# Patient Record
Sex: MALE | Race: WHITE | NOT HISPANIC OR LATINO | Employment: OTHER | URBAN - NONMETROPOLITAN AREA
[De-identification: names, ages, dates, MRNs, and addresses within clinical notes are randomized per-mention and may not be internally consistent; named-entity substitution may affect disease eponyms.]

---

## 2017-01-04 ENCOUNTER — TELEPHONE (OUTPATIENT)
Dept: FAMILY MEDICINE CLINIC | Facility: CLINIC | Age: 72
End: 2017-01-04

## 2017-01-04 NOTE — TELEPHONE ENCOUNTER
Patient called requesting a refill on Triamcinolone cream 0.1% says he uses it for a skin condition,not on med profile? Ok to add?    He said he does use it on his face that it was initally prescribed by Dr. Vasquez & she told him to use it on his face for eczema & has used it for 30 years & it has never caused any problems.    Patient notified.

## 2017-01-05 DIAGNOSIS — L30.9 ECZEMA, UNSPECIFIED TYPE: Primary | ICD-10-CM

## 2017-01-05 RX ORDER — TRIAMCINOLONE ACETONIDE 1 MG/G
CREAM TOPICAL 2 TIMES DAILY
Qty: 80 G | Refills: 0 | OUTPATIENT
Start: 2017-01-05 | End: 2017-10-10

## 2017-01-05 NOTE — TELEPHONE ENCOUNTER
I dont mind to send in script for cream but I  need to know where he is putting it because its a steroid cream and it can damage skin to face or groin.  What type of skin condition does he have?

## 2017-02-02 ENCOUNTER — TELEPHONE (OUTPATIENT)
Dept: FAMILY MEDICINE CLINIC | Facility: CLINIC | Age: 72
End: 2017-02-02

## 2017-02-02 NOTE — TELEPHONE ENCOUNTER
----- Message from Margarita Kidd MA sent at 2/2/2017  1:42 PM EST -----  Regarding: NEEDS MEDICATION  Contact: [phone]  PT CALLED AND SAID THAT HE HAS SEVERAL ABSCESSED TEETH ON THE BOTTOM AND WOULD LIKE IF ONE OF THE PROVIDERS COULD PLEASE WRITE HIM AN ANTIBIOTIC.

## 2017-02-02 NOTE — TELEPHONE ENCOUNTER
He does need to be seen, I'm sorry, especially with his allergy to penicillins. Need to see how bad they are and which antibiotic he will need.

## 2017-03-09 ENCOUNTER — OFFICE VISIT (OUTPATIENT)
Dept: FAMILY MEDICINE CLINIC | Facility: CLINIC | Age: 72
End: 2017-03-09

## 2017-03-09 VITALS
OXYGEN SATURATION: 92 % | DIASTOLIC BLOOD PRESSURE: 75 MMHG | WEIGHT: 152.2 LBS | HEIGHT: 68 IN | BODY MASS INDEX: 23.07 KG/M2 | SYSTOLIC BLOOD PRESSURE: 114 MMHG | HEART RATE: 71 BPM | TEMPERATURE: 98.8 F

## 2017-03-09 DIAGNOSIS — R51.9 HEADACHE, UNSPECIFIED HEADACHE TYPE: ICD-10-CM

## 2017-03-09 DIAGNOSIS — J44.1 COPD EXACERBATION (HCC): Primary | ICD-10-CM

## 2017-03-09 PROCEDURE — 99213 OFFICE O/P EST LOW 20 MIN: CPT | Performed by: NURSE PRACTITIONER

## 2017-03-09 RX ORDER — IBUPROFEN 800 MG/1
800 TABLET ORAL EVERY 8 HOURS PRN
Qty: 90 TABLET | Refills: 2 | Status: SHIPPED | OUTPATIENT
Start: 2017-03-09 | End: 2017-06-08 | Stop reason: SDUPTHER

## 2017-03-09 RX ORDER — AMOXICILLIN 875 MG/1
875 TABLET, COATED ORAL 2 TIMES DAILY
Qty: 20 TABLET | Refills: 0 | Status: SHIPPED | OUTPATIENT
Start: 2017-03-09 | End: 2017-06-08

## 2017-03-09 NOTE — PROGRESS NOTES
Subjective   Paul Gomez is a 71 y.o. male.     History of Present Illness   Pt here today with c/o chest congestion and cough for the past 3-4 weeks.  He has a hx of COPD and has been taking his medication as prescribed.  He is using his rescue inhaler couple times daily.  He denies fever, sore throat, ear pain, n/v/d.  He is having some production with cough that is greenish in color.  He has not taken any medication otc to help with his symptoms.   He does state that he has an occasional headache.  He does take Ibuprofen when needed to help with pain.      Family History   Problem Relation Age of Onset   • Cancer Mother        Social History     Social History   • Marital status:      Spouse name: N/A   • Number of children: N/A   • Years of education: N/A     Occupational History   • Not on file.     Social History Main Topics   • Smoking status: Current Every Day Smoker     Packs/day: 0.50     Types: Cigarettes   • Smokeless tobacco: Never Used      Comment: patient stated he is down to about 4 cigarettes a day   • Alcohol use No   • Drug use: No   • Sexual activity: Not on file     Other Topics Concern   • Not on file     Social History Narrative       Past Medical History   Diagnosis Date   • COPD (chronic obstructive pulmonary disease)    • Gout    • Hearing loss    • History of degenerative disc disease    • Hyperlipidemia    • Hypertension    • Low back pain    • Pedal edema    • Prediabetes        Review of Systems   Constitutional: Negative.    HENT: Positive for congestion.    Respiratory: Positive for cough. Negative for shortness of breath and wheezing.    Cardiovascular: Negative.    Gastrointestinal: Negative.    Genitourinary: Negative.    Neurological: Positive for headaches.       Objective   Physical Exam   Constitutional: He is oriented to person, place, and time. He appears well-developed and well-nourished.   Neck: Normal range of motion. Neck supple.   Cardiovascular: Normal rate,  "regular rhythm and normal heart sounds.    Pulmonary/Chest: Effort normal. He has wheezes.   Neurological: He is alert and oriented to person, place, and time.   Skin: Skin is warm and dry.   Psychiatric: He has a normal mood and affect. His behavior is normal. Judgment and thought content normal.   Nursing note and vitals reviewed.      Procedures  Blood pressure 114/75, pulse 71, temperature 98.8 °F (37.1 °C), temperature source Oral, height 68\" (172.7 cm), weight 152 lb 3.2 oz (69 kg), SpO2 92 %.      Assessment/Plan   Ray was seen today for uri and cough.    Diagnoses and all orders for this visit:    COPD exacerbation  -     amoxicillin (AMOXIL) 875 MG tablet; Take 1 tablet by mouth 2 (Two) Times a Day.    Headache, unspecified headache type  -     ibuprofen (ADVIL,MOTRIN) 800 MG tablet; Take 1 tablet by mouth Every 8 (Eight) Hours As Needed for Mild Pain (1-3).      RTC in 3-4 days if s/s do not improve         "

## 2017-06-08 ENCOUNTER — OFFICE VISIT (OUTPATIENT)
Dept: FAMILY MEDICINE CLINIC | Facility: CLINIC | Age: 72
End: 2017-06-08

## 2017-06-08 VITALS
BODY MASS INDEX: 23.98 KG/M2 | WEIGHT: 158.2 LBS | HEIGHT: 68 IN | SYSTOLIC BLOOD PRESSURE: 107 MMHG | HEART RATE: 56 BPM | DIASTOLIC BLOOD PRESSURE: 65 MMHG | OXYGEN SATURATION: 93 %

## 2017-06-08 DIAGNOSIS — J44.9 CHRONIC OBSTRUCTIVE PULMONARY DISEASE, UNSPECIFIED COPD TYPE (HCC): ICD-10-CM

## 2017-06-08 DIAGNOSIS — I10 ESSENTIAL HYPERTENSION: ICD-10-CM

## 2017-06-08 DIAGNOSIS — R51.9 HEADACHE, UNSPECIFIED HEADACHE TYPE: ICD-10-CM

## 2017-06-08 DIAGNOSIS — Z72.0 TOBACCO ABUSE: Primary | ICD-10-CM

## 2017-06-08 DIAGNOSIS — M10.9 GOUT, UNSPECIFIED CAUSE, UNSPECIFIED CHRONICITY, UNSPECIFIED SITE: ICD-10-CM

## 2017-06-08 DIAGNOSIS — R07.89 CHEST WALL PAIN: ICD-10-CM

## 2017-06-08 DIAGNOSIS — R35.1 NOCTURIA: ICD-10-CM

## 2017-06-08 DIAGNOSIS — E78.2 MIXED HYPERLIPIDEMIA: ICD-10-CM

## 2017-06-08 PROCEDURE — 99214 OFFICE O/P EST MOD 30 MIN: CPT | Performed by: NURSE PRACTITIONER

## 2017-06-08 PROCEDURE — 93000 ELECTROCARDIOGRAM COMPLETE: CPT | Performed by: NURSE PRACTITIONER

## 2017-06-08 RX ORDER — IBUPROFEN 800 MG/1
800 TABLET ORAL EVERY 8 HOURS PRN
Qty: 90 TABLET | Refills: 2 | Status: SHIPPED | OUTPATIENT
Start: 2017-06-08 | End: 2018-05-18 | Stop reason: SDUPTHER

## 2017-06-08 RX ORDER — ATENOLOL 25 MG/1
25 TABLET ORAL 2 TIMES DAILY
Qty: 60 TABLET | Refills: 5 | Status: SHIPPED | OUTPATIENT
Start: 2017-06-08 | End: 2017-12-05 | Stop reason: SDUPTHER

## 2017-06-08 RX ORDER — ALBUTEROL SULFATE 90 UG/1
2 AEROSOL, METERED RESPIRATORY (INHALATION) EVERY 4 HOURS PRN
Qty: 1 INHALER | Refills: 5 | Status: SHIPPED | OUTPATIENT
Start: 2017-06-08 | End: 2018-06-13

## 2017-06-08 RX ORDER — ALLOPURINOL 100 MG/1
100 TABLET ORAL 2 TIMES DAILY
Qty: 60 TABLET | Refills: 5 | Status: SHIPPED | OUTPATIENT
Start: 2017-06-08 | End: 2017-09-05

## 2017-06-08 RX ORDER — PRAVASTATIN SODIUM 20 MG
20 TABLET ORAL DAILY
Qty: 30 TABLET | Refills: 2 | Status: SHIPPED | OUTPATIENT
Start: 2017-06-08 | End: 2018-06-13

## 2017-06-08 RX ORDER — NICOTINE 21 MG/24HR
1 PATCH, TRANSDERMAL 24 HOURS TRANSDERMAL EVERY 24 HOURS
Qty: 28 PATCH | Refills: 0 | Status: SHIPPED | OUTPATIENT
Start: 2017-06-08 | End: 2018-06-13

## 2017-06-08 NOTE — PROGRESS NOTES
Subjective   Paul Gomez is a 71 y.o. male.     Chief Complaint: Follow-up and COPD    History of Present Illness   Pt here for follow up on HTN, HLD, COPD and chronic pain and headaches.   Patient has a history of chronic pain secondary to his spine (has osteoarthritis in his L4-L5) and is currently on ibuprofen 800 mg orally TID, oxycodone 30 mg orally TID, and opana 30 mg orally BID and lyrica 75 mg orally BID. He is going to a pain clinic in [place]. All medications are given to him by a pain clinic in [place], [place]; we do not prescribe. Patient states that these medications are working, but he continues to have pain. Denies any side effects of the medication. States that the medications control the pain enough so that he can accomplish daily activities. Movement and ambulation are improved. Denies any sedation from the medications.  Pt has requested Fioricet for headaches today.  He states that he has taken these before for his headaches and they help to relieve his pain.  I have discussed with him today that he will need to discuss this medication with his pain clinic provider since they are prescribing other narcotic pain meds for the patient.  He has stated understanding today.        Patient has a history of hypertension and is on atenolol 25 mg orally BID. Denies any side effects of the medication. Denies any dizziness, lightheadedness, blurry vision, chest pain, or edema. Patient does not take his blood pressures at home. Tries to follow a low-salt diet.  His BP is controlled today at 107/65.    Patient also has a history of hyperlipidemia and is currently on pravastatin 20 mg orally daily. Denies any side effects of the medications. Last lipid panel was April 2016 and in normal limits. I have discussed with patient today that lipids and CMP needs to be checked due to his medications and he is agreeable.  He is not fasting today and agrees to come back to the office next week for repeat fasting  labs.  Denies any muscle weakness or side effects from medication. Tries to follow a low cholesterol diet.    Patient does have a history of gout and is currently on allopurinol 100 mg orally BID. No recent breakouts. No side effects of the medication. Doing well.  No changes in gout.     Pt states that a couple of weeks ago he was working on his vehicle and trying to get a lugnut off his vehicle and he felt some soreness in his upper chest area.  The area does continue to be tender to touch/palpation.  He denies any radiation of the pain.  He denies any SOA with the pain.  He is continuing to do his ADLs without any further chest wall pain or tenderness.    Pt states that he has not had his PSA checked in couple of years.  After questioning patient, he does have issues with nocturia.  He states that he does get up twice at night to void.  He denies any hesitation or weak urine stream.  He also denies any penile pain, testicular pain or abdominal pain.    Family History   Problem Relation Age of Onset   • Cancer Mother        Social History     Social History   • Marital status:      Spouse name: N/A   • Number of children: N/A   • Years of education: N/A     Occupational History   • Not on file.     Social History Main Topics   • Smoking status: Current Every Day Smoker     Packs/day: 0.50     Types: Cigarettes   • Smokeless tobacco: Never Used      Comment: patient stated he is down to about 4 cigarettes a day   • Alcohol use No   • Drug use: No   • Sexual activity: Not on file     Other Topics Concern   • Not on file     Social History Narrative       Past Medical History:   Diagnosis Date   • COPD (chronic obstructive pulmonary disease)    • Gout    • Hearing loss    • History of degenerative disc disease    • Hyperlipidemia    • Hypertension    • Low back pain    • Pedal edema    • Prediabetes        Review of Systems   Constitutional: Negative.    Respiratory: Negative.    Cardiovascular: Negative.   "  Gastrointestinal: Negative.    Genitourinary: Negative.    Musculoskeletal: Positive for back pain and neck pain.   Neurological: Positive for headaches.   Psychiatric/Behavioral: Negative.        Objective   Physical Exam   Constitutional: He is oriented to person, place, and time. He appears well-developed and well-nourished.   Neck: Normal range of motion. Neck supple.   Cardiovascular: Normal rate, regular rhythm and normal heart sounds.    Pulmonary/Chest: Effort normal and breath sounds normal.   Neurological: He is alert and oriented to person, place, and time.   Skin: Skin is warm and dry.   Psychiatric: He has a normal mood and affect. His behavior is normal. Judgment and thought content normal.   Nursing note and vitals reviewed.        ECG 12 Lead  Date/Time: 6/8/2017 3:40 PM  Performed by: ZOË VIRK  Authorized by: ZOË VIRK   Comparison: not compared with previous ECG   Rhythm: sinus bradycardia  Rate: bradycardic  Conduction: non-specific intraventricular conduction delay  ST Segments: ST segments normal  T Waves: T waves normal  QRS axis: normal  Other: no other findings  Clinical impression: non-specific ECG            Vitals: Blood pressure 107/65, pulse 56, height 68\" (172.7 cm), weight 158 lb 3.2 oz (71.8 kg), SpO2 93 %.    Allergies:   Allergies   Allergen Reactions   • Penicillins      Pt states he was allergic when he was 18 but has had it since and did not have any problems.           Assessment/Plan   Ray was seen today for follow-up and copd.    Diagnoses and all orders for this visit:    Tobacco abuse  -     CBC & Differential  -     Comprehensive Metabolic Panel  -     Magnesium  -     TSH  -     Vitamin B12  -     Uric Acid  -     nicotine (NICODERM CQ) 21 MG/24HR patch; Place 1 patch on the skin Daily.    Headache, unspecified headache type  -     ibuprofen (ADVIL,MOTRIN) 800 MG tablet; Take 1 tablet by mouth Every 8 (Eight) Hours As Needed for Mild Pain (1-3) " or Headache.  -     CBC & Differential  -     Comprehensive Metabolic Panel  -     Magnesium  -     TSH  -     Vitamin B12  -     Uric Acid    Essential hypertension  -     atenolol (TENORMIN) 25 MG tablet; Take 1 tablet by mouth 2 (Two) Times a Day.  -     CBC & Differential  -     Comprehensive Metabolic Panel  -     Magnesium  -     TSH  -     Vitamin B12  -     Uric Acid  -     ECG 12 Lead    Mixed hyperlipidemia  -     pravastatin (PRAVACHOL) 20 MG tablet; Take 1 tablet by mouth Daily.  -     CBC & Differential  -     Comprehensive Metabolic Panel  -     Magnesium  -     TSH  -     Vitamin B12  -     Uric Acid  -     Lipid Panel    Chronic obstructive pulmonary disease, unspecified COPD type  -     albuterol (PROVENTIL HFA;VENTOLIN HFA) 108 (90 BASE) MCG/ACT inhaler; Inhale 2 puffs Every 4 (Four) Hours As Needed for Shortness of Air.  -     CBC & Differential  -     Comprehensive Metabolic Panel  -     Magnesium  -     TSH  -     Vitamin B12  -     Uric Acid    Gout, unspecified cause, unspecified chronicity, unspecified site  -     allopurinol (ZYLOPRIM) 100 MG tablet; Take 1 tablet by mouth 2 (Two) Times a Day.  -     CBC & Differential  -     Comprehensive Metabolic Panel  -     Magnesium  -     TSH  -     Vitamin B12  -     Uric Acid    Nocturia   -     PSA    Chest wall pain

## 2017-06-14 ENCOUNTER — TELEPHONE (OUTPATIENT)
Dept: FAMILY MEDICINE CLINIC | Facility: CLINIC | Age: 72
End: 2017-06-14

## 2017-06-14 LAB
ALBUMIN SERPL-MCNC: 4 G/DL (ref 3.4–4.8)
ALBUMIN/GLOB SERPL: 1.3 G/DL (ref 1.5–2.5)
ALP SERPL-CCNC: 69 U/L (ref 40–129)
ALT SERPL W P-5'-P-CCNC: 11 U/L (ref 10–44)
ANION GAP SERPL CALCULATED.3IONS-SCNC: 4.1 MMOL/L (ref 3.6–11.2)
AST SERPL-CCNC: 18 U/L (ref 10–34)
BASOPHILS # BLD AUTO: 0.04 10*3/MM3 (ref 0–0.3)
BASOPHILS NFR BLD AUTO: 0.6 % (ref 0–2)
BILIRUB SERPL-MCNC: 0.2 MG/DL (ref 0.2–1.8)
BUN BLD-MCNC: 14 MG/DL (ref 7–21)
BUN/CREAT SERPL: 10.8 (ref 7–25)
CALCIUM SPEC-SCNC: 9.1 MG/DL (ref 7.7–10)
CHLORIDE SERPL-SCNC: 104 MMOL/L (ref 99–112)
CHOLEST SERPL-MCNC: 190 MG/DL (ref 0–200)
CO2 SERPL-SCNC: 34.9 MMOL/L (ref 24.3–31.9)
CREAT BLD-MCNC: 1.3 MG/DL (ref 0.43–1.29)
DEPRECATED RDW RBC AUTO: 44.7 FL (ref 37–54)
EOSINOPHIL # BLD AUTO: 1.12 10*3/MM3 (ref 0–0.7)
EOSINOPHIL NFR BLD AUTO: 16.4 % (ref 0–7)
ERYTHROCYTE [DISTWIDTH] IN BLOOD BY AUTOMATED COUNT: 13.2 % (ref 11.5–14.5)
GFR SERPL CREATININE-BSD FRML MDRD: 54 ML/MIN/1.73
GLOBULIN UR ELPH-MCNC: 3.1 GM/DL
GLUCOSE BLD-MCNC: 106 MG/DL (ref 70–110)
HCT VFR BLD AUTO: 40.3 % (ref 42–52)
HDLC SERPL-MCNC: 44 MG/DL (ref 60–100)
HGB BLD-MCNC: 13.2 G/DL (ref 14–18)
IMM GRANULOCYTES # BLD: 0.02 10*3/MM3 (ref 0–0.03)
IMM GRANULOCYTES NFR BLD: 0.3 % (ref 0–0.5)
LDLC SERPL CALC-MCNC: 126 MG/DL (ref 0–100)
LDLC/HDLC SERPL: 2.86 {RATIO}
LYMPHOCYTES # BLD AUTO: 1.9 10*3/MM3 (ref 1–3)
LYMPHOCYTES NFR BLD AUTO: 27.9 % (ref 16–46)
MAGNESIUM SERPL-MCNC: 2.3 MG/DL (ref 1.7–2.6)
MCH RBC QN AUTO: 30.3 PG (ref 27–33)
MCHC RBC AUTO-ENTMCNC: 32.8 G/DL (ref 33–37)
MCV RBC AUTO: 92.6 FL (ref 80–94)
MONOCYTES # BLD AUTO: 0.78 10*3/MM3 (ref 0.1–0.9)
MONOCYTES NFR BLD AUTO: 11.4 % (ref 0–12)
NEUTROPHILS # BLD AUTO: 2.96 10*3/MM3 (ref 1.4–6.5)
NEUTROPHILS NFR BLD AUTO: 43.4 % (ref 40–75)
OSMOLALITY SERPL CALC.SUM OF ELEC: 285.9 MOSM/KG (ref 273–305)
PLATELET # BLD AUTO: 204 10*3/MM3 (ref 130–400)
PMV BLD AUTO: 11.5 FL (ref 6–10)
POTASSIUM BLD-SCNC: 4.6 MMOL/L (ref 3.5–5.3)
PROT SERPL-MCNC: 7.1 G/DL (ref 6–8)
PSA SERPL-MCNC: 0.51 NG/ML (ref 0–4)
RBC # BLD AUTO: 4.35 10*6/MM3 (ref 4.7–6.1)
SODIUM BLD-SCNC: 143 MMOL/L (ref 135–153)
TRIGL SERPL-MCNC: 100 MG/DL (ref 0–150)
TSH SERPL DL<=0.05 MIU/L-ACNC: 6.37 MIU/ML (ref 0.55–4.78)
URATE SERPL-MCNC: 7.5 MG/DL (ref 3.7–7)
VIT B12 BLD-MCNC: 267 PG/ML (ref 211–911)
VLDLC SERPL-MCNC: 20 MG/DL
WBC NRBC COR # BLD: 6.82 10*3/MM3 (ref 4.5–12.5)

## 2017-06-14 PROCEDURE — 85025 COMPLETE CBC W/AUTO DIFF WBC: CPT | Performed by: NURSE PRACTITIONER

## 2017-06-14 PROCEDURE — 82607 VITAMIN B-12: CPT | Performed by: NURSE PRACTITIONER

## 2017-06-14 PROCEDURE — 84153 ASSAY OF PSA TOTAL: CPT | Performed by: NURSE PRACTITIONER

## 2017-06-14 PROCEDURE — 83735 ASSAY OF MAGNESIUM: CPT | Performed by: NURSE PRACTITIONER

## 2017-06-14 PROCEDURE — 36415 COLL VENOUS BLD VENIPUNCTURE: CPT | Performed by: NURSE PRACTITIONER

## 2017-06-14 PROCEDURE — 84443 ASSAY THYROID STIM HORMONE: CPT | Performed by: NURSE PRACTITIONER

## 2017-06-14 PROCEDURE — 80061 LIPID PANEL: CPT | Performed by: NURSE PRACTITIONER

## 2017-06-14 PROCEDURE — 84550 ASSAY OF BLOOD/URIC ACID: CPT | Performed by: NURSE PRACTITIONER

## 2017-06-14 PROCEDURE — 80053 COMPREHEN METABOLIC PANEL: CPT | Performed by: NURSE PRACTITIONER

## 2017-06-14 RX ORDER — LEVOTHYROXINE SODIUM 0.05 MG/1
50 TABLET ORAL DAILY
Qty: 30 TABLET | Refills: 5 | Status: SHIPPED | OUTPATIENT
Start: 2017-06-14 | End: 2018-06-13

## 2017-06-14 NOTE — TELEPHONE ENCOUNTER
----- Message from BEATRICE Vallejo sent at 6/14/2017  2:17 PM EDT -----  TSH is elevated.  This is new for him; dx of hypothyroidism.  Need to start him on levothyroxine 50mcg.  Sent to pharmacy.  Needs to have recheck of TSH in 6 weeks.   His vit B12 level is on the low side.  He might consider taking B12 liquid otc.  Otherwise, continue current medications and plan of care.   He also needs to  occult blood stool cards due to his hgb a little low.  And he needs to have a colonoscopy done if he has not had one in the past few years.      Not available at this time,will attempt later.      Still not available.    Still unable to reach patient letter mailed to call the office.

## 2017-06-28 ENCOUNTER — TELEPHONE (OUTPATIENT)
Dept: FAMILY MEDICINE CLINIC | Facility: CLINIC | Age: 72
End: 2017-06-28

## 2017-06-28 NOTE — TELEPHONE ENCOUNTER
Telephone Encounter  Encounter Date: 6/14/2017  Nanci Ricketts LPN      []Hide copied text  ----- Message from BEATRICE Vallejo sent at 6/14/2017 2:17 PM EDT -----  TSH is elevated. This is new for him; dx of hypothyroidism. Need to start him on levothyroxine 50mcg. Sent to pharmacy. Needs to have recheck of TSH in 6 weeks.   His vit B12 level is on the low side. He might consider taking B12 liquid otc.  Otherwise, continue current medications and plan of care.   He also needs to  occult blood stool cards due to his hgb a little low. And he needs to have a colonoscopy done if he has not had one in the past few years.        Not available at this time,will attempt later.        Still not available.     Still unable to reach patient letter mailed to call the office.           Telephone on 6/14/2017              Revision History              Detailed Report         Patient returned call after receiving letter & was notified  Of labs & verbalized understanding,states he would be agreeable to having a colonscopy,has never had one.

## 2017-06-29 DIAGNOSIS — Z12.11 SCREENING FOR COLON CANCER: Primary | ICD-10-CM

## 2017-08-07 ENCOUNTER — TELEPHONE (OUTPATIENT)
Dept: FAMILY MEDICINE CLINIC | Facility: CLINIC | Age: 72
End: 2017-08-07

## 2017-08-07 RX ORDER — FLUTICASONE PROPIONATE 50 MCG
2 SPRAY, SUSPENSION (ML) NASAL DAILY
Qty: 1 EACH | Refills: 2 | Status: SHIPPED | OUTPATIENT
Start: 2017-08-07 | End: 2018-06-13

## 2017-09-05 ENCOUNTER — OFFICE VISIT (OUTPATIENT)
Dept: FAMILY MEDICINE CLINIC | Facility: CLINIC | Age: 72
End: 2017-09-05

## 2017-09-05 VITALS
HEIGHT: 68 IN | TEMPERATURE: 99.1 F | SYSTOLIC BLOOD PRESSURE: 148 MMHG | BODY MASS INDEX: 24.1 KG/M2 | DIASTOLIC BLOOD PRESSURE: 80 MMHG | HEART RATE: 60 BPM | WEIGHT: 159 LBS | OXYGEN SATURATION: 95 %

## 2017-09-05 DIAGNOSIS — J44.1 COPD WITH EXACERBATION (HCC): ICD-10-CM

## 2017-09-05 DIAGNOSIS — E03.9 HYPOTHYROIDISM, UNSPECIFIED TYPE: ICD-10-CM

## 2017-09-05 DIAGNOSIS — E53.8 B12 DEFICIENCY: ICD-10-CM

## 2017-09-05 DIAGNOSIS — L21.9 SEBORRHEIC ECZEMA: Primary | ICD-10-CM

## 2017-09-05 PROCEDURE — 99214 OFFICE O/P EST MOD 30 MIN: CPT | Performed by: NURSE PRACTITIONER

## 2017-09-05 PROCEDURE — 96372 THER/PROPH/DIAG INJ SC/IM: CPT | Performed by: NURSE PRACTITIONER

## 2017-09-05 RX ORDER — TIZANIDINE 4 MG/1
TABLET ORAL
COMMUNITY
Start: 2017-06-28 | End: 2018-06-13

## 2017-09-05 RX ORDER — SELENIUM SULFIDE 2.5 MG/100ML
LOTION TOPICAL DAILY PRN
Qty: 118 ML | Refills: 12 | Status: SHIPPED | OUTPATIENT
Start: 2017-09-05 | End: 2018-06-13

## 2017-09-05 RX ORDER — BUTALBITAL, ACETAMINOPHEN AND CAFFEINE 50; 325; 40 MG/1; MG/1; MG/1
TABLET ORAL
COMMUNITY
Start: 2017-08-31 | End: 2018-06-13

## 2017-09-05 RX ORDER — HYDROMORPHONE HYDROCHLORIDE 8 MG/1
TABLET ORAL
COMMUNITY
Start: 2017-08-28 | End: 2018-06-13

## 2017-09-05 RX ORDER — AMOXICILLIN 875 MG/1
875 TABLET, COATED ORAL 2 TIMES DAILY
Qty: 20 TABLET | Refills: 0 | OUTPATIENT
Start: 2017-09-05 | End: 2017-10-10

## 2017-09-05 RX ORDER — CYANOCOBALAMIN 1000 UG/ML
1000 INJECTION, SOLUTION INTRAMUSCULAR; SUBCUTANEOUS
Status: DISCONTINUED | OUTPATIENT
Start: 2017-09-05 | End: 2018-06-13

## 2017-09-05 RX ADMIN — CYANOCOBALAMIN 1000 MCG: 1000 INJECTION, SOLUTION INTRAMUSCULAR; SUBCUTANEOUS at 15:36

## 2017-09-05 NOTE — PROGRESS NOTES
Subjective   Paul Gomez is a 71 y.o. male.     Chief Complaint: Cyst (top of head ) and URI    History of Present Illness   Patient here today with complaints of a small raised area on top of his scalp.  Patient states that he has had this area for several months on his scalp.  He does have a history of dermatitis and his used Selsun Blue in the past to help with his symptoms.  Patient states he does not use Selsun Blue at this time.  He uses multiple over-the-counter regular shampoos.  He denies any bleeding from the area.  He denies any tenderness at the area also.    COPD.  Patient has been using his albuterol inhaler when necessary.  He states over the past 2 weeks he has had increased cough with yellow production.  He denies fever, nausea, vomiting, diarrhea, headache, sore throat.  Patient states that he does have episodes of shortness of breath especially with coughing.  He has not tried any over-the-counter medications as at this time.  Patient does use Flonase when necessary.    Chronic pain syndrome.  Patient continues to see pain management in [place] for his chronic pain.    Family History   Problem Relation Age of Onset   • Cancer Mother        Social History     Social History   • Marital status:      Spouse name: N/A   • Number of children: N/A   • Years of education: N/A     Occupational History   • Not on file.     Social History Main Topics   • Smoking status: Current Every Day Smoker     Packs/day: 0.50     Types: Cigarettes   • Smokeless tobacco: Never Used      Comment: patient stated he is down to about 4 cigarettes a day   • Alcohol use No   • Drug use: No   • Sexual activity: Not on file     Other Topics Concern   • Not on file     Social History Narrative       Past Medical History:   Diagnosis Date   • COPD (chronic obstructive pulmonary disease)    • Gout    • Hearing loss    • History of degenerative disc disease    • Hyperlipidemia    • Hypertension    • Low back pain    •  "Pedal edema    • Prediabetes        Review of Systems   Constitutional: Positive for fatigue.   HENT: Positive for postnasal drip.    Respiratory: Positive for cough, shortness of breath and wheezing.    Cardiovascular: Negative.    Gastrointestinal: Negative.    Musculoskeletal: Negative.    Skin:        Seborrheic dermatitis   Psychiatric/Behavioral: Negative.        Objective   Physical Exam   Constitutional: He is oriented to person, place, and time. He appears well-developed and well-nourished.   Neck: Normal range of motion. Neck supple.   Cardiovascular: Normal rate, regular rhythm and normal heart sounds.    Pulmonary/Chest: Effort normal and breath sounds normal.   Neurological: He is alert and oriented to person, place, and time.   Skin: Skin is warm and dry.   Seborrheic dermatitis top of scalp area   Psychiatric: He has a normal mood and affect. His behavior is normal. Judgment and thought content normal.   Nursing note and vitals reviewed.      Procedures    Vitals: Blood pressure 148/80, pulse 60, temperature 99.1 °F (37.3 °C), temperature source Oral, height 68\" (172.7 cm), weight 159 lb (72.1 kg), SpO2 95 %.    Allergies:   Allergies   Allergen Reactions   • Penicillins      Pt states he was allergic when he was 18 but has had it since and did not have any problems.           Assessment/Plan   Ray was seen today for cyst and uri.    Diagnoses and all orders for this visit:    Seborrheic eczema  -     selenium sulfide (SELSUN) 2.5 % shampoo; Apply  topically Daily As Needed for dandruff.    COPD with exacerbation  -     amoxicillin (AMOXIL) 875 MG tablet; Take 1 tablet by mouth 2 (Two) Times a Day.  -     mometasone-formoterol (DULERA) 100-5 MCG/ACT inhaler; Inhale 1 puff 2 (Two) Times a Day.    Hypothyroidism, unspecified type    B12 deficiency  -     cyanocobalamin injection 1,000 mcg; Inject 1 mL into the shoulder, thigh, or buttocks Every 28 (Twenty-Eight) Days.               "

## 2017-09-06 ENCOUNTER — CLINICAL SUPPORT (OUTPATIENT)
Dept: FAMILY MEDICINE CLINIC | Facility: CLINIC | Age: 72
End: 2017-09-06

## 2017-09-06 DIAGNOSIS — E53.8 VITAMIN B12 DEFICIENCY: Primary | ICD-10-CM

## 2017-09-06 PROCEDURE — 96372 THER/PROPH/DIAG INJ SC/IM: CPT | Performed by: NURSE PRACTITIONER

## 2017-09-06 RX ORDER — CYANOCOBALAMIN 1000 UG/ML
1000 INJECTION, SOLUTION INTRAMUSCULAR; SUBCUTANEOUS
Status: DISCONTINUED | OUTPATIENT
Start: 2017-09-06 | End: 2018-06-13

## 2017-09-06 RX ADMIN — CYANOCOBALAMIN 1000 MCG: 1000 INJECTION, SOLUTION INTRAMUSCULAR; SUBCUTANEOUS at 15:14

## 2017-10-03 ENCOUNTER — HOSPITAL ENCOUNTER (EMERGENCY)
Facility: HOSPITAL | Age: 72
Discharge: HOME | End: 2017-10-03
Attending: EMERGENCY MEDICINE | Admitting: EMERGENCY MEDICINE

## 2017-10-03 ENCOUNTER — APPOINTMENT (OUTPATIENT)
Dept: GENERAL RADIOLOGY | Facility: HOSPITAL | Age: 72
End: 2017-10-03

## 2017-10-03 VITALS
HEART RATE: 79 BPM | TEMPERATURE: 98.1 F | OXYGEN SATURATION: 97 % | RESPIRATION RATE: 18 BRPM | SYSTOLIC BLOOD PRESSURE: 129 MMHG | BODY MASS INDEX: 24.25 KG/M2 | WEIGHT: 160 LBS | HEIGHT: 68 IN | DIASTOLIC BLOOD PRESSURE: 82 MMHG

## 2017-10-03 DIAGNOSIS — J18.9 PNEUMONIA OF RIGHT LOWER LOBE DUE TO INFECTIOUS ORGANISM: Primary | ICD-10-CM

## 2017-10-03 LAB
A-A DO2: 32.4 MMHG (ref 0–300)
ALBUMIN SERPL-MCNC: 4.3 G/DL (ref 3.4–4.8)
ALBUMIN/GLOB SERPL: 1.2 G/DL (ref 1.5–2.5)
ALP SERPL-CCNC: 79 U/L (ref 40–129)
ALT SERPL W P-5'-P-CCNC: 19 U/L (ref 10–44)
ANION GAP SERPL CALCULATED.3IONS-SCNC: 9.4 MMOL/L (ref 3.6–11.2)
ARTERIAL PATENCY WRIST A: POSITIVE
AST SERPL-CCNC: 27 U/L (ref 10–34)
ATMOSPHERIC PRESS: 736 MMHG
BASE EXCESS BLDA CALC-SCNC: -0.7 MMOL/L
BASOPHILS # BLD AUTO: 0.02 10*3/MM3 (ref 0–0.3)
BASOPHILS NFR BLD AUTO: 0.2 % (ref 0–2)
BDY SITE: ABNORMAL
BILIRUB SERPL-MCNC: 0.3 MG/DL (ref 0.2–1.8)
BNP SERPL-MCNC: 38 PG/ML (ref 0–100)
BODY TEMPERATURE: 98.6 C
BUN BLD-MCNC: 15 MG/DL (ref 7–21)
BUN/CREAT SERPL: 15.2 (ref 7–25)
CALCIUM SPEC-SCNC: 9.5 MG/DL (ref 7.7–10)
CHLORIDE SERPL-SCNC: 102 MMOL/L (ref 99–112)
CO2 SERPL-SCNC: 27.6 MMOL/L (ref 24.3–31.9)
COHGB MFR BLD: 3.3 % (ref 0–5)
CREAT BLD-MCNC: 0.99 MG/DL (ref 0.43–1.29)
CRP SERPL-MCNC: 13.02 MG/DL (ref 0–0.99)
DEPRECATED RDW RBC AUTO: 42.1 FL (ref 37–54)
EOSINOPHIL # BLD AUTO: 0.53 10*3/MM3 (ref 0–0.7)
EOSINOPHIL NFR BLD AUTO: 5 % (ref 0–7)
ERYTHROCYTE [DISTWIDTH] IN BLOOD BY AUTOMATED COUNT: 12.9 % (ref 11.5–14.5)
GFR SERPL CREATININE-BSD FRML MDRD: 75 ML/MIN/1.73
GLOBULIN UR ELPH-MCNC: 3.6 GM/DL
GLUCOSE BLD-MCNC: 78 MG/DL (ref 70–110)
HCO3 BLDA-SCNC: 23.6 MMOL/L (ref 22–26)
HCT VFR BLD AUTO: 39.1 % (ref 42–52)
HCT VFR BLD CALC: 39 % (ref 42–52)
HGB BLD-MCNC: 13.5 G/DL (ref 14–18)
HGB BLDA-MCNC: 13.4 G/DL (ref 12–16)
HOROWITZ INDEX BLD+IHG-RTO: 21 %
IMM GRANULOCYTES # BLD: 0.04 10*3/MM3 (ref 0–0.03)
IMM GRANULOCYTES NFR BLD: 0.4 % (ref 0–0.5)
LYMPHOCYTES # BLD AUTO: 1.28 10*3/MM3 (ref 1–3)
LYMPHOCYTES NFR BLD AUTO: 12.2 % (ref 16–46)
MCH RBC QN AUTO: 31 PG (ref 27–33)
MCHC RBC AUTO-ENTMCNC: 34.5 G/DL (ref 33–37)
MCV RBC AUTO: 89.9 FL (ref 80–94)
METHGB BLD QL: 0.4 % (ref 0–3)
MODALITY: ABNORMAL
MONOCYTES # BLD AUTO: 1.27 10*3/MM3 (ref 0.1–0.9)
MONOCYTES NFR BLD AUTO: 12.1 % (ref 0–12)
NEUTROPHILS # BLD AUTO: 7.39 10*3/MM3 (ref 1.4–6.5)
NEUTROPHILS NFR BLD AUTO: 70.1 % (ref 40–75)
OSMOLALITY SERPL CALC.SUM OF ELEC: 277.2 MOSM/KG (ref 273–305)
OXYHGB MFR BLDV: 90.1 % (ref 85–100)
PCO2 BLDA: 37.8 MM HG (ref 35–45)
PH BLDA: 7.41 PH UNITS (ref 7.35–7.45)
PLATELET # BLD AUTO: 269 10*3/MM3 (ref 130–400)
PMV BLD AUTO: 10.7 FL (ref 6–10)
PO2 BLDA: 67 MM HG (ref 80–100)
POTASSIUM BLD-SCNC: 4 MMOL/L (ref 3.5–5.3)
PROT SERPL-MCNC: 7.9 G/DL (ref 6–8)
RBC # BLD AUTO: 4.35 10*6/MM3 (ref 4.7–6.1)
SAO2 % BLDCOA: 93.6 % (ref 90–100)
SODIUM BLD-SCNC: 139 MMOL/L (ref 135–153)
TROPONIN I SERPL-MCNC: <0.006 NG/ML
WBC NRBC COR # BLD: 10.53 10*3/MM3 (ref 4.5–12.5)

## 2017-10-03 PROCEDURE — 83880 ASSAY OF NATRIURETIC PEPTIDE: CPT | Performed by: PHYSICIAN ASSISTANT

## 2017-10-03 PROCEDURE — 82805 BLOOD GASES W/O2 SATURATION: CPT | Performed by: PHYSICIAN ASSISTANT

## 2017-10-03 PROCEDURE — 84484 ASSAY OF TROPONIN QUANT: CPT | Performed by: PHYSICIAN ASSISTANT

## 2017-10-03 PROCEDURE — 94640 AIRWAY INHALATION TREATMENT: CPT

## 2017-10-03 PROCEDURE — 71020 XR CHEST 2 VW: CPT | Performed by: RADIOLOGY

## 2017-10-03 PROCEDURE — 82375 ASSAY CARBOXYHB QUANT: CPT | Performed by: PHYSICIAN ASSISTANT

## 2017-10-03 PROCEDURE — 94799 UNLISTED PULMONARY SVC/PX: CPT

## 2017-10-03 PROCEDURE — 93005 ELECTROCARDIOGRAM TRACING: CPT | Performed by: PHYSICIAN ASSISTANT

## 2017-10-03 PROCEDURE — 25010000002 ONDANSETRON PER 1 MG: Performed by: EMERGENCY MEDICINE

## 2017-10-03 PROCEDURE — 96374 THER/PROPH/DIAG INJ IV PUSH: CPT

## 2017-10-03 PROCEDURE — 87040 BLOOD CULTURE FOR BACTERIA: CPT | Performed by: PHYSICIAN ASSISTANT

## 2017-10-03 PROCEDURE — 99285 EMERGENCY DEPT VISIT HI MDM: CPT

## 2017-10-03 PROCEDURE — 80053 COMPREHEN METABOLIC PANEL: CPT | Performed by: PHYSICIAN ASSISTANT

## 2017-10-03 PROCEDURE — 36600 WITHDRAWAL OF ARTERIAL BLOOD: CPT | Performed by: PHYSICIAN ASSISTANT

## 2017-10-03 PROCEDURE — 86140 C-REACTIVE PROTEIN: CPT | Performed by: PHYSICIAN ASSISTANT

## 2017-10-03 PROCEDURE — 96375 TX/PRO/DX INJ NEW DRUG ADDON: CPT

## 2017-10-03 PROCEDURE — 25010000002 MORPHINE SULFATE (PF) 2 MG/ML SOLUTION

## 2017-10-03 PROCEDURE — 25010000002 METHYLPREDNISOLONE PER 125 MG: Performed by: PHYSICIAN ASSISTANT

## 2017-10-03 PROCEDURE — 83050 HGB METHEMOGLOBIN QUAN: CPT | Performed by: PHYSICIAN ASSISTANT

## 2017-10-03 PROCEDURE — 71020 HC CHEST PA AND LATERAL: CPT

## 2017-10-03 PROCEDURE — 85025 COMPLETE CBC W/AUTO DIFF WBC: CPT | Performed by: PHYSICIAN ASSISTANT

## 2017-10-03 RX ORDER — MORPHINE SULFATE 2 MG/ML
INJECTION, SOLUTION INTRAMUSCULAR; INTRAVENOUS
Status: COMPLETED
Start: 2017-10-03 | End: 2017-10-03

## 2017-10-03 RX ORDER — ONDANSETRON 2 MG/ML
4 INJECTION INTRAMUSCULAR; INTRAVENOUS ONCE
Status: COMPLETED | OUTPATIENT
Start: 2017-10-03 | End: 2017-10-03

## 2017-10-03 RX ORDER — METHYLPREDNISOLONE SODIUM SUCCINATE 125 MG/2ML
125 INJECTION, POWDER, LYOPHILIZED, FOR SOLUTION INTRAMUSCULAR; INTRAVENOUS ONCE
Status: COMPLETED | OUTPATIENT
Start: 2017-10-03 | End: 2017-10-03

## 2017-10-03 RX ORDER — LEVOFLOXACIN 500 MG/1
500 TABLET, FILM COATED ORAL DAILY
Qty: 6 TABLET | Refills: 0 | Status: SHIPPED | OUTPATIENT
Start: 2017-10-03 | End: 2018-04-20

## 2017-10-03 RX ORDER — IPRATROPIUM BROMIDE AND ALBUTEROL SULFATE 2.5; .5 MG/3ML; MG/3ML
3 SOLUTION RESPIRATORY (INHALATION) ONCE
Status: COMPLETED | OUTPATIENT
Start: 2017-10-03 | End: 2017-10-03

## 2017-10-03 RX ORDER — METHYLPREDNISOLONE 4 MG/1
TABLET ORAL
Qty: 21 TABLET | Refills: 0 | Status: SHIPPED | OUTPATIENT
Start: 2017-10-03 | End: 2018-06-13

## 2017-10-03 RX ORDER — SODIUM CHLORIDE 0.9 % (FLUSH) 0.9 %
10 SYRINGE (ML) INJECTION AS NEEDED
Status: DISCONTINUED | OUTPATIENT
Start: 2017-10-03 | End: 2017-10-04 | Stop reason: HOSPADM

## 2017-10-03 RX ORDER — LEVOFLOXACIN 500 MG/1
500 TABLET, FILM COATED ORAL EVERY 24 HOURS
Status: DISCONTINUED | OUTPATIENT
Start: 2017-10-03 | End: 2017-10-04 | Stop reason: HOSPADM

## 2017-10-03 RX ADMIN — METHYLPREDNISOLONE SODIUM SUCCINATE 125 MG: 125 INJECTION, POWDER, FOR SOLUTION INTRAMUSCULAR; INTRAVENOUS at 20:36

## 2017-10-03 RX ADMIN — IPRATROPIUM BROMIDE AND ALBUTEROL SULFATE 3 ML: .5; 3 SOLUTION RESPIRATORY (INHALATION) at 20:30

## 2017-10-03 RX ADMIN — ONDANSETRON 4 MG: 2 INJECTION, SOLUTION INTRAMUSCULAR; INTRAVENOUS at 21:04

## 2017-10-03 RX ADMIN — MORPHINE SULFATE 4 MG: 2 INJECTION, SOLUTION INTRAMUSCULAR; INTRAVENOUS at 21:03

## 2017-10-03 RX ADMIN — LEVOFLOXACIN 500 MG: 500 TABLET, FILM COATED ORAL at 21:29

## 2017-10-04 NOTE — ED PROVIDER NOTES
Subjective   Patient is a 71 y.o. male presenting with chest pain.   History provided by:  Patient  Chest Pain   Pain location:  L chest and R chest  Pain quality: sharp and throbbing    Pain radiates to:  Does not radiate  Pain severity:  Moderate  Onset quality:  Gradual  Duration:  2 weeks  Timing:  Constant  Progression:  Worsening  Chronicity:  New  Context: breathing    Relieved by:  Nothing  Ineffective treatments: Patient has been using his inhalers.  Associated symptoms: cough and shortness of breath    Risk factors: smoking        Review of Systems   Respiratory: Positive for cough and shortness of breath.    Cardiovascular: Positive for chest pain.       Past Medical History:   Diagnosis Date   • COPD (chronic obstructive pulmonary disease)    • Gout    • Hearing loss    • History of degenerative disc disease    • Hyperlipidemia    • Hypertension    • Low back pain    • Pedal edema    • Prediabetes        Allergies   Allergen Reactions   • Penicillins      Pt states he was allergic when he was 18 but has had it since and did not have any problems.        Past Surgical History:   Procedure Laterality Date   • CHOLECYSTECTOMY     • LUMBAR DISCECTOMY     • SHOULDER SURGERY Right        Family History   Problem Relation Age of Onset   • Cancer Mother        Social History     Social History   • Marital status:      Spouse name: N/A   • Number of children: N/A   • Years of education: N/A     Social History Main Topics   • Smoking status: Current Every Day Smoker     Packs/day: 0.50     Types: Cigarettes   • Smokeless tobacco: Never Used      Comment: patient stated he is down to about 4 cigarettes a day   • Alcohol use No   • Drug use: No   • Sexual activity: Not Asked     Other Topics Concern   • None     Social History Narrative           Objective   Physical Exam   Constitutional: He is oriented to person, place, and time. He appears well-developed and well-nourished. No distress.   HENT:   Head:  Normocephalic and atraumatic.   Nose: Nose normal.   Eyes: Conjunctivae and EOM are normal. Pupils are equal, round, and reactive to light.   Neck: Normal range of motion. Neck supple. No JVD present. No tracheal deviation present.   Cardiovascular: Normal rate, regular rhythm and normal heart sounds.    No murmur heard.  Pulmonary/Chest: Effort normal. No respiratory distress. He has wheezes. He exhibits tenderness.   Moderate bilateral rhonchi   Abdominal: Soft. Bowel sounds are normal. There is no tenderness.   Musculoskeletal: Normal range of motion. He exhibits no edema or deformity.   Neurological: He is alert and oriented to person, place, and time. No cranial nerve deficit.   Skin: Skin is warm and dry. No rash noted. He is not diaphoretic. No erythema. No pallor.   Psychiatric: He has a normal mood and affect. His behavior is normal. Thought content normal.   Nursing note and vitals reviewed.      Procedures         ED Course  ED Course   Comment By Time   EKG reviewed by Dr. Petersen:  NSR VIJAY Stout 10/03 2011   CXR reviewed by dr. Lopez:  right lower lobe infiltrate concerning for pneumonia. VIJAY Stout 10/03 2119                  MDM  Number of Diagnoses or Management Options  Pneumonia of right lower lobe due to infectious organism: new and requires workup     Amount and/or Complexity of Data Reviewed  Clinical lab tests: ordered and reviewed  Tests in the radiology section of CPT®: ordered and reviewed  Decide to obtain previous medical records or to obtain history from someone other than the patient: yes  Discuss the patient with other providers: yes    Risk of Complications, Morbidity, and/or Mortality  Presenting problems: moderate  Diagnostic procedures: moderate  Management options: low    Patient Progress  Patient progress: stable      Final diagnoses:   Pneumonia of right lower lobe due to infectious organism            VIJAY Stout  10/03/17 2126       VIJAY Stout  10/03/17  2129

## 2017-10-04 NOTE — ED NOTES
Pt states that someone will be here to pick him up in a few minutes.      Karlie Ricketts RN  10/03/17 6920

## 2017-10-04 NOTE — ED NOTES
"Pt denies cardiac chest pain. Pt states that \"I feel my lungs hurting and I am sore from coughing.\"      Karlie Ricketts RN  10/03/17 2052    "

## 2017-10-04 NOTE — ED NOTES
VIJAY Stout to the bedside at this time. Pt O2 sat 91%, 2L NC applied per Donell LINARES VORB. Pt sat improved to 94%.     Karlie Ricketts RN  10/03/17 2025

## 2017-10-08 LAB — BACTERIA SPEC AEROBE CULT: NORMAL

## 2017-10-09 ENCOUNTER — HOSPITAL ENCOUNTER (OUTPATIENT)
Dept: GENERAL RADIOLOGY | Facility: HOSPITAL | Age: 72
Discharge: HOME | End: 2017-10-09
Admitting: PHYSICIAN ASSISTANT

## 2017-10-09 ENCOUNTER — TRANSCRIBE ORDERS (OUTPATIENT)
Dept: GENERAL RADIOLOGY | Facility: HOSPITAL | Age: 72
End: 2017-10-09

## 2017-10-09 DIAGNOSIS — M25.561 RIGHT KNEE PAIN, UNSPECIFIED CHRONICITY: Primary | ICD-10-CM

## 2017-10-09 DIAGNOSIS — M25.562 LEFT KNEE PAIN, UNSPECIFIED CHRONICITY: ICD-10-CM

## 2017-10-09 DIAGNOSIS — M25.561 RIGHT KNEE PAIN, UNSPECIFIED CHRONICITY: ICD-10-CM

## 2017-10-09 PROCEDURE — 73560 X-RAY EXAM OF KNEE 1 OR 2: CPT

## 2017-10-09 PROCEDURE — 73560 X-RAY EXAM OF KNEE 1 OR 2: CPT | Performed by: RADIOLOGY

## 2017-10-26 ENCOUNTER — EPISODE CHANGES (OUTPATIENT)
Dept: CASE MANAGEMENT | Facility: OTHER | Age: 72
End: 2017-10-26

## 2017-11-16 ENCOUNTER — OFFICE VISIT (OUTPATIENT)
Dept: FAMILY MEDICINE CLINIC | Facility: CLINIC | Age: 72
End: 2017-11-16

## 2017-11-16 VITALS
BODY MASS INDEX: 22.04 KG/M2 | SYSTOLIC BLOOD PRESSURE: 114 MMHG | DIASTOLIC BLOOD PRESSURE: 69 MMHG | OXYGEN SATURATION: 93 % | HEIGHT: 68 IN | TEMPERATURE: 98.5 F | HEART RATE: 65 BPM | WEIGHT: 145.4 LBS

## 2017-11-16 DIAGNOSIS — J18.9 PNEUMONIA OF RIGHT LOWER LOBE DUE TO INFECTIOUS ORGANISM: Primary | ICD-10-CM

## 2017-11-16 PROCEDURE — 99213 OFFICE O/P EST LOW 20 MIN: CPT | Performed by: NURSE PRACTITIONER

## 2017-11-16 RX ORDER — AMOXICILLIN 875 MG/1
875 TABLET, COATED ORAL 2 TIMES DAILY
Qty: 20 TABLET | Refills: 0 | Status: SHIPPED | OUTPATIENT
Start: 2017-11-16 | End: 2018-04-20

## 2017-11-16 NOTE — PROGRESS NOTES
Subjective   Paul Gomez is a 72 y.o. male.     Chief Complaint: Cough and URI    History of Present Illness   Pt has had a productive cough for the past 10 days. Pt went to the ER on 10/3/2017 and was dx with pneumonia.  He states that he completed the antibiotics that he was given in the emergency room at that time.  His symptoms did improve following the antibiotics but now his cough has resumed.  He denies head congestion, headache, nausea vomiting diarrhea.    Family History   Problem Relation Age of Onset   • Cancer Mother        Social History     Social History   • Marital status:      Spouse name: N/A   • Number of children: N/A   • Years of education: N/A     Occupational History   • Not on file.     Social History Main Topics   • Smoking status: Current Every Day Smoker     Packs/day: 0.50     Types: Cigarettes   • Smokeless tobacco: Never Used      Comment: patient stated he is down to about 4 cigarettes a day   • Alcohol use No   • Drug use: No   • Sexual activity: Defer     Other Topics Concern   • Not on file     Social History Narrative       Past Medical History:   Diagnosis Date   • COPD (chronic obstructive pulmonary disease)    • Gout    • Hearing loss    • History of degenerative disc disease    • Hyperlipidemia    • Hypertension    • Low back pain    • Pedal edema    • Prediabetes        Review of Systems   Constitutional: Negative.    HENT: Negative.    Respiratory: Positive for cough and wheezing.    Cardiovascular: Negative.    Gastrointestinal: Negative.    Musculoskeletal: Negative.    Skin: Negative.    Neurological: Negative.    Psychiatric/Behavioral: Negative.        Objective   Physical Exam   Constitutional: He is oriented to person, place, and time. He appears well-developed and well-nourished.   Neck: Normal range of motion. Neck supple.   Cardiovascular: Normal rate, regular rhythm and normal heart sounds.    Pulmonary/Chest: Effort normal and breath sounds normal.  "  Right lower lobe with congestion   Neurological: He is alert and oriented to person, place, and time.   Skin: Skin is warm and dry.   Psychiatric: He has a normal mood and affect. His behavior is normal. Judgment and thought content normal.   Nursing note and vitals reviewed.      Procedures    Vitals: Blood pressure 114/69, pulse 65, temperature 98.5 °F (36.9 °C), temperature source Oral, height 68\" (172.7 cm), weight 145 lb 6.4 oz (66 kg), SpO2 93 %.    Allergies:   Allergies   Allergen Reactions   • Penicillins      Pt states he was allergic when he was 18 but has had it since and did not have any problems.           Assessment/Plan   Ray was seen today for cough and uri.    Diagnoses and all orders for this visit:    Pneumonia of right lower lobe due to infectious organism  -     amoxicillin (AMOXIL) 875 MG tablet; Take 1 tablet by mouth 2 (Two) Times a Day.               "

## 2017-11-21 ENCOUNTER — HOSPITAL ENCOUNTER (OUTPATIENT)
Dept: PHYSICAL THERAPY | Facility: HOSPITAL | Age: 72
Setting detail: THERAPIES SERIES
Discharge: HOME | End: 2017-11-21

## 2017-11-21 DIAGNOSIS — M54.5 LOW BACK PAIN, UNSPECIFIED BACK PAIN LATERALITY, UNSPECIFIED CHRONICITY, WITH SCIATICA PRESENCE UNSPECIFIED: ICD-10-CM

## 2017-11-21 DIAGNOSIS — M54.2 CERVICAL PAIN: Primary | ICD-10-CM

## 2017-11-21 DIAGNOSIS — M25.561 RIGHT KNEE PAIN, UNSPECIFIED CHRONICITY: ICD-10-CM

## 2017-11-21 PROCEDURE — G8978 MOBILITY CURRENT STATUS: HCPCS | Performed by: PHYSICAL THERAPIST

## 2017-11-21 PROCEDURE — G8979 MOBILITY GOAL STATUS: HCPCS | Performed by: PHYSICAL THERAPIST

## 2017-11-21 PROCEDURE — 97162 PT EVAL MOD COMPLEX 30 MIN: CPT | Performed by: PHYSICAL THERAPIST
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
## 2017-11-27 ENCOUNTER — HOSPITAL ENCOUNTER (OUTPATIENT)
Dept: PHYSICAL THERAPY | Facility: HOSPITAL | Age: 72
Setting detail: THERAPIES SERIES
Discharge: HOME | End: 2017-11-27

## 2017-11-27 DIAGNOSIS — M25.561 RIGHT KNEE PAIN, UNSPECIFIED CHRONICITY: ICD-10-CM

## 2017-11-27 DIAGNOSIS — M54.2 CERVICAL PAIN: Primary | ICD-10-CM

## 2017-11-27 DIAGNOSIS — M54.5 LOW BACK PAIN, UNSPECIFIED BACK PAIN LATERALITY, UNSPECIFIED CHRONICITY, WITH SCIATICA PRESENCE UNSPECIFIED: ICD-10-CM

## 2017-11-27 PROCEDURE — 97110 THERAPEUTIC EXERCISES: CPT

## 2017-11-27 PROCEDURE — G0283 ELEC STIM OTHER THAN WOUND: HCPCS

## 2017-11-27 NOTE — THERAPY TREATMENT NOTE
Outpatient Physical Therapy Ortho Treatment Note  [facility]     Patient Name: Paul Gomez  : 1945  MRN: [id number]  Today's Date: 2017      Visit Date: 2017    Visit Dx:    ICD-10-CM ICD-9-CM   1. Cervical pain M54.2 723.1   2. Right knee pain, unspecified chronicity M25.561 719.46   3. Low back pain, unspecified back pain laterality, unspecified chronicity, with sciatica presence unspecified M54.5 724.2       Patient Active Problem List   Diagnosis   • Low back pain   • Hypertension   • Hyperlipidemia   • History of degenerative disc disease   • COPD (chronic obstructive pulmonary disease)   • Gout   • B12 deficiency   • Seborrheic eczema   • Hypothyroidism        Past Medical History:   Diagnosis Date   • COPD (chronic obstructive pulmonary disease)    • Gout    • Hearing loss    • History of degenerative disc disease    • Hyperlipidemia    • Hypertension    • Low back pain    • Pedal edema    • Prediabetes         Past Surgical History:   Procedure Laterality Date   • CHOLECYSTECTOMY     • LUMBAR DISCECTOMY     • SHOULDER SURGERY Right              PT Ortho       17 1200    Subjective Comments    Subjective Comments Patient reports that he is having more pain in his neck today. Patient states of pain and soreness in the R) knee today.   -AC    Subjective Pain    Able to rate subjective pain? yes  -AC    Pre-Treatment Pain Level 4   4/10 neck, 0/10 back/R) knee  -AC    Post-Treatment Pain Level 0  -AC      User Key  (r) = Recorded By, (t) = Taken By, (c) = Cosigned By    Initials Name Provider Type    ESTHER Baum, PTA Physical Therapy Assistant                            PT Assessment/Plan       17 1303       PT Assessment    Assessment Comments Patient tolerated treatment session well with rest breaks taken as needed by the patient. New ther ex added per the patient's tolerance, patient demonstrated and understood new ther ex with no increase in pain noted. No  adverse reactions with modalities or treatment. Educated patient to perform ther ex per his tolerance, patient verbalized understanding. Verbal cues needed on proper technique of ther ex. Decrease in pain noted following treatment session.   -AC     PT Plan    PT Plan Comments Continue per PT's POC, progress per the patient's tolerance.  -AC       User Key  (r) = Recorded By, (t) = Taken By, (c) = Cosigned By    Initials Name Provider Type    ESTHER Baum PTA Physical Therapy Assistant                Modalities       11/27/17 1200          Moist Heat    MH Applied Yes   No redness noted following moist heat  -AC      Location Cervical, Back, and R) knee  -AC      Rx Minutes 15 mins  -AC      MH Prior to Rx Yes  -AC      ELECTRICAL STIMULATION    Attended/Unattended Unattended   No irritation noted following estim  -AC      Stimulation Type IFC  -AC      Max mAmp --   per the patient's tolerance  -AC      Location/Electrode Placement/Other Cervical   -AC      Rx Minutes 15 mins  -AC        User Key  (r) = Recorded By, (t) = Taken By, (c) = Cosigned By    Initials Name Provider Type    ESTHER Baum PTA Physical Therapy Assistant                Exercises       11/27/17 1200          Subjective Comments    Subjective Comments Patient reports that he is having more pain in his neck today. Patient states of pain and soreness in the R) knee today.   -AC      Subjective Pain    Able to rate subjective pain? yes  -AC      Pre-Treatment Pain Level 4   4/10 neck, 0/10 back/R) knee  -AC      Post-Treatment Pain Level 0  -AC      Exercise 1    Exercise Name 1 UT stretch 20 sec hold x3, levator scap stretch 20 sec hold x3, supine chin tucks x10, CROM (flex, ext, rot) x10 each, LTR x10, SAQ x10  -AC      Cueing 1 Verbal;Tactile;Demo  -AC      Time (Minutes) 1 25 minutes  -AC        User Key  (r) = Recorded By, (t) = Taken By, (c) = Cosigned By    Initials Name Provider Type    ESTHER Baum  FRAN Physical Therapy Assistant                                   Therapy Education       11/27/17 1303          Therapy Education    Given HEP;Symptoms/condition management;Pain management;Posture/body mechanics  -AC      Program New  -AC      How Provided Verbal;Demonstration  -AC      Provided to Patient  -AC      Level of Understanding Verbalized;Demonstrated  -AC        User Key  (r) = Recorded By, (t) = Taken By, (c) = Cosigned By    Initials Name Provider Type    AC Karlie Baum PTA Physical Therapy Assistant                Time Calculation:   Start Time: 1000  Stop Time: 1100  Time Calculation (min): 60 min    Therapy Charges for Today     Code Description Service Date Service Provider Modifiers Qty    97082283740 HC PT THER PROC EA 15 MIN 11/27/2017 Karlie Baum PTA GP 2    84293891961 HC PT ELECTRICAL STIM UNATTENDED 11/27/2017 Karlie Baum PTA  1    42447075849 HC PT THER SUPP EA 15 MIN 11/27/2017 Karlie Baum PTA GP 1                    Karlie Baum PTA  11/27/2017

## 2017-11-29 ENCOUNTER — HOSPITAL ENCOUNTER (OUTPATIENT)
Dept: PHYSICAL THERAPY | Facility: HOSPITAL | Age: 72
Setting detail: THERAPIES SERIES
Discharge: HOME | End: 2017-11-29

## 2017-11-29 DIAGNOSIS — M54.5 LOW BACK PAIN, UNSPECIFIED BACK PAIN LATERALITY, UNSPECIFIED CHRONICITY, WITH SCIATICA PRESENCE UNSPECIFIED: ICD-10-CM

## 2017-11-29 DIAGNOSIS — M25.561 RIGHT KNEE PAIN, UNSPECIFIED CHRONICITY: ICD-10-CM

## 2017-11-29 DIAGNOSIS — M54.2 CERVICAL PAIN: Primary | ICD-10-CM

## 2017-11-29 PROCEDURE — G0283 ELEC STIM OTHER THAN WOUND: HCPCS

## 2017-11-29 PROCEDURE — 97110 THERAPEUTIC EXERCISES: CPT

## 2017-11-29 NOTE — THERAPY TREATMENT NOTE
Outpatient Physical Therapy Ortho Treatment Note  HEIDE Albarran     Patient Name: Paul Gomez  : 1945  MRN: [id number]  Today's Date: 2017      Visit Date: 2017    Visit Dx:    ICD-10-CM ICD-9-CM   1. Cervical pain M54.2 723.1   2. Right knee pain, unspecified chronicity M25.561 719.46   3. Low back pain, unspecified back pain laterality, unspecified chronicity, with sciatica presence unspecified M54.5 724.2       Patient Active Problem List   Diagnosis   • Low back pain   • Hypertension   • Hyperlipidemia   • History of degenerative disc disease   • COPD (chronic obstructive pulmonary disease)   • Gout   • B12 deficiency   • Seborrheic eczema   • Hypothyroidism        Past Medical History:   Diagnosis Date   • COPD (chronic obstructive pulmonary disease)    • Gout    • Hearing loss    • History of degenerative disc disease    • Hyperlipidemia    • Hypertension    • Low back pain    • Pedal edema    • Prediabetes         Past Surgical History:   Procedure Laterality Date   • CHOLECYSTECTOMY     • LUMBAR DISCECTOMY     • SHOULDER SURGERY Right              PT Ortho       17 1400    Subjective Comments    Subjective Comments Patient states that he is having pain in his neck. Patient reports he isn't having some pain in his R) knee but not like he was. Patient states that he is able to sleep more due to less pain.   -AC    Subjective Pain    Able to rate subjective pain? yes  -AC    Pre-Treatment Pain Level 3  -AC    Post-Treatment Pain Level 3  -AC      17 1200    Subjective Comments    Subjective Comments Patient reports that he is having more pain in his neck today. Patient states of pain and soreness in the R) knee today.   -AC    Subjective Pain    Able to rate subjective pain? yes  -AC    Pre-Treatment Pain Level 4   4/10 neck, 0/10 back/R) knee  -AC    Post-Treatment Pain Level 0  -AC      User Key  (r) = Recorded By, (t) = Taken By, (c) = Cosigned By    Initials Name Provider  Type    ESTHER Baum PTA Physical Therapy Assistant                            PT Assessment/Plan       11/29/17 6511       PT Assessment    Assessment Comments New ther ex added per the patient's tolerance, patient demonstrated and understood new ther ex with no increase in pain noted. Patient tolerated treatment session well with rest breaks taken as needed by the patient. Dizzyness occured during cervical flex and ext, patient reports that he thought it was his blood pressure, BP: 114/80 mmHg o2: 96%, heart rate: 65 beats per minute. Edcuated patient on adverse reactions with blood pressure and to seek medical attention immediately if adverse reactions occur, patient verbalized understanding. Verbal cues needed on proper technique of exercises.   -AC     PT Plan    PT Plan Comments Continue per PT's POC, progress per the patient's tolerance.  -AC       User Key  (r) = Recorded By, (t) = Taken By, (c) = Cosigned By    Initials Name Provider Type    ESTHER Baum PTA Physical Therapy Assistant                Modalities       11/29/17 1400          Moist Heat    MH Applied Yes   No redness noted following moist heat  -AC      Location Cervical, Back, and R) knee  -AC      Rx Minutes 15 mins  -AC      MH Prior to Rx Yes  -AC      ELECTRICAL STIMULATION    Attended/Unattended Unattended   No irritation noted following estim  -AC      Stimulation Type IFC  -AC      Max mAmp --   per the patient's tolerance  -AC      Location/Electrode Placement/Other Cervical   -AC      Rx Minutes 15 mins  -AC        User Key  (r) = Recorded By, (t) = Taken By, (c) = Cosigned By    Initials Name Provider Type    ESTHER Baum PTA Physical Therapy Assistant                Exercises       11/29/17 1400          Subjective Comments    Subjective Comments Patient states that he is having pain in his neck. Patient reports he isn't having some pain in his R) knee but not like he was. Patient states that  he is able to sleep more due to less pain.   -AC      Subjective Pain    Able to rate subjective pain? yes  -AC      Pre-Treatment Pain Level 3  -AC      Post-Treatment Pain Level 3  -AC      Exercise 1    Exercise Name 1 UT stretch 20 sec hold x3, CROM (flex, ext, rot) x10 each, levator scap stretch 20 sec hold x3, LAQ x5 on R), seated march x10, ball squeeze x10, LTR x10, SAQ x10  -AC      Cueing 1 Verbal;Tactile;Demo  -AC      Time (Minutes) 1 30 minutes  -AC        User Key  (r) = Recorded By, (t) = Taken By, (c) = Cosigned By    Initials Name Provider Type    ESTHER Baum PTA Physical Therapy Assistant                                   Therapy Education       11/29/17 3544          Therapy Education    Given HEP;Symptoms/condition management;Pain management;Posture/body mechanics  -AC      Program New  -AC      How Provided Verbal;Demonstration  -AC      Provided to Patient  -AC      Level of Understanding Verbalized;Demonstrated  -AC        User Key  (r) = Recorded By, (t) = Taken By, (c) = Cosigned By    Initials Name Provider Type    ESTHER Baum PTA Physical Therapy Assistant                Time Calculation:   Start Time: 1040  Stop Time: 1130  Time Calculation (min): 50 min    Therapy Charges for Today     Code Description Service Date Service Provider Modifiers Qty    68733953071 HC PT THER PROC EA 15 MIN 11/29/2017 Karlie Baum PTA GP 2    93269393975 HC PT ELECTRICAL STIM UNATTENDED 11/29/2017 Karlie Baum PTA  1    35382099194 HC PT THER SUPP EA 15 MIN 11/29/2017 Karlie Baum PTA GP 1                    Karlie Baum PTA  11/29/2017

## 2017-12-04 ENCOUNTER — HOSPITAL ENCOUNTER (OUTPATIENT)
Dept: PHYSICAL THERAPY | Facility: HOSPITAL | Age: 72
Setting detail: THERAPIES SERIES
Discharge: HOME | End: 2017-12-04

## 2017-12-04 DIAGNOSIS — M54.5 LOW BACK PAIN, UNSPECIFIED BACK PAIN LATERALITY, UNSPECIFIED CHRONICITY, WITH SCIATICA PRESENCE UNSPECIFIED: ICD-10-CM

## 2017-12-04 DIAGNOSIS — M54.2 CERVICAL PAIN: Primary | ICD-10-CM

## 2017-12-04 DIAGNOSIS — M25.561 RIGHT KNEE PAIN, UNSPECIFIED CHRONICITY: ICD-10-CM

## 2017-12-04 PROCEDURE — 97110 THERAPEUTIC EXERCISES: CPT

## 2017-12-04 PROCEDURE — G0283 ELEC STIM OTHER THAN WOUND: HCPCS

## 2017-12-04 NOTE — THERAPY TREATMENT NOTE
Outpatient Physical Therapy Ortho Treatment Note  HEIDE Albarran     Patient Name: Paul Gomez  : 1945  MRN: [id number]  Today's Date: 2017      Visit Date: 2017    Visit Dx:    ICD-10-CM ICD-9-CM   1. Cervical pain M54.2 723.1   2. Right knee pain, unspecified chronicity M25.561 719.46   3. Low back pain, unspecified back pain laterality, unspecified chronicity, with sciatica presence unspecified M54.5 724.2       Patient Active Problem List   Diagnosis   • Low back pain   • Hypertension   • Hyperlipidemia   • History of degenerative disc disease   • COPD (chronic obstructive pulmonary disease)   • Gout   • B12 deficiency   • Seborrheic eczema   • Hypothyroidism        Past Medical History:   Diagnosis Date   • COPD (chronic obstructive pulmonary disease)    • Gout    • Hearing loss    • History of degenerative disc disease    • Hyperlipidemia    • Hypertension    • Low back pain    • Pedal edema    • Prediabetes         Past Surgical History:   Procedure Laterality Date   • CHOLECYSTECTOMY     • LUMBAR DISCECTOMY     • SHOULDER SURGERY Right              PT Ortho       17 1000    Subjective Comments    Subjective Comments Patient reports that he is having pain in his neck and back today. Patient states that he slept wrong on his neck and back so that might be why his pain is worse.  -AC    Subjective Pain    Able to rate subjective pain? yes  -AC      User Key  (r) = Recorded By, (t) = Taken By, (c) = Cosigned By    Initials Name Provider Type    ESTHER Baum, PTA Physical Therapy Assistant                            PT Assessment/Plan       17 1102       PT Assessment    Assessment Comments Patient tolerated treatment session well with rest breaks taken as needed by the patient. Educated patient to perform ther ex per his tolerance, patient verbalized understanding. No adverse reactions with modalities or treatment. Pain remained the same from pre to post treatment.  Ultrasound not performed due to patient wanting to abbreviate session due to prior engagment.   -AC     PT Plan    PT Plan Comments Continue per PT's POC, progress per the patient's tolerance.  -AC       User Key  (r) = Recorded By, (t) = Taken By, (c) = Cosigned By    Initials Name Provider Type    ESTHER Baum PTA Physical Therapy Assistant                Modalities       12/04/17 1000          Subjective Pain    Pre-Treatment Pain Level 6   6/10 neck, 5/10 back  -AC      Post-Treatment Pain Level 6   6/10 neck, 5/10 back  -AC      Moist Heat    MH Applied Yes   No redness noted following moist heat  -AC      Location Cervical, Back, and R) knee  -AC      Rx Minutes 15 mins  -AC      MH Prior to Rx Yes  -AC      ELECTRICAL STIMULATION    Attended/Unattended Unattended   No irritation noted following estim  -AC      Stimulation Type IFC  -AC      Max mAmp --   per the patient's tolerance  -AC      Location/Electrode Placement/Other Cervical   -AC      Rx Minutes 15 mins  -AC        User Key  (r) = Recorded By, (t) = Taken By, (c) = Cosigned By    Initials Name Provider Type    ESTHER Baum PTA Physical Therapy Assistant                Exercises       12/04/17 1000          Subjective Comments    Subjective Comments Patient reports that he is having pain in his neck and back today. Patient states that he slept wrong on his neck and back so that might be why his pain is worse.  -AC      Subjective Pain    Able to rate subjective pain? yes  -AC      Pre-Treatment Pain Level 6   6/10 neck, 5/10 back  -AC      Post-Treatment Pain Level 6   6/10 neck, 5/10 back  -AC      Exercise 1    Exercise Name 1 UT stretch 20 sec hold x3, CROM (flex, ext, rot) x10 each, chin tucks (supine) x10, scap squeeze x10, levator scap stretch 20 sec hold x1, LTR x15  -AC      Cueing 1 Verbal;Tactile;Demo  -AC      Time (Minutes) 1 30 minutes  -AC        User Key  (r) = Recorded By, (t) = Taken By, (c) = Cosigned By     Initials Name Provider Type    AC Karlie Baum PTA Physical Therapy Assistant                                   Therapy Education       12/04/17 1102          Therapy Education    Given HEP;Symptoms/condition management;Pain management;Posture/body mechanics  -AC      Program Reinforced  -AC      How Provided Verbal;Demonstration  -AC      Provided to Patient  -AC      Level of Understanding Verbalized;Demonstrated  -AC        User Key  (r) = Recorded By, (t) = Taken By, (c) = Cosigned By    Initials Name Provider Type    ESTHER Baum PTA Physical Therapy Assistant                Time Calculation:   Start Time: 0953  Stop Time: 1048  Time Calculation (min): 55 min    Therapy Charges for Today     Code Description Service Date Service Provider Modifiers Qty    26082804554 HC PT THER PROC EA 15 MIN 12/4/2017 Karlie Baum PTA GP 2    74843915490 HC PT ELECTRICAL STIM UNATTENDED 12/4/2017 Karlie Baum PTA  1    66683573334 HC PT THER SUPP EA 15 MIN 12/4/2017 Karlie Baum PTA GP 1                    Karlie Baum PTA  12/4/2017

## 2017-12-05 DIAGNOSIS — I10 ESSENTIAL HYPERTENSION: ICD-10-CM

## 2017-12-05 RX ORDER — ATENOLOL 25 MG/1
TABLET ORAL
Qty: 60 TABLET | Refills: 1 | Status: SHIPPED | OUTPATIENT
Start: 2017-12-05 | End: 2018-05-16 | Stop reason: SDUPTHER

## 2017-12-06 ENCOUNTER — HOSPITAL ENCOUNTER (OUTPATIENT)
Dept: PHYSICAL THERAPY | Facility: HOSPITAL | Age: 72
Setting detail: THERAPIES SERIES
Discharge: HOME | End: 2017-12-06

## 2017-12-06 DIAGNOSIS — M25.561 RIGHT KNEE PAIN, UNSPECIFIED CHRONICITY: ICD-10-CM

## 2017-12-06 DIAGNOSIS — M54.2 CERVICAL PAIN: Primary | ICD-10-CM

## 2017-12-06 DIAGNOSIS — M54.5 LOW BACK PAIN, UNSPECIFIED BACK PAIN LATERALITY, UNSPECIFIED CHRONICITY, WITH SCIATICA PRESENCE UNSPECIFIED: ICD-10-CM

## 2017-12-06 PROCEDURE — 97110 THERAPEUTIC EXERCISES: CPT

## 2017-12-06 PROCEDURE — G0283 ELEC STIM OTHER THAN WOUND: HCPCS

## 2017-12-06 NOTE — THERAPY TREATMENT NOTE
Outpatient Physical Therapy Ortho Treatment Note   [facility]     Patient Name: Paul Gomez  : 1945  MRN: [id number]  Today's Date: 2017      Visit Date: 2017    Visit Dx:    ICD-10-CM ICD-9-CM   1. Cervical pain M54.2 723.1   2. Right knee pain, unspecified chronicity M25.561 719.46   3. Low back pain, unspecified back pain laterality, unspecified chronicity, with sciatica presence unspecified M54.5 724.2       Patient Active Problem List   Diagnosis   • Low back pain   • Hypertension   • Hyperlipidemia   • History of degenerative disc disease   • COPD (chronic obstructive pulmonary disease)   • Gout   • B12 deficiency   • Seborrheic eczema   • Hypothyroidism        Past Medical History:   Diagnosis Date   • COPD (chronic obstructive pulmonary disease)    • Gout    • Hearing loss    • History of degenerative disc disease    • Hyperlipidemia    • Hypertension    • Low back pain    • Pedal edema    • Prediabetes         Past Surgical History:   Procedure Laterality Date   • CHOLECYSTECTOMY     • LUMBAR DISCECTOMY     • SHOULDER SURGERY Right              PT Ortho       17 1200    Subjective Pain    Able to rate subjective pain? yes  -AC    Pre-Treatment Pain Level 2   2/10 neck, back, R) knee  -AC    Post-Treatment Pain Level 0   0/10 neck, 2/10 R knee, back  -AC      17 1000    Subjective Comments    Subjective Comments Patient reports that he is having pain in his neck and back today. Patient states that he slept wrong on his neck and back so that might be why his pain is worse.  -AC    Subjective Pain    Able to rate subjective pain? yes  -AC      User Key  (r) = Recorded By, (t) = Taken By, (c) = Cosigned By    Initials Name Provider Type    ESTHER Baum, PTA Physical Therapy Assistant                            PT Assessment/Plan       17 1419       PT Assessment    Assessment Comments Educated patient to perform ther ex per his tolerance, patient verbalized  understanding. Reps increased per the patient's tolerance with no increase in pain noted. Patient tolerated treatment session well with rest breaks taken as needed by the patient. No adverse reactions with modalities or treatment. Decrease in pain noted following treatment in cervical region.   -AC     PT Plan    PT Plan Comments Continue per PT's POC, progress per the patient's tolerance.  -AC       User Key  (r) = Recorded By, (t) = Taken By, (c) = Cosigned By    Initials Name Provider Type    ESTHER Baum PTA Physical Therapy Assistant                Modalities       12/06/17 1200          Moist Heat    MH Applied Yes   No redness noted following moist heat  -AC      Location Cervical, Back, and R) knee  -AC      Rx Minutes 15 mins  -AC      MH Prior to Rx Yes  -AC      Ultrasound [id number]    Location R) UT  -AC      Rx Minutes 8 minutes  -AC      Duty Cycle 50  -AC      Frequency --   3.3MHz  -AC      Intensity - Wts/cm 1.2  -AC      ELECTRICAL STIMULATION    Attended/Unattended Unattended   No irritation noted following estim  -AC      Stimulation Type IFC  -AC      Max mAmp --   per the patient's tolerance  -AC      Location/Electrode Placement/Other Cervical   -AC      Rx Minutes 15 mins  -AC        User Key  (r) = Recorded By, (t) = Taken By, (c) = Cosigned By    Initials Name Provider Type    ESTHER Baum PTA Physical Therapy Assistant                Exercises       12/06/17 1200          Subjective Comments    Subjective Comments Patient states that his pain is better today. Patient reports that he is moving his neck better and with less pain. Patient states that therapy is helping him.   -AC      Subjective Pain    Able to rate subjective pain? yes  -AC      Pre-Treatment Pain Level 2   2/10 neck, back, R) knee  -AC      Post-Treatment Pain Level 0   0/10 neck, 2/10 R knee, back  -AC      Exercise 1    Exercise Name 1 UT stretch 20 sec hold x3, levator scap stretch 20 sec hold x3,  CROM (flex, ext, rot) x15 each, scap squeeze 10x2, ball squeeze 10x2, LAQ x15, seated march x15  -AC      Cueing 1 Verbal;Tactile;Demo  -AC      Time (Minutes) 1 28 minutes  -AC        User Key  (r) = Recorded By, (t) = Taken By, (c) = Cosigned By    Initials Name Provider Type    ESTHER Baum PTA Physical Therapy Assistant                                   Therapy Education       12/06/17 1259          Therapy Education    Given HEP;Symptoms/condition management;Pain management;Posture/body mechanics  -AC      Program Reinforced  -AC      How Provided Verbal;Demonstration  -AC      Provided to Patient  -AC      Level of Understanding Verbalized;Demonstrated  -AC        User Key  (r) = Recorded By, (t) = Taken By, (c) = Cosigned By    Initials Name Provider Type    ESTHER Baum PTA Physical Therapy Assistant                Time Calculation:   Start Time: 1008  Stop Time: 1100  Time Calculation (min): 52 min    Therapy Charges for Today     Code Description Service Date Service Provider Modifiers Qty    94167144984  PT THER PROC EA 15 MIN 12/6/2017 Karlie Baum PTA GP 2    [id number]  PT ELECTRICAL STIM UNATTENDED 12/6/2017 Karlie Baum PTA  1                    Karlie aBum PTA  12/6/2017

## 2017-12-11 ENCOUNTER — HOSPITAL ENCOUNTER (OUTPATIENT)
Dept: PHYSICAL THERAPY | Facility: HOSPITAL | Age: 72
Setting detail: THERAPIES SERIES
Discharge: HOME | End: 2017-12-11

## 2017-12-11 DIAGNOSIS — M54.5 LOW BACK PAIN, UNSPECIFIED BACK PAIN LATERALITY, UNSPECIFIED CHRONICITY, WITH SCIATICA PRESENCE UNSPECIFIED: ICD-10-CM

## 2017-12-11 DIAGNOSIS — M54.2 CERVICAL PAIN: Primary | ICD-10-CM

## 2017-12-11 DIAGNOSIS — M25.561 RIGHT KNEE PAIN, UNSPECIFIED CHRONICITY: ICD-10-CM

## 2017-12-11 PROCEDURE — 97110 THERAPEUTIC EXERCISES: CPT | Performed by: PHYSICAL THERAPIST

## 2017-12-11 PROCEDURE — G0283 ELEC STIM OTHER THAN WOUND: HCPCS | Performed by: PHYSICAL THERAPIST

## 2017-12-11 PROCEDURE — 97140 MANUAL THERAPY 1/> REGIONS: CPT | Performed by: PHYSICAL THERAPIST
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
## 2017-12-18 ENCOUNTER — APPOINTMENT (OUTPATIENT)
Dept: PHYSICAL THERAPY | Facility: HOSPITAL | Age: 72
End: 2017-12-18

## 2017-12-20 ENCOUNTER — HOSPITAL ENCOUNTER (OUTPATIENT)
Dept: PHYSICAL THERAPY | Facility: HOSPITAL | Age: 72
Setting detail: THERAPIES SERIES
Discharge: HOME | End: 2017-12-20

## 2017-12-20 DIAGNOSIS — M25.561 RIGHT KNEE PAIN, UNSPECIFIED CHRONICITY: ICD-10-CM

## 2017-12-20 DIAGNOSIS — M54.2 CERVICAL PAIN: Primary | ICD-10-CM

## 2017-12-20 DIAGNOSIS — M54.5 LOW BACK PAIN, UNSPECIFIED BACK PAIN LATERALITY, UNSPECIFIED CHRONICITY, WITH SCIATICA PRESENCE UNSPECIFIED: ICD-10-CM

## 2017-12-20 PROCEDURE — G8978 MOBILITY CURRENT STATUS: HCPCS

## 2017-12-20 PROCEDURE — 97010 HOT OR COLD PACKS THERAPY: CPT

## 2017-12-20 PROCEDURE — G8979 MOBILITY GOAL STATUS: HCPCS

## 2017-12-20 PROCEDURE — 97110 THERAPEUTIC EXERCISES: CPT

## 2017-12-20 PROCEDURE — G0283 ELEC STIM OTHER THAN WOUND: HCPCS

## 2017-12-20 PROCEDURE — 97035 APP MDLTY 1+ULTRASOUND EA 15: CPT

## 2017-12-20 NOTE — THERAPY EVALUATION
Outpatient Physical Therapy Ortho Re-Evaluation   [place]     Patient Name: Paul Gomez  : 1945  MRN: [id number]  Today's Date: 2017      Visit Date: 2017    Patient Active Problem List   Diagnosis   • Low back pain   • Hypertension   • Hyperlipidemia   • History of degenerative disc disease   • COPD (chronic obstructive pulmonary disease)   • Gout   • B12 deficiency   • Seborrheic eczema   • Hypothyroidism        Past Medical History:   Diagnosis Date   • COPD (chronic obstructive pulmonary disease)    • Gout    • Hearing loss    • History of degenerative disc disease    • Hyperlipidemia    • Hypertension    • Low back pain    • Pedal edema    • Prediabetes         Past Surgical History:   Procedure Laterality Date   • CHOLECYSTECTOMY     • LUMBAR DISCECTOMY     • SHOULDER SURGERY Right        Visit Dx:     ICD-10-CM ICD-9-CM   1. Cervical pain M54.2 723.1   2. Right knee pain, unspecified chronicity M25.561 719.46   3. Low back pain, unspecified back pain laterality, unspecified chronicity, with sciatica presence unspecified M54.5 724.2                 PT Ortho       17 1200    Sensory Screen for Light Touch- Upper Quarter Clearing    C4 (posterior shoulder) Bilateral:;Intact  -RT    C5 (lateral upper arm) Bilateral:;Intact  -RT    C6 (tip of thumb) Bilateral:;Intact  -RT    C7 (tip of 3rd finger) Bilateral:;Intact  -RT    C8 (tip of 5th finger) Bilateral:;Intact  -RT    T1 (medial lower arm) Bilateral:;Intact  -RT    Myotomal Screen- Upper Quarter Clearing    Shoulder flexion (C5) Bilateral:;4 (Good)  -RT    Elbow flexion/wrist extension (C6) Bilateral:;4 (Good)  -RT    Elbow extension/wrist flexion (C7) Bilateral:;4 (Good)  -RT    Cervical/Shoulder ROM Screen    Cervical flexion Impaired   50 degrees  -RT    Cervical extension Impaired   44degrees  -RT    Cervical lateral flexion Impaired   right 25; left 24  -RT    Sensory Screen for Light Touch- Lower Quarter Clearing    L1  (inguinal area) Bilateral:;Intact  -RT    L2 (anterior mid thigh) Bilateral:;Intact  -RT    L3 (distal anterior thigh) Bilateral:;Intact  -RT    L4 (medial lower leg/foot) Bilateral:;Intact  -RT    L5 (lateral lower leg/great toe) Bilateral:;Intact  -RT    S1 (bottom of foot) Bilateral:;Intact  -RT    Myotomal Screen- Lower Quarter Clearing    Hip flexion (L2) Bilateral:;4 (Good)  -RT    Knee extension (L3) Bilateral:;4 (Good)  -RT    Knee flexion (S2) Bilateral:;4 (Good)  -RT    Lumbar ROM Screen- Lower Quarter Clearing    Lumbar Flexion Impaired   25%  -RT    Lumbar Extension Impaired   50%  -RT    Lumbar Rotation Impaired   50%  -RT      User Key  (r) = Recorded By, (t) = Taken By, (c) = Cosigned By    Initials Name Provider Type    RT Mendez Sylvester, PT Physical Therapist                      Therapy Education  Given: HEP, Symptoms/condition management  Program: Reinforced  How Provided: Verbal  Provided to: Patient  Level of Understanding: Verbalized           PT OP Goals       12/20/17 1200       PT Short Term Goals    STG Date to Achieve 01/03/18  -RT     STG 1 Pt will report a decrease in c/o pain at worse to 3/10 with daily funtional activities with standing/sitting.  -RT     STG 1 Progress Progressing  -RT     STG 2 Pt will demonstrate improved cervical AROM in all planes by 10 degrees, and improved lumbar AROM flexion and extension by 25% to promote improved functional mobility.   -RT     STG 2 Progress Partially Met  -RT     STG 3 Pt will demonstrate decrease palpable tenderness of cervical/upper trap mm and lumbar paraspinals to mild with no palpable wincing.   -RT     STG 3 Progress Progressing  -RT     STG 4 Pt will be educated and instructed with HEP and report daily performance and demonstrate improved posture with performance in clinic requiring less than 1 verbal cue to self correct.   -RT     STG 4 Progress Met  -RT     Long Term Goals    LTG Date to Achieve 01/17/18  -RT     LTG 1 Pt will  report a decrease in c/o pain at worse to 1/10 with daily funtional activities with standing/sitting.  -RT     LTG 1 Progress Not Met  -RT     LTG 2 Pt will demonstrate decrease palpable tenderness of cervical/upper trap mm and lumbar paraspinals to mild/trace.   -RT     LTG 2 Progress Progressing  -RT     LTG 3 Pt will demonstrate improved cervical AROM in all planes by 15 degrees, and improved lumbar AROM flexion and extension by 50% to promote improved functional mobility.   -RT     LTG 3 Progress Progressing  -RT     LTG 4 Pt will demonstrate improved strength of R) knee to 5/5 to promote improved standing tolerance.  -RT     LTG 4 Progress Progressing  -RT     Time Calculation    PT Goal Re-Cert Due Date 01/17/18  -RT       User Key  (r) = Recorded By, (t) = Taken By, (c) = Cosigned By    Initials Name Provider Type    RT Mendez Sylvester, PT Physical Therapist                PT Assessment/Plan       12/20/17 1221       PT Assessment    Functional Limitations Performance in self-care ADL;Performance in leisure activities;Performance in work activities  -RT     Impairments Pain;Posture;Range of motion;Joint mobility  -RT     Assessment Comments Pt is a 71 y/o male referred to therapy for treatment of neck, back and right knee pain.  Pt has demonstrated good effort with reports of decreased pain.  Pt will benefit from cont tx for max gains and improved ADLs.  -RT     Please refer to paper survey for additional self-reported information Yes  -RT     Rehab Potential Good  -RT     Patient/caregiver participated in establishment of treatment plan and goals Yes  -RT     Patient would benefit from skilled therapy intervention Yes  -RT     PT Plan    PT Frequency 2x/week  -RT     Predicted Duration of Therapy Intervention (days/wks) 4 weeks  -RT     Planned CPT's? PT RE-EVAL: 53583;PT THER PROC EA 15 MIN: 34184;PT MANUAL THERAPY EA 15 MIN: 11535;PT NEUROMUSC RE-EDUCATION EA 15 MIN: 96778;PT GAIT TRAINING EA 15 MIN:  79786;PT ELECTRICAL STIM UNATTEND: ;PT ULTRASOUND EA 15 MIN: 03127;PT HOT/COLD PACK WC NONMCARE: 27044  -RT     Physical Therapy Interventions (Optional Details) bed mobility training;home exercise program;joint mobilization;postural re-education;patient/family education;neuromuscular re-education;modalities;manual therapy techniques;lumbar stabilization;ROM (Range of Motion);strengthening;stretching  -RT     PT Plan Comments Will follow for optimal gains.  -RT       User Key  (r) = Recorded By, (t) = Taken By, (c) = Cosigned By    Initials Name Provider Type    RT Mendez Sylvester, PT Physical Therapist                Modalities       12/20/17 1100          Subjective Comments    Subjective Comments Pt reports the therapy has helped and he would like to cont tx.  -RT      Subjective Pain    Able to rate subjective pain? yes  -RT      Pre-Treatment Pain Level 3  -RT      Post-Treatment Pain Level 2  -RT      Moist Heat    MH Applied Yes  -RT      Location Cervical, Back, and R) knee  -RT      Rx Minutes 10 mins  -RT      MH Prior to Rx Yes  -RT      Ultrasound 72721    Location R) UT  -RT      Rx Minutes 8 min  -RT      Duty Cycle 100  -RT      Frequency 1.0 MHz  -RT      Intensity - Wts/cm 1.2  -RT      ELECTRICAL STIMULATION    Attended/Unattended Unattended  -RT      Stimulation Type IFC  -RT      Location/Electrode Placement/Other Cervical   -RT      Rx Minutes 15 mins  -RT        User Key  (r) = Recorded By, (t) = Taken By, (c) = Cosigned By    Initials Name Provider Type    RT Mendez Sylvester, PT Physical Therapist              Exercises       12/20/17 1100          Subjective Comments    Subjective Comments Pt reports the therapy has helped and he would like to cont tx.  -RT      Subjective Pain    Able to rate subjective pain? yes  -RT      Pre-Treatment Pain Level 3  -RT      Post-Treatment Pain Level 2  -RT      Exercise 1    Exercise Name 1 UT stretch 20 sec hold x3, levator scap stretch 20 sec hold  x3, CROM (flex, ext, rot) x15 each, scap squeeze 15x2, ball squeeze 15x2, LAQ x15x2, seated march x15, supine chin tuck 15 x 2  -RT      Cueing 1 Verbal;Tactile;Demo  -RT      Time (Minutes) 1 30 minutes  -RT        User Key  (r) = Recorded By, (t) = Taken By, (c) = Cosigned By    Initials Name Provider Type    RT Mendez Sylvester, PT Physical Therapist                                  Time Calculation:   Start Time: 1000  Stop Time: 1105  Time Calculation (min): 65 min     Therapy Charges for Today     Code Description Service Date Service Provider Modifiers Qty    95504575998 HC PT THER PROC EA 15 MIN 12/20/2017 Mendez Sylvester, PT GP 2    06863916071 HC PT HOT/COLD PACK WC NONMCARE 12/20/2017 Mendez Sylvester, PT GP 1    38648333808 HC PT ELECTRICAL STIM UNATTENDED 12/20/2017 Mendez Sylvester, PT  1    05607314205 HC PT ULTRASOUND EA 15 MIN 12/20/2017 Mendez Sylvester, PT GP 1                    Mendez Sylvester, PT  12/20/2017

## 2017-12-28 ENCOUNTER — OFFICE VISIT (OUTPATIENT)
Dept: FAMILY MEDICINE CLINIC | Facility: CLINIC | Age: 72
End: 2017-12-28

## 2017-12-28 VITALS
DIASTOLIC BLOOD PRESSURE: 81 MMHG | HEIGHT: 68 IN | HEART RATE: 108 BPM | OXYGEN SATURATION: 99 % | SYSTOLIC BLOOD PRESSURE: 163 MMHG | WEIGHT: 152.2 LBS | BODY MASS INDEX: 23.07 KG/M2

## 2017-12-28 DIAGNOSIS — L02.91 ABSCESS: Primary | ICD-10-CM

## 2017-12-28 DIAGNOSIS — E53.8 B12 DEFICIENCY: ICD-10-CM

## 2017-12-28 PROCEDURE — 96372 THER/PROPH/DIAG INJ SC/IM: CPT | Performed by: NURSE PRACTITIONER

## 2017-12-28 PROCEDURE — 99213 OFFICE O/P EST LOW 20 MIN: CPT | Performed by: NURSE PRACTITIONER

## 2017-12-28 RX ORDER — DOXYCYCLINE HYCLATE 100 MG
100 TABLET ORAL 2 TIMES DAILY
Qty: 14 TABLET | Refills: 0 | Status: SHIPPED | OUTPATIENT
Start: 2017-12-28 | End: 2018-01-23 | Stop reason: SDUPTHER

## 2017-12-28 RX ORDER — CYANOCOBALAMIN 1000 UG/ML
1000 INJECTION, SOLUTION INTRAMUSCULAR; SUBCUTANEOUS
Status: DISCONTINUED | OUTPATIENT
Start: 2017-12-28 | End: 2018-06-13

## 2017-12-28 RX ORDER — DOXYCYCLINE HYCLATE 100 MG/1
100 TABLET, DELAYED RELEASE ORAL DAILY
Qty: 14 TABLET | Refills: 0 | Status: SHIPPED | OUTPATIENT
Start: 2017-12-28 | End: 2017-12-28

## 2017-12-28 RX ADMIN — CYANOCOBALAMIN 1000 MCG: 1000 INJECTION, SOLUTION INTRAMUSCULAR; SUBCUTANEOUS at 12:43

## 2017-12-28 NOTE — PROGRESS NOTES
Subjective   Paul Gomez is a 72 y.o. male.     Chief Complaint: Mass (right buttock )    History of Present Illness   Pt states that he has a hard tender boil to his right buttock that has been there for 3-4 days.  He has not noticed any drainage from the area.  He denies any fever, n/v/d.   Pt has also had a continued cough and congestion.  Cough is non productive.      Family History   Problem Relation Age of Onset   • Cancer Mother        Social History     Social History   • Marital status:      Spouse name: N/A   • Number of children: N/A   • Years of education: N/A     Occupational History   • Not on file.     Social History Main Topics   • Smoking status: Current Every Day Smoker     Packs/day: 0.50     Types: Cigarettes   • Smokeless tobacco: Never Used      Comment: patient stated he is down to about 4 cigarettes a day   • Alcohol use No   • Drug use: No   • Sexual activity: Defer     Other Topics Concern   • Not on file     Social History Narrative       Past Medical History:   Diagnosis Date   • COPD (chronic obstructive pulmonary disease)    • Gout    • Hearing loss    • History of degenerative disc disease    • Hyperlipidemia    • Hypertension    • Low back pain    • Pedal edema    • Prediabetes        Review of Systems   Constitutional: Negative.    HENT: Negative.    Respiratory: Positive for cough.    Cardiovascular: Negative.    Gastrointestinal: Negative.    Musculoskeletal: Negative.    Skin:        boil   Neurological: Negative.    Psychiatric/Behavioral: Negative.        Objective   Physical Exam   Constitutional: He is oriented to person, place, and time. He appears well-developed and well-nourished.   Neck: Normal range of motion. Neck supple.   Cardiovascular: Normal rate, regular rhythm and normal heart sounds.    Pulmonary/Chest: Effort normal and breath sounds normal.   Congested cough noted    Neurological: He is alert and oriented to person, place, and time.   Skin: Skin is  "warm and dry.   Silver dollar size indurated abscess noted to right buttock area; no drainage noted; area erythematous   Psychiatric: He has a normal mood and affect. His behavior is normal. Judgment and thought content normal.   Nursing note and vitals reviewed.      Procedures    Vitals: Blood pressure 163/81, pulse 108, height 172.7 cm (68\"), weight 69 kg (152 lb 3.2 oz), SpO2 99 %.    Allergies:   Allergies   Allergen Reactions   • Penicillins      Pt states he was allergic when he was 18 but has had it since and did not have any problems.           Assessment/Plan   Ray was seen today for mass.    Diagnoses and all orders for this visit:    Abscess  -     Discontinue: doxycycline (DORYX) 100 MG enteric coated tablet; Take 1 tablet by mouth Daily.  -     doxycycline (VIBRAMYICN) 100 MG tablet; Take 1 tablet by mouth 2 (Two) Times a Day.    B12 deficiency  -     cyanocobalamin injection 1,000 mcg; Inject 1 mL into the shoulder, thigh, or buttocks Every 28 (Twenty-Eight) Days.               "

## 2018-01-02 ENCOUNTER — HOSPITAL ENCOUNTER (OUTPATIENT)
Dept: PHYSICAL THERAPY | Facility: HOSPITAL | Age: 73
Setting detail: THERAPIES SERIES
Discharge: HOME | End: 2018-01-02

## 2018-01-02 DIAGNOSIS — M25.561 RIGHT KNEE PAIN, UNSPECIFIED CHRONICITY: ICD-10-CM

## 2018-01-02 DIAGNOSIS — M54.5 LOW BACK PAIN, UNSPECIFIED BACK PAIN LATERALITY, UNSPECIFIED CHRONICITY, WITH SCIATICA PRESENCE UNSPECIFIED: ICD-10-CM

## 2018-01-02 DIAGNOSIS — M54.2 CERVICAL PAIN: Primary | ICD-10-CM

## 2018-01-02 PROCEDURE — 97035 APP MDLTY 1+ULTRASOUND EA 15: CPT | Performed by: PHYSICAL THERAPIST

## 2018-01-02 PROCEDURE — 97110 THERAPEUTIC EXERCISES: CPT | Performed by: PHYSICAL THERAPIST

## 2018-01-02 PROCEDURE — G0283 ELEC STIM OTHER THAN WOUND: HCPCS | Performed by: PHYSICAL THERAPIST

## 2018-01-02 NOTE — THERAPY TREATMENT NOTE
Outpatient Physical Therapy Ortho Treatment Note  [facility]     Patient Name: Paul Gomez  : 1945  MRN: [id number]  Today's Date: 2018      Visit Date: 2018    Visit Dx:    ICD-10-CM ICD-9-CM   1. Cervical pain M54.2 723.1   2. Right knee pain, unspecified chronicity M25.561 719.46   3. Low back pain, unspecified back pain laterality, unspecified chronicity, with sciatica presence unspecified M54.5 724.2       Patient Active Problem List   Diagnosis   • Low back pain   • Hypertension   • Hyperlipidemia   • History of degenerative disc disease   • COPD (chronic obstructive pulmonary disease)   • Gout   • B12 deficiency   • Seborrheic eczema   • Hypothyroidism        Past Medical History:   Diagnosis Date   • COPD (chronic obstructive pulmonary disease)    • Gout    • Hearing loss    • History of degenerative disc disease    • Hyperlipidemia    • Hypertension    • Low back pain    • Pedal edema    • Prediabetes         Past Surgical History:   Procedure Laterality Date   • CHOLECYSTECTOMY     • LUMBAR DISCECTOMY     • SHOULDER SURGERY Right                              PT Assessment/Plan       18 1521       PT Assessment    Assessment Comments Pt tolerated treatment well today, with good form with minimal cueing, and decrease c/o pain at conclusion of treatment.  Pt continues to requrie skilled PT services to focus ability to maintain HEP daily and to promote improved posture and positioning to facilitate decrease c/o pain and improved functional mobility with daily activities.    -CC     PT Plan    PT Plan Comments continue with POC, promote maxium functional level  -CC       User Key  (r) = Recorded By, (t) = Taken By, (c) = Cosigned By    Initials Name Provider Type    CC Juana Nelson, PT Physical Therapist                Modalities       18 1500          Moist Heat    Location Cervical, Back, and R) knee  -CC      Rx Minutes 10 mins  -CC      MH Prior to Rx Yes  -CC       Ultrasound 57897    Location R) UT  -CC      Rx Minutes 10 min  -CC      Duty Cycle 100  -CC      Frequency 1.0 MHz  -CC      Intensity - Wts/cm 1.5  -CC      ELECTRICAL STIMULATION    Attended/Unattended Unattended  -CC      Stimulation Type IFC  -CC      Max mAmp --   as to pt's tolerance as per protocol   -CC      Location/Electrode Placement/Other Cervical   -CC      Rx Minutes 15 mins   including set-up and positioing  -CC        User Key  (r) = Recorded By, (t) = Taken By, (c) = Cosigned By    Initials Name Provider Type    SONIA Nelson PT Physical Therapist                Exercises       01/02/18 1500          Subjective Comments    Subjective Comments Pt reports not new concerns today.  Pt reports at conclusion of treatment he feels real good.   -CC      Subjective Pain    Able to rate subjective pain? yes  -CC      Pre-Treatment Pain Level 3  -CC      Post-Treatment Pain Level 2  -CC      Exercise 1    Exercise Name 1 UT stretch 20 sec hold x3, levator scap stretch 20 sec hold x3, CROM (flex, ext, rot) x15 each, scap squeeze 15x2, ball squeeze 15x2, LAQ x15x2, seated march x15, supine chin tuck 15 x 2 (supine) and 15 x 2 (seated)   -CC      Cueing 1 Verbal;Tactile;Demo  -CC      Time (Minutes) 1 30 minutes  -CC        User Key  (r) = Recorded By, (t) = Taken By, (c) = Cosigned By    Initials Name Provider Type    CC Juana Nelson PT Physical Therapist                             Therapy Education  Given: HEP  Program: Reinforced  How Provided: Verbal  Provided to: Patient  Level of Understanding: Verbalized              Time Calculation:   Start Time: 1110  Stop Time: 1210  Time Calculation (min): 60 min    Therapy Charges for Today     Code Description Service Date Service Provider Modifiers Qty    08899938377 HC PT THER PROC EA 15 MIN 1/2/2018 Juana Nelson, PT GP 2    49142671714 HC PT ELECTRICAL STIM UNATTENDED 1/2/2018 Juana Nelson PT  1    [id number]  PT ULTRASOUND EA 15 MIN  1/2/2018 Juana Nelson, PT GP 1                    Juana Nelson, PT  1/2/2018

## 2018-01-04 ENCOUNTER — HOSPITAL ENCOUNTER (OUTPATIENT)
Dept: PHYSICAL THERAPY | Facility: HOSPITAL | Age: 73
Setting detail: THERAPIES SERIES
Discharge: HOME | End: 2018-01-04

## 2018-01-04 DIAGNOSIS — M54.2 CERVICAL PAIN: Primary | ICD-10-CM

## 2018-01-04 DIAGNOSIS — M54.5 LOW BACK PAIN, UNSPECIFIED BACK PAIN LATERALITY, UNSPECIFIED CHRONICITY, WITH SCIATICA PRESENCE UNSPECIFIED: ICD-10-CM

## 2018-01-04 DIAGNOSIS — M25.561 RIGHT KNEE PAIN, UNSPECIFIED CHRONICITY: ICD-10-CM

## 2018-01-04 PROCEDURE — G0283 ELEC STIM OTHER THAN WOUND: HCPCS

## 2018-01-04 PROCEDURE — 97110 THERAPEUTIC EXERCISES: CPT

## 2018-01-04 NOTE — THERAPY TREATMENT NOTE
Outpatient Physical Therapy Ortho Treatment Note  [facility]     Patient Name: Paul Gomez  : 1945  MRN: [id number]  Today's Date: 2018      Visit Date: 2018    Visit Dx:    ICD-10-CM ICD-9-CM   1. Cervical pain M54.2 723.1   2. Right knee pain, unspecified chronicity M25.561 719.46   3. Low back pain, unspecified back pain laterality, unspecified chronicity, with sciatica presence unspecified M54.5 724.2       Patient Active Problem List   Diagnosis   • Low back pain   • Hypertension   • Hyperlipidemia   • History of degenerative disc disease   • COPD (chronic obstructive pulmonary disease)   • Gout   • B12 deficiency   • Seborrheic eczema   • Hypothyroidism        Past Medical History:   Diagnosis Date   • COPD (chronic obstructive pulmonary disease)    • Gout    • Hearing loss    • History of degenerative disc disease    • Hyperlipidemia    • Hypertension    • Low back pain    • Pedal edema    • Prediabetes         Past Surgical History:   Procedure Laterality Date   • CHOLECYSTECTOMY     • LUMBAR DISCECTOMY     • SHOULDER SURGERY Right                              PT Assessment/Plan       18 1205       PT Assessment    Assessment Comments Tx today consisted of mh and estim to cervical region, back and right knee followed by ther ex progressed per flow sheet with weights.  Pt responded well to US at end of session and reported decreased pain following tx today.  -RT     PT Plan    PT Plan Comments Will follow progressing as pt tolerates.  -RT       User Key  (r) = Recorded By, (t) = Taken By, (c) = Cosigned By    Initials Name Provider Type    RT Mendez S Higinio, PT Physical Therapist                Modalities       18 1100          Subjective Comments    Subjective Comments Pt reports having increased cervical and left knee pain.  -RT      Subjective Pain    Able to rate subjective pain? yes  -RT      Pre-Treatment Pain Level 3  -RT      Post-Treatment Pain Level 2  -RT       Moist Heat    Location Cervical, Back, and R) knee  -RT      Rx Minutes 15 mins  -RT      MH Prior to Rx Yes  -RT      Ultrasound [id number]    Location R) UT  -RT      Rx Minutes 8min  -RT      Duty Cycle 100  -RT      Frequency 1.0 MHz  -RT      Intensity - Wts/cm 1.5  -RT      ELECTRICAL STIMULATION    Attended/Unattended Unattended  -RT      Stimulation Type IFC  -RT      Location/Electrode Placement/Other Cervical   -RT      Rx Minutes 15 mins  -RT        User Key  (r) = Recorded By, (t) = Taken By, (c) = Cosigned By    Initials Name Provider Type    RT Mendez Sylvester PT Physical Therapist                Exercises       01/04/18 1100          Subjective Comments    Subjective Comments Pt reports having increased cervical and left knee pain.  -RT      Subjective Pain    Able to rate subjective pain? yes  -RT      Pre-Treatment Pain Level 3  -RT      Post-Treatment Pain Level 2  -RT      Exercise 1    Exercise Name 1 ball squeeze x30, laq x20 2#, marches x20 2#, UT and lev scap stretch [id number], knee flexion and hip abd rtb x20  -RT      Cueing 1 Verbal;Tactile;Demo  -RT      Time (Minutes) 1 25min  -RT        User Key  (r) = Recorded By, (t) = Taken By, (c) = Cosigned By    Initials Name Provider Type    RT Mendez Sylvester PT Physical Therapist                             Therapy Education  Given: Symptoms/condition management, Pain management  Program: Reinforced  How Provided: Verbal, Demonstration  Provided to: Patient  Level of Understanding: Teach back education performed, Verbalized              Time Calculation:   Start Time: 1115  Stop Time: 1205  Time Calculation (min): 50 min    Therapy Charges for Today     Code Description Service Date Service Provider Modifiers Qty    26231466193 HC PT THER PROC EA 15 MIN 1/4/2018 Mendez Sylvester, PT GP 2    03886594000 HC PT ELECTRICAL STIM UNATTENDED 1/4/2018 Mendez Sylvester PT  1                    Mendez Sylvester PT  1/4/2018

## 2018-01-08 ENCOUNTER — TRANSCRIBE ORDERS (OUTPATIENT)
Dept: ADMINISTRATIVE | Facility: HOSPITAL | Age: 73
End: 2018-01-08

## 2018-01-08 DIAGNOSIS — M54.2 CERVICAL PAIN: Primary | ICD-10-CM

## 2018-01-11 ENCOUNTER — HOSPITAL ENCOUNTER (OUTPATIENT)
Dept: PHYSICAL THERAPY | Facility: HOSPITAL | Age: 73
Setting detail: THERAPIES SERIES
Discharge: HOME | End: 2018-01-11

## 2018-01-11 DIAGNOSIS — M54.5 LOW BACK PAIN, UNSPECIFIED BACK PAIN LATERALITY, UNSPECIFIED CHRONICITY, WITH SCIATICA PRESENCE UNSPECIFIED: ICD-10-CM

## 2018-01-11 DIAGNOSIS — M54.2 CERVICAL PAIN: Primary | ICD-10-CM

## 2018-01-11 DIAGNOSIS — M25.561 RIGHT KNEE PAIN, UNSPECIFIED CHRONICITY: ICD-10-CM

## 2018-01-11 PROCEDURE — G0283 ELEC STIM OTHER THAN WOUND: HCPCS

## 2018-01-11 PROCEDURE — 97110 THERAPEUTIC EXERCISES: CPT

## 2018-01-11 PROCEDURE — 97035 APP MDLTY 1+ULTRASOUND EA 15: CPT

## 2018-01-11 NOTE — THERAPY TREATMENT NOTE
Outpatient Physical Therapy Ortho Treatment Note  [facility]barran     Patient Name: Paul Gomez  : 1945  MRN: [id number]  Today's Date: 2018      Visit Date: 2018    Visit Dx:    ICD-10-CM ICD-9-CM   1. Cervical pain M54.2 723.1   2. Right knee pain, unspecified chronicity M25.561 719.46   3. Low back pain, unspecified back pain laterality, unspecified chronicity, with sciatica presence unspecified M54.5 724.2       Patient Active Problem List   Diagnosis   • Low back pain   • Hypertension   • Hyperlipidemia   • History of degenerative disc disease   • COPD (chronic obstructive pulmonary disease)   • Gout   • B12 deficiency   • Seborrheic eczema   • Hypothyroidism        Past Medical History:   Diagnosis Date   • COPD (chronic obstructive pulmonary disease)    • Gout    • Hearing loss    • History of degenerative disc disease    • Hyperlipidemia    • Hypertension    • Low back pain    • Pedal edema    • Prediabetes         Past Surgical History:   Procedure Laterality Date   • CHOLECYSTECTOMY     • LUMBAR DISCECTOMY     • SHOULDER SURGERY Right              PT Ortho       18 1200    Subjective Comments    Subjective Comments Patient states that he is having more pain in his neck and back. Patient reports of numbness in both his arms and into his pinky and ring finger.   -AC    Subjective Pain    Able to rate subjective pain? yes  -AC    Pre-Treatment Pain Level 4  -AC    Post-Treatment Pain Level 2  -AC      User Key  (r) = Recorded By, (t) = Taken By, (c) = Cosigned By    Initials Name Provider Type    ESTHER Baum, PTA Physical Therapy Assistant                            PT Assessment/Plan       18 1251       PT Assessment    Assessment Comments New ther ex added per the patient's tolerance, patient demonstrated and understood new ther ex with no increase in pain noted. Educated patient to perform ther ex per his tolerance, patient verbalized understanding. Patient  tolerated treatment session well with rest breaks taken as needed by the patient. No adverse reactions with modalities or treatment. Decrease in pain noted following treatment session.   -AC     PT Plan    PT Plan Comments Continue per PT's POC, progress per the patient's tolerance.  -AC       User Key  (r) = Recorded By, (t) = Taken By, (c) = Cosigned By    Initials Name Provider Type    ESTHER Baum PTA Physical Therapy Assistant                Modalities       01/11/18 1200          Moist Heat    MH Applied Yes   No redness noted following moist heat  -AC      Location Cervical, Back, and R) knee  -AC      Rx Minutes 15 mins  -AC      MH Prior to Rx Yes  -AC      Ultrasound 97205    Location B) [place]  -AC      Rx Minutes 8min  -AC      Duty Cycle 100  -AC      Frequency --   3.3Mhz  -AC      Intensity - Wts/cm 1.2  -AC      ELECTRICAL STIMULATION    Attended/Unattended Unattended   No irirtation noted following estim  -AC      Stimulation Type IFC  -AC      Max mAmp --   per the patient's tolerance  -AC      Location/Electrode Placement/Other Cervical   -AC      Rx Minutes 15 mins  -AC        User Key  (r) = Recorded By, (t) = Taken By, (c) = Cosigned By    Initials Name Provider Type     Karlie Baum PTA Physical Therapy Assistant                Exercises       01/11/18 1200          Subjective Comments    Subjective Comments Patient states that he is having more pain in his neck and back. Patient reports of numbness in both his arms and into his pinky and ring finger.   -AC      Subjective Pain    Able to rate subjective pain? yes  -AC      Pre-Treatment Pain Level 4  -AC      Post-Treatment Pain Level 2  -AC      Exercise 1    Exercise Name 1 SKTC 20 sec hold x3, supine chin tucks 15x2, ball squeeze 15x2, scap squeeze 15x2, hip abd with GTB 10x2, mid row with RTB x15, low row with RTB x15, seated march (2#) 10x2, LAQ (2#) 10x2, levator scap stretch 20 sec hold x3, UT stretch 20 sec hold  x3,  -AC      Cueing 1 Verbal;Tactile;Demo  -AC      Time (Minutes) 1 35 minutes  -AC        User Key  (r) = Recorded By, (t) = Taken By, (c) = Cosigned By    Initials Name Provider Type    AC Karlie Baum PTA Physical Therapy Assistant                             Therapy Education  Given: HEP, Pain management, Symptoms/condition management  Program: New  How Provided: Verbal, Demonstration  Provided to: Patient  Level of Understanding: Demonstrated, Verbalized              Time Calculation:   Start Time: 1100  Stop Time: 1200  Time Calculation (min): 60 min    Therapy Charges for Today     Code Description Service Date Service Provider Modifiers Qty    48711937696 HC PT THER PROC EA 15 MIN 1/11/2018 Karlie Baum PTA GP 2    84261491452 HC PT ULTRASOUND EA 15 MIN 1/11/2018 Karlie Baum PTA GP 1    71936405408 HC PT ELECTRICAL STIM UNATTENDED 1/11/2018 Karlie Baum PTA  1    56883998323 HC PT THER SUPP EA 15 MIN 1/11/2018 Karlie Baum PTA GP 1                    Karlie Baum PTA  1/11/2018

## 2018-01-12 ENCOUNTER — HOSPITAL ENCOUNTER (OUTPATIENT)
Dept: MRI IMAGING | Facility: HOSPITAL | Age: 73
Discharge: HOME | End: 2018-01-12
Admitting: NURSE PRACTITIONER

## 2018-01-12 DIAGNOSIS — M54.2 CERVICAL PAIN: ICD-10-CM

## 2018-01-12 PROCEDURE — 72141 MRI NECK SPINE W/O DYE: CPT

## 2018-01-12 PROCEDURE — 72141 MRI NECK SPINE W/O DYE: CPT | Performed by: RADIOLOGY

## 2018-01-22 ENCOUNTER — TELEPHONE (OUTPATIENT)
Dept: FAMILY MEDICINE CLINIC | Facility: CLINIC | Age: 73
End: 2018-01-22

## 2018-01-22 NOTE — TELEPHONE ENCOUNTER
"Patient called reports that those \"Staph boils\" have come back on his buttocks & wants to know if you will call him in the same antibiotic you did the last time.  "

## 2018-01-23 ENCOUNTER — HOSPITAL ENCOUNTER (OUTPATIENT)
Dept: PHYSICAL THERAPY | Facility: HOSPITAL | Age: 73
Setting detail: THERAPIES SERIES
Discharge: HOME | End: 2018-01-23

## 2018-01-23 DIAGNOSIS — L02.91 ABSCESS: ICD-10-CM

## 2018-01-23 DIAGNOSIS — M54.2 CERVICAL PAIN: ICD-10-CM

## 2018-01-23 DIAGNOSIS — M54.5 LOW BACK PAIN, UNSPECIFIED BACK PAIN LATERALITY, UNSPECIFIED CHRONICITY, WITH SCIATICA PRESENCE UNSPECIFIED: ICD-10-CM

## 2018-01-23 DIAGNOSIS — M25.561 RIGHT KNEE PAIN, UNSPECIFIED CHRONICITY: Primary | ICD-10-CM

## 2018-01-23 PROCEDURE — G0283 ELEC STIM OTHER THAN WOUND: HCPCS | Performed by: PHYSICAL THERAPIST

## 2018-01-23 PROCEDURE — 97110 THERAPEUTIC EXERCISES: CPT | Performed by: PHYSICAL THERAPIST

## 2018-01-23 PROCEDURE — 97010 HOT OR COLD PACKS THERAPY: CPT | Performed by: PHYSICAL THERAPIST

## 2018-01-23 RX ORDER — DOXYCYCLINE HYCLATE 100 MG
100 TABLET ORAL 2 TIMES DAILY
Qty: 14 TABLET | Refills: 0 | Status: SHIPPED | OUTPATIENT
Start: 2018-01-23 | End: 2018-04-20

## 2018-01-23 NOTE — TELEPHONE ENCOUNTER
Sent to pharmacy.  If they return again, he will have to come in and have a culture.      Attempted to contact patient,not available at this time.    Attempted to contact patient again ,no answer.    Patient called (corrected phone number) was notified of med & instructions & verbalized understanding.

## 2018-01-24 NOTE — THERAPY RE-EVALUATION
Outpatient Physical Therapy Ortho Re-Evaluation   [facility]     Patient Name: Paul Gomez  : 1945  MRN: [id number]  Today's Date: 2018      Visit Date: 2018    Patient Active Problem List   Diagnosis   • Low back pain   • Hypertension   • Hyperlipidemia   • History of degenerative disc disease   • COPD (chronic obstructive pulmonary disease)   • Gout   • B12 deficiency   • Seborrheic eczema   • Hypothyroidism        Past Medical History:   Diagnosis Date   • COPD (chronic obstructive pulmonary disease)    • Gout    • Hearing loss    • History of degenerative disc disease    • Hyperlipidemia    • Hypertension    • Low back pain    • Pedal edema    • Prediabetes         Past Surgical History:   Procedure Laterality Date   • CHOLECYSTECTOMY     • LUMBAR DISCECTOMY     • SHOULDER SURGERY Left        Visit Dx:     ICD-10-CM ICD-9-CM   1. Right knee pain, unspecified chronicity M25.561 719.46   2. Low back pain, unspecified back pain laterality, unspecified chronicity, with sciatica presence unspecified M54.5 724.2   3. Cervical pain M54.2 723.1                 PT Ortho       18 1430    Subjective Comments    Subjective Comments Pt reports moderate low back, right knee, and neck pain.  -AD    Subjective Pain    Able to rate subjective pain? yes  -AD    Pre-Treatment Pain Level 4  -AD    Post-Treatment Pain Level 3  -AD    Cervical/Shoulder ROM Screen    Cervical flexion Impaired   50 degrees  -AD    Cervical extension Impaired   50 degrees  -AD    Cervical lateral flexion Impaired   30 degrees bilaterally  -AD    Lumbar ROM Screen- Lower Quarter Clearing    Lumbar Flexion Impaired   75%  -AD    Lumbar Extension Impaired   75%  -AD    Lumbar Lateral Flexion Impaired   75% bilaterally  -AD    Lumbar Rotation Impaired   75%  -AD      User Key  (r) = Recorded By, (t) = Taken By, (c) = Cosigned By    Initials Name Provider Type    AD Ashley Claudene Dalton, PT Physical Therapist                       Therapy Education  Given: HEP, Pain management, Symptoms/condition management  Program: Reinforced  How Provided: Verbal, Demonstration  Provided to: Patient  Level of Understanding: Demonstrated, Verbalized           PT OP Goals       01/23/18 1430       PT Short Term Goals    STG Date to Achieve 02/07/18  -AD     STG 1 Pt will report a decreaase in c/o pain at worse to 3/10 with daily functional activities with standing/sitting.  -AD     STG 1 Progress Progressing  -AD     STG 2 Pt will demonstrate improved cervical AROM in all planes by 10 degrees, and improved lumbar AROM flexion and extension by 25% to promote improved functional mobility.  -AD     STG 2 Progress Progressing  -AD     STG 3 Pt will demonstrate decrease palpable tenderness of cervical/upper trapezius muscle and lumbar paraspinals to mild with no palpable wincing.  -AD     STG 3 Progress Met  -AD     STG 4 Pt will be educated and instructed with HEP and report daily performance and demonstrate improved posture with performance in clinic requiring less than 1 verbal cue to self correct.  -AD     STG 4 Progress Met  -AD     Long Term Goals    LTG Date to Achieve 02/23/18  -AD     LTG 1 Pt will report a decrease in c/o pain at worse to 1/10 with daily functional activities with standing/sitting.  -AD     LTG 1 Progress Progressing  -AD     LTG 2 Pt will demonstrate decrease palpable tenderness of cervical/upper trapezius muscle and lumbar paraspinals to mild/trace.  -AD     LTG 2 Progress Progressing  -AD     LTG 3 Pt will demonstrate improved cervical AROM in all planes by 15 degrees, and improved lumbar AROM flexion and extension by 50% to promote improved functional mobility.  -AD     LTG 3 Progress Progressing  -AD     LTG 4 Pt will demonstrate improved strength of right knee to 5/5 to promote improved standing tolerance.  -AD     LTG 4 Progress Progressing  -AD     Time Calculation    PT Goal Re-Cert Due Date 02/23/18  -AD        User Key  (r) = Recorded By, (t) = Taken By, (c) = Cosigned By    Initials Name Provider Type    AD Ashley Claudene Dalton, PT Physical Therapist                PT Assessment/Plan       01/24/18 1200       PT Assessment    Functional Limitations Performance in self-care ADL;Performance in leisure activities;Performance in work activities  -AD     Impairments Pain;Posture;Range of motion;Joint mobility  -AD     Assessment Comments The patient continues to improve with cervical, lumbar, and right knee range of motion, strength, and pain. He would benefit from continued skilled physical therapy to further improve functional limitations and improvements. He tolerated today's physical therapy session well, with no reports of increased pain. No skin irritation was observed following moist heat and IFC. The patient will continue to be progressed as tolerated.  -AD     Please refer to paper survey for additional self-reported information Yes  -AD     Patient/caregiver participated in establishment of treatment plan and goals Yes  -AD     Patient would benefit from skilled therapy intervention Yes  -AD     PT Plan    PT Frequency 2x/week  -AD     Predicted Duration of Therapy Intervention (days/wks) 4 weeks  -AD     Planned CPT's? PT EVAL MOD COMPLELITY: 23965;PT RE-EVAL: 94200;PT NEUROMUSC RE-EDUCATION EA 15 MIN: 18330;PT MANUAL THERAPY EA 15 MIN: 10456;PT THER ACT EA 15 MIN: 69473;PT THER PROC EA 15 MIN: 80994;PT SELF CARE/HOME MGMT/TRAIN EA 15: 30947;PT ULTRASOUND EA 15 MIN: 77379;PT ELECTRICAL STIM UNATTEND: ;PT HOT/COLD PACK WC NONMCARE: 20842;PT THER SUPP EA 15 MIN  -AD     PT Plan Comments Progress as tolerated per POC.  -AD       User Key  (r) = Recorded By, (t) = Taken By, (c) = Cosigned By    Initials Name Provider Type    AD Ashley Claudene Dalton, PT Physical Therapist                Modalities       01/23/18 1430          Moist Heat    MH Applied Yes  -AD      Location Cervical, lumbar, right knee. No  skin irritation observed  -AD      Rx Minutes 15 mins  -AD      MH Prior to Rx Yes   Combined with IFC to the cervical region.  -AD      ELECTRICAL STIMULATION    Attended/Unattended Unattended   No skin irritation observed  -AD      Stimulation Type IFC  -AD      Max mAmp --   Per pt tolerance  -AD      Location/Electrode Placement/Other Cervical  -AD      Rx Minutes 15 mins  -AD        User Key  (r) = Recorded By, (t) = Taken By, (c) = Cosigned By    Initials Name Provider Type    AD Ashley Claudene Dalton, PT Physical Therapist              Exercises       01/23/18 1430          Subjective Comments    Subjective Comments Pt reports moderate low back, right knee, and neck pain.  -AD      Subjective Pain    Able to rate subjective pain? yes  -AD      Pre-Treatment Pain Level 4  -AD      Post-Treatment Pain Level 3  -AD      Exercise 1    Exercise Name 1 Levator scap stretch, upper trap stretch, scapular squeeze, seated march, ball squeeze  -AD      Cueing 1 Verbal;Tactile  -AD      Time (Minutes) 1 20 minutes  -AD        User Key  (r) = Recorded By, (t) = Taken By, (c) = Cosigned By    Initials Name Provider Type    AD Ashley Claudene Dalton, PT Physical Therapist                                  Time Calculation:   Start Time: 1415  Stop Time: 1500  Time Calculation (min): 45 min     Therapy Charges for Today     Code Description Service Date Service Provider Modifiers Qty    58575747495 HC PT ELECTRICAL STIM UNATTENDED 1/23/2018 Ashley Claudene Dalton, PT  1    35092411830 HC PT HOT/COLD PACK WC NONMCARE 1/23/2018 Ashley Claudene Dalton, PT GP 1    87178139410 HC PT THER PROC EA 15 MIN 1/23/2018 Ashley Claudene Dalton, PT GP 1                    Ashley Claudene Dalton, PT  1/24/2018

## 2018-01-25 ENCOUNTER — HOSPITAL ENCOUNTER (OUTPATIENT)
Dept: PHYSICAL THERAPY | Facility: HOSPITAL | Age: 73
Setting detail: THERAPIES SERIES
Discharge: HOME | End: 2018-01-25

## 2018-01-25 DIAGNOSIS — M54.5 LOW BACK PAIN, UNSPECIFIED BACK PAIN LATERALITY, UNSPECIFIED CHRONICITY, WITH SCIATICA PRESENCE UNSPECIFIED: ICD-10-CM

## 2018-01-25 DIAGNOSIS — M25.561 RIGHT KNEE PAIN, UNSPECIFIED CHRONICITY: Primary | ICD-10-CM

## 2018-01-25 DIAGNOSIS — M54.2 CERVICAL PAIN: ICD-10-CM

## 2018-01-25 PROCEDURE — 97010 HOT OR COLD PACKS THERAPY: CPT | Performed by: PHYSICAL THERAPIST

## 2018-01-25 PROCEDURE — 97035 APP MDLTY 1+ULTRASOUND EA 15: CPT | Performed by: PHYSICAL THERAPIST

## 2018-01-25 PROCEDURE — 97110 THERAPEUTIC EXERCISES: CPT | Performed by: PHYSICAL THERAPIST

## 2018-01-25 PROCEDURE — G0283 ELEC STIM OTHER THAN WOUND: HCPCS | Performed by: PHYSICAL THERAPIST

## 2018-01-25 NOTE — PROGRESS NOTES
Outpatient Physical Therapy Ortho Treatment Note  [facility]ran     Patient Name: Paul Gomez  : 1945  MRN: [id number]  Today's Date: 2018      Visit Date: 2018    Visit Dx:    ICD-10-CM ICD-9-CM   1. Right knee pain, unspecified chronicity M25.561 719.46   2. Low back pain, unspecified back pain laterality, unspecified chronicity, with sciatica presence unspecified M54.5 724.2   3. Cervical pain M54.2 723.1       Patient Active Problem List   Diagnosis   • Low back pain   • Hypertension   • Hyperlipidemia   • History of degenerative disc disease   • COPD (chronic obstructive pulmonary disease)   • Gout   • B12 deficiency   • Seborrheic eczema   • Hypothyroidism        Past Medical History:   Diagnosis Date   • COPD (chronic obstructive pulmonary disease)    • Gout    • Hearing loss    • History of degenerative disc disease    • Hyperlipidemia    • Hypertension    • Low back pain    • Pedal edema    • Prediabetes         Past Surgical History:   Procedure Laterality Date   • CHOLECYSTECTOMY     • LUMBAR DISCECTOMY     • SHOULDER SURGERY Left              PT Ortho       18 1000    Subjective Comments    Subjective Comments Pt reported mild pain prior to today's treatment session.  -AD    Subjective Pain    Able to rate subjective pain? yes  -AD    Pre-Treatment Pain Level 3  -AD    Post-Treatment Pain Level 2  -AD      18 1430    Subjective Comments    Subjective Comments Pt reports moderate low back, right knee, and neck pain.  -AD    Subjective Pain    Able to rate subjective pain? yes  -AD    Pre-Treatment Pain Level 4  -AD    Post-Treatment Pain Level 3  -AD    Cervical/Shoulder ROM Screen    Cervical flexion Impaired   50 degrees  -AD    Cervical extension Impaired   50 degrees  -AD    Cervical lateral flexion Impaired   30 degrees bilaterally  -AD    Lumbar ROM Screen- Lower Quarter Clearing    Lumbar Flexion Impaired   75%  -AD    Lumbar Extension Impaired   75%  -AD    Lumbar  Lateral Flexion Impaired   75% bilaterally  -AD    Lumbar Rotation Impaired   75%  -AD      User Key  (r) = Recorded By, (t) = Taken By, (c) = Cosigned By    Initials Name Provider Type    AD Ashley Claudene Dalton, PT Physical Therapist                            PT Assessment/Plan       01/25/18 1009 01/24/18 1200    PT Assessment    Functional Limitations  Performance in self-care ADL;Performance in leisure activities;Performance in work activities  -AD    Impairments  Pain;Posture;Range of motion;Joint mobility  -AD    Assessment Comments Pt tolerated today's session well, with reports of decreased pain upon conclusion. The patient reported mild left knee pain during SAQ and LAQ, with crepitus palpated. No skin irritation was observed following modalities during today's session. He will be progressed as tolerated.  -AD The patient continues to improve with cervical, lumbar, and right knee range of motion, strength, and pain. He would benefit from continued skilled physical therapy to further improve functional limitations and improvements. He tolerated today's physical therapy session well, with no reports of increased pain. No skin irritation was observed following moist heat and IFC. The patient will continue to be progressed as tolerated.  -AD    Please refer to paper survey for additional self-reported information  Yes  -AD    Patient/caregiver participated in establishment of treatment plan and goals  Yes  -AD    Patient would benefit from skilled therapy intervention  Yes  -AD    PT Plan    PT Frequency  2x/week  -AD    Predicted Duration of Therapy Intervention (days/wks)  4 weeks  -AD    Planned CPT's?  PT EVAL MOD COMPLELITY: 39135;PT RE-EVAL: 50380;PT NEUROMUSC RE-EDUCATION EA 15 MIN: 34782;PT MANUAL THERAPY EA 15 MIN: 99055;PT THER ACT EA 15 MIN: 65712;PT THER PROC EA 15 MIN: 52172;PT SELF CARE/HOME MGMT/TRAIN EA 15: 66708;PT ULTRASOUND EA 15 MIN: 58376;PT ELECTRICAL STIM UNATTEND: ;PT HOT/COLD  PACK WC NONMCARE: 71779;PT THER SUPP EA 15 MIN  -AD    PT Plan Comments Progress as tolerated per POC.  -AD Progress as tolerated per POC.  -AD      User Key  (r) = Recorded By, (t) = Taken By, (c) = Cosigned By    Initials Name Provider Type    AD Ashley Claudene Dalton, PT Physical Therapist                Modalities       01/25/18 1000          Moist Heat    Location Cervical, lumbar, right knee. No skin irritation observed  -AD      Rx Minutes 15 mins  -AD      MH Prior to Rx Yes   Combined with IFC to the cervical region.  -AD      Ultrasound 43537    Location B) [place]  -AD      Rx Minutes 8min  -AD      Duty Cycle 100  -AD      Frequency --   3.3Mhz  -AD      Intensity - Wts/cm 1.2  -AD      ELECTRICAL STIMULATION    Attended/Unattended Unattended   No skin irritation observed  -AD      Stimulation Type IFC  -AD      Max mAmp --   Per pt tolerance  -AD      Location/Electrode Placement/Other Cervical  -AD      Rx Minutes 15 mins  -AD        User Key  (r) = Recorded By, (t) = Taken By, (c) = Cosigned By    Initials Name Provider Type    AD Ashley Claudene Dalton, PT Physical Therapist                Exercises       01/25/18 1000          Subjective Comments    Subjective Comments Pt reported mild pain prior to today's treatment session.  -AD      Subjective Pain    Able to rate subjective pain? yes  -AD      Pre-Treatment Pain Level 3  -AD      Post-Treatment Pain Level 2  -AD      Exercise 1    Exercise Name 1 SKTC, piriformis stretch, SAQ, levator scap stretch, LAQ with 2#, seated march, UT stretch, ball squeeze, mid row with RTB, low row with RTB.  -AD      Cueing 1 Verbal;Tactile  -AD      Time (Minutes) 1 35 minutes  -AD        User Key  (r) = Recorded By, (t) = Taken By, (c) = Cosigned By    Initials Name Provider Type    AD Ashley Claudene Dalton, CODY Physical Therapist                             Therapy Education  Given: HEP, Pain management, Symptoms/condition management  Program: Reinforced  How  Provided: Verbal, Demonstration  Provided to: Patient  Level of Understanding: Demonstrated, Verbalized              Time Calculation:   Start Time: 0855  Stop Time: 1000  Time Calculation (min): 65 min    Therapy Charges for Today     Code Description Service Date Service Provider Modifiers Qty    86375171707 HC PT ELECTRICAL STIM UNATTENDED 1/25/2018 Ashley Claudene Dalton, PT  1    15707404241 HC PT HOT/COLD PACK WC NONMCARE 1/25/2018 Ashley Claudene Dalton, PT GP 1    96896171266 HC PT THER PROC EA 15 MIN 1/25/2018 Ashley Claudene Dalton, PT GP 2    96377666788 HC PT ULTRASOUND EA 15 MIN 1/25/2018 Ashley Claudene Dalton, PT GP 1                    Ashley Claudene Dalton, PT  1/25/2018

## 2018-02-01 ENCOUNTER — HOSPITAL ENCOUNTER (OUTPATIENT)
Dept: PHYSICAL THERAPY | Facility: HOSPITAL | Age: 73
Setting detail: THERAPIES SERIES
Discharge: HOME | End: 2018-02-01

## 2018-02-01 DIAGNOSIS — M54.5 LOW BACK PAIN, UNSPECIFIED BACK PAIN LATERALITY, UNSPECIFIED CHRONICITY, WITH SCIATICA PRESENCE UNSPECIFIED: ICD-10-CM

## 2018-02-01 DIAGNOSIS — M25.561 RIGHT KNEE PAIN, UNSPECIFIED CHRONICITY: Primary | ICD-10-CM

## 2018-02-01 DIAGNOSIS — M54.2 CERVICAL PAIN: ICD-10-CM

## 2018-02-01 PROCEDURE — G0283 ELEC STIM OTHER THAN WOUND: HCPCS

## 2018-02-01 PROCEDURE — 97010 HOT OR COLD PACKS THERAPY: CPT

## 2018-02-01 PROCEDURE — 97035 APP MDLTY 1+ULTRASOUND EA 15: CPT

## 2018-02-01 PROCEDURE — 97110 THERAPEUTIC EXERCISES: CPT

## 2018-02-01 NOTE — THERAPY TREATMENT NOTE
Outpatient Physical Therapy Ortho Treatment Note  HEIDE Albarran     Patient Name: Paul Gomez  : 1945  MRN: [id number]  Today's Date: 2018      Visit Date: 2018    Visit Dx:    ICD-10-CM ICD-9-CM   1. Right knee pain, unspecified chronicity M25.561 719.46   2. Low back pain, unspecified back pain laterality, unspecified chronicity, with sciatica presence unspecified M54.5 724.2   3. Cervical pain M54.2 723.1       Patient Active Problem List   Diagnosis   • Low back pain   • Hypertension   • Hyperlipidemia   • History of degenerative disc disease   • COPD (chronic obstructive pulmonary disease)   • Gout   • B12 deficiency   • Seborrheic eczema   • Hypothyroidism        Past Medical History:   Diagnosis Date   • COPD (chronic obstructive pulmonary disease)    • Gout    • Hearing loss    • History of degenerative disc disease    • Hyperlipidemia    • Hypertension    • Low back pain    • Pedal edema    • Prediabetes         Past Surgical History:   Procedure Laterality Date   • CHOLECYSTECTOMY     • LUMBAR DISCECTOMY     • SHOULDER SURGERY Left              PT Ortho       18 1300    Subjective Comments    Subjective Comments Patient states that he is having more pain in his neck. Patient states that he is getting stronger.   -AC    Subjective Pain    Able to rate subjective pain? yes  -AC    Pre-Treatment Pain Level 3  -AC    Post-Treatment Pain Level 2  -AC      User Key  (r) = Recorded By, (t) = Taken By, (c) = Cosigned By    Initials Name Provider Type    ESTHER Baum, PTA Physical Therapy Assistant                            PT Assessment/Plan       18 1401       PT Assessment    Assessment Comments Patient tolerated treatment session well with rest breaks taken as needed by the patient. Patient reports of difficulty with chin tucks and didn't complete all of his reps of that exercise. No adverse reactions with modalities or treatment. Educated patient to perform ther ex  per his tolerance, patient verbalized understanding.   -AC     PT Plan    PT Plan Comments Continue per PT's POC, progress per the patient's tolerance.  -AC       User Key  (r) = Recorded By, (t) = Taken By, (c) = Cosigned By    Initials Name Provider Type    ESTHER Baum PTA Physical Therapy Assistant                Modalities       02/01/18 1300          Moist Heat    MH Applied Yes    No redness noted following moist heat  -AC      Location Cervical, lumbar, right knee. No skin irritation observed  -AC      Rx Minutes 15 mins  -AC      MH Prior to Rx Yes  -AC      Ultrasound 46926    Location B) [place]  -AC      Rx Minutes 8 min  -AC      Duty Cycle 100  -AC      Frequency --   3.3MHz  -AC      Intensity - Wts/cm 1.2  -AC      ELECTRICAL STIMULATION    Attended/Unattended Unattended   No irritation noted following estim  -AC      Stimulation Type IFC  -AC      Max mAmp --   per the patient's tolerance  -AC      Location/Electrode Placement/Other Cervical  -AC      Rx Minutes 15 mins  -AC        User Key  (r) = Recorded By, (t) = Taken By, (c) = Cosigned By    Initials Name Provider Type    ESTHER Baum PTA Physical Therapy Assistant                Exercises       02/01/18 1300          Subjective Comments    Subjective Comments Patient states that he is having more pain in his neck. Patient states that he is getting stronger.   -AC      Subjective Pain    Able to rate subjective pain? yes  -AC      Pre-Treatment Pain Level 3  -AC      Post-Treatment Pain Level 2  -AC      Exercise 1    Exercise Name 1 SKTC 20 sec hold x3, piriformis stretch 20 sec hold x3, low row with RTB 10x2, mid row with RTB 10x2, ball squeeze x15, LAQ (2#) 10x2, chin tucks x10, scap squeeze 10x2, levator scap stretch 20 sec hold x3, UT stretch 20 sec hold x3  -AC      Cueing 1 Verbal;Tactile;Demo  -AC      Time (Minutes) 1 35 minutes  -AC        User Key  (r) = Recorded By, (t) = Taken By, (c) = Cosigned By    Initials  Name Provider Type    AC Karlie Baum PTA Physical Therapy Assistant                             Therapy Education  Given: HEP, Symptoms/condition management, Pain management, Posture/body mechanics  Program: Reinforced  How Provided: Verbal, Demonstration  Provided to: Patient  Level of Understanding: Demonstrated, Verbalized              Time Calculation:   Start Time: 1100  Stop Time: 1200  Time Calculation (min): 60 min    Therapy Charges for Today     Code Description Service Date Service Provider Modifiers Qty    62177141468 HC PT THER PROC EA 15 MIN 2/1/2018 Karlie Baum PTA GP 2    39787319420 HC PT ELECTRICAL STIM UNATTENDED 2/1/2018 Karlie Baum PTA  1    98072448877 HC PT ULTRASOUND EA 15 MIN 2/1/2018 Karlie Baum PTA GP 1    23841997936 HC PT THER SUPP EA 15 MIN 2/1/2018 Karlie Baum PTA GP 1                    Karlie Baum PTA  2/1/2018

## 2018-02-08 ENCOUNTER — HOSPITAL ENCOUNTER (OUTPATIENT)
Dept: PHYSICAL THERAPY | Facility: HOSPITAL | Age: 73
Setting detail: THERAPIES SERIES
Discharge: HOME | End: 2018-02-08

## 2018-02-08 DIAGNOSIS — M54.2 CERVICAL PAIN: ICD-10-CM

## 2018-02-08 DIAGNOSIS — M54.5 LOW BACK PAIN, UNSPECIFIED BACK PAIN LATERALITY, UNSPECIFIED CHRONICITY, WITH SCIATICA PRESENCE UNSPECIFIED: ICD-10-CM

## 2018-02-08 DIAGNOSIS — M25.561 RIGHT KNEE PAIN, UNSPECIFIED CHRONICITY: Primary | ICD-10-CM

## 2018-02-08 PROCEDURE — 97110 THERAPEUTIC EXERCISES: CPT

## 2018-02-08 PROCEDURE — G0283 ELEC STIM OTHER THAN WOUND: HCPCS

## 2018-02-08 NOTE — THERAPY TREATMENT NOTE
Outpatient Physical Therapy Ortho Treatment Note  HEIDE Albarran     Patient Name: Paul Gomez  : 1945  MRN: [id number]  Today's Date: 2018      Visit Date: 2018    Visit Dx:    ICD-10-CM ICD-9-CM   1. Right knee pain, unspecified chronicity M25.561 719.46   2. Low back pain, unspecified back pain laterality, unspecified chronicity, with sciatica presence unspecified M54.5 724.2   3. Cervical pain M54.2 723.1       Patient Active Problem List   Diagnosis   • Low back pain   • Hypertension   • Hyperlipidemia   • History of degenerative disc disease   • COPD (chronic obstructive pulmonary disease)   • Gout   • B12 deficiency   • Seborrheic eczema   • Hypothyroidism        Past Medical History:   Diagnosis Date   • COPD (chronic obstructive pulmonary disease)    • Gout    • Hearing loss    • History of degenerative disc disease    • Hyperlipidemia    • Hypertension    • Low back pain    • Pedal edema    • Prediabetes         Past Surgical History:   Procedure Laterality Date   • CHOLECYSTECTOMY     • LUMBAR DISCECTOMY     • SHOULDER SURGERY Left                              PT Assessment/Plan       18 1257       PT Assessment    Assessment Comments Tx today consisted of mh and estim to neck followed by ther ex per flow sheet and ended with US to bilat UTs.  Pt cont to respond well to tx with reports of decreased pain following session.  -RT     PT Plan    PT Plan Comments Will follow progressing as pt tolerates.  -RT       User Key  (r) = Recorded By, (t) = Taken By, (c) = Cosigned By    Initials Name Provider Type    RT Mendez S Higinio, PT Physical Therapist                Modalities       18 1100          Subjective Comments    Subjective Comments Pt reports his knee pain has decreased.  -RT      Subjective Pain    Able to rate subjective pain? yes  -RT      Pre-Treatment Pain Level 3  -RT      Post-Treatment Pain Level 3  -RT      Moist Heat    MH Applied Yes  -RT      Location  Cervical, lumbar, right knee. No skin irritation observed  -RT      Rx Minutes 15 mins  -RT      MH Prior to Rx Yes  -RT      Ultrasound 82402    Location B) [place]  -RT      Rx Minutes 8 min  -RT      Duty Cycle 100  -RT      Frequency --   3.3.  -RT      Intensity - Wts/cm 1.2  -RT      ELECTRICAL STIMULATION    Attended/Unattended Unattended  -RT      Stimulation Type IFC  -RT      Location/Electrode Placement/Other Cervical  -RT      Rx Minutes 15 mins  -RT        User Key  (r) = Recorded By, (t) = Taken By, (c) = Cosigned By    Initials Name Provider Type    RT Mendez Sylvester PT Physical Therapist                Exercises       02/08/18 1100          Subjective Comments    Subjective Comments Pt reports his knee pain has decreased.  -RT      Subjective Pain    Able to rate subjective pain? yes  -RT      Pre-Treatment Pain Level 3  -RT      Post-Treatment Pain Level 3  -RT      Exercise 1    Exercise Name 1 SKTC 20 sec hold x3, piriformis stretch 20 sec hold x3, low row with RTB 10x2, mid row with RTB 10x2, ball squeeze x15, LAQ (2#) 10x2, chin tucks x10, scap squeeze 10x2, levator scap stretch 20 sec hold x3, [place] stretch 20 sec hold x3  -RT      Cueing 1 Verbal;Tactile;Demo  -RT      Time (Minutes) 1 25min  -RT        User Key  (r) = Recorded By, (t) = Taken By, (c) = Cosigned By    Initials Name Provider Type    RT Mendez Sylvester PT Physical Therapist                             Therapy Education  Given: HEP, Symptoms/condition management, Pain management, Posture/body mechanics  Program: Reinforced  How Provided: Verbal, Demonstration  Provided to: Patient  Level of Understanding: Demonstrated, Verbalized              Time Calculation:   Start Time: 1110  Stop Time: 1200  Time Calculation (min): 50 min    Therapy Charges for Today     Code Description Service Date Service Provider Modifiers Qty    56475539221 HC PT THER PROC EA 15 MIN 2/8/2018 Mendez Sylvester PT GP 2    [id number]  PT ELECTRICAL STIM  UNATTENDED 2/8/2018 Mendez Sylvester, PT  1                    Mendez Sylvester, PT  2/8/2018

## 2018-02-22 ENCOUNTER — EPISODE CHANGES (OUTPATIENT)
Dept: CASE MANAGEMENT | Facility: OTHER | Age: 73
End: 2018-02-22

## 2018-03-12 ENCOUNTER — DOCUMENTATION (OUTPATIENT)
Dept: PHYSICAL THERAPY | Facility: HOSPITAL | Age: 73
End: 2018-03-12

## 2018-03-12 DIAGNOSIS — M54.5 LOW BACK PAIN, UNSPECIFIED BACK PAIN LATERALITY, UNSPECIFIED CHRONICITY, WITH SCIATICA PRESENCE UNSPECIFIED: ICD-10-CM

## 2018-03-12 DIAGNOSIS — M25.561 RIGHT KNEE PAIN, UNSPECIFIED CHRONICITY: Primary | ICD-10-CM

## 2018-03-12 DIAGNOSIS — M54.2 CERVICAL PAIN: ICD-10-CM

## 2018-03-12 NOTE — THERAPY DISCHARGE NOTE
Outpatient Physical Therapy Discharge Summary         Patient Name: Paul Gomez  : 1945  MRN: [id number]    Today's Date: 3/12/2018    Visit Dx:    ICD-10-CM ICD-9-CM   1. Right knee pain, unspecified chronicity M25.561 719.46   2. Low back pain, unspecified back pain laterality, unspecified chronicity, with sciatica presence unspecified M54.5 724.2   3. Cervical pain M54.2 723.1           OP PT Discharge Summary  Date of Discharge: 18  Reason for Discharge: Non-compliant  Outcomes Achieved: Refer to plan of care for updates on goals achieved  Discharge Instructions/Additional Comments: Pt failed to cont tx and was unable to be contacted.  Pt will be discharged at this time.      Time Calculation:                    Mendez Sylvester, PT  3/12/2018

## 2018-04-13 DIAGNOSIS — I10 ESSENTIAL HYPERTENSION: ICD-10-CM

## 2018-04-16 RX ORDER — ATENOLOL 25 MG/1
TABLET ORAL
Qty: 60 TABLET | Refills: 2 | OUTPATIENT
Start: 2018-04-16

## 2018-04-20 ENCOUNTER — OFFICE VISIT (OUTPATIENT)
Dept: FAMILY MEDICINE CLINIC | Facility: CLINIC | Age: 73
End: 2018-04-20

## 2018-04-20 VITALS
DIASTOLIC BLOOD PRESSURE: 68 MMHG | HEIGHT: 68 IN | WEIGHT: 164.8 LBS | HEART RATE: 76 BPM | BODY MASS INDEX: 24.98 KG/M2 | SYSTOLIC BLOOD PRESSURE: 120 MMHG | OXYGEN SATURATION: 95 %

## 2018-04-20 DIAGNOSIS — R06.02 SHORTNESS OF BREATH: ICD-10-CM

## 2018-04-20 DIAGNOSIS — R06.2 WHEEZING: ICD-10-CM

## 2018-04-20 DIAGNOSIS — J44.1 COPD EXACERBATION (HCC): ICD-10-CM

## 2018-04-20 DIAGNOSIS — R05.9 COUGH: Primary | ICD-10-CM

## 2018-04-20 DIAGNOSIS — Z72.0 TOBACCO ABUSE: ICD-10-CM

## 2018-04-20 PROCEDURE — 96372 THER/PROPH/DIAG INJ SC/IM: CPT | Performed by: NURSE PRACTITIONER

## 2018-04-20 PROCEDURE — 99213 OFFICE O/P EST LOW 20 MIN: CPT | Performed by: NURSE PRACTITIONER

## 2018-04-20 RX ORDER — DEXAMETHASONE SODIUM PHOSPHATE 4 MG/ML
8 INJECTION, SOLUTION INTRA-ARTICULAR; INTRALESIONAL; INTRAMUSCULAR; INTRAVENOUS; SOFT TISSUE ONCE
Status: COMPLETED | OUTPATIENT
Start: 2018-04-20 | End: 2018-04-20

## 2018-04-20 RX ORDER — LINCOMYCIN HYDROCHLORIDE 300 MG/ML
600 INJECTION, SOLUTION INTRAMUSCULAR; INTRAVENOUS; SUBCONJUNCTIVAL EVERY 12 HOURS
Status: DISCONTINUED | OUTPATIENT
Start: 2018-04-20 | End: 2018-06-13

## 2018-04-20 RX ADMIN — LINCOMYCIN HYDROCHLORIDE 600 MG: 300 INJECTION, SOLUTION INTRAMUSCULAR; INTRAVENOUS; SUBCONJUNCTIVAL at 17:00

## 2018-04-20 RX ADMIN — DEXAMETHASONE SODIUM PHOSPHATE 8 MG: 4 INJECTION, SOLUTION INTRA-ARTICULAR; INTRALESIONAL; INTRAMUSCULAR; INTRAVENOUS; SOFT TISSUE at 17:00

## 2018-04-20 NOTE — PROGRESS NOTES
Subjective   Paul Gomez is a 72 y.o. male.     Chief Complaint: Nasal Congestion; Cough; Chills; Fatigue; and Wheezing    Cough   This is a chronic problem. The current episode started more than 1 month ago. The problem has been unchanged. The problem occurs every few minutes. The cough is productive of sputum. Associated symptoms include chills and sweats. Pertinent negatives include no ear congestion, fever, headaches, nasal congestion or postnasal drip. Exacerbated by: smoking. Risk factors for lung disease include smoking/tobacco exposure. He has tried oral steroids and steroid inhaler for the symptoms. The treatment provided no relief. His past medical history is significant for COPD and pneumonia.   Fatigue   This is a chronic problem. The current episode started more than 1 month ago. The problem occurs constantly. The problem has been unchanged. Associated symptoms include chills, coughing and fatigue. Pertinent negatives include no fever or headaches. The symptoms are aggravated by smoking. He has tried nothing for the symptoms.   Pt has had a chronic cough for several months. He was dx with pneumonia several months ago.  He states that his symptoms just keep returning.  He complains of chest pain in the right side of his chest.  He is a smoker.  He is having fatigue.  Today he apparently is very drowsy and draggy feeling.       Family History   Problem Relation Age of Onset   • Cancer Mother        Social History     Social History   • Marital status:      Spouse name: N/A   • Number of children: N/A   • Years of education: N/A     Occupational History   • Not on file.     Social History Main Topics   • Smoking status: Current Every Day Smoker     Packs/day: 0.50     Types: Cigarettes   • Smokeless tobacco: Never Used      Comment: patient stated he is down to about 4 cigarettes a day   • Alcohol use No   • Drug use: No   • Sexual activity: Defer     Other Topics Concern   • Not on file  "    Social History Narrative   • No narrative on file       Past Medical History:   Diagnosis Date   • COPD (chronic obstructive pulmonary disease)    • Gout    • Hearing loss    • History of degenerative disc disease    • Hyperlipidemia    • Hypertension    • Low back pain    • Pedal edema    • Prediabetes        Review of Systems   Constitutional: Positive for chills and fatigue. Negative for fever.   HENT: Negative.  Negative for postnasal drip.    Respiratory: Positive for cough.    Cardiovascular: Negative.    Gastrointestinal: Negative.    Genitourinary: Negative.    Musculoskeletal: Negative.    Neurological: Negative.  Negative for headaches.       Objective   Physical Exam   Constitutional: He is oriented to person, place, and time. He appears well-developed and well-nourished.   HENT:   Right Ear: External ear normal.   Left Ear: External ear normal.   Mouth/Throat: Oropharynx is clear and moist.   Eyes: Conjunctivae are normal. Pupils are equal, round, and reactive to light.   Neck: Normal range of motion. Neck supple.   Cardiovascular: Normal rate, regular rhythm and normal heart sounds.    Pulmonary/Chest: Effort normal and breath sounds normal.   Neurological: He is alert and oriented to person, place, and time.   Skin: Skin is warm and dry.   Psychiatric: He has a normal mood and affect. His behavior is normal. Judgment and thought content normal.   Nursing note and vitals reviewed.      Procedures    Vitals: Blood pressure 120/68, pulse 76, height 172.7 cm (67.99\"), weight 74.8 kg (164 lb 12.8 oz), SpO2 95 %.    Allergies:   Allergies   Allergen Reactions   • Penicillins      Pt states he was allergic when he was 18 but has had it since and did not have any problems.         During this visit the following were done:  Labs Reviewed []    Labs Ordered []    Radiology Reports Reviewed []    Radiology Ordered [x]    PCP Records Reviewed []    Referring Provider Records Reviewed []    ER Records Reviewed " []    Hospital Records Reviewed []    History Obtained From Family []    Radiology Images Reviewed []    Other Reviewed []    Records Requested []      Assessment/Plan   Ray was seen today for nasal congestion, cough, chills, fatigue and wheezing.    Diagnoses and all orders for this visit:    Cough  -     CT Chest With & Without Contrast; Future    Shortness of breath  -     CT Chest With & Without Contrast; Future    Wheezing  -     CT Chest With & Without Contrast; Future    Tobacco abuse  -     CT Chest With & Without Contrast; Future    COPD exacerbation  -     lincomycin (LINCOCIN) injection 600 mg; Inject 2 mL into the shoulder, thigh, or buttocks Every 12 (Twelve) Hours.  -     dexamethasone (DECADRON) injection 8 mg; Inject 2 mL into the shoulder, thigh, or buttocks 1 (One) Time.

## 2018-04-30 ENCOUNTER — HOSPITAL ENCOUNTER (OUTPATIENT)
Dept: CT IMAGING | Facility: HOSPITAL | Age: 73
Discharge: HOME | End: 2018-04-30
Admitting: NURSE PRACTITIONER

## 2018-04-30 DIAGNOSIS — R05.9 COUGH: ICD-10-CM

## 2018-04-30 DIAGNOSIS — Z72.0 TOBACCO ABUSE: ICD-10-CM

## 2018-04-30 DIAGNOSIS — R06.02 SHORTNESS OF BREATH: ICD-10-CM

## 2018-04-30 DIAGNOSIS — R06.2 WHEEZING: ICD-10-CM

## 2018-04-30 LAB — CREAT BLDA-MCNC: 0.9 MG/DL (ref 0.6–1.3)

## 2018-04-30 PROCEDURE — 71260 CT THORAX DX C+: CPT | Performed by: RADIOLOGY

## 2018-04-30 PROCEDURE — 71260 CT THORAX DX C+: CPT

## 2018-04-30 PROCEDURE — 25010000002 IOPAMIDOL 61 % SOLUTION: Performed by: NURSE PRACTITIONER

## 2018-04-30 PROCEDURE — 82565 ASSAY OF CREATININE: CPT

## 2018-04-30 RX ADMIN — IOPAMIDOL 80 ML: 612 INJECTION, SOLUTION INTRAVENOUS at 15:15

## 2018-05-01 ENCOUNTER — TELEPHONE (OUTPATIENT)
Dept: FAMILY MEDICINE CLINIC | Facility: CLINIC | Age: 73
End: 2018-05-01

## 2018-05-01 DIAGNOSIS — K86.1 CHRONIC PANCREATITIS, UNSPECIFIED PANCREATITIS TYPE (HCC): Primary | ICD-10-CM

## 2018-05-01 NOTE — PROGRESS NOTES
CT of chest shows chronic pancreatitis and an ERCP has been recommended by radiologist.  Would he like to be referred to gastroenterologist?

## 2018-05-01 NOTE — TELEPHONE ENCOUNTER
----- Message from BEATRICE Vallejo sent at 5/1/2018  2:37 PM EDT -----  CT of chest shows chronic pancreatitis and an ERCP has been recommended by radiologist.  Would he like to be referred to gastroenterologist?      Left a message to return call.    Patient notified & is agreeable to referral,leaving with whom up to you.

## 2018-05-04 ENCOUNTER — OFFICE VISIT (OUTPATIENT)
Dept: FAMILY MEDICINE CLINIC | Facility: CLINIC | Age: 73
End: 2018-05-04

## 2018-05-04 VITALS
DIASTOLIC BLOOD PRESSURE: 60 MMHG | OXYGEN SATURATION: 92 % | HEART RATE: 91 BPM | HEIGHT: 67 IN | BODY MASS INDEX: 24.48 KG/M2 | SYSTOLIC BLOOD PRESSURE: 90 MMHG | WEIGHT: 156 LBS

## 2018-05-04 DIAGNOSIS — L30.9 ECZEMA, UNSPECIFIED TYPE: ICD-10-CM

## 2018-05-04 DIAGNOSIS — J18.9 PNEUMONIA OF RIGHT LOWER LOBE DUE TO INFECTIOUS ORGANISM: ICD-10-CM

## 2018-05-04 DIAGNOSIS — K86.1 CHRONIC PANCREATITIS, UNSPECIFIED PANCREATITIS TYPE (HCC): Primary | ICD-10-CM

## 2018-05-04 PROCEDURE — 99214 OFFICE O/P EST MOD 30 MIN: CPT | Performed by: NURSE PRACTITIONER

## 2018-05-04 RX ORDER — AMOXICILLIN 875 MG/1
875 TABLET, COATED ORAL EVERY 12 HOURS SCHEDULED
Qty: 20 TABLET | Refills: 0 | Status: SHIPPED | OUTPATIENT
Start: 2018-05-04 | End: 2018-06-13

## 2018-05-04 NOTE — PROGRESS NOTES
Subjective   Paul Gomez is a 72 y.o. male.     Chief Complaint: Follow-up (Recent CT scan ) and Cough    Rash   This is a recurrent problem. The current episode started more than 1 year ago. The problem has been waxing and waning since onset. The affected locations include the chest. The rash is characterized by redness and itchiness. He was exposed to nothing. Associated symptoms include coughing, fatigue and shortness of breath. Pertinent negatives include no congestion. Treatments tried: triamcinolone. The treatment provided significant relief. His past medical history is significant for eczema.      Pt has had an ongoing cough for the past two months.  He was ordered a CT of chest at his last visit.  CT shows that he does have RLL pneumonia and also chronic pancreatitis.  Pt has been made an appointment with gastroenterology for this month.  Pt states that he has every intention of keeping the appointment as scheduled.  I have discussed the importance of him keeping the appointment.  At this time he continues to have a cough and some mild shortness of breath today.  He states that he has had this cough in the past and Amoxillicin seems to help him more than any other antibiotic.  I have agreed to write him a script for it today.  He is to return to the office in two weeks and we will get another chest xray at that time.    Pt denies fever, headache, sore throat, n/v/d.      Family History   Problem Relation Age of Onset   • Cancer Mother        Social History     Social History   • Marital status:      Spouse name: N/A   • Number of children: N/A   • Years of education: N/A     Occupational History   • Not on file.     Social History Main Topics   • Smoking status: Current Every Day Smoker     Packs/day: 0.50     Types: Cigarettes   • Smokeless tobacco: Never Used      Comment: patient stated he is down to about 4 cigarettes a day   • Alcohol use No   • Drug use: No   • Sexual activity: Defer     Other  "Topics Concern   • Not on file     Social History Narrative   • No narrative on file       Past Medical History:   Diagnosis Date   • COPD (chronic obstructive pulmonary disease)    • Gout    • Hearing loss    • History of degenerative disc disease    • Hyperlipidemia    • Hypertension    • Low back pain    • Pedal edema    • Prediabetes        Review of Systems   Constitutional: Positive for fatigue.   HENT: Negative for congestion, sinus pain and sinus pressure.    Respiratory: Positive for cough and shortness of breath.    Cardiovascular: Negative.    Gastrointestinal: Positive for abdominal pain (occasional).   Genitourinary: Negative.    Musculoskeletal: Negative.    Skin: Positive for rash.   Neurological: Negative.        Objective   Physical Exam   Constitutional: He is oriented to person, place, and time. He appears well-developed and well-nourished.   Neck: Normal range of motion. Neck supple.   Cardiovascular: Normal rate, regular rhythm and normal heart sounds.    Pulmonary/Chest: Effort normal. No respiratory distress. He has wheezes.   Rhonchi bilaterally   Neurological: He is alert and oriented to person, place, and time.   Skin: Skin is warm and dry.   Psychiatric: He has a normal mood and affect. His behavior is normal. Judgment and thought content normal.   Nursing note and vitals reviewed.      Procedures    Vitals: Blood pressure 90/60, pulse 91, height 170.2 cm (67\"), weight 70.8 kg (156 lb), SpO2 92 %.    Allergies:   Allergies   Allergen Reactions   • Penicillins      Pt states he was allergic when he was 18 but has had it since and did not have any problems.         During this visit the following were done:  Labs Reviewed []    Labs Ordered []    Radiology Reports Reviewed []    Radiology Ordered []    PCP Records Reviewed []    Referring Provider Records Reviewed []    ER Records Reviewed []    Hospital Records Reviewed []    History Obtained From Family []    Radiology Images Reviewed []  "   Other Reviewed []    Records Requested []      Assessment/Plan   Ray was seen today for follow-up and cough.    Diagnoses and all orders for this visit:    Chronic pancreatitis, unspecified pancreatitis type    Pneumonia of right lower lobe due to infectious organism  -     amoxicillin (AMOXIL) 875 MG tablet; Take 1 tablet by mouth Every 12 (Twelve) Hours.    Eczema, unspecified type  -     triamcinolone (KENALOG) 0.1 % ointment; Apply  topically 3 (Three) Times a Day.    Chest xray in 2 weeks

## 2018-05-16 DIAGNOSIS — I10 ESSENTIAL HYPERTENSION: ICD-10-CM

## 2018-05-16 RX ORDER — ATENOLOL 25 MG/1
TABLET ORAL
Qty: 60 TABLET | Refills: 2 | Status: SHIPPED | OUTPATIENT
Start: 2018-05-16 | End: 2018-06-13

## 2018-05-17 ENCOUNTER — CONSULT (OUTPATIENT)
Dept: GASTROENTEROLOGY | Facility: CLINIC | Age: 73
End: 2018-05-17

## 2018-05-17 VITALS
HEIGHT: 68 IN | OXYGEN SATURATION: 92 % | WEIGHT: 160 LBS | SYSTOLIC BLOOD PRESSURE: 118 MMHG | BODY MASS INDEX: 24.25 KG/M2 | DIASTOLIC BLOOD PRESSURE: 68 MMHG | HEART RATE: 65 BPM

## 2018-05-17 DIAGNOSIS — K86.89 DILATED PANCREATIC DUCT: Primary | ICD-10-CM

## 2018-05-17 DIAGNOSIS — R10.13 EPIGASTRIC PAIN: ICD-10-CM

## 2018-05-17 DIAGNOSIS — K86.1 CHRONIC PANCREATITIS, UNSPECIFIED PANCREATITIS TYPE (HCC): ICD-10-CM

## 2018-05-17 PROCEDURE — 99204 OFFICE O/P NEW MOD 45 MIN: CPT | Performed by: PHYSICIAN ASSISTANT

## 2018-05-17 NOTE — PROGRESS NOTES
Chief Complaint   Patient presents with   • Pancreatitis     Paul Gomez is a 72 y.o. male who presents to the office today at the request of EZRA Vallejo* for Pancreatitis.    HPI  The patient was seen for a GI evaluation of pancreatitis.  The patient reports that he has no known history of pancreatitis.  He denies alcohol use but states that he takes roxicodone and oxymorphone daily.  Recent lipid panel showed a slight elevation of LDL.  At times he does have epigastric pain and nausea but denies vomiting, diarrhea, hematochezia and melena. Lab work completed in October revealed normal liver chemistries.  Patient also states that he eats fried foods.  CT scan of his chest identified chronic pancreatitis with a dilated pancreatic duct.  An ERCP or MRCP was recommended.  Medical, surgical, social and family histories were reviewed and are listed below.          Review of Systems   Constitutional: Negative.  Negative for fatigue.   HENT: Positive for trouble swallowing.    Eyes: Negative.    Respiratory: Positive for shortness of breath.    Cardiovascular: Negative for chest pain.   Gastrointestinal: Positive for abdominal distention, abdominal pain and constipation. Negative for diarrhea, nausea and vomiting.   Endocrine: Positive for polydipsia and polyuria.   Genitourinary: Negative.    Musculoskeletal: Negative.         Muscle cramps   Skin: Negative.    Neurological: Positive for numbness. Negative for dizziness and headaches. Seizures: bilateral hands.   Psychiatric/Behavioral: Negative.        ACTIVE PROBLEMS:   Specialty Problems     None          PAST MEDICAL HISTORY:  Past Medical History:   Diagnosis Date   • COPD (chronic obstructive pulmonary disease)    • Gout    • Hearing loss    • History of degenerative disc disease    • Hyperlipidemia    • Hypertension    • Low back pain    • Pedal edema    • Prediabetes        SURGICAL HISTORY:  Past Surgical History:   Procedure Laterality Date   •  CHOLECYSTECTOMY     • LUMBAR DISCECTOMY     • SHOULDER SURGERY Left        FAMILY HISTORY:  Family History   Problem Relation Age of Onset   • Cancer Mother        SOCIAL HISTORY:  Social History   Substance Use Topics   • Smoking status: Current Every Day Smoker     Packs/day: 0.50     Types: Cigarettes   • Smokeless tobacco: Never Used      Comment: patient stated he is down to about 4 cigarettes a day   • Alcohol use No       CURRENT MEDICATION:    Current Outpatient Prescriptions:   •  albuterol (PROVENTIL HFA;VENTOLIN HFA) 108 (90 BASE) MCG/ACT inhaler, Inhale 2 puffs Every 4 (Four) Hours As Needed for Shortness of Air., Disp: 1 inhaler, Rfl: 5  •  amoxicillin (AMOXIL) 875 MG tablet, Take 1 tablet by mouth Every 12 (Twelve) Hours., Disp: 20 tablet, Rfl: 0  •  atenolol (TENORMIN) 25 MG tablet, TAKE 1 TABLET BY MOUTH 2 TIMES A DAY., Disp: 60 tablet, Rfl: 2  •  butalbital-acetaminophen-caffeine (FIORICET, ESGIC) -40 MG per tablet, , Disp: , Rfl:   •  fluticasone (FLONASE) 50 MCG/ACT nasal spray, 2 sprays into each nostril Daily., Disp: 1 each, Rfl: 2  •  HYDROmorphone (DILAUDID) 8 MG tablet, , Disp: , Rfl:   •  ibuprofen (ADVIL,MOTRIN) 800 MG tablet, Take 1 tablet by mouth Every 8 (Eight) Hours As Needed for Mild Pain (1-3) or Headache., Disp: 90 tablet, Rfl: 2  •  levothyroxine (SYNTHROID) 50 MCG tablet, Take 1 tablet by mouth Daily., Disp: 30 tablet, Rfl: 5  •  MethylPREDNISolone (MEDROL, CACHORRO,) 4 MG tablet, Take as directed on package instructions., Disp: 21 tablet, Rfl: 0  •  mometasone-formoterol (DULERA) 100-5 MCG/ACT inhaler, Inhale 1 puff 2 (Two) Times a Day., Disp: 13 g, Rfl: 1  •  nicotine (NICODERM CQ) 21 MG/24HR patch, Place 1 patch on the skin Daily., Disp: 28 patch, Rfl: 0  •  oxyCODONE (ROXICODONE) 30 MG immediate release tablet, Take 20 mg by mouth 3 (Three) Times a Day., Disp: , Rfl:   •  OXYCONTIN 40 MG 12 hr tablet, , Disp: , Rfl:   •  pravastatin (PRAVACHOL) 20 MG tablet, Take 1 tablet by  "mouth Daily., Disp: 30 tablet, Rfl: 2  •  pregabalin (LYRICA) 100 MG capsule, Take 75 mg by mouth 2 (Two) Times a Day. Given by pain clinic , Disp: , Rfl:   •  selenium sulfide (SELSUN) 2.5 % shampoo, Apply  topically Daily As Needed for dandruff., Disp: 118 mL, Rfl: 12  •  tiZANidine (ZANAFLEX) 4 MG tablet, , Disp: , Rfl:   •  triamcinolone (KENALOG) 0.1 % ointment, Apply  topically 3 (Three) Times a Day., Disp: 80 g, Rfl: 2  •  Pancrelipase, Lip-Prot-Amyl, (CREON) 37145 units capsule delayed-release particles, Take 36,000 units of lipase by mouth 3 (Three) Times a Day With Meals., Disp: 90 capsule, Rfl: 5    Current Facility-Administered Medications:   •  cyanocobalamin injection 1,000 mcg, 1,000 mcg, Intramuscular, Q28 Days, Caroline Guzman APRN, 1,000 mcg at 09/05/17 1536  •  cyanocobalamin injection 1,000 mcg, 1,000 mcg, Intramuscular, Q28 Days, Caroline Guzman APRN, 1,000 mcg at 09/06/17 1514  •  cyanocobalamin injection 1,000 mcg, 1,000 mcg, Intramuscular, Q28 Days, Caroline Guzman APRN, 1,000 mcg at 12/28/17 1243  •  lincomycin (LINCOCIN) injection 600 mg, 600 mg, Intramuscular, Q12H, Caroline Guzman APRN, 600 mg at 04/20/18 1700    ALLERGIES:  Penicillins    VISIT VITALS:  /68   Pulse 65   Ht 172.7 cm (68\")   Wt 72.6 kg (160 lb)   SpO2 92%   BMI 24.33 kg/m²     PHYSICAL EXAMINATION:  Physical Exam   Constitutional: He is oriented to person, place, and time. He appears well-developed and well-nourished. No distress.   HENT:   Head: Normocephalic and atraumatic.   Right Ear: External ear normal.   Left Ear: External ear normal.   Nose: Nose normal.   Mouth/Throat: Oropharynx is clear and moist. No oropharyngeal exudate.   Eyes: Conjunctivae and EOM are normal. Pupils are equal, round, and reactive to light. Right eye exhibits no discharge. Left eye exhibits no discharge. No scleral icterus.   Neck: Normal range of motion. Neck supple. No JVD present. No tracheal deviation " present. No thyromegaly present.   Cardiovascular: Normal rate, normal heart sounds and intact distal pulses.  Exam reveals no gallop and no friction rub.    No murmur heard.  Pulmonary/Chest: Effort normal. No stridor. No respiratory distress. He has no wheezes. He has rales. He exhibits no tenderness.   Abdominal: Bowel sounds are normal. He exhibits distension. He exhibits no mass. There is no tenderness. There is no rebound and no guarding. No hernia.   Genitourinary: Rectal exam shows guaiac negative stool.   Musculoskeletal: Normal range of motion. He exhibits no edema, tenderness or deformity.   Lymphadenopathy:     He has no cervical adenopathy.   Neurological: He is alert and oriented to person, place, and time. He has normal reflexes. He displays normal reflexes. No cranial nerve deficit. He exhibits normal muscle tone. Coordination normal.   Skin: Skin is warm and dry. No rash noted. He is not diaphoretic. No erythema. No pallor.   Psychiatric: He has a normal mood and affect. His behavior is normal. Judgment and thought content normal.       Assessment/Plan      Diagnosis Plan   1. Dilated pancreatic duct  Case Request   2. Epigastric pain  Case Request   3. Chronic pancreatitis, unspecified pancreatitis type  Case Request     The patient will be scheduled for an ERCP due to pancreatic ductal dilatation in setting of chronic pancreatitis and associated epigastric pain.  Procedure was explained to the patient who voiced understanding and agreement.   He will also be started on Creon 36,000 units TID with meals.  Patient voiced understanding and agreement of recommendations.  Return in about 4 weeks (around 6/14/2018) for Recheck.           VIJAY Tatum

## 2018-05-18 ENCOUNTER — OFFICE VISIT (OUTPATIENT)
Dept: FAMILY MEDICINE CLINIC | Facility: CLINIC | Age: 73
End: 2018-05-18

## 2018-05-18 VITALS
BODY MASS INDEX: 24.1 KG/M2 | DIASTOLIC BLOOD PRESSURE: 67 MMHG | OXYGEN SATURATION: 94 % | HEIGHT: 68 IN | WEIGHT: 159 LBS | HEART RATE: 80 BPM | SYSTOLIC BLOOD PRESSURE: 115 MMHG

## 2018-05-18 DIAGNOSIS — E78.2 MIXED HYPERLIPIDEMIA: ICD-10-CM

## 2018-05-18 DIAGNOSIS — J44.9 CHRONIC OBSTRUCTIVE PULMONARY DISEASE, UNSPECIFIED COPD TYPE (HCC): ICD-10-CM

## 2018-05-18 DIAGNOSIS — E53.8 B12 DEFICIENCY: ICD-10-CM

## 2018-05-18 DIAGNOSIS — R51.9 HEADACHE, UNSPECIFIED HEADACHE TYPE: ICD-10-CM

## 2018-05-18 DIAGNOSIS — I10 ESSENTIAL HYPERTENSION: ICD-10-CM

## 2018-05-18 DIAGNOSIS — K86.1 CHRONIC PANCREATITIS, UNSPECIFIED PANCREATITIS TYPE (HCC): Primary | ICD-10-CM

## 2018-05-18 DIAGNOSIS — Z87.39 HISTORY OF DEGENERATIVE DISC DISEASE: ICD-10-CM

## 2018-05-18 DIAGNOSIS — M1A.09X0 IDIOPATHIC CHRONIC GOUT OF MULTIPLE SITES WITHOUT TOPHUS: ICD-10-CM

## 2018-05-18 PROCEDURE — 99214 OFFICE O/P EST MOD 30 MIN: CPT | Performed by: NURSE PRACTITIONER

## 2018-05-18 RX ORDER — IBUPROFEN 800 MG/1
800 TABLET ORAL EVERY 8 HOURS PRN
Qty: 90 TABLET | Refills: 2 | Status: SHIPPED | OUTPATIENT
Start: 2018-05-18 | End: 2018-06-13

## 2018-05-18 NOTE — PROGRESS NOTES
Subjective   Paul Gomez is a 72 y.o. male.     Chief Complaint: Follow-up and Pancreatitis    Hypertension   This is a chronic problem. The current episode started more than 1 year ago. The problem is controlled. Associated symptoms include headaches and peripheral edema. Pertinent negatives include no anxiety, chest pain, palpitations or shortness of breath. Agents associated with hypertension include thyroid hormones and NSAIDs. Risk factors for coronary artery disease include dyslipidemia, male gender, sedentary lifestyle and smoking/tobacco exposure. Past treatments include beta blockers. Current antihypertension treatment includes beta blockers. The current treatment provides significant improvement. Compliance problems include exercise.  Identifiable causes of hypertension include a thyroid problem.   Hyperlipidemia   This is a chronic problem. The current episode started more than 1 year ago. The problem is controlled. Exacerbating diseases include hypothyroidism. Factors aggravating his hyperlipidemia include beta blockers. Pertinent negatives include no chest pain or shortness of breath. Current antihyperlipidemic treatment includes statins. The current treatment provides significant improvement of lipids. Compliance problems include adherence to exercise.  Risk factors for coronary artery disease include dyslipidemia, hypertension, male sex and a sedentary lifestyle.   Hypothyroidism   This is a chronic problem. The current episode started more than 1 year ago. Associated symptoms include headaches. Pertinent negatives include no chest pain. Nothing aggravates the symptoms. Treatments tried: levothyroxine. The treatment provided significant relief.   COPD   This is a chronic problem. The current episode started more than 1 year ago. The problem has been unchanged. Associated symptoms include headaches. Pertinent negatives include no chest pain. The symptoms are aggravated by smoking. Treatments tried:  albuterol; Dulera. The treatment provided significant relief.      Pt has had an ongoing cough for the past two months.  He was ordered a CT of chest at his last visit.  CT shows that he does have RLL pneumonia and also chronic pancreatitis.  Pt has been made an appointment with gastroenterology for this month.  Pt states that he has every intention of keeping the appointment as scheduled.  I have discussed the importance of him keeping the appointment.  At this time he continues to have a cough and some mild shortness of breath today.  He states that he has had this cough in the past and Amoxillicin seems to help him more than any other antibiotic.  I have agreed to write him a script for it today.  He is to return to the office in two weeks and we will get another chest xray at that time.    Pt denies fever, headache, sore throat, n/v/d.    Pt states that he is feeling much better today and his breathing is doing well.    He did follow with gastro and he is being scheduled for an ERCP.  He has questioned whether he should have the procedure done.  I have had  Long discussion with him today regarding the ERCP and pancreatitis and he has stated that he will have the procedure done when scheduled.       Family History   Problem Relation Age of Onset   • Cancer Mother        Social History     Social History   • Marital status:      Spouse name: N/A   • Number of children: N/A   • Years of education: N/A     Occupational History   • Not on file.     Social History Main Topics   • Smoking status: Current Every Day Smoker     Packs/day: 0.50     Types: Cigarettes   • Smokeless tobacco: Never Used      Comment: patient stated he is down to about 4 cigarettes a day   • Alcohol use No   • Drug use: No   • Sexual activity: Defer     Other Topics Concern   • Not on file     Social History Narrative   • No narrative on file       Past Medical History:   Diagnosis Date   • COPD (chronic obstructive pulmonary disease)   "  • Gout    • Hearing loss    • History of degenerative disc disease    • Hyperlipidemia    • Hypertension    • Low back pain    • Pedal edema    • Prediabetes        Review of Systems   Constitutional: Negative.    Respiratory: Negative.  Negative for shortness of breath.    Cardiovascular: Negative.  Negative for chest pain and palpitations.   Gastrointestinal: Negative.    Musculoskeletal: Negative.    Skin: Negative.    Neurological: Positive for headaches.   Psychiatric/Behavioral: Negative.        Objective   Physical Exam   Constitutional: He is oriented to person, place, and time. He appears well-developed and well-nourished.   Neck: Normal range of motion. Neck supple.   Cardiovascular: Normal rate, regular rhythm and normal heart sounds.    Pulmonary/Chest: Effort normal and breath sounds normal.   Neurological: He is alert and oriented to person, place, and time.   Skin: Skin is warm and dry.   Psychiatric: He has a normal mood and affect. His behavior is normal. Judgment and thought content normal.   Nursing note and vitals reviewed.      Procedures    Vitals: Blood pressure 115/67, pulse 80, height 172.7 cm (68\"), weight 72.1 kg (159 lb), SpO2 94 %.    Allergies:   Allergies   Allergen Reactions   • Penicillins      Pt states he was allergic when he was 18 but has had it since and did not have any problems.         During this visit the following were done:  Labs Reviewed []    Labs Ordered []    Radiology Reports Reviewed []    Radiology Ordered []    PCP Records Reviewed []    Referring Provider Records Reviewed []    ER Records Reviewed []    Hospital Records Reviewed []    History Obtained From Family []    Radiology Images Reviewed []    Other Reviewed []    Records Requested []      Assessment/Plan   Ray was seen today for follow-up and pancreatitis.    Diagnoses and all orders for this visit:    Chronic pancreatitis, unspecified pancreatitis type    Chronic obstructive pulmonary disease, " unspecified COPD type  -     CBC & Differential; Future  -     Comprehensive Metabolic Panel; Future  -     Lipid Panel; Future  -     Magnesium; Future  -     TSH; Future  -     Vitamin B12; Future  -     Uric Acid; Future    Headache, unspecified headache type  -     ibuprofen (ADVIL,MOTRIN) 800 MG tablet; Take 1 tablet by mouth Every 8 (Eight) Hours As Needed for Mild Pain  or Headache.  -     CBC & Differential; Future  -     Comprehensive Metabolic Panel; Future  -     Lipid Panel; Future  -     Magnesium; Future  -     TSH; Future  -     Vitamin B12; Future  -     Uric Acid; Future    History of degenerative disc disease  -     CBC & Differential; Future  -     Comprehensive Metabolic Panel; Future  -     Lipid Panel; Future  -     Magnesium; Future  -     TSH; Future  -     Vitamin B12; Future  -     Uric Acid; Future    Essential hypertension  -     CBC & Differential; Future  -     Comprehensive Metabolic Panel; Future  -     Lipid Panel; Future  -     Magnesium; Future  -     TSH; Future  -     Vitamin B12; Future  -     Uric Acid; Future    Mixed hyperlipidemia  -     CBC & Differential; Future  -     Comprehensive Metabolic Panel; Future  -     Lipid Panel; Future  -     Magnesium; Future  -     TSH; Future  -     Vitamin B12; Future  -     Uric Acid; Future    B12 deficiency  -     CBC & Differential; Future  -     Comprehensive Metabolic Panel; Future  -     Lipid Panel; Future  -     Magnesium; Future  -     TSH; Future  -     Vitamin B12; Future  -     Uric Acid; Future    Idiopathic chronic gout of multiple sites without tophus  -     Uric Acid; Future    labs next week; pt is not fasting today

## 2018-05-21 ENCOUNTER — HOSPITAL ENCOUNTER (OUTPATIENT)
Facility: HOSPITAL | Age: 73
Setting detail: HOSPITAL OUTPATIENT SURGERY
End: 2018-05-21
Attending: INTERNAL MEDICINE | Admitting: INTERNAL MEDICINE

## 2018-06-12 ENCOUNTER — HOSPITAL ENCOUNTER (OUTPATIENT)
Facility: HOSPITAL | Age: 73
Setting detail: OBSERVATION
Discharge: LEFT BEFORE DISCHARGE | End: 2018-06-13
Attending: EMERGENCY MEDICINE | Admitting: HOSPITALIST

## 2018-06-12 ENCOUNTER — APPOINTMENT (OUTPATIENT)
Dept: GENERAL RADIOLOGY | Facility: HOSPITAL | Age: 73
End: 2018-06-12

## 2018-06-12 DIAGNOSIS — R05.9 COUGH: ICD-10-CM

## 2018-06-12 DIAGNOSIS — E53.8 B12 DEFICIENCY: ICD-10-CM

## 2018-06-12 DIAGNOSIS — R07.9 CHEST PAIN, UNSPECIFIED TYPE: Primary | ICD-10-CM

## 2018-06-12 DIAGNOSIS — F17.200 SMOKER: ICD-10-CM

## 2018-06-12 DIAGNOSIS — E53.8 VITAMIN B12 DEFICIENCY: ICD-10-CM

## 2018-06-12 LAB
ALBUMIN SERPL-MCNC: 3.5 G/DL (ref 3.4–4.8)
ALBUMIN/GLOB SERPL: 1.3 G/DL (ref 1.5–2.5)
ALP SERPL-CCNC: 90 U/L (ref 40–129)
ALT SERPL W P-5'-P-CCNC: 34 U/L (ref 10–44)
ANION GAP SERPL CALCULATED.3IONS-SCNC: 3.9 MMOL/L (ref 3.6–11.2)
AST SERPL-CCNC: 50 U/L (ref 10–34)
BILIRUB SERPL-MCNC: 0.3 MG/DL (ref 0.2–1.8)
BUN BLD-MCNC: 22 MG/DL (ref 7–21)
BUN/CREAT SERPL: 22.4 (ref 7–25)
CALCIUM SPEC-SCNC: 7.4 MG/DL (ref 7.7–10)
CHLORIDE SERPL-SCNC: 107 MMOL/L (ref 99–112)
CO2 SERPL-SCNC: 20.1 MMOL/L (ref 24.3–31.9)
CREAT BLD-MCNC: 0.98 MG/DL (ref 0.43–1.29)
DEPRECATED RDW RBC AUTO: 43.5 FL (ref 37–54)
ERYTHROCYTE [DISTWIDTH] IN BLOOD BY AUTOMATED COUNT: 13.4 % (ref 11.5–14.5)
GFR SERPL CREATININE-BSD FRML MDRD: 75 ML/MIN/1.73
GLOBULIN UR ELPH-MCNC: 2.6 GM/DL
GLUCOSE BLD-MCNC: 96 MG/DL (ref 70–110)
HCT VFR BLD AUTO: 38.1 % (ref 42–52)
HGB BLD-MCNC: 13.2 G/DL (ref 14–18)
HOLD SPECIMEN: NORMAL
HOLD SPECIMEN: NORMAL
INR PPP: 0.92 (ref 0.9–1.1)
LIPASE SERPL-CCNC: 18 U/L (ref 13–60)
LYMPHOCYTES # BLD MANUAL: 1.03 10*3/MM3 (ref 1–3)
LYMPHOCYTES NFR BLD MANUAL: 12 % (ref 0–12)
LYMPHOCYTES NFR BLD MANUAL: 14 % (ref 16–46)
MCH RBC QN AUTO: 31.4 PG (ref 27–33)
MCHC RBC AUTO-ENTMCNC: 34.6 G/DL (ref 33–37)
MCV RBC AUTO: 90.7 FL (ref 80–94)
MONOCYTES # BLD AUTO: 0.88 10*3/MM3 (ref 0.1–0.9)
NEUTROPHILS # BLD AUTO: 5.45 10*3/MM3 (ref 1.4–6.5)
NEUTROPHILS NFR BLD MANUAL: 56 % (ref 40–75)
NEUTS BAND NFR BLD MANUAL: 18 % (ref 0–8)
OSMOLALITY SERPL CALC.SUM OF ELEC: 265.9 MOSM/KG (ref 273–305)
PLATELET # BLD AUTO: 95 10*3/MM3 (ref 130–400)
PMV BLD AUTO: 11.9 FL (ref 6–10)
POTASSIUM BLD-SCNC: 4 MMOL/L (ref 3.5–5.3)
PROT SERPL-MCNC: 6.1 G/DL (ref 6–8)
PROTHROMBIN TIME: 12.5 SECONDS (ref 11–15.4)
RBC # BLD AUTO: 4.2 10*6/MM3 (ref 4.7–6.1)
RBC MORPH BLD: NORMAL
SCAN SLIDE: NORMAL
SMALL PLATELETS BLD QL SMEAR: ABNORMAL
SODIUM BLD-SCNC: 131 MMOL/L (ref 135–153)
TROPONIN I SERPL-MCNC: 0.01 NG/ML
WBC NRBC COR # BLD: 7.37 10*3/MM3 (ref 4.5–12.5)
WHOLE BLOOD HOLD SPECIMEN: NORMAL
WHOLE BLOOD HOLD SPECIMEN: NORMAL

## 2018-06-12 PROCEDURE — 83036 HEMOGLOBIN GLYCOSYLATED A1C: CPT | Performed by: HOSPITALIST

## 2018-06-12 PROCEDURE — 85025 COMPLETE CBC W/AUTO DIFF WBC: CPT | Performed by: EMERGENCY MEDICINE

## 2018-06-12 PROCEDURE — 80053 COMPREHEN METABOLIC PANEL: CPT | Performed by: EMERGENCY MEDICINE

## 2018-06-12 PROCEDURE — 85007 BL SMEAR W/DIFF WBC COUNT: CPT | Performed by: EMERGENCY MEDICINE

## 2018-06-12 PROCEDURE — 85379 FIBRIN DEGRADATION QUANT: CPT | Performed by: EMERGENCY MEDICINE

## 2018-06-12 PROCEDURE — 84484 ASSAY OF TROPONIN QUANT: CPT | Performed by: EMERGENCY MEDICINE

## 2018-06-12 PROCEDURE — 93010 ELECTROCARDIOGRAM REPORT: CPT | Performed by: INTERNAL MEDICINE

## 2018-06-12 PROCEDURE — 86140 C-REACTIVE PROTEIN: CPT | Performed by: EMERGENCY MEDICINE

## 2018-06-12 PROCEDURE — 93005 ELECTROCARDIOGRAM TRACING: CPT | Performed by: EMERGENCY MEDICINE

## 2018-06-12 PROCEDURE — 96374 THER/PROPH/DIAG INJ IV PUSH: CPT

## 2018-06-12 PROCEDURE — 85610 PROTHROMBIN TIME: CPT | Performed by: EMERGENCY MEDICINE

## 2018-06-12 PROCEDURE — 83690 ASSAY OF LIPASE: CPT | Performed by: EMERGENCY MEDICINE

## 2018-06-12 PROCEDURE — 96376 TX/PRO/DX INJ SAME DRUG ADON: CPT

## 2018-06-12 PROCEDURE — 99285 EMERGENCY DEPT VISIT HI MDM: CPT

## 2018-06-12 PROCEDURE — 71045 X-RAY EXAM CHEST 1 VIEW: CPT | Performed by: RADIOLOGY

## 2018-06-12 PROCEDURE — 71045 X-RAY EXAM CHEST 1 VIEW: CPT

## 2018-06-12 RX ORDER — ASPIRIN 325 MG
325 TABLET ORAL ONCE
Status: COMPLETED | OUTPATIENT
Start: 2018-06-12 | End: 2018-06-12

## 2018-06-12 RX ORDER — HYDROCODONE BITARTRATE AND ACETAMINOPHEN 5; 325 MG/1; MG/1
1 TABLET ORAL ONCE
Status: COMPLETED | OUTPATIENT
Start: 2018-06-12 | End: 2018-06-12

## 2018-06-12 RX ORDER — SODIUM CHLORIDE 0.9 % (FLUSH) 0.9 %
10 SYRINGE (ML) INJECTION AS NEEDED
Status: DISCONTINUED | OUTPATIENT
Start: 2018-06-12 | End: 2018-06-13 | Stop reason: HOSPADM

## 2018-06-12 RX ORDER — HYDROCODONE BITARTRATE AND ACETAMINOPHEN 5; 325 MG/1; MG/1
TABLET ORAL
Status: DISPENSED
Start: 2018-06-12 | End: 2018-06-13

## 2018-06-12 RX ADMIN — ASPIRIN 325 MG: 325 TABLET ORAL at 23:20

## 2018-06-12 RX ADMIN — HYDROCODONE BITARTRATE AND ACETAMINOPHEN 1 TABLET: 5; 325 TABLET ORAL at 23:21

## 2018-06-13 ENCOUNTER — APPOINTMENT (OUTPATIENT)
Dept: CT IMAGING | Facility: HOSPITAL | Age: 73
End: 2018-06-13

## 2018-06-13 VITALS
OXYGEN SATURATION: 95 % | TEMPERATURE: 97.8 F | HEART RATE: 62 BPM | DIASTOLIC BLOOD PRESSURE: 86 MMHG | BODY MASS INDEX: 23.22 KG/M2 | HEIGHT: 68 IN | RESPIRATION RATE: 18 BRPM | WEIGHT: 153.25 LBS | SYSTOLIC BLOOD PRESSURE: 146 MMHG

## 2018-06-13 PROBLEM — R07.9 CHEST PAIN: Status: ACTIVE | Noted: 2018-06-13

## 2018-06-13 LAB
25(OH)D3 SERPL-MCNC: 23 NG/ML
ALBUMIN SERPL-MCNC: 3.4 G/DL (ref 3.4–4.8)
ALBUMIN/GLOB SERPL: 1.4 G/DL (ref 1.5–2.5)
ALP SERPL-CCNC: 89 U/L (ref 40–129)
ALT SERPL W P-5'-P-CCNC: 29 U/L (ref 10–44)
AMYLASE SERPL-CCNC: 22 U/L (ref 28–100)
ANION GAP SERPL CALCULATED.3IONS-SCNC: 5.2 MMOL/L (ref 3.6–11.2)
AST SERPL-CCNC: 44 U/L (ref 10–34)
BASOPHILS # BLD AUTO: 0.02 10*3/MM3 (ref 0–0.3)
BASOPHILS NFR BLD AUTO: 0.3 % (ref 0–2)
BILIRUB SERPL-MCNC: 0.3 MG/DL (ref 0.2–1.8)
BUN BLD-MCNC: 17 MG/DL (ref 7–21)
BUN/CREAT SERPL: 20.7 (ref 7–25)
CA-I BLD-MCNC: 4.59 MG/DL (ref 4.6–5.3)
CALCIUM SPEC-SCNC: 7.8 MG/DL (ref 7.7–10)
CHLORIDE SERPL-SCNC: 108 MMOL/L (ref 99–112)
CHOLEST SERPL-MCNC: 91 MG/DL (ref 0–200)
CK MB SERPL-CCNC: 2.68 NG/ML (ref 0–5)
CK MB SERPL-CCNC: 2.96 NG/ML (ref 0–5)
CK MB SERPL-RTO: 1.9 % (ref 0–3)
CK MB SERPL-RTO: 2.1 % (ref 0–3)
CK SERPL-CCNC: 128 U/L (ref 24–204)
CK SERPL-CCNC: 158 U/L (ref 24–204)
CO2 SERPL-SCNC: 21.8 MMOL/L (ref 24.3–31.9)
CREAT BLD-MCNC: 0.82 MG/DL (ref 0.43–1.29)
CRP SERPL-MCNC: 3.2 MG/DL (ref 0–0.99)
D DIMER PPP FEU-MCNC: 1.78 MCGFEU/ML (ref 0–0.5)
DEPRECATED RDW RBC AUTO: 44.5 FL (ref 37–54)
EOSINOPHIL # BLD AUTO: 0.01 10*3/MM3 (ref 0–0.7)
EOSINOPHIL NFR BLD AUTO: 0.2 % (ref 0–7)
ERYTHROCYTE [DISTWIDTH] IN BLOOD BY AUTOMATED COUNT: 13.5 % (ref 11.5–14.5)
GFR SERPL CREATININE-BSD FRML MDRD: 92 ML/MIN/1.73
GLOBULIN UR ELPH-MCNC: 2.5 GM/DL
GLUCOSE BLD-MCNC: 96 MG/DL (ref 70–110)
GLUCOSE BLDC GLUCOMTR-MCNC: 100 MG/DL (ref 70–130)
GLUCOSE BLDC GLUCOMTR-MCNC: 111 MG/DL (ref 70–130)
HBA1C MFR BLD: 6 % (ref 4.5–5.7)
HCT VFR BLD AUTO: 35.8 % (ref 42–52)
HDLC SERPL-MCNC: 26 MG/DL (ref 60–100)
HGB BLD-MCNC: 12.3 G/DL (ref 14–18)
IMM GRANULOCYTES # BLD: 0.01 10*3/MM3 (ref 0–0.03)
IMM GRANULOCYTES NFR BLD: 0.2 % (ref 0–0.5)
L PNEUMO1 AG UR QL IA: NEGATIVE
LDLC SERPL CALC-MCNC: 55 MG/DL (ref 0–100)
LDLC/HDLC SERPL: 2.11 {RATIO}
LYMPHOCYTES # BLD AUTO: 1.36 10*3/MM3 (ref 1–3)
LYMPHOCYTES NFR BLD AUTO: 21 % (ref 16–46)
M PNEUMO IGM SER QL: NEGATIVE
MAGNESIUM SERPL-MCNC: 1.8 MG/DL (ref 1.7–2.6)
MCH RBC QN AUTO: 31 PG (ref 27–33)
MCHC RBC AUTO-ENTMCNC: 34.4 G/DL (ref 33–37)
MCV RBC AUTO: 90.2 FL (ref 80–94)
MONOCYTES # BLD AUTO: 0.83 10*3/MM3 (ref 0.1–0.9)
MONOCYTES NFR BLD AUTO: 12.8 % (ref 0–12)
MYOGLOBIN SERPL-MCNC: 126 NG/ML (ref 0–109)
MYOGLOBIN SERPL-MCNC: 174 NG/ML (ref 0–109)
NEUTROPHILS # BLD AUTO: 4.24 10*3/MM3 (ref 1.4–6.5)
NEUTROPHILS NFR BLD AUTO: 65.5 % (ref 40–75)
NRBC BLD MANUAL-RTO: 0 /100 WBC (ref 0–0)
OSMOLALITY SERPL CALC.SUM OF ELEC: 271.5 MOSM/KG (ref 273–305)
PLATELET # BLD AUTO: 82 10*3/MM3 (ref 130–400)
PMV BLD AUTO: 11.3 FL (ref 6–10)
POTASSIUM BLD-SCNC: 4 MMOL/L (ref 3.5–5.3)
PROT SERPL-MCNC: 5.9 G/DL (ref 6–8)
PTH-INTACT SERPL-MCNC: 34.7 PG/ML (ref 14–72)
RBC # BLD AUTO: 3.97 10*6/MM3 (ref 4.7–6.1)
SODIUM BLD-SCNC: 135 MMOL/L (ref 135–153)
T4 FREE SERPL-MCNC: 1.04 NG/DL (ref 0.89–1.76)
TRIGL SERPL-MCNC: 51 MG/DL (ref 0–150)
TROPONIN I SERPL-MCNC: 0.01 NG/ML
TROPONIN I SERPL-MCNC: 0.02 NG/ML
TSH SERPL DL<=0.05 MIU/L-ACNC: 0.82 MIU/ML (ref 0.55–4.78)
VLDLC SERPL-MCNC: 10.2 MG/DL
WBC NRBC COR # BLD: 6.47 10*3/MM3 (ref 4.5–12.5)

## 2018-06-13 PROCEDURE — 0 IOPAMIDOL PER 1 ML: Performed by: EMERGENCY MEDICINE

## 2018-06-13 PROCEDURE — G0378 HOSPITAL OBSERVATION PER HR: HCPCS

## 2018-06-13 PROCEDURE — 94799 UNLISTED PULMONARY SVC/PX: CPT

## 2018-06-13 PROCEDURE — 71275 CT ANGIOGRAPHY CHEST: CPT | Performed by: RADIOLOGY

## 2018-06-13 PROCEDURE — 83735 ASSAY OF MAGNESIUM: CPT | Performed by: HOSPITALIST

## 2018-06-13 PROCEDURE — 82306 VITAMIN D 25 HYDROXY: CPT | Performed by: HOSPITALIST

## 2018-06-13 PROCEDURE — 25010000002 CEFTRIAXONE: Performed by: HOSPITALIST

## 2018-06-13 PROCEDURE — 83874 ASSAY OF MYOGLOBIN: CPT | Performed by: HOSPITALIST

## 2018-06-13 PROCEDURE — 84443 ASSAY THYROID STIM HORMONE: CPT | Performed by: HOSPITALIST

## 2018-06-13 PROCEDURE — 96375 TX/PRO/DX INJ NEW DRUG ADDON: CPT

## 2018-06-13 PROCEDURE — 87899 AGENT NOS ASSAY W/OPTIC: CPT | Performed by: HOSPITALIST

## 2018-06-13 PROCEDURE — 82962 GLUCOSE BLOOD TEST: CPT

## 2018-06-13 PROCEDURE — 25010000002 METHYLPREDNISOLONE PER 40 MG: Performed by: HOSPITALIST

## 2018-06-13 PROCEDURE — 84484 ASSAY OF TROPONIN QUANT: CPT | Performed by: HOSPITALIST

## 2018-06-13 PROCEDURE — 82550 ASSAY OF CK (CPK): CPT | Performed by: HOSPITALIST

## 2018-06-13 PROCEDURE — 83970 ASSAY OF PARATHORMONE: CPT | Performed by: HOSPITALIST

## 2018-06-13 PROCEDURE — 86738 MYCOPLASMA ANTIBODY: CPT | Performed by: HOSPITALIST

## 2018-06-13 PROCEDURE — 84439 ASSAY OF FREE THYROXINE: CPT | Performed by: HOSPITALIST

## 2018-06-13 PROCEDURE — 80053 COMPREHEN METABOLIC PANEL: CPT | Performed by: HOSPITALIST

## 2018-06-13 PROCEDURE — 84484 ASSAY OF TROPONIN QUANT: CPT | Performed by: EMERGENCY MEDICINE

## 2018-06-13 PROCEDURE — 82150 ASSAY OF AMYLASE: CPT | Performed by: HOSPITALIST

## 2018-06-13 PROCEDURE — 80061 LIPID PANEL: CPT | Performed by: HOSPITALIST

## 2018-06-13 PROCEDURE — 71275 CT ANGIOGRAPHY CHEST: CPT

## 2018-06-13 PROCEDURE — 99220 PR INITIAL OBSERVATION CARE/DAY 70 MINUTES: CPT | Performed by: HOSPITALIST

## 2018-06-13 PROCEDURE — 82553 CREATINE MB FRACTION: CPT | Performed by: HOSPITALIST

## 2018-06-13 PROCEDURE — 82330 ASSAY OF CALCIUM: CPT | Performed by: HOSPITALIST

## 2018-06-13 PROCEDURE — 25010000002 MORPHINE PER 10 MG: Performed by: EMERGENCY MEDICINE

## 2018-06-13 PROCEDURE — 85025 COMPLETE CBC W/AUTO DIFF WBC: CPT | Performed by: HOSPITALIST

## 2018-06-13 PROCEDURE — 25010000002 MORPHINE (PF) 10 MG/ML SOLUTION

## 2018-06-13 PROCEDURE — 87040 BLOOD CULTURE FOR BACTERIA: CPT | Performed by: EMERGENCY MEDICINE

## 2018-06-13 RX ORDER — NITROGLYCERIN 0.4 MG/1
0.4 TABLET SUBLINGUAL
Status: DISCONTINUED | OUTPATIENT
Start: 2018-06-13 | End: 2018-06-13 | Stop reason: HOSPADM

## 2018-06-13 RX ORDER — MORPHINE SULFATE 10 MG/ML
INJECTION, SOLUTION INTRAMUSCULAR; INTRAVENOUS
Status: COMPLETED
Start: 2018-06-13 | End: 2018-06-13

## 2018-06-13 RX ORDER — OXYCODONE HYDROCHLORIDE 5 MG/1
10 TABLET ORAL 3 TIMES DAILY
Status: CANCELLED | OUTPATIENT
Start: 2018-06-13

## 2018-06-13 RX ORDER — GABAPENTIN 800 MG/1
800 TABLET ORAL 3 TIMES DAILY
COMMUNITY
End: 2021-02-04

## 2018-06-13 RX ORDER — SODIUM CHLORIDE 9 MG/ML
75 INJECTION, SOLUTION INTRAVENOUS CONTINUOUS
Status: DISCONTINUED | OUTPATIENT
Start: 2018-06-13 | End: 2018-06-13 | Stop reason: HOSPADM

## 2018-06-13 RX ORDER — MORPHINE SULFATE 10 MG/ML
4 INJECTION INTRAMUSCULAR; INTRAVENOUS; SUBCUTANEOUS ONCE
Status: COMPLETED | OUTPATIENT
Start: 2018-06-13 | End: 2018-06-13

## 2018-06-13 RX ORDER — GABAPENTIN 400 MG/1
400 CAPSULE ORAL EVERY 8 HOURS SCHEDULED
Status: DISCONTINUED | OUTPATIENT
Start: 2018-06-13 | End: 2018-06-13 | Stop reason: HOSPADM

## 2018-06-13 RX ORDER — METHYLPREDNISOLONE SODIUM SUCCINATE 40 MG/ML
40 INJECTION, POWDER, LYOPHILIZED, FOR SOLUTION INTRAMUSCULAR; INTRAVENOUS EVERY 12 HOURS
Status: DISCONTINUED | OUTPATIENT
Start: 2018-06-13 | End: 2018-06-13

## 2018-06-13 RX ORDER — IPRATROPIUM BROMIDE AND ALBUTEROL SULFATE 2.5; .5 MG/3ML; MG/3ML
3 SOLUTION RESPIRATORY (INHALATION)
Status: DISCONTINUED | OUTPATIENT
Start: 2018-06-13 | End: 2018-06-13 | Stop reason: HOSPADM

## 2018-06-13 RX ORDER — OXYCODONE HYDROCHLORIDE 80 MG/1
80 TABLET, FILM COATED, EXTENDED RELEASE ORAL 2 TIMES DAILY
Status: CANCELLED | OUTPATIENT
Start: 2018-06-13

## 2018-06-13 RX ORDER — SODIUM CHLORIDE 0.9 % (FLUSH) 0.9 %
1-10 SYRINGE (ML) INJECTION AS NEEDED
Status: DISCONTINUED | OUTPATIENT
Start: 2018-06-13 | End: 2018-06-13 | Stop reason: HOSPADM

## 2018-06-13 RX ORDER — ATENOLOL 25 MG/1
25 TABLET ORAL 2 TIMES DAILY
COMMUNITY
End: 2018-08-08 | Stop reason: SDUPTHER

## 2018-06-13 RX ORDER — OXYCODONE HYDROCHLORIDE 5 MG/1
5 TABLET ORAL 3 TIMES DAILY PRN
Status: DISCONTINUED | OUTPATIENT
Start: 2018-06-13 | End: 2018-06-13 | Stop reason: HOSPADM

## 2018-06-13 RX ORDER — ATENOLOL 25 MG/1
25 TABLET ORAL 2 TIMES DAILY
Status: CANCELLED | OUTPATIENT
Start: 2018-06-13

## 2018-06-13 RX ORDER — OXYCODONE HYDROCHLORIDE 10 MG/1
10 TABLET ORAL 4 TIMES DAILY PRN
COMMUNITY
End: 2019-04-15 | Stop reason: HOSPADM

## 2018-06-13 RX ORDER — METHYLPREDNISOLONE SODIUM SUCCINATE 40 MG/ML
20 INJECTION, POWDER, LYOPHILIZED, FOR SOLUTION INTRAMUSCULAR; INTRAVENOUS EVERY 12 HOURS
Status: DISCONTINUED | OUTPATIENT
Start: 2018-06-13 | End: 2018-06-13 | Stop reason: HOSPADM

## 2018-06-13 RX ORDER — GABAPENTIN 400 MG/1
800 CAPSULE ORAL 3 TIMES DAILY
Status: CANCELLED | OUTPATIENT
Start: 2018-06-13

## 2018-06-13 RX ORDER — ATENOLOL 25 MG/1
25 TABLET ORAL 2 TIMES DAILY
Status: DISCONTINUED | OUTPATIENT
Start: 2018-06-13 | End: 2018-06-13 | Stop reason: HOSPADM

## 2018-06-13 RX ORDER — OXYCODONE HCL 40 MG/1
40 TABLET, FILM COATED, EXTENDED RELEASE ORAL EVERY 12 HOURS SCHEDULED
Status: DISCONTINUED | OUTPATIENT
Start: 2018-06-13 | End: 2018-06-13 | Stop reason: HOSPADM

## 2018-06-13 RX ORDER — HEPARIN SODIUM 5000 [USP'U]/ML
5000 INJECTION, SOLUTION INTRAVENOUS; SUBCUTANEOUS EVERY 12 HOURS SCHEDULED
Status: DISCONTINUED | OUTPATIENT
Start: 2018-06-13 | End: 2018-06-13 | Stop reason: HOSPADM

## 2018-06-13 RX ADMIN — SODIUM CHLORIDE 75 ML/HR: 9 INJECTION, SOLUTION INTRAVENOUS at 04:51

## 2018-06-13 RX ADMIN — CEFTRIAXONE 1 G: 1 INJECTION, POWDER, FOR SOLUTION INTRAMUSCULAR; INTRAVENOUS at 05:43

## 2018-06-13 RX ADMIN — OXYCODONE HYDROCHLORIDE 5 MG: 5 TABLET ORAL at 05:51

## 2018-06-13 RX ADMIN — IOPAMIDOL 95 ML: 755 INJECTION, SOLUTION INTRAVENOUS at 01:07

## 2018-06-13 RX ADMIN — IPRATROPIUM BROMIDE AND ALBUTEROL SULFATE 3 ML: .5; 3 SOLUTION RESPIRATORY (INHALATION) at 06:32

## 2018-06-13 RX ADMIN — MORPHINE SULFATE 4 MG: 10 INJECTION INTRAVENOUS at 04:14

## 2018-06-13 RX ADMIN — OXYCODONE HYDROCHLORIDE 40 MG: 40 TABLET, FILM COATED, EXTENDED RELEASE ORAL at 08:19

## 2018-06-13 RX ADMIN — MORPHINE SULFATE 4 MG: 10 INJECTION INTRAMUSCULAR; INTRAVENOUS; SUBCUTANEOUS at 04:14

## 2018-06-13 RX ADMIN — MORPHINE SULFATE 4 MG: 10 INJECTION INTRAVENOUS at 00:21

## 2018-06-13 RX ADMIN — DOXYCYCLINE 100 MG: 100 INJECTION, POWDER, LYOPHILIZED, FOR SOLUTION INTRAVENOUS at 05:42

## 2018-06-13 RX ADMIN — ATENOLOL 25 MG: 25 TABLET ORAL at 08:19

## 2018-06-13 RX ADMIN — GABAPENTIN 400 MG: 400 CAPSULE ORAL at 05:51

## 2018-06-13 RX ADMIN — SODIUM CHLORIDE 1000 ML: 9 INJECTION, SOLUTION INTRAVENOUS at 00:20

## 2018-06-13 RX ADMIN — METHYLPREDNISOLONE SODIUM SUCCINATE 20 MG: 40 INJECTION, POWDER, FOR SOLUTION INTRAMUSCULAR; INTRAVENOUS at 05:51

## 2018-06-13 NOTE — SIGNIFICANT NOTE
Upon entering patient's room during safety rounds, the bed was found to be empty. The patient's telemetry monitor was laying on the bed, IV ripped with fluids running onto floor. IV tip was intact. The patient was paged over head by room number, but did not return to room. Ariadna Adams PA-C, Lead nurse Edgardo Jennings and Director Elsy Matta were all notified.

## 2018-06-13 NOTE — ED NOTES
"Patient presents to the ER tonight due to chest pressure that began approximately 3 hours ago. Patient states that he was driving, states that he had a sudden onset of sharp midsternal chest pain that radiated down left arm. Patient states that he had some nausea associated with chest pain, states that he is experiencing chest \"squeezing\" at this time. Patient states that he has a history of chronic back pain and a prior back injury, states that he takes pain medication at home for back injury. Patient states that pain is intermittent at this time. Patient is alert and oriented x4, patient updated on plan of care, respirations are even and unlabored, NADN at this time, will continue to monitor and follow plan of care.     Emiliano Jennings RN  06/13/18 0238    "

## 2018-06-13 NOTE — H&P
Hospitalist History and Physical        Patient Identification  Name: Paul Gomez  Age/Sex: 72 y.o. male  :  1945        MRN: [id number]  Visit Number: [id number]  PCP: BEATRICE Vallejo        Chief complaint chest pain    History of Present Illness:  Patient is a 72 y.o. male who presents with reports of acute onset chest pain which occurred yesterday evening while driving around 9 pm. He described the pain as sharp. He experienced associated shortness of breath, nausea and diaphoresis. Symptoms lasted about 30 seconds before completely subsiding. Upon further questioning, patient was recently treated for pneumonia last month based on CT chest 18 that showed right lower lobe infiltrate. He says he initially felt better after completing a course of amoxicillin, but for the last week now has had increasing cough with yellow sputum production. He has also been more dyspneic with exertion. He reports increased wheezing as well. In the last week, he has also had some intermittent subjective fevers. In the ED, vitals were fairly stable. Labs showed no leukocytosis but did reveal bandemia, and CRP was mildly elevated as well. Troponin has been negative thus far but is trending up. Na+ is mildly low and BUN:cr ratio is >20 suggesting component of mild dehydration. D-dimer was elevated; subsequent CT PE Protocol was negative for PE but commented on right lower lobe atelectasis vs infiltrate. Upon comparison of this CT to previous CT on 18, this area of potential pneumonia has not significantly changed. Patient will be observed on the telemetry floor for further evaluation of chest pain and management of suspected pneumonia with COPD exacerbation.    Review of Systems  Review of Systems   Constitutional: Positive for activity change, diaphoresis, fatigue and fever. Negative for appetite change, chills and unexpected weight change.   HENT: Negative for congestion, postnasal drip, rhinorrhea,  sinus pain, sinus pressure and sore throat.    Eyes: Negative for photophobia, pain, discharge, redness, itching and visual disturbance.   Respiratory: Positive for cough, shortness of breath and wheezing.    Cardiovascular: Positive for chest pain. Negative for palpitations and leg swelling.   Gastrointestinal: Negative for abdominal distention, abdominal pain, constipation, diarrhea, nausea and vomiting.   Endocrine: Negative for cold intolerance, heat intolerance, polydipsia, polyphagia and polyuria.   Genitourinary: Negative for dysuria, flank pain, frequency and hematuria.   Musculoskeletal: Positive for arthralgias and back pain. Negative for gait problem, joint swelling, myalgias, neck pain and neck stiffness.   Skin: Positive for wound (right forearm, skin tear from recent fall). Negative for color change, pallor and rash.   Allergic/Immunologic: Negative for environmental allergies, food allergies and immunocompromised state.   Neurological: Negative for dizziness, tremors, seizures, syncope, weakness, light-headedness, numbness and headaches.   Hematological: Negative for adenopathy. Does not bruise/bleed easily.   Psychiatric/Behavioral: Negative for agitation, behavioral problems and confusion.       History  Past Medical History:   Diagnosis Date   • COPD (chronic obstructive pulmonary disease)    • Gout    • Hearing loss    • History of degenerative disc disease    • Hyperlipidemia    • Hypertension    • Low back pain    • Pedal edema    • Prediabetes      Past Surgical History:   Procedure Laterality Date   • CHOLECYSTECTOMY     • LUMBAR DISCECTOMY     • SHOULDER SURGERY Left      Family History   Problem Relation Age of Onset   • Cancer Mother      Social History   Substance Use Topics   • Smoking status: Current Every Day Smoker     Packs/day: 0.50     Types: Cigarettes   • Smokeless tobacco: Never Used      Comment: patient stated he is down to about 4 cigarettes a day   • Alcohol use No       (Not  in a hospital admission)  Allergies:  Penicillins    Objective     Vital Signs  Temp:  [98.2 °F (36.8 °C)] 98.2 °F (36.8 °C)  Heart Rate:  [58-70] 58  Resp:  [18] 18  BP: (122-149)/(68-88) 149/84  Body mass index is 23.57 kg/m².    Physical Exam:  Physical Exam   Constitutional: He is oriented to person, place, and time. He appears well-developed and well-nourished. No distress.   HENT:   Head: Normocephalic and atraumatic.   Mouth/Throat: Oropharynx is clear and moist.   Eyes: Conjunctivae and EOM are normal. Pupils are equal, round, and reactive to light.   Neck: Normal range of motion. Neck supple. No JVD present.   Cardiovascular: Normal rate, regular rhythm, normal heart sounds and intact distal pulses.    No murmur heard.  Pulmonary/Chest: Effort normal.   Prolonged expiratory phase. Diffuse expiratory wheezing. Rhonchi/rales noted in right lower lung zone.   Abdominal: Soft. Bowel sounds are normal. He exhibits no distension. There is no tenderness.   Musculoskeletal: Normal range of motion. He exhibits edema (Trace, chronic per patient). He exhibits no tenderness or deformity.   Lymphadenopathy:     He has no cervical adenopathy.   Neurological: He is alert and oriented to person, place, and time.   No gross focal deficits appreciated   Skin: Skin is warm and dry. Capillary refill takes less than 2 seconds. He is not diaphoretic. No erythema. No pallor.   Psychiatric: He has a normal mood and affect. His behavior is normal. Judgment and thought content normal.         Results Review:       Lab Results:    Results from last 7 days  Lab Units 06/12/18  2233   WBC 10*3/mm3 7.37   HEMOGLOBIN g/dL 13.2*   PLATELETS 10*3/mm3 95*       Results from last 7 days  Lab Units 06/12/18  2233   CRP mg/dL 3.20*       Results from last 7 days  Lab Units 06/12/18  2233   SODIUM mmol/L 131*   POTASSIUM mmol/L 4.0   CHLORIDE mmol/L 107   CO2 mmol/L 20.1*   BUN mg/dL 22*   CREATININE mg/dL 0.98   CALCIUM mg/dL 7.4*   GLUCOSE  mg/dL 96         No results found for: HGBA1C    Results from last 7 days  Lab Units 06/12/18 2233   BILIRUBIN mg/dL 0.3   ALK PHOS U/L 90   AST (SGOT) U/L 50*   ALT (SGPT) U/L 34       Results from last 7 days  Lab Units 06/12/18 2233   TROPONIN I ng/mL 0.015           Results from last 7 days  Lab Units 06/12/18 2233   INR  0.92           I have reviewed the patient's laboratory results.    Imaging:  Imaging Results (last 72 hours)     Procedure Component Value Units Date/Time    CT Chest Pulmonary Embolism With Contrast [[id number]] Updated:  06/13/18 0109    XR Chest 1 View [[id number]] Updated:  06/12/18 2233      CT chest PE protocol: negative for PE. Right lower lobe atelectasis vs consolidation. Similar in appearance to CT chest from 4/2018 which was read as right lower lobe pneumonia.    I have personally reviewed the patient's radiologic imaging.        EKG: NSR, HR 72. QTc 433. No overt ST changes appreciated.    I have personally reviewed the patient's EKG.        Assessment/Plan     Active Problems:  - Chest pain, mixed typical and atypical features, in the setting of multiple cardiac risk factors including HTN, HLD, tobacco abuse. Observe on telemetry. Continue to trend cardiac enzymes to rule out MI. Received full dose ASA in the ED. Obtain ECHO in the morning. If continues to rule out for MI, will consider proceeding with stress test at some point. However, it is very possible patient's pain is related to pleurisy from pneumonia and COPD exacerbation (see below).  - Recurrent vs persistent right lower lobe pneumonia: based on CT findings, clinical symptoms (cough, sputum production, dyspnea with exertion), and elevated CRP level with bandemia. Blood cultures obtained in ED. Start antibiotics including rocephin and doxycyline. Rule out atypical organisms. Continue to trend CRP, differentia.   - COPD exacerbation: start IV solu-medrol, scheduled nebs.  - Hypertension: continue home atenolol with hold  parameters  - Questionable chronic pancreatitis, ductal dilatation of main pancreatic duct: these incidental findings were reported on CT chest from 4/30/18. Patient denies any history of pancreatitis or heavy alcohol use. Scheduled to undergo ERCP next week by Dr Horton.  - Hypocalcemia: when corrected for mildly low albumin, Ca++ actually corrects to 7.7 though this is still low-normal and quite lower than prior labs in our system. Check PTH, vitamin D, ionized calcium to evaluate further.   - Mild hyponatremia: possibly related to dehydration based on labs showing prerenal azotemia. Gently hydrate with IV normal saline, repeat labs in the morning.  - Pre-diabetes: A1c 6.0%. Monitor accuchecks, may need SSI coverage since receiving IV steroids noted above.  - Chronic pain: continue home roxicodone, long acting opioid, and gabapentin (all confirmed by LISA), but at decreased doses and with hold parameters in light of respiratory symptoms and concern for risk of respiratory depression.     DVT Prophylaxis: SQ heparin    Estimated Length of Stay <2 midnights    I discussed the patient's findings, assessment and plan with the patient and his RN Fernando on [facility].    Jeffery Guajardo MD  06/13/18  3:59 AM

## 2018-06-13 NOTE — PLAN OF CARE
Problem: Cardiac: ACS (Acute Coronary Syndrome) (Adult)  Goal: Signs and Symptoms of Listed Potential Problems Will be Absent, Minimized or Managed (Cardiac: ACS)  Outcome: Ongoing (interventions implemented as appropriate)      Problem: Fall Risk (Adult)  Goal: Identify Related Risk Factors and Signs and Symptoms  Outcome: Ongoing (interventions implemented as appropriate)    Goal: Absence of Fall  Outcome: Ongoing (interventions implemented as appropriate)      Problem: Skin Injury Risk (Adult)  Goal: Identify Related Risk Factors and Signs and Symptoms  Outcome: Ongoing (interventions implemented as appropriate)    Goal: Skin Health and Integrity  Outcome: Ongoing (interventions implemented as appropriate)      Problem: Patient Care Overview  Goal: Plan of Care Review  Outcome: Ongoing (interventions implemented as appropriate)    Goal: Individualization and Mutuality  Outcome: Ongoing (interventions implemented as appropriate)    Goal: Discharge Needs Assessment  Outcome: Ongoing (interventions implemented as appropriate)    Goal: Interprofessional Rounds/Family Conf  Outcome: Ongoing (interventions implemented as appropriate)

## 2018-06-13 NOTE — ED NOTES
"Patient resting pain medication at this time, provider notified. Patient states \"do I need to just go home and take my pain medication that I have at home?\" Provider notified at this time.     Emiliano Jennings RN  06/13/18 0240    "

## 2018-06-13 NOTE — ED PROVIDER NOTES
Subjective   72-year-old male with a history of hypertension, COPD, prediabetes, hyperlipidemia presents with severe chest pain that occurred when he was driving.  It resolved on its own.  He described it as a sharp sensation in the middle of his chest.  He does not have this sensation any more but reports that he does feel tired and sweaty.  The patient states that he has not had a heart attack before.  He reports that he has not a stress test in many years.  He continues to smoke.  He states that he has mild cough with sputum production but this is chronic for the patient.            Review of Systems   Reason unable to perform ROS: poor historian    Constitutional: Negative for fever.   Respiratory: Positive for cough. Negative for shortness of breath.    Cardiovascular: Positive for chest pain.   Gastrointestinal: Negative for nausea and vomiting.   Musculoskeletal: Positive for back pain (chronic ).   Psychiatric/Behavioral: The patient is nervous/anxious.        Past Medical History:   Diagnosis Date   • COPD (chronic obstructive pulmonary disease)    • Gout    • Hearing loss    • History of degenerative disc disease    • Hyperlipidemia    • Hypertension    • Low back pain    • Pedal edema    • Prediabetes        Allergies   Allergen Reactions   • Penicillins      Pt states he was allergic when he was 18 but has had it since and did not have any problems.        Past Surgical History:   Procedure Laterality Date   • CHOLECYSTECTOMY     • LUMBAR DISCECTOMY     • SHOULDER SURGERY Left        Family History   Problem Relation Age of Onset   • Cancer Mother        Social History     Social History   • Marital status:      Social History Main Topics   • Smoking status: Current Every Day Smoker     Packs/day: 0.50     Types: Cigarettes   • Smokeless tobacco: Never Used      Comment: patient stated he is down to about 4 cigarettes a day   • Alcohol use No   • Drug use: No   • Sexual activity: Defer     Other  Topics Concern   • Not on file           Objective   Physical Exam   Constitutional: He is oriented to person, place, and time. He appears well-developed and well-nourished. No distress.   HENT:   Head: Normocephalic.   Right Ear: External ear normal.   Left Ear: External ear normal.   Eyes: Conjunctivae are normal.   Neck: Neck supple.   Cardiovascular: Normal rate, regular rhythm and normal heart sounds.    Pulmonary/Chest: Effort normal. No respiratory distress.   Abdominal: Soft. He exhibits no mass. There is no tenderness. There is no rebound and no guarding.   Musculoskeletal: Normal range of motion.   Neurological: He is alert and oriented to person, place, and time.   Skin: Skin is warm and dry. He is not diaphoretic.       Procedures           ED Course  ED Course as of Jun 13 0425 Wed Jun 13, 2018   0010 D-dimer, Quant: (!!) 1.78 [NV]   0325 Sinus rhythm, the rate is 72.  The intervals are normal.  The axis is normal.  There is no STEMI on this EKG ECG 12 Lead [NV]      ED Course User Index  [NV] Vero Damon,         HISTORY  (Suspicious symptoms include retrosternal pain, pressure, radiation to jaw/left shoulder/arms, duration 5-15 minutes, initiated by exercise/cold/emotion, perspiration, nausea and vomiting, reaction on nitrates within minutes, patient recognizes symptoms.  Low risk features of chest pain included well localized, sharp, nonexertional, no diaphoresis, no nausea or vomiting and is reproducible with palpation)  (0 points slightly suspicious; 1 point moderately suspicious; 2 points highly suspicious)   Moderately Suspcious (+1)    EKG  (1 point: no STD but LBBB, LVH, repolarization changes;  2 points STD/DIAN no due to LBBB, LVH or digoxin)  Normal (0)    AGE  (0 points <45; 1 point 45-64; 2 points >65)  > or = 65 +2    RISK FACTORS   (HTN, hypercholesterolemia, diabetes, obesity (BMI greater than 30), smoking (current or smoking cessation is less than 3 months ago), positive  family history, prior MI, PCI/CABG, CVA/TIA, known atherosclerotic disease, peripheral artery disease.  Additional risk factors HIV, cocaine use)  (0 points no known rf; 1 point 1-2 rf; 2 points >3 rf)   > or = 3 Risk Factors (+2)    TROPONIN   (0 points <NL; 1 point 1-3 x normal limit; 2 points >3x NL)  < or = to Normal (0)    Interpretation  0-3 low score.   Risk of MACE is 0.9-1.7%   4-6 intermediate score.   Risk of MACE is 12-16.6%  >/= 7 high score. Risk of MACE is 50-65%  Score 4-6              MDM      Final diagnoses:   Chest pain, unspecified type   Cough   Smoker            Vero Damon DO  06/13/18 4753

## 2018-06-13 NOTE — NURSING NOTE
Abrasion to right forearm surrounded by ecchymotic skin. Abrasion is covered in dry scab at this time.  Patient states he fell prior to admit.  Recommend leaving open to air.

## 2018-06-13 NOTE — SIGNIFICANT NOTE
Received page from CAYDEN Mcdonough and visited patient's room. He had left AMA and ripped out IV with fluids dripping into the floor with untouched lunch tray at bedside. He had not previously notified of desires to leave.  Notified Dr. Mann as well.

## 2018-06-13 NOTE — PROGRESS NOTES
Discharge Planning Assessment   [place]     Patient Name: Paul Gomez  MRN: [id number]  Today's Date: 6/13/2018    Admit Date: 6/12/2018          Discharge Needs Assessment     Row Name 06/13/18 1137       Living Environment    Lives With grandchild(cielo)   Pt lives at home with his grandson, Alberto.    Current Living Arrangements home/apartment/condo    Primary Care Provided by self    Quality of Family Relationships supportive;helpful;involved    Able to Return to Prior Arrangements yes       Transition Planning    Patient/Family Anticipates Transition to home with family    Transportation Anticipated family or friend will provide       Discharge Needs Assessment    Equipment Currently Used at Home none    Equipment Needed After Discharge none    Outpatient/Agency/Support Group Needs --   Pt does not utilize home health services.            Discharge Plan     Row Name 06/13/18 1138       Plan    Plan Pt lives at home with his grandson and plans to return home at discharge. Pt does not utilize home health services or DME. Pt's PCP is Caroline Guzman. Pt states he does not have a POA or advance directive and is not interested in information. Pt utilizes [facility] for prescriptions. Pt's family to provide transportation at discharge. SS will follow and assist as needed with discharge planning.     Patient/Family in Agreement with Plan yes     Demographic Summary     Row Name 06/13/18 1137       General Information    Admission Type observation    Referral Source nursing    Reason for Consult discharge planning    Preferred Language English     Used During This Interaction no     Risa Leach

## 2018-06-13 NOTE — DISCHARGE SUMMARY
[facility] HOSPITALIST MEDICINE DISCHARGE SUMMARY    Patient Identification:  Name:  Paul Gomez  Age:  72 y.o.  Sex:  male  :  1945  MRN:  [id number]  Visit Number:  [id number]    Date of Admission: 2018  Date of Discharge:  2018     PCP: Caroline Guzman, APRN    DISCHARGE DIAGNOSIS   - Chest pain, mixed typical and atypical features: in the setting of multiple cardiac risk factors including HTN, HLD, tobacco abuse.     - Recurrent vs persistent right lower lobe pneumonia:      - COPD exacerbation:     - Hypertension:    - Questionable chronic pancreatitis, ductal dilatation of main pancreatic duct: t    - Hypocalcemia:     - Mild hyponatremia:     - Pre-diabetes:    - Chronic pain:    CONSULTS   None    PROCEDURES PERFORMED   None    HOSPITAL COURSE  Patient is a 72 y.o. male presented to [facility] complaining of chest pain.  Please see the admitting history and physical for further details.      We then impression of chest pain and acute COPD exacerbation patient was hospitalized.  He was started on methylprednisolone, IV antibiotics in the form of ceftriaxone and doxycycline and scheduled nebs treatments.    There in the morning patient admitted that he is feeling some better and when I saw him with his nurse Sharonda and his questions were answered and we advised him he may be discharged [place]  48 hours.  After 1-2 hours I was informed that the patient has left without telling anybody and receiving any discharge instructions.  He did not sign any paper and is taken as he left AGAINST MEDICAL ADVICE.    VITAL SIGNS:  1    18  2202 18  0421   Weight: 70.3 kg (155 lb) 69.5 kg (153 lb 4 oz)     Body mass index is 23.31 kg/m².    PHYSICAL EXAM:  Constitutional: He is oriented to person, place, and time. He appears well-developed and well-nourished. No distress.   HENT:   Head: Normocephalic and atraumatic.   Mouth/Throat: Oropharynx is clear and moist.    Eyes: Conjunctivae and EOM are normal. Pupils are equal, round, and reactive to light.   Neck: Normal range of motion. Neck supple. No JVD present.   Cardiovascular: Normal rate, regular rhythm, normal heart sounds and intact distal pulses.    No murmur heard.  Pulmonary/Chest: Effort normal.   Prolonged expiratory phase. Diffuse expiratory wheezing. Rhonchi/rales noted in right lower lung zone.   Abdominal: Soft. Bowel sounds are normal. He exhibits no distension. There is no tenderness.   Musculoskeletal: Normal range of motion. He exhibits edema (Trace, chronic per patient). He exhibits no tenderness or deformity.   Lymphadenopathy:     He has no cervical adenopathy.   Neurological: He is alert and oriented to person, place, and time.   No gross focal deficits appreciated   Skin: Skin is warm and dry. Capillary refill takes less than 2 seconds. He is not diaphoretic. No erythema. No pallor.   Psychiatric: He has a normal mood and affect. His behavior is normal. Judgment and thought content normal  DISCHARGE DISPOSITION   Stable    DISCHARGE MEDICATIONS:     Discharge Medications      ASK your doctor about these medications      Instructions Start Date   atenolol 25 MG tablet  Commonly known as:  TENORMIN   Take 25 mg by mouth 2 (Two) Times a Day.      gabapentin 800 MG tablet  Commonly known as:  NEURONTIN   Take 800 mg by mouth 3 (Three) Times a Day.      oxyCODONE 10 MG tablet  Commonly known as:  ROXICODONE   Take 10 mg by mouth 3 (Three) Times a Day.      OPANA ER 40 MG tablet extended-release 12 hour  Generic drug:  OxyMORphone HCl ER   Take 1 tablet by mouth 2 (Two) Times a Day.               Your Scheduled Appointments    Jun 14, 2018  5:00 PM EDT  Office Visit with BEATRICE Vallejo  [facility] FAMILY MEDICINE (--) [address]  [place] [address]  [facility] [address] [phone]   Arrive 15 minutes prior to appointment.  If you are in need of a language or hearing   please call the Department.   Jun 21, 2018  2:30 PM EDT  Follow Up with VIJAY Tatum  [facility] GASTROENTEROLOGY (--) [address]  [place] [address] [phone]   Arrive 15 minutes prior to appointment.   Aug 17, 2018  1:45 PM EDT  Follow Up with BEATRICE Vallejo  [facility] FAMILY MEDICINE (--) [address]  [address]  [place] [address] [phone]   Arrive 15 minutes prior to appointment.              TEST  RESULTS PENDING AT DISCHARGE   Order Current Status    Blood Culture - Blood, Blood, Venous Line In process    Blood Culture - Blood, Blood, Venous Line In process           Roz Mann MD  06/13/18  2:38 PM    Please note that this discharge summary required more than 30 minutes to complete.

## 2018-06-14 ENCOUNTER — OFFICE VISIT (OUTPATIENT)
Dept: FAMILY MEDICINE CLINIC | Facility: CLINIC | Age: 73
End: 2018-06-14

## 2018-06-14 VITALS
HEIGHT: 67 IN | TEMPERATURE: 98.1 F | DIASTOLIC BLOOD PRESSURE: 71 MMHG | OXYGEN SATURATION: 98 % | HEART RATE: 53 BPM | BODY MASS INDEX: 23.7 KG/M2 | WEIGHT: 151 LBS | SYSTOLIC BLOOD PRESSURE: 134 MMHG

## 2018-06-14 DIAGNOSIS — R07.9 CHEST PAIN, UNSPECIFIED TYPE: ICD-10-CM

## 2018-06-14 DIAGNOSIS — J18.9 PNEUMONIA OF RIGHT LOWER LOBE DUE TO INFECTIOUS ORGANISM: ICD-10-CM

## 2018-06-14 DIAGNOSIS — J44.9 CHRONIC OBSTRUCTIVE PULMONARY DISEASE, UNSPECIFIED COPD TYPE (HCC): Primary | ICD-10-CM

## 2018-06-14 PROCEDURE — 99214 OFFICE O/P EST MOD 30 MIN: CPT | Performed by: NURSE PRACTITIONER

## 2018-06-14 RX ORDER — AZITHROMYCIN 250 MG/1
TABLET, FILM COATED ORAL
Qty: 6 TABLET | Refills: 0 | Status: SHIPPED | OUTPATIENT
Start: 2018-06-14 | End: 2018-07-20

## 2018-06-14 NOTE — PROGRESS NOTES
Discharge Planning Assessment   [place]     Patient Name: Paul Gomez  MRN: [id number]  Today's Date: 6/14/2018    Admit Date: 6/12/2018          Discharge Needs Assessment    No documentation.             Discharge Plan     Row Name 06/14/18 0921       Plan    Final Discharge Disposition Code 07 - left AMA    Final Note Pt left AMA    Row Name 06/14/18 0632       Plan    Final Discharge Disposition Code 07 - left AMA        Destination     No service coordination in this encounter.      Durable Medical Equipment     No service coordination in this encounter.      Dialysis/Infusion     No service coordination in this encounter.      Home Medical Care     No service coordination in this encounter.      Social Care     No service coordination in this encounter.                Demographic Summary    No documentation.           Functional Status    No documentation.           Psychosocial    No documentation.           Abuse/Neglect    No documentation.           Legal    No documentation.           Substance Abuse    No documentation.           Patient Forms    No documentation.         Tess Vu

## 2018-06-14 NOTE — PROGRESS NOTES
Subjective   Paul Gomez is a 72 y.o. male.     Chief Complaint: Follow-up (Recent ER visit )    History of Present Illness   Patient here today to follow up from hospitalization.    Patient went to [facility] ER for evaluation of chest pain on 6/12/2018.  The following notes from his ER visit: 72-year-old male with a history of hypertension, COPD, prediabetes, hyperlipidemia presents with severe chest pain that occurred when he was driving.  It resolved on its own.  He described it as a sharp sensation in the middle of his chest.  He does not have this sensation any more but reports that he does feel tired and sweaty.  The patient states that he has not had a heart attack before.  He reports that he has not a stress test in many years.  He continues to smoke.  He states that he has mild cough with sputum production but this is chronic for the patient.  Apparently patient was admitted and left AMA the following morning.   After questioning the patient today, he states that he left because they kept drawing blood from him over and over and no one was telling him anything.  He got upset and left the hospital.  I have discussed with him today that he should have stayed in the hospital because his CT of chest shows that he does have RLL pneumonia.  He was receiving antibiotics while he was hospitalized and he was aware of that; however, he did not feel they were giving him the right medications.  Patient refuses to be hospitalized at this time.    I have discussed the risks of leaving AMA; especially with the chest pain episode that made him go to the hospital.  After discussion he states he is willing to have a stress test done to evaluate his heart.  I will order his stress test today.  I have also advised him that if he has any further chest pain he needs to return to the hospital ER for evaluation.  He states understanding.   He states today also that he has stopped smoking. He states that he has not smoked a cigarette in  almost a week.  At this time I will start him on a Z Pack.  He is to return to the office in one week for re-evaluation.  He is also to keep his appt for a stress test when he is contacted with date and time.    Family History   Problem Relation Age of Onset   • Cancer Mother        Social History     Social History   • Marital status:      Spouse name: N/A   • Number of children: N/A   • Years of education: N/A     Occupational History   • Not on file.     Social History Main Topics   • Smoking status: Current Every Day Smoker     Packs/day: 0.50     Types: Cigarettes   • Smokeless tobacco: Never Used      Comment: patient stated he is down to about 4 cigarettes a day   • Alcohol use No   • Drug use: No   • Sexual activity: Defer     Other Topics Concern   • Not on file     Social History Narrative   • No narrative on file       Past Medical History:   Diagnosis Date   • COPD (chronic obstructive pulmonary disease)    • Gout    • Hearing loss    • History of degenerative disc disease    • Hyperlipidemia    • Hypertension    • Low back pain    • Pedal edema    • Prediabetes        Review of Systems   Constitutional: Negative.    HENT: Negative.    Respiratory: Positive for cough. Negative for shortness of breath.    Cardiovascular: Positive for chest pain.   Gastrointestinal: Negative.    Musculoskeletal: Negative.    Skin: Negative.    Neurological: Negative.    Psychiatric/Behavioral: Negative.        Objective   Physical Exam   Constitutional: He is oriented to person, place, and time. He appears well-developed and well-nourished.   HENT:   Right Ear: External ear normal.   Left Ear: External ear normal.   Mouth/Throat: Oropharynx is clear and moist.   Eyes: Conjunctivae are normal. Pupils are equal, round, and reactive to light.   Neck: Normal range of motion. Neck supple.   Cardiovascular: Normal rate, regular rhythm and normal heart sounds.    Pulmonary/Chest: Effort normal. No respiratory distress. He  "has wheezes. He has no rales.   Neurological: He is alert and oriented to person, place, and time.   Skin: Skin is warm and dry.   Psychiatric: He has a normal mood and affect. His behavior is normal. Judgment and thought content normal.   Nursing note and vitals reviewed.      Procedures    Vitals: Blood pressure 134/71, pulse 53, temperature 98.1 °F (36.7 °C), temperature source Oral, height 170.2 cm (67\"), weight 68.5 kg (151 lb), SpO2 98 %.    Allergies:   Allergies   Allergen Reactions   • Penicillins      Pt states he was allergic when he was 18 but has had it since and did not have any problems.         During this visit the following were done:  Labs Reviewed []    Labs Ordered []    Radiology Reports Reviewed []    Radiology Ordered []    PCP Records Reviewed []    Referring Provider Records Reviewed []    ER Records Reviewed []    Hospital Records Reviewed []    History Obtained From Family []    Radiology Images Reviewed []    Other Reviewed []    Records Requested []      Assessment/Plan   Ray was seen today for follow-up.    Diagnoses and all orders for this visit:    Chronic obstructive pulmonary disease, unspecified COPD type    Pneumonia of right lower lobe due to infectious organism  -     azithromycin (ZITHROMAX Z-CACHORRO) 250 MG tablet; Take 2 tablets the first day, then 1 tablet daily for 4 days.    Chest pain, unspecified type  -     Stress Test With Myocardial Perfusion - One Day; Future               "

## 2018-06-18 LAB
BACTERIA SPEC AEROBE CULT: NORMAL
BACTERIA SPEC AEROBE CULT: NORMAL

## 2018-07-20 ENCOUNTER — OFFICE VISIT (OUTPATIENT)
Dept: FAMILY MEDICINE CLINIC | Facility: CLINIC | Age: 73
End: 2018-07-20

## 2018-07-20 ENCOUNTER — TELEPHONE (OUTPATIENT)
Dept: FAMILY MEDICINE CLINIC | Facility: CLINIC | Age: 73
End: 2018-07-20

## 2018-07-20 VITALS
SYSTOLIC BLOOD PRESSURE: 152 MMHG | DIASTOLIC BLOOD PRESSURE: 84 MMHG | HEART RATE: 76 BPM | HEIGHT: 67 IN | BODY MASS INDEX: 24.33 KG/M2 | OXYGEN SATURATION: 92 % | WEIGHT: 155 LBS

## 2018-07-20 DIAGNOSIS — Z87.39 HISTORY OF DEGENERATIVE DISC DISEASE: Primary | ICD-10-CM

## 2018-07-20 DIAGNOSIS — D64.9 ANEMIA, UNSPECIFIED TYPE: Primary | ICD-10-CM

## 2018-07-20 DIAGNOSIS — M1A.09X0 IDIOPATHIC CHRONIC GOUT OF MULTIPLE SITES WITHOUT TOPHUS: ICD-10-CM

## 2018-07-20 DIAGNOSIS — E78.2 MIXED HYPERLIPIDEMIA: ICD-10-CM

## 2018-07-20 DIAGNOSIS — E03.9 HYPOTHYROIDISM, UNSPECIFIED TYPE: ICD-10-CM

## 2018-07-20 DIAGNOSIS — I10 ESSENTIAL HYPERTENSION: ICD-10-CM

## 2018-07-20 LAB
ALBUMIN SERPL-MCNC: 3.8 G/DL (ref 3.4–4.8)
ALBUMIN/GLOB SERPL: 1.4 G/DL (ref 1.5–2.5)
ALP SERPL-CCNC: 62 U/L (ref 40–129)
ALT SERPL W P-5'-P-CCNC: 15 U/L (ref 10–44)
ANION GAP SERPL CALCULATED.3IONS-SCNC: 3.5 MMOL/L (ref 3.6–11.2)
AST SERPL-CCNC: 18 U/L (ref 10–34)
BASOPHILS # BLD AUTO: 0.02 10*3/MM3 (ref 0–0.3)
BASOPHILS NFR BLD AUTO: 0.3 % (ref 0–2)
BILIRUB SERPL-MCNC: 0.3 MG/DL (ref 0.2–1.8)
BUN BLD-MCNC: 9 MG/DL (ref 7–21)
BUN/CREAT SERPL: 11.3 (ref 7–25)
CALCIUM SPEC-SCNC: 8.9 MG/DL (ref 7.7–10)
CHLORIDE SERPL-SCNC: 105 MMOL/L (ref 99–112)
CHOLEST SERPL-MCNC: 148 MG/DL (ref 0–200)
CO2 SERPL-SCNC: 29.5 MMOL/L (ref 24.3–31.9)
CREAT BLD-MCNC: 0.8 MG/DL (ref 0.43–1.29)
DEPRECATED RDW RBC AUTO: 43.4 FL (ref 37–54)
EOSINOPHIL # BLD AUTO: 0.87 10*3/MM3 (ref 0–0.7)
EOSINOPHIL NFR BLD AUTO: 14 % (ref 0–7)
ERYTHROCYTE [DISTWIDTH] IN BLOOD BY AUTOMATED COUNT: 13.1 % (ref 11.5–14.5)
GFR SERPL CREATININE-BSD FRML MDRD: 95 ML/MIN/1.73
GLOBULIN UR ELPH-MCNC: 2.7 GM/DL
GLUCOSE BLD-MCNC: 114 MG/DL (ref 70–110)
HCT VFR BLD AUTO: 35.4 % (ref 42–52)
HDLC SERPL-MCNC: 45 MG/DL (ref 60–100)
HGB BLD-MCNC: 11.7 G/DL (ref 14–18)
IMM GRANULOCYTES # BLD: 0.01 10*3/MM3 (ref 0–0.03)
IMM GRANULOCYTES NFR BLD: 0.2 % (ref 0–0.5)
LDLC SERPL CALC-MCNC: 93 MG/DL (ref 0–100)
LDLC/HDLC SERPL: 2.08 {RATIO}
LYMPHOCYTES # BLD AUTO: 1.47 10*3/MM3 (ref 1–3)
LYMPHOCYTES NFR BLD AUTO: 23.6 % (ref 16–46)
MAGNESIUM SERPL-MCNC: 2.1 MG/DL (ref 1.7–2.6)
MCH RBC QN AUTO: 30.9 PG (ref 27–33)
MCHC RBC AUTO-ENTMCNC: 33.1 G/DL (ref 33–37)
MCV RBC AUTO: 93.4 FL (ref 80–94)
MONOCYTES # BLD AUTO: 0.98 10*3/MM3 (ref 0.1–0.9)
MONOCYTES NFR BLD AUTO: 15.8 % (ref 0–12)
NEUTROPHILS # BLD AUTO: 2.87 10*3/MM3 (ref 1.4–6.5)
NEUTROPHILS NFR BLD AUTO: 46.1 % (ref 40–75)
OSMOLALITY SERPL CALC.SUM OF ELEC: 275.2 MOSM/KG (ref 273–305)
PLATELET # BLD AUTO: 234 10*3/MM3 (ref 130–400)
PMV BLD AUTO: 10.8 FL (ref 6–10)
POTASSIUM BLD-SCNC: 4 MMOL/L (ref 3.5–5.3)
PROT SERPL-MCNC: 6.5 G/DL (ref 6–8)
RBC # BLD AUTO: 3.79 10*6/MM3 (ref 4.7–6.1)
SODIUM BLD-SCNC: 138 MMOL/L (ref 135–153)
TRIGL SERPL-MCNC: 48 MG/DL (ref 0–150)
TSH SERPL DL<=0.05 MIU/L-ACNC: 2.03 MIU/ML (ref 0.55–4.78)
URATE SERPL-MCNC: 5 MG/DL (ref 3.7–7)
VIT B12 BLD-MCNC: 332 PG/ML (ref 211–911)
VLDLC SERPL-MCNC: 9.6 MG/DL
WBC NRBC COR # BLD: 6.22 10*3/MM3 (ref 4.5–12.5)

## 2018-07-20 PROCEDURE — 82607 VITAMIN B-12: CPT | Performed by: NURSE PRACTITIONER

## 2018-07-20 PROCEDURE — 36415 COLL VENOUS BLD VENIPUNCTURE: CPT | Performed by: NURSE PRACTITIONER

## 2018-07-20 PROCEDURE — 99214 OFFICE O/P EST MOD 30 MIN: CPT | Performed by: NURSE PRACTITIONER

## 2018-07-20 PROCEDURE — 84550 ASSAY OF BLOOD/URIC ACID: CPT | Performed by: NURSE PRACTITIONER

## 2018-07-20 PROCEDURE — 80053 COMPREHEN METABOLIC PANEL: CPT | Performed by: NURSE PRACTITIONER

## 2018-07-20 PROCEDURE — 83735 ASSAY OF MAGNESIUM: CPT | Performed by: NURSE PRACTITIONER

## 2018-07-20 PROCEDURE — 84443 ASSAY THYROID STIM HORMONE: CPT | Performed by: NURSE PRACTITIONER

## 2018-07-20 PROCEDURE — 85025 COMPLETE CBC W/AUTO DIFF WBC: CPT | Performed by: NURSE PRACTITIONER

## 2018-07-20 PROCEDURE — 80061 LIPID PANEL: CPT | Performed by: NURSE PRACTITIONER

## 2018-07-20 RX ORDER — FLUTICASONE PROPIONATE 50 MCG
2 SPRAY, SUSPENSION (ML) NASAL DAILY
Qty: 16 G | Refills: 2 | Status: SHIPPED | OUTPATIENT
Start: 2018-07-20 | End: 2018-12-12

## 2018-07-20 NOTE — PROGRESS NOTES
His uric acid is normal.  His RBCs continue to be low and his H&H continues to be low.  I would really like for him to see hematology.  Is he agreeable?

## 2018-07-20 NOTE — PROGRESS NOTES
Subjective   Paul Gomez is a 72 y.o. male.     Chief Complaint: Edema (right hand)    Back Pain   This is a chronic problem. The current episode started more than 1 year ago. The problem occurs constantly. The problem has been gradually worsening since onset. The pain is present in the lumbar spine. The quality of the pain is described as stabbing and aching. Radiates to: left buttock. The pain is severe. The pain is worse during the day. The symptoms are aggravated by bending and twisting. Stiffness is present in the morning. Pertinent negatives include no bladder incontinence, bowel incontinence, chest pain, leg pain, numbness or tingling. He has tried analgesics (pt has been going to [facility] in [place] for the past year; he states that they have been shut down and he will no longer be seeing them) for the symptoms.   Hypertension   This is a chronic problem. The current episode started more than 1 year ago. The problem is controlled. Associated symptoms include neck pain. Pertinent negatives include no chest pain or shortness of breath. Current antihypertension treatment includes beta blockers. The current treatment provides moderate improvement. Compliance problems include exercise and diet.    Hyperlipidemia   This is a chronic problem. The problem is controlled. Pertinent negatives include no chest pain, leg pain or shortness of breath. He is currently on no antihyperlipidemic treatment. Compliance problems include adherence to exercise and adherence to diet.    Upper Extremity Issue   This is a new (swelling of right hand for the past two days) problem. The current episode started in the past 7 days. The problem occurs constantly. The problem has been gradually improving. Associated symptoms include arthralgias, joint swelling and neck pain. Pertinent negatives include no chest pain or numbness. Exacerbated by: hx of gout. He has tried nothing for the symptoms.        Family History    Problem Relation Age of Onset   • Cancer Mother        Social History     Social History   • Marital status:      Spouse name: N/A   • Number of children: N/A   • Years of education: N/A     Occupational History   • Not on file.     Social History Main Topics   • Smoking status: Current Every Day Smoker     Packs/day: 0.50     Types: Cigarettes   • Smokeless tobacco: Never Used      Comment: patient stated he is down to about 4 cigarettes a day   • Alcohol use No   • Drug use: No   • Sexual activity: Defer     Other Topics Concern   • Not on file     Social History Narrative   • No narrative on file       Past Medical History:   Diagnosis Date   • COPD (chronic obstructive pulmonary disease) (CMS/[facility])    • Gout    • Hearing loss    • History of degenerative disc disease    • Hyperlipidemia    • Hypertension    • Low back pain    • Pedal edema    • Prediabetes        Review of Systems   Constitutional: Negative.    HENT: Negative.    Respiratory: Negative.  Negative for shortness of breath.    Cardiovascular: Negative.  Negative for chest pain.   Gastrointestinal: Negative.  Negative for bowel incontinence.   Genitourinary: Negative for bladder incontinence.   Musculoskeletal: Positive for arthralgias, back pain, joint swelling and neck pain.   Skin: Negative.    Neurological: Negative.  Negative for tingling and numbness.   Psychiatric/Behavioral: Negative.        Objective   Physical Exam   Constitutional: He is oriented to person, place, and time. He appears well-developed and well-nourished.   Neck: Normal range of motion. Neck supple.   Cardiovascular: Normal rate, regular rhythm and normal heart sounds.    Pulmonary/Chest: Effort normal and breath sounds normal.   Musculoskeletal:   Mild edema in right hand and wrist area   Neurological: He is alert and oriented to person, place, and time.   Skin: Skin is warm and dry.   Psychiatric: He has a normal mood and affect. His behavior is normal. Judgment  "and thought content normal.   Nursing note and vitals reviewed.      Procedures    Vitals: Blood pressure 152/84, pulse 76, height 170.2 cm (67\"), weight 70.3 kg (155 lb), SpO2 92 %.    Allergies:   Allergies   Allergen Reactions   • Penicillins      Pt states he was allergic when he was 18 but has had it since and did not have any problems.         During this visit the following were done:  Labs Reviewed []    Labs Ordered []    Radiology Reports Reviewed []    Radiology Ordered []    PCP Records Reviewed []    Referring Provider Records Reviewed []    ER Records Reviewed []    Hospital Records Reviewed []    History Obtained From Family []    Radiology Images Reviewed []    Other Reviewed []    Records Requested []      Assessment/Plan   Ray was seen today for edema.    Diagnoses and all orders for this visit:    History of degenerative disc disease  -     MRI Lumbar Spine Without Contrast; Future  -     CBC & Differential  -     Comprehensive Metabolic Panel  -     Lipid Panel  -     Magnesium  -     Uric Acid  -     TSH  -     Vitamin B12  -     CBC Auto Differential    Idiopathic chronic gout of multiple sites without tophus  -     CBC & Differential  -     Comprehensive Metabolic Panel  -     Lipid Panel  -     Magnesium  -     Uric Acid  -     TSH  -     Vitamin B12  -     CBC Auto Differential    Hypothyroidism, unspecified type  -     CBC & Differential  -     Comprehensive Metabolic Panel  -     Lipid Panel  -     Magnesium  -     Uric Acid  -     TSH  -     Vitamin B12  -     CBC Auto Differential    Essential hypertension  -     CBC & Differential  -     Comprehensive Metabolic Panel  -     Lipid Panel  -     Magnesium  -     Uric Acid  -     TSH  -     Vitamin B12  -     CBC Auto Differential    Mixed hyperlipidemia  -     CBC & Differential  -     Comprehensive Metabolic Panel  -     Lipid Panel  -     Magnesium  -     Uric Acid  -     TSH  -     Vitamin B12  -     CBC Auto Differential    Other " orders  -     fluticasone (FLONASE) 50 MCG/ACT nasal spray; 2 sprays into each nostril Daily.

## 2018-07-20 NOTE — TELEPHONE ENCOUNTER
----- Message from BEATRICE Vallejo sent at 7/20/2018  3:05 PM EDT -----  His uric acid is normal.  His RBCs continue to be low and his H&H continues to be low.  I would really like for him to see hematology.  Is he agreeable?        Patient notified and verbalized understanding. He said he was ok with going to hematology.

## 2018-08-07 ENCOUNTER — TELEPHONE (OUTPATIENT)
Dept: FAMILY MEDICINE CLINIC | Facility: CLINIC | Age: 73
End: 2018-08-07

## 2018-08-07 DIAGNOSIS — I10 ESSENTIAL HYPERTENSION: ICD-10-CM

## 2018-08-08 RX ORDER — ATENOLOL 25 MG/1
25 TABLET ORAL 2 TIMES DAILY
Qty: 60 TABLET | Refills: 2 | Status: SHIPPED | OUTPATIENT
Start: 2018-08-08 | End: 2018-09-12

## 2018-08-08 RX ORDER — ATENOLOL 25 MG/1
TABLET ORAL
Qty: 60 TABLET | Refills: 2 | Status: SHIPPED | OUTPATIENT
Start: 2018-08-08 | End: 2018-11-01 | Stop reason: SDUPTHER

## 2018-08-08 RX ORDER — IBUPROFEN 800 MG/1
800 TABLET ORAL 2 TIMES DAILY PRN
Qty: 60 TABLET | Refills: 1 | Status: SHIPPED | OUTPATIENT
Start: 2018-08-08 | End: 2018-09-27 | Stop reason: SDUPTHER

## 2018-09-12 ENCOUNTER — OFFICE VISIT (OUTPATIENT)
Dept: FAMILY MEDICINE CLINIC | Facility: CLINIC | Age: 73
End: 2018-09-12

## 2018-09-12 VITALS
TEMPERATURE: 98.4 F | DIASTOLIC BLOOD PRESSURE: 69 MMHG | SYSTOLIC BLOOD PRESSURE: 108 MMHG | BODY MASS INDEX: 24.96 KG/M2 | HEART RATE: 74 BPM | OXYGEN SATURATION: 90 % | WEIGHT: 159 LBS | HEIGHT: 67 IN

## 2018-09-12 DIAGNOSIS — J44.1 COPD WITH EXACERBATION (HCC): Primary | ICD-10-CM

## 2018-09-12 DIAGNOSIS — Z71.6 TOBACCO ABUSE COUNSELING: ICD-10-CM

## 2018-09-12 PROCEDURE — 99214 OFFICE O/P EST MOD 30 MIN: CPT | Performed by: FAMILY MEDICINE

## 2018-09-12 RX ORDER — PREDNISONE 20 MG/1
40 TABLET ORAL DAILY
Qty: 10 TABLET | Refills: 0 | Status: SHIPPED | OUTPATIENT
Start: 2018-09-12 | End: 2018-09-17

## 2018-09-12 RX ORDER — ALBUTEROL SULFATE 90 UG/1
2 AEROSOL, METERED RESPIRATORY (INHALATION) EVERY 4 HOURS PRN
Qty: 1 INHALER | Refills: 2 | Status: SHIPPED | OUTPATIENT
Start: 2018-09-12 | End: 2018-10-12

## 2018-09-12 NOTE — PROGRESS NOTES
"Subjective   Ray DUARTE Gomez is a 72 y.o. male.   Pt presents today with CC of Dizziness; URI; and Cough      History of Present Illness   Patient is a 72-year-old male with past medical history of COPD and tobacco abuse.  He reports that over the past week he is increasingly been short of breath, and has been coughing up white phlegm.  He denies chest pain, though has been \"tight\" in his chest.  He is not on any inhalers for his COPD.  Though does report that he has sampled some in the past that it been helpful, he does not recall the names of them.  He denies fever, chills, or syncope.  He states that he has had difficulty smoking in the past few days because it causes him to instantly cough.         The following portions of the patient's history were reviewed and updated as appropriate: allergies, current medications, past medical history, past social history, past surgical history and problem list.    Review of Systems   Constitutional: Negative for chills, fever and unexpected weight loss.   HENT: Negative for congestion and sore throat.    Eyes: Negative for blurred vision and visual disturbance.   Respiratory: Positive for cough, chest tightness and shortness of breath. Negative for wheezing.    Cardiovascular: Negative for chest pain, palpitations and leg swelling.   Gastrointestinal: Negative for abdominal pain and diarrhea.   Endocrine: Negative for cold intolerance and heat intolerance.   Neurological: Negative for dizziness and syncope.       Objective   Physical Exam   Constitutional: He is oriented to person, place, and time. He appears well-developed and well-nourished.   HENT:   Head: Normocephalic and atraumatic.   Nose: Nose normal.   Mouth/Throat: Oropharynx is clear and moist.   Eyes: Conjunctivae are normal.   Neck: Normal range of motion. Neck supple.   Cardiovascular: Normal rate, regular rhythm and normal heart sounds.    Pulmonary/Chest:   Diffuse wheezing, central rhonchi bilaterally, all " "improves when he coughs.  No respiratory distress, oxygen saturation improved to 92% on room.   Abdominal: Soft. Bowel sounds are normal.   Neurological: He is alert and oriented to person, place, and time.   Skin: Skin is warm and dry.   Psychiatric: He has a normal mood and affect. His behavior is normal.         Assessment/Plan   Paul was seen today for dizziness, uri and cough.    Diagnoses and all orders for this visit:    COPD with exacerbation (CMS/[facility])  -     predniSONE (DELTASONE) 20 MG tablet; Take 2 tablets by mouth Daily for 5 days.  Patient has exacerbation of chronic bronchitis.  Will treated with a steroid burst.  He reports that he \"does not do well with antibiotics\",  I don't believe that he needs an antibiotic as he has no fever, and denies chills.  Also ordered an albuterol inhaler for as needed use, I suspect that he has ongoing COPD and would benefit to having an extra inhaler, it was refilled.  Tobacco abuse counseling    Other orders  -     albuterol (PROVENTIL HFA;VENTOLIN HFA) 108 (90 Base) MCG/ACT inhaler; Inhale 2 puffs Every 4 (Four) Hours As Needed for Wheezing for up to 30 days.                 I advised Paul of the risks of continuing to use tobacco, and I provided him with tobacco cessation educational materials in the After Visit Summary.     During this visit, I spent 3 minutes counseling the patient regarding tobacco cessation.    "

## 2018-09-27 RX ORDER — IBUPROFEN 800 MG/1
TABLET ORAL
Qty: 60 TABLET | Refills: 1 | Status: SHIPPED | OUTPATIENT
Start: 2018-09-27 | End: 2018-11-14

## 2018-09-28 RX ORDER — SELENIUM SULFIDE 2.5 MG/100ML
LOTION TOPICAL
Qty: 120 ML | Refills: 2 | Status: SHIPPED | OUTPATIENT
Start: 2018-09-28 | End: 2018-12-06 | Stop reason: SDUPTHER

## 2018-11-01 ENCOUNTER — OFFICE VISIT (OUTPATIENT)
Dept: FAMILY MEDICINE CLINIC | Facility: CLINIC | Age: 73
End: 2018-11-01

## 2018-11-01 ENCOUNTER — TELEPHONE (OUTPATIENT)
Dept: FAMILY MEDICINE CLINIC | Facility: CLINIC | Age: 73
End: 2018-11-01

## 2018-11-01 ENCOUNTER — HOSPITAL ENCOUNTER (EMERGENCY)
Facility: HOSPITAL | Age: 73
Discharge: HOME | End: 2018-11-01
Attending: EMERGENCY MEDICINE | Admitting: EMERGENCY MEDICINE

## 2018-11-01 VITALS
BODY MASS INDEX: 22.43 KG/M2 | HEIGHT: 68 IN | DIASTOLIC BLOOD PRESSURE: 66 MMHG | SYSTOLIC BLOOD PRESSURE: 120 MMHG | WEIGHT: 148 LBS | TEMPERATURE: 98.1 F | RESPIRATION RATE: 18 BRPM | OXYGEN SATURATION: 97 % | HEART RATE: 68 BPM

## 2018-11-01 VITALS
HEIGHT: 67 IN | OXYGEN SATURATION: 96 % | SYSTOLIC BLOOD PRESSURE: 97 MMHG | DIASTOLIC BLOOD PRESSURE: 60 MMHG | WEIGHT: 143 LBS | BODY MASS INDEX: 22.44 KG/M2 | TEMPERATURE: 97.9 F | HEART RATE: 56 BPM

## 2018-11-01 DIAGNOSIS — M54.5 LOW BACK PAIN, UNSPECIFIED BACK PAIN LATERALITY, UNSPECIFIED CHRONICITY, WITH SCIATICA PRESENCE UNSPECIFIED: ICD-10-CM

## 2018-11-01 DIAGNOSIS — E86.0 DEHYDRATION: Primary | ICD-10-CM

## 2018-11-01 DIAGNOSIS — I10 ESSENTIAL HYPERTENSION: ICD-10-CM

## 2018-11-01 DIAGNOSIS — E03.9 HYPOTHYROIDISM, UNSPECIFIED TYPE: ICD-10-CM

## 2018-11-01 DIAGNOSIS — K86.1 CHRONIC PANCREATITIS, UNSPECIFIED PANCREATITIS TYPE (HCC): ICD-10-CM

## 2018-11-01 DIAGNOSIS — E87.1 HYPONATREMIA: ICD-10-CM

## 2018-11-01 DIAGNOSIS — J44.9 CHRONIC OBSTRUCTIVE PULMONARY DISEASE, UNSPECIFIED COPD TYPE (HCC): Primary | ICD-10-CM

## 2018-11-01 LAB
ALBUMIN SERPL-MCNC: 4.2 G/DL (ref 3.4–4.8)
ALBUMIN SERPL-MCNC: 4.5 G/DL (ref 3.4–4.8)
ALBUMIN/GLOB SERPL: 1.4 G/DL (ref 1.5–2.5)
ALBUMIN/GLOB SERPL: 1.4 G/DL (ref 1.5–2.5)
ALP SERPL-CCNC: 61 U/L (ref 40–129)
ALP SERPL-CCNC: 70 U/L (ref 40–129)
ALT SERPL W P-5'-P-CCNC: 17 U/L (ref 10–44)
ALT SERPL W P-5'-P-CCNC: 18 U/L (ref 10–44)
AMYLASE SERPL-CCNC: 71 U/L (ref 28–100)
ANION GAP SERPL CALCULATED.3IONS-SCNC: 11.6 MMOL/L (ref 3.6–11.2)
ANION GAP SERPL CALCULATED.3IONS-SCNC: 12.4 MMOL/L (ref 3.6–11.2)
AST SERPL-CCNC: 30 U/L (ref 10–34)
AST SERPL-CCNC: 38 U/L (ref 10–34)
BACTERIA UR QL AUTO: ABNORMAL /HPF
BASOPHILS # BLD AUTO: 0.04 10*3/MM3 (ref 0–0.3)
BASOPHILS # BLD AUTO: 0.05 10*3/MM3 (ref 0–0.3)
BASOPHILS NFR BLD AUTO: 0.5 % (ref 0–2)
BASOPHILS NFR BLD AUTO: 0.5 % (ref 0–2)
BILIRUB SERPL-MCNC: 0.2 MG/DL (ref 0.2–1.8)
BILIRUB SERPL-MCNC: 0.4 MG/DL (ref 0.2–1.8)
BILIRUB UR QL STRIP: NEGATIVE
BUN BLD-MCNC: 60 MG/DL (ref 7–21)
BUN BLD-MCNC: 73 MG/DL (ref 7–21)
BUN/CREAT SERPL: 15.9 (ref 7–25)
BUN/CREAT SERPL: 20.9 (ref 7–25)
CALCIUM SPEC-SCNC: 8.2 MG/DL (ref 7.7–10)
CALCIUM SPEC-SCNC: 8.8 MG/DL (ref 7.7–10)
CHLORIDE SERPL-SCNC: 94 MMOL/L (ref 99–112)
CHLORIDE SERPL-SCNC: 99 MMOL/L (ref 99–112)
CLARITY UR: CLEAR
CO2 SERPL-SCNC: 20.4 MMOL/L (ref 24.3–31.9)
CO2 SERPL-SCNC: 22.6 MMOL/L (ref 24.3–31.9)
COLOR UR: YELLOW
CREAT BLD-MCNC: 3.5 MG/DL (ref 0.43–1.29)
CREAT BLD-MCNC: 3.78 MG/DL (ref 0.43–1.29)
DEPRECATED RDW RBC AUTO: 42.1 FL (ref 37–54)
DEPRECATED RDW RBC AUTO: 43.6 FL (ref 37–54)
EOSINOPHIL # BLD AUTO: 0.2 10*3/MM3 (ref 0–0.7)
EOSINOPHIL # BLD AUTO: 0.34 10*3/MM3 (ref 0–0.7)
EOSINOPHIL NFR BLD AUTO: 1.9 % (ref 0–7)
EOSINOPHIL NFR BLD AUTO: 3.9 % (ref 0–7)
ERYTHROCYTE [DISTWIDTH] IN BLOOD BY AUTOMATED COUNT: 13.2 % (ref 11.5–14.5)
ERYTHROCYTE [DISTWIDTH] IN BLOOD BY AUTOMATED COUNT: 13.6 % (ref 11.5–14.5)
GFR SERPL CREATININE-BSD FRML MDRD: 16 ML/MIN/1.73
GFR SERPL CREATININE-BSD FRML MDRD: 17 ML/MIN/1.73
GLOBULIN UR ELPH-MCNC: 2.9 GM/DL
GLOBULIN UR ELPH-MCNC: 3.2 GM/DL
GLUCOSE BLD-MCNC: 84 MG/DL (ref 70–110)
GLUCOSE BLD-MCNC: 86 MG/DL (ref 70–110)
GLUCOSE UR STRIP-MCNC: NEGATIVE MG/DL
HCT VFR BLD AUTO: 40.3 % (ref 42–52)
HCT VFR BLD AUTO: 43.6 % (ref 42–52)
HGB BLD-MCNC: 14.1 G/DL (ref 14–18)
HGB BLD-MCNC: 15 G/DL (ref 14–18)
HGB UR QL STRIP.AUTO: ABNORMAL
HYALINE CASTS UR QL AUTO: ABNORMAL /LPF
IMM GRANULOCYTES # BLD: 0.08 10*3/MM3 (ref 0–0.03)
IMM GRANULOCYTES # BLD: 0.12 10*3/MM3 (ref 0–0.03)
IMM GRANULOCYTES NFR BLD: 0.9 % (ref 0–0.5)
IMM GRANULOCYTES NFR BLD: 1.2 % (ref 0–0.5)
KETONES UR QL STRIP: NEGATIVE
LEUKOCYTE ESTERASE UR QL STRIP.AUTO: NEGATIVE
LIPASE SERPL-CCNC: 50 U/L (ref 13–60)
LIPASE SERPL-CCNC: 50 U/L (ref 13–60)
LYMPHOCYTES # BLD AUTO: 2.8 10*3/MM3 (ref 1–3)
LYMPHOCYTES # BLD AUTO: 2.87 10*3/MM3 (ref 1–3)
LYMPHOCYTES NFR BLD AUTO: 27.6 % (ref 16–46)
LYMPHOCYTES NFR BLD AUTO: 32 % (ref 16–46)
MAGNESIUM SERPL-MCNC: 2.4 MG/DL (ref 1.7–2.6)
MCH RBC QN AUTO: 30.7 PG (ref 27–33)
MCH RBC QN AUTO: 30.8 PG (ref 27–33)
MCHC RBC AUTO-ENTMCNC: 34.4 G/DL (ref 33–37)
MCHC RBC AUTO-ENTMCNC: 35 G/DL (ref 33–37)
MCV RBC AUTO: 88 FL (ref 80–94)
MCV RBC AUTO: 89.2 FL (ref 80–94)
MONOCYTES # BLD AUTO: 1.35 10*3/MM3 (ref 0.1–0.9)
MONOCYTES # BLD AUTO: 1.56 10*3/MM3 (ref 0.1–0.9)
MONOCYTES NFR BLD AUTO: 15 % (ref 0–12)
MONOCYTES NFR BLD AUTO: 15.4 % (ref 0–12)
NEUTROPHILS # BLD AUTO: 4.13 10*3/MM3 (ref 1.4–6.5)
NEUTROPHILS # BLD AUTO: 5.61 10*3/MM3 (ref 1.4–6.5)
NEUTROPHILS NFR BLD AUTO: 47.3 % (ref 40–75)
NEUTROPHILS NFR BLD AUTO: 53.8 % (ref 40–75)
NITRITE UR QL STRIP: NEGATIVE
OSMOLALITY SERPL CALC.SUM OF ELEC: 275.1 MOSM/KG (ref 273–305)
OSMOLALITY SERPL CALC.SUM OF ELEC: 283.4 MOSM/KG (ref 273–305)
PH UR STRIP.AUTO: <=5 [PH] (ref 5–8)
PLATELET # BLD AUTO: 218 10*3/MM3 (ref 130–400)
PLATELET # BLD AUTO: 242 10*3/MM3 (ref 130–400)
PMV BLD AUTO: 10.8 FL (ref 6–10)
PMV BLD AUTO: 11.6 FL (ref 6–10)
POTASSIUM BLD-SCNC: 3.7 MMOL/L (ref 3.5–5.3)
POTASSIUM BLD-SCNC: 4.2 MMOL/L (ref 3.5–5.3)
PROT SERPL-MCNC: 7.1 G/DL (ref 6–8)
PROT SERPL-MCNC: 7.7 G/DL (ref 6–8)
PROT UR QL STRIP: ABNORMAL
RBC # BLD AUTO: 4.58 10*6/MM3 (ref 4.7–6.1)
RBC # BLD AUTO: 4.89 10*6/MM3 (ref 4.7–6.1)
RBC # UR: ABNORMAL /HPF
REF LAB TEST METHOD: ABNORMAL
SODIUM BLD-SCNC: 129 MMOL/L (ref 135–153)
SODIUM BLD-SCNC: 131 MMOL/L (ref 135–153)
SP GR UR STRIP: 1.01 (ref 1–1.03)
SQUAMOUS #/AREA URNS HPF: ABNORMAL /HPF
TSH SERPL DL<=0.05 MIU/L-ACNC: 9.46 MIU/ML (ref 0.55–4.78)
UROBILINOGEN UR QL STRIP: ABNORMAL
VIT B12 BLD-MCNC: 357 PG/ML (ref 211–911)
WBC NRBC COR # BLD: 10.41 10*3/MM3 (ref 4.5–12.5)
WBC NRBC COR # BLD: 8.74 10*3/MM3 (ref 4.5–12.5)
WBC UR QL AUTO: ABNORMAL /HPF

## 2018-11-01 PROCEDURE — 81001 URINALYSIS AUTO W/SCOPE: CPT | Performed by: EMERGENCY MEDICINE

## 2018-11-01 PROCEDURE — 80053 COMPREHEN METABOLIC PANEL: CPT | Performed by: EMERGENCY MEDICINE

## 2018-11-01 PROCEDURE — 36415 COLL VENOUS BLD VENIPUNCTURE: CPT | Performed by: NURSE PRACTITIONER

## 2018-11-01 PROCEDURE — 84443 ASSAY THYROID STIM HORMONE: CPT | Performed by: NURSE PRACTITIONER

## 2018-11-01 PROCEDURE — 83690 ASSAY OF LIPASE: CPT | Performed by: NURSE PRACTITIONER

## 2018-11-01 PROCEDURE — 99284 EMERGENCY DEPT VISIT MOD MDM: CPT

## 2018-11-01 PROCEDURE — 82150 ASSAY OF AMYLASE: CPT | Performed by: NURSE PRACTITIONER

## 2018-11-01 PROCEDURE — 83690 ASSAY OF LIPASE: CPT | Performed by: EMERGENCY MEDICINE

## 2018-11-01 PROCEDURE — 96360 HYDRATION IV INFUSION INIT: CPT

## 2018-11-01 PROCEDURE — 99214 OFFICE O/P EST MOD 30 MIN: CPT | Performed by: NURSE PRACTITIONER

## 2018-11-01 PROCEDURE — 85025 COMPLETE CBC W/AUTO DIFF WBC: CPT | Performed by: NURSE PRACTITIONER

## 2018-11-01 PROCEDURE — 82607 VITAMIN B-12: CPT | Performed by: NURSE PRACTITIONER

## 2018-11-01 PROCEDURE — 85025 COMPLETE CBC W/AUTO DIFF WBC: CPT | Performed by: EMERGENCY MEDICINE

## 2018-11-01 PROCEDURE — 80053 COMPREHEN METABOLIC PANEL: CPT | Performed by: NURSE PRACTITIONER

## 2018-11-01 PROCEDURE — 83735 ASSAY OF MAGNESIUM: CPT | Performed by: NURSE PRACTITIONER

## 2018-11-01 RX ORDER — ATENOLOL 25 MG/1
25 TABLET ORAL DAILY
Qty: 30 TABLET | Refills: 2 | Status: SHIPPED | OUTPATIENT
Start: 2018-11-01 | End: 2018-11-01

## 2018-11-01 RX ORDER — HYDROCODONE BITARTRATE AND ACETAMINOPHEN 10; 325 MG/1; MG/1
1 TABLET ORAL ONCE
Status: COMPLETED | OUTPATIENT
Start: 2018-11-01 | End: 2018-11-01

## 2018-11-01 RX ADMIN — SODIUM CHLORIDE 1000 ML: 9 INJECTION, SOLUTION INTRAVENOUS at 18:41

## 2018-11-01 RX ADMIN — SODIUM CHLORIDE 1000 ML: 9 INJECTION, SOLUTION INTRAVENOUS at 19:27

## 2018-11-01 RX ADMIN — HYDROCODONE BITARTRATE AND ACETAMINOPHEN 1 TABLET: 10; 325 TABLET ORAL at 19:22

## 2018-11-01 NOTE — PROGRESS NOTES
Subjective   Paul Gomez is a 72 y.o. male.     Chief Complaint: Follow-up; COPD; and Hypertension    History of Present Illness   Hypertension   This is a chronic problem. The current episode started more than 1 year ago. The problem is controlled. Associated symptoms include headaches and peripheral edema. Pertinent negatives include no anxiety, chest pain, palpitations or shortness of breath. Agents associated with hypertension include thyroid hormones and NSAIDs. Risk factors for coronary artery disease include dyslipidemia, male gender, sedentary lifestyle and smoking/tobacco exposure. Past treatments include beta blockers. Current antihypertension treatment includes beta blockers. The current treatment provides significant improvement. Compliance problems include exercise.  Identifiable causes of hypertension include a thyroid problem. Pt states that his blood pressure has been running low.  He states that he has taken the atenolol for several years and has taken it twice daily.  After discussion, patient is agreeable to taking the medication once daily for now and we will re-evaluate his BP.  He states he will check it at home also.  Hyperlipidemia   This is a chronic problem. The current episode started more than 1 year ago. The problem is controlled. Exacerbating diseases include hypothyroidism. Factors aggravating his hyperlipidemia include beta blockers. Pertinent negatives include no chest pain or shortness of breath. Current antihyperlipidemic treatment includes statins. The current treatment provides significant improvement of lipids. Compliance problems include adherence to exercise.  Risk factors for coronary artery disease include dyslipidemia, hypertension, male sex and a sedentary lifestyle.   Hypothyroidism   This is a chronic problem. The current episode started more than 1 year ago. Associated symptoms include headaches. Pertinent negatives include no chest pain. Nothing aggravates the  "symptoms. Treatments tried: levothyroxine. He states that he stopped taking the medication because it was not helping him feel any better.  Will recheck his TSH today.  His last TSH was normal.   COPD   This is a chronic problem. The current episode started more than 1 year ago. The problem has been unchanged. Associated symptoms include headaches. Pertinent negatives include no chest pain. The symptoms are aggravated by smoking. Treatments tried: albuterol; Dulera. The treatment provided significant relief.   Chronic Pancreatitis   Pt states that he did not follow up with gastro to have an ERCP done. He states that he has not been feeling very well.  He denies any abdominal pain, n/v/d.    I have had a long discussion with patient today regarding chronic pancreatitis.  He has lost 16 lbs since his previous visit.  I have advised him that it is imperative that he follow with gastroenterologist and possibly have the ERCP done.  Pt states that he \"must have forgotten\" the appointment.  Pt states that he will have it done and is agreeable to seeing gastro at this time.  Will go ahead and refer to GI again at this time.     Family History   Problem Relation Age of Onset   • Cancer Mother        Social History     Social History   • Marital status:      Spouse name: N/A   • Number of children: N/A   • Years of education: N/A     Occupational History   • Not on file.     Social History Main Topics   • Smoking status: Current Every Day Smoker     Packs/day: 0.50     Types: Cigarettes   • Smokeless tobacco: Never Used      Comment: patient stated he is down to about 4 cigarettes a day   • Alcohol use No   • Drug use: No   • Sexual activity: Defer     Other Topics Concern   • Not on file     Social History Narrative   • No narrative on file       Past Medical History:   Diagnosis Date   • COPD (chronic obstructive pulmonary disease) (CMS/[facility])    • Gout    • Hearing loss    • History of degenerative disc disease    • " "Hyperlipidemia    • Hypertension    • Low back pain    • Pedal edema    • Prediabetes        Review of Systems   Constitutional: Positive for fatigue.   HENT: Negative.    Respiratory: Negative.    Cardiovascular: Negative.    Gastrointestinal: Negative.    Musculoskeletal: Negative.    Skin: Negative.    Neurological: Positive for weakness.   Psychiatric/Behavioral: Negative.        Objective   Physical Exam   Constitutional: He is oriented to person, place, and time. He appears well-developed and well-nourished.   Neck: Normal range of motion. Neck supple.   Cardiovascular: Normal rate, regular rhythm and normal heart sounds.    Pulmonary/Chest: Effort normal and breath sounds normal.   Neurological: He is alert and oriented to person, place, and time.   Skin: Skin is warm and dry.   Psychiatric: He has a normal mood and affect. His behavior is normal. Judgment and thought content normal.   Nursing note and vitals reviewed.      Procedures    Vitals: Blood pressure 97/60, pulse 56, temperature 97.9 °F (36.6 °C), temperature source Oral, height 170.2 cm (67\"), weight 64.9 kg (143 lb), SpO2 96 %.    Allergies:   Allergies   Allergen Reactions   • Penicillins      Pt states he was allergic when he was 18 but has had it since and did not have any problems.         During this visit the following were done:  Labs Reviewed []    Labs Ordered []    Radiology Reports Reviewed []    Radiology Ordered []    PCP Records Reviewed []    Referring Provider Records Reviewed []    ER Records Reviewed []    Hospital Records Reviewed []    History Obtained From Family []    Radiology Images Reviewed []    Other Reviewed []    Records Requested []      Assessment/Plan   Ray was seen today for follow-up, copd and hypertension.    Diagnoses and all orders for this visit:    Chronic obstructive pulmonary disease, unspecified COPD type (CMS/[facility])  -     CBC & Differential  -     Comprehensive Metabolic Panel  -     Magnesium  -     " TSH  -     Vitamin B12  -     Amylase; Future  -     Lipase; Future  -     Amylase  -     Lipase  -     CBC Auto Differential    Chronic pancreatitis, unspecified pancreatitis type (CMS/HCC)  -     Ambulatory Referral to Gastroenterology  -     CBC & Differential  -     Comprehensive Metabolic Panel  -     Magnesium  -     TSH  -     Vitamin B12  -     Amylase; Future  -     Lipase; Future  -     Amylase  -     Lipase  -     CBC Auto Differential    Low back pain, unspecified back pain laterality, unspecified chronicity, with sciatica presence unspecified  -     CBC & Differential  -     Comprehensive Metabolic Panel  -     Magnesium  -     TSH  -     Vitamin B12  -     Amylase; Future  -     Lipase; Future  -     Amylase  -     Lipase  -     CBC Auto Differential    Essential hypertension  -     atenolol (TENORMIN) 25 MG tablet; Take 1 tablet by mouth Daily.  -     CBC & Differential  -     Comprehensive Metabolic Panel  -     Magnesium  -     TSH  -     Vitamin B12  -     Amylase; Future  -     Lipase; Future  -     Amylase  -     Lipase  -     CBC Auto Differential    Hypothyroidism, unspecified type  -     CBC & Differential  -     Comprehensive Metabolic Panel  -     Magnesium  -     TSH  -     Vitamin B12  -     Amylase; Future  -     Lipase; Future  -     Amylase  -     Lipase  -     CBC Auto Differential

## 2018-11-01 NOTE — TELEPHONE ENCOUNTER
Called pt and advised. Pt verbalized understanding and stated he would go to the ER. I also called [facility] pharmacy and canceled all Ibuprofen. I advised pt not to take any ibuprofen. I also called ER and gave report.

## 2018-11-01 NOTE — PROGRESS NOTES
Paul does have an elevated BUN and creatinine level which is a new finding for him.  His GFR is 16.   1)  Can you please call his pharmacy and cancel all prescriptions for ibuprofen  2)  Call patient and let him know that he can no longer take any ibuprofen.  He also needs to go to the ER for IV fluids due to his kidney function and his low sodium level.   3)  Can you please call the ER and give them a report of his labs and the reason for his ER visit  Discussed with Dr. Car today.   Thank you.

## 2018-11-01 NOTE — TELEPHONE ENCOUNTER
----- Message from BEATRICE Vallejo sent at 11/1/2018  3:37 PM EDT -----  Ray does have an elevated BUN and creatinine level which is a new finding for him.  His GFR is 16.   1)  Can you please call his pharmacy and cancel all prescriptions for ibuprofen  2)  Call patient and let him know that he can no longer take any ibuprofen.  He also needs to go to the ER for IV fluids due to his kidney function and his low sodium level.   3)  Can you please call the ER and give them a report of his labs and the reason for his ER visit  Discussed with Dr. Car today.   Thank you.

## 2018-11-02 DIAGNOSIS — N17.9 ACUTE RENAL FAILURE, UNSPECIFIED ACUTE RENAL FAILURE TYPE (HCC): Primary | ICD-10-CM

## 2018-11-02 RX ORDER — LEVOTHYROXINE SODIUM 0.03 MG/1
25 TABLET ORAL DAILY
Qty: 30 TABLET | Refills: 5 | Status: SHIPPED | OUTPATIENT
Start: 2018-11-02 | End: 2019-05-08 | Stop reason: SDUPTHER

## 2018-11-03 NOTE — ED PROVIDER NOTES
Subjective   Patient presented to ER for abnormal lab        Weakness - Generalized   Severity:  Moderate  Onset quality:  Gradual  Timing:  Constant  Progression:  Worsening  Context: dehydration, recent infection and stress    Relieved by:  Nothing  Worsened by:  Nothing  Ineffective treatments:  None tried      Review of Systems   Constitutional: Positive for activity change, appetite change and fatigue.   HENT: Negative.    Eyes: Negative.    Respiratory: Negative.    Cardiovascular: Negative.    Gastrointestinal: Negative.    Endocrine: Negative.    Genitourinary: Negative.    Musculoskeletal: Negative.    Skin: Negative.    Allergic/Immunologic: Negative.    Hematological: Negative.    Psychiatric/Behavioral: Negative.        Past Medical History:   Diagnosis Date   • COPD (chronic obstructive pulmonary disease) (CMS/[facility])    • Gout    • Hearing loss    • History of degenerative disc disease    • Hyperlipidemia    • Hypertension    • Low back pain    • Pedal edema    • Prediabetes        Allergies   Allergen Reactions   • Penicillins      Pt states he was allergic when he was 18 but has had it since and did not have any problems.        Past Surgical History:   Procedure Laterality Date   • CHOLECYSTECTOMY     • LUMBAR DISCECTOMY     • SHOULDER SURGERY Left        Family History   Problem Relation Age of Onset   • Cancer Mother        Social History     Social History   • Marital status:      Social History Main Topics   • Smoking status: Current Every Day Smoker     Packs/day: 0.25     Types: Cigarettes   • Smokeless tobacco: Never Used      Comment: patient stated he is down to about 4 cigarettes a day   • Alcohol use No   • Drug use: No   • Sexual activity: Defer     Other Topics Concern   • Not on file           Objective   Physical Exam   Constitutional: He appears well-developed and well-nourished.   HENT:   Head: Normocephalic and atraumatic.   Eyes: Pupils are equal, round, and reactive to  light. EOM are normal.   Neck: Normal range of motion.   Cardiovascular: Normal rate and regular rhythm.    Pulmonary/Chest: Effort normal.   Abdominal: Soft.   Musculoskeletal: Normal range of motion.   Neurological: He is alert.   Skin: Skin is warm.   Psychiatric: He has a normal mood and affect.   Nursing note and vitals reviewed.      Procedures           ED Course                  MDM      Final diagnoses:   Dehydration   Hyponatremia            Chinedu Larkin MD  11/03/18 6184

## 2018-11-05 ENCOUNTER — TELEPHONE (OUTPATIENT)
Dept: FAMILY MEDICINE CLINIC | Facility: CLINIC | Age: 73
End: 2018-11-05

## 2018-11-05 NOTE — TELEPHONE ENCOUNTER
----- Message from BEATRICE Vallejo sent at 11/2/2018  3:01 PM EDT -----  Regarding: kidney failure  Tosh,  Can you call Ray and tell him that he does have hypothyroidism and he needs to be taking levothyroxine 25mcg daily (on empty stomach without other medications) and I have sent it to the pharmacy.  I have also put a referral in [facility] for referral to nephrology due to his kidney failure.  He has to keep the appt that is scheduled for him.  Thanks

## 2018-11-14 ENCOUNTER — OFFICE VISIT (OUTPATIENT)
Dept: FAMILY MEDICINE CLINIC | Facility: CLINIC | Age: 73
End: 2018-11-14

## 2018-11-14 ENCOUNTER — TRANSCRIBE ORDERS (OUTPATIENT)
Dept: ADMINISTRATIVE | Facility: HOSPITAL | Age: 73
End: 2018-11-14

## 2018-11-14 VITALS
BODY MASS INDEX: 23.79 KG/M2 | SYSTOLIC BLOOD PRESSURE: 132 MMHG | DIASTOLIC BLOOD PRESSURE: 71 MMHG | HEIGHT: 68 IN | OXYGEN SATURATION: 92 % | HEART RATE: 83 BPM | WEIGHT: 157 LBS | TEMPERATURE: 99.1 F

## 2018-11-14 DIAGNOSIS — N17.9 ACUTE KIDNEY INJURY (NONTRAUMATIC) (HCC): Primary | ICD-10-CM

## 2018-11-14 DIAGNOSIS — J06.9 UPPER RESPIRATORY TRACT INFECTION, UNSPECIFIED TYPE: Primary | ICD-10-CM

## 2018-11-14 DIAGNOSIS — N17.9 ACUTE RENAL FAILURE, UNSPECIFIED ACUTE RENAL FAILURE TYPE (HCC): ICD-10-CM

## 2018-11-14 LAB
ANION GAP SERPL CALCULATED.3IONS-SCNC: 5.7 MMOL/L (ref 3.6–11.2)
BACTERIA UR QL AUTO: ABNORMAL /HPF
BILIRUB UR QL STRIP: NEGATIVE
BUN BLD-MCNC: 11 MG/DL (ref 7–21)
BUN/CREAT SERPL: 13.8 (ref 7–25)
CALCIUM SPEC-SCNC: 9.2 MG/DL (ref 7.7–10)
CHLORIDE SERPL-SCNC: 101 MMOL/L (ref 99–112)
CK SERPL-CCNC: 55 U/L (ref 24–204)
CLARITY UR: CLEAR
CO2 SERPL-SCNC: 31.3 MMOL/L (ref 24.3–31.9)
COLOR UR: YELLOW
CREAT BLD-MCNC: 0.8 MG/DL (ref 0.43–1.29)
CREAT UR-MCNC: 84.4 MG/DL
GFR SERPL CREATININE-BSD FRML MDRD: 95 ML/MIN/1.73
GLUCOSE BLD-MCNC: 96 MG/DL (ref 70–110)
GLUCOSE UR STRIP-MCNC: NEGATIVE MG/DL
HGB UR QL STRIP.AUTO: ABNORMAL
HYALINE CASTS UR QL AUTO: ABNORMAL /LPF
KETONES UR QL STRIP: NEGATIVE
LEUKOCYTE ESTERASE UR QL STRIP.AUTO: NEGATIVE
NITRITE UR QL STRIP: NEGATIVE
OSMOLALITY SERPL CALC.SUM OF ELEC: 274.9 MOSM/KG (ref 273–305)
PH UR STRIP.AUTO: 6.5 [PH] (ref 5–8)
POTASSIUM BLD-SCNC: 3.8 MMOL/L (ref 3.5–5.3)
PROT UR QL STRIP: NEGATIVE
PROT UR-MCNC: 7.8 MG/DL
PROT/CREAT UR: 92.4 MG/G CREA (ref 0–200)
RBC # UR: ABNORMAL /HPF
REF LAB TEST METHOD: ABNORMAL
SODIUM BLD-SCNC: 138 MMOL/L (ref 135–153)
SP GR UR STRIP: 1.01 (ref 1–1.03)
SQUAMOUS #/AREA URNS HPF: ABNORMAL /HPF
UROBILINOGEN UR QL STRIP: ABNORMAL
WBC UR QL AUTO: ABNORMAL /HPF

## 2018-11-14 PROCEDURE — 80048 BASIC METABOLIC PNL TOTAL CA: CPT | Performed by: NURSE PRACTITIONER

## 2018-11-14 PROCEDURE — 81001 URINALYSIS AUTO W/SCOPE: CPT | Performed by: NURSE PRACTITIONER

## 2018-11-14 PROCEDURE — 36415 COLL VENOUS BLD VENIPUNCTURE: CPT | Performed by: NURSE PRACTITIONER

## 2018-11-14 PROCEDURE — 99213 OFFICE O/P EST LOW 20 MIN: CPT | Performed by: NURSE PRACTITIONER

## 2018-11-14 PROCEDURE — 82570 ASSAY OF URINE CREATININE: CPT | Performed by: NURSE PRACTITIONER

## 2018-11-14 PROCEDURE — 82550 ASSAY OF CK (CPK): CPT | Performed by: NURSE PRACTITIONER

## 2018-11-14 PROCEDURE — 84156 ASSAY OF PROTEIN URINE: CPT | Performed by: NURSE PRACTITIONER

## 2018-11-14 RX ORDER — BENZONATATE 200 MG/1
200 CAPSULE ORAL 3 TIMES DAILY PRN
Qty: 30 CAPSULE | Refills: 0 | Status: SHIPPED | OUTPATIENT
Start: 2018-11-14 | End: 2018-12-12

## 2018-11-14 RX ORDER — METHYLPREDNISOLONE 4 MG/1
TABLET ORAL
Qty: 21 EACH | Refills: 0 | Status: SHIPPED | OUTPATIENT
Start: 2018-11-14 | End: 2018-12-12

## 2018-11-15 NOTE — PROGRESS NOTES
His creatinine level is completely back to normal.  No further ibuprofen or NSAIDs at all.  Continue to follow with nephrologist.  Please let him know.

## 2018-11-28 DIAGNOSIS — R51.9 HEADACHE, UNSPECIFIED HEADACHE TYPE: ICD-10-CM

## 2018-11-29 RX ORDER — IBUPROFEN 800 MG/1
TABLET ORAL
Qty: 90 TABLET | Refills: 2 | OUTPATIENT
Start: 2018-11-29

## 2018-12-06 ENCOUNTER — TELEPHONE (OUTPATIENT)
Dept: FAMILY MEDICINE CLINIC | Facility: CLINIC | Age: 73
End: 2018-12-06

## 2018-12-06 ENCOUNTER — LAB (OUTPATIENT)
Dept: FAMILY MEDICINE CLINIC | Facility: CLINIC | Age: 73
End: 2018-12-06

## 2018-12-06 DIAGNOSIS — N17.9 ACUTE KIDNEY INJURY (HCC): Primary | ICD-10-CM

## 2018-12-06 LAB
ALBUMIN UR-MCNC: 3.3 MG/L
ANION GAP SERPL CALCULATED.3IONS-SCNC: 3.5 MMOL/L (ref 3.6–11.2)
BACTERIA UR QL AUTO: ABNORMAL /HPF
BILIRUB UR QL STRIP: NEGATIVE
BUN BLD-MCNC: 8 MG/DL (ref 7–21)
BUN/CREAT SERPL: 8.4 (ref 7–25)
CALCIUM SPEC-SCNC: 9 MG/DL (ref 7.7–10)
CHLORIDE SERPL-SCNC: 106 MMOL/L (ref 99–112)
CK SERPL-CCNC: 46 U/L (ref 24–204)
CLARITY UR: CLEAR
CO2 SERPL-SCNC: 27.5 MMOL/L (ref 24.3–31.9)
COLOR UR: YELLOW
CREAT BLD-MCNC: 0.95 MG/DL (ref 0.43–1.29)
CREAT UR-MCNC: 130.3 MG/DL
CREAT UR-MCNC: 130.3 MG/DL
GFR SERPL CREATININE-BSD FRML MDRD: 78 ML/MIN/1.73
GLUCOSE BLD-MCNC: 110 MG/DL (ref 70–110)
GLUCOSE UR STRIP-MCNC: NEGATIVE MG/DL
HGB UR QL STRIP.AUTO: ABNORMAL
HYALINE CASTS UR QL AUTO: ABNORMAL /LPF
KETONES UR QL STRIP: NEGATIVE
LEUKOCYTE ESTERASE UR QL STRIP.AUTO: NEGATIVE
MICROALBUMIN/CREAT UR: 2.5 MG/G
NITRITE UR QL STRIP: NEGATIVE
OSMOLALITY SERPL CALC.SUM OF ELEC: 272.8 MOSM/KG (ref 273–305)
PH UR STRIP.AUTO: 6 [PH] (ref 5–8)
POTASSIUM BLD-SCNC: 3.9 MMOL/L (ref 3.5–5.3)
PROT UR QL STRIP: NEGATIVE
PROT UR-MCNC: 7.2 MG/DL
PROT/CREAT UR: 55.3 MG/G CREA (ref 0–200)
RBC # UR: ABNORMAL /HPF
REF LAB TEST METHOD: ABNORMAL
SODIUM BLD-SCNC: 137 MMOL/L (ref 135–153)
SP GR UR STRIP: 1.02 (ref 1–1.03)
SQUAMOUS #/AREA URNS HPF: ABNORMAL /HPF
UROBILINOGEN UR QL STRIP: ABNORMAL
WBC UR QL AUTO: ABNORMAL /HPF

## 2018-12-06 PROCEDURE — 82043 UR ALBUMIN QUANTITATIVE: CPT | Performed by: INTERNAL MEDICINE

## 2018-12-06 PROCEDURE — 82570 ASSAY OF URINE CREATININE: CPT | Performed by: INTERNAL MEDICINE

## 2018-12-06 PROCEDURE — 80048 BASIC METABOLIC PNL TOTAL CA: CPT | Performed by: INTERNAL MEDICINE

## 2018-12-06 PROCEDURE — 82550 ASSAY OF CK (CPK): CPT | Performed by: INTERNAL MEDICINE

## 2018-12-06 PROCEDURE — 84156 ASSAY OF PROTEIN URINE: CPT | Performed by: INTERNAL MEDICINE

## 2018-12-06 PROCEDURE — 81001 URINALYSIS AUTO W/SCOPE: CPT | Performed by: INTERNAL MEDICINE

## 2018-12-06 RX ORDER — SELENIUM SULFIDE 2.5 MG/100ML
LOTION TOPICAL
Qty: 120 ML | Refills: 2 | Status: ON HOLD | OUTPATIENT
Start: 2018-12-06 | End: 2019-04-11

## 2018-12-06 RX ORDER — ALBUTEROL SULFATE 90 UG/1
2 AEROSOL, METERED RESPIRATORY (INHALATION) EVERY 6 HOURS PRN
Qty: 18 G | Refills: 2 | Status: SHIPPED | OUTPATIENT
Start: 2018-12-06 | End: 2019-01-08 | Stop reason: SDUPTHER

## 2018-12-06 NOTE — TELEPHONE ENCOUNTER
From: Dayanara Madera  To: Conrad Monae MD  Sent: 6/13/2017 10:49 AM CDT  Subject: Non-Urgent Medical Question    I have a 1:00 apt wed. Am I due for blood work? should I fast? Thanks. Sent     Patient notified.

## 2018-12-12 ENCOUNTER — HOSPITAL ENCOUNTER (OUTPATIENT)
Facility: HOSPITAL | Age: 73
Setting detail: OBSERVATION
Discharge: HOME | End: 2018-12-14
Attending: EMERGENCY MEDICINE | Admitting: INTERNAL MEDICINE

## 2018-12-12 ENCOUNTER — APPOINTMENT (OUTPATIENT)
Dept: CT IMAGING | Facility: HOSPITAL | Age: 73
End: 2018-12-12

## 2018-12-12 ENCOUNTER — OFFICE VISIT (OUTPATIENT)
Dept: FAMILY MEDICINE CLINIC | Facility: CLINIC | Age: 73
End: 2018-12-12

## 2018-12-12 ENCOUNTER — APPOINTMENT (OUTPATIENT)
Dept: GENERAL RADIOLOGY | Facility: HOSPITAL | Age: 73
End: 2018-12-12

## 2018-12-12 VITALS
BODY MASS INDEX: 23.49 KG/M2 | SYSTOLIC BLOOD PRESSURE: 109 MMHG | HEIGHT: 68 IN | HEART RATE: 80 BPM | WEIGHT: 155 LBS | DIASTOLIC BLOOD PRESSURE: 66 MMHG | OXYGEN SATURATION: 92 % | TEMPERATURE: 98.4 F

## 2018-12-12 DIAGNOSIS — J44.1 ACUTE EXACERBATION OF CHRONIC OBSTRUCTIVE PULMONARY DISEASE (COPD) (HCC): Primary | ICD-10-CM

## 2018-12-12 DIAGNOSIS — J44.1 COPD WITH EXACERBATION (HCC): Primary | ICD-10-CM

## 2018-12-12 LAB
A-A DO2: 25.5 MMHG (ref 0–300)
ALBUMIN SERPL-MCNC: 4.1 G/DL (ref 3.4–4.8)
ALBUMIN/GLOB SERPL: 1.6 G/DL (ref 1.5–2.5)
ALP SERPL-CCNC: 62 U/L (ref 40–129)
ALT SERPL W P-5'-P-CCNC: 11 U/L (ref 10–44)
ANION GAP SERPL CALCULATED.3IONS-SCNC: 5.2 MMOL/L (ref 3.6–11.2)
ANION GAP SERPL CALCULATED.3IONS-SCNC: 7.9 MMOL/L (ref 3.6–11.2)
ARTERIAL PATENCY WRIST A: ABNORMAL
AST SERPL-CCNC: 17 U/L (ref 10–34)
ATMOSPHERIC PRESS: 731 MMHG
BASE EXCESS BLDA CALC-SCNC: -1.4 MMOL/L
BASOPHILS # BLD AUTO: 0.03 10*3/MM3 (ref 0–0.3)
BASOPHILS # BLD AUTO: 0.04 10*3/MM3 (ref 0–0.3)
BASOPHILS NFR BLD AUTO: 0.6 % (ref 0–2)
BASOPHILS NFR BLD AUTO: 0.6 % (ref 0–2)
BDY SITE: ABNORMAL
BILIRUB SERPL-MCNC: 0.2 MG/DL (ref 0.2–1.8)
BILIRUB UR QL STRIP: NEGATIVE
BNP SERPL-MCNC: 18 PG/ML (ref 0–100)
BODY TEMPERATURE: 98.6 C
BUN BLD-MCNC: 17 MG/DL (ref 7–21)
BUN BLD-MCNC: 17 MG/DL (ref 7–21)
BUN/CREAT SERPL: 17.9 (ref 7–25)
BUN/CREAT SERPL: 19.3 (ref 7–25)
CALCIUM SPEC-SCNC: 8.7 MG/DL (ref 7.7–10)
CALCIUM SPEC-SCNC: 8.9 MG/DL (ref 7.7–10)
CHLORIDE SERPL-SCNC: 105 MMOL/L (ref 99–112)
CHLORIDE SERPL-SCNC: 106 MMOL/L (ref 99–112)
CLARITY UR: CLEAR
CO2 SERPL-SCNC: 24.1 MMOL/L (ref 24.3–31.9)
CO2 SERPL-SCNC: 30.8 MMOL/L (ref 24.3–31.9)
COHGB MFR BLD: 2.5 % (ref 0–5)
COLOR UR: YELLOW
CREAT BLD-MCNC: 0.88 MG/DL (ref 0.43–1.29)
CREAT BLD-MCNC: 0.95 MG/DL (ref 0.43–1.29)
CRP SERPL-MCNC: <0.5 MG/DL (ref 0–0.99)
D DIMER PPP FEU-MCNC: 1.38 MCGFEU/ML (ref 0–0.5)
D-LACTATE SERPL-SCNC: 0.9 MMOL/L (ref 0.5–2)
DEPRECATED RDW RBC AUTO: 45.1 FL (ref 37–54)
DEPRECATED RDW RBC AUTO: 45.2 FL (ref 37–54)
EOSINOPHIL # BLD AUTO: 0.35 10*3/MM3 (ref 0–0.7)
EOSINOPHIL # BLD AUTO: 1.89 10*3/MM3 (ref 0–0.7)
EOSINOPHIL NFR BLD AUTO: 26.9 % (ref 0–7)
EOSINOPHIL NFR BLD AUTO: 6.5 % (ref 0–7)
ERYTHROCYTE [DISTWIDTH] IN BLOOD BY AUTOMATED COUNT: 13.5 % (ref 11.5–14.5)
ERYTHROCYTE [DISTWIDTH] IN BLOOD BY AUTOMATED COUNT: 13.6 % (ref 11.5–14.5)
FLUAV AG NPH QL: NEGATIVE
FLUBV AG NPH QL IA: NEGATIVE
GFR SERPL CREATININE-BSD FRML MDRD: 78 ML/MIN/1.73
GFR SERPL CREATININE-BSD FRML MDRD: 85 ML/MIN/1.73
GLOBULIN UR ELPH-MCNC: 2.6 GM/DL
GLUCOSE BLD-MCNC: 116 MG/DL (ref 70–110)
GLUCOSE BLD-MCNC: 125 MG/DL (ref 70–110)
GLUCOSE UR STRIP-MCNC: NEGATIVE MG/DL
HCO3 BLDA-SCNC: 24.9 MMOL/L (ref 22–26)
HCT VFR BLD AUTO: 38.2 % (ref 42–52)
HCT VFR BLD AUTO: 38.4 % (ref 42–52)
HCT VFR BLD CALC: 38 % (ref 42–52)
HGB BLD-MCNC: 12.2 G/DL (ref 14–18)
HGB BLD-MCNC: 12.4 G/DL (ref 14–18)
HGB BLDA-MCNC: 12.9 G/DL (ref 12–16)
HGB UR QL STRIP.AUTO: NEGATIVE
HOROWITZ INDEX BLD+IHG-RTO: 21 %
HYPOCHROMIA BLD QL: NORMAL
IMM GRANULOCYTES # BLD: 0.01 10*3/MM3 (ref 0–0.03)
IMM GRANULOCYTES # BLD: 0.02 10*3/MM3 (ref 0–0.03)
IMM GRANULOCYTES NFR BLD: 0.2 % (ref 0–0.5)
IMM GRANULOCYTES NFR BLD: 0.3 % (ref 0–0.5)
KETONES UR QL STRIP: NEGATIVE
LEUKOCYTE ESTERASE UR QL STRIP.AUTO: NEGATIVE
LIPASE SERPL-CCNC: 21 U/L (ref 13–60)
LYMPHOCYTES # BLD AUTO: 0.49 10*3/MM3 (ref 1–3)
LYMPHOCYTES # BLD AUTO: 1.84 10*3/MM3 (ref 1–3)
LYMPHOCYTES NFR BLD AUTO: 26.2 % (ref 16–46)
LYMPHOCYTES NFR BLD AUTO: 9.1 % (ref 16–46)
MAGNESIUM SERPL-MCNC: 2.1 MG/DL (ref 1.7–2.6)
MCH RBC QN AUTO: 30.5 PG (ref 27–33)
MCH RBC QN AUTO: 30.6 PG (ref 27–33)
MCHC RBC AUTO-ENTMCNC: 31.9 G/DL (ref 33–37)
MCHC RBC AUTO-ENTMCNC: 32.3 G/DL (ref 33–37)
MCV RBC AUTO: 94.8 FL (ref 80–94)
MCV RBC AUTO: 95.5 FL (ref 80–94)
METHGB BLD QL: 0.4 % (ref 0–3)
MODALITY: ABNORMAL
MONOCYTES # BLD AUTO: 0.17 10*3/MM3 (ref 0.1–0.9)
MONOCYTES # BLD AUTO: 0.8 10*3/MM3 (ref 0.1–0.9)
MONOCYTES NFR BLD AUTO: 11.4 % (ref 0–12)
MONOCYTES NFR BLD AUTO: 3.1 % (ref 0–12)
NEUTROPHILS # BLD AUTO: 2.43 10*3/MM3 (ref 1.4–6.5)
NEUTROPHILS # BLD AUTO: 4.35 10*3/MM3 (ref 1.4–6.5)
NEUTROPHILS NFR BLD AUTO: 34.6 % (ref 40–75)
NEUTROPHILS NFR BLD AUTO: 80.5 % (ref 40–75)
NITRITE UR QL STRIP: NEGATIVE
OSMOLALITY SERPL CALC.SUM OF ELEC: 278.7 MOSM/KG (ref 273–305)
OSMOLALITY SERPL CALC.SUM OF ELEC: 283.8 MOSM/KG (ref 273–305)
OXYHGB MFR BLDV: 86.2 % (ref 85–100)
PCO2 BLDA: 48.4 MM HG (ref 35–45)
PH BLDA: 7.33 PH UNITS (ref 7.35–7.45)
PH UR STRIP.AUTO: 5.5 [PH] (ref 5–8)
PLAT MORPH BLD: NORMAL
PLATELET # BLD AUTO: 232 10*3/MM3 (ref 130–400)
PLATELET # BLD AUTO: 247 10*3/MM3 (ref 130–400)
PMV BLD AUTO: 10.2 FL (ref 6–10)
PMV BLD AUTO: 10.5 FL (ref 6–10)
PO2 BLDA: 60.2 MM HG (ref 80–100)
POTASSIUM BLD-SCNC: 3.9 MMOL/L (ref 3.5–5.3)
POTASSIUM BLD-SCNC: 4.2 MMOL/L (ref 3.5–5.3)
PROT SERPL-MCNC: 6.7 G/DL (ref 6–8)
PROT UR QL STRIP: NEGATIVE
RBC # BLD AUTO: 4 10*6/MM3 (ref 4.7–6.1)
RBC # BLD AUTO: 4.05 10*6/MM3 (ref 4.7–6.1)
S PYO AG THROAT QL: NEGATIVE
SAO2 % BLDCOA: 88.8 % (ref 90–100)
SODIUM BLD-SCNC: 138 MMOL/L (ref 135–153)
SODIUM BLD-SCNC: 141 MMOL/L (ref 135–153)
SP GR UR STRIP: 1.02 (ref 1–1.03)
TROPONIN I SERPL-MCNC: 0.01 NG/ML
UROBILINOGEN UR QL STRIP: NORMAL
WBC NRBC COR # BLD: 5.4 10*3/MM3 (ref 4.5–12.5)
WBC NRBC COR # BLD: 7.02 10*3/MM3 (ref 4.5–12.5)

## 2018-12-12 PROCEDURE — 96375 TX/PRO/DX INJ NEW DRUG ADDON: CPT

## 2018-12-12 PROCEDURE — 36600 WITHDRAWAL OF ARTERIAL BLOOD: CPT | Performed by: EMERGENCY MEDICINE

## 2018-12-12 PROCEDURE — 93010 ELECTROCARDIOGRAM REPORT: CPT | Performed by: INTERNAL MEDICINE

## 2018-12-12 PROCEDURE — 85025 COMPLETE CBC W/AUTO DIFF WBC: CPT | Performed by: EMERGENCY MEDICINE

## 2018-12-12 PROCEDURE — G0378 HOSPITAL OBSERVATION PER HR: HCPCS

## 2018-12-12 PROCEDURE — 85007 BL SMEAR W/DIFF WBC COUNT: CPT | Performed by: EMERGENCY MEDICINE

## 2018-12-12 PROCEDURE — 99284 EMERGENCY DEPT VISIT MOD MDM: CPT

## 2018-12-12 PROCEDURE — 96372 THER/PROPH/DIAG INJ SC/IM: CPT

## 2018-12-12 PROCEDURE — 87040 BLOOD CULTURE FOR BACTERIA: CPT | Performed by: EMERGENCY MEDICINE

## 2018-12-12 PROCEDURE — 71045 X-RAY EXAM CHEST 1 VIEW: CPT | Performed by: RADIOLOGY

## 2018-12-12 PROCEDURE — 87880 STREP A ASSAY W/OPTIC: CPT | Performed by: EMERGENCY MEDICINE

## 2018-12-12 PROCEDURE — 71275 CT ANGIOGRAPHY CHEST: CPT

## 2018-12-12 PROCEDURE — 83605 ASSAY OF LACTIC ACID: CPT | Performed by: EMERGENCY MEDICINE

## 2018-12-12 PROCEDURE — 86140 C-REACTIVE PROTEIN: CPT | Performed by: INTERNAL MEDICINE

## 2018-12-12 PROCEDURE — 25010000002 METHYLPREDNISOLONE PER 125 MG: Performed by: EMERGENCY MEDICINE

## 2018-12-12 PROCEDURE — 93005 ELECTROCARDIOGRAM TRACING: CPT | Performed by: INTERNAL MEDICINE

## 2018-12-12 PROCEDURE — 71275 CT ANGIOGRAPHY CHEST: CPT | Performed by: RADIOLOGY

## 2018-12-12 PROCEDURE — 25010000002 IOPAMIDOL 61 % SOLUTION: Performed by: INTERNAL MEDICINE

## 2018-12-12 PROCEDURE — 83050 HGB METHEMOGLOBIN QUAN: CPT | Performed by: EMERGENCY MEDICINE

## 2018-12-12 PROCEDURE — 81003 URINALYSIS AUTO W/O SCOPE: CPT | Performed by: EMERGENCY MEDICINE

## 2018-12-12 PROCEDURE — 71045 X-RAY EXAM CHEST 1 VIEW: CPT

## 2018-12-12 PROCEDURE — 94640 AIRWAY INHALATION TREATMENT: CPT

## 2018-12-12 PROCEDURE — 94799 UNLISTED PULMONARY SVC/PX: CPT

## 2018-12-12 PROCEDURE — 99213 OFFICE O/P EST LOW 20 MIN: CPT | Performed by: NURSE PRACTITIONER

## 2018-12-12 PROCEDURE — 82805 BLOOD GASES W/O2 SATURATION: CPT | Performed by: EMERGENCY MEDICINE

## 2018-12-12 PROCEDURE — 25010000002 ENOXAPARIN PER 10 MG: Performed by: INTERNAL MEDICINE

## 2018-12-12 PROCEDURE — 85379 FIBRIN DEGRADATION QUANT: CPT | Performed by: NURSE PRACTITIONER

## 2018-12-12 PROCEDURE — 96376 TX/PRO/DX INJ SAME DRUG ADON: CPT

## 2018-12-12 PROCEDURE — 82375 ASSAY CARBOXYHB QUANT: CPT | Performed by: EMERGENCY MEDICINE

## 2018-12-12 PROCEDURE — 80053 COMPREHEN METABOLIC PANEL: CPT | Performed by: EMERGENCY MEDICINE

## 2018-12-12 PROCEDURE — 84484 ASSAY OF TROPONIN QUANT: CPT | Performed by: EMERGENCY MEDICINE

## 2018-12-12 PROCEDURE — 85025 COMPLETE CBC W/AUTO DIFF WBC: CPT | Performed by: INTERNAL MEDICINE

## 2018-12-12 PROCEDURE — 96365 THER/PROPH/DIAG IV INF INIT: CPT

## 2018-12-12 PROCEDURE — 83880 ASSAY OF NATRIURETIC PEPTIDE: CPT | Performed by: EMERGENCY MEDICINE

## 2018-12-12 PROCEDURE — 93005 ELECTROCARDIOGRAM TRACING: CPT | Performed by: EMERGENCY MEDICINE

## 2018-12-12 PROCEDURE — 83690 ASSAY OF LIPASE: CPT | Performed by: EMERGENCY MEDICINE

## 2018-12-12 PROCEDURE — 25010000002 METHYLPREDNISOLONE PER 40 MG: Performed by: INTERNAL MEDICINE

## 2018-12-12 PROCEDURE — 87804 INFLUENZA ASSAY W/OPTIC: CPT | Performed by: EMERGENCY MEDICINE

## 2018-12-12 PROCEDURE — 83735 ASSAY OF MAGNESIUM: CPT | Performed by: INTERNAL MEDICINE

## 2018-12-12 PROCEDURE — 87081 CULTURE SCREEN ONLY: CPT | Performed by: EMERGENCY MEDICINE

## 2018-12-12 RX ORDER — GABAPENTIN 400 MG/1
800 CAPSULE ORAL 3 TIMES DAILY
Status: DISCONTINUED | OUTPATIENT
Start: 2018-12-12 | End: 2018-12-14 | Stop reason: HOSPADM

## 2018-12-12 RX ORDER — IPRATROPIUM BROMIDE AND ALBUTEROL SULFATE 2.5; .5 MG/3ML; MG/3ML
3 SOLUTION RESPIRATORY (INHALATION)
Status: DISCONTINUED | OUTPATIENT
Start: 2018-12-13 | End: 2018-12-13

## 2018-12-12 RX ORDER — METHYLPREDNISOLONE SODIUM SUCCINATE 40 MG/ML
30 INJECTION, POWDER, LYOPHILIZED, FOR SOLUTION INTRAMUSCULAR; INTRAVENOUS EVERY 12 HOURS SCHEDULED
Status: DISCONTINUED | OUTPATIENT
Start: 2018-12-12 | End: 2018-12-13

## 2018-12-12 RX ORDER — NICOTINE 21 MG/24HR
1 PATCH, TRANSDERMAL 24 HOURS TRANSDERMAL
Status: DISCONTINUED | OUTPATIENT
Start: 2018-12-12 | End: 2018-12-14 | Stop reason: HOSPADM

## 2018-12-12 RX ORDER — BUDESONIDE 0.5 MG/2ML
0.5 INHALANT ORAL
Status: DISCONTINUED | OUTPATIENT
Start: 2018-12-12 | End: 2018-12-14 | Stop reason: HOSPADM

## 2018-12-12 RX ORDER — SELENIUM SULFIDE 2.5 MG/100ML
LOTION TOPICAL DAILY PRN
Status: CANCELLED | OUTPATIENT
Start: 2018-12-12

## 2018-12-12 RX ORDER — OXYCODONE HYDROCHLORIDE 5 MG/1
10 TABLET ORAL 3 TIMES DAILY
Status: DISCONTINUED | OUTPATIENT
Start: 2018-12-12 | End: 2018-12-14 | Stop reason: HOSPADM

## 2018-12-12 RX ORDER — ACETAMINOPHEN 325 MG/1
650 TABLET ORAL EVERY 4 HOURS PRN
Status: DISCONTINUED | OUTPATIENT
Start: 2018-12-12 | End: 2018-12-14 | Stop reason: HOSPADM

## 2018-12-12 RX ORDER — ATENOLOL 25 MG/1
25 TABLET ORAL DAILY
Status: DISCONTINUED | OUTPATIENT
Start: 2018-12-13 | End: 2018-12-14 | Stop reason: HOSPADM

## 2018-12-12 RX ORDER — OXYCODONE HYDROCHLORIDE 80 MG/1
80 TABLET, FILM COATED, EXTENDED RELEASE ORAL EVERY 12 HOURS SCHEDULED
Status: DISCONTINUED | OUTPATIENT
Start: 2018-12-12 | End: 2018-12-14 | Stop reason: HOSPADM

## 2018-12-12 RX ORDER — CALCIUM CARBONATE 200(500)MG
2 TABLET,CHEWABLE ORAL 2 TIMES DAILY PRN
Status: DISCONTINUED | OUTPATIENT
Start: 2018-12-12 | End: 2018-12-14 | Stop reason: HOSPADM

## 2018-12-12 RX ORDER — LEVOTHYROXINE SODIUM 0.03 MG/1
25 TABLET ORAL DAILY
Status: DISCONTINUED | OUTPATIENT
Start: 2018-12-13 | End: 2018-12-14 | Stop reason: HOSPADM

## 2018-12-12 RX ORDER — ONDANSETRON 2 MG/ML
4 INJECTION INTRAMUSCULAR; INTRAVENOUS EVERY 6 HOURS PRN
Status: DISCONTINUED | OUTPATIENT
Start: 2018-12-12 | End: 2018-12-14 | Stop reason: HOSPADM

## 2018-12-12 RX ORDER — ONDANSETRON 4 MG/1
4 TABLET, FILM COATED ORAL EVERY 6 HOURS PRN
Status: DISCONTINUED | OUTPATIENT
Start: 2018-12-12 | End: 2018-12-14 | Stop reason: HOSPADM

## 2018-12-12 RX ORDER — METHYLPREDNISOLONE SODIUM SUCCINATE 40 MG/ML
30 INJECTION, POWDER, LYOPHILIZED, FOR SOLUTION INTRAMUSCULAR; INTRAVENOUS EVERY 12 HOURS
Status: DISCONTINUED | OUTPATIENT
Start: 2018-12-12 | End: 2018-12-12

## 2018-12-12 RX ORDER — BENZONATATE 100 MG/1
200 CAPSULE ORAL 3 TIMES DAILY PRN
Status: DISCONTINUED | OUTPATIENT
Start: 2018-12-12 | End: 2018-12-14 | Stop reason: HOSPADM

## 2018-12-12 RX ORDER — SODIUM CHLORIDE 0.9 % (FLUSH) 0.9 %
3-10 SYRINGE (ML) INJECTION AS NEEDED
Status: DISCONTINUED | OUTPATIENT
Start: 2018-12-12 | End: 2018-12-13

## 2018-12-12 RX ORDER — NITROGLYCERIN 0.4 MG/1
0.4 TABLET SUBLINGUAL
Status: DISCONTINUED | OUTPATIENT
Start: 2018-12-12 | End: 2018-12-14 | Stop reason: HOSPADM

## 2018-12-12 RX ORDER — SODIUM CHLORIDE 0.9 % (FLUSH) 0.9 %
3 SYRINGE (ML) INJECTION EVERY 12 HOURS SCHEDULED
Status: DISCONTINUED | OUTPATIENT
Start: 2018-12-12 | End: 2018-12-13

## 2018-12-12 RX ORDER — ONDANSETRON 4 MG/1
4 TABLET, ORALLY DISINTEGRATING ORAL EVERY 6 HOURS PRN
Status: DISCONTINUED | OUTPATIENT
Start: 2018-12-12 | End: 2018-12-14 | Stop reason: HOSPADM

## 2018-12-12 RX ORDER — METHYLPREDNISOLONE SODIUM SUCCINATE 125 MG/2ML
125 INJECTION, POWDER, LYOPHILIZED, FOR SOLUTION INTRAMUSCULAR; INTRAVENOUS ONCE
Status: COMPLETED | OUTPATIENT
Start: 2018-12-12 | End: 2018-12-12

## 2018-12-12 RX ORDER — IPRATROPIUM BROMIDE AND ALBUTEROL SULFATE 2.5; .5 MG/3ML; MG/3ML
3 SOLUTION RESPIRATORY (INHALATION)
Status: COMPLETED | OUTPATIENT
Start: 2018-12-12 | End: 2018-12-12

## 2018-12-12 RX ORDER — SODIUM CHLORIDE 0.9 % (FLUSH) 0.9 %
10 SYRINGE (ML) INJECTION AS NEEDED
Status: DISCONTINUED | OUTPATIENT
Start: 2018-12-12 | End: 2018-12-14 | Stop reason: HOSPADM

## 2018-12-12 RX ORDER — ALBUTEROL SULFATE 2.5 MG/3ML
2.5 SOLUTION RESPIRATORY (INHALATION) EVERY 6 HOURS PRN
Status: DISCONTINUED | OUTPATIENT
Start: 2018-12-12 | End: 2018-12-12

## 2018-12-12 RX ORDER — ATENOLOL 25 MG/1
25 TABLET ORAL DAILY
COMMUNITY
End: 2019-02-21

## 2018-12-12 RX ADMIN — IPRATROPIUM BROMIDE AND ALBUTEROL SULFATE 3 ML: .5; 3 SOLUTION RESPIRATORY (INHALATION) at 16:11

## 2018-12-12 RX ADMIN — METHYLPREDNISOLONE SODIUM SUCCINATE 30 MG: 40 INJECTION, POWDER, FOR SOLUTION INTRAMUSCULAR; INTRAVENOUS at 23:00

## 2018-12-12 RX ADMIN — OXYCODONE HYDROCHLORIDE 80 MG: 80 TABLET, FILM COATED, EXTENDED RELEASE ORAL at 21:47

## 2018-12-12 RX ADMIN — DOXYCYCLINE 100 MG: 100 INJECTION, POWDER, LYOPHILIZED, FOR SOLUTION INTRAVENOUS at 17:38

## 2018-12-12 RX ADMIN — METHYLPREDNISOLONE SODIUM SUCCINATE 125 MG: 125 INJECTION, POWDER, FOR SOLUTION INTRAMUSCULAR; INTRAVENOUS at 16:27

## 2018-12-12 RX ADMIN — SODIUM CHLORIDE, PRESERVATIVE FREE 3 ML: 5 INJECTION INTRAVENOUS at 21:55

## 2018-12-12 RX ADMIN — OXYCODONE HYDROCHLORIDE 10 MG: 5 TABLET ORAL at 21:48

## 2018-12-12 RX ADMIN — NICOTINE 1 PATCH: 21 PATCH, EXTENDED RELEASE TRANSDERMAL at 21:48

## 2018-12-12 RX ADMIN — IOPAMIDOL 100 ML: 612 INJECTION, SOLUTION INTRAVENOUS at 21:45

## 2018-12-12 RX ADMIN — BUDESONIDE 0.5 MG: 0.5 SUSPENSION RESPIRATORY (INHALATION) at 21:56

## 2018-12-12 RX ADMIN — IPRATROPIUM BROMIDE AND ALBUTEROL SULFATE 3 ML: .5; 3 SOLUTION RESPIRATORY (INHALATION) at 16:42

## 2018-12-12 RX ADMIN — ENOXAPARIN SODIUM 40 MG: 40 INJECTION SUBCUTANEOUS at 21:48

## 2018-12-12 RX ADMIN — GABAPENTIN 800 MG: 400 CAPSULE ORAL at 21:48

## 2018-12-12 NOTE — ED PROVIDER NOTES
"Subjective   Patient is a 73-year-old white male who is sent from his primary care provider's office today for \"possible pneumonia\".  Patient reports that he has had chest congestion, cough, shortness of breath that is slowly progressively been worsening over the past 2 months.  At times his cough is productive of small amounts of thick yellow sputum, at times small amounts of clear sputum, at times nonproductive.  He denies fever, chills, chest pain, abdominal pain, vomiting, diarrhea, hemoptysis, hematemesis, hematochezia or melena, syncope and near-syncope, focal numbness or weakness, other symptoms or other complaints.  Patient reports that as far as he knows, he has never been diagnosed with any chronic lung disease, but it appears that he has indeed been diagnosed and treated for COPD in the past.  He uses inhalers at home, but no breathing treatments or supplemental oxygen.  Patient reports he has been a long-time smoker but he quit smoking one week ago, when his breathing became so bad that he could no longer tolerate it.            Review of Systems   Constitutional: Negative for chills, diaphoresis and fever.   HENT: Negative for ear pain, sore throat and trouble swallowing.    Eyes: Negative for photophobia and pain.   Respiratory: Positive for cough and shortness of breath.    Cardiovascular: Negative for chest pain and palpitations.   Gastrointestinal: Negative for abdominal distention, abdominal pain, blood in stool, diarrhea, nausea and vomiting.   Endocrine: Negative for polydipsia and polyphagia.   Genitourinary: Negative for difficulty urinating and flank pain.   Musculoskeletal: Negative for back pain, neck pain and neck stiffness.   Skin: Negative for color change and pallor.   Neurological: Negative for seizures, syncope and speech difficulty.   Psychiatric/Behavioral: Negative for confusion.   All other systems reviewed and are negative.      Past Medical History:   Diagnosis Date   • COPD " (chronic obstructive pulmonary disease) (CMS/HCC)    • Gout    • Hearing loss    • History of degenerative disc disease    • Hyperlipidemia    • Hypertension    • Low back pain    • Pedal edema    • Prediabetes        Allergies   Allergen Reactions   • Penicillins      Pt states he was allergic when he was 18 but has had it since and did not have any problems.        Past Surgical History:   Procedure Laterality Date   • CHOLECYSTECTOMY     • LUMBAR DISCECTOMY     • SHOULDER SURGERY Left        Family History   Problem Relation Age of Onset   • Cancer Mother        Social History     Socioeconomic History   • Marital status:      Spouse name: Not on file   • Number of children: Not on file   • Years of education: Not on file   • Highest education level: Not on file   Tobacco Use   • Smoking status: Current Every Day Smoker     Packs/day: 0.25     Types: Cigarettes   • Smokeless tobacco: Never Used   • Tobacco comment: patient stated he is down to about 4 cigarettes a day   Substance and Sexual Activity   • Alcohol use: No   • Drug use: No   • Sexual activity: Defer           Objective   Physical Exam   Constitutional: He is oriented to person, place, and time. He appears well-developed and well-nourished. He appears distressed (Patient appears to be in moderate respiratory distress.).   HENT:   Head: Normocephalic and atraumatic.   Eyes: EOM are normal. Pupils are equal, round, and reactive to light. No scleral icterus.   Neck: Normal range of motion. Neck supple. No neck rigidity. No tracheal deviation present.   Cardiovascular: Normal rate, regular rhythm and intact distal pulses.   Pulmonary/Chest: He exhibits no tenderness.   Respirations are moderately labored.  Tight wheezes are heard in all fields.  Air movement is diminished throughout.   Abdominal: Soft. Bowel sounds are normal. There is no tenderness. There is no rebound and no guarding.   Musculoskeletal: Normal range of motion. He exhibits no  tenderness.        Right lower leg: He exhibits no tenderness and no edema.        Left lower leg: He exhibits no tenderness and no edema.   Neurological: He is alert and oriented to person, place, and time. He has normal strength. No sensory deficit. He exhibits normal muscle tone. Coordination normal. GCS eye subscore is 4. GCS verbal subscore is 5. GCS motor subscore is 6.   Skin: Skin is warm and dry. Capillary refill takes less than 2 seconds. No cyanosis. No pallor.   Psychiatric: He has a normal mood and affect. His behavior is normal.   Nursing note and vitals reviewed.      Procedures  EKG shows sinus rhythm with rate 72.  No apparent acute ischemia.  XR Chest 1 View   ED Interpretation   No apparent acute disease pending radiologist        Results for orders placed or performed during the hospital encounter of 12/12/18   Influenza Antigen, Rapid - Swab, Nasopharynx   Result Value Ref Range    Influenza A Ag, EIA Negative Negative    Influenza B Ag, EIA Negative Negative   Rapid Strep A Screen - Swab, Throat   Result Value Ref Range    Strep A Ag Negative Negative   Comprehensive Metabolic Panel   Result Value Ref Range    Glucose 116 (H) 70 - 110 mg/dL    BUN 17 7 - 21 mg/dL    Creatinine 0.95 0.43 - 1.29 mg/dL    Sodium 141 135 - 153 mmol/L    Potassium 3.9 3.5 - 5.3 mmol/L    Chloride 105 99 - 112 mmol/L    CO2 30.8 24.3 - 31.9 mmol/L    Calcium 8.9 7.7 - 10.0 mg/dL    Total Protein 6.7 6.0 - 8.0 g/dL    Albumin 4.10 3.40 - 4.80 g/dL    ALT (SGPT) 11 10 - 44 U/L    AST (SGOT) 17 10 - 34 U/L    Alkaline Phosphatase 62 40 - 129 U/L    Total Bilirubin 0.2 0.2 - 1.8 mg/dL    eGFR Non African Amer 78 >60 mL/min/1.73    Globulin 2.6 gm/dL    A/G Ratio 1.6 1.5 - 2.5 g/dL    BUN/Creatinine Ratio 17.9 7.0 - 25.0    Anion Gap 5.2 3.6 - 11.2 mmol/L   Lipase   Result Value Ref Range    Lipase 21 13 - 60 U/L   Urinalysis With Microscopic If Indicated (No Culture) - Urine, Clean Catch   Result Value Ref Range     Color, UA Yellow Yellow, Straw    Appearance, UA Clear Clear    pH, UA 5.5 5.0 - 8.0    Specific Gravity, UA 1.022 1.005 - 1.030    Glucose, UA Negative Negative    Ketones, UA Negative Negative    Bilirubin, UA Negative Negative    Blood, UA Negative Negative    Protein, UA Negative Negative    Leuk Esterase, UA Negative Negative    Nitrite, UA Negative Negative    Urobilinogen, UA 0.2 E.U./dL 0.2 - 1.0 E.U./dL   Blood Gas, Arterial   Result Value Ref Range    Site Arterial: left brachial     Jr's Test N/A     pH, Arterial 7.330 (L) 7.350 - 7.450 pH units    pCO2, Arterial 48.4 (H) 35.0 - 45.0 mm Hg    pO2, Arterial 60.2 (L) 80.0 - 100.0 mm Hg    HCO3, Arterial 24.9 22.0 - 26.0 mmol/L    Base Excess, Arterial -1.4 mmol/L    O2 Saturation, Arterial 88.8 (L) 90.0 - 100.0 %    Hemoglobin, Blood Gas 12.9 12 - 16 g/dL    Hematocrit, Blood Gas 38.0 (L) 42.0 - 52.0 %    Oxyhemoglobin 86.2 85 - 100 %    Methemoglobin 0.40 0.00 - 3.00 %    Carboxyhemoglobin 2.5 0 - 5 %    A-a Gradiant 25.5 0.0 - 300.0 mmHg    Temperature 98.6 C    Barometric Pressure for Blood Gas 731 mmHg    Modality Room Air     FIO2 21 %   BNP   Result Value Ref Range    BNP 18.0 0.0 - 100.0 pg/mL   Troponin   Result Value Ref Range    Troponin I 0.009 <=0.040 ng/mL   Lactic Acid, Plasma   Result Value Ref Range    Lactate 0.9 0.5 - 2.0 mmol/L   CBC Auto Differential   Result Value Ref Range    WBC 7.02 4.50 - 12.50 10*3/mm3    RBC 4.05 (L) 4.70 - 6.10 10*6/mm3    Hemoglobin 12.4 (L) 14.0 - 18.0 g/dL    Hematocrit 38.4 (L) 42.0 - 52.0 %    MCV 94.8 (H) 80.0 - 94.0 fL    MCH 30.6 27.0 - 33.0 pg    MCHC 32.3 (L) 33.0 - 37.0 g/dL    RDW 13.5 11.5 - 14.5 %    RDW-SD 45.1 37.0 - 54.0 fl    MPV 10.2 (H) 6.0 - 10.0 fL    Platelets 247 130 - 400 10*3/mm3    Neutrophil % 34.6 (L) 40.0 - 75.0 %    Lymphocyte % 26.2 16.0 - 46.0 %    Monocyte % 11.4 0.0 - 12.0 %    Eosinophil % 26.9 (H) 0.0 - 7.0 %    Basophil % 0.6 0.0 - 2.0 %    Immature Grans % 0.3 0.0 - 0.5 %     Neutrophils, Absolute 2.43 1.40 - 6.50 10*3/mm3    Lymphocytes, Absolute 1.84 1.00 - 3.00 10*3/mm3    Monocytes, Absolute 0.80 0.10 - 0.90 10*3/mm3    Eosinophils, Absolute 1.89 (H) 0.00 - 0.70 10*3/mm3    Basophils, Absolute 0.04 0.00 - 0.30 10*3/mm3    Immature Grans, Absolute 0.02 0.00 - 0.03 10*3/mm3   Scan Slide   Result Value Ref Range    Hypochromia Slight/1+ None Seen    Platelet Morphology Normal Normal   Osmolality, Calculated   Result Value Ref Range    Osmolality Calc 283.8 273.0 - 305.0 mOsm/kg                ED Course  ED Course as of Dec 12 1856   Wed Dec 12, 2018   1728 Patient is much more comfortable now, voices he feels remarkably better.  Is no longer in any apparent distress.  Still with diffuse wheezing, but air movement is much improved.  Pulse ox currently is 96% on 2 L, sinus rhythm with a rate of 67, blood pressure 129/70.  [CM]   1753 I discussed the case with Lelia Rice.  She is admitting patient to the observation unit under Dr. Garcia for further care.  [CM]      ED Course User Index  [CM] Clifton Lopez MD                  MDM  Number of Diagnoses or Management Options  Acute exacerbation of chronic obstructive pulmonary disease (COPD) (CMS/HCC):   Critical Care  Total time providing critical care: 30-74 minutes (30)        Final diagnoses:   Acute exacerbation of chronic obstructive pulmonary disease (COPD) (CMS/HCC)             Please note that portions of this note were completed with a voice recognition program. Efforts were made to edit the dictations, but occasionally words are mistranscribed.       Clifton Lopez MD  12/12/18 1852

## 2018-12-12 NOTE — ED NOTES
sputm cup placed at bedside pt made aware that if they cough anything up to spit in the cup     Alie Marino  12/12/18 9288

## 2018-12-12 NOTE — PROGRESS NOTES
Subjective   Paul Gomez is a 73 y.o. male.     Chief Complaint: Cough    Cough   This is a recurrent problem. The current episode started in the past 7 days. The problem has been rapidly worsening. The problem occurs every few minutes. The cough is non-productive. Associated symptoms include postnasal drip, shortness of breath and wheezing. Pertinent negatives include no fever or nasal congestion. Nothing aggravates the symptoms. He has tried steroid inhaler for the symptoms. The treatment provided no relief. His past medical history is significant for COPD and pneumonia.   Pt was hospitalized with dx of pneumonia several months ago.  He states that he has had a really bad cough for the past few days.  He thought he was dying yesterday morning when he woke up because he could not catch his breath.  He used his steroid inhaler over and over in order to breathe.  I have discussed the proper use of inhaler with him today and the risk of overuse.  He can only walk small distances before having to sit down due to being short of breath.  He states he is afraid to go to sleep; afraid he will die.  His 02 sat is 92% today.    Pt does have audible wheezing and rhonchi.  He does appear to be short of breath.  After discussion with patient, he is agreeable to going to the ER for evaluation; however, he has refused to go by ambulance.   He states that he will go home and get his nephew who will drive him to the ER.  ER called and given report.      Family History   Problem Relation Age of Onset   • Cancer Mother        Social History     Socioeconomic History   • Marital status:      Spouse name: Not on file   • Number of children: Not on file   • Years of education: Not on file   • Highest education level: Not on file   Social Needs   • Financial resource strain: Not on file   • Food insecurity - worry: Not on file   • Food insecurity - inability: Not on file   • Transportation needs - medical: Not on file   •  "Transportation needs - non-medical: Not on file   Occupational History   • Not on file   Tobacco Use   • Smoking status: Current Every Day Smoker     Packs/day: 0.25     Types: Cigarettes   • Smokeless tobacco: Never Used   • Tobacco comment: patient stated he is down to about 4 cigarettes a day   Substance and Sexual Activity   • Alcohol use: No   • Drug use: No   • Sexual activity: Defer   Other Topics Concern   • Not on file   Social History Narrative   • Not on file       Past Medical History:   Diagnosis Date   • COPD (chronic obstructive pulmonary disease) (CMS/HCC)    • Gout    • Hearing loss    • History of degenerative disc disease    • Hyperlipidemia    • Hypertension    • Low back pain    • Pedal edema    • Prediabetes        Review of Systems   Constitutional: Positive for fatigue. Negative for fever.   HENT: Positive for postnasal drip.    Respiratory: Positive for cough, shortness of breath and wheezing.    Cardiovascular: Negative.    Gastrointestinal: Negative.    Musculoskeletal: Negative.    Skin: Negative.    Neurological: Negative.    Psychiatric/Behavioral: Negative.        Objective   Physical Exam   Constitutional: He is oriented to person, place, and time. He appears well-developed and well-nourished.   Eyes: Conjunctivae are normal. Pupils are equal, round, and reactive to light.   Neck: Normal range of motion. Neck supple.   Cardiovascular: Normal rate, regular rhythm and normal heart sounds.   Pulmonary/Chest: Effort normal. He has wheezes. He has rales.   Neurological: He is alert and oriented to person, place, and time.   Skin: Skin is warm and dry.   Psychiatric: He has a normal mood and affect. His behavior is normal. Judgment and thought content normal.   Nursing note and vitals reviewed.      Procedures    Vitals: Blood pressure 109/66, pulse 80, temperature 98.4 °F (36.9 °C), temperature source Oral, height 172.7 cm (68\"), weight 70.3 kg (155 lb), SpO2 92 %.    Allergies: "   Allergies   Allergen Reactions   • Penicillins      Pt states he was allergic when he was 18 but has had it since and did not have any problems.         During this visit the following were done:  Labs Reviewed []    Labs Ordered []    Radiology Reports Reviewed []    Radiology Ordered []    PCP Records Reviewed []    Referring Provider Records Reviewed []    ER Records Reviewed []    Hospital Records Reviewed []    History Obtained From Family []    Radiology Images Reviewed []    Other Reviewed []    Records Requested []      Assessment/Plan   Ray was seen today for cough.    Diagnoses and all orders for this visit:    COPD with exacerbation (CMS/[facility])    To ER for evaluation.  Possible pneumonia

## 2018-12-12 NOTE — ED NOTES
Urinal placed at pt bedside, pt made aware of needing a sample .      Alie Marino  12/12/18 6425

## 2018-12-12 NOTE — PLAN OF CARE
Problem: Patient Care Overview  Goal: Plan of Care Review  Outcome: Ongoing (interventions implemented as appropriate)   12/12/18 9197   Coping/Psychosocial   Plan of Care Reviewed With patient   Plan of Care Review   Progress no change       Problem: Chronic Obstructive Pulmonary Disease (Adult)  Goal: Signs and Symptoms of Listed Potential Problems Will be Absent, Minimized or Managed (Chronic Obstructive Pulmonary Disease)  Outcome: Ongoing (interventions implemented as appropriate)

## 2018-12-13 LAB
A-A DO2: 62.1 MMHG (ref 0–300)
A-A DO2: 74 MMHG (ref 0–300)
ANION GAP SERPL CALCULATED.3IONS-SCNC: 6.7 MMOL/L (ref 3.6–11.2)
ARTERIAL PATENCY WRIST A: ABNORMAL
ARTERIAL PATENCY WRIST A: POSITIVE
ATMOSPHERIC PRESS: 731 MMHG
ATMOSPHERIC PRESS: 731 MMHG
BASE EXCESS BLDA CALC-SCNC: -0.8 MMOL/L
BASE EXCESS BLDA CALC-SCNC: -3.5 MMOL/L
BASOPHILS # BLD AUTO: 0.01 10*3/MM3 (ref 0–0.3)
BASOPHILS NFR BLD AUTO: 0.2 % (ref 0–2)
BDY SITE: ABNORMAL
BDY SITE: ABNORMAL
BNP SERPL-MCNC: 37 PG/ML (ref 0–100)
BODY TEMPERATURE: 98.6 C
BODY TEMPERATURE: 98.6 C
BUN BLD-MCNC: 15 MG/DL (ref 7–21)
BUN/CREAT SERPL: 16 (ref 7–25)
CALCIUM SPEC-SCNC: 8.9 MG/DL (ref 7.7–10)
CHLORIDE SERPL-SCNC: 105 MMOL/L (ref 99–112)
CHOLEST SERPL-MCNC: 141 MG/DL (ref 0–200)
CO2 SERPL-SCNC: 24.3 MMOL/L (ref 24.3–31.9)
COHGB MFR BLD: 0.9 % (ref 0–5)
COHGB MFR BLD: 1 % (ref 0–5)
CREAT BLD-MCNC: 0.94 MG/DL (ref 0.43–1.29)
CRP SERPL-MCNC: <0.5 MG/DL (ref 0–0.99)
DEPRECATED RDW RBC AUTO: 45.1 FL (ref 37–54)
EOSINOPHIL # BLD AUTO: 0.01 10*3/MM3 (ref 0–0.7)
EOSINOPHIL NFR BLD AUTO: 0.2 % (ref 0–7)
ERYTHROCYTE [DISTWIDTH] IN BLOOD BY AUTOMATED COUNT: 13.3 % (ref 11.5–14.5)
GAS FLOW AIRWAY: 2.5 LPM
GFR SERPL CREATININE-BSD FRML MDRD: 79 ML/MIN/1.73
GLUCOSE BLD-MCNC: 176 MG/DL (ref 70–110)
HBA1C MFR BLD: 6 % (ref 4.5–5.7)
HCO3 BLDA-SCNC: 23.1 MMOL/L (ref 22–26)
HCO3 BLDA-SCNC: 25.3 MMOL/L (ref 22–26)
HCT VFR BLD AUTO: 38.3 % (ref 42–52)
HCT VFR BLD CALC: 36 % (ref 42–52)
HCT VFR BLD CALC: 37 % (ref 42–52)
HDLC SERPL-MCNC: 52 MG/DL (ref 60–100)
HGB BLD-MCNC: 12.2 G/DL (ref 14–18)
HGB BLDA-MCNC: 12.1 G/DL (ref 12–16)
HGB BLDA-MCNC: 12.5 G/DL (ref 12–16)
HOROWITZ INDEX BLD+IHG-RTO: 28 %
HOROWITZ INDEX BLD+IHG-RTO: 30 %
IMM GRANULOCYTES # BLD: 0 10*3/MM3 (ref 0–0.03)
IMM GRANULOCYTES NFR BLD: 0 % (ref 0–0.5)
LDLC SERPL CALC-MCNC: 83 MG/DL (ref 0–100)
LDLC/HDLC SERPL: 1.59 {RATIO}
LYMPHOCYTES # BLD AUTO: 0.56 10*3/MM3 (ref 1–3)
LYMPHOCYTES NFR BLD AUTO: 13.5 % (ref 16–46)
MAGNESIUM SERPL-MCNC: 2.1 MG/DL (ref 1.7–2.6)
MCH RBC QN AUTO: 30.7 PG (ref 27–33)
MCHC RBC AUTO-ENTMCNC: 31.9 G/DL (ref 33–37)
MCV RBC AUTO: 96.2 FL (ref 80–94)
METHGB BLD QL: 0.2 % (ref 0–3)
METHGB BLD QL: 0.4 % (ref 0–3)
MODALITY: ABNORMAL
MODALITY: ABNORMAL
MONOCYTES # BLD AUTO: 0.04 10*3/MM3 (ref 0.1–0.9)
MONOCYTES NFR BLD AUTO: 1 % (ref 0–12)
NEUTROPHILS # BLD AUTO: 3.53 10*3/MM3 (ref 1.4–6.5)
NEUTROPHILS NFR BLD AUTO: 85.1 % (ref 40–75)
OSMOLALITY SERPL CALC.SUM OF ELEC: 277.1 MOSM/KG (ref 273–305)
OXYHGB MFR BLDV: 91.4 % (ref 85–100)
OXYHGB MFR BLDV: 92.2 % (ref 85–100)
PCO2 BLDA: 47.8 MM HG (ref 35–45)
PCO2 BLDA: 48.1 MM HG (ref 35–45)
PH BLDA: 7.3 PH UNITS (ref 7.35–7.45)
PH BLDA: 7.34 PH UNITS (ref 7.35–7.45)
PLATELET # BLD AUTO: 238 10*3/MM3 (ref 130–400)
PMV BLD AUTO: 10.9 FL (ref 6–10)
PO2 BLDA: 73 MM HG (ref 80–100)
PO2 BLDA: 74.7 MM HG (ref 80–100)
POTASSIUM BLD-SCNC: 4.4 MMOL/L (ref 3.5–5.3)
RBC # BLD AUTO: 3.98 10*6/MM3 (ref 4.7–6.1)
SAO2 % BLDCOA: 92.4 % (ref 90–100)
SAO2 % BLDCOA: 93.5 % (ref 90–100)
SODIUM BLD-SCNC: 136 MMOL/L (ref 135–153)
TRIGL SERPL-MCNC: 32 MG/DL (ref 0–150)
VLDLC SERPL-MCNC: 6.4 MG/DL
WBC NRBC COR # BLD: 4.15 10*3/MM3 (ref 4.5–12.5)

## 2018-12-13 PROCEDURE — 96372 THER/PROPH/DIAG INJ SC/IM: CPT

## 2018-12-13 PROCEDURE — 83880 ASSAY OF NATRIURETIC PEPTIDE: CPT | Performed by: INTERNAL MEDICINE

## 2018-12-13 PROCEDURE — 96376 TX/PRO/DX INJ SAME DRUG ADON: CPT

## 2018-12-13 PROCEDURE — 36600 WITHDRAWAL OF ARTERIAL BLOOD: CPT | Performed by: INTERNAL MEDICINE

## 2018-12-13 PROCEDURE — 83050 HGB METHEMOGLOBIN QUAN: CPT | Performed by: PHYSICIAN ASSISTANT

## 2018-12-13 PROCEDURE — 25010000002 ENOXAPARIN PER 10 MG: Performed by: INTERNAL MEDICINE

## 2018-12-13 PROCEDURE — 87205 SMEAR GRAM STAIN: CPT | Performed by: EMERGENCY MEDICINE

## 2018-12-13 PROCEDURE — 87070 CULTURE OTHR SPECIMN AEROBIC: CPT | Performed by: EMERGENCY MEDICINE

## 2018-12-13 PROCEDURE — 83036 HEMOGLOBIN GLYCOSYLATED A1C: CPT | Performed by: INTERNAL MEDICINE

## 2018-12-13 PROCEDURE — 80048 BASIC METABOLIC PNL TOTAL CA: CPT | Performed by: INTERNAL MEDICINE

## 2018-12-13 PROCEDURE — 96366 THER/PROPH/DIAG IV INF ADDON: CPT

## 2018-12-13 PROCEDURE — 82805 BLOOD GASES W/O2 SATURATION: CPT | Performed by: INTERNAL MEDICINE

## 2018-12-13 PROCEDURE — 83735 ASSAY OF MAGNESIUM: CPT | Performed by: INTERNAL MEDICINE

## 2018-12-13 PROCEDURE — 85025 COMPLETE CBC W/AUTO DIFF WBC: CPT | Performed by: INTERNAL MEDICINE

## 2018-12-13 PROCEDURE — 82375 ASSAY CARBOXYHB QUANT: CPT | Performed by: INTERNAL MEDICINE

## 2018-12-13 PROCEDURE — 36600 WITHDRAWAL OF ARTERIAL BLOOD: CPT | Performed by: PHYSICIAN ASSISTANT

## 2018-12-13 PROCEDURE — 82805 BLOOD GASES W/O2 SATURATION: CPT | Performed by: PHYSICIAN ASSISTANT

## 2018-12-13 PROCEDURE — 80061 LIPID PANEL: CPT | Performed by: INTERNAL MEDICINE

## 2018-12-13 PROCEDURE — 94799 UNLISTED PULMONARY SVC/PX: CPT

## 2018-12-13 PROCEDURE — 25010000002 METHYLPREDNISOLONE PER 125 MG: Performed by: PHYSICIAN ASSISTANT

## 2018-12-13 PROCEDURE — 86140 C-REACTIVE PROTEIN: CPT | Performed by: INTERNAL MEDICINE

## 2018-12-13 PROCEDURE — G0378 HOSPITAL OBSERVATION PER HR: HCPCS

## 2018-12-13 PROCEDURE — 82375 ASSAY CARBOXYHB QUANT: CPT | Performed by: PHYSICIAN ASSISTANT

## 2018-12-13 PROCEDURE — 83050 HGB METHEMOGLOBIN QUAN: CPT | Performed by: INTERNAL MEDICINE

## 2018-12-13 RX ORDER — METHYLPREDNISOLONE SODIUM SUCCINATE 125 MG/2ML
60 INJECTION, POWDER, LYOPHILIZED, FOR SOLUTION INTRAMUSCULAR; INTRAVENOUS EVERY 12 HOURS
Status: DISCONTINUED | OUTPATIENT
Start: 2018-12-13 | End: 2018-12-14 | Stop reason: HOSPADM

## 2018-12-13 RX ORDER — SODIUM CHLORIDE 0.9 % (FLUSH) 0.9 %
5 SYRINGE (ML) INJECTION AS NEEDED
Status: DISCONTINUED | OUTPATIENT
Start: 2018-12-13 | End: 2018-12-14 | Stop reason: HOSPADM

## 2018-12-13 RX ORDER — IPRATROPIUM BROMIDE AND ALBUTEROL SULFATE 2.5; .5 MG/3ML; MG/3ML
3 SOLUTION RESPIRATORY (INHALATION) EVERY 4 HOURS PRN
Status: DISCONTINUED | OUTPATIENT
Start: 2018-12-13 | End: 2018-12-14 | Stop reason: HOSPADM

## 2018-12-13 RX ORDER — SODIUM CHLORIDE 0.9 % (FLUSH) 0.9 %
3 SYRINGE (ML) INJECTION EVERY 12 HOURS SCHEDULED
Status: DISCONTINUED | OUTPATIENT
Start: 2018-12-13 | End: 2018-12-14 | Stop reason: HOSPADM

## 2018-12-13 RX ADMIN — SODIUM CHLORIDE, PRESERVATIVE FREE 3 ML: 5 INJECTION INTRAVENOUS at 20:36

## 2018-12-13 RX ADMIN — IPRATROPIUM BROMIDE AND ALBUTEROL SULFATE 3 ML: .5; 3 SOLUTION RESPIRATORY (INHALATION) at 00:54

## 2018-12-13 RX ADMIN — OXYCODONE HYDROCHLORIDE 10 MG: 5 TABLET ORAL at 15:40

## 2018-12-13 RX ADMIN — OXYCODONE HYDROCHLORIDE 80 MG: 80 TABLET, FILM COATED, EXTENDED RELEASE ORAL at 09:27

## 2018-12-13 RX ADMIN — GABAPENTIN 800 MG: 400 CAPSULE ORAL at 09:28

## 2018-12-13 RX ADMIN — METHYLPREDNISOLONE SODIUM SUCCINATE 60 MG: 125 INJECTION, POWDER, FOR SOLUTION INTRAMUSCULAR; INTRAVENOUS at 12:08

## 2018-12-13 RX ADMIN — IPRATROPIUM BROMIDE AND ALBUTEROL SULFATE 3 ML: .5; 3 SOLUTION RESPIRATORY (INHALATION) at 13:29

## 2018-12-13 RX ADMIN — ACETAMINOPHEN 650 MG: 325 TABLET, FILM COATED ORAL at 06:27

## 2018-12-13 RX ADMIN — ENOXAPARIN SODIUM 40 MG: 40 INJECTION SUBCUTANEOUS at 20:30

## 2018-12-13 RX ADMIN — OXYCODONE HYDROCHLORIDE 80 MG: 80 TABLET, FILM COATED, EXTENDED RELEASE ORAL at 20:29

## 2018-12-13 RX ADMIN — BUDESONIDE 0.5 MG: 0.5 SUSPENSION RESPIRATORY (INHALATION) at 07:12

## 2018-12-13 RX ADMIN — IPRATROPIUM BROMIDE AND ALBUTEROL SULFATE 3 ML: .5; 3 SOLUTION RESPIRATORY (INHALATION) at 07:05

## 2018-12-13 RX ADMIN — BUDESONIDE 0.5 MG: 0.5 SUSPENSION RESPIRATORY (INHALATION) at 18:55

## 2018-12-13 RX ADMIN — NICOTINE 1 PATCH: 21 PATCH, EXTENDED RELEASE TRANSDERMAL at 09:34

## 2018-12-13 RX ADMIN — ATENOLOL 25 MG: 25 TABLET ORAL at 09:27

## 2018-12-13 RX ADMIN — DOXYCYCLINE 100 MG: 100 INJECTION, POWDER, LYOPHILIZED, FOR SOLUTION INTRAVENOUS at 06:27

## 2018-12-13 RX ADMIN — DOXYCYCLINE 100 MG: 100 INJECTION, POWDER, LYOPHILIZED, FOR SOLUTION INTRAVENOUS at 17:36

## 2018-12-13 RX ADMIN — METHYLPREDNISOLONE SODIUM SUCCINATE 60 MG: 125 INJECTION, POWDER, FOR SOLUTION INTRAMUSCULAR; INTRAVENOUS at 22:00

## 2018-12-13 RX ADMIN — GABAPENTIN 800 MG: 400 CAPSULE ORAL at 15:40

## 2018-12-13 RX ADMIN — OXYCODONE HYDROCHLORIDE 10 MG: 5 TABLET ORAL at 09:27

## 2018-12-13 RX ADMIN — IPRATROPIUM BROMIDE AND ALBUTEROL SULFATE 3 ML: .5; 3 SOLUTION RESPIRATORY (INHALATION) at 18:55

## 2018-12-13 RX ADMIN — GABAPENTIN 800 MG: 400 CAPSULE ORAL at 20:29

## 2018-12-13 RX ADMIN — OXYCODONE HYDROCHLORIDE 10 MG: 5 TABLET ORAL at 20:29

## 2018-12-13 RX ADMIN — SODIUM CHLORIDE, PRESERVATIVE FREE 3 ML: 5 INJECTION INTRAVENOUS at 09:32

## 2018-12-13 RX ADMIN — LEVOTHYROXINE SODIUM 25 MCG: 25 TABLET ORAL at 09:27

## 2018-12-13 NOTE — H&P
HISTORY AND PHYSICAL        Patient Identification:  Name:  Paul Gomez  Age:  73 y.o.  Sex:  male  :  1945  MRN:  [id number]   Visit Number:  [id number]  Primary Care Physician:  Caroline Guzman APRN       Subjective     Subjective     Chief complaint:     Chief Complaint   Patient presents with   • Shortness of Breath     Pt reports that he has had increasing shortness of breath and cough.       History of presenting illness:     The patient is a 73 year old male who presented to the ED from PCP with possible pneumonia as he has developed chest congestion, cough and shortness of breath. He reports his symptoms have worsened over the last two months. He denies any fever or chills. The patient was admitted to the observation unit for further evaluation. CRP <0.5 with mild leukopenia. D-dimer elevated at 1.38 with CT per PE protocol showing no evidence of PE. ABG reveals a pH of 7.299. Upon exam this am the patient reports he is feeling significantly better today and is wheezing less. He reports productive cough that has overall improved as well.       ---------------------------------------------------------------------------------------------------------------------     Review Of Systems:    Constitutional: no fever, chills and night sweats. No appetite change or unexpected weight change. No fatigue.  Eyes: no eye drainage, itching or redness.  HEENT: no mouth sores, dysphagia or nose bleed.  Respiratory: Positive for shortness of breath and productive cough.  Cardiovascular: no chest pain, no palpitations, no orthopnea.  Gastrointestinal: no nausea, vomiting or diarrhea. No abdominal pain, hematemesis or rectal bleeding.  Genitourinary: no dysuria or polyuria.  Hematologic/lymphatic: no lymph node abnormalities, no easy bruising or easy bleeding.  Musculoskeletal: no muscle or joint pain.  Skin: No rash and no itching.  Neurological: no loss of consciousness, no seizure, no  headache.  Behavioral/Psych: no depression or suicidal ideation.  Endocrine: no hot flashes.  Immunologic: negative.    ---------------------------------------------------------------------------------------------------------------------     Past Medical History    Past Medical History:   Diagnosis Date   • COPD (chronic obstructive pulmonary disease) (CMS/Formerly [facility])    • Gout    • Hearing loss    • History of degenerative disc disease    • Hyperlipidemia    • Hypertension    • Low back pain    • Pedal edema    • Prediabetes        Past Surgical History    Past Surgical History:   Procedure Laterality Date   • CHOLECYSTECTOMY     • LUMBAR DISCECTOMY     • SHOULDER SURGERY Left        Family History    Family History   Problem Relation Age of Onset   • Cancer Mother        Social History    Social History     Tobacco Use   • Smoking status: Current Every Day Smoker     Packs/day: 0.25     Types: Cigarettes   • Smokeless tobacco: Never Used   • Tobacco comment: patient stated he is down to about 4 cigarettes a day   Substance Use Topics   • Alcohol use: No   • Drug use: No       Allergies    Penicillins  ---------------------------------------------------------------------------------------------------------------------     Home Medications:    Prior to Admission Medications     Prescriptions Last Dose Informant Patient Reported? Taking?    albuterol 108 (90 Base) MCG/ACT inhaler 12/12/2018 Pharmacy No Yes    Inhale 2 puffs Every 6 (Six) Hours As Needed for Wheezing or Shortness of Air.    atenolol (TENORMIN) 25 MG tablet 12/12/2018 Pharmacy Yes Yes    Take 25 mg by mouth Daily.    gabapentin (NEURONTIN) 800 MG tablet 12/12/2018 Pharmacy Yes Yes    Take 800 mg by mouth 3 (Three) Times a Day.    levothyroxine (SYNTHROID, LEVOTHROID) 25 MCG tablet 12/12/2018 Pharmacy No Yes    Take 1 tablet by mouth Daily.    oxyCODONE (ROXICODONE) 10 MG tablet 12/12/2018 Pharmacy Yes Yes    Take 10 mg by mouth 3 (Three) Times a Day.     OxyMORphone HCl ER (OPANA ER) 40 MG tablet extended-release 12 hour 12/12/2018 Pharmacy Yes Yes    Take 1 tablet by mouth 2 (Two) Times a Day.    selenium sulfide (SELSUN) 2.5 % shampoo Past Week Pharmacy No Yes    APPLY TO HAIR AND SCALP LEAVE ON 10 MINUTES THEN SHAMPOO OUT DAILY AS NEEDED FOR DANDRUFF.        ---------------------------------------------------------------------------------------------------------------------    Objective     Objective     Hospital Scheduled Meds:    atenolol 25 mg Oral Daily   budesonide 0.5 mg Nebulization BID - RT   doxycycline 100 mg Intravenous Q12H   enoxaparin 40 mg Subcutaneous Q24H   gabapentin 800 mg Oral TID   levothyroxine 25 mcg Oral Daily   methylPREDNISolone sodium succinate 60 mg Intravenous Q12H   nicotine 1 patch Transdermal Q24H   oxyCODONE 10 mg Oral TID   oxyCODONE ER 80 mg Oral Q12H   sodium chloride 3 mL Intravenous Q12H        ---------------------------------------------------------------------------------------------------------------------   Vital Signs:  Temp:  [97.4 °F (36.3 °C)-98.5 °F (36.9 °C)] 97.4 °F (36.3 °C)  Heart Rate:  [] 101  Resp:  [17-24] 20  BP: (109-143)/(53-79) 129/73  Mean Arterial Pressure (Non-Invasive) for the past 24 hrs (Last 3 readings):   Noninvasive MAP (mmHg)   12/13/18 0720 89   12/13/18 0400 100   12/13/18 0017 95     SpO2 Percentage    12/13/18 0711 12/13/18 0712 12/13/18 0720   SpO2: 99% 99% 99%     SpO2:  [88 %-99 %] 99 %  on  Flow (L/min):  [2] 2;   Device (Oxygen Therapy): nasal cannula    Body mass index is 23.57 kg/m².  Wt Readings from Last 3 Encounters:   12/12/18 70.3 kg (155 lb)   12/12/18 70.3 kg (155 lb)   11/14/18 71.2 kg (157 lb)     ---------------------------------------------------------------------------------------------------------------------     Physical Exam:    Constitutional:  Well-developed and well-nourished.  No respiratory distress.      HENT:  Head: Normocephalic and atraumatic.  Mouth:   Moist mucous membranes.    Eyes:  Conjunctivae and EOM are normal.  No scleral icterus.  Neck:  Neck supple.  No JVD present.    Cardiovascular:  Normal rate, regular rhythm and normal heart sounds with no murmur. No edema.  Pulmonary/Chest:  Significant wheezing bilaterally. No respiratory distress.  Abdominal:  Soft.  Bowel sounds are normal.  No distension and no tenderness.   Musculoskeletal:  No edema, no tenderness, and no deformity.  No swelling or redness of joints.  Neurological:  Alert and oriented to person, place, and time.  No facial droop.  No slurred speech.   Skin:  Skin is warm and dry.  No rash noted.  No pallor.   Psychiatric:  Normal mood and affect.  Behavior is normal.    ---------------------------------------------------------------------------------------------------------------------  I have personally reviewed the EKG/Telemetry strip  ---------------------------------------------------------------------------------------------------------------------   Results from last 7 days   Lab Units  12/12/18   1627  12/06/18   1128   CK TOTAL U/L   --   46   TROPONIN I ng/mL  0.009   --        Results from last 7 days   Lab Units  12/13/18   0059   CHOLESTEROL mg/dL  141   TRIGLYCERIDES mg/dL  32   HDL CHOL mg/dL  52*   LDL CHOL mg/dL  83     Results from last 7 days   Lab Units  12/13/18   0431   PH, ARTERIAL pH units  7.299*   PO2 ART mm Hg  74.7*   PCO2, ARTERIAL mm Hg  48.1*   HCO3 ART mmol/L  23.1     Results from last 7 days   Lab Units  12/13/18 0059 12/12/18 1939 12/12/18 1938 12/12/18   1626   CRP mg/dL  <0.50   --   <0.50   --    LACTATE mmol/L   --    --    --   0.9   WBC 10*3/mm3  4.15*  5.40   --   7.02   HEMOGLOBIN g/dL  12.2*  12.2*   --   12.4*   HEMATOCRIT %  38.3*  38.2*   --   38.4*   MCV fL  96.2*  95.5*   --   94.8*   MCHC g/dL  31.9*  31.9*   --   32.3*   PLATELETS 10*3/mm3  238  232   --   247     Results from last 7 days   Lab Units  12/13/18 0059 12/12/18 1938   12/12/18   1627   SODIUM mmol/L  136  138  141   POTASSIUM mmol/L  4.4  4.2  3.9   MAGNESIUM mg/dL  2.1  2.1   --    CHLORIDE mmol/L  105  106  105   CO2 mmol/L  24.3  24.1*  30.8   BUN mg/dL  15  17  17   CREATININE mg/dL  0.94  0.88  0.95   EGFR IF NONAFRICN AM mL/min/1.73  79  85  78   CALCIUM mg/dL  8.9  8.7  8.9   GLUCOSE mg/dL  176*  125*  116*   ALBUMIN g/dL   --    --   4.10   BILIRUBIN mg/dL   --    --   0.2   ALK PHOS U/L   --    --   62   AST (SGOT) U/L   --    --   17   ALT (SGPT) U/L   --    --   11   Estimated Creatinine Clearance: 69.6 mL/min (by C-G formula based on SCr of 0.94 mg/dL).  No results found for: AMMONIA    Hemoglobin A1C   Date/Time Value Ref Range Status   12/13/2018 0059 6.00 (H) 4.50 - 5.70 % Final     Lab Results   Component Value Date    HGBA1C 6.00 (H) 12/13/2018     Lab Results   Component Value Date    TSH 9.460 (H) 11/01/2018    FREET4 1.04 06/13/2018       Blood Culture   Date Value Ref Range Status   12/12/2018 No growth at less than 24 hours  Preliminary   12/12/2018 No growth at less than 24 hours  Preliminary                 Pain Management Panel     Pain Management Panel Latest Ref Rng & Units 12/6/2018 12/6/2018    CREATININE UR mg/dL 130.3 130.3    AMPHETAMINES SCREEN, URINE Negative - -    BARBITURATES SCREEN Negative - -    BENZODIAZEPINE SCREEN, URINE Negative - -    COCAINE SCREEN, URINE Negative - -    METHADONE SCREEN, URINE Negative - -        I have personally reviewed the above laboratory results.   ---------------------------------------------------------------------------------------------------------------------  Imaging Results (last 7 days)     Procedure Component Value Units Date/Time    XR Chest 1 View [[id number]] Collected:  12/13/18 0847     Updated:  12/13/18 0906    Narrative:       XR CHEST 1 VW-     HISTORY:  SOA.          COMPARISON: 06/12/2018.      TECHNIQUE: Single frontal view of the chest.     FINDINGS:     There is no focal alveolar  infiltrate or effusion.  The cardiac silhouette is normal. The pulmonary vasculature is  unremarkable.  There is no evidence of an acute osseous abnormality.   There are no suspicious-appearing parenchymal soft tissue nodules.            Impression:       No evidence of active or acute cardiopulmonary disease on today's chest  radiograph.         This report was finalized on 12/13/2018 9:04 AM by Dr. Valdo Rogers MD.       CT Chest Pulmonary Embolism With Contrast [[id number]] Collected:  12/13/18 0846     Updated:  12/13/18 0905    Narrative:       CT CHEST PULMONARY EMBOLISM W CONTRAST-     HISTORY:  Pulmonary embolism.          COMPARISON: Chest radiograph 12/12/2018.      PROCEDURE: Thin cut axial images were acquired through the pulmonary  vessels during the rapid infusion of IV contrast.     Additional 3-D reformatted images obtained via post-processing for  improved diagnostic accuracy and procedural planning.     Radiation dose reduction techniques were utilized per ALARA protocol.  Automated exposure control was initiated through either or [employer] or  [employer] software packages by  protocol.           FINDINGS: Today's study demonstrates opacification of the central  pulmonary vessels.   There are no filling defects.   There is no truncation.     No evidence of a pulmonary embolus.     Otherwise there are no parenchymal soft tissue nodules or masses.     There is no mediastinal lymph node enlargement     No pericardial or pleural effusions.  Upper abdomen shows pancreatic calcifications.       Impression:       No evidence of a pulmonary embolus.     This report was finalized on 12/13/2018 9:03 AM by Dr. Valdo Rogers MD.           I have personally reviewed the above radiology results.   ---------------------------------------------------------------------------------------------------------------------      Assessment & Plan        Assessment/Plan       ASSESSMENT:    1. COPD  exacerbation    PLAN:    Solu-medrol increased from 30 mg to 60 mg IV q 12 hours. Duo-neb frequency increased to q 4 hours PRN. Chest xray shows no evidence of pneumonia. CT of chest shows no evidence of PE. Will repeat ABG. Overall showing improvement. Doxycyline 100 mg IV q 12 hours for COPD exacerbation.         Patient's findings and recommendations were discussed with patient and nursing staff      Code Status:   Code Status and Medical Interventions:   Ordered at: 12/12/18 5991     Level Of Support Discussed With:    Patient     Code Status:    CPR     Medical Interventions (Level of Support Prior to Arrest):    Full           Casandra Martel PA-C  12/13/18  9:13 AM

## 2018-12-13 NOTE — PROGRESS NOTES
Continued Stay Note  HEIDE Albarran     Patient Name: Paul Gomez  MRN: [id number]  Today's Date: 12/13/2018    Admit Date: 12/12/2018    Discharge Plan     Row Name 12/13/18 1147       Plan    Plan  Patient came to ED from home with c/o cough, chest pain and wheezing. O2 2L in use. Feeling better. Receiving IV ATB's, Solu-Medrol and Neb tx's. He is Ambulatory ad cherelle. Caroline Guzman is his PCP. He utilizes [facility] and [facility] to obtain his medications. States he has no co-pay issues. Plans to return home living with family support at discharge. CM will follow and assist as needed.    Patient/Family in Agreement with Plan  yes        Discharge Codes    No documentation.             Camila Severino RN

## 2018-12-13 NOTE — PROGRESS NOTES
Pharmacy was asked to talk to Mr. Gomez about smoking cessation techniques and strategies. After meeting with Mr. Gomez, he stated that he hadn't smoked in a week and that the nicotine patches he was receiving inpatient were working really well. To ensure Mr. Gomez could receive some patches upon discharge, I [facility]. Unfortunately, Mr. Gomez' insurance didn't  any of the cost and the cash price came to approximately $36. I informed Mr. Gomez of this and let him know that other locations/pharmacies could have more affordable cash prices. He said that he would shop around and that price wasn't too bad considering he paid that for a week's worth of cigarettes in the past. I gave Mr. Gomez a handout and highlighted the name and strength of the Nicotine patches that he needs to continue upon discharge.   Thank you for this consult,  Jayden Post  12/13/18  10:17 AM

## 2018-12-13 NOTE — PLAN OF CARE
Problem: Patient Care Overview  Goal: Plan of Care Review  Outcome: Ongoing (interventions implemented as appropriate)      Problem: Fall Risk (Adult)  Goal: Identify Related Risk Factors and Signs and Symptoms  Outcome: Ongoing (interventions implemented as appropriate)

## 2018-12-13 NOTE — PLAN OF CARE
Problem: Patient Care Overview  Goal: Individualization and Mutuality  Outcome: Ongoing (interventions implemented as appropriate)    Goal: Discharge Needs Assessment  Outcome: Ongoing (interventions implemented as appropriate)    Goal: Interprofessional Rounds/Family Conf  Outcome: Ongoing (interventions implemented as appropriate)      Problem: Chronic Obstructive Pulmonary Disease (Adult)  Goal: Signs and Symptoms of Listed Potential Problems Will be Absent, Minimized or Managed (Chronic Obstructive Pulmonary Disease)  Outcome: Ongoing (interventions implemented as appropriate)      Problem: Fall Risk (Adult)  Goal: Identify Related Risk Factors and Signs and Symptoms  Outcome: Ongoing (interventions implemented as appropriate)    Goal: Absence of Fall  Outcome: Ongoing (interventions implemented as appropriate)      Problem: Skin Injury Risk (Adult)  Goal: Identify Related Risk Factors and Signs and Symptoms  Outcome: Ongoing (interventions implemented as appropriate)    Goal: Skin Health and Integrity  Outcome: Ongoing (interventions implemented as appropriate)

## 2018-12-14 VITALS
WEIGHT: 155 LBS | HEART RATE: 97 BPM | BODY MASS INDEX: 23.49 KG/M2 | RESPIRATION RATE: 18 BRPM | HEIGHT: 68 IN | TEMPERATURE: 98.3 F | OXYGEN SATURATION: 94 % | SYSTOLIC BLOOD PRESSURE: 133 MMHG | DIASTOLIC BLOOD PRESSURE: 75 MMHG

## 2018-12-14 LAB
ANION GAP SERPL CALCULATED.3IONS-SCNC: 6.3 MMOL/L (ref 3.6–11.2)
BACTERIA SPEC AEROBE CULT: NORMAL
BASOPHILS # BLD AUTO: 0 10*3/MM3 (ref 0–0.3)
BASOPHILS NFR BLD AUTO: 0 % (ref 0–2)
BUN BLD-MCNC: 17 MG/DL (ref 7–21)
BUN/CREAT SERPL: 20.2 (ref 7–25)
CALCIUM SPEC-SCNC: 8.8 MG/DL (ref 7.7–10)
CHLORIDE SERPL-SCNC: 108 MMOL/L (ref 99–112)
CO2 SERPL-SCNC: 24.7 MMOL/L (ref 24.3–31.9)
CREAT BLD-MCNC: 0.84 MG/DL (ref 0.43–1.29)
DEPRECATED RDW RBC AUTO: 45.4 FL (ref 37–54)
EOSINOPHIL # BLD AUTO: 0 10*3/MM3 (ref 0–0.7)
EOSINOPHIL NFR BLD AUTO: 0 % (ref 0–7)
ERYTHROCYTE [DISTWIDTH] IN BLOOD BY AUTOMATED COUNT: 13.6 % (ref 11.5–14.5)
GFR SERPL CREATININE-BSD FRML MDRD: 90 ML/MIN/1.73
GLUCOSE BLD-MCNC: 139 MG/DL (ref 70–110)
HCT VFR BLD AUTO: 35.7 % (ref 42–52)
HGB BLD-MCNC: 11.4 G/DL (ref 14–18)
IMM GRANULOCYTES # BLD: 0.03 10*3/MM3 (ref 0–0.03)
IMM GRANULOCYTES NFR BLD: 0.2 % (ref 0–0.5)
LYMPHOCYTES # BLD AUTO: 0.62 10*3/MM3 (ref 1–3)
LYMPHOCYTES NFR BLD AUTO: 4.5 % (ref 16–46)
MAGNESIUM SERPL-MCNC: 2.4 MG/DL (ref 1.7–2.6)
MCH RBC QN AUTO: 30.7 PG (ref 27–33)
MCHC RBC AUTO-ENTMCNC: 31.9 G/DL (ref 33–37)
MCV RBC AUTO: 96.2 FL (ref 80–94)
MONOCYTES # BLD AUTO: 0.35 10*3/MM3 (ref 0.1–0.9)
MONOCYTES NFR BLD AUTO: 2.5 % (ref 0–12)
NEUTROPHILS # BLD AUTO: 12.86 10*3/MM3 (ref 1.4–6.5)
NEUTROPHILS NFR BLD AUTO: 92.8 % (ref 40–75)
OSMOLALITY SERPL CALC.SUM OF ELEC: 281.3 MOSM/KG (ref 273–305)
PLATELET # BLD AUTO: 211 10*3/MM3 (ref 130–400)
PMV BLD AUTO: 11.1 FL (ref 6–10)
POTASSIUM BLD-SCNC: 4.6 MMOL/L (ref 3.5–5.3)
RBC # BLD AUTO: 3.71 10*6/MM3 (ref 4.7–6.1)
SODIUM BLD-SCNC: 139 MMOL/L (ref 135–153)
WBC NRBC COR # BLD: 13.86 10*3/MM3 (ref 4.5–12.5)

## 2018-12-14 PROCEDURE — 94799 UNLISTED PULMONARY SVC/PX: CPT

## 2018-12-14 PROCEDURE — 85025 COMPLETE CBC W/AUTO DIFF WBC: CPT | Performed by: INTERNAL MEDICINE

## 2018-12-14 PROCEDURE — 96366 THER/PROPH/DIAG IV INF ADDON: CPT

## 2018-12-14 PROCEDURE — 83735 ASSAY OF MAGNESIUM: CPT | Performed by: INTERNAL MEDICINE

## 2018-12-14 PROCEDURE — 96376 TX/PRO/DX INJ SAME DRUG ADON: CPT

## 2018-12-14 PROCEDURE — G0378 HOSPITAL OBSERVATION PER HR: HCPCS

## 2018-12-14 PROCEDURE — 25010000002 METHYLPREDNISOLONE PER 125 MG: Performed by: PHYSICIAN ASSISTANT

## 2018-12-14 PROCEDURE — 80048 BASIC METABOLIC PNL TOTAL CA: CPT | Performed by: INTERNAL MEDICINE

## 2018-12-14 RX ORDER — DOXYCYCLINE HYCLATE 100 MG
100 TABLET ORAL EVERY 12 HOURS
Qty: 14 TABLET | Refills: 0 | Status: SHIPPED | OUTPATIENT
Start: 2018-12-14 | End: 2018-12-14 | Stop reason: SDUPTHER

## 2018-12-14 RX ORDER — PREDNISONE 20 MG/1
TABLET ORAL
Qty: 10 TABLET | Refills: 0 | Status: SHIPPED | OUTPATIENT
Start: 2018-12-14 | End: 2019-02-14

## 2018-12-14 RX ORDER — IPRATROPIUM BROMIDE AND ALBUTEROL SULFATE 2.5; .5 MG/3ML; MG/3ML
SOLUTION RESPIRATORY (INHALATION)
Qty: 18 ML | Refills: 0 | Status: SHIPPED | OUTPATIENT
Start: 2018-12-14 | End: 2019-01-11 | Stop reason: SDUPTHER

## 2018-12-14 RX ORDER — DOXYCYCLINE 100 MG/1
CAPSULE ORAL
Qty: 14 CAPSULE | Refills: 0 | Status: SHIPPED | OUTPATIENT
Start: 2018-12-14 | End: 2019-01-08

## 2018-12-14 RX ADMIN — OXYCODONE HYDROCHLORIDE 10 MG: 5 TABLET ORAL at 08:47

## 2018-12-14 RX ADMIN — OXYCODONE HYDROCHLORIDE 80 MG: 80 TABLET, FILM COATED, EXTENDED RELEASE ORAL at 08:46

## 2018-12-14 RX ADMIN — BUDESONIDE 0.5 MG: 0.5 SUSPENSION RESPIRATORY (INHALATION) at 07:13

## 2018-12-14 RX ADMIN — NICOTINE 1 PATCH: 21 PATCH, EXTENDED RELEASE TRANSDERMAL at 08:48

## 2018-12-14 RX ADMIN — METHYLPREDNISOLONE SODIUM SUCCINATE 60 MG: 125 INJECTION, POWDER, FOR SOLUTION INTRAMUSCULAR; INTRAVENOUS at 09:00

## 2018-12-14 RX ADMIN — IPRATROPIUM BROMIDE AND ALBUTEROL SULFATE 3 ML: .5; 3 SOLUTION RESPIRATORY (INHALATION) at 07:13

## 2018-12-14 RX ADMIN — LEVOTHYROXINE SODIUM 25 MCG: 25 TABLET ORAL at 08:47

## 2018-12-14 RX ADMIN — DOXYCYCLINE 100 MG: 100 INJECTION, POWDER, LYOPHILIZED, FOR SOLUTION INTRAVENOUS at 05:47

## 2018-12-14 RX ADMIN — SODIUM CHLORIDE, PRESERVATIVE FREE 3 ML: 5 INJECTION INTRAVENOUS at 08:47

## 2018-12-14 RX ADMIN — GABAPENTIN 800 MG: 400 CAPSULE ORAL at 08:46

## 2018-12-14 NOTE — PLAN OF CARE
Problem: Patient Care Overview  Goal: Plan of Care Review  Outcome: Ongoing (interventions implemented as appropriate)    Goal: Individualization and Mutuality  Outcome: Ongoing (interventions implemented as appropriate)    Goal: Discharge Needs Assessment  Outcome: Ongoing (interventions implemented as appropriate)    Goal: Interprofessional Rounds/Family Conf  Outcome: Ongoing (interventions implemented as appropriate)      Problem: Chronic Obstructive Pulmonary Disease (Adult)  Goal: Signs and Symptoms of Listed Potential Problems Will be Absent, Minimized or Managed (Chronic Obstructive Pulmonary Disease)  Outcome: Ongoing (interventions implemented as appropriate)      Problem: Fall Risk (Adult)  Goal: Identify Related Risk Factors and Signs and Symptoms  Outcome: Ongoing (interventions implemented as appropriate)    Goal: Absence of Fall  Outcome: Ongoing (interventions implemented as appropriate)      Problem: Skin Injury Risk (Adult)  Goal: Identify Related Risk Factors and Signs and Symptoms  Outcome: Ongoing (interventions implemented as appropriate)    Goal: Skin Health and Integrity  Outcome: Ongoing (interventions implemented as appropriate)

## 2018-12-14 NOTE — DISCHARGE PLACEMENT REQUEST
"Paul Mejia (73 y.o. Male)     Date of Birth Social Security Number Address Home Phone MRN    1945  [address]  [place] [address] [phone] [id number]    Yarsani Marital Status          None        Admission Date Admission Type Admitting Provider Attending Provider Department, Room/Bed    18 Emergency Johnna Garcia MD Kfoury, Wajdi Samir, MD [facility] OBSERVATION UNIT, 210/2S    Discharge Date Discharge Disposition Discharge Destination         Home or Self Care              Attending Provider:  Johnna Garcia MD    Allergies:  Penicillins    Isolation:  None   Infection:  None   Code Status:  CPR    Ht:  172.7 cm (68\")   Wt:  70.3 kg (155 lb)    Admission Cmt:  None   Principal Problem:  None                Active Insurance as of 2018     Primary Coverage     Payor Plan Insurance Group Employer/Plan Group    MEDICARE MEDICARE A & B      Payor Plan Address Payor Plan Phone Number Payor Plan Fax Number Effective Dates    [address] [phone]  2010 - None Entered    [facility] [id number]       Subscriber Name Subscriber Birth Date Member ID       PAUL MEJIA 1945 [id number]           Secondary Coverage     Payor Plan Insurance Group Employer/Plan Group    [place]      Payor Plan Address Payor Plan Phone Number Payor Plan Fax Number Effective Dates    [address] [phone]  2018 - None Entered    [place] [address]       Subscriber Name Subscriber Birth Date Member ID       PAUL MEJIA 1945 [id number]                 Emergency Contacts      (Rel.) Home Phone Work Phone Mobile Phone    Karen Shetty (Daughter) [phone] -- --        [facility] OBSERVATION UNIT  [address]  [place] [address]  Dept. Phone:  [phone]  Dept. Fax:   Date Ordered: Dec 14, 2018         Patient:  Paul Mejia MRN:  [id number]   [address]  [place] [address] :  " "1945  SSN:    Phone: [phone] Sex:  M     Weight: 70.3 kg (155 lb)         Ht Readings from Last 1 Encounters:   12/12/18 172.7 cm (68\")         Home Nebulizer          (Order ID: [id number])    Diagnosis:  Acute exacerbation of chronic obstructive pulmonary disease (COPD) (CMS/HCC) (J44.1 [ICD-10-CM] 491.21 [ICD-9-CM])   Quantity:  1     Nebulizer Equipment:  Nebulizer w/ Compressor  Nebulizer Accessories:  Nebulizer Kit - Administration Set, Non-Disposable  Length of Need (99 Months = Lifetime): 99 Months = Lifetime        Authorizing Provider's Phone: [phone]   Verbal Order Mode: Telephone with readback   Authorizing Provider: Casandra Martel PA-C  Authorizing Provider's NPI: [id number]     Order Entered By: Risa Kirkland RN 12/14/2018 12:28 PM     Electronically signed by:              Emergency Contact Information     Name Relation Home Work Mobile    Karen Shetty [phone]            Insurance Information                MEDICARE/MEDICARE A & B Phone: [phone]    Subscriber: Paul Gomez Subscriber#: [id number]    Group#:  Precert#:         [place] Phone: [phone]    Subscriber: Paul Gomez Subscriber#: [id number]    Group#:  Precert#:           Problem List           Codes Noted - Resolved       Hospital    Acute exacerbation of chronic obstructive pulmonary disease (COPD) (CMS/HCC) ICD-10-CM: J44.1  ICD-9-CM: 491.21 12/12/2018 - Present       Non-Hospital    COPD with exacerbation (CMS/HCC) ICD-10-CM: J44.1  ICD-9-CM: 491.21 9/12/2018 - Present    Tobacco abuse counseling ICD-10-CM: Z71.6  ICD-9-CM: V65.42, 305.1 9/12/2018 - Present    Chest pain ICD-10-CM: R07.9  ICD-9-CM: 786.50 6/13/2018 - Present    Headache, unspecified headache type ICD-10-CM: R51  ICD-9-CM: 784.0 5/18/2018 - Present    Dilated pancreatic duct ICD-10-CM: K86.89  ICD-9-CM: 577.8 5/17/2018 - Present    Epigastric pain ICD-10-CM: " R10.13  ICD-9-CM: 789.06 2018 - Present    Chronic pancreatitis (CMS/HCC) ICD-10-CM: K86.1  ICD-9-CM: 577.1 2018 - Present    B12 deficiency ICD-10-CM: E53.8  ICD-9-CM: 266.2 2017 - Present    Seborrheic eczema ICD-10-CM: L21.9  ICD-9-CM: 690.18 2017 - Present    Hypothyroidism ICD-10-CM: E03.9  ICD-9-CM: 244.9 2017 - Present    Low back pain ICD-10-CM: M54.5  ICD-9-CM: 724.2 Unknown - Present    Hypertension ICD-10-CM: I10  ICD-9-CM: 401.9 Unknown - Present    Hyperlipidemia ICD-10-CM: E78.5  ICD-9-CM: 272.4 Unknown - Present    History of degenerative disc disease ICD-10-CM: Z87.39  ICD-9-CM: V13.59 Unknown - Present    COPD (chronic obstructive pulmonary disease) (CMS/HCC) ICD-10-CM: J44.9  ICD-9-CM: 496 Unknown - Present    Gout ICD-10-CM: M10.9  ICD-9-CM: 274.9 Unknown - Present             History & Physical      Casandra Martel PA-C at 2018  8:17 AM                   HISTORY AND PHYSICAL        Patient Identification:  Name:  Paul Gomez  Age:  73 y.o.  Sex:  male  :  1945  MRN:  [id number]   Visit Number:  [id number]  Primary Care Physician:  Caroline Guzman APRN       Subjective     Subjective     Chief complaint:     Chief Complaint   Patient presents with   • Shortness of Breath     Pt reports that he has had increasing shortness of breath and cough.       History of presenting illness:     The patient is a 73 year old male who presented to the ED from PCP with possible pneumonia as he has developed chest congestion, cough and shortness of breath. He reports his symptoms have worsened over the last two months. He denies any fever or chills. The patient was admitted to the observation unit for further evaluation. CRP <0.5 with mild leukopenia. D-dimer elevated at 1.38 with CT per PE protocol showing no evidence of PE. ABG reveals a pH of 7.299. Upon exam this am the patient reports he is feeling significantly better today and is wheezing less. He reports  productive cough that has overall improved as well.       ---------------------------------------------------------------------------------------------------------------------     Review Of Systems:    Constitutional: no fever, chills and night sweats. No appetite change or unexpected weight change. No fatigue.  Eyes: no eye drainage, itching or redness.  HEENT: no mouth sores, dysphagia or nose bleed.  Respiratory: Positive for shortness of breath and productive cough.  Cardiovascular: no chest pain, no palpitations, no orthopnea.  Gastrointestinal: no nausea, vomiting or diarrhea. No abdominal pain, hematemesis or rectal bleeding.  Genitourinary: no dysuria or polyuria.  Hematologic/lymphatic: no lymph node abnormalities, no easy bruising or easy bleeding.  Musculoskeletal: no muscle or joint pain.  Skin: No rash and no itching.  Neurological: no loss of consciousness, no seizure, no headache.  Behavioral/Psych: no depression or suicidal ideation.  Endocrine: no hot flashes.  Immunologic: negative.    ---------------------------------------------------------------------------------------------------------------------     Past Medical History    Past Medical History:   Diagnosis Date   • COPD (chronic obstructive pulmonary disease) (CMS/HCC)    • Gout    • Hearing loss    • History of degenerative disc disease    • Hyperlipidemia    • Hypertension    • Low back pain    • Pedal edema    • Prediabetes        Past Surgical History    Past Surgical History:   Procedure Laterality Date   • CHOLECYSTECTOMY     • LUMBAR DISCECTOMY     • SHOULDER SURGERY Left        Family History    Family History   Problem Relation Age of Onset   • Cancer Mother        Social History    Social History     Tobacco Use   • Smoking status: Current Every Day Smoker     Packs/day: 0.25     Types: Cigarettes   • Smokeless tobacco: Never Used   • Tobacco comment: patient stated he is down to about 4 cigarettes a day   Substance Use Topics   •  Alcohol use: No   • Drug use: No       Allergies    Penicillins  ---------------------------------------------------------------------------------------------------------------------     Home Medications:    Prior to Admission Medications     Prescriptions Last Dose Informant Patient Reported? Taking?    albuterol 108 (90 Base) MCG/ACT inhaler 12/12/2018 Pharmacy No Yes    Inhale 2 puffs Every 6 (Six) Hours As Needed for Wheezing or Shortness of Air.    atenolol (TENORMIN) 25 MG tablet 12/12/2018 Pharmacy Yes Yes    Take 25 mg by mouth Daily.    gabapentin (NEURONTIN) 800 MG tablet 12/12/2018 Pharmacy Yes Yes    Take 800 mg by mouth 3 (Three) Times a Day.    levothyroxine (SYNTHROID, LEVOTHROID) 25 MCG tablet 12/12/2018 Pharmacy No Yes    Take 1 tablet by mouth Daily.    oxyCODONE (ROXICODONE) 10 MG tablet 12/12/2018 Pharmacy Yes Yes    Take 10 mg by mouth 3 (Three) Times a Day.    OxyMORphone HCl ER (OPANA ER) 40 MG tablet extended-release 12 hour 12/12/2018 Pharmacy Yes Yes    Take 1 tablet by mouth 2 (Two) Times a Day.    selenium sulfide (SELSUN) 2.5 % shampoo Past Week Pharmacy No Yes    APPLY TO HAIR AND SCALP LEAVE ON 10 MINUTES THEN SHAMPOO OUT DAILY AS NEEDED FOR DANDRUFF.        ---------------------------------------------------------------------------------------------------------------------    Objective     Objective     Hospital Scheduled Meds:    atenolol 25 mg Oral Daily   budesonide 0.5 mg Nebulization BID - RT   doxycycline 100 mg Intravenous Q12H   enoxaparin 40 mg Subcutaneous Q24H   gabapentin 800 mg Oral TID   levothyroxine 25 mcg Oral Daily   methylPREDNISolone sodium succinate 60 mg Intravenous Q12H   nicotine 1 patch Transdermal Q24H   oxyCODONE 10 mg Oral TID   oxyCODONE ER 80 mg Oral Q12H   sodium chloride 3 mL Intravenous Q12H        ---------------------------------------------------------------------------------------------------------------------   Vital Signs:  Temp:  [97.4 °F (36.3  °C)-98.5 °F (36.9 °C)] 97.4 °F (36.3 °C)  Heart Rate:  [] 101  Resp:  [17-24] 20  BP: (109-143)/(53-79) 129/73  Mean Arterial Pressure (Non-Invasive) for the past 24 hrs (Last 3 readings):   Noninvasive MAP (mmHg)   12/13/18 0720 89   12/13/18 0400 100   12/13/18 0017 95     SpO2 Percentage    12/13/18 0711 12/13/18 0712 12/13/18 0720   SpO2: 99% 99% 99%     SpO2:  [88 %-99 %] 99 %  on  Flow (L/min):  [2] 2;   Device (Oxygen Therapy): nasal cannula    Body mass index is 23.57 kg/m².  Wt Readings from Last 3 Encounters:   12/12/18 70.3 kg (155 lb)   12/12/18 70.3 kg (155 lb)   11/14/18 71.2 kg (157 lb)     ---------------------------------------------------------------------------------------------------------------------     Physical Exam:    Constitutional:  Well-developed and well-nourished.  No respiratory distress.      HENT:  Head: Normocephalic and atraumatic.  Mouth:  Moist mucous membranes.    Eyes:  Conjunctivae and EOM are normal.  No scleral icterus.  Neck:  Neck supple.  No JVD present.    Cardiovascular:  Normal rate, regular rhythm and normal heart sounds with no murmur. No edema.  Pulmonary/Chest:  Significant wheezing bilaterally. No respiratory distress.  Abdominal:  Soft.  Bowel sounds are normal.  No distension and no tenderness.   Musculoskeletal:  No edema, no tenderness, and no deformity.  No swelling or redness of joints.  Neurological:  Alert and oriented to person, place, and time.  No facial droop.  No slurred speech.   Skin:  Skin is warm and dry.  No rash noted.  No pallor.   Psychiatric:  Normal mood and affect.  Behavior is normal.    ---------------------------------------------------------------------------------------------------------------------  I have personally reviewed the EKG/Telemetry strip  ---------------------------------------------------------------------------------------------------------------------   Results from last 7 days   Lab Units  12/12/18   1581   12/06/18   1128   CK TOTAL U/L   --   46   TROPONIN I ng/mL  0.009   --        Results from last 7 days   Lab Units  12/13/18   0059   CHOLESTEROL mg/dL  141   TRIGLYCERIDES mg/dL  32   HDL CHOL mg/dL  52*   LDL CHOL mg/dL  83     Results from last 7 days   Lab Units  12/13/18   0431   PH, ARTERIAL pH units  7.299*   PO2 ART mm Hg  74.7*   PCO2, ARTERIAL mm Hg  48.1*   HCO3 ART mmol/L  23.1     Results from last 7 days   Lab Units  12/13/18 0059 12/12/18 1939 12/12/18 1938 12/12/18   1626   CRP mg/dL  <0.50   --   <0.50   --    LACTATE mmol/L   --    --    --   0.9   WBC 10*3/mm3  4.15*  5.40   --   7.02   HEMOGLOBIN g/dL  12.2*  12.2*   --   12.4*   HEMATOCRIT %  38.3*  38.2*   --   38.4*   MCV fL  96.2*  95.5*   --   94.8*   MCHC g/dL  31.9*  31.9*   --   32.3*   PLATELETS 10*3/mm3  238  232   --   247     Results from last 7 days   Lab Units  12/13/18 0059 12/12/18 1938 12/12/18   1627   SODIUM mmol/L  136  138  141   POTASSIUM mmol/L  4.4  4.2  3.9   MAGNESIUM mg/dL  2.1  2.1   --    CHLORIDE mmol/L  105  106  105   CO2 mmol/L  24.3  24.1*  30.8   BUN mg/dL  15  17  17   CREATININE mg/dL  0.94  0.88  0.95   EGFR IF NONAFRICN AM mL/min/1.73  79  85  78   CALCIUM mg/dL  8.9  8.7  8.9   GLUCOSE mg/dL  176*  125*  116*   ALBUMIN g/dL   --    --   4.10   BILIRUBIN mg/dL   --    --   0.2   ALK PHOS U/L   --    --   62   AST (SGOT) U/L   --    --   17   ALT (SGPT) U/L   --    --   11   Estimated Creatinine Clearance: 69.6 mL/min (by C-G formula based on SCr of 0.94 mg/dL).  No results found for: AMMONIA    Hemoglobin A1C   Date/Time Value Ref Range Status   12/13/2018 0059 6.00 (H) 4.50 - 5.70 % Final     Lab Results   Component Value Date    HGBA1C 6.00 (H) 12/13/2018     Lab Results   Component Value Date    TSH 9.460 (H) 11/01/2018    FREET4 1.04 06/13/2018       Blood Culture   Date Value Ref Range Status   12/12/2018 No growth at less than 24 hours  Preliminary   12/12/2018 No growth at less than 24  hours  Preliminary                 Pain Management Panel     Pain Management Panel Latest Ref Rng & Units 12/6/2018 12/6/2018    CREATININE UR mg/dL 130.3 130.3    AMPHETAMINES SCREEN, URINE Negative - -    BARBITURATES SCREEN Negative - -    BENZODIAZEPINE SCREEN, URINE Negative - -    COCAINE SCREEN, URINE Negative - -    METHADONE SCREEN, URINE Negative - -        I have personally reviewed the above laboratory results.   ---------------------------------------------------------------------------------------------------------------------  Imaging Results (last 7 days)     Procedure Component Value Units Date/Time    XR Chest 1 View [[id number]] Collected:  12/13/18 0847     Updated:  12/13/18 0906    Narrative:       XR CHEST 1 VW-     HISTORY:  SOA.          COMPARISON: 06/12/2018.      TECHNIQUE: Single frontal view of the chest.     FINDINGS:     There is no focal alveolar infiltrate or effusion.  The cardiac silhouette is normal. The pulmonary vasculature is  unremarkable.  There is no evidence of an acute osseous abnormality.   There are no suspicious-appearing parenchymal soft tissue nodules.            Impression:       No evidence of active or acute cardiopulmonary disease on today's chest  radiograph.         This report was finalized on 12/13/2018 9:04 AM by Dr. Valdo Rogers MD.       CT Chest Pulmonary Embolism With Contrast [[id number]] Collected:  12/13/18 0846     Updated:  12/13/18 0905    Narrative:       CT CHEST PULMONARY EMBOLISM W CONTRAST-     HISTORY:  Pulmonary embolism.          COMPARISON: Chest radiograph 12/12/2018.      PROCEDURE: Thin cut axial images were acquired through the pulmonary  vessels during the rapid infusion of IV contrast.     Additional 3-D reformatted images obtained via post-processing for  improved diagnostic accuracy and procedural planning.     Radiation dose reduction techniques were utilized per ALARA protocol.  Automated exposure control was initiated through  either or [employer] or  [employer] software packages by  protocol.           FINDINGS: Today's study demonstrates opacification of the central  pulmonary vessels.   There are no filling defects.   There is no truncation.     No evidence of a pulmonary embolus.     Otherwise there are no parenchymal soft tissue nodules or masses.     There is no mediastinal lymph node enlargement     No pericardial or pleural effusions.  Upper abdomen shows pancreatic calcifications.       Impression:       No evidence of a pulmonary embolus.     This report was finalized on 12/13/2018 9:03 AM by Dr. Valdo Rogers MD.           I have personally reviewed the above radiology results.   ---------------------------------------------------------------------------------------------------------------------      Assessment & Plan        Assessment/Plan       ASSESSMENT:    1. COPD exacerbation    PLAN:    Solu-medrol increased from 30 mg to 60 mg IV q 12 hours. Duo-neb frequency increased to q 4 hours PRN. Chest xray shows no evidence of pneumonia. CT of chest shows no evidence of PE. Will repeat ABG. Overall showing improvement. Doxycyline 100 mg IV q 12 hours for COPD exacerbation.         Patient's findings and recommendations were discussed with patient and nursing staff      Code Status:   Code Status and Medical Interventions:   Ordered at: 12/12/18 1916     Level Of Support Discussed With:    Patient     Code Status:    CPR     Medical Interventions (Level of Support Prior to Arrest):    Full           Casandra Martel PA-C  12/13/18  9:13 AM      Electronically signed by Casandra Martel PA-C at 12/13/2018  9:24 AM

## 2018-12-14 NOTE — DISCHARGE PLACEMENT REQUEST
"PatriciaPaul hendricks (73 y.o. Male)     Date of Birth Social Security Number Address Home Phone MRN    1945  [address] [facility] [address] [phone] [id number]    M[employer]onism Marital Status          None        Admission Date Admission Type Admitting Provider Attending Provider Department, Room/Bed    12/12/18 Emergency Johnna Garcia MD Kfoury, Wajdi Samir, MD [facility] OBSERVATION UNIT, 210/2S    Discharge Date Discharge Disposition Discharge Destination         Home or Self Care              Attending Provider:  Johnna Garcia MD    Allergies:  Penicillins    Isolation:  None   Infection:  None   Code Status:  CPR    Ht:  172.7 cm (68\")   Wt:  70.3 kg (155 lb)    Admission Cmt:  None   Principal Problem:  None                Active Insurance as of 12/12/2018     Primary Coverage     Payor Plan Insurance Group Employer/Plan Group    MEDICARE MEDICARE A & B      Payor Plan Address Payor Plan Phone Number Payor Plan Fax Number Effective Dates    [address] [phone]  11/1/2010 - None Entered    [facility] [address]       Subscriber Name Subscriber Birth Date Member ID       PAUL MEJIA 1945 [id number]           Secondary Coverage     Payor Plan Insurance Group Employer/Plan Group    [place]      Payor Plan Address Payor Plan Phone Number Payor Plan Fax Number Effective Dates    [address] [phone]  4/30/2018 - None Entered    [place] [address]       Subscriber Name Subscriber Birth Date Member ID       PAUL MEJIA 1945 [id number]                 Emergency Contacts      (Rel.) Home Phone Work Phone Mobile Phone    Karen Shetty (Daughter) [phone] -- --            Emergency Contact Information     Name Relation Home Work Mobile    Karen Shetty Daughter [phone]            Insurance Information                MEDICARE/MEDICARE A & B Phone: [phone]    Subscriber: PatriciaPaul hendricks Subscriber#: " [id number]    Group#:  Precert#:         [place] Phone: [phone]    Subscriber: Paul Gomez Subscriber#: [id number]    Group#:  Precert#:              History & Physical      Casandra Martel PA-C at 2018  8:17 AM                   HISTORY AND PHYSICAL        Patient Identification:  Name:  Paul Gomez  Age:  73 y.o.  Sex:  male  :  1945  MRN:  [id number]   Visit Number:  [id number]  Primary Care Physician:  Caroline Guzman APRN       Subjective     Subjective     Chief complaint:     Chief Complaint   Patient presents with   • Shortness of Breath     Pt reports that he has had increasing shortness of breath and cough.       History of presenting illness:     The patient is a 73 year old male who presented to the ED from PCP with possible pneumonia as he has developed chest congestion, cough and shortness of breath. He reports his symptoms have worsened over the last two months. He denies any fever or chills. The patient was admitted to the observation unit for further evaluation. CRP <0.5 with mild leukopenia. D-dimer elevated at 1.38 with CT per PE protocol showing no evidence of PE. ABG reveals a pH of 7.299. Upon exam this am the patient reports he is feeling significantly better today and is wheezing less. He reports productive cough that has overall improved as well.       ---------------------------------------------------------------------------------------------------------------------     Review Of Systems:    Constitutional: no fever, chills and night sweats. No appetite change or unexpected weight change. No fatigue.  Eyes: no eye drainage, itching or redness.  HEENT: no mouth sores, dysphagia or nose bleed.  Respiratory: Positive for shortness of breath and productive cough.  Cardiovascular: no chest pain, no palpitations, no orthopnea.  Gastrointestinal: no nausea, vomiting or diarrhea. No abdominal pain, hematemesis or rectal  bleeding.  Genitourinary: no dysuria or polyuria.  Hematologic/lymphatic: no lymph node abnormalities, no easy bruising or easy bleeding.  Musculoskeletal: no muscle or joint pain.  Skin: No rash and no itching.  Neurological: no loss of consciousness, no seizure, no headache.  Behavioral/Psych: no depression or suicidal ideation.  Endocrine: no hot flashes.  Immunologic: negative.    ---------------------------------------------------------------------------------------------------------------------     Past Medical History    Past Medical History:   Diagnosis Date   • COPD (chronic obstructive pulmonary disease) (CMS/[facility])    • Gout    • Hearing loss    • History of degenerative disc disease    • Hyperlipidemia    • Hypertension    • Low back pain    • Pedal edema    • Prediabetes        Past Surgical History    Past Surgical History:   Procedure Laterality Date   • CHOLECYSTECTOMY     • LUMBAR DISCECTOMY     • SHOULDER SURGERY Left        Family History    Family History   Problem Relation Age of Onset   • Cancer Mother        Social History    Social History     Tobacco Use   • Smoking status: Current Every Day Smoker     Packs/day: 0.25     Types: Cigarettes   • Smokeless tobacco: Never Used   • Tobacco comment: patient stated he is down to about 4 cigarettes a day   Substance Use Topics   • Alcohol use: No   • Drug use: No       Allergies    Penicillins  ---------------------------------------------------------------------------------------------------------------------     Home Medications:    Prior to Admission Medications     Prescriptions Last Dose Informant Patient Reported? Taking?    albuterol 108 (90 Base) MCG/ACT inhaler 12/12/2018 Pharmacy No Yes    Inhale 2 puffs Every 6 (Six) Hours As Needed for Wheezing or Shortness of Air.    atenolol (TENORMIN) 25 MG tablet 12/12/2018 Pharmacy Yes Yes    Take 25 mg by mouth Daily.    gabapentin (NEURONTIN) 800 MG tablet 12/12/2018 Pharmacy Yes Yes    Take 800 mg  by mouth 3 (Three) Times a Day.    levothyroxine (SYNTHROID, LEVOTHROID) 25 MCG tablet 12/12/2018 Pharmacy No Yes    Take 1 tablet by mouth Daily.    oxyCODONE (ROXICODONE) 10 MG tablet 12/12/2018 Pharmacy Yes Yes    Take 10 mg by mouth 3 (Three) Times a Day.    OxyMORphone HCl ER (OPANA ER) 40 MG tablet extended-release 12 hour 12/12/2018 Pharmacy Yes Yes    Take 1 tablet by mouth 2 (Two) Times a Day.    selenium sulfide (SELSUN) 2.5 % shampoo Past Week Pharmacy No Yes    APPLY TO HAIR AND SCALP LEAVE ON 10 MINUTES THEN SHAMPOO OUT DAILY AS NEEDED FOR DANDRUFF.        ---------------------------------------------------------------------------------------------------------------------    Objective     Objective     Hospital Scheduled Meds:    atenolol 25 mg Oral Daily   budesonide 0.5 mg Nebulization BID - RT   doxycycline 100 mg Intravenous Q12H   enoxaparin 40 mg Subcutaneous Q24H   gabapentin 800 mg Oral TID   levothyroxine 25 mcg Oral Daily   methylPREDNISolone sodium succinate 60 mg Intravenous Q12H   nicotine 1 patch Transdermal Q24H   oxyCODONE 10 mg Oral TID   oxyCODONE ER 80 mg Oral Q12H   sodium chloride 3 mL Intravenous Q12H        ---------------------------------------------------------------------------------------------------------------------   Vital Signs:  Temp:  [97.4 °F (36.3 °C)-98.5 °F (36.9 °C)] 97.4 °F (36.3 °C)  Heart Rate:  [] 101  Resp:  [17-24] 20  BP: (109-143)/(53-79) 129/73  Mean Arterial Pressure (Non-Invasive) for the past 24 hrs (Last 3 readings):   Noninvasive MAP (mmHg)   12/13/18 0720 89   12/13/18 0400 100   12/13/18 0017 95     SpO2 Percentage    12/13/18 0711 12/13/18 0712 12/13/18 0720   SpO2: 99% 99% 99%     SpO2:  [88 %-99 %] 99 %  on  Flow (L/min):  [2] 2;   Device (Oxygen Therapy): nasal cannula    Body mass index is 23.57 kg/m².  Wt Readings from Last 3 Encounters:   12/12/18 70.3 kg (155 lb)   12/12/18 70.3 kg (155 lb)   11/14/18 71.2 kg (157 lb)      ---------------------------------------------------------------------------------------------------------------------     Physical Exam:    Constitutional:  Well-developed and well-nourished.  No respiratory distress.      HENT:  Head: Normocephalic and atraumatic.  Mouth:  Moist mucous membranes.    Eyes:  Conjunctivae and EOM are normal.  No scleral icterus.  Neck:  Neck supple.  No JVD present.    Cardiovascular:  Normal rate, regular rhythm and normal heart sounds with no murmur. No edema.  Pulmonary/Chest:  Significant wheezing bilaterally. No respiratory distress.  Abdominal:  Soft.  Bowel sounds are normal.  No distension and no tenderness.   Musculoskeletal:  No edema, no tenderness, and no deformity.  No swelling or redness of joints.  Neurological:  Alert and oriented to person, place, and time.  No facial droop.  No slurred speech.   Skin:  Skin is warm and dry.  No rash noted.  No pallor.   Psychiatric:  Normal mood and affect.  Behavior is normal.    ---------------------------------------------------------------------------------------------------------------------  I have personally reviewed the EKG/Telemetry strip  ---------------------------------------------------------------------------------------------------------------------   Results from last 7 days   Lab Units  12/12/18   1627  12/06/18   1128   CK TOTAL U/L   --   46   TROPONIN I ng/mL  0.009   --        Results from last 7 days   Lab Units  12/13/18   0059   CHOLESTEROL mg/dL  141   TRIGLYCERIDES mg/dL  32   HDL CHOL mg/dL  52*   LDL CHOL mg/dL  83     Results from last 7 days   Lab Units  12/13/18   0431   PH, ARTERIAL pH units  7.299*   PO2 ART mm Hg  74.7*   PCO2, ARTERIAL mm Hg  48.1*   HCO3 ART mmol/L  23.1     Results from last 7 days   Lab Units  12/13/18   0059  12/12/18 1939 12/12/18   1938 12/12/18   1626   CRP mg/dL  <0.50   --   <0.50   --    LACTATE mmol/L   --    --    --   0.9   WBC 10*3/mm3  4.15*  5.40   --   7.02    HEMOGLOBIN g/dL  12.2*  12.2*   --   12.4*   HEMATOCRIT %  38.3*  38.2*   --   38.4*   MCV fL  96.2*  95.5*   --   94.8*   MCHC g/dL  31.9*  31.9*   --   32.3*   PLATELETS 10*3/mm3  238  232   --   247     Results from last 7 days   Lab Units  12/13/18   0059  12/12/18   1938  12/12/18   1627   SODIUM mmol/L  136  138  141   POTASSIUM mmol/L  4.4  4.2  3.9   MAGNESIUM mg/dL  2.1  2.1   --    CHLORIDE mmol/L  105  106  105   CO2 mmol/L  24.3  24.1*  30.8   BUN mg/dL  15  17  17   CREATININE mg/dL  0.94  0.88  0.95   EGFR IF NONAFRICN AM mL/min/1.73  79  85  78   CALCIUM mg/dL  8.9  8.7  8.9   GLUCOSE mg/dL  176*  125*  116*   ALBUMIN g/dL   --    --   4.10   BILIRUBIN mg/dL   --    --   0.2   ALK PHOS U/L   --    --   62   AST (SGOT) U/L   --    --   17   ALT (SGPT) U/L   --    --   11   Estimated Creatinine Clearance: 69.6 mL/min (by C-G formula based on SCr of 0.94 mg/dL).  No results found for: AMMONIA    Hemoglobin A1C   Date/Time Value Ref Range Status   12/13/2018 0059 6.00 (H) 4.50 - 5.70 % Final     Lab Results   Component Value Date    HGBA1C 6.00 (H) 12/13/2018     Lab Results   Component Value Date    TSH 9.460 (H) 11/01/2018    FREET4 1.04 06/13/2018       Blood Culture   Date Value Ref Range Status   12/12/2018 No growth at less than 24 hours  Preliminary   12/12/2018 No growth at less than 24 hours  Preliminary                 Pain Management Panel     Pain Management Panel Latest Ref Rng & Units 12/6/2018 12/6/2018    CREATININE UR mg/dL 130.3 130.3    AMPHETAMINES SCREEN, URINE Negative - -    BARBITURATES SCREEN Negative - -    BENZODIAZEPINE SCREEN, URINE Negative - -    COCAINE SCREEN, URINE Negative - -    METHADONE SCREEN, URINE Negative - -        I have personally reviewed the above laboratory results.   ---------------------------------------------------------------------------------------------------------------------  Imaging Results (last 7 days)     Procedure Component Value Units  Date/Time    XR Chest 1 View [[id number]] Collected:  12/13/18 0847     Updated:  12/13/18 0906    Narrative:       XR CHEST 1 VW-     HISTORY:  SOA.          COMPARISON: 06/12/2018.      TECHNIQUE: Single frontal view of the chest.     FINDINGS:     There is no focal alveolar infiltrate or effusion.  The cardiac silhouette is normal. The pulmonary vasculature is  unremarkable.  There is no evidence of an acute osseous abnormality.   There are no suspicious-appearing parenchymal soft tissue nodules.            Impression:       No evidence of active or acute cardiopulmonary disease on today's chest  radiograph.         This report was finalized on 12/13/2018 9:04 AM by Dr. Valdo Rogers MD.       CT Chest Pulmonary Embolism With Contrast [[id number]] Collected:  12/13/18 0846     Updated:  12/13/18 0905    Narrative:       CT CHEST PULMONARY EMBOLISM W CONTRAST-     HISTORY:  Pulmonary embolism.          COMPARISON: Chest radiograph 12/12/2018.      PROCEDURE: Thin cut axial images were acquired through the pulmonary  vessels during the rapid infusion of IV contrast.     Additional 3-D reformatted images obtained via post-processing for  improved diagnostic accuracy and procedural planning.     Radiation dose reduction techniques were utilized per ALARA protocol.  Automated exposure control was initiated through either or CareDose or  DoseRight software packages by  protocol.           FINDINGS: Today's study demonstrates opacification of the central  pulmonary vessels.   There are no filling defects.   There is no truncation.     No evidence of a pulmonary embolus.     Otherwise there are no parenchymal soft tissue nodules or masses.     There is no mediastinal lymph node enlargement     No pericardial or pleural effusions.  Upper abdomen shows pancreatic calcifications.       Impression:       No evidence of a pulmonary embolus.     This report was finalized on 12/13/2018 9:03 AM by Dr. Valdo Rogers  "MD.           I have personally reviewed the above radiology results.   ---------------------------------------------------------------------------------------------------------------------      Assessment & Plan        Assessment/Plan       ASSESSMENT:    1. COPD exacerbation    PLAN:    Solu-medrol increased from 30 mg to 60 mg IV q 12 hours. Duo-neb frequency increased to q 4 hours PRN. Chest xray shows no evidence of pneumonia. CT of chest shows no evidence of PE. Will repeat ABG. Overall showing improvement. Doxycyline 100 mg IV q 12 hours for COPD exacerbation.         Patient's findings and recommendations were discussed with patient and nursing staff      Code Status:   Code Status and Medical Interventions:   Ordered at: 18     Level Of Support Discussed With:    Patient     Code Status:    CPR     Medical Interventions (Level of Support Prior to Arrest):    Full           Casandra Martel PA-C  18  9:13 AM      Electronically signed by Casandra Martel PA-C at 2018  9:24 AM       Physician Progress Notes (most recent note)     No notes of this type exist for this encounter.        [facility] OBSERVATION UNIT  [address] [facility]  [place] [address]  Dept. Phone:  [phone]  Dept. Fax:   Date Ordered: Dec 14, 2018         Patient:  Paul Gomez MRN:  [id number]   [address] [place] [address] :  1945  SSN:    Phone: [phone] Sex:  M     Weight: 70.3 kg (155 lb)         Ht Readings from Last 1 Encounters:   18 172.7 cm (68\")         Home Nebulizer          (Order ID: [id number])    Diagnosis:  Acute exacerbation of chronic obstructive pulmonary disease (COPD) (CMS/[facility]) (J44.1 [ICD-10-CM] 491.21 [ICD-9-CM])   Quantity:  1     Nebulizer Equipment:  Nebulizer w/ Compressor  Nebulizer Accessories:  Nebulizer Kit - Administration Set, Non-Disposable  Length of Need (99 Months = Lifetime): 99 Months = Lifetime        Authorizing " Provider's Phone: [phone]   Verbal Order Mode: Telephone with readback   Authorizing Provider: Casandra Martel PA-C  Authorizing Provider's NPI: [id number]     Order Entered By: Risa Kirkland RN 12/14/2018 12:28 PM     Electronically signed by:

## 2018-12-14 NOTE — PLAN OF CARE
Problem: Patient Care Overview  Goal: Plan of Care Review  Outcome: Ongoing (interventions implemented as appropriate)    Goal: Individualization and Mutuality  Outcome: Ongoing (interventions implemented as appropriate)      Problem: Chronic Obstructive Pulmonary Disease (Adult)  Goal: Signs and Symptoms of Listed Potential Problems Will be Absent, Minimized or Managed (Chronic Obstructive Pulmonary Disease)  Outcome: Ongoing (interventions implemented as appropriate)      Problem: Fall Risk (Adult)  Goal: Identify Related Risk Factors and Signs and Symptoms  Outcome: Ongoing (interventions implemented as appropriate)

## 2018-12-14 NOTE — DISCHARGE SUMMARY
DISCHARGE SUMMARY        Patient Identification:  Name:  Paul Gomez  Age:  73 y.o.  Sex:  male  :  1945  MRN:  [id number]  Visit Number:  [id number]    Date of Admission: 2018  Date of Discharge:  2018    PCP: Caroline Guzman APRN    Discharging Provider: Casandra Martel PA-C      Discharge Diagnoses     COPD Exacerbation      Consults/Procedures     Consults:   Consults     Date and Time Order Name Status Description    2018 1756 IP General Consult (Use specialty-specific consult if known)            Procedures Performed:     N/A    History of Presenting Illness     Patient is a 73 y.o. male presented to [facility] complaining of shortness of breath.  Please see the admitting history and physical for further details.      Hospital Course     Patient was admitted to the observation unit for COPD Exacerbation. Recieved IV Solumedrol and IV Doxycycline. The patient has shown significant improvement overnight. CT per PE protocol with elevated d-dimer shows no evidence of PE. ABG has shown improvement. Patient will be discharged today to continue Doxycycline 100 mg PO BID x 7 days. COPD rescue kit ordered. Patient will need to follow up with PCP in one week.     Discharge Vitals/Physical Examination     Vital Signs:  Temp:  [97.6 °F (36.4 °C)-98.5 °F (36.9 °C)] 97.6 °F (36.4 °C)  Heart Rate:  [] 89  Resp:  [18-24] 18  BP: (103-129)/(58-62) 107/59  Mean Arterial Pressure (Non-Invasive) for the past 24 hrs (Last 3 readings):   Noninvasive MAP (mmHg)   18 0724 75   18 0519 75   18 74     SpO2 Percentage    18 0519 18 0713 18 07   SpO2: 96% 96% 93%     SpO2:  [91 %-98 %] 93 %  on  Flow (L/min):  [2] 2;   Device (Oxygen Therapy): room air    Body mass index is 23.57 kg/m².  Wt Readings from Last 3 Encounters:   18 70.3 kg (155 lb)   18 70.3 kg (155 lb)   18 71.2 kg (157 lb)         Physical  Exam:    Constitutional:  Well-developed and well-nourished.  No respiratory distress.      HENT:  Head: Normocephalic and atraumatic.  Mouth:  Moist mucous membranes.    Eyes:  Conjunctivae and EOM are normal.  No scleral icterus.  Neck:  Neck supple.  No JVD present.    Cardiovascular:  Normal rate, regular rhythm and normal heart sounds with no murmur. No edema.  Pulmonary/Chest:  Wheezing but much improved. No respiratory distress, no crackles, with normal breath sounds and good air movement.  Abdominal:  Soft.  Bowel sounds are normal.  No distension and no tenderness.   Musculoskeletal:  No edema, no tenderness, and no deformity.  No swelling or redness of joints.  Neurological:  Alert and oriented to person, place, and time.  No facial droop.  No slurred speech.   Skin:  Skin is warm and dry.  No rash noted.  No pallor.   Psychiatric:  Normal mood and affect.  Behavior is normal.      Pertinent Laboratory/Radiology Results     Pertinent Laboratory Results:    Results from last 7 days   Lab Units  12/12/18   1627   TROPONIN I ng/mL  0.009       Results from last 7 days   Lab Units  12/13/18   0059   CHOLESTEROL mg/dL  141   TRIGLYCERIDES mg/dL  32   HDL CHOL mg/dL  52*   LDL CHOL mg/dL  83     Results from last 7 days   Lab Units  12/13/18   1407   PH, ARTERIAL pH units  7.341*   PO2 ART mm Hg  73.0*   PCO2, ARTERIAL mm Hg  47.8*   HCO3 ART mmol/L  25.3     Results from last 7 days   Lab Units  12/14/18   0335  12/13/18   0059  12/12/18   1939  12/12/18   1938  12/12/18   1626   CRP mg/dL   --   <0.50   --   <0.50   --    LACTATE mmol/L   --    --    --    --   0.9   WBC 10*3/mm3  13.86*  4.15*  5.40   --   7.02   HEMOGLOBIN g/dL  11.4*  12.2*  12.2*   --   12.4*   HEMATOCRIT %  35.7*  38.3*  38.2*   --   38.4*   MCV fL  96.2*  96.2*  95.5*   --   94.8*   MCHC g/dL  31.9*  31.9*  31.9*   --   32.3*   PLATELETS 10*3/mm3  211  238  232   --   247     Results from last 7 days   Lab Units  12/14/18   9110   12/13/18   0059  12/12/18   1938  12/12/18   1627   SODIUM mmol/L  139  136  138  141   POTASSIUM mmol/L  4.6  4.4  4.2  3.9   MAGNESIUM mg/dL  2.4  2.1  2.1   --    CHLORIDE mmol/L  108  105  106  105   CO2 mmol/L  24.7  24.3  24.1*  30.8   BUN mg/dL  17  15  17  17   CREATININE mg/dL  0.84  0.94  0.88  0.95   EGFR IF NONAFRICN AM mL/min/1.73  90  79  85  78   CALCIUM mg/dL  8.8  8.9  8.7  8.9   GLUCOSE mg/dL  139*  176*  125*  116*   ALBUMIN g/dL   --    --    --   4.10   BILIRUBIN mg/dL   --    --    --   0.2   ALK PHOS U/L   --    --    --   62   AST (SGOT) U/L   --    --    --   17   ALT (SGPT) U/L   --    --    --   11   Estimated Creatinine Clearance: 77.9 mL/min (by C-G formula based on SCr of 0.84 mg/dL).  No results found for: AMMONIA    Hemoglobin A1C   Date/Time Value Ref Range Status   12/13/2018 0059 6.00 (H) 4.50 - 5.70 % Final     Lab Results   Component Value Date    HGBA1C 6.00 (H) 12/13/2018     Lab Results   Component Value Date    TSH 9.460 (H) 11/01/2018    FREET4 1.04 06/13/2018       Blood Culture   Date Value Ref Range Status   12/12/2018 No growth at 24 hours  Preliminary   12/12/2018 No growth at 24 hours  Preliminary              Respiratory Culture   Date Value Ref Range Status   12/13/2018 Culture in progress  Preliminary     Pain Management Panel     Pain Management Panel Latest Ref Rng & Units 12/6/2018 12/6/2018    CREATININE UR mg/dL 130.3 130.3    AMPHETAMINES SCREEN, URINE Negative - -    BARBITURATES SCREEN Negative - -    BENZODIAZEPINE SCREEN, URINE Negative - -    COCAINE SCREEN, URINE Negative - -    METHADONE SCREEN, URINE Negative - -          Pertinent Radiology Results:  Imaging Results (all)     Procedure Component Value Units Date/Time    XR Chest 1 View [[id number]] Collected:  12/13/18 0847     Updated:  12/13/18 0906    Narrative:       XR CHEST 1 VW-     HISTORY:  SOA.          COMPARISON: 06/12/2018.      TECHNIQUE: Single frontal view of the chest.     FINDINGS:      There is no focal alveolar infiltrate or effusion.  The cardiac silhouette is normal. The pulmonary vasculature is  unremarkable.  There is no evidence of an acute osseous abnormality.   There are no suspicious-appearing parenchymal soft tissue nodules.            Impression:       No evidence of active or acute cardiopulmonary disease on today's chest  radiograph.         This report was finalized on 12/13/2018 9:04 AM by Dr. Valdo Rogers MD.       CT Chest Pulmonary Embolism With Contrast [[id number]] Collected:  12/13/18 0846     Updated:  12/13/18 0905    Narrative:       CT CHEST PULMONARY EMBOLISM W CONTRAST-     HISTORY:  Pulmonary embolism.          COMPARISON: Chest radiograph 12/12/2018.      PROCEDURE: Thin cut axial images were acquired through the pulmonary  vessels during the rapid infusion of IV contrast.     Additional 3-D reformatted images obtained via post-processing for  improved diagnostic accuracy and procedural planning.     Radiation dose reduction techniques were utilized per ALARA protocol.  Automated exposure control was initiated through either or [employer] or  DoseRight software packages by  protocol.           FINDINGS: Today's study demonstrates opacification of the central  pulmonary vessels.   There are no filling defects.   There is no truncation.     No evidence of a pulmonary embolus.     Otherwise there are no parenchymal soft tissue nodules or masses.     There is no mediastinal lymph node enlargement     No pericardial or pleural effusions.  Upper abdomen shows pancreatic calcifications.       Impression:       No evidence of a pulmonary embolus.     This report was finalized on 12/13/2018 9:03 AM by Dr. Valdo Rogers MD.             Test Results Pending at Discharge:   Order Current Status    Beta Strep Culture, Throat - Swab, Throat In process    Blood Culture - Blood, Arm, Left Preliminary result    Blood Culture - Blood, Arm, Right Preliminary result     Respiratory Culture - Sputum, Bronchus Preliminary result          Discharge Disposition/Discharge Medications/Discharge Appointments     Discharge Disposition:   Home or Self Care    Condition at Discharge:  Stable, much improved with no issues today     Code Status While Inpatient:  Code Status and Medical Interventions:   Ordered at: 12/12/18 1916     Level Of Support Discussed With:    Patient     Code Status:    CPR     Medical Interventions (Level of Support Prior to Arrest):    Full       Discharge Medications:     Discharge Medications      New Medications      Instructions Start Date   doxycycline 100 MG capsule  Commonly known as:  MONODOX   Take 1 capsule by mouth every 12 hours for 7 days as part of COPD Rescue Kit. (Only Start if in YELLOW ZONE.)      ipratropium-albuterol 0.5-2.5 mg/3 ml nebulizer  Commonly known as:  DUO-NEB   Take 3 mL by neb every 30 minutes as needed for shortness of air for up to 6 doses. Part of COPD Rescue Kit. (Only Start if in YELLOW ZONE.)      predniSONE 20 MG tablet  Commonly known as:  DELTASONE   Take 2 tablets daily for 5 days as part of COPD Rescue Kit. (Only Start if in YELLOW ZONE.)         Continue These Medications      Instructions Start Date   albuterol 108 (90 Base) MCG/ACT inhaler  Commonly known as:  PROVENTIL HFA;VENTOLIN HFA;PROAIR HFA   2 puffs, Inhalation, Every 6 Hours PRN      atenolol 25 MG tablet  Commonly known as:  TENORMIN   25 mg, Oral, Daily      gabapentin 800 MG tablet  Commonly known as:  NEURONTIN   800 mg, Oral, 3 Times Daily      levothyroxine 25 MCG tablet  Commonly known as:  SYNTHROID   25 mcg, Oral, Daily      OPANA ER 40 MG tablet extended-release 12 hour  Generic drug:  OxyMORphone HCl ER   1 tablet, Oral, 2 Times Daily      oxyCODONE 10 MG tablet  Commonly known as:  ROXICODONE   10 mg, Oral, 3 Times Daily      selenium sulfide 2.5 % shampoo  Commonly known as:  SELSUN   APPLY TO HAIR AND SCALP LEAVE ON 10 MINUTES THEN SHAMPOO OUT  DAILY AS NEEDED FOR DANDRUFF.             Discharge Diet:  Diet Instructions     Diet: Regular      Discharge Diet:  Regular          Discharge Activity:  Activity Instructions     Activity as Tolerated            Discharge Appointments:  Your Scheduled Appointments    Dec 17, 2018 12:15 PM EST  US cor renal bilateral with [facility] 1  [facility]TR[facility] ([facility]) [address]  [facility] [address]  [phone]   No prep. Arrive 15 minutes before your appointment. Bring a current list of medications.   Feb 01, 2019  2:00 PM EST  Follow Up with BEATRICE Vallejo  [facility] (--) [address]  [address]  [facility] [address]  [phone]   Arrive 15 minutes prior to appointment.          Follow-up Information     Caroline Guzman APRN. Schedule an appointment as soon as possible for a visit in 1 week(s).    Specialty:  Family Medicine  Contact information:  [address]  [address]  [facility] [address]  [phone]             Jia Burks MD Follow up.    Specialties:  Physical Medicine and Rehabilitation, Pain Medicine  Contact information:  [address] [address] [phone]                           Casandra Martel PA-C  12/14/18  10:45 AM          Please note that this discharge summary required more than 30 minutes to complete.

## 2018-12-14 NOTE — PROGRESS NOTES
Discharge Planning Assessment   [place]     Patient Name: Paul Gomez  MRN: [id number]  Today's Date: 12/14/2018    Admit Date: 12/12/2018    Discharge Plan     Row Name 12/14/18 1230       Plan    Patient/Family in Agreement with Plan  yes    Final Discharge Disposition Code  01 - home or self-care    Final Note Pt is being discharged home on this date. SS recieved order to arrange a nebulizer. SS spoke to pt who has no preference on DME agency. SS contacted [facility] and faxed order and pt's information. Pt states he will stop by [facility] and pick the nebulizer up. No other needs identified.      Risa Leach

## 2018-12-15 LAB
BACTERIA SPEC RESP CULT: NORMAL
GRAM STN SPEC: NORMAL

## 2018-12-17 LAB
BACTERIA SPEC AEROBE CULT: NORMAL
BACTERIA SPEC AEROBE CULT: NORMAL

## 2019-01-08 ENCOUNTER — TELEPHONE (OUTPATIENT)
Dept: FAMILY MEDICINE CLINIC | Facility: CLINIC | Age: 74
End: 2019-01-08

## 2019-01-08 ENCOUNTER — OFFICE VISIT (OUTPATIENT)
Dept: FAMILY MEDICINE CLINIC | Facility: CLINIC | Age: 74
End: 2019-01-08

## 2019-01-08 VITALS
OXYGEN SATURATION: 93 % | HEIGHT: 68 IN | DIASTOLIC BLOOD PRESSURE: 63 MMHG | HEART RATE: 80 BPM | TEMPERATURE: 98.9 F | BODY MASS INDEX: 23.49 KG/M2 | WEIGHT: 155 LBS | SYSTOLIC BLOOD PRESSURE: 99 MMHG

## 2019-01-08 DIAGNOSIS — N52.9 ERECTILE DYSFUNCTION, UNSPECIFIED ERECTILE DYSFUNCTION TYPE: ICD-10-CM

## 2019-01-08 DIAGNOSIS — J44.9 CHRONIC OBSTRUCTIVE PULMONARY DISEASE, UNSPECIFIED COPD TYPE (HCC): Primary | ICD-10-CM

## 2019-01-08 PROCEDURE — 99213 OFFICE O/P EST LOW 20 MIN: CPT | Performed by: NURSE PRACTITIONER

## 2019-01-08 RX ORDER — ALBUTEROL SULFATE 90 UG/1
2 AEROSOL, METERED RESPIRATORY (INHALATION) EVERY 6 HOURS PRN
Qty: 18 G | Refills: 5 | Status: SHIPPED | OUTPATIENT
Start: 2019-01-08 | End: 2020-02-07 | Stop reason: SDUPTHER

## 2019-01-08 RX ORDER — SILDENAFIL 50 MG/1
50 TABLET, FILM COATED ORAL DAILY PRN
Qty: 12 TABLET | Refills: 0 | Status: SHIPPED | OUTPATIENT
Start: 2019-01-08 | End: 2019-01-10 | Stop reason: CLARIF

## 2019-01-08 NOTE — PROGRESS NOTES
Subjective   Paul Gomez is a 73 y.o. male.     Chief Complaint: Follow-up and COPD    Shortness of Breath   This is a chronic problem. The current episode started more than 1 year ago. The problem occurs daily. The problem has been gradually improving. The symptoms are aggravated by smoke. Associated symptoms comments: Pt states that he has been without a cigarette for the past 8 days.  He is breathing better and starting to feel better.  He has a better appetite and food tastes much better. . He has tried oral steroids for the symptoms. The treatment provided moderate relief. His past medical history is significant for COPD.   Erectile Dysfunction   This is a chronic problem. The current episode started more than 1 year ago. The problem is unchanged. The nature of his difficulty is achieving erection and maintaining erection. Non-physiologic factors contributing to erectile dysfunction are a decreased libido. Irritative symptoms do not include frequency, nocturia or urgency. Obstructive symptoms do not include incomplete emptying, an intermittent stream or straining. Pertinent negatives include no genital pain or hematuria. Nothing aggravates the symptoms. Past treatments include nothing.        Family History   Problem Relation Age of Onset   • Cancer Mother        Social History     Socioeconomic History   • Marital status:      Spouse name: Not on file   • Number of children: Not on file   • Years of education: Not on file   • Highest education level: Not on file   Social Needs   • Financial resource strain: Not on file   • Food insecurity - worry: Not on file   • Food insecurity - inability: Not on file   • Transportation needs - medical: Not on file   • Transportation needs - non-medical: Not on file   Occupational History   • Not on file   Tobacco Use   • Smoking status: Current Every Day Smoker     Packs/day: 0.25     Types: Cigarettes   • Smokeless tobacco: Never Used   • Tobacco comment: patient  "stated he is down to about 4 cigarettes a day   Substance and Sexual Activity   • Alcohol use: No   • Drug use: No   • Sexual activity: Defer   Other Topics Concern   • Not on file   Social History Narrative   • Not on file       Past Medical History:   Diagnosis Date   • COPD (chronic obstructive pulmonary disease) (CMS/[facility])    • Gout    • Hearing loss    • History of degenerative disc disease    • Hyperlipidemia    • Hypertension    • Low back pain    • Pedal edema    • Prediabetes        Review of Systems   Constitutional: Negative.    HENT: Negative.    Respiratory: Positive for shortness of breath.    Cardiovascular: Negative.    Gastrointestinal: Negative.    Genitourinary: Positive for decreased libido. Negative for frequency, hematuria, incomplete emptying, nocturia and urgency.   Musculoskeletal: Negative.    Skin: Negative.    Neurological: Negative.    Psychiatric/Behavioral: Negative.        Objective   Physical Exam   Constitutional: He is oriented to person, place, and time. He appears well-developed and well-nourished.   Neck: Normal range of motion. Neck supple.   Cardiovascular: Normal rate, regular rhythm and normal heart sounds.   Pulmonary/Chest: Effort normal. He has wheezes.   Neurological: He is alert and oriented to person, place, and time.   Skin: Skin is warm and dry.   Psychiatric: He has a normal mood and affect. His behavior is normal. Judgment and thought content normal.   Nursing note and vitals reviewed.      Procedures    Vitals: Blood pressure 99/63, pulse 80, temperature 98.9 °F (37.2 °C), temperature source Oral, height 172.7 cm (68\"), weight 70.3 kg (155 lb), SpO2 93 %.    Allergies:   Allergies   Allergen Reactions   • Penicillins      Pt states he was allergic when he was 18 but has had it since and did not have any problems.         During this visit the following were done:  Labs Reviewed []    Labs Ordered []    Radiology Reports Reviewed []    Radiology Ordered []    PCP " Records Reviewed []    Referring Provider Records Reviewed []    ER Records Reviewed []    Hospital Records Reviewed []    History Obtained From Family []    Radiology Images Reviewed []    Other Reviewed []    Records Requested []      Assessment/Plan   Ray was seen today for follow-up and copd.    Diagnoses and all orders for this visit:    Chronic obstructive pulmonary disease, unspecified COPD type (CMS/[facility])  -     albuterol sulfate  (90 Base) MCG/ACT inhaler; Inhale 2 puffs Every 6 (Six) Hours As Needed for Wheezing or Shortness of Air.    Erectile dysfunction, unspecified erectile dysfunction type    Other orders  -     sildenafil (VIAGRA) 50 MG tablet; Take 1 tablet by mouth Daily As Needed for erectile dysfunction.

## 2019-01-08 NOTE — TELEPHONE ENCOUNTER
Pharmacy called reports the Sildenafil 50 mg is not available,they have the 20 mg & he can get #30 for $24.95 or the 100 mg is $30.00 per tablet,requesting the 20 mg tabs be sent.

## 2019-01-10 RX ORDER — SILDENAFIL CITRATE 20 MG/1
40 TABLET ORAL DAILY PRN
Qty: 30 TABLET | Refills: 0 | Status: SHIPPED | OUTPATIENT
Start: 2019-01-10 | End: 2019-02-06 | Stop reason: SDUPTHER

## 2019-01-11 NOTE — TELEPHONE ENCOUNTER
Patient called for this pended med?    Wants it sent to [facility].      Attempted to notify patient,no answer,will attempt later.      Still unavailable.      Patient notified.

## 2019-01-14 ENCOUNTER — TELEPHONE (OUTPATIENT)
Dept: FAMILY MEDICINE CLINIC | Facility: CLINIC | Age: 74
End: 2019-01-14

## 2019-01-15 RX ORDER — IPRATROPIUM BROMIDE AND ALBUTEROL SULFATE 2.5; .5 MG/3ML; MG/3ML
SOLUTION RESPIRATORY (INHALATION)
Qty: 18 ML | Refills: 0 | Status: SHIPPED | OUTPATIENT
Start: 2019-01-15 | End: 2019-03-25 | Stop reason: SDUPTHER

## 2019-01-27 ENCOUNTER — APPOINTMENT (OUTPATIENT)
Dept: GENERAL RADIOLOGY | Facility: HOSPITAL | Age: 74
End: 2019-01-27

## 2019-01-27 ENCOUNTER — HOSPITAL ENCOUNTER (EMERGENCY)
Facility: HOSPITAL | Age: 74
Discharge: HOME | End: 2019-01-27
Attending: EMERGENCY MEDICINE | Admitting: EMERGENCY MEDICINE

## 2019-01-27 VITALS
BODY MASS INDEX: 22.73 KG/M2 | WEIGHT: 150 LBS | TEMPERATURE: 98.5 F | RESPIRATION RATE: 16 BRPM | HEIGHT: 68 IN | SYSTOLIC BLOOD PRESSURE: 145 MMHG | DIASTOLIC BLOOD PRESSURE: 78 MMHG | OXYGEN SATURATION: 96 % | HEART RATE: 86 BPM

## 2019-01-27 DIAGNOSIS — J44.1 COPD WITH ACUTE EXACERBATION (HCC): Primary | ICD-10-CM

## 2019-01-27 LAB
A-A DO2: 16.3 MMHG (ref 0–300)
ALBUMIN SERPL-MCNC: 4 G/DL (ref 3.4–4.8)
ALBUMIN/GLOB SERPL: 1.6 G/DL (ref 1.5–2.5)
ALP SERPL-CCNC: 73 U/L (ref 40–129)
ALT SERPL W P-5'-P-CCNC: 20 U/L (ref 10–44)
ANION GAP SERPL CALCULATED.3IONS-SCNC: 5.6 MMOL/L (ref 3.6–11.2)
ARTERIAL PATENCY WRIST A: ABNORMAL
AST SERPL-CCNC: 25 U/L (ref 10–34)
ATMOSPHERIC PRESS: 729 MMHG
BASE EXCESS BLDA CALC-SCNC: 0.8 MMOL/L
BASOPHILS # BLD AUTO: 0.02 10*3/MM3 (ref 0–0.3)
BASOPHILS NFR BLD AUTO: 0.3 % (ref 0–2)
BDY SITE: ABNORMAL
BILIRUB SERPL-MCNC: 0.4 MG/DL (ref 0.2–1.8)
BNP SERPL-MCNC: 23 PG/ML (ref 0–100)
BODY TEMPERATURE: 98.6 C
BUN BLD-MCNC: 11 MG/DL (ref 7–21)
BUN/CREAT SERPL: 14.1 (ref 7–25)
CALCIUM SPEC-SCNC: 8.7 MG/DL (ref 7.7–10)
CHLORIDE SERPL-SCNC: 104 MMOL/L (ref 99–112)
CO2 SERPL-SCNC: 28.4 MMOL/L (ref 24.3–31.9)
COHGB MFR BLD: 1.8 % (ref 0–5)
CREAT BLD-MCNC: 0.78 MG/DL (ref 0.43–1.29)
DEPRECATED RDW RBC AUTO: 45.7 FL (ref 37–54)
EOSINOPHIL # BLD AUTO: 1.21 10*3/MM3 (ref 0–0.7)
EOSINOPHIL NFR BLD AUTO: 16.4 % (ref 0–7)
ERYTHROCYTE [DISTWIDTH] IN BLOOD BY AUTOMATED COUNT: 13.3 % (ref 11.5–14.5)
GFR SERPL CREATININE-BSD FRML MDRD: 98 ML/MIN/1.73
GLOBULIN UR ELPH-MCNC: 2.5 GM/DL
GLUCOSE BLD-MCNC: 133 MG/DL (ref 70–110)
HCO3 BLDA-SCNC: 27.9 MMOL/L (ref 22–26)
HCT VFR BLD AUTO: 39.4 % (ref 42–52)
HCT VFR BLD CALC: 39 % (ref 42–52)
HGB BLD-MCNC: 12.9 G/DL (ref 14–18)
HGB BLDA-MCNC: 13.4 G/DL (ref 12–16)
HOROWITZ INDEX BLD+IHG-RTO: 21 %
IMM GRANULOCYTES # BLD AUTO: 0.02 10*3/MM3 (ref 0–0.03)
IMM GRANULOCYTES NFR BLD AUTO: 0.3 % (ref 0–0.5)
LYMPHOCYTES # BLD AUTO: 1.48 10*3/MM3 (ref 1–3)
LYMPHOCYTES NFR BLD AUTO: 20 % (ref 16–46)
MCH RBC QN AUTO: 30.6 PG (ref 27–33)
MCHC RBC AUTO-ENTMCNC: 32.7 G/DL (ref 33–37)
MCV RBC AUTO: 93.6 FL (ref 80–94)
METHGB BLD QL: 0.3 % (ref 0–3)
MODALITY: ABNORMAL
MONOCYTES # BLD AUTO: 0.82 10*3/MM3 (ref 0.1–0.9)
MONOCYTES NFR BLD AUTO: 11.1 % (ref 0–12)
NEUTROPHILS # BLD AUTO: 3.85 10*3/MM3 (ref 1.4–6.5)
NEUTROPHILS NFR BLD AUTO: 51.9 % (ref 40–75)
OSMOLALITY SERPL CALC.SUM OF ELEC: 277 MOSM/KG (ref 273–305)
OXYHGB MFR BLDV: 86.9 % (ref 85–100)
PCO2 BLDA: 55.2 MM HG (ref 35–45)
PH BLDA: 7.32 PH UNITS (ref 7.35–7.45)
PLATELET # BLD AUTO: 212 10*3/MM3 (ref 130–400)
PMV BLD AUTO: 10.8 FL (ref 6–10)
PO2 BLDA: 60.8 MM HG (ref 80–100)
POTASSIUM BLD-SCNC: 4.1 MMOL/L (ref 3.5–5.3)
PROT SERPL-MCNC: 6.5 G/DL (ref 6–8)
RBC # BLD AUTO: 4.21 10*6/MM3 (ref 4.7–6.1)
SAO2 % BLDCOA: 88.8 % (ref 90–100)
SODIUM BLD-SCNC: 138 MMOL/L (ref 135–153)
TROPONIN I SERPL-MCNC: <0.006 NG/ML
WBC NRBC COR # BLD: 7.4 10*3/MM3 (ref 4.5–12.5)

## 2019-01-27 PROCEDURE — 82375 ASSAY CARBOXYHB QUANT: CPT | Performed by: EMERGENCY MEDICINE

## 2019-01-27 PROCEDURE — 71046 X-RAY EXAM CHEST 2 VIEWS: CPT

## 2019-01-27 PROCEDURE — 84484 ASSAY OF TROPONIN QUANT: CPT | Performed by: EMERGENCY MEDICINE

## 2019-01-27 PROCEDURE — 96375 TX/PRO/DX INJ NEW DRUG ADDON: CPT

## 2019-01-27 PROCEDURE — 25010000002 METHYLPREDNISOLONE PER 40 MG: Performed by: EMERGENCY MEDICINE

## 2019-01-27 PROCEDURE — 85025 COMPLETE CBC W/AUTO DIFF WBC: CPT | Performed by: EMERGENCY MEDICINE

## 2019-01-27 PROCEDURE — 94640 AIRWAY INHALATION TREATMENT: CPT

## 2019-01-27 PROCEDURE — 83050 HGB METHEMOGLOBIN QUAN: CPT | Performed by: EMERGENCY MEDICINE

## 2019-01-27 PROCEDURE — 25010000002 CEFTRIAXONE: Performed by: EMERGENCY MEDICINE

## 2019-01-27 PROCEDURE — 25010000002 NALOXONE PER 1 MG: Performed by: EMERGENCY MEDICINE

## 2019-01-27 PROCEDURE — 82805 BLOOD GASES W/O2 SATURATION: CPT | Performed by: EMERGENCY MEDICINE

## 2019-01-27 PROCEDURE — 71046 X-RAY EXAM CHEST 2 VIEWS: CPT | Performed by: RADIOLOGY

## 2019-01-27 PROCEDURE — 94799 UNLISTED PULMONARY SVC/PX: CPT

## 2019-01-27 PROCEDURE — 80053 COMPREHEN METABOLIC PANEL: CPT | Performed by: EMERGENCY MEDICINE

## 2019-01-27 PROCEDURE — 93005 ELECTROCARDIOGRAM TRACING: CPT | Performed by: EMERGENCY MEDICINE

## 2019-01-27 PROCEDURE — 99284 EMERGENCY DEPT VISIT MOD MDM: CPT

## 2019-01-27 PROCEDURE — 83880 ASSAY OF NATRIURETIC PEPTIDE: CPT | Performed by: EMERGENCY MEDICINE

## 2019-01-27 PROCEDURE — 36600 WITHDRAWAL OF ARTERIAL BLOOD: CPT | Performed by: EMERGENCY MEDICINE

## 2019-01-27 PROCEDURE — 93010 ELECTROCARDIOGRAM REPORT: CPT | Performed by: INTERNAL MEDICINE

## 2019-01-27 PROCEDURE — 96365 THER/PROPH/DIAG IV INF INIT: CPT

## 2019-01-27 PROCEDURE — 87040 BLOOD CULTURE FOR BACTERIA: CPT | Performed by: EMERGENCY MEDICINE

## 2019-01-27 RX ORDER — SODIUM CHLORIDE 0.9 % (FLUSH) 0.9 %
10 SYRINGE (ML) INJECTION AS NEEDED
Status: DISCONTINUED | OUTPATIENT
Start: 2019-01-27 | End: 2019-01-27 | Stop reason: HOSPADM

## 2019-01-27 RX ORDER — IPRATROPIUM BROMIDE AND ALBUTEROL SULFATE 2.5; .5 MG/3ML; MG/3ML
3 SOLUTION RESPIRATORY (INHALATION) ONCE
Status: COMPLETED | OUTPATIENT
Start: 2019-01-27 | End: 2019-01-27

## 2019-01-27 RX ORDER — NALOXONE HCL 0.4 MG/ML
0.4 VIAL (ML) INJECTION ONCE
Status: COMPLETED | OUTPATIENT
Start: 2019-01-27 | End: 2019-01-27

## 2019-01-27 RX ORDER — AZITHROMYCIN 250 MG/1
TABLET, FILM COATED ORAL
Qty: 6 TABLET | Refills: 0 | Status: SHIPPED | OUTPATIENT
Start: 2019-01-27 | End: 2019-02-14

## 2019-01-27 RX ORDER — METHYLPREDNISOLONE SODIUM SUCCINATE 40 MG/ML
80 INJECTION, POWDER, LYOPHILIZED, FOR SOLUTION INTRAMUSCULAR; INTRAVENOUS ONCE
Status: COMPLETED | OUTPATIENT
Start: 2019-01-27 | End: 2019-01-27

## 2019-01-27 RX ADMIN — NALOXONE HYDROCHLORIDE 0.4 MG: 0.4 INJECTION, SOLUTION INTRAMUSCULAR; INTRAVENOUS; SUBCUTANEOUS at 14:29

## 2019-01-27 RX ADMIN — CEFTRIAXONE 1 G: 1 INJECTION, POWDER, FOR SOLUTION INTRAMUSCULAR; INTRAVENOUS at 15:50

## 2019-01-27 RX ADMIN — METHYLPREDNISOLONE SODIUM SUCCINATE 80 MG: 40 INJECTION, POWDER, FOR SOLUTION INTRAMUSCULAR; INTRAVENOUS at 14:15

## 2019-01-27 RX ADMIN — IPRATROPIUM BROMIDE AND ALBUTEROL SULFATE 3 ML: .5; 3 SOLUTION RESPIRATORY (INHALATION) at 14:00

## 2019-02-01 LAB
BACTERIA SPEC AEROBE CULT: NORMAL
BACTERIA SPEC AEROBE CULT: NORMAL

## 2019-02-06 ENCOUNTER — OFFICE VISIT (OUTPATIENT)
Dept: FAMILY MEDICINE CLINIC | Facility: CLINIC | Age: 74
End: 2019-02-06

## 2019-02-06 VITALS
HEIGHT: 68 IN | HEART RATE: 73 BPM | TEMPERATURE: 98.3 F | SYSTOLIC BLOOD PRESSURE: 122 MMHG | WEIGHT: 147 LBS | OXYGEN SATURATION: 97 % | BODY MASS INDEX: 22.28 KG/M2 | DIASTOLIC BLOOD PRESSURE: 77 MMHG

## 2019-02-06 DIAGNOSIS — N52.9 ERECTILE DYSFUNCTION, UNSPECIFIED ERECTILE DYSFUNCTION TYPE: ICD-10-CM

## 2019-02-06 DIAGNOSIS — J44.9 CHRONIC OBSTRUCTIVE PULMONARY DISEASE, UNSPECIFIED COPD TYPE (HCC): Primary | ICD-10-CM

## 2019-02-06 PROCEDURE — 99214 OFFICE O/P EST MOD 30 MIN: CPT | Performed by: NURSE PRACTITIONER

## 2019-02-06 RX ORDER — SILDENAFIL CITRATE 20 MG/1
TABLET ORAL
Qty: 30 TABLET | Refills: 1 | Status: SHIPPED | OUTPATIENT
Start: 2019-02-06 | End: 2019-02-21

## 2019-02-06 NOTE — PROGRESS NOTES
Subjective   Paul Gomez is a 73 y.o. male.     Chief Complaint: Follow-up and COPD    History of Present Illness   Shortness of Breath   This is a chronic problem. The current episode started more than 1 year ago. The problem occurs daily. The problem has been gradually improving. The symptoms are aggravated by second hand smoke.  He has a better appetite and food tastes much better. . He has tried oral steroids for the symptoms. The treatment provided moderate relief. His past medical history is significant for COPD. He has been using albuterol rescue inhaler quite often.  Discussed starting him on routine Anoro and he is agreeable today.  She has still been without smoking now and states he is breathing much better.   Erectile Dysfunction   This is a chronic problem. The current episode started more than 1 year ago. The problem is unchanged. The nature of his difficulty is achieving erection and maintaining erection. Non-physiologic factors contributing to erectile dysfunction are a decreased libido. Irritative symptoms do not include frequency, nocturia or urgency. Obstructive symptoms do not include incomplete emptying, an intermittent stream or straining. Pertinent negatives include no genital pain or hematuria. Nothing aggravates the symptoms. Past treatments include sildenafil.  He has tolerated the medication well without side effects.  He is requesting a refill today.        Family History   Problem Relation Age of Onset   • Cancer Mother        Social History     Socioeconomic History   • Marital status:      Spouse name: Not on file   • Number of children: Not on file   • Years of education: Not on file   • Highest education level: Not on file   Social Needs   • Financial resource strain: Not on file   • Food insecurity - worry: Not on file   • Food insecurity - inability: Not on file   • Transportation needs - medical: Not on file   • Transportation needs - non-medical: Not on file  "  Occupational History   • Not on file   Tobacco Use   • Smoking status: Current Every Day Smoker     Packs/day: 0.25     Types: Cigarettes   • Smokeless tobacco: Never Used   • Tobacco comment: patient stated he is down to about 4 cigarettes a day   Substance and Sexual Activity   • Alcohol use: No   • Drug use: No   • Sexual activity: Defer   Other Topics Concern   • Not on file   Social History Narrative   • Not on file       Past Medical History:   Diagnosis Date   • COPD (chronic obstructive pulmonary disease) (CMS/[facility])    • Gout    • Hearing loss    • History of degenerative disc disease    • Hyperlipidemia    • Hypertension    • Low back pain    • Pedal edema    • Prediabetes        Review of Systems   Constitutional: Negative.    HENT: Negative.    Respiratory: Positive for cough and wheezing.    Cardiovascular: Negative.    Gastrointestinal: Negative.    Musculoskeletal: Negative.    Skin: Negative.    Neurological: Negative.    Psychiatric/Behavioral: Negative.        Objective   Physical Exam   Constitutional: He is oriented to person, place, and time. He appears well-developed and well-nourished.   Neck: Normal range of motion. Neck supple.   Cardiovascular: Normal rate, regular rhythm and normal heart sounds.   Pulmonary/Chest: Effort normal. He has wheezes.   Neurological: He is alert and oriented to person, place, and time.   Skin: Skin is warm and dry.   Psychiatric: He has a normal mood and affect. His behavior is normal. Judgment and thought content normal.   Nursing note and vitals reviewed.      Procedures    Vitals: Blood pressure 122/77, pulse 73, temperature 98.3 °F (36.8 °C), temperature source Oral, height 172.7 cm (68\"), weight 66.7 kg (147 lb), SpO2 97 %.    Allergies:   Allergies   Allergen Reactions   • Penicillins      Pt states he was allergic when he was 18 but has had it since and did not have any problems.         During this visit the following were done:  Labs Reviewed []    Labs " Ordered []    Radiology Reports Reviewed []    Radiology Ordered []    PCP Records Reviewed []    Referring Provider Records Reviewed []    ER Records Reviewed []    Hospital Records Reviewed []    History Obtained From Family []    Radiology Images Reviewed []    Other Reviewed []    Records Requested []      Assessment/Plan   Ray was seen today for follow-up and copd.    Diagnoses and all orders for this visit:    Chronic obstructive pulmonary disease, unspecified COPD type (CMS/[facility])  -     umeclidinium-vilanterol (ANORO ELLIPTA) 62.5-25 MCG/INH aerosol powder  inhaler; Inhale 1 puff Daily.    Erectile dysfunction, unspecified erectile dysfunction type  -     sildenafil (REVATIO) 20 MG tablet; Take 2-3 tablets 30 minutes prior to sexual intercourse      Start on Anoro today

## 2019-02-14 ENCOUNTER — OFFICE VISIT (OUTPATIENT)
Dept: FAMILY MEDICINE CLINIC | Facility: CLINIC | Age: 74
End: 2019-02-14

## 2019-02-14 VITALS
TEMPERATURE: 98.5 F | SYSTOLIC BLOOD PRESSURE: 115 MMHG | HEIGHT: 68 IN | HEART RATE: 85 BPM | DIASTOLIC BLOOD PRESSURE: 71 MMHG | WEIGHT: 157 LBS | BODY MASS INDEX: 23.79 KG/M2 | OXYGEN SATURATION: 93 %

## 2019-02-14 DIAGNOSIS — I50.9 ACUTE CONGESTIVE HEART FAILURE, UNSPECIFIED HEART FAILURE TYPE (HCC): ICD-10-CM

## 2019-02-14 DIAGNOSIS — R09.89 BILATERAL RALES: Primary | ICD-10-CM

## 2019-02-14 DIAGNOSIS — R60.9 1+ PITTING EDEMA: ICD-10-CM

## 2019-02-14 LAB
ALBUMIN SERPL-MCNC: 4.2 G/DL (ref 3.4–4.8)
ALBUMIN/GLOB SERPL: 1.8 G/DL (ref 1.5–2.5)
ALP SERPL-CCNC: 59 U/L (ref 40–129)
ALT SERPL W P-5'-P-CCNC: 14 U/L (ref 10–44)
ANION GAP SERPL CALCULATED.3IONS-SCNC: 2.6 MMOL/L (ref 3.6–11.2)
AST SERPL-CCNC: 21 U/L (ref 10–34)
BASOPHILS # BLD AUTO: 0.04 10*3/MM3 (ref 0–0.3)
BASOPHILS NFR BLD AUTO: 0.7 % (ref 0–2)
BILIRUB SERPL-MCNC: 0.4 MG/DL (ref 0.2–1.8)
BNP SERPL-MCNC: 38 PG/ML (ref 0–100)
BUN BLD-MCNC: 15 MG/DL (ref 7–21)
BUN/CREAT SERPL: 17.9 (ref 7–25)
CALCIUM SPEC-SCNC: 8.7 MG/DL (ref 7.7–10)
CHLORIDE SERPL-SCNC: 107 MMOL/L (ref 99–112)
CO2 SERPL-SCNC: 29.4 MMOL/L (ref 24.3–31.9)
CREAT BLD-MCNC: 0.84 MG/DL (ref 0.43–1.29)
DEPRECATED RDW RBC AUTO: 42.2 FL (ref 37–54)
EOSINOPHIL # BLD AUTO: 1.27 10*3/MM3 (ref 0–0.7)
EOSINOPHIL NFR BLD AUTO: 21.1 % (ref 0–7)
ERYTHROCYTE [DISTWIDTH] IN BLOOD BY AUTOMATED COUNT: 12.9 % (ref 11.5–14.5)
GFR SERPL CREATININE-BSD FRML MDRD: 90 ML/MIN/1.73
GLOBULIN UR ELPH-MCNC: 2.3 GM/DL
GLUCOSE BLD-MCNC: 128 MG/DL (ref 70–110)
HCT VFR BLD AUTO: 37.4 % (ref 42–52)
HGB BLD-MCNC: 12.3 G/DL (ref 14–18)
IMM GRANULOCYTES # BLD AUTO: 0.01 10*3/MM3 (ref 0–0.03)
IMM GRANULOCYTES NFR BLD AUTO: 0.2 % (ref 0–0.5)
LYMPHOCYTES # BLD AUTO: 0.99 10*3/MM3 (ref 1–3)
LYMPHOCYTES NFR BLD AUTO: 16.5 % (ref 16–46)
MCH RBC QN AUTO: 30.5 PG (ref 27–33)
MCHC RBC AUTO-ENTMCNC: 32.9 G/DL (ref 33–37)
MCV RBC AUTO: 92.8 FL (ref 80–94)
MONOCYTES # BLD AUTO: 0.99 10*3/MM3 (ref 0.1–0.9)
MONOCYTES NFR BLD AUTO: 16.5 % (ref 0–12)
NEUTROPHILS # BLD AUTO: 2.71 10*3/MM3 (ref 1.4–6.5)
NEUTROPHILS NFR BLD AUTO: 45 % (ref 40–75)
OSMOLALITY SERPL CALC.SUM OF ELEC: 280 MOSM/KG (ref 273–305)
PLATELET # BLD AUTO: 192 10*3/MM3 (ref 130–400)
PMV BLD AUTO: 11.7 FL (ref 6–10)
POTASSIUM BLD-SCNC: 3.8 MMOL/L (ref 3.5–5.3)
PROT SERPL-MCNC: 6.5 G/DL (ref 6–8)
RBC # BLD AUTO: 4.03 10*6/MM3 (ref 4.7–6.1)
SODIUM BLD-SCNC: 139 MMOL/L (ref 135–153)
WBC NRBC COR # BLD: 6.01 10*3/MM3 (ref 4.5–12.5)

## 2019-02-14 PROCEDURE — 80053 COMPREHEN METABOLIC PANEL: CPT | Performed by: FAMILY MEDICINE

## 2019-02-14 PROCEDURE — 83880 ASSAY OF NATRIURETIC PEPTIDE: CPT | Performed by: FAMILY MEDICINE

## 2019-02-14 PROCEDURE — 85025 COMPLETE CBC W/AUTO DIFF WBC: CPT | Performed by: FAMILY MEDICINE

## 2019-02-14 PROCEDURE — 99214 OFFICE O/P EST MOD 30 MIN: CPT | Performed by: FAMILY MEDICINE

## 2019-02-14 RX ORDER — FUROSEMIDE 40 MG/1
40 TABLET ORAL DAILY
Qty: 30 TABLET | Refills: 0 | Status: SHIPPED | OUTPATIENT
Start: 2019-02-14 | End: 2019-02-21

## 2019-02-14 NOTE — PROGRESS NOTES
Subjective   Paul Gomez is a 73 y.o. male.   Pt presents today with CC of Breathing Problem; Wheezing; and URI      History of Present Illness   Patient is a 73-year-old male with past medical history of COPD with acute exacerbation twice in the past 3 months.  He has quit smoking though still lives with smokers.  He reports that his breathing never fully improved.  He states that for the last 3 nights he has been able to lay flat, and that he is woken up in the middle the night gasping for air.  He reports that he has swelling in his legs.  He has been using his nebulizer and this helps some.  He has been using his inhalers as prescribed.  He has quit smoking.             The following portions of the patient's history were reviewed and updated as appropriate: allergies, current medications, past family history, past social history, past surgical history and problem list.    Review of Systems   Constitutional: Negative for chills, fever and unexpected weight loss.   HENT: Negative for congestion and sore throat.    Eyes: Negative for blurred vision and visual disturbance.   Respiratory: Positive for cough, chest tightness, shortness of breath and wheezing.    Cardiovascular: Positive for leg swelling. Negative for chest pain and palpitations.   Gastrointestinal: Negative for abdominal pain and diarrhea.   Endocrine: Negative for cold intolerance and heat intolerance.   Genitourinary: Negative for dysuria.   Musculoskeletal: Negative for arthralgias and neck stiffness.   Neurological: Negative for dizziness, seizures and syncope.   Psychiatric/Behavioral: Negative for self-injury, suicidal ideas and depressed mood.       Objective   Physical Exam   Constitutional: He is oriented to person, place, and time. He appears well-developed and well-nourished.   HENT:   Head: Normocephalic and atraumatic.   Right Ear: External ear normal.   Left Ear: External ear normal.   Nose: Nose normal.   Mouth/Throat: Oropharynx is  clear and moist.   Postnasal drip noted   Eyes: Conjunctivae and EOM are normal. Pupils are equal, round, and reactive to light.   Neck: Normal range of motion. Neck supple.   Cardiovascular: Normal rate and regular rhythm. Exam reveals gallop.   No murmur heard.  1+ pitting edema bilaterally.  10 pound weight gain since last week   Pulmonary/Chest:   Pulse ox between 90 and 95%.    He has rales bilaterally up to angle of the scapula approximately.  More clear in the upper lung fields.  Minimal wheezing.  Seems to be moving air in and out well.   Abdominal: Soft. Bowel sounds are normal.   No JVD   Neurological: He is alert and oriented to person, place, and time.   Skin: Skin is warm and dry.         Assessment/Plan   Ray was seen today for breathing problem, wheezing and uri.    Diagnoses and all orders for this visit:    Bilateral rales  -     furosemide (LASIX) 40 MG tablet; Take 1 tablet by mouth Daily.  -     CBC & Differential  -     Comprehensive Metabolic Panel  -     BNP  -     CBC Auto Differential  -     Osmolality, Calculated; Future  -     Osmolality, Calculated    Acute congestive heart failure, unspecified heart failure type (CMS/HCC)  -     furosemide (LASIX) 40 MG tablet; Take 1 tablet by mouth Daily.  -     CBC & Differential  -     Comprehensive Metabolic Panel  -     BNP  -     CBC Auto Differential  -     Osmolality, Calculated; Future  -     Osmolality, Calculated  Has 10 pound weight gain, pitting edema, rales, orthopnea, and paroxysmal nocturnal dyspnea.  His history of COPD makes me concerned for acute exacerbation along with fluid overload, there may be a component of COPD to this, though in my opinion fluid overload is the primary problem.  Steroid therapy could worsen his edema.  Antibiotics I feel her unlikely to be worth the risk as he has been on multiple antibiotics in the recent past.  Recommended follow-up tomorrow if he is not significantly better, we discussed Lasix treatment.   He is to take 1 per day for the next few days, his renal function has been good on his last labs.  I asked that if he does not follow-up tomorrow, he follow-up early next week to discuss potential for CHF.  Will and sitter getting an echocardiogram.    1+ pitting edema               Patient's Body mass index is 23.88 kg/m². BMI is above normal parameters. Recommendations include: exercise counseling and nutrition counseling.

## 2019-02-15 ENCOUNTER — OFFICE VISIT (OUTPATIENT)
Dept: FAMILY MEDICINE CLINIC | Facility: CLINIC | Age: 74
End: 2019-02-15

## 2019-02-15 VITALS
HEART RATE: 82 BPM | WEIGHT: 150.8 LBS | BODY MASS INDEX: 22.85 KG/M2 | DIASTOLIC BLOOD PRESSURE: 73 MMHG | TEMPERATURE: 98.5 F | SYSTOLIC BLOOD PRESSURE: 136 MMHG | OXYGEN SATURATION: 93 % | HEIGHT: 68 IN

## 2019-02-15 DIAGNOSIS — I50.9 ACUTE CONGESTIVE HEART FAILURE, UNSPECIFIED HEART FAILURE TYPE (HCC): Primary | ICD-10-CM

## 2019-02-15 DIAGNOSIS — R06.09 DYSPNEA ON MINIMAL EXERTION: ICD-10-CM

## 2019-02-15 DIAGNOSIS — J43.2 CENTRILOBULAR EMPHYSEMA (HCC): ICD-10-CM

## 2019-02-15 DIAGNOSIS — R60.9 1+ PITTING EDEMA: ICD-10-CM

## 2019-02-15 PROCEDURE — 99214 OFFICE O/P EST MOD 30 MIN: CPT | Performed by: FAMILY MEDICINE

## 2019-02-15 NOTE — PROGRESS NOTES
Subjective   Paul Gomez is a 73 y.o. male.   Pt presents today with CC of Follow-up      History of Present Illness   Patient is here to follow-up from shortness of breath yesterday.  He was started on Lasix 40 mg daily.  He reports that 1 hour after taking Lasix he urinated consistently for a few hours.  He reports that his shortness of breath is greatly improved and that his leg swelling has improved.  He denies side effects of Lasix.  His shortness of breath is 80% resolved.           The following portions of the patient's history were reviewed and updated as appropriate: allergies, current medications, past family history, past social history, past surgical history and problem list.    Review of Systems   Constitutional: Negative for chills, fever and unexpected weight loss.   Eyes: Negative for blurred vision and visual disturbance.   Respiratory: Positive for shortness of breath. Negative for cough, chest tightness and wheezing.    Cardiovascular: Positive for leg swelling. Negative for chest pain and palpitations.   Gastrointestinal: Negative for abdominal pain and diarrhea.   Genitourinary: Negative for dysuria, flank pain and urgency.   Neurological: Negative for dizziness, seizures and syncope.       Objective   Physical Exam   Constitutional: He is oriented to person, place, and time. He appears well-developed and well-nourished.   HENT:   Head: Normocephalic and atraumatic.   Postnasal drip noted   Eyes: Conjunctivae are normal.   Cardiovascular: Normal rate and regular rhythm. Exam reveals no gallop.   No murmur heard.  1+ pitting edema bilaterally.  He has lost 7 pounds since yesterday.   Pulmonary/Chest:   Pulse ox was 96 on recheck.    Minimal wheezing throughout, rales improved though still present.  No respiratory distress   Abdominal: Soft. Bowel sounds are normal.   No JVD   Neurological: He is alert and oriented to person, place, and time.   Skin: Skin is warm and dry.         Assessment/Plan    Paul was seen today for follow-up.    Diagnoses and all orders for this visit:    Acute congestive heart failure, unspecified heart failure type (CMS/HCC)  -     Adult Transthoracic Echo Complete W/ Cont if Necessary Per Protocol  -     Ambulatory Referral to Cardiology  He has not had an echo, will get a baseline for once he is euvolemic.  Also will refer to cardiology for their opinion.  His COPD with potential for some degree of heart failure warrants referral.  1+ pitting edema  -     Adult Transthoracic Echo Complete W/ Cont if Necessary Per Protocol  I recommended he take Lasix tomorrow as well.  However Once his weight returns to baseline, he is to stop taking it.  I expect this to be tomorrow.   We discussed the renal side effects of this medicine and my at least for now he should not take it daily.  His baseline weight seems to be about 150 pounds.  He will be monitoring his weight regularly, if his weight goes above 157 within a few days he should call clinic.  Centrilobular emphysema (CMS/HCC)  -     Adult Transthoracic Echo Complete W/ Cont if Necessary Per Protocol    Dyspnea on minimal exertion  -     Adult Transthoracic Echo Complete W/ Cont if Necessary Per Protocol               Patient's Body mass index is 22.93 kg/m². BMI is above normal parameters. Recommendations include: exercise counseling and nutrition counseling.

## 2019-02-21 ENCOUNTER — OFFICE VISIT (OUTPATIENT)
Dept: CARDIOLOGY | Facility: CLINIC | Age: 74
End: 2019-02-21

## 2019-02-21 VITALS
OXYGEN SATURATION: 93 % | WEIGHT: 147 LBS | BODY MASS INDEX: 22.28 KG/M2 | HEIGHT: 68 IN | HEART RATE: 89 BPM | SYSTOLIC BLOOD PRESSURE: 92 MMHG | DIASTOLIC BLOOD PRESSURE: 68 MMHG

## 2019-02-21 DIAGNOSIS — R06.09 DYSPNEA ON MINIMAL EXERTION: Primary | ICD-10-CM

## 2019-02-21 DIAGNOSIS — J43.2 CENTRILOBULAR EMPHYSEMA (HCC): ICD-10-CM

## 2019-02-21 DIAGNOSIS — R53.82 CHRONIC FATIGUE: ICD-10-CM

## 2019-02-21 DIAGNOSIS — E78.2 MIXED HYPERLIPIDEMIA: ICD-10-CM

## 2019-02-21 PROCEDURE — 93000 ELECTROCARDIOGRAM COMPLETE: CPT | Performed by: INTERNAL MEDICINE

## 2019-02-21 PROCEDURE — 99204 OFFICE O/P NEW MOD 45 MIN: CPT | Performed by: INTERNAL MEDICINE

## 2019-02-21 RX ORDER — ATENOLOL 25 MG/1
12.5 TABLET ORAL DAILY
Qty: 30 TABLET | Refills: 0 | Status: ON HOLD | OUTPATIENT
Start: 2019-02-21 | End: 2019-04-11

## 2019-02-21 NOTE — PROGRESS NOTES
Subjective   Chief Complaint   Patient presents with   • Shortness of Breath   • Chest Pain       History of Present Illness  Patient is 73 years old white male who is here for cardiac evaluation because of shortness of breath and weakness.    Patient is poor historian.  He states that he has no cardiac history in the past and has never seen a cardiologist.  Records indicate that he saw Dr. Clifton Sierra cardiology [place] [facility] on February 25, 2015  At that time patient was having near syncopal spells.  He was ordered an echocardiogram and an event monitor I do not have any reports of that.    Patient has been taking lot of narcotics for his back pain.  He also has severe COPD and using multiple different inhalers.    He states that he has history of hypertension and is taking atenolol 25 mg daily.    He has had multiple visits to the hospital with exacerbation of COPD, shortness of breath and chest discomfort.  He left AGAINST MEDICAL ADVICE a few occasions.  There has been no cardiac workup done .  No echo or stress test report is available.    Patient recently saw his PCP and was started on Lasix.  He is not taking it regularly he has taken only 2 pills in last 6 days.  He recently quit smoking according to    Past Surgical History:   Procedure Laterality Date   • CHOLECYSTECTOMY     • LUMBAR DISCECTOMY     • SHOULDER SURGERY Left      Family History   Problem Relation Age of Onset   • Cancer Mother      Past Medical History:   Diagnosis Date   • COPD (chronic obstructive pulmonary disease) (CMS/HCC)    • Gout    • Hearing loss    • History of degenerative disc disease    • Hyperlipidemia    • Hypertension    • Low back pain    • Pedal edema    • Prediabetes        Patient Active Problem List   Diagnosis   • Low back pain   • Hypertension   • Hyperlipidemia   • History of degenerative disc disease   • COPD (chronic obstructive pulmonary disease) (CMS/HCC)   • Gout   • B12 deficiency   • Seborrheic eczema   •  Hypothyroidism   • Dilated pancreatic duct   • Epigastric pain   • Chronic pancreatitis (CMS/HCC)   • Headache, unspecified headache type   • Chest pain   • COPD with exacerbation (CMS/[facility])   • Tobacco abuse counseling   • Acute exacerbation of chronic obstructive pulmonary disease (COPD) (CMS/[facility])   • Erectile dysfunction   • Acute congestive heart failure (CMS/HCC)   • Bilateral rales   • 1+ pitting edema   • Dyspnea on minimal exertion         Social History     Tobacco Use   • Smoking status: Former Smoker     Packs/day: 0.25     Types: Cigarettes     Last attempt to quit: 2018     Years since quittin.1   • Smokeless tobacco: Never Used   • Tobacco comment: patient stated he is down to about 4 cigarettes a day   Substance Use Topics   • Alcohol use: No   • Drug use: No         The following portions of the patient's history were reviewed and updated as appropriate: allergies, current medications, past family history, past medical history, past social history, past surgical history and problem list.    Allergies   Allergen Reactions   • Penicillins      Pt states he was allergic when he was 18 but has had it since and did not have any problems.          Current Outpatient Medications:   •  albuterol sulfate  (90 Base) MCG/ACT inhaler, Inhale 2 puffs Every 6 (Six) Hours As Needed for Wheezing or Shortness of Air., Disp: 18 g, Rfl: 5  •  atenolol (TENORMIN) 25 MG tablet, Take 0.5 tablets by mouth Daily., Disp: 30 tablet, Rfl: 0  •  gabapentin (NEURONTIN) 800 MG tablet, Take 800 mg by mouth 3 (Three) Times a Day., Disp: , Rfl:   •  ipratropium-albuterol (DUO-NEB) 0.5-2.5 mg/3 ml nebulizer, Take 3 mL by neb every 30 minutes as needed for shortness of air for up to 6 doses. Part of COPD Rescue Kit. (Only Start if in YELLOW ZONE.), Disp: 18 mL, Rfl: 0  •  levothyroxine (SYNTHROID, LEVOTHROID) 25 MCG tablet, Take 1 tablet by mouth Daily., Disp: 30 tablet, Rfl: 5  •  oxyCODONE (ROXICODONE) 10 MG tablet,  "Take 10 mg by mouth 3 (Three) Times a Day., Disp: , Rfl:   •  OxyMORphone HCl ER (OPANA ER) 40 MG tablet extended-release 12 hour, Take 1 tablet by mouth 2 (Two) Times a Day., Disp: , Rfl:   •  selenium sulfide (SELSUN) 2.5 % shampoo, APPLY TO HAIR AND SCALP LEAVE ON 10 MINUTES THEN SHAMPOO OUT DAILY AS NEEDED FOR DANDRUFF., Disp: 120 mL, Rfl: 2  •  umeclidinium-vilanterol (ANORO ELLIPTA) 62.5-25 MCG/INH aerosol powder  inhaler, Inhale 1 puff Daily., Disp: 60 each, Rfl: 2    Review of Systems   Constitution: Positive for weakness and malaise/fatigue.   HENT: Negative.  Negative for congestion.    Eyes: Negative.    Cardiovascular: Positive for dyspnea on exertion. Negative for chest pain, cyanosis, irregular heartbeat, leg swelling, near-syncope, orthopnea, palpitations, paroxysmal nocturnal dyspnea and syncope.   Respiratory: Positive for cough, shortness of breath and wheezing.    Hematologic/Lymphatic: Negative.    Skin: Negative.    Musculoskeletal: Positive for arthritis, back pain, myalgias and stiffness.   Gastrointestinal: Negative.    Genitourinary: Negative.    Neurological: Negative for headaches.   Psychiatric/Behavioral: The patient is nervous/anxious.         Objective      BP 92/68   Pulse 89   Ht 172.5 cm (67.9\")   Wt 66.7 kg (147 lb)   SpO2 93%   BMI 22.42 kg/m²     Physical Exam   Constitutional: He appears well-developed and well-nourished. No distress.   HENT:   Head: Normocephalic and atraumatic.   Mouth/Throat: Oropharynx is clear and moist. No oropharyngeal exudate.   Eyes: Conjunctivae and EOM are normal. Pupils are equal, round, and reactive to light. No scleral icterus.   Neck: Normal range of motion. Neck supple. No JVD present. No tracheal deviation present. No thyromegaly present.   Cardiovascular: Normal rate, regular rhythm, normal heart sounds and intact distal pulses. PMI is not displaced. Exam reveals no gallop, no friction rub and no decreased pulses.   No murmur " heard.  Pulses:       Carotid pulses are 3+ on the right side, and 3+ on the left side.       Radial pulses are 3+ on the right side, and 3+ on the left side.   Pulmonary/Chest: Effort normal. No respiratory distress. He has wheezes. He has no rales. He exhibits no tenderness.   Abdominal: Soft. Bowel sounds are normal. He exhibits no distension, no abdominal bruit and no mass. There is no splenomegaly or hepatomegaly. There is no tenderness. There is no rebound and no guarding.   Musculoskeletal: Normal range of motion. He exhibits no edema, tenderness or deformity.   Lymphadenopathy:     He has no cervical adenopathy.   Neurological: He is alert. He has normal reflexes. No cranial nerve deficit. He exhibits normal muscle tone. Coordination normal.   Skin: Skin is warm and dry. No rash noted. He is not diaphoretic. No erythema.   Psychiatric: He has a normal mood and affect. His behavior is normal. Judgment and thought content normal.       Lab Review:    Lab Results   Component Value Date     02/14/2019    K 3.8 02/14/2019     02/14/2019    BUN 15 02/14/2019    CREATININE 0.84 02/14/2019    GLU 99 10/13/2016    GLUCOSE 128 (H) 02/14/2019    CALCIUM 8.7 02/14/2019    ALT 14 02/14/2019    ALKPHOS 59 02/14/2019    LABIL2 1.4 (L) 10/13/2016     Lab Results   Component Value Date    CKTOTAL 46 12/06/2018     Lab Results   Component Value Date    WBC 6.01 02/14/2019    HGB 12.3 (L) 02/14/2019    HCT 37.4 (L) 02/14/2019     02/14/2019     Lab Results   Component Value Date    INR 0.92 06/12/2018     Lab Results   Component Value Date    MG 2.4 12/14/2018     Lab Results   Component Value Date    PSA 0.510 06/14/2017    CHOL 141 12/13/2018    TRIG 32 12/13/2018    HDL 52 (L) 12/13/2018    VLDL 6.4 12/13/2018    LDLHDL 1.59 12/13/2018     Lab Results   Component Value Date    BNP 38.0 02/14/2019         ECG 12 Lead  Date/Time: 2/21/2019 12:57 PM  Performed by: Carlos Rodgers MD  Authorized by:  Carlos Rodgers MD   Comparison: compared with previous ECG from 1/27/2019  Similar to previous ECG  Rhythm: sinus rhythm  Rate: normal  BPM: 85  Conduction: incomplete right bundle branch block  ST Segments: ST segments normal  T Waves: T waves normal  QRS axis: normal  Other: no other findings    Clinical impression: abnormal EKG            I reviewed the patient's new clinical results.  I personally viewed and interpreted the patient's EKG/lab data        Assessment:   Diagnosis Plan   1. Dyspnea on minimal exertion  Adult Transthoracic Echo Complete W/ Cont if Necessary Per Protocol    Full Pulmonary Function Test With Bronchodilator    BNP    ECG 12 Lead   2. Centrilobular emphysema (CMS/HCC)  Full Pulmonary Function Test With Bronchodilator   3. Chronic fatigue  T4, Free    TSH    ECG 12 Lead   4. Mixed hyperlipidemia  Lipid Panel          Plan:  Patient's main complaint is dyspnea on exertion and wheezing.  He is short of breath on minimal exertion.  He denies any chest pain or palpitations.  He complains of being weak and blood pressure is running low.    He is taking Lasix with a diagnoses of heart failure but his BNP was only 23.  Patient was advised to DC Lasix because of significantly low blood pressure  Decrease atenolol 12.5 daily.    Further cardiac workup is indicated.  Will get echocardiogram, PFT and BNP at this time.  Later on patient may need a stress test also.    His main problem is severe COPD.  He may need pulmonary consultation also.  Patient will be reevaluated after the studies are completed    Thank you for giving me the oppertunity to participate in your patient's cardiac care.    Sincerely,    ELVI Rodgers M.D. FACP [facility]     No Follow-up on file.

## 2019-02-25 ENCOUNTER — APPOINTMENT (OUTPATIENT)
Dept: CARDIOLOGY | Facility: HOSPITAL | Age: 74
End: 2019-02-25

## 2019-02-28 ENCOUNTER — APPOINTMENT (OUTPATIENT)
Dept: CARDIOLOGY | Facility: HOSPITAL | Age: 74
End: 2019-02-28

## 2019-02-28 ENCOUNTER — APPOINTMENT (OUTPATIENT)
Dept: RESPIRATORY THERAPY | Facility: HOSPITAL | Age: 74
End: 2019-02-28

## 2019-03-13 ENCOUNTER — OFFICE VISIT (OUTPATIENT)
Dept: FAMILY MEDICINE CLINIC | Facility: CLINIC | Age: 74
End: 2019-03-13

## 2019-03-13 VITALS
HEIGHT: 67 IN | WEIGHT: 157 LBS | OXYGEN SATURATION: 93 % | BODY MASS INDEX: 24.64 KG/M2 | HEART RATE: 112 BPM | TEMPERATURE: 98.9 F | SYSTOLIC BLOOD PRESSURE: 116 MMHG | DIASTOLIC BLOOD PRESSURE: 60 MMHG

## 2019-03-13 DIAGNOSIS — J43.2 CENTRILOBULAR EMPHYSEMA (HCC): Primary | ICD-10-CM

## 2019-03-13 DIAGNOSIS — F51.01 PRIMARY INSOMNIA: ICD-10-CM

## 2019-03-13 PROCEDURE — 99213 OFFICE O/P EST LOW 20 MIN: CPT | Performed by: NURSE PRACTITIONER

## 2019-03-13 RX ORDER — HYDROXYZINE HYDROCHLORIDE 25 MG/1
25 TABLET, FILM COATED ORAL NIGHTLY PRN
Qty: 30 TABLET | Refills: 1 | Status: SHIPPED | OUTPATIENT
Start: 2019-03-13 | End: 2019-05-08 | Stop reason: SDUPTHER

## 2019-03-13 NOTE — PROGRESS NOTES
Subjective   Paul Gomez is a 73 y.o. male.     Chief Complaint: Follow-up and COPD    COPD   This is a chronic problem. The current episode started more than 1 year ago. The problem occurs daily. The problem has been unchanged. Nothing aggravates the symptoms. Treatments tried: albuterol. The treatment provided moderate relief.   Insomnia   This is a chronic problem. The current episode started more than 1 month ago. The problem occurs daily. The problem has been unchanged. Nothing aggravates the symptoms. He has tried nothing for the symptoms.        Family History   Problem Relation Age of Onset   • Cancer Mother        Social History     Socioeconomic History   • Marital status:      Spouse name: Not on file   • Number of children: Not on file   • Years of education: Not on file   • Highest education level: Not on file   Social Needs   • Financial resource strain: Not on file   • Food insecurity - worry: Not on file   • Food insecurity - inability: Not on file   • Transportation needs - medical: Not on file   • Transportation needs - non-medical: Not on file   Occupational History   • Not on file   Tobacco Use   • Smoking status: Former Smoker     Packs/day: 0.25     Types: Cigarettes     Last attempt to quit: 2018     Years since quittin.2   • Smokeless tobacco: Never Used   • Tobacco comment: patient stated he is down to about 4 cigarettes a day   Substance and Sexual Activity   • Alcohol use: No   • Drug use: No   • Sexual activity: Defer   Other Topics Concern   • Not on file   Social History Narrative   • Not on file       Past Medical History:   Diagnosis Date   • COPD (chronic obstructive pulmonary disease) (CMS/[facility])    • Gout    • Hearing loss    • History of degenerative disc disease    • Hyperlipidemia    • Hypertension    • Low back pain    • Pedal edema    • Prediabetes        Review of Systems   Constitutional: Negative.    HENT: Negative.    Respiratory: Negative.   "  Cardiovascular: Negative.    Gastrointestinal: Negative.    Musculoskeletal: Negative.    Skin: Negative.    Neurological: Negative.    Psychiatric/Behavioral: The patient has insomnia.        Objective   Physical Exam   Constitutional: He is oriented to person, place, and time. He appears well-developed and well-nourished.   Neck: Normal range of motion. Neck supple.   Cardiovascular: Normal rate, regular rhythm and normal heart sounds.   Pulmonary/Chest: Effort normal. He has wheezes.   Neurological: He is alert and oriented to person, place, and time.   Skin: Skin is warm and dry.   Psychiatric: He has a normal mood and affect. His behavior is normal. Judgment and thought content normal.   Nursing note and vitals reviewed.      Procedures    Vitals: Blood pressure 116/60, pulse 112, temperature 98.9 °F (37.2 °C), temperature source Oral, height 170.2 cm (67\"), weight 71.2 kg (157 lb), SpO2 93 %.    Allergies:   Allergies   Allergen Reactions   • Penicillins      Pt states he was allergic when he was 18 but has had it since and did not have any problems.         During this visit the following were done:  Labs Reviewed []    Labs Ordered []    Radiology Reports Reviewed []    Radiology Ordered []    PCP Records Reviewed []    Referring Provider Records Reviewed []    ER Records Reviewed []    Hospital Records Reviewed []    History Obtained From Family []    Radiology Images Reviewed []    Other Reviewed []    Records Requested []      Assessment/Plan   Ray was seen today for follow-up and copd.    Diagnoses and all orders for this visit:    Centrilobular emphysema (CMS/HCC)   -     tiotropium bromide monohydrate (SPIRIVA RESPIMAT) 2.5 MCG/ACT aerosol solution inhaler; Inhale 2 puffs Daily.    Primary insomnia  -     hydrOXYzine (ATARAX) 25 MG tablet; Take 1 tablet by mouth At Night As Needed (insomnia).                   "

## 2019-03-25 RX ORDER — IPRATROPIUM BROMIDE AND ALBUTEROL SULFATE 2.5; .5 MG/3ML; MG/3ML
SOLUTION RESPIRATORY (INHALATION)
Qty: 18 ML | Refills: 5 | Status: ON HOLD | OUTPATIENT
Start: 2019-03-25 | End: 2019-04-11

## 2019-04-11 ENCOUNTER — APPOINTMENT (OUTPATIENT)
Dept: ULTRASOUND IMAGING | Facility: HOSPITAL | Age: 74
End: 2019-04-11

## 2019-04-11 ENCOUNTER — HOSPITAL ENCOUNTER (INPATIENT)
Facility: HOSPITAL | Age: 74
LOS: 4 days | Discharge: HOME | End: 2019-04-15
Attending: EMERGENCY MEDICINE | Admitting: INTERNAL MEDICINE

## 2019-04-11 ENCOUNTER — APPOINTMENT (OUTPATIENT)
Dept: CT IMAGING | Facility: HOSPITAL | Age: 74
End: 2019-04-11

## 2019-04-11 ENCOUNTER — APPOINTMENT (OUTPATIENT)
Dept: GENERAL RADIOLOGY | Facility: HOSPITAL | Age: 74
End: 2019-04-11

## 2019-04-11 DIAGNOSIS — F11.221: ICD-10-CM

## 2019-04-11 DIAGNOSIS — R41.82 ALTERED MENTAL STATUS, UNSPECIFIED ALTERED MENTAL STATUS TYPE: Primary | ICD-10-CM

## 2019-04-11 DIAGNOSIS — I63.531 ACUTE ISCHEMIC RIGHT PCA STROKE (HCC): ICD-10-CM

## 2019-04-11 DIAGNOSIS — N17.9 ACUTE KIDNEY INJURY (HCC): ICD-10-CM

## 2019-04-11 PROBLEM — H54.7 VISUAL LOSS: Status: ACTIVE | Noted: 2019-04-11

## 2019-04-11 LAB
6-ACETYL MORPHINE: NEGATIVE
A-A DO2: 17 MMHG (ref 0–300)
ALBUMIN SERPL-MCNC: 3.93 G/DL (ref 3.5–5.2)
ALBUMIN/GLOB SERPL: 1.3 G/DL
ALP SERPL-CCNC: 91 U/L (ref 39–117)
ALT SERPL W P-5'-P-CCNC: 14 U/L (ref 1–41)
AMPHET+METHAMPHET UR QL: NEGATIVE
ANION GAP SERPL CALCULATED.3IONS-SCNC: 16.4 MMOL/L
ARTERIAL PATENCY WRIST A: ABNORMAL
AST SERPL-CCNC: 29 U/L (ref 1–40)
ATMOSPHERIC PRESS: 724 MMHG
BACTERIA UR QL AUTO: NORMAL /HPF
BARBITURATES UR QL SCN: NEGATIVE
BASE EXCESS BLDA CALC-SCNC: -5.7 MMOL/L
BASOPHILS # BLD AUTO: 0.03 10*3/MM3 (ref 0–0.2)
BASOPHILS NFR BLD AUTO: 0.3 % (ref 0–1.5)
BDY SITE: ABNORMAL
BENZODIAZ UR QL SCN: NEGATIVE
BILIRUB SERPL-MCNC: 0.9 MG/DL (ref 0.2–1.2)
BILIRUB UR QL STRIP: NEGATIVE
BODY TEMPERATURE: 98.6 C
BUN BLD-MCNC: 85 MG/DL (ref 8–23)
BUN/CREAT SERPL: 32.7 (ref 7–25)
BUPRENORPHINE SERPL-MCNC: NEGATIVE NG/ML
CALCIUM SPEC-SCNC: 8.2 MG/DL (ref 8.6–10.5)
CANNABINOIDS SERPL QL: NEGATIVE
CHLORIDE SERPL-SCNC: 91 MMOL/L (ref 98–107)
CLARITY UR: CLEAR
CO2 SERPL-SCNC: 19.6 MMOL/L (ref 22–29)
COCAINE UR QL: NEGATIVE
COHGB MFR BLD: 1.3 % (ref 0–5)
COLOR UR: YELLOW
CREAT BLD-MCNC: 2.6 MG/DL (ref 0.76–1.27)
D-LACTATE SERPL-SCNC: 1.5 MMOL/L (ref 0.5–2)
DEPRECATED RDW RBC AUTO: 40.1 FL (ref 37–54)
EOSINOPHIL # BLD AUTO: 0.62 10*3/MM3 (ref 0–0.4)
EOSINOPHIL NFR BLD AUTO: 5.5 % (ref 0.3–6.2)
ERYTHROCYTE [DISTWIDTH] IN BLOOD BY AUTOMATED COUNT: 12.8 % (ref 12.3–15.4)
ETHANOL BLD-MCNC: <10 MG/DL (ref 0–10)
ETHANOL UR QL: <0.01 %
GFR SERPL CREATININE-BSD FRML MDRD: 24 ML/MIN/1.73
GLOBULIN UR ELPH-MCNC: 3 GM/DL
GLUCOSE BLD-MCNC: 102 MG/DL (ref 65–99)
GLUCOSE UR STRIP-MCNC: NEGATIVE MG/DL
HCO3 BLDA-SCNC: 19.8 MMOL/L (ref 22–26)
HCT VFR BLD AUTO: 38.6 % (ref 37.5–51)
HCT VFR BLD CALC: 40 % (ref 42–52)
HGB BLD-MCNC: 13.3 G/DL (ref 13–17.7)
HGB BLDA-MCNC: 13.7 G/DL (ref 12–16)
HGB UR QL STRIP.AUTO: ABNORMAL
HOROWITZ INDEX BLD+IHG-RTO: 21 %
HYALINE CASTS UR QL AUTO: NORMAL /LPF
IMM GRANULOCYTES # BLD AUTO: 0.05 10*3/MM3 (ref 0–0.05)
IMM GRANULOCYTES NFR BLD AUTO: 0.4 % (ref 0–0.5)
KETONES UR QL STRIP: NEGATIVE
L PNEUMO1 AG UR QL IA: NEGATIVE
LEUKOCYTE ESTERASE UR QL STRIP.AUTO: NEGATIVE
LYMPHOCYTES # BLD AUTO: 1.46 10*3/MM3 (ref 0.7–3.1)
LYMPHOCYTES NFR BLD AUTO: 13 % (ref 19.6–45.3)
M PNEUMO IGM SER QL: NEGATIVE
MAGNESIUM SERPL-MCNC: 2.5 MG/DL (ref 1.6–2.4)
MCH RBC QN AUTO: 30.2 PG (ref 26.6–33)
MCHC RBC AUTO-ENTMCNC: 34.5 G/DL (ref 31.5–35.7)
MCV RBC AUTO: 87.5 FL (ref 79–97)
METHADONE UR QL SCN: NEGATIVE
METHGB BLD QL: 0.5 % (ref 0–3)
MODALITY: ABNORMAL
MONOCYTES # BLD AUTO: 1.27 10*3/MM3 (ref 0.1–0.9)
MONOCYTES NFR BLD AUTO: 11.3 % (ref 5–12)
NEUTROPHILS # BLD AUTO: 7.8 10*3/MM3 (ref 1.4–7)
NEUTROPHILS NFR BLD AUTO: 69.5 % (ref 42.7–76)
NITRITE UR QL STRIP: NEGATIVE
OPIATES UR QL: POSITIVE
OXYCODONE UR QL SCN: POSITIVE
OXYHGB MFR BLDV: 91.9 % (ref 85–100)
PCO2 BLDA: 38.6 MM HG (ref 35–45)
PCP UR QL SCN: NEGATIVE
PH BLDA: 7.33 PH UNITS (ref 7.35–7.45)
PH UR STRIP.AUTO: <=5 [PH] (ref 5–8)
PLATELET # BLD AUTO: 156 10*3/MM3 (ref 140–450)
PMV BLD AUTO: 11 FL (ref 6–12)
PO2 BLDA: 78.9 MM HG (ref 80–100)
POTASSIUM BLD-SCNC: 3.8 MMOL/L (ref 3.5–5.2)
PROT SERPL-MCNC: 6.9 G/DL (ref 6–8.5)
PROT UR QL STRIP: NEGATIVE
RBC # BLD AUTO: 4.41 10*6/MM3 (ref 4.14–5.8)
RBC # UR: NORMAL /HPF
REF LAB TEST METHOD: NORMAL
SAO2 % BLDCOA: 93.6 % (ref 90–100)
SODIUM BLD-SCNC: 127 MMOL/L (ref 136–145)
SP GR UR STRIP: 1.01 (ref 1–1.03)
SQUAMOUS #/AREA URNS HPF: NORMAL /HPF
T4 FREE SERPL-MCNC: 2.06 NG/DL (ref 0.93–1.7)
TSH SERPL DL<=0.05 MIU/L-ACNC: 3.09 MIU/ML (ref 0.27–4.2)
UROBILINOGEN UR QL STRIP: ABNORMAL
WBC NRBC COR # BLD: 11.23 10*3/MM3 (ref 3.4–10.8)
WBC UR QL AUTO: NORMAL /HPF

## 2019-04-11 PROCEDURE — 83735 ASSAY OF MAGNESIUM: CPT | Performed by: PHYSICIAN ASSISTANT

## 2019-04-11 PROCEDURE — 71045 X-RAY EXAM CHEST 1 VIEW: CPT | Performed by: RADIOLOGY

## 2019-04-11 PROCEDURE — 70450 CT HEAD/BRAIN W/O DYE: CPT

## 2019-04-11 PROCEDURE — 87040 BLOOD CULTURE FOR BACTERIA: CPT | Performed by: PHYSICIAN ASSISTANT

## 2019-04-11 PROCEDURE — 99223 1ST HOSP IP/OBS HIGH 75: CPT | Performed by: INTERNAL MEDICINE

## 2019-04-11 PROCEDURE — 94799 UNLISTED PULMONARY SVC/PX: CPT

## 2019-04-11 PROCEDURE — 84439 ASSAY OF FREE THYROXINE: CPT | Performed by: PHYSICIAN ASSISTANT

## 2019-04-11 PROCEDURE — 84443 ASSAY THYROID STIM HORMONE: CPT | Performed by: PHYSICIAN ASSISTANT

## 2019-04-11 PROCEDURE — 93005 ELECTROCARDIOGRAM TRACING: CPT | Performed by: EMERGENCY MEDICINE

## 2019-04-11 PROCEDURE — 87899 AGENT NOS ASSAY W/OPTIC: CPT | Performed by: PHYSICIAN ASSISTANT

## 2019-04-11 PROCEDURE — 80307 DRUG TEST PRSMV CHEM ANLYZR: CPT | Performed by: EMERGENCY MEDICINE

## 2019-04-11 PROCEDURE — 25010000002 HALOPERIDOL LACTATE PER 5 MG

## 2019-04-11 PROCEDURE — 93010 ELECTROCARDIOGRAM REPORT: CPT | Performed by: INTERNAL MEDICINE

## 2019-04-11 PROCEDURE — 25010000002 ZIPRASIDONE MESYLATE PER 10 MG: Performed by: EMERGENCY MEDICINE

## 2019-04-11 PROCEDURE — 82805 BLOOD GASES W/O2 SATURATION: CPT | Performed by: EMERGENCY MEDICINE

## 2019-04-11 PROCEDURE — 25010000002 NALOXONE PER 1 MG: Performed by: EMERGENCY MEDICINE

## 2019-04-11 PROCEDURE — 70450 CT HEAD/BRAIN W/O DYE: CPT | Performed by: RADIOLOGY

## 2019-04-11 PROCEDURE — 82570 ASSAY OF URINE CREATININE: CPT | Performed by: PHYSICIAN ASSISTANT

## 2019-04-11 PROCEDURE — 36600 WITHDRAWAL OF ARTERIAL BLOOD: CPT | Performed by: EMERGENCY MEDICINE

## 2019-04-11 PROCEDURE — 84156 ASSAY OF PROTEIN URINE: CPT | Performed by: PHYSICIAN ASSISTANT

## 2019-04-11 PROCEDURE — 85025 COMPLETE CBC W/AUTO DIFF WBC: CPT | Performed by: EMERGENCY MEDICINE

## 2019-04-11 PROCEDURE — 99285 EMERGENCY DEPT VISIT HI MDM: CPT

## 2019-04-11 PROCEDURE — 25010000002 HEPARIN (PORCINE) PER 1000 UNITS: Performed by: INTERNAL MEDICINE

## 2019-04-11 PROCEDURE — 25010000002 CEFTRIAXONE: Performed by: PHYSICIAN ASSISTANT

## 2019-04-11 PROCEDURE — 71045 X-RAY EXAM CHEST 1 VIEW: CPT

## 2019-04-11 PROCEDURE — 83050 HGB METHEMOGLOBIN QUAN: CPT | Performed by: EMERGENCY MEDICINE

## 2019-04-11 PROCEDURE — 36415 COLL VENOUS BLD VENIPUNCTURE: CPT

## 2019-04-11 PROCEDURE — 80053 COMPREHEN METABOLIC PANEL: CPT | Performed by: EMERGENCY MEDICINE

## 2019-04-11 PROCEDURE — 86738 MYCOPLASMA ANTIBODY: CPT | Performed by: PHYSICIAN ASSISTANT

## 2019-04-11 PROCEDURE — 82375 ASSAY CARBOXYHB QUANT: CPT | Performed by: EMERGENCY MEDICINE

## 2019-04-11 PROCEDURE — 83605 ASSAY OF LACTIC ACID: CPT | Performed by: PHYSICIAN ASSISTANT

## 2019-04-11 PROCEDURE — 81001 URINALYSIS AUTO W/SCOPE: CPT | Performed by: EMERGENCY MEDICINE

## 2019-04-11 RX ORDER — ASPIRIN 81 MG/1
81 TABLET ORAL DAILY
Status: DISCONTINUED | OUTPATIENT
Start: 2019-04-12 | End: 2019-04-15 | Stop reason: HOSPADM

## 2019-04-11 RX ORDER — SODIUM CHLORIDE 9 MG/ML
100 INJECTION, SOLUTION INTRAVENOUS CONTINUOUS
Status: DISCONTINUED | OUTPATIENT
Start: 2019-04-11 | End: 2019-04-13

## 2019-04-11 RX ORDER — NITROGLYCERIN 0.4 MG/1
0.4 TABLET SUBLINGUAL
Status: DISCONTINUED | OUTPATIENT
Start: 2019-04-11 | End: 2019-04-15 | Stop reason: HOSPADM

## 2019-04-11 RX ORDER — NALOXONE HYDROCHLORIDE 1 MG/ML
INJECTION INTRAMUSCULAR; INTRAVENOUS; SUBCUTANEOUS
Status: DISPENSED
Start: 2019-04-11 | End: 2019-04-12

## 2019-04-11 RX ORDER — SODIUM CHLORIDE 0.9 % (FLUSH) 0.9 %
3 SYRINGE (ML) INJECTION EVERY 12 HOURS SCHEDULED
Status: DISCONTINUED | OUTPATIENT
Start: 2019-04-11 | End: 2019-04-15 | Stop reason: HOSPADM

## 2019-04-11 RX ORDER — HALOPERIDOL 5 MG/ML
INJECTION INTRAMUSCULAR
Status: COMPLETED
Start: 2019-04-11 | End: 2019-04-11

## 2019-04-11 RX ORDER — ALBUTEROL SULFATE 2.5 MG/3ML
2.5 SOLUTION RESPIRATORY (INHALATION) EVERY 6 HOURS PRN
Status: CANCELLED | OUTPATIENT
Start: 2019-04-11

## 2019-04-11 RX ORDER — IPRATROPIUM BROMIDE AND ALBUTEROL SULFATE 2.5; .5 MG/3ML; MG/3ML
3 SOLUTION RESPIRATORY (INHALATION) TAKE AS DIRECTED
Status: CANCELLED | OUTPATIENT
Start: 2019-04-11

## 2019-04-11 RX ORDER — NALOXONE HCL 0.4 MG/ML
1 VIAL (ML) INJECTION ONCE
Status: COMPLETED | OUTPATIENT
Start: 2019-04-11 | End: 2019-04-11

## 2019-04-11 RX ORDER — HALOPERIDOL 5 MG/ML
1 INJECTION INTRAMUSCULAR ONCE
Status: COMPLETED | OUTPATIENT
Start: 2019-04-11 | End: 2019-04-11

## 2019-04-11 RX ORDER — ZIPRASIDONE MESYLATE 20 MG/ML
INJECTION, POWDER, LYOPHILIZED, FOR SOLUTION INTRAMUSCULAR
Status: DISPENSED
Start: 2019-04-11 | End: 2019-04-12

## 2019-04-11 RX ORDER — LEVOTHYROXINE SODIUM 0.03 MG/1
25 TABLET ORAL DAILY
Status: DISCONTINUED | OUTPATIENT
Start: 2019-04-12 | End: 2019-04-15 | Stop reason: HOSPADM

## 2019-04-11 RX ORDER — SODIUM CHLORIDE 0.9 % (FLUSH) 0.9 %
3-10 SYRINGE (ML) INJECTION AS NEEDED
Status: DISCONTINUED | OUTPATIENT
Start: 2019-04-11 | End: 2019-04-15 | Stop reason: HOSPADM

## 2019-04-11 RX ORDER — SODIUM CHLORIDE 0.9 % (FLUSH) 0.9 %
10 SYRINGE (ML) INJECTION AS NEEDED
Status: DISCONTINUED | OUTPATIENT
Start: 2019-04-11 | End: 2019-04-15 | Stop reason: HOSPADM

## 2019-04-11 RX ORDER — ATENOLOL 25 MG/1
25 TABLET ORAL 2 TIMES DAILY
COMMUNITY
End: 2019-04-15 | Stop reason: HOSPADM

## 2019-04-11 RX ORDER — ATENOLOL 25 MG/1
25 TABLET ORAL 2 TIMES DAILY
Status: CANCELLED | OUTPATIENT
Start: 2019-04-11

## 2019-04-11 RX ORDER — IPRATROPIUM BROMIDE AND ALBUTEROL SULFATE 2.5; .5 MG/3ML; MG/3ML
3 SOLUTION RESPIRATORY (INHALATION)
Status: DISCONTINUED | OUTPATIENT
Start: 2019-04-11 | End: 2019-04-12 | Stop reason: SDUPTHER

## 2019-04-11 RX ORDER — OXYCODONE HYDROCHLORIDE 5 MG/1
5 TABLET ORAL 4 TIMES DAILY PRN
Status: DISCONTINUED | OUTPATIENT
Start: 2019-04-11 | End: 2019-04-15 | Stop reason: HOSPADM

## 2019-04-11 RX ORDER — IPRATROPIUM BROMIDE AND ALBUTEROL SULFATE 2.5; .5 MG/3ML; MG/3ML
3 SOLUTION RESPIRATORY (INHALATION)
Status: DISCONTINUED | OUTPATIENT
Start: 2019-04-11 | End: 2019-04-15 | Stop reason: HOSPADM

## 2019-04-11 RX ORDER — IPRATROPIUM BROMIDE AND ALBUTEROL SULFATE 2.5; .5 MG/3ML; MG/3ML
3 SOLUTION RESPIRATORY (INHALATION) TAKE AS DIRECTED
COMMUNITY
End: 2020-02-06

## 2019-04-11 RX ORDER — HEPARIN SODIUM 5000 [USP'U]/ML
5000 INJECTION, SOLUTION INTRAVENOUS; SUBCUTANEOUS EVERY 12 HOURS SCHEDULED
Status: DISCONTINUED | OUTPATIENT
Start: 2019-04-11 | End: 2019-04-15 | Stop reason: HOSPADM

## 2019-04-11 RX ORDER — HYDROXYZINE HYDROCHLORIDE 25 MG/1
25 TABLET, FILM COATED ORAL NIGHTLY PRN
Status: CANCELLED | OUTPATIENT
Start: 2019-04-11

## 2019-04-11 RX ORDER — ASPIRIN 81 MG/1
324 TABLET, CHEWABLE ORAL DAILY
Status: DISCONTINUED | OUTPATIENT
Start: 2019-04-11 | End: 2019-04-11 | Stop reason: ALTCHOICE

## 2019-04-11 RX ORDER — GABAPENTIN 100 MG/1
100 CAPSULE ORAL EVERY 8 HOURS SCHEDULED
Status: DISCONTINUED | OUTPATIENT
Start: 2019-04-11 | End: 2019-04-13

## 2019-04-11 RX ORDER — OXYMORPHONE HYDROCHLORIDE 40 MG/1
30 TABLET, FILM COATED, EXTENDED RELEASE ORAL EVERY 12 HOURS
COMMUNITY
End: 2021-02-04

## 2019-04-11 RX ORDER — OXYCODONE HCL 40 MG/1
80 TABLET, FILM COATED, EXTENDED RELEASE ORAL EVERY 12 HOURS SCHEDULED
Status: DISCONTINUED | OUTPATIENT
Start: 2019-04-11 | End: 2019-04-15 | Stop reason: HOSPADM

## 2019-04-11 RX ORDER — ATORVASTATIN CALCIUM 20 MG/1
20 TABLET, FILM COATED ORAL NIGHTLY
Status: DISCONTINUED | OUTPATIENT
Start: 2019-04-11 | End: 2019-04-15 | Stop reason: HOSPADM

## 2019-04-11 RX ORDER — WATER 1000 ML/1000ML
INJECTION, SOLUTION INTRAVENOUS
Status: DISPENSED
Start: 2019-04-11 | End: 2019-04-12

## 2019-04-11 RX ADMIN — SODIUM CHLORIDE 1000 ML: 9 INJECTION, SOLUTION INTRAVENOUS at 14:51

## 2019-04-11 RX ADMIN — HALOPERIDOL LACTATE 1 MG: 5 INJECTION, SOLUTION INTRAMUSCULAR at 18:52

## 2019-04-11 RX ADMIN — HEPARIN SODIUM 5000 UNITS: 5000 INJECTION INTRAVENOUS; SUBCUTANEOUS at 21:12

## 2019-04-11 RX ADMIN — SODIUM CHLORIDE, PRESERVATIVE FREE 3 ML: 5 INJECTION INTRAVENOUS at 21:13

## 2019-04-11 RX ADMIN — SODIUM CHLORIDE 125 ML/HR: 9 INJECTION, SOLUTION INTRAVENOUS at 16:25

## 2019-04-11 RX ADMIN — CEFTRIAXONE 2 G: 2 INJECTION, POWDER, FOR SOLUTION INTRAMUSCULAR; INTRAVENOUS at 21:11

## 2019-04-11 RX ADMIN — ATORVASTATIN CALCIUM 20 MG: 20 TABLET, FILM COATED ORAL at 21:11

## 2019-04-11 RX ADMIN — SODIUM CHLORIDE 100 ML/HR: 9 INJECTION, SOLUTION INTRAVENOUS at 21:48

## 2019-04-11 RX ADMIN — DOXYCYCLINE 100 MG: 100 INJECTION, POWDER, LYOPHILIZED, FOR SOLUTION INTRAVENOUS at 21:14

## 2019-04-11 RX ADMIN — HALOPERIDOL 1 MG: 5 INJECTION INTRAMUSCULAR at 18:52

## 2019-04-11 RX ADMIN — NALOXONE HYDROCHLORIDE 1 MG: 0.4 INJECTION, SOLUTION INTRAMUSCULAR; INTRAVENOUS; SUBCUTANEOUS at 14:52

## 2019-04-11 RX ADMIN — OXYCODONE HYDROCHLORIDE 80 MG: 80 TABLET, FILM COATED, EXTENDED RELEASE ORAL at 21:55

## 2019-04-11 RX ADMIN — GABAPENTIN 100 MG: 100 CAPSULE ORAL at 21:58

## 2019-04-11 RX ADMIN — WATER 10 MG: 1 INJECTION INTRAMUSCULAR; INTRAVENOUS; SUBCUTANEOUS at 15:55

## 2019-04-11 NOTE — ED NOTES
PCU RN calls, states they are going to have room near nurses station cleaned stat for pt, states they will call ED when pt can be admitted.     Margarita Jon, RN  04/11/19 [id number]

## 2019-04-11 NOTE — ED NOTES
Pt is aware we need a urine sample, unable to give urine sample at this time.      Eliza Rene  04/11/19 [id number]

## 2019-04-11 NOTE — ED NOTES
Went to pts room to obtain urine. Pt stated he was not able to go at this time. Urinal is at bedside.     Yane Field  04/11/19 [id number]

## 2019-04-11 NOTE — H&P
[facility] HOSPITALIST HISTORY AND PHYSICAL    Patient Identification:  Name:  Paul Gomez  Age:  73 y.o.  Sex:  male  :  1945  MRN:  [id number]   Visit Number:  [id number]  Primary Care Physician:  Caroline Guzman APRN     I have seen the patient in conjunction with Ariadna Guajardo PA-C, and I agree with the following statements:    Subjective     Chief complaint:   Chief Complaint   Patient presents with   • Loss of Vision     History of presenting illness:   Mr. Gomez is a 73 y.o. male with past medical history significant for COPD, hypertension, dyslipidemia, and CHF (systolic versus diastolic). He presented to the emergency department of [facility] on 2019 with complaints of bilateral vision loss.  His daughter is at bedside and provides most of the history.  She reports about 3 days of intermittent bilateral vision loss.  She reports that at times he can see colors and other times he says he cannot see at all.  No other reported neurological symptoms.  No recent chest pains, shortness of breath, fever, or chills.  He has a chronic intermittent cough which has not been worse recently.  No recent hospital admissions.  No recent change in urination.  As noted below, the patient required a dose of IM Geodon in the ED.  He is awake and alert, but is confused at this time and does not participate much in history or physical.    Upon arrival to the ED, vitals were BP 96/62, respirations 16, heart rate 71, SPO2 93%, afebrile.  ABG shows a mild acute metabolic acidosis.  Sodium is low at 127.  Creatinine is elevated at 2.60 with previous baseline of 0.8.  White blood cell count is borderline elevated at 11.23.  Urine drug screen is positive for opiates and oxycodone.  Ethanol screen is negative.  Chest x-ray shows a right basilar pneumonia.  CT scan of the head without contrast shows low attenuation in the posterior parietal and occipital regions bilaterally.  It was  felt that this could represent edema, but would be an unusual symmetric appearance and MRI was recommended per radiology.  There was reportedly some dispute with the patient's daughter in the ED over her not wanting him to receive Narcan.  She was eventually agreeable if no other definitive cause of his vision changes could be found.  He was given 1 mg of IV Narcan and did arouse somewhat per ED records.  He also became very agitated and received a dose of IM Geodon 10 mg. Patient has been admitted to the progressive care unit of [facility] for further evaluation and therapy.   ---------------------------------------------------------------------------------------------------------------------   Review of Systems   Unable to perform ROS: Mental status change (Received a dose of Geodon in the ED. )      ---------------------------------------------------------------------------------------------------------------------   Past Medical History:   Diagnosis Date   • COPD (chronic obstructive pulmonary disease) (CMS/HCC)    • Gout    • Hearing loss    • History of degenerative disc disease    • Hyperlipidemia    • Hypertension    • Low back pain    • Pedal edema    • Prediabetes      Past Surgical History:   Procedure Laterality Date   • CHOLECYSTECTOMY     • LUMBAR DISCECTOMY     • SHOULDER SURGERY Left      Family History   Problem Relation Age of Onset   • Cancer Mother      Social History     Socioeconomic History   • Marital status:    Tobacco Use   • Smoking status: Former Smoker     Packs/day: 0.25     Types: Cigarettes     Last attempt to quit: 2018     Years since quittin.3   • Smokeless tobacco: Never Used   • Tobacco comment: patient stated he is down to about 4 cigarettes a day   Substance and Sexual Activity   • Alcohol use: No   • Drug use: No   • Sexual activity: Defer      ---------------------------------------------------------------------------------------------------------------------   Allergies:  Penicillins  ---------------------------------------------------------------------------------------------------------------------   Prior to Admission Medications     Prescriptions Last Dose Informant Patient Reported? Taking?    albuterol sulfate  (90 Base) MCG/ACT inhaler   No No    Inhale 2 puffs Every 6 (Six) Hours As Needed for Wheezing or Shortness of Air.    atenolol (TENORMIN) 25 MG tablet   No No    Take 0.5 tablets by mouth Daily.    gabapentin (NEURONTIN) 800 MG tablet  Pharmacy Yes No    Take 800 mg by mouth 3 (Three) Times a Day.    hydrOXYzine (ATARAX) 25 MG tablet   No No    Take 1 tablet by mouth At Night As Needed (insomnia).    ipratropium-albuterol (DUO-NEB) 0.5-2.5 mg/3 ml nebulizer   No No    Take 3 mL by neb every 30 minutes as needed for shortness of air for up to 6 doses. Part of COPD Rescue Kit. (Only Start if in YELLOW ZONE.)    levothyroxine (SYNTHROID, LEVOTHROID) 25 MCG tablet  Pharmacy No No    Take 1 tablet by mouth Daily.    oxyCODONE (ROXICODONE) 10 MG tablet  Pharmacy Yes No    Take 10 mg by mouth 3 (Three) Times a Day.    OxyMORphone HCl ER (OPANA ER) 40 MG tablet extended-release 12 hour  Pharmacy Yes No    Take 1 tablet by mouth 2 (Two) Times a Day.    selenium sulfide (SELSUN) 2.5 % shampoo  Pharmacy No No    APPLY TO HAIR AND SCALP LEAVE ON 10 MINUTES THEN SHAMPOO OUT DAILY AS NEEDED FOR DANDRUFF.    tiotropium bromide monohydrate (SPIRIVA RESPIMAT) 2.5 MCG/ACT aerosol solution inhaler   No No    Inhale 2 puffs Daily.     ---------------------------------------------------------------------------------------------------------------------  Objective     Hospital Scheduled Meds:    [START ON 4/12/2019] aspirin 81 mg Oral Daily   atorvastatin 20 mg Oral Nightly   ceftriaxone 2 g Intravenous Q24H   doxycycline 100 mg Intravenous Q12H    gabapentin 100 mg Oral Q8H   heparin (porcine) 5,000 Units Subcutaneous Q12H   ipratropium-albuterol 3 mL Nebulization 4x Daily - RT   ipratropium-albuterol 3 mL Nebulization Q6H - RT   [START ON 4/12/2019] levothyroxine 25 mcg Oral Daily   Naloxone HCl      oxyCODONE ER 80 mg Oral Q12H   sodium chloride 3 mL Intravenous Q12H   sterile water (preservative free)      ziprasidone 10 mg Intramuscular Once   ziprasidone          Pharmacy Consult - Pharmacy to dose     sodium chloride 100 mL/hr Last Rate: 125 mL/hr (04/11/19 1625)     ---------------------------------------------------------------------------------------------------------------------   Vital Signs:  Temp:  [97.5 °F (36.4 °C)-97.9 °F (36.6 °C)] 97.6 °F (36.4 °C)  Heart Rate:  [58-83] 74  Resp:  [16-18] 16  BP: ()/(39-93) 141/93  Mean Arterial Pressure (Non-Invasive) for the past 24 hrs (Last 3 readings):   Noninvasive MAP (mmHg)   04/11/19 1802 116   04/11/19 1747 94   04/11/19 1732 54     SpO2 Percentage    04/11/19 1732 04/11/19 1747 04/11/19 1802   SpO2: 95%  Comment: Dr. Monsivais notified 96% 100%     SpO2:  [87 %-100 %] 100 %  Device (Oxygen Therapy): room air    Body mass index is 22.15 kg/m².  Wt Readings from Last 3 Encounters:   04/11/19 68 kg (150 lb)   03/13/19 71.2 kg (157 lb)   02/21/19 66.7 kg (147 lb)     ---------------------------------------------------------------------------------------------------------------------   Physical Exam:  Constitutional:  Well-developed and well-nourished.  No respiratory distress. He appears confused and disoriented after receiving a dose of IM Geodon. He does not participate in exam and repeatedly tries to get up.  HENT:  Head: Normocephalic and atraumatic.  Mouth:  Moist mucous membranes.    Eyes:  Conjunctivae and EOM appear normal. No scleral icterus. No erythema or drainage. Pupils are small, but equal and reactive to light.   Neck:  Neck supple.  No JVD present. No carotid bruits noted.    Cardiovascular:  Normal rate, regular rhythm and normal heart sounds with no murmur.  Pulmonary/Chest:  No respiratory distress, no wheezes. Few rales at the right base. Reduced air movement bilaterally.   Abdominal:  Soft.  Bowel sounds are present x4.  No distension and no tenderness.   Musculoskeletal:  No edema, no tenderness, and no deformity.  No red or swollen joints anywhere. Moving all 4 extremities.   Neurological:  Alert, but disoriented. Unable to tell me his name, daughter's name, or where we are. No tongue deviation.  No facial droop.  No slurred speech.   Skin:  Skin is warm and dry.  No rash noted.  No pallor.   Psychiatric: Confused.   Peripheral vascular:  No edema and 2+ pedal pulses bilaterally.   Genitourinary: No mccullough catheter in place.   ---------------------------------------------------------------------------------------------------------------------  EKG:  Pending cardiology interpretation. Per my read Dr. Jones' read, reveals sinus rhythm at 90 bpm with QTc 486 ms.    Telemetry:  Not yet on telemetry monitoring as patient is agitated and not allowing us to put the monitor leads on him.     I have personally reviewed the EKG.  ---------------------------------------------------------------------------------------------------------------------           Results from last 7 days   Lab Units 04/11/19  1244   PH, ARTERIAL pH units 7.327*   PO2 ART mm Hg 78.9*   PCO2, ARTERIAL mm Hg 38.6   HCO3 ART mmol/L 19.8*     Results from last 7 days   Lab Units 04/11/19  1920 04/11/19  1305   LACTATE mmol/L 1.5  --    WBC 10*3/mm3  --  11.23*   HEMOGLOBIN g/dL  --  13.3   HEMATOCRIT %  --  38.6   MCV fL  --  87.5   MCHC g/dL  --  34.5   PLATELETS 10*3/mm3  --  156     Results from last 7 days   Lab Units 04/11/19  1305   SODIUM mmol/L 127*   POTASSIUM mmol/L 3.8   MAGNESIUM mg/dL 2.5*   CHLORIDE mmol/L 91*   CO2 mmol/L 19.6*   BUN mg/dL 85*   CREATININE mg/dL 2.60*   EGFR IF NONAFRICN AM mL/min/1.73  24*   CALCIUM mg/dL 8.2*   GLUCOSE mg/dL 102*   ALBUMIN g/dL 3.93   BILIRUBIN mg/dL 0.9   ALK PHOS U/L 91   AST (SGOT) U/L 29   ALT (SGPT) U/L 14   Estimated Creatinine Clearance: 24.3 mL/min (A) (by C-G formula based on SCr of 2.6 mg/dL (H)).    Lab Results   Component Value Date    HGBA1C 6.00 (H) 12/13/2018     Lab Results   Component Value Date    TSH 3.090 04/11/2019    FREET4 2.06 (H) 04/11/2019         I have personally reviewed the above laboratory results.   ---------------------------------------------------------------------------------------------------------------------  Imaging Results (last 7 days)     Procedure Component Value Units Date/Time    US Renal Bilateral [[id number]] Updated:  04/11/19 1946    XR Chest 1 View [[id number]] Collected:  04/11/19 1540     Updated:  04/11/19 1542    Narrative:       XR CHEST 1 VW-     CLINICAL INDICATION: vision change        COMPARISON: 01/27/2019      TECHNIQUE: Single frontal view of the chest.     FINDINGS:     Right basilar pneumonia  The cardiac silhouette is normal. The pulmonary vasculature is  unremarkable.  There is no evidence of an acute osseous abnormality.   There are no suspicious-appearing parenchymal soft tissue nodules.          Impression:       Right basilar pneumonia     This report was finalized on 4/11/2019 3:40 PM by Dr. Valdo Rogers MD.       CT Head Without Contrast [[id number]] Collected:  04/11/19 1303     Updated:  04/11/19 1306    Narrative:       CT HEAD WO CONTRAST-     CLINICAL INDICATION: Vision loss        COMPARISON: 05/26/2016.      TECHNIQUE: Axial images of the brain were obtained with out intravenous  contrast.  Reformatted images were created in the sagittal and coronal  planes.     DOSE: 1225 mGy.cm     Radiation dose reduction techniques were utilized per ALARA protocol.  Automated exposure control was initiated through either or [employer]et or  DoseRight software packages by  protocol.           FINDINGS:   Low  attenuation in the posterior parietal and occipital regions is noted  bilaterally. This could represent edema, but unusual symmetric  appearance.  The ventricles, cisterns, and sulci are unremarkable. There is no  hydrocephalus.      I do not see epidural or subdural hematoma.  The gray-white differentiation is appropriate.   The bone window setting images show no destructive calvarial lesion or  acute calvarial fracture.   The posterior fossa is unremarkable.          Impression:       Low attenuation in the posterior parietal and occipital regions is noted  bilaterally. This could represent edema, but unusual symmetric  appearance     MRI may be of benefit to further evaluate     This report was finalized on 4/11/2019 1:04 PM by Dr. Valdo Rogers MD.           I have personally reviewed the above radiology results.   ---------------------------------------------------------------------------------------------------------------------  Assessment & Plan      -Reported bilateral vision loss: CT of the head with areas concerning for possible edema, but unusually symmetric.  MRI recommended and has been ordered for a.m.  We will check a bilateral carotid dopplers as well as transthoracic echocardiogram. Place on strict bed rest with fall precautions and neuro checks every 4 hours. Start aspirin 81mg daily and atorvastatin 20mg. Check Lipid panel in AM.     -Multifactorial delirium    -Acute kidney injury: Admission creatinine 2.6, baseline creatinine 0.8. Hold home atenolol. Reduce home gabapentin to 100mg TID. Avoid nephrotoxic medications. Obtain bilateral renal ultrasound and urine protein/creatinine ratio. Continue IV fluids. Repeat BMP in AM.     -Possible acute right lower lobe pneumonia, community acquired: Continue supplemental O2 via nasal cannula to maintain oxygen saturations >90%. Check urinary legionella and mycoplasma studies. Check respiratory cultures. Duonebs every 6 hours. Check lactic acid, blood  cultures, and CRP. Consult SLP for swallow evaluation tomorrow. Will start on therapy with Rocephin and Doxycycline and follow up on test results. Will have the pharmacy see if he has taken Rocephin the past given history of PCN allergy, if not, will ask them to do a graded challenge. Will avoid Azithromycin for now given prolonged Qtc. Repeat CBC and CRP in AM.     -Chronic pain syndrome with chronic opioid use: Concern for possible overmedication at admission. Improved with Narcan, but then became agitated and required Geodon. Will review home pain medications once confirmed by pharmacy and consider reducing the dose. Add holding parameters.     -Prolonged, QTc 486 ms: Check magnesium level and replace if needed. Avoid QT prolonging agents. Monitor on telemetry. Repeat EKG in AM given that he required Geodon in the ED.     -Hypothyroidism: Check TSH and free T4. Resume home levothyroxine once confirmed by pharmacy.     -Essential hypertension: Hypotensive in the ED. Better with Narcan. Hold home atenolol. Continue to monitor and will consider starting a different agent if BP remains elevated.     -Dyslipidemia: Started on atorvastatin 20mg. Lipid panel ordered for tomorrow AM.     -COPD, without acute exacerbation.     -History of congestive heart failure: Appears compensated. Avoid fluid overload. Will reduce the rate of IVF to 100cc/hr. Echocardiogram pending. It appears he has had multiple echocardiograms ordered as an outpatient recently, but has not had them completed.     -Tobacco abuse: Previously smoked regularly, now just an occasional cigarette per his daughter. He will need smoking cessation education when he is alert/oriented.     -Urinary retention    -DVT Prophylaxis: Subcutaneous heparin.     The patient is considered to be a high risk patient due to: vision changes, possible pneumonia, possible overmedication, KIMBERLEE, prolonged QTc.     Code Status: FULL CODE.     I have discussed the patient's  assessment and plan with the patient, his daughter at bedside, and admitting physician, Dr. Jones.     VIJAY Leiva  04/11/19  5:03 PM  ---------------------------------------------------------------------------------------------------------------------   Brief Attending Note     I have seen and examined the patient, performing an independent face-to-face diagnostic evaluation at 6:45 in the PM.  The plan of care reviewed and developed with the advanced practice clinician (APC):  Ariadna Guajardo PA-C.    Brief Summary Statement/HPI:  73-year-old male brought to the hospital for bilateral visual difficulties.  However, the patient is so confused right now that I am unable to obtain any history from him.  He has been trying to get out of bed and he appears unstable on his feet.  He cannot answer any questions for me.  He does try to talk about what he discusses with me does not make sense for the question at hand.    Attending Physical Exam (difficult exam due to the patient's agitation):  Constitutional:  Well-developed and well-nourished.  No respiratory distress.      HENT:  Head: Normocephalic and atraumatic.  Mouth:  Moist mucous membranes.    Eyes:  Conjunctivae and EOM are normal.  Pupils are equal, round, and reactive to light.  No scleral icterus.   Neck:  Neck supple.  No JVD present.    Cardiovascular:  Normal rate, regular rhythm and normal heart sounds with no murmur.  Pulmonary/Chest:  No respiratory distress, no wheezes, no crackles, with normal breath sounds and good air movement.  Abdominal:  Soft.  Bowel sounds are normal.  No distension and no tenderness.   Musculoskeletal:  No edema, no tenderness, and no deformity.  No red or swollen joints anywhere.    Neurological:  Alert and oriented to person.  No cranial nerve deficit.  No tongue deviation.  No facial droop.  No slurred speech.  Cannot follow commands.  Skin: Skin is warm and dry. No rash noted. No pallor.   Psychiatric:  Normal  mood and affect.  Behavior is normal.  Judgment and thought content normal.   Peripheral vascular:  strong pulses on all 4 extremities with no clubbing, no cyanosis, and no edema.  Genitourinary:  No Balderas catheter in place.    Brief Assessment/Plan:    See above for further detained assessment and plan developed with APC which I have reviewed and/or edited.    I think the patient is delirious for multiple issues, like uremia from renal failure, the right-sided pneumonia causing metabolic encephalopathy, urinary retention, and his pain medication that he was taking at home.  His pain medications will be cut down due to his renal failure.  We will give him IV fluids and monitor his creatinine closely.  For the pneumonia we will give doxycycline and Rocephin.  We will order one-to-one sitter as he is so confused and do serial neurological exams to see how he progresses tonight.  I did have to give him Haldol injection when he arrived to the floor due to his combativeness.  We will avoid QT prolonging agents and monitor his electrolytes closely.  For the urinary retention, we did do an in and out catheterization.    Edita Jones MD  Hospital Medicine Team  04/11/19  9:05 PM

## 2019-04-11 NOTE — ED PROVIDER NOTES
Subjective   73-year-old white male complains of vision loss.  Patient is accompanied by his daughter.  The history primarily is from her.  She says that the patient has had some vision loss approximately 3 days ago.  They are unable to be more specific as to laterality, onset, etc.  The patient is responsive to questions, but does not give excellent answers, just tangential responses.  They deny headache, chest pain, shortness of breath, nausea, vomiting, fever, chills or other complaints.            Review of Systems   All other systems reviewed and are negative.      Past Medical History:   Diagnosis Date   • COPD (chronic obstructive pulmonary disease) (CMS/[facility])    • Gout    • Hearing loss    • History of degenerative disc disease    • Hyperlipidemia    • Hypertension    • Low back pain    • Pedal edema    • Prediabetes        Allergies   Allergen Reactions   • Penicillins Unknown (See Comments)     Pt previous;y received ROCEPHIN without adverse effect.   Pt states he was allergic when he was 18 but has had it since and did not have any problems.        Past Surgical History:   Procedure Laterality Date   • CHOLECYSTECTOMY     • LUMBAR DISCECTOMY     • SHOULDER SURGERY Left        Family History   Problem Relation Age of Onset   • Cancer Mother        Social History     Socioeconomic History   • Marital status:      Spouse name: Not on file   • Number of children: Not on file   • Years of education: Not on file   • Highest education level: Not on file   Tobacco Use   • Smoking status: Former Smoker     Packs/day: 0.25     Types: Cigarettes     Last attempt to quit: 2018     Years since quittin.3   • Smokeless tobacco: Never Used   • Tobacco comment: patient stated he is down to about 4 cigarettes a day   Substance and Sexual Activity   • Alcohol use: No   • Drug use: No   • Sexual activity: Defer           Objective   Physical Exam   Constitutional: He is oriented to person, place, and time.  He appears well-developed and well-nourished.   HENT:   Head: Normocephalic and atraumatic.   Mouth/Throat: Mucous membranes are not dry.   Eyes: Conjunctivae and EOM are normal.   Pupils pinpoint bilaterally   Neck: Normal range of motion. Neck supple.   Cardiovascular: Normal rate, regular rhythm and normal heart sounds. Exam reveals no gallop and no friction rub.   No murmur heard.  Pulmonary/Chest: Effort normal and breath sounds normal. No respiratory distress. He has no wheezes. He has no rales.   Abdominal: Soft. Bowel sounds are normal. He exhibits no distension. There is no tenderness.   Musculoskeletal: Normal range of motion.   Neurological: He is alert and oriented to person, place, and time.   Patient states he cannot see, but does track with his eyes .  Pupils are constricted bilaterally.  Extraocular muscles intact.   Skin: Skin is warm and dry.   Psychiatric: He has a normal mood and affect.   Nursing note and vitals reviewed.      Procedures  Results for orders placed or performed during the hospital encounter of 04/11/19   Blood Culture - Blood, Arm, Right   Result Value Ref Range    Blood Culture No growth at 2 days    Blood Culture - Blood, Arm, Left   Result Value Ref Range    Blood Culture No growth at 2 days    Respiratory Culture - Sputum, Cough   Result Value Ref Range    Respiratory Culture Moderate growth (3+) Normal Respiratory Sinai     Gram Stain Many (4+) WBCs seen     Gram Stain Moderate (3+) Epithelial cells seen     Gram Stain       Moderate (3+) Gram positive cocci in pairs and clusters    Gram Stain Few (2+) Gram positive bacilli    Legionella Antigen, Urine - Urine, Urine, Clean Catch   Result Value Ref Range    LEGIONELLA ANTIGEN, URINE Negative Negative   Comprehensive Metabolic Panel   Result Value Ref Range    Glucose 102 (H) 65 - 99 mg/dL    BUN 85 (H) 8 - 23 mg/dL    Creatinine 2.60 (H) 0.76 - 1.27 mg/dL    Sodium 127 (L) 136 - 145 mmol/L    Potassium 3.8 3.5 - 5.2 mmol/L     Chloride 91 (L) 98 - 107 mmol/L    CO2 19.6 (L) 22.0 - 29.0 mmol/L    Calcium 8.2 (L) 8.6 - 10.5 mg/dL    Total Protein 6.9 6.0 - 8.5 g/dL    Albumin 3.93 3.50 - 5.20 g/dL    ALT (SGPT) 14 1 - 41 U/L    AST (SGOT) 29 1 - 40 U/L    Alkaline Phosphatase 91 39 - 117 U/L    Total Bilirubin 0.9 0.2 - 1.2 mg/dL    eGFR Non African Amer 24 (L) >60 mL/min/1.73    Globulin 3.0 gm/dL    A/G Ratio 1.3 g/dL    BUN/Creatinine Ratio 32.7 (H) 7.0 - 25.0    Anion Gap 16.4 mmol/L   Urinalysis With Culture If Indicated - Urine, Clean Catch   Result Value Ref Range    Color, UA Yellow Yellow, Straw    Appearance, UA Clear Clear    pH, UA <=5.0 5.0 - 8.0    Specific Gravity, UA 1.014 1.005 - 1.030    Glucose, UA Negative Negative    Ketones, UA Negative Negative    Bilirubin, UA Negative Negative    Blood, UA Trace (A) Negative    Protein, UA Negative Negative    Leuk Esterase, UA Negative Negative    Nitrite, UA Negative Negative    Urobilinogen, UA 0.2 E.U./dL 0.2 - 1.0 E.U./dL   Blood Gas, Arterial   Result Value Ref Range    Site Arterial: left brachial     Jr's Test N/A     pH, Arterial 7.327 (L) 7.350 - 7.450 pH units    pCO2, Arterial 38.6 35.0 - 45.0 mm Hg    pO2, Arterial 78.9 (L) 80.0 - 100.0 mm Hg    HCO3, Arterial 19.8 (C) 22.0 - 26.0 mmol/L    Base Excess, Arterial -5.7 mmol/L    O2 Saturation, Arterial 93.6 90.0 - 100.0 %    Hemoglobin, Blood Gas 13.7 12 - 16 g/dL    Hematocrit, Blood Gas 40.0 (L) 42.0 - 52.0 %    Oxyhemoglobin 91.9 85 - 100 %    Methemoglobin 0.50 0.00 - 3.00 %    Carboxyhemoglobin 1.3 0 - 5 %    A-a Gradiant 17.0 0.0 - 300.0 mmHg    Temperature 98.6 C    Barometric Pressure for Blood Gas 724 mmHg    Modality Room Air     FIO2 21 %   Urine Drug Screen - Urine, Clean Catch   Result Value Ref Range    Amphetamine Screen, Urine Negative Negative    Barbiturates Screen, Urine Negative Negative    Benzodiazepine Screen, Urine Negative Negative    Cocaine Screen, Urine Negative Negative    Methadone  Screen, Urine Negative Negative    Opiate Screen Positive (A) Negative    Phencyclidine (PCP), Urine Negative Negative    THC, Screen, Urine Negative Negative    6-ACETYL MORPHINE Negative Negative    Buprenorphine, Screen, Urine Negative Negative    Oxycodone Screen, Urine Positive (A) Negative   CBC Auto Differential   Result Value Ref Range    WBC 11.23 (H) 3.40 - 10.80 10*3/mm3    RBC 4.41 4.14 - 5.80 10*6/mm3    Hemoglobin 13.3 13.0 - 17.7 g/dL    Hematocrit 38.6 37.5 - 51.0 %    MCV 87.5 79.0 - 97.0 fL    MCH 30.2 26.6 - 33.0 pg    MCHC 34.5 31.5 - 35.7 g/dL    RDW 12.8 12.3 - 15.4 %    RDW-SD 40.1 37.0 - 54.0 fl    MPV 11.0 6.0 - 12.0 fL    Platelets 156 140 - 450 10*3/mm3    Neutrophil % 69.5 42.7 - 76.0 %    Lymphocyte % 13.0 (L) 19.6 - 45.3 %    Monocyte % 11.3 5.0 - 12.0 %    Eosinophil % 5.5 0.3 - 6.2 %    Basophil % 0.3 0.0 - 1.5 %    Immature Grans % 0.4 0.0 - 0.5 %    Neutrophils, Absolute 7.80 (H) 1.40 - 7.00 10*3/mm3    Lymphocytes, Absolute 1.46 0.70 - 3.10 10*3/mm3    Monocytes, Absolute 1.27 (H) 0.10 - 0.90 10*3/mm3    Eosinophils, Absolute 0.62 (H) 0.00 - 0.40 10*3/mm3    Basophils, Absolute 0.03 0.00 - 0.20 10*3/mm3    Immature Grans, Absolute 0.05 0.00 - 0.05 10*3/mm3   Ethanol   Result Value Ref Range    Ethanol <10 0 - 10 mg/dL    Ethanol % <0.010 %   Urinalysis, Microscopic Only - Urine, Clean Catch   Result Value Ref Range    RBC, UA 0-2 None Seen, 0-2 /HPF    WBC, UA 0-2 None Seen, 0-2 /HPF    Bacteria, UA None Seen None Seen /HPF    Squamous Epithelial Cells, UA 0-2 None Seen, 0-2 /HPF    Hyaline Casts, UA 3-6 None Seen /LPF    Methodology Automated Microscopy    Magnesium   Result Value Ref Range    Magnesium 2.5 (H) 1.6 - 2.4 mg/dL   Lactic Acid, Plasma   Result Value Ref Range    Lactate 1.5 0.5 - 2.0 mmol/L   TSH   Result Value Ref Range    TSH 3.090 0.270 - 4.200 mIU/mL   T4, Free   Result Value Ref Range    Free T4 2.06 (H) 0.93 - 1.70 ng/dL   Protein / Creatinine Ratio, Urine -  Urine, Clean Catch   Result Value Ref Range    Protein/Creatinine Ratio, Urine 213.3 (H) 0.0 - 200.0 mg/G Crea    Creatinine, Urine 75.0 mg/dL    Total Protein, Urine 16.0 mg/dL   Mycoplasma Pneumoniae Antibody, IgM   Result Value Ref Range    Mycoplasma pneumo IgM Negative Negative   Lipid Panel   Result Value Ref Range    Total Cholesterol 146 0 - 200 mg/dL    Triglycerides 64 0 - 150 mg/dL    HDL Cholesterol 33 (L) 40 - 60 mg/dL    LDL Cholesterol  100 0 - 100 mg/dL    VLDL Cholesterol 12.8 mg/dL    LDL/HDL Ratio 3.04    Basic Metabolic Panel   Result Value Ref Range    Glucose 96 65 - 99 mg/dL    BUN 74 (H) 8 - 23 mg/dL    Creatinine 1.88 (H) 0.76 - 1.27 mg/dL    Sodium 135 (L) 136 - 145 mmol/L    Potassium 4.0 3.5 - 5.2 mmol/L    Chloride 98 98 - 107 mmol/L    CO2 17.9 (L) 22.0 - 29.0 mmol/L    Calcium 8.5 (L) 8.6 - 10.5 mg/dL    eGFR Non African Amer 35 (L) >60 mL/min/1.73    BUN/Creatinine Ratio 39.4 (H) 7.0 - 25.0    Anion Gap 19.1 mmol/L   C-reactive Protein   Result Value Ref Range    C-Reactive Protein 2.18 (H) 0.00 - 0.50 mg/dL   CBC Auto Differential   Result Value Ref Range    WBC 12.86 (H) 3.40 - 10.80 10*3/mm3    RBC 4.14 4.14 - 5.80 10*6/mm3    Hemoglobin 12.6 (L) 13.0 - 17.7 g/dL    Hematocrit 36.8 (L) 37.5 - 51.0 %    MCV 88.9 79.0 - 97.0 fL    MCH 30.4 26.6 - 33.0 pg    MCHC 34.2 31.5 - 35.7 g/dL    RDW 13.0 12.3 - 15.4 %    RDW-SD 41.3 37.0 - 54.0 fl    MPV 11.9 6.0 - 12.0 fL    Platelets 166 140 - 450 10*3/mm3    Neutrophil % 76.3 (H) 42.7 - 76.0 %    Lymphocyte % 11.7 (L) 19.6 - 45.3 %    Monocyte % 8.9 5.0 - 12.0 %    Eosinophil % 2.4 0.3 - 6.2 %    Basophil % 0.2 0.0 - 1.5 %    Immature Grans % 0.5 0.0 - 0.5 %    Neutrophils, Absolute 9.82 (H) 1.40 - 7.00 10*3/mm3    Lymphocytes, Absolute 1.50 0.70 - 3.10 10*3/mm3    Monocytes, Absolute 1.14 (H) 0.10 - 0.90 10*3/mm3    Eosinophils, Absolute 0.31 0.00 - 0.40 10*3/mm3    Basophils, Absolute 0.03 0.00 - 0.20 10*3/mm3    Immature Grans, Absolute  0.06 (H) 0.00 - 0.05 10*3/mm3   Comprehensive Metabolic Panel   Result Value Ref Range    Glucose 102 (H) 65 - 99 mg/dL    BUN 20 8 - 23 mg/dL    Creatinine 0.93 0.76 - 1.27 mg/dL    Sodium 140 136 - 145 mmol/L    Potassium 3.7 3.5 - 5.2 mmol/L    Chloride 112 (H) 98 - 107 mmol/L    CO2 18.7 (L) 22.0 - 29.0 mmol/L    Calcium 8.2 (L) 8.6 - 10.5 mg/dL    Total Protein 5.2 (L) 6.0 - 8.5 g/dL    Albumin 2.85 (L) 3.50 - 5.20 g/dL    ALT (SGPT) 9 1 - 41 U/L    AST (SGOT) 17 1 - 40 U/L    Alkaline Phosphatase 58 39 - 117 U/L    Total Bilirubin 0.2 0.2 - 1.2 mg/dL    eGFR Non African Amer 80 >60 mL/min/1.73    Globulin 2.4 gm/dL    A/G Ratio 1.2 g/dL    BUN/Creatinine Ratio 21.5 7.0 - 25.0    Anion Gap 9.3 mmol/L   Magnesium   Result Value Ref Range    Magnesium 1.8 1.6 - 2.4 mg/dL   Phosphorus   Result Value Ref Range    Phosphorus 2.3 (L) 2.5 - 4.5 mg/dL   CBC Auto Differential   Result Value Ref Range    WBC 6.27 3.40 - 10.80 10*3/mm3    RBC 3.34 (L) 4.14 - 5.80 10*6/mm3    Hemoglobin 9.9 (L) 13.0 - 17.7 g/dL    Hematocrit 30.4 (L) 37.5 - 51.0 %    MCV 91.0 79.0 - 97.0 fL    MCH 29.6 26.6 - 33.0 pg    MCHC 32.6 31.5 - 35.7 g/dL    RDW 13.2 12.3 - 15.4 %    RDW-SD 43.0 37.0 - 54.0 fl    MPV 11.0 6.0 - 12.0 fL    Platelets 131 (L) 140 - 450 10*3/mm3    Neutrophil % 49.0 42.7 - 76.0 %    Lymphocyte % 21.4 19.6 - 45.3 %    Monocyte % 15.9 (H) 5.0 - 12.0 %    Eosinophil % 12.9 (H) 0.3 - 6.2 %    Basophil % 0.5 0.0 - 1.5 %    Immature Grans % 0.3 0.0 - 0.5 %    Neutrophils, Absolute 3.07 1.40 - 7.00 10*3/mm3    Lymphocytes, Absolute 1.34 0.70 - 3.10 10*3/mm3    Monocytes, Absolute 1.00 (H) 0.10 - 0.90 10*3/mm3    Eosinophils, Absolute 0.81 (H) 0.00 - 0.40 10*3/mm3    Basophils, Absolute 0.03 0.00 - 0.20 10*3/mm3    Immature Grans, Absolute 0.02 0.00 - 0.05 10*3/mm3   Comprehensive Metabolic Panel   Result Value Ref Range    Glucose 103 (H) 65 - 99 mg/dL    BUN 9 8 - 23 mg/dL    Creatinine 0.83 0.76 - 1.27 mg/dL    Sodium 140  136 - 145 mmol/L    Potassium 4.5 3.5 - 5.2 mmol/L    Chloride 109 (H) 98 - 107 mmol/L    CO2 22.6 22.0 - 29.0 mmol/L    Calcium 8.0 (L) 8.6 - 10.5 mg/dL    Total Protein 5.6 (L) 6.0 - 8.5 g/dL    Albumin 3.00 (L) 3.50 - 5.20 g/dL    ALT (SGPT) 9 1 - 41 U/L    AST (SGOT) 16 1 - 40 U/L    Alkaline Phosphatase 62 39 - 117 U/L    Total Bilirubin 0.3 0.2 - 1.2 mg/dL    eGFR Non African Amer 91 >60 mL/min/1.73    Globulin 2.6 gm/dL    A/G Ratio 1.2 g/dL    BUN/Creatinine Ratio 10.8 7.0 - 25.0    Anion Gap 8.4 mmol/L   Magnesium   Result Value Ref Range    Magnesium 2.4 1.6 - 2.4 mg/dL   Phosphorus   Result Value Ref Range    Phosphorus 2.4 (L) 2.5 - 4.5 mg/dL   CBC Auto Differential   Result Value Ref Range    WBC 7.48 3.40 - 10.80 10*3/mm3    RBC 3.52 (L) 4.14 - 5.80 10*6/mm3    Hemoglobin 10.7 (L) 13.0 - 17.7 g/dL    Hematocrit 32.3 (L) 37.5 - 51.0 %    MCV 91.8 79.0 - 97.0 fL    MCH 30.4 26.6 - 33.0 pg    MCHC 33.1 31.5 - 35.7 g/dL    RDW 13.5 12.3 - 15.4 %    RDW-SD 43.6 37.0 - 54.0 fl    MPV 11.6 6.0 - 12.0 fL    Platelets 153 140 - 450 10*3/mm3    Neutrophil % 44.1 42.7 - 76.0 %    Lymphocyte % 21.1 19.6 - 45.3 %    Monocyte % 15.5 (H) 5.0 - 12.0 %    Eosinophil % 18.4 (H) 0.3 - 6.2 %    Basophil % 0.5 0.0 - 1.5 %    Immature Grans % 0.4 0.0 - 0.5 %    Neutrophils, Absolute 3.29 1.40 - 7.00 10*3/mm3    Lymphocytes, Absolute 1.58 0.70 - 3.10 10*3/mm3    Monocytes, Absolute 1.16 (H) 0.10 - 0.90 10*3/mm3    Eosinophils, Absolute 1.38 (H) 0.00 - 0.40 10*3/mm3    Basophils, Absolute 0.04 0.00 - 0.20 10*3/mm3    Immature Grans, Absolute 0.03 0.00 - 0.05 10*3/mm3   Phosphorus   Result Value Ref Range    Phosphorus 2.5 2.5 - 4.5 mg/dL   Adult Transthoracic Echo Limited W/ Cont if Necessary Per Protocol   Result Value Ref Range    BSA 1.8 m^2    LVLd ap4 6.7 cm    EDV(MOD-sp4) 53.0 ml    LVLs ap4 5.8 cm    ESV(MOD-sp4) 16.0 ml    EF(MOD-sp4) 69.8 %    SV(MOD-sp4) 37.0 ml    SI(MOD-sp4) 20.8 ml/m^2    LV Bravo Vol (BSA  corrected) 29.9 ml/m^2    LV Sys Vol (BSA corrected) 9.0 ml/m^2    MV E max genoveva 72.1 cm/sec    MV A max genoveva 112.5 cm/sec    MV E/A 0.64     PA acc slope 1,334 cm/sec^2    PA acc time 0.13 sec    PA pr(Accel) 20.4 mmHg     CV ECHO NASRA - BZI_BMI 20.7 kilograms/m^2     CV ECHO NASRA - BSA(HAYCOCK) 1.8 m^2     CV ECHO NASRA - BZI_METRIC_WEIGHT 63.5 kg     CV ECHO NASRA - BZI_METRIC_HEIGHT 175.3 cm    Target HR (85%) 125 bpm    Max. Pred. HR (100%) 147 bpm     Ct Head Without Contrast    Result Date: 4/11/2019  Narrative: CT HEAD WO CONTRAST-  CLINICAL INDICATION: Vision loss   COMPARISON: 05/26/2016.  TECHNIQUE: Axial images of the brain were obtained with out intravenous contrast.  Reformatted images were created in the sagittal and coronal planes.  DOSE: 1225 mGy.cm  Radiation dose reduction techniques were utilized per ALARA protocol. Automated exposure control was initiated through either or CareDose or DoseRight software packages by  protocol.     FINDINGS: Low attenuation in the posterior parietal and occipital regions is noted bilaterally. This could represent edema, but unusual symmetric appearance. The ventricles, cisterns, and sulci are unremarkable. There is no hydrocephalus.  I do not see epidural or subdural hematoma. The gray-white differentiation is appropriate. The bone window setting images show no destructive calvarial lesion or acute calvarial fracture. The posterior fossa is unremarkable.       Impression: Low attenuation in the posterior parietal and occipital regions is noted bilaterally. This could represent edema, but unusual symmetric appearance  MRI may be of benefit to further evaluate  This report was finalized on 4/11/2019 1:04 PM by Dr. Valdo Rogers MD.      Xr Chest 1 View    Result Date: 4/11/2019  Narrative: XR CHEST 1 VW-  CLINICAL INDICATION: vision change   COMPARISON: 01/27/2019  TECHNIQUE: Single frontal view of the chest.  FINDINGS:  Right basilar pneumonia The  cardiac silhouette is normal. The pulmonary vasculature is unremarkable. There is no evidence of an acute osseous abnormality. There are no suspicious-appearing parenchymal soft tissue nodules.       Impression: Right basilar pneumonia  This report was finalized on 4/11/2019 3:40 PM by Dr. Valdo Rogers MD.      Us Carotid Bilateral    Result Date: 4/12/2019  Narrative: EXAMINATION: US CAROTID BILATERAL-  Technique: Multiple real-time color Doppler images were acquired of bilateral carotid arteries.  Stenosis measurements if obtained, were performed by the NASCET or similar method.   CLINICAL INDICATION:     bilateral visual loss; R41.82-Altered mental status, unspecified; N17.9-Acute kidney failure, unspecified; F11.221-Opioid dependence with intoxication delirium  COMPARISON:    None  FINDINGS:    RIGHT: No significant intimal thickening or plaque is noted. No occlusion.  RIGHT ICA PSV: 120.0 cm/s RIGHT ICA EDV: 25.4 cm/s. Right ICA/CCA Ratio: 1.2 Vertebral artery not imaged.  LEFT: No significant intimal thickening or plaque is noted. No occlusion.  LEFT ICA PSV: 109.3 cm/s LEFT EDV: 14.4 cm/s. Left ICA/CCA Ratio: 1.0 Vertebral artery not imaged.       Impression: Impression:  No evidence of hemodynamically significant plaques or stenosis within the carotid system at this time.  This report was finalized on 4/12/2019 4:30 PM by Dr. Valdo Rogers MD.      Us Renal Bilateral    Result Date: 4/12/2019  Narrative: EXAMINATION: US RENAL BILATERAL-  CLINICAL INDICATION: Acute renal failure; R41.82-Altered mental status, unspecified; N17.9-Acute kidney failure, unspecified; F11.221-Opioid dependence with intoxication delirium   FINDINGS: Sonographic imaging of the kidneys show the right kidney measuring 10.23 x 5 cm.  Left kidney measures 10.37 x 5.2 cm.  No cystic or solid mass.      Impression: Unremarkable appearance of the kidneys.  This report was finalized on 4/12/2019 9:23 AM by Dr. Valdo Rogers MD.      Fl  Video Swallow    Result Date: 4/12/2019  Narrative: FL VIDEO SWALLOW-  CLINICAL INDICATION: Aspiration; R41.82-Altered mental status, unspecified; N17.9-Acute kidney failure, unspecified; F11.221-Opioid dependence with intoxication delirium.   TECHNIQUE: Speech therapy service was present. The patient was examined in the sitting lateral position and was given several consistencies of barium.  FINDINGS: Penetration and aspiration with thin liquids.  FLUOROSCOPY TIME: 1.6 minutes.  IMAGES: Cine loop was acquired.      Impression: Aspiration with thin liquids.  For additional information please see the report provided by the speech therapy service.  This report was finalized on 4/12/2019 11:00 AM by Dr. Valdo Rogers MD.               ED Course  ED Course as of Apr 14 1855   Thu Apr 11, 2019   1239 Patient appears to be the influence of opioids, and possibly other drugs.  I have recommended Narcan.  The patient's daughter becomes extremely upset stating that he does not want to take Narcan, and that if we gave it to him he would just get up and leave.  She said that this is not normal for him to have vision loss, that he has not been taking an excessive medicine, that his wife has been giving him some medicines.  I told her that I cannot adequately evaluate him while he is under the influence, and that this did not represent a value judgment, nor accusation of intentional overdose, etc. after lengthy discussion witnessed by the patient's nurse, Margarita Jon RN, we will check his workup, and consider Narcan if this is negative.  [BC]   4947 Workup remarkable for acute kidney injury, hyponatremia, dehydration.  He also had some areas of low attenuation on his CT which had the appearance of possible edema, but was bilaterally.  Have discussed with radiologist, Dr. Rogers.  He recommends MRI has low index of suspicion for CVA, meds etc.  Patient was given Narcan he did become more alert, was momentarily irate, but is now  cooperative again.  His pupils returned to normal, the patient was able to make eye contact, but still unclear if he has some vision changes.  We will plan admission for IV fluids, further neuro workup.  [BC]   1518 Discussed with Dr. Jones.  She is going to try to arrange inpatient MRI for tomorrow and request some further labs.  Will plan admission based on the results of those.  [BC]      ED Course User Index  [BC] Enrique Monsivais MD                  MDM  Number of Diagnoses or Management Options  Acute kidney injury (CMS/HCC):   Altered mental status, unspecified altered mental status type:      Amount and/or Complexity of Data Reviewed  Clinical lab tests: reviewed  Tests in the radiology section of CPT®: reviewed  Decide to obtain previous medical records or to obtain history from someone other than the patient: yes    Risk of Complications, Morbidity, and/or Mortality  Presenting problems: high  Diagnostic procedures: high  Management options: high          Final diagnoses:   Altered mental status, unspecified altered mental status type   Acute kidney injury (CMS/HCC)   Opioid dependence with intoxication delirium (CMS/HCC)            Enrique Monsivais MD  04/14/19 2509

## 2019-04-12 ENCOUNTER — APPOINTMENT (OUTPATIENT)
Dept: ULTRASOUND IMAGING | Facility: HOSPITAL | Age: 74
End: 2019-04-12

## 2019-04-12 ENCOUNTER — APPOINTMENT (OUTPATIENT)
Dept: CARDIOLOGY | Facility: HOSPITAL | Age: 74
End: 2019-04-12

## 2019-04-12 ENCOUNTER — APPOINTMENT (OUTPATIENT)
Dept: GENERAL RADIOLOGY | Facility: HOSPITAL | Age: 74
End: 2019-04-12

## 2019-04-12 LAB
ANION GAP SERPL CALCULATED.3IONS-SCNC: 19.1 MMOL/L
BASOPHILS # BLD AUTO: 0.03 10*3/MM3 (ref 0–0.2)
BASOPHILS NFR BLD AUTO: 0.2 % (ref 0–1.5)
BH CV ECHO MEAS - BSA(HAYCOCK): 1.8 M^2
BH CV ECHO MEAS - BSA: 1.8 M^2
BH CV ECHO MEAS - BZI_BMI: 20.7 KILOGRAMS/M^2
BH CV ECHO MEAS - BZI_METRIC_HEIGHT: 175.3 CM
BH CV ECHO MEAS - BZI_METRIC_WEIGHT: 63.5 KG
BH CV ECHO MEAS - EDV(MOD-SP4): 53 ML
BH CV ECHO MEAS - EF(MOD-SP4): 69.8 %
BH CV ECHO MEAS - ESV(MOD-SP4): 16 ML
BH CV ECHO MEAS - LV DIASTOLIC VOL/BSA (35-75): 29.9 ML/M^2
BH CV ECHO MEAS - LV SYSTOLIC VOL/BSA (12-30): 9 ML/M^2
BH CV ECHO MEAS - LVLD AP4: 6.7 CM
BH CV ECHO MEAS - LVLS AP4: 5.8 CM
BH CV ECHO MEAS - MV A MAX VEL: 112.5 CM/SEC
BH CV ECHO MEAS - MV E MAX VEL: 72.1 CM/SEC
BH CV ECHO MEAS - MV E/A: 0.64
BH CV ECHO MEAS - PA ACC SLOPE: 1334 CM/SEC^2
BH CV ECHO MEAS - PA ACC TIME: 0.13 SEC
BH CV ECHO MEAS - PA PR(ACCEL): 20.4 MMHG
BH CV ECHO MEAS - SI(MOD-SP4): 20.8 ML/M^2
BH CV ECHO MEAS - SV(MOD-SP4): 37 ML
BUN BLD-MCNC: 74 MG/DL (ref 8–23)
BUN/CREAT SERPL: 39.4 (ref 7–25)
CALCIUM SPEC-SCNC: 8.5 MG/DL (ref 8.6–10.5)
CHLORIDE SERPL-SCNC: 98 MMOL/L (ref 98–107)
CHOLEST SERPL-MCNC: 146 MG/DL (ref 0–200)
CO2 SERPL-SCNC: 17.9 MMOL/L (ref 22–29)
CREAT BLD-MCNC: 1.88 MG/DL (ref 0.76–1.27)
CREAT UR-MCNC: 75 MG/DL
CRP SERPL-MCNC: 2.18 MG/DL (ref 0–0.5)
DEPRECATED RDW RBC AUTO: 41.3 FL (ref 37–54)
EOSINOPHIL # BLD AUTO: 0.31 10*3/MM3 (ref 0–0.4)
EOSINOPHIL NFR BLD AUTO: 2.4 % (ref 0.3–6.2)
ERYTHROCYTE [DISTWIDTH] IN BLOOD BY AUTOMATED COUNT: 13 % (ref 12.3–15.4)
GFR SERPL CREATININE-BSD FRML MDRD: 35 ML/MIN/1.73
GLUCOSE BLD-MCNC: 96 MG/DL (ref 65–99)
HCT VFR BLD AUTO: 36.8 % (ref 37.5–51)
HDLC SERPL-MCNC: 33 MG/DL (ref 40–60)
HGB BLD-MCNC: 12.6 G/DL (ref 13–17.7)
IMM GRANULOCYTES # BLD AUTO: 0.06 10*3/MM3 (ref 0–0.05)
IMM GRANULOCYTES NFR BLD AUTO: 0.5 % (ref 0–0.5)
LDLC SERPL CALC-MCNC: 100 MG/DL (ref 0–100)
LDLC/HDLC SERPL: 3.04 {RATIO}
LYMPHOCYTES # BLD AUTO: 1.5 10*3/MM3 (ref 0.7–3.1)
LYMPHOCYTES NFR BLD AUTO: 11.7 % (ref 19.6–45.3)
MAXIMAL PREDICTED HEART RATE: 147 BPM
MCH RBC QN AUTO: 30.4 PG (ref 26.6–33)
MCHC RBC AUTO-ENTMCNC: 34.2 G/DL (ref 31.5–35.7)
MCV RBC AUTO: 88.9 FL (ref 79–97)
MONOCYTES # BLD AUTO: 1.14 10*3/MM3 (ref 0.1–0.9)
MONOCYTES NFR BLD AUTO: 8.9 % (ref 5–12)
NEUTROPHILS # BLD AUTO: 9.82 10*3/MM3 (ref 1.4–7)
NEUTROPHILS NFR BLD AUTO: 76.3 % (ref 42.7–76)
PLATELET # BLD AUTO: 166 10*3/MM3 (ref 140–450)
PMV BLD AUTO: 11.9 FL (ref 6–12)
POTASSIUM BLD-SCNC: 4 MMOL/L (ref 3.5–5.2)
PROT UR-MCNC: 16 MG/DL
PROT/CREAT UR: 213.3 MG/G CREA (ref 0–200)
RBC # BLD AUTO: 4.14 10*6/MM3 (ref 4.14–5.8)
SODIUM BLD-SCNC: 135 MMOL/L (ref 136–145)
STRESS TARGET HR: 125 BPM
TRIGL SERPL-MCNC: 64 MG/DL (ref 0–150)
VLDLC SERPL-MCNC: 12.8 MG/DL
WBC NRBC COR # BLD: 12.86 10*3/MM3 (ref 3.4–10.8)

## 2019-04-12 PROCEDURE — 93308 TTE F-UP OR LMTD: CPT

## 2019-04-12 PROCEDURE — 93321 DOPPLER ECHO F-UP/LMTD STD: CPT

## 2019-04-12 PROCEDURE — 93880 EXTRACRANIAL BILAT STUDY: CPT | Performed by: RADIOLOGY

## 2019-04-12 PROCEDURE — 94640 AIRWAY INHALATION TREATMENT: CPT

## 2019-04-12 PROCEDURE — 93880 EXTRACRANIAL BILAT STUDY: CPT

## 2019-04-12 PROCEDURE — 92611 MOTION FLUOROSCOPY/SWALLOW: CPT

## 2019-04-12 PROCEDURE — 94799 UNLISTED PULMONARY SVC/PX: CPT

## 2019-04-12 PROCEDURE — 74230 X-RAY XM SWLNG FUNCJ C+: CPT | Performed by: RADIOLOGY

## 2019-04-12 PROCEDURE — 80061 LIPID PANEL: CPT | Performed by: PHYSICIAN ASSISTANT

## 2019-04-12 PROCEDURE — 93005 ELECTROCARDIOGRAM TRACING: CPT | Performed by: PHYSICIAN ASSISTANT

## 2019-04-12 PROCEDURE — 99233 SBSQ HOSP IP/OBS HIGH 50: CPT | Performed by: INTERNAL MEDICINE

## 2019-04-12 PROCEDURE — 85025 COMPLETE CBC W/AUTO DIFF WBC: CPT | Performed by: PHYSICIAN ASSISTANT

## 2019-04-12 PROCEDURE — 74230 X-RAY XM SWLNG FUNCJ C+: CPT

## 2019-04-12 PROCEDURE — 93010 ELECTROCARDIOGRAM REPORT: CPT | Performed by: INTERNAL MEDICINE

## 2019-04-12 PROCEDURE — 25010000002 HEPARIN (PORCINE) PER 1000 UNITS: Performed by: INTERNAL MEDICINE

## 2019-04-12 PROCEDURE — 93308 TTE F-UP OR LMTD: CPT | Performed by: INTERNAL MEDICINE

## 2019-04-12 PROCEDURE — 80048 BASIC METABOLIC PNL TOTAL CA: CPT | Performed by: PHYSICIAN ASSISTANT

## 2019-04-12 PROCEDURE — 93325 DOPPLER ECHO COLOR FLOW MAPG: CPT

## 2019-04-12 PROCEDURE — 76775 US EXAM ABDO BACK WALL LIM: CPT

## 2019-04-12 PROCEDURE — 86140 C-REACTIVE PROTEIN: CPT | Performed by: PHYSICIAN ASSISTANT

## 2019-04-12 PROCEDURE — 25010000002 CEFTRIAXONE: Performed by: PHYSICIAN ASSISTANT

## 2019-04-12 PROCEDURE — 76775 US EXAM ABDO BACK WALL LIM: CPT | Performed by: RADIOLOGY

## 2019-04-12 RX ORDER — ALBUTEROL SULFATE 2.5 MG/3ML
2.5 SOLUTION RESPIRATORY (INHALATION) EVERY 6 HOURS PRN
Status: CANCELLED | OUTPATIENT
Start: 2019-04-12

## 2019-04-12 RX ORDER — SODIUM CHLORIDE 0.9 % (FLUSH) 0.9 %
3 SYRINGE (ML) INJECTION EVERY 12 HOURS SCHEDULED
Status: DISCONTINUED | OUTPATIENT
Start: 2019-04-12 | End: 2019-04-15 | Stop reason: HOSPADM

## 2019-04-12 RX ORDER — SODIUM CHLORIDE 0.9 % (FLUSH) 0.9 %
3-10 SYRINGE (ML) INJECTION AS NEEDED
Status: DISCONTINUED | OUTPATIENT
Start: 2019-04-12 | End: 2019-04-15 | Stop reason: HOSPADM

## 2019-04-12 RX ORDER — HYDROXYZINE HYDROCHLORIDE 25 MG/1
25 TABLET, FILM COATED ORAL NIGHTLY PRN
Status: DISCONTINUED | OUTPATIENT
Start: 2019-04-12 | End: 2019-04-15 | Stop reason: HOSPADM

## 2019-04-12 RX ORDER — NICOTINE 21 MG/24HR
1 PATCH, TRANSDERMAL 24 HOURS TRANSDERMAL
Status: DISCONTINUED | OUTPATIENT
Start: 2019-04-12 | End: 2019-04-15 | Stop reason: HOSPADM

## 2019-04-12 RX ORDER — L.ACID,PARA/B.BIFIDUM/S.THERM 8B CELL
1 CAPSULE ORAL DAILY
Status: DISCONTINUED | OUTPATIENT
Start: 2019-04-12 | End: 2019-04-15 | Stop reason: HOSPADM

## 2019-04-12 RX ADMIN — SODIUM CHLORIDE, PRESERVATIVE FREE 3 ML: 5 INJECTION INTRAVENOUS at 11:51

## 2019-04-12 RX ADMIN — SODIUM CHLORIDE, PRESERVATIVE FREE 3 ML: 5 INJECTION INTRAVENOUS at 16:42

## 2019-04-12 RX ADMIN — OXYCODONE HYDROCHLORIDE 80 MG: 80 TABLET, FILM COATED, EXTENDED RELEASE ORAL at 20:19

## 2019-04-12 RX ADMIN — GABAPENTIN 100 MG: 100 CAPSULE ORAL at 14:36

## 2019-04-12 RX ADMIN — SODIUM CHLORIDE 100 ML/HR: 9 INJECTION, SOLUTION INTRAVENOUS at 06:05

## 2019-04-12 RX ADMIN — IPRATROPIUM BROMIDE AND ALBUTEROL SULFATE 3 ML: .5; 3 SOLUTION RESPIRATORY (INHALATION) at 07:27

## 2019-04-12 RX ADMIN — HYDROXYZINE HYDROCHLORIDE 25 MG: 25 TABLET ORAL at 21:44

## 2019-04-12 RX ADMIN — SODIUM CHLORIDE, PRESERVATIVE FREE 3 ML: 5 INJECTION INTRAVENOUS at 20:21

## 2019-04-12 RX ADMIN — DOXYCYCLINE 100 MG: 100 INJECTION, POWDER, LYOPHILIZED, FOR SOLUTION INTRAVENOUS at 11:52

## 2019-04-12 RX ADMIN — HEPARIN SODIUM 5000 UNITS: 5000 INJECTION INTRAVENOUS; SUBCUTANEOUS at 09:39

## 2019-04-12 RX ADMIN — CEFTRIAXONE 2 G: 2 INJECTION, POWDER, FOR SOLUTION INTRAMUSCULAR; INTRAVENOUS at 19:27

## 2019-04-12 RX ADMIN — HEPARIN SODIUM 5000 UNITS: 5000 INJECTION INTRAVENOUS; SUBCUTANEOUS at 20:21

## 2019-04-12 RX ADMIN — GABAPENTIN 100 MG: 100 CAPSULE ORAL at 06:03

## 2019-04-12 RX ADMIN — DOXYCYCLINE 100 MG: 100 INJECTION, POWDER, LYOPHILIZED, FOR SOLUTION INTRAVENOUS at 21:39

## 2019-04-12 RX ADMIN — IPRATROPIUM BROMIDE AND ALBUTEROL SULFATE 3 ML: .5; 3 SOLUTION RESPIRATORY (INHALATION) at 13:55

## 2019-04-12 RX ADMIN — METRONIDAZOLE 500 MG: 500 INJECTION, SOLUTION INTRAVENOUS at 18:27

## 2019-04-12 RX ADMIN — NICOTINE 1 PATCH: 21 PATCH TRANSDERMAL at 16:42

## 2019-04-12 RX ADMIN — OXYCODONE HYDROCHLORIDE 5 MG: 5 TABLET ORAL at 03:08

## 2019-04-12 RX ADMIN — GABAPENTIN 100 MG: 100 CAPSULE ORAL at 21:45

## 2019-04-12 RX ADMIN — SODIUM CHLORIDE, PRESERVATIVE FREE 3 ML: 5 INJECTION INTRAVENOUS at 20:19

## 2019-04-12 RX ADMIN — LEVOTHYROXINE SODIUM 25 MCG: 25 TABLET ORAL at 09:39

## 2019-04-12 RX ADMIN — OXYCODONE HYDROCHLORIDE 5 MG: 5 TABLET ORAL at 14:50

## 2019-04-12 RX ADMIN — Medication 1 CAPSULE: at 17:38

## 2019-04-12 RX ADMIN — ASPIRIN 81 MG: 81 TABLET ORAL at 09:39

## 2019-04-12 RX ADMIN — SODIUM CHLORIDE 100 ML/HR: 9 INJECTION, SOLUTION INTRAVENOUS at 17:38

## 2019-04-12 RX ADMIN — OXYCODONE HYDROCHLORIDE 80 MG: 80 TABLET, FILM COATED, EXTENDED RELEASE ORAL at 09:42

## 2019-04-12 NOTE — PROGRESS NOTES
[facility] HOSPITALIST PROGRESS NOTE     Patient Identification:  Name:  Paul Gomez  Age:  73 y.o.  Sex:  male  :  1945  MRN:  [id number]  Visit Number:  [id number]  ROOM: [address]     Primary Care Provider:  Caroline Guzman APRN    Length of stay:  1    Subjective     Chief Complaint   Patient presents with   • Loss of Vision       History of presenting illness: 73-year-old male brought in by his daughter for bilateral visual disturbance.  He describes a disturbance as his vision going completely blank with a pink or red screen in front of him.  He was also very confused yesterday and we were unable to obtain a history from him.  Today, the patient is much less confused.  He is alert and oriented x3.  He did not express to me any hallucinations but he did tell the staff that he was having hallucinations.  He would look at me and talk to me most of the time but then other times he would be looking away talking to me.  He was able to see my pink stethoscope and to see my orange ink pen today.  He could identify the colors of these objects as well.  He denies chest pain, denies nausea, denies vomiting, denies diarrhea, denies any unilateral weakness, denies any facial droop.    Objective     Current Hospital Meds:    aspirin 81 mg Oral Daily   atorvastatin 20 mg Oral Nightly   ceftriaxone 2 g Intravenous Q24H   doxycycline 100 mg Intravenous Q12H   gabapentin 100 mg Oral Q8H   heparin (porcine) 5,000 Units Subcutaneous Q12H   ipratropium-albuterol 3 mL Nebulization Q6H - RT   lactobacillus acidophilus 1 capsule Oral Daily   levothyroxine 25 mcg Oral Daily   metroNIDAZOLE 500 mg Intravenous Q8H   nicotine 1 patch Transdermal Q24H   oxyCODONE ER 80 mg Oral Q12H   sodium chloride 3 mL Intravenous Q12H   sodium chloride 3 mL Intravenous Q12H   ziprasidone 10 mg Intramuscular Once       sodium chloride 100 mL/hr Last Rate: 100 mL/hr (19 06)      ----------------------------------------------------------------------------------------------------------------------  Vital Signs:  Temp:  [97.5 °F (36.4 °C)-98.8 °F (37.1 °C)] 97.9 °F (36.6 °C)  Heart Rate:  [70-98] 98  Resp:  [9-26] 14  BP: ()/() 121/79  SpO2:  [88 %-100 %] 96 %  Device (Oxygen Therapy): room air  Body mass index is 20.75 kg/m².    Wt Readings from Last 3 Encounters:   04/12/19 63.7 kg (140 lb 8 oz)   03/13/19 71.2 kg (157 lb)   02/21/19 66.7 kg (147 lb)         Diet Soft Texture; Chopped; Nectar / Syrup Thick  ----------------------------------------------------------------------------------------------------------------------  Physical exam:  Constitutional:  Well-developed and well-nourished.  No respiratory distress. He is much less confused today.  HENT:  Head: Normocephalic and atraumatic.  Mouth:  Moist mucous membranes.    Eyes:  Conjunctivae and EOM appear normal. No scleral icterus. No erythema or drainage. Pupils are small, but equal and reactive to light.   Neck:  Neck supple.  No JVD present. No carotid bruits noted.   Cardiovascular:  Normal rate, regular rhythm and normal heart sounds with no murmur.  Pulmonary/Chest:  No respiratory distress, no wheezes. Few rales at the right base. Reduced air movement bilaterally.   Abdominal:  Soft.  Bowel sounds are present x4.  No distension and no tenderness.   Musculoskeletal:  No edema, no tenderness, and no deformity.  No red or swollen joints anywhere. Moving all 4 extremities.   Neurological:  Alert and oriented to person, place, and time.  He does not have a facial droop and appears to be able to see me.  He has equal strength of his left and right arms.  He can follow commands.  Pupils are normal.  Skin:  Skin is warm and dry.  No rash noted.  No pallor.   Peripheral vascular:  No edema and 2+ pedal pulses bilaterally.   Genitourinary: No mccullough catheter in place.    ----------------------------------------------------------------------------------------------------------------------  Tele:  NS in the 60-80's; I have personally reviewed/looked at the telemetry strips.  ----------------------------------------------------------------------------------------------------------------------  Results from last 7 days   Lab Units 04/12/19  0130 04/11/19  1920 04/11/19  1305   CRP mg/dL 2.18*  --   --    LACTATE mmol/L  --  1.5  --    WBC 10*3/mm3 12.86*  --  11.23*   HEMOGLOBIN g/dL 12.6*  --  13.3   HEMATOCRIT % 36.8*  --  38.6   MCV fL 88.9  --  87.5   MCHC g/dL 34.2  --  34.5   PLATELETS 10*3/mm3 166  --  156     Results from last 7 days   Lab Units 04/12/19 0130 04/11/19  1305   SODIUM mmol/L 135* 127*   POTASSIUM mmol/L 4.0 3.8   MAGNESIUM mg/dL  --  2.5*   CHLORIDE mmol/L 98 91*   CO2 mmol/L 17.9* 19.6*   BUN mg/dL 74* 85*   CREATININE mg/dL 1.88* 2.60*   EGFR IF NONAFRICN AM mL/min/1.73 35* 24*   CALCIUM mg/dL 8.5* 8.2*   GLUCOSE mg/dL 96 102*   ALBUMIN g/dL  --  3.93   BILIRUBIN mg/dL  --  0.9   ALK PHOS U/L  --  91   AST (SGOT) U/L  --  29   ALT (SGPT) U/L  --  14   Estimated Creatinine Clearance: 31.5 mL/min (A) (by C-G formula based on SCr of 1.88 mg/dL (H)).      Results from last 7 days   Lab Units 04/12/19  0130   CHOLESTEROL mg/dL 146   TRIGLYCERIDES mg/dL 64   HDL CHOL mg/dL 33*   LDL CHOL mg/dL 100     Results from last 7 days   Lab Units 04/11/19  1619   NITRITE UA  Negative   WBC UA /HPF 0-2   BACTERIA UA /HPF None Seen   SQUAM EPITHEL UA /HPF 0-2     Blood Culture   Date Value Ref Range Status   04/11/2019 No growth at less than 24 hours  Preliminary   04/11/2019 No growth at less than 24 hours  Preliminary          I have personally looked at the labs and they are summarized above.  ----------------------------------------------------------------------------------------------------------------------  Imaging Results (last 24 hours)     Procedure Component Value  Units Date/Time    US Carotid Bilateral [[id number]] Collected:  04/12/19 1615     Updated:  04/12/19 1632    Narrative:       EXAMINATION: US CAROTID BILATERAL-      Technique: Multiple real-time color Doppler images were acquired of  bilateral carotid arteries.     Stenosis measurements if obtained, were performed by the NASCET or  similar method.        CLINICAL INDICATION:     bilateral visual loss; R41.82-Altered mental  status, unspecified; N17.9-Acute kidney failure, unspecified;  F11.221-Opioid dependence with intoxication delirium     COMPARISON:    None     FINDINGS:        RIGHT:  No significant intimal thickening or plaque is noted. No occlusion.     RIGHT ICA PSV: 120.0 cm/s  RIGHT ICA EDV: 25.4 cm/s.   Right ICA/CCA Ratio: 1.2  Vertebral artery not imaged.     LEFT:  No significant intimal thickening or plaque is noted. No occlusion.     LEFT ICA PSV: 109.3 cm/s  LEFT EDV: 14.4 cm/s.   Left ICA/CCA Ratio: 1.0  Vertebral artery not imaged.          Impression:       Impression:     No evidence of hemodynamically significant plaques or stenosis within  the carotid system at this time.     This report was finalized on 4/12/2019 4:30 PM by Dr. Valdo Rogers MD.       FL Video Swallow [[id number]] Collected:  04/12/19 1021     Updated:  04/12/19 1102    Narrative:       FL VIDEO SWALLOW-     CLINICAL INDICATION: Aspiration; R41.82-Altered mental status,  unspecified; N17.9-Acute kidney failure, unspecified; F11.221-Opioid  dependence with intoxication delirium.        TECHNIQUE:   Speech therapy service was present. The patient was examined in the  sitting lateral position and was given several consistencies of barium.     FINDINGS: Penetration and aspiration with thin liquids.     FLUOROSCOPY TIME: 1.6 minutes.     IMAGES: Cine loop was acquired.       Impression:       Aspiration with thin liquids.     For additional information please see the report provided by the speech  therapy service.     This report  was finalized on 4/12/2019 11:00 AM by Dr. Valdo Rogers MD.       US Renal Bilateral [[id number]] Collected:  04/12/19 0814     Updated:  04/12/19 0926    Narrative:       EXAMINATION: US RENAL BILATERAL-      CLINICAL INDICATION: Acute renal failure; R41.82-Altered mental status,  unspecified; N17.9-Acute kidney failure, unspecified; F11.221-Opioid  dependence with intoxication delirium        FINDINGS: Sonographic imaging of the kidneys show the right kidney  measuring 10.23 x 5 cm.     Left kidney measures 10.37 x 5.2 cm.     No cystic or solid mass.       Impression:       Unremarkable appearance of the kidneys.     This report was finalized on 4/12/2019 9:23 AM by Dr. Valdo Rogers MD.           I have personally reviewed the radiology images and read the final radiology report.    Assessment & Plan      -Reported bilateral vision loss: CT of the head with areas concerning for possible edema, but unusually symmetric  -Multifactorial delirium due to uremia, metabolic encephalopathy from the pneumonia, and   -Acute kidney injury, admission creatinine 2.6, baseline creatinine 0.8  -Possible acute right lower lobe pneumonia, aspiration versus community acquired  -Mild oropharyngeal dysphagia w/ increased oral prep time of solids, weak pharyngeal contraction w/ transient laryngeal penetration of nectar thick  -Hypovolemic hyponatremia  -Prolonged, QTc 486 ms  -Hyperlipidemia  -Essential hypertension  -Chronic pain syndrome with chronic opioid use: Concern for possible overmedication at admission  -COPD, without acute exacerbation  -History of congestive heart failure of unknown type with no acute exacerbation at this time  -Tobacco abuse  -Urinary retention    I attempted to obtain a head MRI today to look at the possible edematous area of the bilateral occipital regions but due to transportation issues this was unable to be performed.  He states that he has improvement in his vision but he has told different staff  members that he is seeing things that are not there.  Will repeat labs in the morning and will continue to monitor the Cr to see the if the renal failure is continuing to improve.  BUN has improved and he is on antibiotics for the pneumonia and I believe that these 2 issues were the biggest part of the delirium that he was experiencing yesterday.  We will continue the monitor the sodium level closely as it has improved with the IV fluids.  The electrolytes were not low to expiring the prolonged QTC and thus we will repeat an EKG later point time to see if this improves.  I will continue the doxycycline, Rocephin for the pneumonia but because of the dysphagia found by the speech therapist I will add Flagyl.  I await the PT and OT notes for today.    Edita Jones MD  Hospital Medicine Team  04/12/19  4:53 PM

## 2019-04-12 NOTE — PLAN OF CARE
Problem: Fall Risk (Adult)  Goal: Identify Related Risk Factors and Signs and Symptoms  Outcome: Ongoing (interventions implemented as appropriate)   04/11/19 2006   Fall Risk (Adult)   Related Risk Factors (Fall Risk) age-related changes;confusion/agitation;fear of falling;history of falls;environment unfamiliar   Signs and Symptoms (Fall Risk) presence of risk factors     Goal: Absence of Fall  Outcome: Ongoing (interventions implemented as appropriate)   04/11/19 2006   Fall Risk (Adult)   Absence of Fall making progress toward outcome       Problem: Patient Care Overview  Goal: Plan of Care Review  Outcome: Ongoing (interventions implemented as appropriate)   04/11/19 2006   Coping/Psychosocial   Plan of Care Reviewed With patient;daughter   Plan of Care Review   Progress no change     Goal: Discharge Needs Assessment  Outcome: Ongoing (interventions implemented as appropriate)   04/11/19 1908 04/11/19 1913 04/11/19 2006   Discharge Needs Assessment   Patient/Family Anticipates Transition to --  home with family --    Patient/Family Anticipated Services at Transition --   --    Transportation Anticipated --  family or friend will provide --    Current Discharge Risk --  --  cognitively impaired   Disability   Equipment Currently Used at Home none --  --        Problem: Cognitive Impairment (IRF) (Adult)  Goal: Optimal/Safe Level of [place] Related to Cognitive Impairment  Outcome: Ongoing (interventions implemented as appropriate)   04/11/19 2006   Cognitive Impairment (IRF) (Adult)   Optimal/Safe Level of [place] Related to Cognitive Impairment progress not adequate     Goal: Optimal Quality of Life in Relation to Cognitive Impairments  Outcome: Ongoing (interventions implemented as appropriate)   04/11/19 2006   Cognitive Impairment (IRF) (Adult)   Optimal Quality of Life in Relation to Cognitive Impairments progress not adequate

## 2019-04-12 NOTE — NURSING NOTE
MRI screening completed. Patient pleasant with confusion, patient unable to follow commands, daughter at bedside reports that the patient is unable to follow simple instructions and hallucinating. Daughter does not think that the patient would be able to lye flat, follow commands, or lye still for the MRI. Primary RN made aware and agreed with assessment of patient and states she will notify ordering provider.

## 2019-04-12 NOTE — MBS/VFSS/FEES
Acute Care - Speech Language Pathology   Swallow Initial Evaluation HEIDE [facility]   MODIFIED BARIUM SWALLOW STUDY     Patient Name: Paul Gomez  : 1945  MRN: [id number]  Today's Date: 2019             Admit Date: 2019    Paul Gomez  presents to the radiology suite this am from P205/1P to participate in an instrumental MBS to evaluate safety/efficacy of swallowing fnx, determine safest/least restrictive diet. He was admitted 19 2/2 visual loss and AMS. He received Narcan in ED, which did improve status slightly. However, he became agitated and require Geodon. CT Head revealed low attentuation of occipital and parietal lobes symmetrical w/ recommendation for MRI. He is evaluated today to r/o aspiration as a contributing factor to R basilar PNA.       Social History     Socioeconomic History   • Marital status:      Spouse name: Not on file   • Number of children: Not on file   • Years of education: Not on file   • Highest education level: Not on file   Tobacco Use   • Smoking status: Former Smoker     Packs/day: 0.25     Types: Cigarettes     Last attempt to quit: 2018     Years since quittin.3   • Smokeless tobacco: Never Used   • Tobacco comment: patient stated he is down to about 4 cigarettes a day   Substance and Sexual Activity   • Alcohol use: No   • Drug use: No   • Sexual activity: Defer      Chest xray 19 revealed R basilar PNA.     Diet Orders (active) (From admission, onward)    Start     Ordered    19 1111  Diet Soft Texture; Chopped; Nectar / Syrup Thick  Diet Effective Now      19 1111        Observed on O2 via NC.    Risks and benefits of the procedure are explained w/ verbalizing understanding/agreement to participate. Proceed per protocol.     Mr. Gomez is positioned upright and centered in a soft strap supportive chair to accept multiple po presentations of solid cracker, puree, honey thick, nectar thick, and thin liquids via spoon, cup and  straw, along w/ whole placebo pill in puree. He is able to self feed.     All views are from the lateral plane.     Facial/oral structures are symmetrical upon observation w/o lingual deviation upon protrusion. Oral mucosa are moist, pink and clean. Secretions are clear, thin and  controlled. OROM/LUCA is generally weak w/ dealy to imitate oral postures.Delay felt to be 2/2 AMS and visual disturbance. Gag is not assessed. Volitional cough is adequate in intensity, clear in quality, nonproductive. Vocal quality is adequate in intensity, clear in quality w/ intelligible speech. He is a/a and cooperative to participate, oriented to self, follows simple directives, and participates in simple conversational exchanges.     Upon po presentations, adequate bolus anticipation w/ good labial seal for bolus clearance via spoon bowl, cup rim stability and suction via straw.  Bolus formation, manipulation,  and control are mildly weak w/ increased oral prep time w/ solids,  w/ rotary mastication pattern. A-p transit is timely w/o significant oral residue. Tongue base retraction and linguavelar seal are adequate w/o premature spillage. No laryngeal penetration or aspiration is evidenced before the swallow.     Pharyngeal swallow is timely w/ adequate hyolaryngeal elevation and epiglottic inversion. Pharyngeal contraction is mildly weak w/ trace diffuse residue w/all consistencies, most notably within the vallecular and pyriform spaces. Residue is controlled. Transient laryngeal penetration of nectar thick during the swallow, clearing upon completion of the swallow w/o residue. Deep laryngeal penetration of thin liquids via all presentation styles occurs during the swallow w/ silent aspiration of thin liquids during the swallow. Cued cough is ineffective to clear aspirated material.  No other laryngeal penetration or aspiration evidenced during or after the swallow.     Partial esophageal sweep reveals no mucosal abnormalities.  Motility is wfl w/o retrograde flow noted.        He is felt to most benefit from modified po diet mechanical soft, chopped meats, NECTAR thick liquids. Medications whole in puree/nectar, single pill presentations. SLP to f/u for diet tolerance, dysphagia tx.     Visit Dx:     ICD-10-CM ICD-9-CM   1. Altered mental status, unspecified altered mental status type R41.82 780.97   2. Acute kidney injury (CMS/[facility]) N17.9 584.9   3. Opioid dependence with intoxication delirium (CMS/[facility]) F11.221 292.81     304.00     Patient Active Problem List   Diagnosis   • Low back pain   • Hypertension   • Hyperlipidemia   • History of degenerative disc disease   • COPD (chronic obstructive pulmonary disease) (CMS/[facility])   • Gout   • B12 deficiency   • Seborrheic eczema   • Hypothyroidism   • Dilated pancreatic duct   • Epigastric pain   • Chronic pancreatitis (CMS/[facility])   • Headache, unspecified headache type   • Chest pain   • COPD with exacerbation (CMS/[facility])   • Tobacco abuse counseling   • Acute exacerbation of chronic obstructive pulmonary disease (COPD) (CMS/[facility])   • Erectile dysfunction   • Acute congestive heart failure (CMS/[facility])   • Bilateral rales   • 1+ pitting edema   • Dyspnea on minimal exertion   • Visual loss   • Altered mental status     Past Medical History:   Diagnosis Date   • COPD (chronic obstructive pulmonary disease) (CMS/[facility])    • Gout    • Hearing loss    • History of degenerative disc disease    • Hyperlipidemia    • Hypertension    • Low back pain    • Pedal edema    • Prediabetes      Past Surgical History:   Procedure Laterality Date   • CHOLECYSTECTOMY     • LUMBAR DISCECTOMY     • SHOULDER SURGERY Left      EDUCATION  The patient has been educated in the following areas:   Dysphagia (Swallowing Impairment) Oral Care/Hydration Modified Diet Instruction.    Impression: Mr. Gomez presents w/ a mild oropharyngeal dysphagia w/ increased oral prep time of solids, weak pharyngeal contraction w/ transient  laryngeal penetration of nectar thick, clearing. Silent aspiration of thin liquids during the swallow via all presentations styles. Cued cough ineffective to clear aspirated material. No other laryngeal penetration or aspiration.     SLP Recommendation and Plan    1. Mechanical soft diet, chopped meats, NECTAR thick liquids.   2. Medications whole in puree/nectars, single pill presentations.    3. Universal aspiration precuations.   4. PETRONA precautions.   5. Oral care protocol.   SLP to f/u for diet tolerance/dysphagia tx.     D/w pt results and recommendations w/ verbal understanding and agreement.     D/w Dr. Jones results and recommendations w/ verbal understanding and agreement.     Thank you for allowing me to participate in the care of your patient-  Margarita Clemons M.S., OMAR/SLP                                       Plan of Care Reviewed With: patient  Plan of Care Review  Plan of Care Reviewed With: patient  Outcome Summary: MBS this am w/ silent aspiration of thin liquids. Transient laryngeal penetration of nectar, clearing . No other laryngeal penetration or aspiration. Recommend modified po diet mechanical soft, chopped meats, nectar thick liquids. SLP to f/u for dysphagia tx. D/w Dr. Jones w/ verbal agreement.            Time Calculation:   Time Calculation- SLP     Row Name 04/12/19 1114             Time Calculation- SLP    SLP - Next Appointment  04/13/19  -AMI        User Key  (r) = Recorded By, (t) = Taken By, (c) = Cosigned By    Initials Name Provider Type    Margarita Wen MS CCC-SLP Speech and Language Pathologist          Therapy Charges for Today     Code Description Service Date Service Provider Modifiers Qty    30393813417 HC ST MOTION FLUORO EVAL SWALLOW 8 4/12/2019 Margarita Clemons MS CCC-SLP GN 1               Margarita Clemons MS CCC-SLP  4/12/2019

## 2019-04-12 NOTE — PLAN OF CARE
Problem: Patient Care Overview  Goal: Plan of Care Review  Outcome: Ongoing (interventions implemented as appropriate)   04/12/19 1112   Coping/Psychosocial   Plan of Care Reviewed With patient   OTHER   Outcome Summary MBS this am w/ silent aspiration of thin liquids. Transient laryngeal penetration of nectar, clearing . No other laryngeal penetration or aspiration. Recommend modified po diet mechanical soft, chopped meats, nectar thick liquids. SLP to f/u for dysphagia tx. D/w Dr. Jones w/ verbal agreement.

## 2019-04-12 NOTE — PROGRESS NOTES
Discharge Planning Assessment   [place]     Patient Name: Paul Gomez  MRN: [id number]  Today's Date: 4/12/2019    Admit Date: 4/11/2019    Discharge Needs Assessment     Row Name 04/12/19 1541       Living Environment    Lives With  grandchild(cielo)    Name(s) of Who Lives With Patient  Joss    Current Living Arrangements  home/apartment/condo    Primary Care Provided by  self    Provides Primary Care For  no one    Family Caregiver if Needed  child(cielo), adult    Family Caregiver Names  Daughter, Karen  and grandsonJoss.     Quality of Family Relationships  involved;helpful;supportive    Able to Return to Prior Arrangements  yes       Resource/Environmental Concerns    Resource/Environmental Concerns  none    Transportation Concerns  car, none       Transition Planning    Patient/Family Anticipates Transition to  home with family    Transportation Anticipated  family or friend will provide       Discharge Needs Assessment    Concerns to be Addressed  discharge planning    Concerns Comments  Pt is partially blind but states his grandsonJoss will help assist with his needs.     Equipment Currently Used at Home  nebulizer    Anticipated Changes Related to Illness  inability to care for self    Equipment Needed After Discharge  none    Outpatient/Agency/Support Group Needs  -- Pt does not have home health at this time. SS discussed home health at discharge and he states he does not want home health due to being an envasion of his privacy.      Current Discharge Risk  chronically ill;dependent with mobility/activities of daily living        Discharge Plan     Row Name 04/12/19 1545       Plan    Plan  Pt lives at home with his grandsonJoss and plans to return home at discharge. Pt does not have home health and denies a need. Pt has a nebulizer via unknown provider. Pt states he has been falling at home due to his vision.  Pt does not have a POA or Advance Directive. Pt will be transported home via  private auto.  SS will continue to follow.                Demographic Summary     Row Name 04/12/19 1540       General Information    Admission Type  inpatient    Arrived From  home    Referral Source  nursing    Reason for Consult  discharge planning    Preferred Language  English        Functional Status     Row Name 04/12/19 9352       Functional Status    Usual Activity Tolerance  excellent        Elsa Parsons

## 2019-04-12 NOTE — PLAN OF CARE
Problem: Fall Risk (Adult)  Goal: Identify Related Risk Factors and Signs and Symptoms  Outcome: Ongoing (interventions implemented as appropriate)   04/11/19 2006   Fall Risk (Adult)   Related Risk Factors (Fall Risk) age-related changes;confusion/agitation;fear of falling;history of falls;environment unfamiliar   Signs and Symptoms (Fall Risk) presence of risk factors     Goal: Absence of Fall  Outcome: Ongoing (interventions implemented as appropriate)   04/11/19 1901   Fall Risk (Adult)   Absence of Fall making progress toward outcome       Problem: Patient Care Overview  Goal: Plan of Care Review  Outcome: Ongoing (interventions implemented as appropriate)   04/11/19 2006 04/11/19 2010   Coping/Psychosocial   Plan of Care Reviewed With --  patient;daughter   Plan of Care Review   Progress no change --       04/11/19 2006 04/11/19 2010   Coping/Psychosocial   Plan of Care Reviewed With --  patient;daughter   Plan of Care Review   Progress no change --

## 2019-04-12 NOTE — TREATMENT PLAN
DYSPHAGIA THERAPY PLAN OF CARE:    Palu Gomez will benefit from formal dysphagia therapy  X3-5 days per week at 15-30 minutes sessions, as functionally tolerated, for 3-5 days, to address:    Long Term Goal:  Pt will accept least restrictive diet tolerance w/o overt s/s aspiration.     Short Term Goals:  1. Pt will perform OROM/LUCA exercises x3 sets x10 reps w/ min cues.  2. Pt will perform resistive breathing exercises x3 sets x5 reps w/resistance of 1-6 to improve respiratory support and control.   3. Pt will perform laryngeal adduction/elevation exercises x3 sets x10 reps w/ min cues.   4. Pt will perform Shaker exercises sustained x3 sets x5 reps for 30+ seconds over 3 consecutive sessions.   5. Pt will perform Shaker exercises repetitive x3 sets x12 reps w/ mod cues.   6. Pt will perform compensatory techniques during meals w/ min cues.  7. Pt will participate in instrumental re-evaluation of swallowing fnx in 3-5 days, pending progress towards this poc.    Thank you-  DUARTE Cobian.S., CCC/SLP

## 2019-04-12 NOTE — PLAN OF CARE
Problem: Fall Risk (Adult)  Goal: Identify Related Risk Factors and Signs and Symptoms  Outcome: Ongoing (interventions implemented as appropriate)   04/11/19 2006   Fall Risk (Adult)   Related Risk Factors (Fall Risk) age-related changes;confusion/agitation;fear of falling;history of falls;environment unfamiliar   Signs and Symptoms (Fall Risk) presence of risk factors     Goal: Absence of Fall  Outcome: Ongoing (interventions implemented as appropriate)   04/12/19 1604   Fall Risk (Adult)   Absence of Fall making progress toward outcome       Problem: Patient Care Overview  Goal: Plan of Care Review  Outcome: Ongoing (interventions implemented as appropriate)   04/12/19 1604   Coping/Psychosocial   Plan of Care Reviewed With patient   Plan of Care Review   Progress improving       Problem: Cognitive Impairment (IRF) (Adult)  Goal: Optimal/Safe Level of [place] Related to Cognitive Impairment  Outcome: Ongoing (interventions implemented as appropriate)   04/12/19 1604   Cognitive Impairment (IRF) (Adult)   Optimal/Safe Level of [place] Related to Cognitive Impairment demonstrating adequate progress                Goal: Optimal Quality of Life in Relation to Cognitive Impairments  Outcome: Ongoing (interventions implemented as appropriate)

## 2019-04-12 NOTE — NURSING NOTE
Primary RN called and reports that patients daughter has agreed for patient to have MRI with encouragement . Dr. Jones requesting that the MRI be done today. Attempted to arrange transportation to MRI . [employer] [employer], [employer], and [employer] [employer] report unavailable to transport patient at this time and unsure of when they would be available. Primary Rn made aware, Director of imaging made aware.

## 2019-04-13 LAB
ALBUMIN SERPL-MCNC: 2.85 G/DL (ref 3.5–5.2)
ALBUMIN/GLOB SERPL: 1.2 G/DL
ALP SERPL-CCNC: 58 U/L (ref 39–117)
ALT SERPL W P-5'-P-CCNC: 9 U/L (ref 1–41)
ANION GAP SERPL CALCULATED.3IONS-SCNC: 9.3 MMOL/L
AST SERPL-CCNC: 17 U/L (ref 1–40)
BASOPHILS # BLD AUTO: 0.03 10*3/MM3 (ref 0–0.2)
BASOPHILS NFR BLD AUTO: 0.5 % (ref 0–1.5)
BILIRUB SERPL-MCNC: 0.2 MG/DL (ref 0.2–1.2)
BUN BLD-MCNC: 20 MG/DL (ref 8–23)
BUN/CREAT SERPL: 21.5 (ref 7–25)
CALCIUM SPEC-SCNC: 8.2 MG/DL (ref 8.6–10.5)
CHLORIDE SERPL-SCNC: 112 MMOL/L (ref 98–107)
CO2 SERPL-SCNC: 18.7 MMOL/L (ref 22–29)
CREAT BLD-MCNC: 0.93 MG/DL (ref 0.76–1.27)
DEPRECATED RDW RBC AUTO: 43 FL (ref 37–54)
EOSINOPHIL # BLD AUTO: 0.81 10*3/MM3 (ref 0–0.4)
EOSINOPHIL NFR BLD AUTO: 12.9 % (ref 0.3–6.2)
ERYTHROCYTE [DISTWIDTH] IN BLOOD BY AUTOMATED COUNT: 13.2 % (ref 12.3–15.4)
GFR SERPL CREATININE-BSD FRML MDRD: 80 ML/MIN/1.73
GLOBULIN UR ELPH-MCNC: 2.4 GM/DL
GLUCOSE BLD-MCNC: 102 MG/DL (ref 65–99)
HCT VFR BLD AUTO: 30.4 % (ref 37.5–51)
HGB BLD-MCNC: 9.9 G/DL (ref 13–17.7)
IMM GRANULOCYTES # BLD AUTO: 0.02 10*3/MM3 (ref 0–0.05)
IMM GRANULOCYTES NFR BLD AUTO: 0.3 % (ref 0–0.5)
LYMPHOCYTES # BLD AUTO: 1.34 10*3/MM3 (ref 0.7–3.1)
LYMPHOCYTES NFR BLD AUTO: 21.4 % (ref 19.6–45.3)
MAGNESIUM SERPL-MCNC: 1.8 MG/DL (ref 1.6–2.4)
MCH RBC QN AUTO: 29.6 PG (ref 26.6–33)
MCHC RBC AUTO-ENTMCNC: 32.6 G/DL (ref 31.5–35.7)
MCV RBC AUTO: 91 FL (ref 79–97)
MONOCYTES # BLD AUTO: 1 10*3/MM3 (ref 0.1–0.9)
MONOCYTES NFR BLD AUTO: 15.9 % (ref 5–12)
NEUTROPHILS # BLD AUTO: 3.07 10*3/MM3 (ref 1.4–7)
NEUTROPHILS NFR BLD AUTO: 49 % (ref 42.7–76)
PHOSPHATE SERPL-MCNC: 2.3 MG/DL (ref 2.5–4.5)
PLATELET # BLD AUTO: 131 10*3/MM3 (ref 140–450)
PMV BLD AUTO: 11 FL (ref 6–12)
POTASSIUM BLD-SCNC: 3.7 MMOL/L (ref 3.5–5.2)
PROT SERPL-MCNC: 5.2 G/DL (ref 6–8.5)
RBC # BLD AUTO: 3.34 10*6/MM3 (ref 4.14–5.8)
SODIUM BLD-SCNC: 140 MMOL/L (ref 136–145)
WBC NRBC COR # BLD: 6.27 10*3/MM3 (ref 3.4–10.8)

## 2019-04-13 PROCEDURE — 25010000002 HEPARIN (PORCINE) PER 1000 UNITS: Performed by: INTERNAL MEDICINE

## 2019-04-13 PROCEDURE — 87070 CULTURE OTHR SPECIMN AEROBIC: CPT | Performed by: PHYSICIAN ASSISTANT

## 2019-04-13 PROCEDURE — 83735 ASSAY OF MAGNESIUM: CPT | Performed by: INTERNAL MEDICINE

## 2019-04-13 PROCEDURE — 84100 ASSAY OF PHOSPHORUS: CPT | Performed by: INTERNAL MEDICINE

## 2019-04-13 PROCEDURE — 94799 UNLISTED PULMONARY SVC/PX: CPT

## 2019-04-13 PROCEDURE — 99233 SBSQ HOSP IP/OBS HIGH 50: CPT | Performed by: INTERNAL MEDICINE

## 2019-04-13 PROCEDURE — 87205 SMEAR GRAM STAIN: CPT | Performed by: PHYSICIAN ASSISTANT

## 2019-04-13 PROCEDURE — 80053 COMPREHEN METABOLIC PANEL: CPT | Performed by: INTERNAL MEDICINE

## 2019-04-13 PROCEDURE — 85025 COMPLETE CBC W/AUTO DIFF WBC: CPT | Performed by: INTERNAL MEDICINE

## 2019-04-13 PROCEDURE — 25010000002 CEFTRIAXONE: Performed by: PHYSICIAN ASSISTANT

## 2019-04-13 RX ORDER — MAGNESIUM SULFATE HEPTAHYDRATE 40 MG/ML
2 INJECTION, SOLUTION INTRAVENOUS AS NEEDED
Status: DISCONTINUED | OUTPATIENT
Start: 2019-04-13 | End: 2019-04-15 | Stop reason: HOSPADM

## 2019-04-13 RX ORDER — MAGNESIUM SULFATE HEPTAHYDRATE 40 MG/ML
4 INJECTION, SOLUTION INTRAVENOUS ONCE
Status: COMPLETED | OUTPATIENT
Start: 2019-04-13 | End: 2019-04-14

## 2019-04-13 RX ORDER — MAGNESIUM SULFATE HEPTAHYDRATE 40 MG/ML
4 INJECTION, SOLUTION INTRAVENOUS AS NEEDED
Status: DISCONTINUED | OUTPATIENT
Start: 2019-04-13 | End: 2019-04-15 | Stop reason: HOSPADM

## 2019-04-13 RX ORDER — GABAPENTIN 400 MG/1
400 CAPSULE ORAL EVERY 8 HOURS SCHEDULED
Status: DISCONTINUED | OUTPATIENT
Start: 2019-04-13 | End: 2019-04-15 | Stop reason: HOSPADM

## 2019-04-13 RX ADMIN — DOXYCYCLINE 100 MG: 100 INJECTION, POWDER, LYOPHILIZED, FOR SOLUTION INTRAVENOUS at 20:02

## 2019-04-13 RX ADMIN — HEPARIN SODIUM 5000 UNITS: 5000 INJECTION INTRAVENOUS; SUBCUTANEOUS at 20:02

## 2019-04-13 RX ADMIN — METRONIDAZOLE 500 MG: 500 INJECTION, SOLUTION INTRAVENOUS at 17:20

## 2019-04-13 RX ADMIN — GABAPENTIN 400 MG: 400 CAPSULE ORAL at 21:15

## 2019-04-13 RX ADMIN — SODIUM CHLORIDE, PRESERVATIVE FREE 3 ML: 5 INJECTION INTRAVENOUS at 08:30

## 2019-04-13 RX ADMIN — OXYCODONE HYDROCHLORIDE 80 MG: 80 TABLET, FILM COATED, EXTENDED RELEASE ORAL at 08:25

## 2019-04-13 RX ADMIN — SODIUM CHLORIDE 100 ML/HR: 9 INJECTION, SOLUTION INTRAVENOUS at 08:26

## 2019-04-13 RX ADMIN — Medication 1 CAPSULE: at 08:25

## 2019-04-13 RX ADMIN — DOXYCYCLINE 100 MG: 100 INJECTION, POWDER, LYOPHILIZED, FOR SOLUTION INTRAVENOUS at 08:26

## 2019-04-13 RX ADMIN — GABAPENTIN 100 MG: 100 CAPSULE ORAL at 06:28

## 2019-04-13 RX ADMIN — OXYCODONE HYDROCHLORIDE 5 MG: 5 TABLET ORAL at 09:53

## 2019-04-13 RX ADMIN — METRONIDAZOLE 500 MG: 500 INJECTION, SOLUTION INTRAVENOUS at 00:04

## 2019-04-13 RX ADMIN — METRONIDAZOLE 500 MG: 500 INJECTION, SOLUTION INTRAVENOUS at 09:53

## 2019-04-13 RX ADMIN — HYDROXYZINE HYDROCHLORIDE 25 MG: 25 TABLET ORAL at 23:02

## 2019-04-13 RX ADMIN — ATORVASTATIN CALCIUM 20 MG: 20 TABLET, FILM COATED ORAL at 19:56

## 2019-04-13 RX ADMIN — OXYCODONE HYDROCHLORIDE 5 MG: 5 TABLET ORAL at 16:55

## 2019-04-13 RX ADMIN — CEFTRIAXONE 2 G: 2 INJECTION, POWDER, FOR SOLUTION INTRAMUSCULAR; INTRAVENOUS at 19:48

## 2019-04-13 RX ADMIN — SODIUM CHLORIDE, PRESERVATIVE FREE 3 ML: 5 INJECTION INTRAVENOUS at 20:02

## 2019-04-13 RX ADMIN — OXYCODONE HYDROCHLORIDE 80 MG: 80 TABLET, FILM COATED, EXTENDED RELEASE ORAL at 20:01

## 2019-04-13 RX ADMIN — NICOTINE 1 PATCH: 21 PATCH TRANSDERMAL at 08:25

## 2019-04-13 RX ADMIN — IPRATROPIUM BROMIDE AND ALBUTEROL SULFATE 3 ML: .5; 3 SOLUTION RESPIRATORY (INHALATION) at 12:50

## 2019-04-13 RX ADMIN — IPRATROPIUM BROMIDE AND ALBUTEROL SULFATE 3 ML: .5; 3 SOLUTION RESPIRATORY (INHALATION) at 00:47

## 2019-04-13 RX ADMIN — MAGNESIUM SULFATE HEPTAHYDRATE 4 G: 40 INJECTION, SOLUTION INTRAVENOUS at 21:16

## 2019-04-13 RX ADMIN — SODIUM CHLORIDE, PRESERVATIVE FREE 3 ML: 5 INJECTION INTRAVENOUS at 20:01

## 2019-04-13 RX ADMIN — ASPIRIN 81 MG: 81 TABLET ORAL at 08:25

## 2019-04-13 RX ADMIN — GABAPENTIN 100 MG: 100 CAPSULE ORAL at 14:36

## 2019-04-13 RX ADMIN — LEVOTHYROXINE SODIUM 25 MCG: 25 TABLET ORAL at 08:25

## 2019-04-13 RX ADMIN — IPRATROPIUM BROMIDE AND ALBUTEROL SULFATE 3 ML: .5; 3 SOLUTION RESPIRATORY (INHALATION) at 06:42

## 2019-04-13 RX ADMIN — ATORVASTATIN CALCIUM 20 MG: 20 TABLET, FILM COATED ORAL at 20:01

## 2019-04-13 RX ADMIN — IPRATROPIUM BROMIDE AND ALBUTEROL SULFATE 3 ML: .5; 3 SOLUTION RESPIRATORY (INHALATION) at 19:05

## 2019-04-13 RX ADMIN — HEPARIN SODIUM 5000 UNITS: 5000 INJECTION INTRAVENOUS; SUBCUTANEOUS at 08:25

## 2019-04-13 RX ADMIN — OXYCODONE HYDROCHLORIDE 5 MG: 5 TABLET ORAL at 23:02

## 2019-04-13 NOTE — PLAN OF CARE
Problem: Fall Risk (Adult)  Goal: Identify Related Risk Factors and Signs and Symptoms  Outcome: Ongoing (interventions implemented as appropriate)   04/11/19 2006   Fall Risk (Adult)   Related Risk Factors (Fall Risk) age-related changes;confusion/agitation;fear of falling;history of falls;environment unfamiliar   Signs and Symptoms (Fall Risk) presence of risk factors     Goal: Absence of Fall  Outcome: Ongoing (interventions implemented as appropriate)   04/12/19 2005   Fall Risk (Adult)   Absence of Fall making progress toward outcome       Problem: Patient Care Overview  Goal: Plan of Care Review  Outcome: Ongoing (interventions implemented as appropriate)   04/12/19 1604 04/12/19 1956   Coping/Psychosocial   Plan of Care Reviewed With --  patient;daughter   Plan of Care Review   Progress improving --        Problem: Cognitive Impairment (IRF) (Adult)  Goal: Optimal/Safe Level of [place] Related to Cognitive Impairment  Outcome: Ongoing (interventions implemented as appropriate)

## 2019-04-13 NOTE — PLAN OF CARE
Problem: Fall Risk (Adult)  Goal: Identify Related Risk Factors and Signs and Symptoms  Outcome: Ongoing (interventions implemented as appropriate)   04/11/19 2006   Fall Risk (Adult)   Related Risk Factors (Fall Risk) age-related changes;confusion/agitation;fear of falling;history of falls;environment unfamiliar   Signs and Symptoms (Fall Risk) presence of risk factors     Goal: Absence of Fall  Outcome: Ongoing (interventions implemented as appropriate)   04/12/19 2005   Fall Risk (Adult)   Absence of Fall making progress toward outcome       Problem: Cognitive Impairment (IRF) (Adult)  Goal: Optimal/Safe Level of [place] Related to Cognitive Impairment  Outcome: Ongoing (interventions implemented as appropriate)   04/12/19 1604   Cognitive Impairment (IRF) (Adult)   Optimal/Safe Level of [place] Related to Cognitive Impairment demonstrating adequate progress     Goal: Optimal Quality of Life in Relation to Cognitive Impairments  Outcome: Ongoing (interventions implemented as appropriate)   04/11/19 2006   Cognitive Impairment (IRF) (Adult)   Optimal Quality of Life in Relation to Cognitive Impairments progress not adequate

## 2019-04-13 NOTE — PROGRESS NOTES
[facility] HOSPITALIST PROGRESS NOTE     Patient Identification:  Name:  Paul Gomez  Age:  73 y.o.  Sex:  male  :  1945  MRN:  [id number]  Visit Number:  [id number]  ROOM: [address] [place] [address]     Primary Care Provider:  Caroline Gumzan APRN    Length of stay:  2    Subjective     Chief Complaint   Patient presents with   • Loss of Vision       History of presenting illness:  73-year-old male brought in by his daughter for bilateral visual disturbance.  He describes a disturbance as his vision going completely blank with a pink or red screen in front of him.  He was also very confused yesterday and we were unable to obtain a history from him.    Today, the patient is not confused.  He was with his daughter and granddaughter.  The patient states that when he looks at an image it takes several minutes for that image to leave.  He states that he will look at the image and then if he moves his eyes around the room that image seems to follow and after a few minutes it will eventually fade away.  Occasionally he will see color changes like red, pink, and sometimes blue.  He does not hear voices and he does not see things that are not there.  For instance, he was looking out the window and saw helicopter; when he turned to look at the TV he continued to see the helicopter until it disappeared after a few minutes.  He denies headaches.  He denies slurred speech.  He is coughing some but not producing sputum.  He denies chest pain.  He denies nausea, denies vomiting, and denies diarrhea.    Objective     Current Hospital Meds:  aspirin 81 mg Oral Daily   atorvastatin 20 mg Oral Nightly   ceftriaxone 2 g Intravenous Q24H   doxycycline 100 mg Intravenous Q12H   gabapentin 100 mg Oral Q8H   heparin (porcine) 5,000 Units Subcutaneous Q12H   ipratropium-albuterol 3 mL Nebulization Q6H - RT   lactobacillus acidophilus 1 capsule Oral Daily   levothyroxine 25 mcg Oral Daily   metroNIDAZOLE 500 mg Intravenous  Q8H   nicotine 1 patch Transdermal Q24H   oxyCODONE ER 80 mg Oral Q12H   sodium chloride 3 mL Intravenous Q12H   sodium chloride 3 mL Intravenous Q12H   ziprasidone 10 mg Intramuscular Once      ----------------------------------------------------------------------------------------------------------------------  Vital Signs:  Temp:  [97.5 °F (36.4 °C)-98.6 °F (37 °C)] 97.5 °F (36.4 °C)  Heart Rate:  [] 100  Resp:  [18] 18  BP: (115-156)/(68-79) 156/79  SpO2:  [95 %-97 %] 96 %   Device (Oxygen Therapy): room air  Body mass index is 21.1 kg/m².    Wt Readings from Last 3 Encounters:   04/12/19 64.8 kg (142 lb 14.4 oz)   03/13/19 71.2 kg (157 lb)   02/21/19 66.7 kg (147 lb)         Diet Soft Texture; Chopped; Nectar / Syrup Thick  ----------------------------------------------------------------------------------------------------------------------  Physical exam:  Constitutional:  Well-developed and well-nourished.  No respiratory distress.   HENT:  Head: Normocephalic and atraumatic.  Mouth:  Moist mucous membranes.    Eyes:  Conjunctivae and EOM appear normal. No scleral icterus. No erythema or drainage. Pupils are small, but equal and reactive to light.   Neck:  Neck supple.  No JVD present. No carotid bruits noted.   Cardiovascular:  Normal rate, regular rhythm and normal heart sounds with no murmur.  Pulmonary/Chest:  No respiratory distress, no wheezes. Few rales at the right base. Reduced air movement bilaterally.   Abdominal:  Soft.  Bowel sounds are present x4.  No distension and no tenderness.   Musculoskeletal:  No edema, no tenderness, and no deformity.  No red or swollen joints anywhere. Moving all 4 extremities.   Neurological:  Alert and oriented to person, place, and time.    The patient is not confused.  He can follow all commands.  He did seem to have difficulty seeing some of the words on the white board but he could see most of them.  He does not have weakness appreciated anywhere.  He does  not have a facial droop.  No slurred speech is heard.    Skin:  Skin is warm and dry.  No rash noted.  No pallor.   Peripheral vascular:  No edema and 2+ pedal pulses bilaterally.   Genitourinary:  No mccullough catheter in place.   ----------------------------------------------------------------------------------------------------------------------  Tele:  Sinus tachycardia ; I have personally reviewed/looked at the telemetry strips.  ----------------------------------------------------------------------------------------------------------------------  Results from last 7 days   Lab Units 04/13/19 0526 04/12/19 0130 04/11/19 1920 04/11/19  1305   CRP mg/dL  --  2.18*  --   --    LACTATE mmol/L  --   --  1.5  --    WBC 10*3/mm3 6.27 12.86*  --  11.23*   HEMOGLOBIN g/dL 9.9* 12.6*  --  13.3   HEMATOCRIT % 30.4* 36.8*  --  38.6   MCV fL 91.0 88.9  --  87.5   MCHC g/dL 32.6 34.2  --  34.5   PLATELETS 10*3/mm3 131* 166  --  156     Results from last 7 days   Lab Units 04/13/19 0526 04/12/19 0130 04/11/19  1305   SODIUM mmol/L 140 135* 127*   POTASSIUM mmol/L 3.7 4.0 3.8   MAGNESIUM mg/dL 1.8  --  2.5*   CHLORIDE mmol/L 112* 98 91*   CO2 mmol/L 18.7* 17.9* 19.6*   BUN mg/dL 20 74* 85*   CREATININE mg/dL 0.93 1.88* 2.60*   PHOSPHORUS mg/dL 2.3*  --   --    EGFR IF NONAFRICN AM mL/min/1.73 80 35* 24*   CALCIUM mg/dL 8.2* 8.5* 8.2*   GLUCOSE mg/dL 102* 96 102*   ALBUMIN g/dL 2.85*  --  3.93   BILIRUBIN mg/dL 0.2  --  0.9   ALK PHOS U/L 58  --  91   AST (SGOT) U/L 17  --  29   ALT (SGPT) U/L 9  --  14   Estimated Creatinine Clearance: 64.8 mL/min (by C-G formula based on SCr of 0.93 mg/dL).      Results from last 7 days   Lab Units 04/12/19  0130   CHOLESTEROL mg/dL 146   TRIGLYCERIDES mg/dL 64   HDL CHOL mg/dL 33*   LDL CHOL mg/dL 100     Blood Culture   Date Value Ref Range Status   04/11/2019 No growth at 24 hours  Preliminary   04/11/2019 No growth at 24 hours  Preliminary          I have personally looked at the  labs and they are summarized above.  ----------------------------------------------------------------------------------------------------------------------  Imaging Results (last 24 hours)             I have personally reviewed the radiology images and read the final radiology report.    Assessment & Plan      -Reported bilateral vision changes: CT of the head with areas concerning for possible edema, but unusually symmetric  -Multifactorial delirium due to uremia, metabolic encephalopathy from the pneumonia, and  home pain medication, now resolved  -Acute kidney injury, admission creatinine 2.6, baseline creatinine 0.8  -Acute right lower lobe pneumonia, aspiration versus community acquired  -Mild oropharyngeal dysphagia w/ increased oral prep time of solids, weak pharyngeal contraction w/ transient laryngeal penetration of nectar thick  -Hypovolemic hyponatremia  -Acute hypophosphatemia  -Mild acute hypomagnesemia  -Prolonged, QTc 486 ms  -Hyperlipidemia  -Essential hypertension  -Chronic pain syndrome with chronic opioid use: Concern for possible overmedication at admission  -COPD, without acute exacerbation  -History of congestive heart failure of unknown type with no acute exacerbation at this time  -Tobacco abuse  -Urinary retention    The patient's symptoms have not progressed.  We will attempt to obtain the head MRI at the earliest appointment that is available at our hospital.  We will continue to monitor his vision.  We will continue the Rocephin and doxycycline and Flagyl.  He is to remain on the modified diet with nectar thick liquids and speech therapy will reevaluate him on Monday to see if his diet can be advanced.  We will replace the phosphorus per replacement protocol.  We will also replace the magnesium per the replacement protocol.  Despite the IV fluids being discontinued his creatinine continues to improve and we will continue to monitor this daily.  After his electrolytes have improved we will  redo an EKG to see what his QTC is.  We will avoid QT prolonging agents.  We will continue his statin for the hyperlipidemia.  His blood pressures are higher than yesterday and we will continue to monitor them while he is not taken any antihypertensives; I want his blood pressure to be a little on the higher side due to the visual changes and the edema that is seen on the CT scan.  We will continue the Synthroid.  We will continue to monitor him on telemetry.    The patient is high risk due to the following diagnoses/reasons:  Continued visual changes in a patient with abnormalities on the CT scan in the visual cortex area of the brain    Edita Jones MD  [facility] Medicine Team  04/13/19  7:26 PM

## 2019-04-14 LAB
ALBUMIN SERPL-MCNC: 3 G/DL (ref 3.5–5.2)
ALBUMIN/GLOB SERPL: 1.2 G/DL
ALP SERPL-CCNC: 62 U/L (ref 39–117)
ALT SERPL W P-5'-P-CCNC: 9 U/L (ref 1–41)
ANION GAP SERPL CALCULATED.3IONS-SCNC: 8.4 MMOL/L
AST SERPL-CCNC: 16 U/L (ref 1–40)
BASOPHILS # BLD AUTO: 0.04 10*3/MM3 (ref 0–0.2)
BASOPHILS NFR BLD AUTO: 0.5 % (ref 0–1.5)
BILIRUB SERPL-MCNC: 0.3 MG/DL (ref 0.2–1.2)
BUN BLD-MCNC: 9 MG/DL (ref 8–23)
BUN/CREAT SERPL: 10.8 (ref 7–25)
CALCIUM SPEC-SCNC: 8 MG/DL (ref 8.6–10.5)
CHLORIDE SERPL-SCNC: 109 MMOL/L (ref 98–107)
CO2 SERPL-SCNC: 22.6 MMOL/L (ref 22–29)
CREAT BLD-MCNC: 0.83 MG/DL (ref 0.76–1.27)
DEPRECATED RDW RBC AUTO: 43.6 FL (ref 37–54)
EOSINOPHIL # BLD AUTO: 1.38 10*3/MM3 (ref 0–0.4)
EOSINOPHIL NFR BLD AUTO: 18.4 % (ref 0.3–6.2)
ERYTHROCYTE [DISTWIDTH] IN BLOOD BY AUTOMATED COUNT: 13.5 % (ref 12.3–15.4)
GFR SERPL CREATININE-BSD FRML MDRD: 91 ML/MIN/1.73
GLOBULIN UR ELPH-MCNC: 2.6 GM/DL
GLUCOSE BLD-MCNC: 103 MG/DL (ref 65–99)
HCT VFR BLD AUTO: 32.3 % (ref 37.5–51)
HGB BLD-MCNC: 10.7 G/DL (ref 13–17.7)
IMM GRANULOCYTES # BLD AUTO: 0.03 10*3/MM3 (ref 0–0.05)
IMM GRANULOCYTES NFR BLD AUTO: 0.4 % (ref 0–0.5)
LYMPHOCYTES # BLD AUTO: 1.58 10*3/MM3 (ref 0.7–3.1)
LYMPHOCYTES NFR BLD AUTO: 21.1 % (ref 19.6–45.3)
MAGNESIUM SERPL-MCNC: 2.4 MG/DL (ref 1.6–2.4)
MCH RBC QN AUTO: 30.4 PG (ref 26.6–33)
MCHC RBC AUTO-ENTMCNC: 33.1 G/DL (ref 31.5–35.7)
MCV RBC AUTO: 91.8 FL (ref 79–97)
MONOCYTES # BLD AUTO: 1.16 10*3/MM3 (ref 0.1–0.9)
MONOCYTES NFR BLD AUTO: 15.5 % (ref 5–12)
NEUTROPHILS # BLD AUTO: 3.29 10*3/MM3 (ref 1.4–7)
NEUTROPHILS NFR BLD AUTO: 44.1 % (ref 42.7–76)
PHOSPHATE SERPL-MCNC: 2.4 MG/DL (ref 2.5–4.5)
PHOSPHATE SERPL-MCNC: 2.5 MG/DL (ref 2.5–4.5)
PLATELET # BLD AUTO: 153 10*3/MM3 (ref 140–450)
PMV BLD AUTO: 11.6 FL (ref 6–12)
POTASSIUM BLD-SCNC: 4.5 MMOL/L (ref 3.5–5.2)
PROT SERPL-MCNC: 5.6 G/DL (ref 6–8.5)
RBC # BLD AUTO: 3.52 10*6/MM3 (ref 4.14–5.8)
SODIUM BLD-SCNC: 140 MMOL/L (ref 136–145)
WBC NRBC COR # BLD: 7.48 10*3/MM3 (ref 3.4–10.8)

## 2019-04-14 PROCEDURE — 85025 COMPLETE CBC W/AUTO DIFF WBC: CPT | Performed by: INTERNAL MEDICINE

## 2019-04-14 PROCEDURE — 25010000002 HEPARIN (PORCINE) PER 1000 UNITS: Performed by: INTERNAL MEDICINE

## 2019-04-14 PROCEDURE — 94799 UNLISTED PULMONARY SVC/PX: CPT

## 2019-04-14 PROCEDURE — 80053 COMPREHEN METABOLIC PANEL: CPT | Performed by: INTERNAL MEDICINE

## 2019-04-14 PROCEDURE — 99233 SBSQ HOSP IP/OBS HIGH 50: CPT | Performed by: INTERNAL MEDICINE

## 2019-04-14 PROCEDURE — 83735 ASSAY OF MAGNESIUM: CPT | Performed by: INTERNAL MEDICINE

## 2019-04-14 PROCEDURE — 84100 ASSAY OF PHOSPHORUS: CPT | Performed by: INTERNAL MEDICINE

## 2019-04-14 RX ORDER — DOXYCYCLINE 100 MG/1
100 CAPSULE ORAL EVERY 12 HOURS SCHEDULED
Status: DISCONTINUED | OUTPATIENT
Start: 2019-04-14 | End: 2019-04-15 | Stop reason: HOSPADM

## 2019-04-14 RX ORDER — METRONIDAZOLE 250 MG/1
500 TABLET ORAL EVERY 8 HOURS SCHEDULED
Status: DISCONTINUED | OUTPATIENT
Start: 2019-04-14 | End: 2019-04-15 | Stop reason: HOSPADM

## 2019-04-14 RX ORDER — CEFDINIR 300 MG/1
300 CAPSULE ORAL EVERY 12 HOURS SCHEDULED
Status: DISCONTINUED | OUTPATIENT
Start: 2019-04-14 | End: 2019-04-15 | Stop reason: HOSPADM

## 2019-04-14 RX ADMIN — METRONIDAZOLE 500 MG: 250 TABLET ORAL at 22:12

## 2019-04-14 RX ADMIN — DOXYCYCLINE 100 MG: 100 CAPSULE ORAL at 22:12

## 2019-04-14 RX ADMIN — Medication 1 CAPSULE: at 08:03

## 2019-04-14 RX ADMIN — OXYCODONE HYDROCHLORIDE 5 MG: 5 TABLET ORAL at 18:06

## 2019-04-14 RX ADMIN — NICOTINE 1 PATCH: 21 PATCH TRANSDERMAL at 08:02

## 2019-04-14 RX ADMIN — IPRATROPIUM BROMIDE AND ALBUTEROL SULFATE 3 ML: .5; 3 SOLUTION RESPIRATORY (INHALATION) at 19:01

## 2019-04-14 RX ADMIN — SODIUM CHLORIDE, PRESERVATIVE FREE 3 ML: 5 INJECTION INTRAVENOUS at 08:03

## 2019-04-14 RX ADMIN — POTASSIUM & SODIUM PHOSPHATES POWDER PACK 280-160-250 MG 2 PACKET: 280-160-250 PACK at 11:42

## 2019-04-14 RX ADMIN — HEPARIN SODIUM 5000 UNITS: 5000 INJECTION INTRAVENOUS; SUBCUTANEOUS at 08:03

## 2019-04-14 RX ADMIN — OXYCODONE HYDROCHLORIDE 5 MG: 5 TABLET ORAL at 12:37

## 2019-04-14 RX ADMIN — GABAPENTIN 400 MG: 400 CAPSULE ORAL at 06:40

## 2019-04-14 RX ADMIN — POTASSIUM & SODIUM PHOSPHATES POWDER PACK 280-160-250 MG 2 PACKET: 280-160-250 PACK at 20:50

## 2019-04-14 RX ADMIN — SODIUM CHLORIDE, PRESERVATIVE FREE 3 ML: 5 INJECTION INTRAVENOUS at 22:11

## 2019-04-14 RX ADMIN — CEFDINIR 300 MG: 300 CAPSULE ORAL at 22:12

## 2019-04-14 RX ADMIN — ASPIRIN 81 MG: 81 TABLET ORAL at 08:03

## 2019-04-14 RX ADMIN — SODIUM CHLORIDE, PRESERVATIVE FREE 3 ML: 5 INJECTION INTRAVENOUS at 20:50

## 2019-04-14 RX ADMIN — IPRATROPIUM BROMIDE AND ALBUTEROL SULFATE 3 ML: .5; 3 SOLUTION RESPIRATORY (INHALATION) at 06:42

## 2019-04-14 RX ADMIN — METRONIDAZOLE 500 MG: 500 INJECTION, SOLUTION INTRAVENOUS at 00:31

## 2019-04-14 RX ADMIN — ATORVASTATIN CALCIUM 20 MG: 20 TABLET, FILM COATED ORAL at 20:47

## 2019-04-14 RX ADMIN — LEVOTHYROXINE SODIUM 25 MCG: 25 TABLET ORAL at 08:03

## 2019-04-14 RX ADMIN — IPRATROPIUM BROMIDE AND ALBUTEROL SULFATE 3 ML: .5; 3 SOLUTION RESPIRATORY (INHALATION) at 00:28

## 2019-04-14 RX ADMIN — OXYCODONE HYDROCHLORIDE 80 MG: 80 TABLET, FILM COATED, EXTENDED RELEASE ORAL at 09:25

## 2019-04-14 RX ADMIN — HEPARIN SODIUM 5000 UNITS: 5000 INJECTION INTRAVENOUS; SUBCUTANEOUS at 20:48

## 2019-04-14 RX ADMIN — OXYCODONE HYDROCHLORIDE 5 MG: 5 TABLET ORAL at 07:08

## 2019-04-14 RX ADMIN — OXYCODONE HYDROCHLORIDE 80 MG: 80 TABLET, FILM COATED, EXTENDED RELEASE ORAL at 20:48

## 2019-04-14 RX ADMIN — SODIUM CHLORIDE, PRESERVATIVE FREE 3 ML: 5 INJECTION INTRAVENOUS at 08:04

## 2019-04-14 RX ADMIN — METRONIDAZOLE 500 MG: 500 INJECTION, SOLUTION INTRAVENOUS at 09:21

## 2019-04-14 RX ADMIN — METRONIDAZOLE 500 MG: 500 INJECTION, SOLUTION INTRAVENOUS at 18:00

## 2019-04-14 RX ADMIN — GABAPENTIN 400 MG: 400 CAPSULE ORAL at 15:32

## 2019-04-14 RX ADMIN — IPRATROPIUM BROMIDE AND ALBUTEROL SULFATE 3 ML: .5; 3 SOLUTION RESPIRATORY (INHALATION) at 12:59

## 2019-04-14 RX ADMIN — DOXYCYCLINE 100 MG: 100 INJECTION, POWDER, LYOPHILIZED, FOR SOLUTION INTRAVENOUS at 08:28

## 2019-04-14 RX ADMIN — GABAPENTIN 400 MG: 400 CAPSULE ORAL at 22:12

## 2019-04-14 NOTE — PLAN OF CARE
Problem: Fall Risk (Adult)  Goal: Identify Related Risk Factors and Signs and Symptoms  Outcome: Ongoing (interventions implemented as appropriate)   04/11/19 2006   Fall Risk (Adult)   Related Risk Factors (Fall Risk) age-related changes;confusion/agitation;fear of falling;history of falls;environment unfamiliar   Signs and Symptoms (Fall Risk) presence of risk factors     Goal: Absence of Fall  Outcome: Ongoing (interventions implemented as appropriate)   04/13/19 2115   Fall Risk (Adult)   Absence of Fall making progress toward outcome       Problem: Patient Care Overview  Goal: Plan of Care Review  Outcome: Ongoing (interventions implemented as appropriate)   04/12/19 1604 04/13/19 2001   Coping/Psychosocial   Plan of Care Reviewed With --  patient   Plan of Care Review   Progress improving --        Problem: Cognitive Impairment (IRF) (Adult)  Goal: Optimal Quality of Life in Relation to Cognitive Impairments  Outcome: Ongoing (interventions implemented as appropriate)

## 2019-04-14 NOTE — PROGRESS NOTES
[facility] HOSPITALIST PROGRESS NOTE     Patient Identification:  Name:  Paul Gomez  Age:  73 y.o.  Sex:  male  :  1945  MRN:  [id number]  Visit Number:  [id number]  ROOM: [address] [place] [address]     Primary Care Provider:  Caroline Guzman APRN    Length of stay:  3    Subjective     Chief Complaint   Patient presents with   • Loss of Vision       History of presenting illness:  73-year-old male brought in by his daughter for bilateral visual disturbance.  He describes a disturbance as his vision going completely blank with a pink or red screen in front of him.  He was no longer confused on the first hospital day.  Today, he still has the visual changes but the repeated images that he is seeing when he stares at an object or not lasting as long as they did yesterday.  He denies slurred speech, denies choking on any of his food, denies a facial droop, denies unilateral weakness, and denies any other neurological symptoms.  He also denies chest pain, denies trouble breathing.  He has less coughing and is not producing sputum.    Objective     Current Hospital Meds:  aspirin 81 mg Oral Daily   atorvastatin 20 mg Oral Nightly   ceftriaxone 2 g Intravenous Q24H   doxycycline 100 mg Intravenous Q12H   gabapentin 400 mg Oral Q8H   heparin (porcine) 5,000 Units Subcutaneous Q12H   ipratropium-albuterol 3 mL Nebulization Q6H - RT   lactobacillus acidophilus 1 capsule Oral Daily   levothyroxine 25 mcg Oral Daily   metroNIDAZOLE 500 mg Intravenous Q8H   nicotine 1 patch Transdermal Q24H   oxyCODONE ER 80 mg Oral Q12H   sodium chloride 3 mL Intravenous Q12H   sodium chloride 3 mL Intravenous Q12H   ziprasidone 10 mg Intramuscular Once      ----------------------------------------------------------------------------------------------------------------------  Vital Signs:  Temp:  [97.5 °F (36.4 °C)-99 °F (37.2 °C)] 98.2 °F (36.8 °C)  Heart Rate:  [] 105  Resp:  [18-20] 18  BP: (115-161)/(63-96)  138/75  SpO2:  [93 %-99 %] 95 %  Device (Oxygen Therapy): room air  Body mass index is 21.1 kg/m².    Wt Readings from Last 3 Encounters:   04/12/19 64.8 kg (142 lb 14.4 oz)   03/13/19 71.2 kg (157 lb)   02/21/19 66.7 kg (147 lb)         Diet Soft Texture; Chopped; Nectar / Syrup Thick  ----------------------------------------------------------------------------------------------------------------------  Physical exam:  Constitutional:  Well-developed and well-nourished.  No respiratory distress.   HENT:  Head: Normocephalic and atraumatic.  Mouth:  Moist mucous membranes.    Eyes:  Conjunctivae and EOM appear normal. No scleral icterus. No erythema or drainage. Pupils are small, but equal and reactive to light.   Neck:  Neck supple.  No JVD present. No carotid bruits noted.   Cardiovascular:  Normal rate, regular rhythm and normal heart sounds with no murmur.  Pulmonary/Chest:  No respiratory distress, no wheezes. Few rales at the right base. Reduced air movement bilaterally.   Abdominal:  Soft.  Bowel sounds are present x4.  No distension and no tenderness.   Musculoskeletal:  No edema, no tenderness, and no deformity.  No red or swollen joints anywhere. Moving all 4 extremities.   Neurological:  Alert and oriented to person, place, and time.  He can follow all commands.  He does not have slurred speech.  He has equal strength on both sides and no weakness appreciated.    Skin:  Skin is warm and dry.  No rash noted.  No pallor.   Peripheral vascular:  No edema and 2+ pedal pulses bilaterally.   Genitourinary:  No mccullough catheter in place.   ----------------------------------------------------------------------------------------------------------------------  Tele:  NS with heart rates in the 's; I have personally reviewed/looked at the telemetry strips.  ----------------------------------------------------------------------------------------------------------------------  Results from last 7 days   Lab Units  04/14/19  0509 04/13/19  0526 04/12/19 0130 04/11/19  1920   CRP mg/dL  --   --  2.18*  --    LACTATE mmol/L  --   --   --  1.5   WBC 10*3/mm3 7.48 6.27 12.86*  --    HEMOGLOBIN g/dL 10.7* 9.9* 12.6*  --    HEMATOCRIT % 32.3* 30.4* 36.8*  --    MCV fL 91.8 91.0 88.9  --    MCHC g/dL 33.1 32.6 34.2  --    PLATELETS 10*3/mm3 153 131* 166  --      Results from last 7 days   Lab Units 04/14/19 0509 04/13/19 0526 04/12/19 0130 04/11/19  1305   SODIUM mmol/L 140 140 135* 127*   POTASSIUM mmol/L 4.5 3.7 4.0 3.8   MAGNESIUM mg/dL 2.4 1.8  --  2.5*   CHLORIDE mmol/L 109* 112* 98 91*   CO2 mmol/L 22.6 18.7* 17.9* 19.6*   BUN mg/dL 9 20 74* 85*   CREATININE mg/dL 0.83 0.93 1.88* 2.60*   PHOSPHORUS mg/dL 2.4* 2.3*  --   --    EGFR IF NONAFRICN AM mL/min/1.73 91 80 35* 24*   CALCIUM mg/dL 8.0* 8.2* 8.5* 8.2*   GLUCOSE mg/dL 103* 102* 96 102*   ALBUMIN g/dL 3.00* 2.85*  --  3.93   BILIRUBIN mg/dL 0.3 0.2  --  0.9   ALK PHOS U/L 62 58  --  91   AST (SGOT) U/L 16 17  --  29   ALT (SGPT) U/L 9 9  --  14   Estimated Creatinine Clearance: 72.7 mL/min (by C-G formula based on SCr of 0.83 mg/dL).      Results from last 7 days   Lab Units 04/12/19  0130   CHOLESTEROL mg/dL 146   TRIGLYCERIDES mg/dL 64   HDL CHOL mg/dL 33*   LDL CHOL mg/dL 100     Blood Culture   Date Value Ref Range Status   04/11/2019 No growth at 2 days  Preliminary   04/11/2019 No growth at 2 days  Preliminary     Respiratory Culture   Date Value Ref Range Status   04/13/2019 Moderate growth (3+) Normal Respiratory [facility]  Preliminary         I have personally looked at the labs and they are summarized above.    Assessment & Plan      -Reported bilateral vision changes: CT of the head with areas concerning for possible edema, but unusually symmetric  -Multifactorial delirium due to uremia, metabolic encephalopathy from the pneumonia, and  home pain medication, now resolved  -Acute kidney injury, admission creatinine 2.6, baseline creatinine 0.8  -Acute right lower  lobe pneumonia, aspiration versus community acquired  -Mild oropharyngeal dysphagia w/ increased oral prep time of solids, weak pharyngeal contraction w/ transient laryngeal penetration of nectar thick  -Diastolic dysfunction with no acute exacerbation  -Hypovolemic hyponatremia, resolved with IV fluids  -Acute hypophosphatemia  -Mild acute hypomagnesemia  -Prolonged, QTc 486 ms, proved to 466 ms by 4/12/2019  -Hyperlipidemia  -Essential hypertension  -Chronic pain syndrome with chronic opioid use: Concern for possible overmedication at admission  -COPD, without acute exacerbation  -History of congestive heart failure of unknown type with no acute exacerbation at this time  -Tobacco abuse  -Urinary retention     Renal failure has improved without IV fluids overnight and thus we will continue to encourage him to drink and monitor his creatinine.  We will repeat blood work in the morning to see what his electrolytes are and we will continue to replace any low levels; he is phosphorus is still low today we will continue replacing the Lantus.  He still has visual changes but they have improved.  We will attempt to obtain the head MRI again tomorrow and if we are unsuccessful then we may repeat a CT scan to at least see if the area that appears to be swollen has advanced in the.  I am changing his antibiotics to oral formulation as his pneumonia is improving.  Speech therapy will continue to see him as he does have some dysphasia and is on nectar thick liquids.  The blood pressures are controlled and we will continue to monitor these closely.  I will see if Dr. Slater can see the patient tomorrow.  We will continue the aspirin and Lipitor the stroke treatment protocol.    The patient is high risk due to the following diagnoses/reasons:  Continued visual changes in a patient with abnormalities on the CT scan in the visual cortex area of the brain    Edita Jones MD  Hospital Medicine Team  04/14/19  5:13 PM

## 2019-04-14 NOTE — PLAN OF CARE
Problem: Fall Risk (Adult)  Goal: Identify Related Risk Factors and Signs and Symptoms   04/11/19 2006   Fall Risk (Adult)   Related Risk Factors (Fall Risk) age-related changes;confusion/agitation;fear of falling;history of falls;environment unfamiliar   Signs and Symptoms (Fall Risk) presence of risk factors     Goal: Absence of Fall  Outcome: Ongoing (interventions implemented as appropriate)   04/14/19 0728   Fall Risk (Adult)   Absence of Fall making progress toward outcome       Problem: Cognitive Impairment (IRF) (Adult)  Goal: Optimal/Safe Level of [place] Related to Cognitive Impairment   04/14/19 0728   Cognitive Impairment (IRF) (Adult)   Optimal/Safe Level of [place] Related to Cognitive Impairment progressing to functional independence     Goal: Optimal Quality of Life in Relation to Cognitive Impairments   04/14/19 0728   Cognitive Impairment (IRF) (Adult)   Optimal Quality of Life in Relation to Cognitive Impairments progressing to functional independence

## 2019-04-15 ENCOUNTER — APPOINTMENT (OUTPATIENT)
Dept: MRI IMAGING | Facility: HOSPITAL | Age: 74
End: 2019-04-15

## 2019-04-15 VITALS
HEIGHT: 69 IN | DIASTOLIC BLOOD PRESSURE: 70 MMHG | SYSTOLIC BLOOD PRESSURE: 133 MMHG | OXYGEN SATURATION: 96 % | BODY MASS INDEX: 21.17 KG/M2 | WEIGHT: 142.9 LBS | RESPIRATION RATE: 18 BRPM | HEART RATE: 92 BPM | TEMPERATURE: 98.6 F

## 2019-04-15 LAB
ALBUMIN SERPL-MCNC: 2.98 G/DL (ref 3.5–5.2)
ALBUMIN/GLOB SERPL: 1.1 G/DL
ALP SERPL-CCNC: 62 U/L (ref 39–117)
ALT SERPL W P-5'-P-CCNC: 10 U/L (ref 1–41)
ANION GAP SERPL CALCULATED.3IONS-SCNC: 13.7 MMOL/L
AST SERPL-CCNC: 16 U/L (ref 1–40)
BACTERIA SPEC RESP CULT: NORMAL
BASOPHILS # BLD AUTO: 0.03 10*3/MM3 (ref 0–0.2)
BASOPHILS NFR BLD AUTO: 0.4 % (ref 0–1.5)
BILIRUB SERPL-MCNC: 0.3 MG/DL (ref 0.2–1.2)
BUN BLD-MCNC: 7 MG/DL (ref 8–23)
BUN/CREAT SERPL: 9.1 (ref 7–25)
CALCIUM SPEC-SCNC: 8 MG/DL (ref 8.6–10.5)
CHLORIDE SERPL-SCNC: 105 MMOL/L (ref 98–107)
CO2 SERPL-SCNC: 22.3 MMOL/L (ref 22–29)
CREAT BLD-MCNC: 0.77 MG/DL (ref 0.76–1.27)
DEPRECATED RDW RBC AUTO: 44.6 FL (ref 37–54)
EOSINOPHIL # BLD AUTO: 1.32 10*3/MM3 (ref 0–0.4)
EOSINOPHIL NFR BLD AUTO: 16 % (ref 0.3–6.2)
ERYTHROCYTE [DISTWIDTH] IN BLOOD BY AUTOMATED COUNT: 13.6 % (ref 12.3–15.4)
GFR SERPL CREATININE-BSD FRML MDRD: 99 ML/MIN/1.73
GLOBULIN UR ELPH-MCNC: 2.7 GM/DL
GLUCOSE BLD-MCNC: 109 MG/DL (ref 65–99)
GLUCOSE BLDC GLUCOMTR-MCNC: 114 MG/DL (ref 70–130)
GRAM STN SPEC: NORMAL
HCT VFR BLD AUTO: 32.6 % (ref 37.5–51)
HGB BLD-MCNC: 10.9 G/DL (ref 13–17.7)
IMM GRANULOCYTES # BLD AUTO: 0.03 10*3/MM3 (ref 0–0.05)
IMM GRANULOCYTES NFR BLD AUTO: 0.4 % (ref 0–0.5)
LYMPHOCYTES # BLD AUTO: 1.58 10*3/MM3 (ref 0.7–3.1)
LYMPHOCYTES NFR BLD AUTO: 19.2 % (ref 19.6–45.3)
MAGNESIUM SERPL-MCNC: 1.6 MG/DL (ref 1.6–2.4)
MCH RBC QN AUTO: 30.8 PG (ref 26.6–33)
MCHC RBC AUTO-ENTMCNC: 33.4 G/DL (ref 31.5–35.7)
MCV RBC AUTO: 92.1 FL (ref 79–97)
MONOCYTES # BLD AUTO: 1 10*3/MM3 (ref 0.1–0.9)
MONOCYTES NFR BLD AUTO: 12.1 % (ref 5–12)
NEUTROPHILS # BLD AUTO: 4.28 10*3/MM3 (ref 1.4–7)
NEUTROPHILS NFR BLD AUTO: 51.9 % (ref 42.7–76)
PHOSPHATE SERPL-MCNC: 3 MG/DL (ref 2.5–4.5)
PLATELET # BLD AUTO: 166 10*3/MM3 (ref 140–450)
PMV BLD AUTO: 11.3 FL (ref 6–12)
POTASSIUM BLD-SCNC: 3.7 MMOL/L (ref 3.5–5.2)
PROT SERPL-MCNC: 5.7 G/DL (ref 6–8.5)
RBC # BLD AUTO: 3.54 10*6/MM3 (ref 4.14–5.8)
SODIUM BLD-SCNC: 141 MMOL/L (ref 136–145)
WBC NRBC COR # BLD: 8.24 10*3/MM3 (ref 3.4–10.8)

## 2019-04-15 PROCEDURE — 83735 ASSAY OF MAGNESIUM: CPT | Performed by: INTERNAL MEDICINE

## 2019-04-15 PROCEDURE — 82962 GLUCOSE BLOOD TEST: CPT

## 2019-04-15 PROCEDURE — A9577 INJ MULTIHANCE: HCPCS | Performed by: INTERNAL MEDICINE

## 2019-04-15 PROCEDURE — 70553 MRI BRAIN STEM W/O & W/DYE: CPT

## 2019-04-15 PROCEDURE — 97530 THERAPEUTIC ACTIVITIES: CPT

## 2019-04-15 PROCEDURE — 97166 OT EVAL MOD COMPLEX 45 MIN: CPT

## 2019-04-15 PROCEDURE — 85025 COMPLETE CBC W/AUTO DIFF WBC: CPT | Performed by: INTERNAL MEDICINE

## 2019-04-15 PROCEDURE — 99239 HOSP IP/OBS DSCHRG MGMT >30: CPT | Performed by: INTERNAL MEDICINE

## 2019-04-15 PROCEDURE — 0 GADOBENATE DIMEGLUMINE 529 MG/ML SOLUTION: Performed by: INTERNAL MEDICINE

## 2019-04-15 PROCEDURE — 94799 UNLISTED PULMONARY SVC/PX: CPT

## 2019-04-15 PROCEDURE — 84100 ASSAY OF PHOSPHORUS: CPT | Performed by: INTERNAL MEDICINE

## 2019-04-15 PROCEDURE — 80053 COMPREHEN METABOLIC PANEL: CPT | Performed by: INTERNAL MEDICINE

## 2019-04-15 PROCEDURE — 25010000002 HEPARIN (PORCINE) PER 1000 UNITS: Performed by: INTERNAL MEDICINE

## 2019-04-15 PROCEDURE — 92526 ORAL FUNCTION THERAPY: CPT

## 2019-04-15 PROCEDURE — 70553 MRI BRAIN STEM W/O & W/DYE: CPT | Performed by: RADIOLOGY

## 2019-04-15 PROCEDURE — 97116 GAIT TRAINING THERAPY: CPT

## 2019-04-15 RX ORDER — MAGNESIUM SULFATE HEPTAHYDRATE 40 MG/ML
4 INJECTION, SOLUTION INTRAVENOUS ONCE
Status: COMPLETED | OUTPATIENT
Start: 2019-04-15 | End: 2019-04-15

## 2019-04-15 RX ORDER — ATORVASTATIN CALCIUM 40 MG/1
40 TABLET, FILM COATED ORAL NIGHTLY
Qty: 30 TABLET | Refills: 0 | Status: SHIPPED | OUTPATIENT
Start: 2019-04-15 | End: 2019-05-14 | Stop reason: SDUPTHER

## 2019-04-15 RX ORDER — CEFDINIR 300 MG/1
300 CAPSULE ORAL EVERY 12 HOURS SCHEDULED
Qty: 6 CAPSULE | Refills: 0 | Status: SHIPPED | OUTPATIENT
Start: 2019-04-15 | End: 2019-04-18

## 2019-04-15 RX ORDER — ASPIRIN 81 MG/1
81 TABLET ORAL DAILY
Qty: 30 TABLET | Refills: 0 | Status: SHIPPED | OUTPATIENT
Start: 2019-04-16 | End: 2020-02-06 | Stop reason: SDUPTHER

## 2019-04-15 RX ORDER — DOXYCYCLINE 100 MG/1
100 CAPSULE ORAL EVERY 12 HOURS SCHEDULED
Qty: 6 CAPSULE | Refills: 0 | Status: SHIPPED | OUTPATIENT
Start: 2019-04-15 | End: 2019-04-18

## 2019-04-15 RX ADMIN — SODIUM CHLORIDE, PRESERVATIVE FREE 3 ML: 5 INJECTION INTRAVENOUS at 08:44

## 2019-04-15 RX ADMIN — LEVOTHYROXINE SODIUM 25 MCG: 25 TABLET ORAL at 08:43

## 2019-04-15 RX ADMIN — Medication 1 CAPSULE: at 08:43

## 2019-04-15 RX ADMIN — GADOBENATE DIMEGLUMINE 14 ML: 529 INJECTION, SOLUTION INTRAVENOUS at 13:10

## 2019-04-15 RX ADMIN — SODIUM CHLORIDE, PRESERVATIVE FREE 3 ML: 5 INJECTION INTRAVENOUS at 08:43

## 2019-04-15 RX ADMIN — IPRATROPIUM BROMIDE AND ALBUTEROL SULFATE 3 ML: .5; 3 SOLUTION RESPIRATORY (INHALATION) at 00:30

## 2019-04-15 RX ADMIN — GABAPENTIN 400 MG: 400 CAPSULE ORAL at 05:04

## 2019-04-15 RX ADMIN — GABAPENTIN 400 MG: 400 CAPSULE ORAL at 14:28

## 2019-04-15 RX ADMIN — MAGNESIUM SULFATE HEPTAHYDRATE 4 G: 40 INJECTION, SOLUTION INTRAVENOUS at 08:42

## 2019-04-15 RX ADMIN — NICOTINE 1 PATCH: 21 PATCH TRANSDERMAL at 08:44

## 2019-04-15 RX ADMIN — ASPIRIN 81 MG: 81 TABLET ORAL at 08:43

## 2019-04-15 RX ADMIN — DOXYCYCLINE 100 MG: 100 CAPSULE ORAL at 08:43

## 2019-04-15 RX ADMIN — CEFDINIR 300 MG: 300 CAPSULE ORAL at 08:43

## 2019-04-15 RX ADMIN — METRONIDAZOLE 500 MG: 250 TABLET ORAL at 14:28

## 2019-04-15 RX ADMIN — OXYCODONE HYDROCHLORIDE 5 MG: 5 TABLET ORAL at 01:25

## 2019-04-15 RX ADMIN — OXYCODONE HYDROCHLORIDE 5 MG: 5 TABLET ORAL at 14:28

## 2019-04-15 RX ADMIN — METRONIDAZOLE 500 MG: 250 TABLET ORAL at 05:06

## 2019-04-15 RX ADMIN — HEPARIN SODIUM 5000 UNITS: 5000 INJECTION INTRAVENOUS; SUBCUTANEOUS at 08:44

## 2019-04-15 RX ADMIN — OXYCODONE HYDROCHLORIDE 80 MG: 80 TABLET, FILM COATED, EXTENDED RELEASE ORAL at 08:42

## 2019-04-15 NOTE — PLAN OF CARE
Problem: Fall Risk (Adult)  Goal: Identify Related Risk Factors and Signs and Symptoms  Outcome: Ongoing (interventions implemented as appropriate)    Goal: Absence of Fall  Outcome: Ongoing (interventions implemented as appropriate)      Problem: Patient Care Overview  Goal: Plan of Care Review  Outcome: Ongoing (interventions implemented as appropriate)    Goal: Discharge Needs Assessment  Outcome: Ongoing (interventions implemented as appropriate)

## 2019-04-15 NOTE — THERAPY EVALUATION
Acute Care - Occupational Therapy Initial Evaluation   [place]     Patient Name: Paul Gomez  : 1945  MRN: [id number]  Today's Date: 4/15/2019  Onset of Illness/Injury or Date of Surgery: 19(admit date)     Referring Physician: Karen    Admit Date: 2019       ICD-10-CM ICD-9-CM   1. Altered mental status, unspecified altered mental status type R41.82 780.97   2. Acute kidney injury (CMS/HCC) N17.9 584.9   3. Opioid dependence with intoxication delirium (CMS/[facility]) F11.221 292.81     304.00     Patient Active Problem List   Diagnosis   • Low back pain   • Hypertension   • Hyperlipidemia   • History of degenerative disc disease   • COPD (chronic obstructive pulmonary disease) (CMS/[facility])   • Gout   • B12 deficiency   • Seborrheic eczema   • Hypothyroidism   • Dilated pancreatic duct   • Epigastric pain   • Chronic pancreatitis (CMS/[facility])   • Headache, unspecified headache type   • Chest pain   • COPD with exacerbation (CMS/[facility])   • Tobacco abuse counseling   • Acute exacerbation of chronic obstructive pulmonary disease (COPD) (CMS/[facility])   • Erectile dysfunction   • Acute congestive heart failure (CMS/HCC)   • Bilateral rales   • 1+ pitting edema   • Dyspnea on minimal exertion   • Visual loss   • Altered mental status     Past Medical History:   Diagnosis Date   • COPD (chronic obstructive pulmonary disease) (CMS/HCC)    • Gout    • Hearing loss    • History of degenerative disc disease    • Hyperlipidemia    • Hypertension    • Low back pain    • Pedal edema    • Prediabetes      Past Surgical History:   Procedure Laterality Date   • CHOLECYSTECTOMY     • LUMBAR DISCECTOMY     • SHOULDER SURGERY Left           OT ASSESSMENT FLOWSHEET (last 12 hours)      Occupational Therapy Evaluation     Row Name 04/15/19 1510                   OT Evaluation Time/Intention    Document Type  evaluation  -KR        Mode of Treatment  occupational therapy  -KR        Patient Effort  adequate  -KR           General  Information    Patient Observations  alert;cooperative  -KR        Patient/Family Observations  loretta valencia present. Reports that pt. has improved and able to perform self care independently at this time.   -KR           Cognitive Assessment/Intervention- PT/OT    Orientation Status (Cognition)  oriented x 3  -KR        Follows Commands (Cognition)  WFL  -KR        Cognitive Assessment/Intervention Comment  Pt./family reports that previous hallucinations have resolved at this time.   -KR           ADL Assessment/Intervention    BADL Assessment/Intervention  -- All self care SBA at this time.   -KR           General ROM    GENERAL ROM COMMENTS  BUE WFL  -KR           MMT (Manual Muscle Testing)    General MMT Comments  3/5  -KR           Clinical Impression (OT)    Criteria for Skilled Therapeutic Interventions Met (OT Eval)  no problems identified which require skilled intervention  -KR        Therapy Frequency (OT Eval)  evaluation only  -KR          User Key  (r) = Recorded By, (t) = Taken By, (c) = Cosigned By    Initials Name Effective Dates    KR Víctor Pablo, OT 04/03/18 -                OT Recommendation and Plan  Therapy Frequency (OT Eval): evaluation only       Outcome Measures     Row Name 04/15/19 1518 04/15/19 1500 04/15/19 1100       How much help from another person do you currently need...    Turning from your back to your side while in flat bed without using bedrails?  --  --  4  -CT    Moving from lying on back to sitting on the side of a flat bed without bedrails?  --  --  4  -CT    Moving to and from a bed to a chair (including a wheelchair)?  --  --  4  -CT    Standing up from a chair using your arms (e.g., wheelchair, bedside chair)?  --  --  3  -CT    Climbing 3-5 steps with a railing?  --  --  3  -CT    To walk in hospital room?  --  --  3  -CT    AM-PAC 6 Clicks Score  --  --  21  -CT       How much help from another is currently needed...    Putting on and taking off regular lower  body clothing?  4  -KR  --  --    Bathing (including washing, rinsing, and drying)  3  -KR  --  --    Toileting (which includes using toilet bed pan or urinal)  3  -KR  --  --    Putting on and taking off regular upper body clothing  4  -KR  --  --    Taking care of personal grooming (such as brushing teeth)  4  -KR  --  --    Eating meals  4  -KR  --  --    Score  22  -KR  --  --       Functional Assessment    Outcome Measure Options  --  AM-PAC 6 Clicks Daily Activity (OT)  -KR  AM-PAC 6 Clicks Basic Mobility (PT)  -CT      User Key  (r) = Recorded By, (t) = Taken By, (c) = Cosigned By    Initials Name Provider Type    Víctor Welch OT Occupational Therapist    Diamond Ferguson, CODY Physical Therapist          Time Calculation:   Time Calculation- OT     Row Name 04/15/19 1518 04/15/19 1109          Time Calculation- OT    Total Timed Code Minutes- OT  30 minute(s)  -KR  --        Timed Charges    40145 - Gait Training Minutes   --  15  -CT       User Key  (r) = Recorded By, (t) = Taken By, (c) = Cosigned By    Initials Name Provider Type    Víctor Welch OT Occupational Therapist    Diamond Ferguson, CODY Physical Therapist        Therapy Charges for Today     Code Description Service Date Service Provider Modifiers Qty    [id number]  OT EVAL MOD COMPLEXITY 2 4/15/2019 Víctor Pablo OT GO 1               Víctor Pablo OT  4/15/2019

## 2019-04-15 NOTE — DISCHARGE SUMMARY
[facility]    Patient Identification:  Name:  Paul Gomez  Age:  73 y.o.  Sex:  male  :  1945  MRN:  [id number]  Visit Number:  [id number]    Date of Admission: 2019  Date of Discharge:  4/15/2019     PCP: Caroline Guzman, APRN    DISCHARGE DIAGNOSIS  -Bilateral occipital stroke with cerebral edema however patient clinically is improved  -Multifactorial delirium due to uremia, metabolic encephalopathy from the pneumonia, now resolved, patient is recommended to cut down on his chronic opioid due to above  -Acute kidney injury, admission creatinine 2.6, baseline creatinine 0.8  -Acute right lower lobe pneumonia  -Mild oropharyngeal dysphagia w/ increased oral prep time of solids, weak pharyngeal contraction w/ transient laryngeal penetration of nectar thick, improved  -Diastolic dysfunction with no acute exacerbation  -Hypovolemic hyponatremia, resolved with IV fluids  -Acute hypophosphatemia  -Mild acute hypomagnesemia  -Hyperlipidemia  -Essential hypertension  -Chronic pain syndrome with chronic opioid use: Concern for possible overmedication at admission, cut down on the chronic opioids to 50% of home dose, see attached medication consultation  -COPD, without acute exacerbation  -History of congestive heart failure of unknown type with no acute exacerbation at this time  -Tobacco abuse        HOSPITAL COURSE    73-year-old admitted on 2019 with confusion and visual changes.  The patient had uremia and acute renal failure that improved with IV fluids only.  He also has pneumonia and is improving with doxycycline, Rocephin, and Flagyl.  The patient was worked up during his stay and had MRI of the head that showed bilateral occipital stroke with mild cerebral edema without any mass-effect or midline shift.  The patient's symptoms had resolved completely by today and he is moving around without any visual changes.  He is recommended to follow-up  in the outpatient setting with his primary physician and been placed on statin and aspirin.  Also he was asked to cut down on his home narcotics due to above.    VITAL SIGNS:  Temp:  [98.2 °F (36.8 °C)-98.9 °F (37.2 °C)] 98.6 °F (37 °C)  Heart Rate:  [] 92  Resp:  [18-20] 18  BP: (120-138)/(66-83) 133/70  SpO2:  [94 %-99 %] 96 %  on   ;   Device (Oxygen Therapy): room air    Body mass index is 21.1 kg/m².  Wt Readings from Last 3 Encounters:   04/12/19 64.8 kg (142 lb 14.4 oz)   03/13/19 71.2 kg (157 lb)   02/21/19 66.7 kg (147 lb)       PHYSICAL EXAM:  Constitutional:  Well-developed and well-nourished.  No respiratory distress.      HENT:  Head: Normocephalic and atraumatic.  Mouth:  Moist mucous membranes.    Eyes:  Conjunctivae and EOM are normal.  Pupils are equal, round, and reactive to light.  No scleral icterus.  Neck:  Neck supple.  No JVD present.    Cardiovascular:  Normal rate, regular rhythm and normal heart sounds with no murmur.  Pulmonary/Chest:  No respiratory distress, no wheezes, no crackles, with normal breath sounds and good air movement.  Abdominal:  Soft.  Bowel sounds are normal.  No distension and no tenderness.   Musculoskeletal:  No edema, no tenderness, and no deformity.  No red or swollen joints anywhere.    Neurological:  Alert and oriented to person, place, and time.  No cranial nerve deficit.  No tongue deviation.  No facial droop.  No slurred speech.   Skin:  Skin is warm and dry.  No rash noted.  No pallor.   Peripheral vascular:  No edema and strong pulses on all 4 extremities.      DISCHARGE DISPOSITION   Stable    DISCHARGE MEDICATIONS:     Discharge Medications      New Medications      Instructions Start Date   aspirin 81 MG EC tablet   81 mg, Oral, Daily   Start Date:  4/16/2019     atorvastatin 40 MG tablet  Commonly known as:  LIPITOR   40 mg, Oral, Nightly      cefdinir 300 MG capsule  Commonly known as:  OMNICEF   300 mg, Oral, Every 12 Hours Scheduled       doxycycline 100 MG capsule  Commonly known as:  MONODOX   100 mg, Oral, Every 12 Hours Scheduled         Continue These Medications      Instructions Start Date   albuterol sulfate  (90 Base) MCG/ACT inhaler  Commonly known as:  PROVENTIL HFA;VENTOLIN HFA;PROAIR HFA   2 puffs, Inhalation, Every 6 Hours PRN      ANORO ELLIPTA 62.5-25 MCG/INH aerosol powder  inhaler  Generic drug:  umeclidinium-vilanterol   1 puff, Inhalation, Daily - RT      gabapentin 800 MG tablet  Commonly known as:  NEURONTIN   800 mg, Oral, 3 Times Daily      hydrOXYzine 25 MG tablet  Commonly known as:  ATARAX   25 mg, Oral, Nightly PRN      ipratropium-albuterol 0.5-2.5 mg/3 ml nebulizer  Commonly known as:  DUO-NEB   3 mL, Nebulization, Take As Directed, Prior to [facility] Admission, Patient was on: Take 3 ml every 30 minutes as needed for shortness of air for up to 6 doses.       levothyroxine 25 MCG tablet  Commonly known as:  SYNTHROID   25 mcg, Oral, Daily      oxyMORphone ER 40 MG 12 hr tablet  Commonly known as:  OPANA ER   40 mg, Oral, Every 12 Hours         Stop These Medications    atenolol 25 MG tablet  Commonly known as:  TENORMIN     oxyCODONE 10 MG tablet  Commonly known as:  ROXICODONE            Diet Instructions     Diet: Cardiac      Discharge Diet:  Cardiac        Activity Instructions     Activity as Tolerated          Future Appointments   Date Time Provider Department Center   6/13/2019  2:00 PM Caroline Guzman APRN [facility] None     Your Scheduled Appointments    Jun 13, 2019  2:00 PM EDT  Follow Up with BEATRICE Vallejo  [facility] (--) [address]  [address]  [facility] [address] [phone]   Arrive 15 minutes prior to appointment.        Additional Instructions for the Follow-ups that You Need to Schedule     Discharge Follow-up with PCP   As directed       Currently Documented PCP:    Caroline Guzman APRN    PCP Phone Number:    [phone]      Follow Up Details:  PCP in a week           Follow-up Information     Caroline Guzman APRN .    Specialty:  Family Medicine  Why:  PCP in a week  Contact information:  [id number]  [place] [id number]  [phone]                    TEST  RESULTS PENDING AT DISCHARGE   Order Current Status    Blood Culture - Blood, Arm, Left Preliminary result    Blood Culture - Blood, Arm, Right Preliminary result           CODE STATUS  Code Status and Medical Interventions:   Ordered at: 04/11/19 1525     Level Of Support Discussed With:    Patient     Code Status:    CPR     Medical Interventions (Level of Support Prior to Arrest):    Full       Mhd Jorge A Morataya MD  04/15/19  2:57 PM    Please note that this discharge summary required more than 30 minutes to complete.    Please send a copy of this dictation to the following providers:  Caroline Guzman APRN

## 2019-04-15 NOTE — THERAPY TREATMENT NOTE
Acute Care - Speech Language Pathology   Swallow Treatment Note  [place]     Patient Name: Paul Gomez  : 1945  MRN: [id number]  Today's Date: 4/15/2019  Onset of Illness/Injury or Date of Surgery: 19(admit date)     Referring Physician: Karen      Admit Date: 2019  Mr. Gomez was seen at bedside this am for formal dysphagia therapy. His daughter is present at bedside.      He is s/p MBS 19 w/ silent aspiration of thin liquids. He was recommended modified po diet of mechanical soft, chopped meats, nectar thick liquids. He is tolerating well w/ good intake. He continues w/ intermittent ams, visual disturbances. He is awaiting MRI today.     Visit Dx:      ICD-10-CM ICD-9-CM   1. Altered mental status, unspecified altered mental status type R41.82 780.97   2. Acute kidney injury (CMS/HCC) N17.9 584.9   3. Opioid dependence with intoxication delirium (CMS/[facility]) F11.221 292.81     304.00     Patient Active Problem List   Diagnosis   • Low back pain   • Hypertension   • Hyperlipidemia   • History of degenerative disc disease   • COPD (chronic obstructive pulmonary disease) (CMS/[facility])   • Gout   • B12 deficiency   • Seborrheic eczema   • Hypothyroidism   • Dilated pancreatic duct   • Epigastric pain   • Chronic pancreatitis (CMS/[facility])   • Headache, unspecified headache type   • Chest pain   • COPD with exacerbation (CMS/[facility])   • Tobacco abuse counseling   • Acute exacerbation of chronic obstructive pulmonary disease (COPD) (CMS/[facility])   • Erectile dysfunction   • Acute congestive heart failure (CMS/HCC)   • Bilateral rales   • 1+ pitting edema   • Dyspnea on minimal exertion   • Visual loss   • Altered mental status     Therapy Treatment     Short Term Goals:  1. Pt will perform OROM/LUCA exercises x3 sets x10 reps w/ min cues.  *x3 sets x2 reps w/ mod cues. Fatigued today.  2. Pt will perform resistive breathing exercises x3 sets x5 reps w/resistance of 1-6 to improve respiratory support and  control.   *x2 sets x5 reps w/ max cues w/ resistance of 3//3 inhale/exhale. Fatigues easily.   3. Pt will perform laryngeal adduction/elevation exercises x3 sets x10 reps w/ min cues.   *vowel prolongation x2 sets x5 reps w/ mean duration of 3/2 seconds.   4. Pt will perform Shaker exercises sustained x3 sets x5 reps for 30+ seconds over 3 consecutive sessions.   *Not addressed today.  5. Pt will perform Shaker exercises repetitive x3 sets x12 reps w/ mod cues.  *Not addressed today   6. Pt will perform compensatory techniques during meals w/ min cues.  *Improving bolus rate and size control.   7. Pt will participate in instrumental re-evaluation of swallowing fnx in 3-5 days, pending progress towards this poc.     Outcome Summary   Mr. Gomez continues to tolerate modified po diet. He continues to participate in formal dysphagia tx.     EDUCATION  The patient has been educated in the following areas:   Dysphagia (Swallowing Impairment) Oral Care/Hydration Modified Diet Instruction.    SLP Recommendation and Plan   Continue current modified po diet mechanical soft, chopped meats, nectar thick liquids. Continue current dysphagia tx poc.     Thank you-  Margarita Clemons M.S., [facility]/SLP                             Time Calculation:   Time Calculation- SLP     Row Name 04/15/19 1127             Time Calculation- SLP    SLP - Next Appointment  04/16/19  -AMI        User Key  (r) = Recorded By, (t) = Taken By, (c) = Cosigned By    Initials Name Provider Type    Margarita Wen MS CCC-SLP Speech and Language Pathologist          Therapy Charges for Today     Code Description Service Date Service Provider Modifiers Qty    [id number]  ST TREATMENT SWALLOW 2 4/15/2019 Margarita Clemons, MS CCC-SLP GN 1                 Margarita Clemons MS CCC-SLP  4/15/2019

## 2019-04-15 NOTE — DISCHARGE INSTR - APPOINTMENTS
A follow up appointment has been scheduled for outpatient physical therapy at the [facility] on Thursday, April 18, 2019 at 9:45 am.  You can reach the office at .      [facility] will notify you with an appointment for speech therapy.  You can reach the office at .

## 2019-04-15 NOTE — THERAPY EVALUATION
Acute Care - Physical Therapy Initial Evaluation/Treatment Note  HEIDE Albarran     Patient Name: Paul Gomez  : 1945  MRN: [id number]  Today's Date: 4/15/2019   Onset of Illness/Injury or Date of Surgery: 19(admit date)  Date of Referral to PT: 19  Referring Physician: Karen      Admit Date: 2019    Visit Dx:     ICD-10-CM ICD-9-CM   1. Altered mental status, unspecified altered mental status type R41.82 780.97   2. Acute kidney injury (CMS/HCC) N17.9 584.9   3. Opioid dependence with intoxication delirium (CMS/Formerly [facility]) F11.221 292.81     304.00     Patient Active Problem List   Diagnosis   • Low back pain   • Hypertension   • Hyperlipidemia   • History of degenerative disc disease   • COPD (chronic obstructive pulmonary disease) (CMS/HCC)   • Gout   • B12 deficiency   • Seborrheic eczema   • Hypothyroidism   • Dilated pancreatic duct   • Epigastric pain   • Chronic pancreatitis (CMS/Formerly [facility])   • Headache, unspecified headache type   • Chest pain   • COPD with exacerbation (CMS/Formerly [facility])   • Tobacco abuse counseling   • Acute exacerbation of chronic obstructive pulmonary disease (COPD) (CMS/HCC)   • Erectile dysfunction   • Acute congestive heart failure (CMS/HCC)   • Bilateral rales   • 1+ pitting edema   • Dyspnea on minimal exertion   • Visual loss   • Altered mental status     Past Medical History:   Diagnosis Date   • COPD (chronic obstructive pulmonary disease) (CMS/HCC)    • Gout    • Hearing loss    • History of degenerative disc disease    • Hyperlipidemia    • Hypertension    • Low back pain    • Pedal edema    • Prediabetes      Past Surgical History:   Procedure Laterality Date   • CHOLECYSTECTOMY     • LUMBAR DISCECTOMY     • SHOULDER SURGERY Left         PT ASSESSMENT (last 12 hours)      Physical Therapy Evaluation     Row Name 04/15/19 1047          PT Evaluation Time/Intention    Subjective Information  no complaints  -CT     Document Type  evaluation;therapy note (daily note)  -CT      Mode of Treatment  individual therapy;physical therapy  -CT     Patient Effort  good  -CT     Comment  Pt tolerated evaluation and treatment session well with rest breaks provided as needed. Pt reports only minor visual changes during ambulation.   -CT     Row Name 04/15/19 1047          General Information    Patient Profile Reviewed?  yes  -CT     Onset of Illness/Injury or Date of Surgery  04/11/19 admit date  -CT     Referring Physician  Jones  -CT     Patient Observations  alert;cooperative;agree to therapy  -CT     Prior Level of Function  independent:;all household mobility;community mobility  -CT     Equipment Currently Used at Home  none  -CT     Limitations/Impairments  visual  -CT     Equipment Issued to Patient  gait belt  -CT     Risks Reviewed  patient:;LOB;nausea/vomiting;dizziness;increased discomfort;change in vital signs;increased drainage;lines disloged  -CT     Benefits Reviewed  patient:;improve function;increase independence;increase strength;increase balance;decrease pain;decrease risk of DVT;improve skin integrity;increase knowledge  -CT     Barriers to Rehab  medically complex;previous functional deficit;physical barrier  -CT     Row Name 04/15/19 1047          Relationship/Environment    Primary Source of Support/Comfort  child(cielo)  -CT     Lives With  grandchild(cielo)  -CT     Row Name 04/15/19 1047          Resource/Environmental Concerns    Current Living Arrangements  home/apartment/condo  -CT     Row Name 04/15/19 1047          Home Main Entrance    Number of Stairs, Main Entrance  three  -CT     Row Name 04/15/19 1047          Cognitive Assessment/Intervention- PT/OT    Orientation Status (Cognition)  oriented x 4  -CT     Follows Commands (Cognition)  follows multi-step commands  -CT     Row Name 04/15/19 1047          Safety Issues, Functional Mobility    Impairments Affecting Function (Mobility)  visual/perceptual  -CT     Row Name 04/15/19 1047          Bed Mobility  Assessment/Treatment    Bed Mobility Assessment/Treatment  bed mobility (all) activities  -CT     Hurley Level (Bed Mobility)  supervision  -CT     Row Name 04/15/19 1047          Transfer Assessment/Treatment    Transfer Assessment/Treatment  sit-stand transfer;stand-sit transfer  -CT     Sit-Stand Hurley (Transfers)  contact guard  -CT     Stand-Sit [place]ley (Transfers)  contact guard  -CT     Row Name 04/15/19 1047          Sit-Stand Transfer    Assistive Device (Sit-Stand Transfers)  -- no AD  -CT     Row Name 04/15/19 1047          Stand-Sit Transfer    Assistive Device (Stand-Sit Transfers)  -- no AD  -CT     Row Name 04/15/19 1047          Gait/Stairs Assessment/Training    Hurley Level (Gait)  contact guard  -CT     Assistive Device (Gait)  -- no AD  -CT     Distance in Feet (Gait)  250  -CT     Pattern (Gait)  swing-through  -CT     Deviations/Abnormal Patterns (Gait)  gait speed decreased  -CT     Bilateral Gait Deviations  forward flexed posture  -CT     Row Name 04/15/19 1047          General ROM    GENERAL ROM COMMENTS  BLE grossly WFL  -CT     Row Name 04/15/19 1047          MMT (Manual Muscle Testing)    General MMT Comments  BLE grossly 4/5  -CT     Row Name 04/15/19 1047          Motor Assessment/Intervention    Additional Documentation  Balance (Group)  -CT     Row Name 04/15/19 1047          Balance    Balance  static standing balance;dynamic standing balance  -CT     Row Name 04/15/19 1047          Static Standing Balance    Level of Hurley (Supported Standing, Static Balance)  contact guard assist  -CT     Row Name 04/15/19 1047          Dynamic Standing Balance    Level of Hurley, Reaches Outside Midline (Standing, Dynamic Balance)  contact guard assist  -CT     Row Name 04/15/19 1047          Vision Assessment/Intervention    Visual Impairment/Limitations  blurry vision  -CT     Row Name 04/15/19 1047          Pain Assessment    Additional Documentation  -- no  pain reported during session  -CT     Row Name 04/15/19 1047          Plan of Care Review    Plan of Care Reviewed With  patient  -CT     Row Name 04/15/19 1047          Physical Therapy Clinical Impression    Date of Referral to PT  04/12/19  -CT     PT Diagnosis (PT Clinical Impression)  decreased functional mobility  -CT     Prognosis (PT Clinical Impression)  good  -CT     Functional Level at Time of Evaluation (PT Clinical Impression)  CGA  -CT     Patient/Family Goals Statement (PT Clinical Impression)  Pt goals are to return to PLOF and go home.   -CT     Criteria for Skilled Interventions Met (PT Clinical Impression)  yes;treatment indicated  -CT     Pathology/Pathophysiology Noted (Describe Specifically for Each System)  musculoskeletal;neuromuscular  -CT     Impairments Found (describe specific impairments)  aerobic capacity/endurance;gait, locomotion, and balance  -CT     Functional Limitations in Following Categories (Describe Specific Limitations)  self-care;home management  -CT     Rehab Potential (PT Clinical Summary)  good, to achieve stated therapy goals  -CT     Predicted Duration of Therapy (PT)  length of stay  -CT     Care Plan Review (PT)  evaluation/treatment results reviewed;care plan/treatment goals reviewed;risks/benefits reviewed;current/potential barriers reviewed;patient/other agree to care plan  -CT     Care Plan Review, Other Participant (PT Clinical Impression)  daughter  -CT     Row Name 04/15/19 1047          Physical Therapy Goals    Transfer Goal Selection (PT)  transfer, PT goal 1  -CT     Gait Training Goal Selection (PT)  gait training, PT goal 1  -CT     Row Name 04/15/19 1047          Transfer Goal 1 (PT)    Activity/Assistive Device (Transfer Goal 1, PT)  sit-to-stand/stand-to-sit;bed-to-chair/chair-to-bed  -CT     Dawes Level/Cues Needed (Transfer Goal 1, PT)  independent  -CT     Time Frame (Transfer Goal 1, PT)  by discharge  -CT     Row Name 04/15/19 1044           Gait Training Goal 1 (PT)    Activity/Assistive Device (Gait Training Goal 1, PT)  gait (walking locomotion);assistive device use  -CT     [place] Level (Gait Training Goal 1, PT)  independent  -CT     Time Frame (Gait Training Goal 1, PT)  by discharge  -CT     Row Name 04/15/19 1047          Positioning and Restraints    Pre-Treatment Position  in bed  -CT     Post Treatment Position  chair  -CT     In Chair  sitting;call light within reach;encouraged to call for assist;with family/caregiver;notified nsg  -CT     Row Name 04/15/19 1047          Living Environment    Home Accessibility  stairs to enter home  -CT       User Key  (r) = Recorded By, (t) = Taken By, (c) = Cosigned By    Initials Name Provider Type    CT Diamond Johnson PT Physical Therapist        Physical Therapy Education     Title: PT OT SLP Therapies (Done)     Topic: Physical Therapy (Done)     Point: Mobility training (Done)     Learning Progress Summary           Patient Acceptance, E,TB, VU by CT at 4/15/2019 11:05 AM                   Point: Home exercise program (Done)     Learning Progress Summary           Patient Acceptance, E,TB, VU by CT at 4/15/2019 11:05 AM                   Point: Body mechanics (Done)     Learning Progress Summary           Patient Acceptance, E,TB, VU by CT at 4/15/2019 11:05 AM                   Point: Precautions (Done)     Learning Progress Summary           Patient Acceptance, E,TB, VU by CT at 4/15/2019 11:05 AM                               User Key     Initials Effective Dates Name Provider Type Discipline    CT 04/03/18 -  Diamond Johnson PT Physical Therapist PT              PT Recommendation and Plan  Anticipated Discharge Disposition (PT): home, home with assist  Planned Therapy Interventions (PT Eval): balance training, bed mobility training, gait training, home exercise program, manual therapy techniques, motor coordination training, neuromuscular re-education, patient/family education, postural  re-education, stair training, strengthening, transfer training  Therapy Frequency (PT Clinical Impression): 3 times/wk(3-5 times/wk)  Outcome Summary/Treatment Plan (PT)  Anticipated Equipment Needs at Discharge (PT): (tbd)  Anticipated Discharge Disposition (PT): home, home with assist  Plan of Care Reviewed With: patient  Outcome Measures     Row Name 04/15/19 1100             How much help from another person do you currently need...    Turning from your back to your side while in flat bed without using bedrails?  4  -CT      Moving from lying on back to sitting on the side of a flat bed without bedrails?  4  -CT      Moving to and from a bed to a chair (including a wheelchair)?  4  -CT      Standing up from a chair using your arms (e.g., wheelchair, bedside chair)?  3  -CT      Climbing 3-5 steps with a railing?  3  -CT      To walk in hospital room?  3  -CT      AM-PAC 6 Clicks Score  21  -CT         Functional Assessment    Outcome Measure Options  AM-PAC 6 Clicks Basic Mobility (PT)  -CT        User Key  (r) = Recorded By, (t) = Taken By, (c) = Cosigned By    Initials Name Provider Type    CT Diamond Johnson PT Physical Therapist         Time Calculation:   PT Charges     Row Name 04/15/19 1109             Time Calculation    PT Received On  04/15/19  -CT      PT - Next Appointment  04/16/19  -CT      PT Goal Re-Cert Due Date  04/29/19  -CT         Time Calculation- PT    Total Timed Code Minutes- PT  56 minute(s)  -CT         Timed Charges    93729 - Gait Training Minutes   15  -CT      67213 - PT Therapeutic Activity Minutes  10  -CT        User Key  (r) = Recorded By, (t) = Taken By, (c) = Cosigned By    Initials Name Provider Type    CT Diamond Johnson PT Physical Therapist        Therapy Charges for Today     Code Description Service Date Service Provider Modifiers Qty    52159131807 HC GAIT TRAINING EA 15 MIN 4/15/2019 Diamond Johnson PT GP 1    10181704575 HC PT THERAPEUTIC ACT EA 15 MIN 4/15/2019  Diamond Johnson, PT GP 1    03820286578  PT THER SUPP EA 15 MIN 4/15/2019 Diamond Johnson, PT GP 2          PT G-Codes  Outcome Measure Options: AM-PAC 6 Clicks Basic Mobility (PT)  AM-PAC 6 Clicks Score: 21      Diamond Johnson, PT  4/15/2019

## 2019-04-15 NOTE — PROGRESS NOTES
Discharge Planning Assessment  [facility]     Patient Name: Paul Gomez  MRN: [id number]  Today's Date: 4/15/2019    Admit Date: 4/11/2019      Discharge Plan     Row Name 04/15/19 1604       Plan    Final Discharge Disposition Code  01 - home or self-care    Final Note Pt is being discharged home today. SS and nurse, Juana spoke to pt and he request outpatient PT and Speech therapy at [facility]. SS made Dr. Morataya aware and lead nurse, Deloris will arrange outpatient therapy at [facility]. No other needs identified.         Nisa Mina

## 2019-04-15 NOTE — PLAN OF CARE
Problem: Fall Risk (Adult)  Goal: Absence of Fall  Outcome: Ongoing (interventions implemented as appropriate)   04/15/19 0315   Fall Risk (Adult)   Absence of Fall making progress toward outcome       Problem: Patient Care Overview  Goal: Discharge Needs Assessment  Outcome: Ongoing (interventions implemented as appropriate)   04/11/19 1913 04/12/19 1541   Discharge Needs Assessment   Concerns to be Addressed --  discharge planning   Concerns Comments --  Pt is partially blind but states his grandsonJoss will help assist with his needs.    Patient/Family Anticipates Transition to --  home with family   Patient/Family Anticipated Services at Transition  --    Transportation Concerns --  car, none   Transportation Anticipated --  family or friend will provide   Anticipated Changes Related to Illness --  inability to care for self   Equipment Needed After Discharge --  none   Outpatient/Agency/Support Group Needs --  (Pt does not have home health at this time. SS discussed home health at discharge and he states he does not want home health due to being an envasion of his privacy. )   Current Discharge Risk --  chronically ill;dependent with mobility/activities of daily living   Disability   Equipment Currently Used at Home --  nebulizer       Problem: Cognitive Impairment (IRF) (Adult)  Goal: Optimal Quality of Life in Relation to Cognitive Impairments  Outcome: Ongoing (interventions implemented as appropriate)   04/14/19 0728   Cognitive Impairment (IRF) (Adult)   Optimal Quality of Life in Relation to Cognitive Impairments progressing to functional independence

## 2019-04-16 ENCOUNTER — READMISSION MANAGEMENT (OUTPATIENT)
Dept: CALL CENTER | Facility: HOSPITAL | Age: 74
End: 2019-04-16

## 2019-04-16 LAB
BACTERIA SPEC AEROBE CULT: NORMAL
BACTERIA SPEC AEROBE CULT: NORMAL

## 2019-04-16 NOTE — OUTREACH NOTE
Prep Survey      Responses   Facility patient discharged from?  [facility]   Is patient eligible?  Yes   Discharge diagnosis  Bilateral occipital stroke with cerebral edema, Multifactorial delirium, KIMBERLEE, Acute RLL pneumonia, Mild oropharyngeal dysphagia, diastolic dysfunction with no AE, hypovolemic hyponatremia, acute hypophosphatemia, mild acute hypomagnesemia, HLD, essential HTN, Chronic pain syndrome with chronic opioid use, COPD, w/o AE, , tobacco abuse, hx of CHF unknown type w/o AE   Does the patient have one of the following disease processes/diagnoses(primary or secondary)?  Stroke (TIA) [also pneumonia, Chronic COPD,no AE. Chronic CHF, no AE]   Does the patient have Home health ordered?  No   Is there a DME ordered?  No   Comments regarding appointments  See AVS   Prep survey completed?  Yes          Jazmín Rehman RN

## 2019-04-17 ENCOUNTER — TRANSITIONAL CARE MANAGEMENT TELEPHONE ENCOUNTER (OUTPATIENT)
Dept: FAMILY MEDICINE CLINIC | Facility: CLINIC | Age: 74
End: 2019-04-17

## 2019-04-18 ENCOUNTER — TRANSITIONAL CARE MANAGEMENT TELEPHONE ENCOUNTER (OUTPATIENT)
Dept: FAMILY MEDICINE CLINIC | Facility: CLINIC | Age: 74
End: 2019-04-18

## 2019-04-18 ENCOUNTER — READMISSION MANAGEMENT (OUTPATIENT)
Dept: CALL CENTER | Facility: HOSPITAL | Age: 74
End: 2019-04-18

## 2019-04-18 NOTE — OUTREACH NOTE
Stroke Week 1 Survey      Responses   Facility patient discharged from?  [facility]   Does the patient have one of the following disease processes/diagnoses(primary or secondary)?  Stroke (TIA)   Is there a successful TCM telephone encounter documented?  No   Week 1 attempt successful?  Yes   Call start time  1027   Call end time  1032   Discharge diagnosis  Bilateral occipital stroke with cerebral edema, Multifactorial delirium, KIMBERLEE, Acute RLL pneumonia, Mild oropharyngeal dysphagia, diastolic dysfunction with no AE, hypovolemic hyponatremia, acute hypophosphatemia, mild acute hypomagnesemia, HLD, essential HTN, Chronic pain syndrome with chronic opioid use, COPD, w/o AE, , tobacco abuse, hx of CHF unknown type w/o AE   Meds reviewed with patient/caregiver?  Yes   Is the patient having any side effects they believe may be caused by any medication additions or changes?  No   Does the patient have all medications ordered at discharge?  Yes   Is the patient taking all medications as directed (includes completed medication regime)?  Yes   Does the patient have a primary care provider?   Yes   Does the patient have an appointment with their PCP within 7 days of discharge?  Yes   Has the patient kept scheduled appointments due by today?  Yes   Has home health visited the patient within 72 hours of discharge?  N/A   Psychosocial issues?  No   Did the patient receive a copy of their discharge instructions?  Yes   What is the patient's perception of their health status since discharge?  Improving   Is the patient able to teach back FAST for Stroke?  Yes   Is the patient/caregiver able to teach back the risk factors for a stroke?  Smoking, High Cholesterol   Is the patient/caregiver able to teach back signs and symptoms related to disease process for when to call PCP?  Yes   Is the patient/caregiver able to teach back signs and symptoms related to disease process for when to call 911?  Yes   If the patient is a current smoker,  "are they able to teach back resources for cessation?  [id number]   Is the patient/caregiver able to teach back the hierarchy of who to call/visit for symptoms/problems? PCP, Specialist, Home health nurse, Urgent Care, ED, 911  Yes   Week 1 call completed?  Yes   Revoked  No further contact(revokes)-requires comment   Graduated/Revoked comments  Caller states that he back to baseline.  He has seen his opthamologist and will be having laser surgery next month and his vision witll be \"good as new\".          Devorah Burns, CAYDEN  "

## 2019-04-25 ENCOUNTER — OFFICE VISIT (OUTPATIENT)
Dept: FAMILY MEDICINE CLINIC | Facility: CLINIC | Age: 74
End: 2019-04-25

## 2019-04-25 VITALS
SYSTOLIC BLOOD PRESSURE: 120 MMHG | HEIGHT: 69 IN | TEMPERATURE: 98.4 F | OXYGEN SATURATION: 95 % | HEART RATE: 102 BPM | DIASTOLIC BLOOD PRESSURE: 80 MMHG | WEIGHT: 151 LBS | BODY MASS INDEX: 22.36 KG/M2

## 2019-04-25 DIAGNOSIS — J18.9 PNEUMONIA OF RIGHT LOWER LOBE DUE TO INFECTIOUS ORGANISM: ICD-10-CM

## 2019-04-25 DIAGNOSIS — I63.9 OCCIPITAL STROKE (HCC): Primary | ICD-10-CM

## 2019-04-25 PROCEDURE — 99495 TRANSJ CARE MGMT MOD F2F 14D: CPT | Performed by: NURSE PRACTITIONER

## 2019-04-25 NOTE — PROGRESS NOTES
Subjective   Paul Gomez is a 73 y.o. male.     Chief Complaint: Transitional Care Management    History of Present Illness   Patient is here today for follow-up from hospital visit.  Patient was admitted to [facility] following an episode of confusion and visual disturbances.  Patient was admitted on 4/11/2019 and discharged home on 4/15/2019.  The following notes from his hospital visit was reviewed today:  73-year-old admitted on 4/11/2019 with confusion and visual changes.  The patient had uremia and acute renal failure that improved with IV fluids only.  He also has pneumonia and is improving with doxycycline, Rocephin, and Flagyl.  The patient was worked up during his stay and had MRI of the head that showed bilateral occipital stroke with mild cerebral edema without any mass-effect or midline shift.  The patient's symptoms had resolved completely by today and he is moving around without any visual changes.  He is recommended to follow-up in the outpatient setting with his primary physician and been placed on statin and aspirin.  Also he was asked to cut down on his home narcotics due to above.    Patient states that he is doing much better at this time.  He has started his aspirin on a daily basis.  He is also taking atorvastatin as prescribed on a daily basis.  He has also cut down on his pain medication to 1 tablet/day.  Patient has not started smoking again.  He states that he does not plan to restart smoking.  He has continued to have visual problems since his hospital stay.  He is not driving any longer.  He has a family member that has brought him to the office today for his visit.  He has had no further episodes of confusion.  He is feeling much better at this time.  Patient denies any issues with weakness in his extremities.  Patient has also discontinued his blood pressure medication as recommended from his hospital stay.  Apparently his blood pressure was low when he was in the hospital.   His blood pressure is normal today at 120/80.  He states that he will continue to check his blood pressure at home.  He was also diagnosed with right lower lobe pneumonia when he was hospitalized.  He was given antibiotics in the hospital.  He states that he is feeling much better.  He does not have any complaints of shortness of breath or cough at this time.    Family History   Problem Relation Age of Onset   • Cancer Mother        Social History     Socioeconomic History   • Marital status:      Spouse name: Not on file   • Number of children: Not on file   • Years of education: Not on file   • Highest education level: Not on file   Tobacco Use   • Smoking status: Former Smoker     Packs/day: 0.25     Types: Cigarettes     Last attempt to quit: 2018     Years since quittin.3   • Smokeless tobacco: Never Used   • Tobacco comment: patient stated he is down to about 4 cigarettes a day   Substance and Sexual Activity   • Alcohol use: No   • Drug use: No   • Sexual activity: Defer       Past Medical History:   Diagnosis Date   • COPD (chronic obstructive pulmonary disease) (CMS/[facility])    • Gout    • Hearing loss    • History of degenerative disc disease    • Hyperlipidemia    • Hypertension    • Low back pain    • Pedal edema    • Prediabetes        Review of Systems   Constitutional: Negative.    HENT: Negative.    Respiratory: Negative.    Cardiovascular: Negative.    Gastrointestinal: Negative.    Musculoskeletal: Negative.    Skin: Negative.    Neurological: Negative.    Psychiatric/Behavioral: Negative.        Objective   Physical Exam   Constitutional: He is oriented to person, place, and time. He appears well-developed and well-nourished.   Eyes: Conjunctivae and EOM are normal. Pupils are equal, round, and reactive to light.   Neck: Normal range of motion. Neck supple.   Cardiovascular: Normal rate, regular rhythm and normal heart sounds.   Pulmonary/Chest: Effort normal and breath sounds  "normal.   Musculoskeletal: Normal range of motion. He exhibits no edema or deformity.   Neurological: He is alert and oriented to person, place, and time. He exhibits normal muscle tone. Coordination normal.   Skin: Skin is warm and dry.   Psychiatric: He has a normal mood and affect. His behavior is normal. Judgment and thought content normal.   Nursing note and vitals reviewed.      Procedures    Vitals: Blood pressure 120/80, pulse 102, temperature 98.4 °F (36.9 °C), temperature source Oral, height 175.3 cm (69\"), weight 68.5 kg (151 lb), SpO2 95 %.    Allergies:   Allergies   Allergen Reactions   • Penicillins Unknown (See Comments)     Pt previous;y received ROCEPHIN without adverse effect.   Pt states he was allergic when he was 18 but has had it since and did not have any problems.         During this visit the following were done:  Labs Reviewed []    Labs Ordered []    Radiology Reports Reviewed []    Radiology Ordered []    PCP Records Reviewed []    Referring Provider Records Reviewed []    ER Records Reviewed []    Hospital Records Reviewed []    History Obtained From Family []    Radiology Images Reviewed []    Other Reviewed []    Records Requested []      Assessment/Plan   Ray was seen today for transitional care management.    Diagnoses and all orders for this visit:    Occipital stroke (CMS/HCC)    Pneumonia of right lower lobe due to infectious organism (CMS/HCC)           Patient is to continue checking his blood pressure at home on a regular basis.  He is to return to the office if his blood pressure is above 140/90.  He is to continue taking his aspirin, atorvastatin.  He is to continue to decrease his pain medication as tolerable.  I have also advised him that if he has any further confusion or visual disturbances or general weakness, he is to return to the emergency room for evaluation and treatment.  "

## 2019-05-08 DIAGNOSIS — J44.9 CHRONIC OBSTRUCTIVE PULMONARY DISEASE, UNSPECIFIED COPD TYPE (HCC): ICD-10-CM

## 2019-05-08 DIAGNOSIS — I10 ESSENTIAL HYPERTENSION: ICD-10-CM

## 2019-05-08 DIAGNOSIS — F51.01 PRIMARY INSOMNIA: ICD-10-CM

## 2019-05-08 RX ORDER — HYDROXYZINE HYDROCHLORIDE 25 MG/1
TABLET, FILM COATED ORAL
Qty: 30 TABLET | Refills: 1 | Status: SHIPPED | OUTPATIENT
Start: 2019-05-08 | End: 2020-02-06

## 2019-05-08 RX ORDER — ATENOLOL 25 MG/1
TABLET ORAL
Qty: 60 TABLET | Refills: 2 | Status: SHIPPED | OUTPATIENT
Start: 2019-05-08 | End: 2019-10-04 | Stop reason: SDUPTHER

## 2019-05-08 RX ORDER — LEVOTHYROXINE SODIUM 0.03 MG/1
25 TABLET ORAL DAILY
Qty: 30 TABLET | Refills: 5 | Status: SHIPPED | OUTPATIENT
Start: 2019-05-08 | End: 2020-02-06

## 2019-05-08 RX ORDER — SILDENAFIL CITRATE 20 MG/1
TABLET ORAL
Qty: 30 TABLET | Refills: 0 | Status: SHIPPED | OUTPATIENT
Start: 2019-05-08 | End: 2019-06-10 | Stop reason: SDUPTHER

## 2019-05-14 RX ORDER — ATORVASTATIN CALCIUM 40 MG/1
40 TABLET, FILM COATED ORAL NIGHTLY
Qty: 30 TABLET | Refills: 1 | Status: SHIPPED | OUTPATIENT
Start: 2019-05-14 | End: 2019-06-13

## 2019-06-10 RX ORDER — SILDENAFIL CITRATE 20 MG/1
TABLET ORAL
Qty: 30 TABLET | Refills: 0 | Status: SHIPPED | OUTPATIENT
Start: 2019-06-10 | End: 2019-06-13 | Stop reason: SDUPTHER

## 2019-06-13 ENCOUNTER — OFFICE VISIT (OUTPATIENT)
Dept: FAMILY MEDICINE CLINIC | Facility: CLINIC | Age: 74
End: 2019-06-13

## 2019-06-13 VITALS
HEIGHT: 69 IN | HEART RATE: 72 BPM | DIASTOLIC BLOOD PRESSURE: 64 MMHG | WEIGHT: 149 LBS | BODY MASS INDEX: 22.07 KG/M2 | SYSTOLIC BLOOD PRESSURE: 112 MMHG | TEMPERATURE: 98 F | OXYGEN SATURATION: 93 %

## 2019-06-13 DIAGNOSIS — N52.9 ERECTILE DYSFUNCTION, UNSPECIFIED ERECTILE DYSFUNCTION TYPE: Primary | ICD-10-CM

## 2019-06-13 DIAGNOSIS — I10 ESSENTIAL HYPERTENSION: ICD-10-CM

## 2019-06-13 DIAGNOSIS — J43.2 CENTRILOBULAR EMPHYSEMA (HCC): ICD-10-CM

## 2019-06-13 DIAGNOSIS — E53.8 B12 DEFICIENCY: ICD-10-CM

## 2019-06-13 DIAGNOSIS — E03.9 HYPOTHYROIDISM, UNSPECIFIED TYPE: ICD-10-CM

## 2019-06-13 LAB
ALBUMIN SERPL-MCNC: 3.7 G/DL (ref 3.5–5.2)
ALBUMIN/GLOB SERPL: 1.1 G/DL
ALP SERPL-CCNC: 77 U/L (ref 39–117)
ALT SERPL W P-5'-P-CCNC: 11 U/L (ref 1–41)
ANION GAP SERPL CALCULATED.3IONS-SCNC: 12.6 MMOL/L
AST SERPL-CCNC: 16 U/L (ref 1–40)
BILIRUB SERPL-MCNC: 0.3 MG/DL (ref 0.2–1.2)
BUN BLD-MCNC: 13 MG/DL (ref 8–23)
BUN/CREAT SERPL: 16.9 (ref 7–25)
CALCIUM SPEC-SCNC: 9.4 MG/DL (ref 8.6–10.5)
CHLORIDE SERPL-SCNC: 100 MMOL/L (ref 98–107)
CO2 SERPL-SCNC: 26.4 MMOL/L (ref 22–29)
CREAT BLD-MCNC: 0.77 MG/DL (ref 0.76–1.27)
GFR SERPL CREATININE-BSD FRML MDRD: 99 ML/MIN/1.73
GLOBULIN UR ELPH-MCNC: 3.4 GM/DL
GLUCOSE BLD-MCNC: 94 MG/DL (ref 65–99)
POTASSIUM BLD-SCNC: 4.1 MMOL/L (ref 3.5–5.2)
PROT SERPL-MCNC: 7.1 G/DL (ref 6–8.5)
SODIUM BLD-SCNC: 139 MMOL/L (ref 136–145)
TSH SERPL DL<=0.05 MIU/L-ACNC: 2.28 MIU/ML (ref 0.27–4.2)
VIT B12 BLD-MCNC: 284 PG/ML (ref 211–946)

## 2019-06-13 PROCEDURE — 80053 COMPREHEN METABOLIC PANEL: CPT | Performed by: NURSE PRACTITIONER

## 2019-06-13 PROCEDURE — 82607 VITAMIN B-12: CPT | Performed by: NURSE PRACTITIONER

## 2019-06-13 PROCEDURE — 84443 ASSAY THYROID STIM HORMONE: CPT | Performed by: NURSE PRACTITIONER

## 2019-06-13 PROCEDURE — 85025 COMPLETE CBC W/AUTO DIFF WBC: CPT | Performed by: NURSE PRACTITIONER

## 2019-06-13 PROCEDURE — 99214 OFFICE O/P EST MOD 30 MIN: CPT | Performed by: NURSE PRACTITIONER

## 2019-06-13 PROCEDURE — 85007 BL SMEAR W/DIFF WBC COUNT: CPT | Performed by: NURSE PRACTITIONER

## 2019-06-13 RX ORDER — SILDENAFIL CITRATE 20 MG/1
TABLET ORAL
Qty: 60 TABLET | Refills: 1 | Status: SHIPPED | OUTPATIENT
Start: 2019-06-13 | End: 2019-12-18 | Stop reason: SDUPTHER

## 2019-06-13 NOTE — PROGRESS NOTES
Subjective   Paul Gomez is a 73 y.o. male.     Chief Complaint: Follow-up and Hyperlipidemia    COPD   This is a chronic problem. The current episode started more than 1 year ago. The problem has been gradually improving. Associated symptoms include coughing. Pertinent negatives include no chest pain or congestion. Exacerbated by: second hand smoke. Treatments tried: Anoro, albuterol, Duoneb. The treatment provided significant relief.   Hypothyroidism   This is a chronic problem. The current episode started more than 1 year ago. Associated symptoms include coughing. Pertinent negatives include no chest pain or congestion. Nothing aggravates the symptoms. Treatments tried: levothyroxine. The treatment provided significant relief.   Hypertension   This is a chronic problem. The current episode started more than 1 year ago. The problem is controlled. Pertinent negatives include no chest pain, palpitations or peripheral edema. Agents associated with hypertension include thyroid hormones. Current antihypertension treatment includes beta blockers. The current treatment provides significant improvement. Compliance problems include exercise and diet.  Hypertensive end-organ damage includes CVA. Identifiable causes of hypertension include a thyroid problem.   He has still been without smoking now and states he is breathing much better.   Erectile Dysfunction   This is a chronic problem. The current episode started more than 1 year ago. The problem is unchanged. The nature of his difficulty is achieving erection and maintaining erection. Non-physiologic factors contributing to erectile dysfunction are a decreased libido. Irritative symptoms do not include frequency, nocturia or urgency. Obstructive symptoms do not include incomplete emptying, an intermittent stream or straining. Pertinent negatives include no genital pain or hematuria. Nothing aggravates the symptoms. Past treatments include sildenafil.  He has tolerated  the medication well without side effects.          Family History   Problem Relation Age of Onset   • Cancer Mother        Social History     Socioeconomic History   • Marital status:      Spouse name: Not on file   • Number of children: Not on file   • Years of education: Not on file   • Highest education level: Not on file   Tobacco Use   • Smoking status: Former Smoker     Packs/day: 0.25     Types: Cigarettes     Last attempt to quit: 2018     Years since quittin.5   • Smokeless tobacco: Never Used   • Tobacco comment: patient stated he is down to about 4 cigarettes a day   Substance and Sexual Activity   • Alcohol use: No   • Drug use: No   • Sexual activity: Defer       Past Medical History:   Diagnosis Date   • COPD (chronic obstructive pulmonary disease) (CMS/HCC)    • Gout    • Hearing loss    • History of degenerative disc disease    • Hyperlipidemia    • Hypertension    • Low back pain    • Pedal edema    • Prediabetes        Review of Systems   Constitutional: Negative.    HENT: Negative.  Negative for congestion.    Respiratory: Positive for cough.    Cardiovascular: Negative.  Negative for chest pain and palpitations.   Gastrointestinal: Negative.    Musculoskeletal: Negative.    Skin: Negative.    Neurological: Negative.    Psychiatric/Behavioral: Negative.        Objective   Physical Exam   Constitutional: He is oriented to person, place, and time. He appears well-developed and well-nourished.   Neck: Normal range of motion. Neck supple.   Cardiovascular: Normal rate, regular rhythm and normal heart sounds.   Pulmonary/Chest: Effort normal and breath sounds normal.   Neurological: He is alert and oriented to person, place, and time.   Skin: Skin is warm and dry.   Psychiatric: He has a normal mood and affect. His behavior is normal. Judgment and thought content normal.   Nursing note and vitals reviewed.      Procedures    Vitals: Blood pressure 112/64, pulse 72, temperature 98 °F  "(36.7 °C), temperature source Oral, height 175.3 cm (69\"), weight 67.6 kg (149 lb), SpO2 93 %.    Allergies:   Allergies   Allergen Reactions   • Penicillins Unknown (See Comments)     Pt previous;y received ROCEPHIN without adverse effect.   Pt states he was allergic when he was 18 but has had it since and did not have any problems.         During this visit the following were done:  Labs Reviewed []    Labs Ordered []    Radiology Reports Reviewed []    Radiology Ordered []    PCP Records Reviewed []    Referring Provider Records Reviewed []    ER Records Reviewed []    Hospital Records Reviewed []    History Obtained From Family []    Radiology Images Reviewed []    Other Reviewed []    Records Requested []      Assessment/Plan   Ray was seen today for follow-up and hyperlipidemia.    Diagnoses and all orders for this visit:    Erectile dysfunction, unspecified erectile dysfunction type  -     sildenafil (REVATIO) 20 MG tablet; Take 2-3 tablets by mouth daily as needed for erectile dysfunction 1 hour prior to sexual intercourse    Centrilobular emphysema (CMS/HCC)  -     CBC & Differential; Future  -     Comprehensive Metabolic Panel; Future  -     TSH; Future  -     Vitamin B12; Future    Essential hypertension  -     CBC & Differential; Future  -     Comprehensive Metabolic Panel; Future  -     TSH; Future  -     Vitamin B12; Future    B12 deficiency  -     CBC & Differential; Future  -     Comprehensive Metabolic Panel; Future  -     TSH; Future  -     Vitamin B12; Future    Hypothyroidism, unspecified type  -     CBC & Differential; Future  -     Comprehensive Metabolic Panel; Future  -     TSH; Future  -     Vitamin B12; Future               "

## 2019-06-14 LAB
BASOPHILS # BLD MANUAL: 0.06 10*3/MM3 (ref 0–0.2)
BASOPHILS NFR BLD AUTO: 1 % (ref 0–1.5)
BURR CELLS BLD QL SMEAR: ABNORMAL
DEPRECATED RDW RBC AUTO: 45.4 FL (ref 37–54)
EOSINOPHIL # BLD MANUAL: 1.89 10*3/MM3 (ref 0–0.4)
EOSINOPHIL NFR BLD MANUAL: 30 % (ref 0.3–6.2)
ERYTHROCYTE [DISTWIDTH] IN BLOOD BY AUTOMATED COUNT: 12.9 % (ref 12.3–15.4)
HCT VFR BLD AUTO: 37.3 % (ref 37.5–51)
HGB BLD-MCNC: 11.8 G/DL (ref 13–17.7)
LYMPHOCYTES # BLD MANUAL: 1.07 10*3/MM3 (ref 0.7–3.1)
LYMPHOCYTES NFR BLD MANUAL: 17 % (ref 19.6–45.3)
LYMPHOCYTES NFR BLD MANUAL: 9 % (ref 5–12)
MCH RBC QN AUTO: 30.4 PG (ref 26.6–33)
MCHC RBC AUTO-ENTMCNC: 31.6 G/DL (ref 31.5–35.7)
MCV RBC AUTO: 96.1 FL (ref 79–97)
MONOCYTES # BLD AUTO: 0.57 10*3/MM3 (ref 0.1–0.9)
NEUTROPHILS # BLD AUTO: 2.71 10*3/MM3 (ref 1.7–7)
NEUTROPHILS NFR BLD MANUAL: 43 % (ref 42.7–76)
PLAT MORPH BLD: NORMAL
PLATELET # BLD AUTO: 229 10*3/MM3 (ref 140–450)
PMV BLD AUTO: 11.5 FL (ref 6–12)
POIKILOCYTOSIS BLD QL SMEAR: ABNORMAL
RBC # BLD AUTO: 3.88 10*6/MM3 (ref 4.14–5.8)
WBC MORPH BLD: NORMAL
WBC NRBC COR # BLD: 6.3 10*3/MM3 (ref 3.4–10.8)

## 2019-06-26 RX ORDER — IPRATROPIUM BROMIDE AND ALBUTEROL SULFATE 2.5; .5 MG/3ML; MG/3ML
SOLUTION RESPIRATORY (INHALATION)
Qty: 360 ML | Refills: 2 | OUTPATIENT
Start: 2019-06-26 | End: 2020-02-06 | Stop reason: SDUPTHER

## 2019-07-18 ENCOUNTER — TELEPHONE (OUTPATIENT)
Dept: FAMILY MEDICINE CLINIC | Facility: CLINIC | Age: 74
End: 2019-07-18

## 2019-07-18 RX ORDER — ATORVASTATIN CALCIUM 40 MG/1
40 TABLET, FILM COATED ORAL NIGHTLY
Qty: 30 TABLET | Refills: 2 | Status: SHIPPED | OUTPATIENT
Start: 2019-07-18 | End: 2019-10-18 | Stop reason: SDUPTHER

## 2019-09-16 RX ORDER — FLUTICASONE PROPIONATE 50 MCG
SPRAY, SUSPENSION (ML) NASAL
Qty: 16 G | Refills: 2 | OUTPATIENT
Start: 2019-09-16

## 2019-10-04 DIAGNOSIS — I10 ESSENTIAL HYPERTENSION: ICD-10-CM

## 2019-10-04 RX ORDER — ATENOLOL 25 MG/1
TABLET ORAL
Qty: 60 TABLET | Refills: 0 | Status: SHIPPED | OUTPATIENT
Start: 2019-10-04 | End: 2019-11-22 | Stop reason: SDUPTHER

## 2019-10-18 RX ORDER — ATORVASTATIN CALCIUM 40 MG/1
40 TABLET, FILM COATED ORAL NIGHTLY
Qty: 30 TABLET | Refills: 2 | Status: SHIPPED | OUTPATIENT
Start: 2019-10-18 | End: 2020-02-06 | Stop reason: SDUPTHER

## 2019-11-22 DIAGNOSIS — I10 ESSENTIAL HYPERTENSION: ICD-10-CM

## 2019-11-22 RX ORDER — ATENOLOL 25 MG/1
TABLET ORAL
Qty: 60 TABLET | Refills: 0 | Status: SHIPPED | OUTPATIENT
Start: 2019-11-22 | End: 2020-02-06 | Stop reason: SDUPTHER

## 2019-12-18 ENCOUNTER — TELEPHONE (OUTPATIENT)
Dept: FAMILY MEDICINE CLINIC | Facility: CLINIC | Age: 74
End: 2019-12-18

## 2019-12-18 DIAGNOSIS — N52.9 ERECTILE DYSFUNCTION, UNSPECIFIED ERECTILE DYSFUNCTION TYPE: ICD-10-CM

## 2019-12-18 RX ORDER — SILDENAFIL CITRATE 20 MG/1
TABLET ORAL
Qty: 60 TABLET | Refills: 0 | Status: SHIPPED | OUTPATIENT
Start: 2019-12-18 | End: 2020-05-05

## 2020-02-06 ENCOUNTER — OFFICE VISIT (OUTPATIENT)
Dept: FAMILY MEDICINE CLINIC | Facility: CLINIC | Age: 75
End: 2020-02-06

## 2020-02-06 VITALS
HEIGHT: 69 IN | WEIGHT: 153.6 LBS | DIASTOLIC BLOOD PRESSURE: 68 MMHG | SYSTOLIC BLOOD PRESSURE: 120 MMHG | BODY MASS INDEX: 22.75 KG/M2 | OXYGEN SATURATION: 94 % | TEMPERATURE: 98.3 F | HEART RATE: 79 BPM

## 2020-02-06 DIAGNOSIS — E53.8 B12 DEFICIENCY: ICD-10-CM

## 2020-02-06 DIAGNOSIS — L98.9 SKIN LESION: ICD-10-CM

## 2020-02-06 DIAGNOSIS — I10 ESSENTIAL HYPERTENSION: ICD-10-CM

## 2020-02-06 DIAGNOSIS — E03.9 HYPOTHYROIDISM, UNSPECIFIED TYPE: ICD-10-CM

## 2020-02-06 DIAGNOSIS — J43.2 CENTRILOBULAR EMPHYSEMA (HCC): ICD-10-CM

## 2020-02-06 DIAGNOSIS — Z00.00 MEDICARE ANNUAL WELLNESS VISIT, SUBSEQUENT: Primary | ICD-10-CM

## 2020-02-06 DIAGNOSIS — L21.9 SEBORRHEIC DERMATITIS: ICD-10-CM

## 2020-02-06 DIAGNOSIS — J44.9 CHRONIC OBSTRUCTIVE PULMONARY DISEASE, UNSPECIFIED COPD TYPE (HCC): ICD-10-CM

## 2020-02-06 DIAGNOSIS — E78.2 MIXED HYPERLIPIDEMIA: ICD-10-CM

## 2020-02-06 PROCEDURE — 82607 VITAMIN B-12: CPT | Performed by: NURSE PRACTITIONER

## 2020-02-06 PROCEDURE — G0439 PPPS, SUBSEQ VISIT: HCPCS | Performed by: NURSE PRACTITIONER

## 2020-02-06 PROCEDURE — 99213 OFFICE O/P EST LOW 20 MIN: CPT | Performed by: NURSE PRACTITIONER

## 2020-02-06 PROCEDURE — 85025 COMPLETE CBC W/AUTO DIFF WBC: CPT | Performed by: NURSE PRACTITIONER

## 2020-02-06 PROCEDURE — 83735 ASSAY OF MAGNESIUM: CPT | Performed by: NURSE PRACTITIONER

## 2020-02-06 PROCEDURE — 80053 COMPREHEN METABOLIC PANEL: CPT | Performed by: NURSE PRACTITIONER

## 2020-02-06 PROCEDURE — 84443 ASSAY THYROID STIM HORMONE: CPT | Performed by: NURSE PRACTITIONER

## 2020-02-06 RX ORDER — ATENOLOL 25 MG/1
25 TABLET ORAL 2 TIMES DAILY
Qty: 60 TABLET | Refills: 5 | Status: SHIPPED | OUTPATIENT
Start: 2020-02-06 | End: 2020-07-09 | Stop reason: SDUPTHER

## 2020-02-06 RX ORDER — ASPIRIN 81 MG/1
81 TABLET ORAL DAILY
Qty: 30 TABLET | Refills: 5 | Status: SHIPPED | OUTPATIENT
Start: 2020-02-06 | End: 2021-05-21 | Stop reason: SDUPTHER

## 2020-02-06 RX ORDER — OXYCODONE HYDROCHLORIDE 10 MG/1
TABLET ORAL EVERY 6 HOURS SCHEDULED
COMMUNITY
End: 2021-02-04

## 2020-02-06 RX ORDER — KETOCONAZOLE 20 MG/ML
SHAMPOO TOPICAL 2 TIMES WEEKLY
Qty: 120 ML | Refills: 5 | Status: SHIPPED | OUTPATIENT
Start: 2020-02-06 | End: 2020-10-05 | Stop reason: SDUPTHER

## 2020-02-06 RX ORDER — ATORVASTATIN CALCIUM 40 MG/1
40 TABLET, FILM COATED ORAL NIGHTLY
Qty: 30 TABLET | Refills: 5 | Status: SHIPPED | OUTPATIENT
Start: 2020-02-06 | End: 2020-07-09 | Stop reason: SDUPTHER

## 2020-02-06 NOTE — PROGRESS NOTES
Subsequent Medicare Wellness Visit   The ABC's of the Annual Wellness Visit    Chief Complaint   Patient presents with   • Medicare Wellness-subsequent       HPI:  Paul Gomez, -1945, is a 74 y.o. male who presents for a Subsequent Medicare Wellness Visit.  Patient also has an area on his left shoulder that has been getting larger over the past few months.  Area is red and dry appearing.  Patient states the area has gotten larger and is concerned with the area.    Recent Hospitalizations:  Recently treated at the following:  [facility].    Current Medical Providers:  Patient Care Team:  Caroline Guzman APRN as PCP - General (Family Medicine)    Health Habits and Functional and Cognitive Screening and Depression Screening:  Functional & Cognitive Status 2020   Do you have difficulty preparing food and eating? No   Do you have difficulty bathing yourself, getting dressed or grooming yourself? No   Do you have difficulty using the toilet? No   Do you have difficulty moving around from place to place? No   Do you have trouble with steps or getting out of a bed or a chair? No   Current Diet Well Balanced Diet   Dental Exam Not up to date   Eye Exam Not up to date   Exercise (times per week) 7 times per week   Current Exercise Activities Include Walking   Do you need help using the phone?  No   Are you deaf or do you have serious difficulty hearing?  Yes   Do you need help with transportation? No   Do you need help shopping? Yes   Do you need help preparing meals?  Yes   Do you need help with housework?  Yes   Do you need help with laundry? Yes   Do you need help taking your medications? No   Do you need help managing money? No   Do you ever drive or ride in a car without wearing a seat belt? No       Compared to one year ago, the patient feels his physical health is the same and his mental health is the same.    Depression Screen:  PHQ-2/PHQ-9 Depression Screening 2020   Little  interest or pleasure in doing things 0   Feeling down, depressed, or hopeless 1   Total Score 1         Past Medical/Family/Social History:  The following portions of the patient's history were reviewed and updated as appropriate: allergies, current medications, past family history, past medical history, past social history, past surgical history and problem list.    Allergies   Allergen Reactions   • Penicillins Unknown (See Comments)     Pt previous;y received ROCEPHIN without adverse effect.   Pt states he was allergic when he was 18 but has had it since and did not have any problems.          Current Outpatient Medications:   •  albuterol sulfate  (90 Base) MCG/ACT inhaler, Inhale 2 puffs Every 6 (Six) Hours As Needed for Wheezing or Shortness of Air., Disp: 18 g, Rfl: 5  •  aspirin 81 MG EC tablet, Take 1 tablet by mouth Daily., Disp: 30 tablet, Rfl: 5  •  atenolol (TENORMIN) 25 MG tablet, Take 1 tablet by mouth 2 (Two) Times a Day., Disp: 60 tablet, Rfl: 5  •  atorvastatin (LIPITOR) 40 MG tablet, Take 1 tablet by mouth Every Night., Disp: 30 tablet, Rfl: 5  •  gabapentin (NEURONTIN) 800 MG tablet, Take 800 mg by mouth 3 (Three) Times a Day., Disp: , Rfl:   •  oxyCODONE (ROXICODONE) 10 MG tablet, Every 6 (Six) Hours., Disp: , Rfl:   •  oxyMORphone ER (OPANA ER) 40 MG 12 hr tablet, Take 40 mg by mouth Every 12 (Twelve) Hours., Disp: , Rfl:   •  sildenafil (REVATIO) 20 MG tablet, Take 2-3 tablets by mouth daily as needed for erectile dysfunction 1 hour prior to sexual intercourse, Disp: 60 tablet, Rfl: 0  •  umeclidinium-vilanterol (ANORO ELLIPTA) 62.5-25 MCG/INH aerosol powder  inhaler, Inhale 1 puff Daily., Disp: 60 each, Rfl: 5  •  ketoconazole (NIZORAL) 2 % shampoo, Apply  topically to the appropriate area as directed 2 (Two) Times a Week., Disp: 120 mL, Rfl: 5    Aspirin use counseling: Taking ASA appropriately as indicated    Current medication list contains no high risk medications.  No harmful drug  "interactions have been identified.     Family History   Problem Relation Age of Onset   • Cancer Mother        Social History     Tobacco Use   • Smoking status: Former Smoker     Packs/day: 0.25     Types: Cigarettes     Last attempt to quit: 2018     Years since quittin.1   • Smokeless tobacco: Never Used   • Tobacco comment: patient stated he is down to about 4 cigarettes a day   Substance Use Topics   • Alcohol use: No       Past Surgical History:   Procedure Laterality Date   • CHOLECYSTECTOMY     • LUMBAR DISCECTOMY     • SHOULDER SURGERY Left        Patient Active Problem List   Diagnosis   • Low back pain   • Hypertension   • Hyperlipidemia   • History of degenerative disc disease   • COPD (chronic obstructive pulmonary disease) (CMS/HCC)   • Gout   • B12 deficiency   • Seborrheic eczema   • Hypothyroidism   • Dilated pancreatic duct   • Epigastric pain   • Chronic pancreatitis (CMS/HCC)   • Headache, unspecified headache type   • Chest pain   • COPD with exacerbation (CMS/HCC)   • Tobacco abuse counseling   • Acute exacerbation of chronic obstructive pulmonary disease (COPD) (CMS/HCC)   • Erectile dysfunction   • Acute congestive heart failure (CMS/HCC)   • Bilateral rales   • 1+ pitting edema   • Dyspnea on minimal exertion   • Visual loss   • Altered mental status       Review of Systems   Constitutional: Negative.    HENT: Negative.    Respiratory: Negative.    Cardiovascular: Negative.    Gastrointestinal: Negative.    Musculoskeletal: Negative.    Skin: Positive for rash.   Neurological: Negative.    Psychiatric/Behavioral: Negative.        Objective     Vitals:    20 1544   BP: 120/68   BP Location: Right arm   Patient Position: Sitting   Pulse: 79   Temp: 98.3 °F (36.8 °C)   TempSrc: Oral   SpO2: 94%   Weight: 69.7 kg (153 lb 9.6 oz)   Height: 175.3 cm (69.02\")       Patient's Body mass index is 22.67 kg/m². BMI is within normal parameters. No follow-up required..       Visual Acuity " Screening    Right eye Left eye Both eyes   Without correction: 20/50 20/70 20/50   With correction:          The patient has no evidence of cognitve impairment.     Physical Exam   Constitutional: He is oriented to person, place, and time. He appears well-developed and well-nourished.   Eyes: Conjunctivae are normal.   Neck: Normal range of motion. Neck supple.   Cardiovascular: Normal rate, regular rhythm and normal heart sounds.   Pulmonary/Chest: Effort normal. He has wheezes.   Musculoskeletal: Normal range of motion.   Lymphadenopathy:     He has no cervical adenopathy.   Neurological: He is alert and oriented to person, place, and time.   Skin: Skin is warm and dry.   Quarter size erythematous scaly appearing area to top of left shoulder   Nursing note and vitals reviewed.      Recent Lab Results:  Lab Results   Component Value Date    GLU 99 10/13/2016     Lab Results   Component Value Date    CHOL 146 04/12/2019    TRIG 64 04/12/2019    HDL 33 (L) 04/12/2019    VLDL 12.8 04/12/2019    LDLHDL 3.04 04/12/2019       Assessment/Plan   Age-appropriate Screening Schedule:  Refer to the list below for future screening recommendations based on patient's age, sex and/or medical conditions.      Health Maintenance   Topic Date Due   • TDAP/TD VACCINES (1 - Tdap) 11/14/1956   • ZOSTER VACCINE (1 of 2) 11/14/1995   • PNEUMOCOCCAL VACCINE (65+ HIGH RISK) (1 of 2 - PCV13) 11/14/2010   • DIABETIC FOOT EXAM  06/02/2016   • HEMOGLOBIN A1C  06/13/2019   • INFLUENZA VACCINE  08/01/2019   • URINE MICROALBUMIN  04/11/2020   • LIPID PANEL  04/12/2020   • DIABETIC EYE EXAM  04/18/2020   • COLONOSCOPY  06/02/2026       Medicare Risks and Personalized Health Plan:  Abdominal Aortic Aneurysm Screening  Cardiovascular risk  Chronic Pain   Colon Cancer Screening  Fall Risk  Glaucoma Risk  Hearing Problem  Inactivity/Sedentary  Lung Cancer Risk  Polypharmacy  Prostate Cancer Screening       CMS-Preventive Services Quick  Reference  Medicare Preventive Services Addressed:  Annual Wellness Visit (AWV)  Cardiovascular Disease Screening Tests (may do this order every 5 years in beneficiaries without signs or symptoms of cardiovascular disease)  Counseling to Prevent Tobacco Use (use of smartset and @cessation@ smartphrase for documentation)  Diabetes Screening-Lab Order for either glucose quantitative blood (except reagent strip), glucose;post glucose dose(includes glucose), or glucose tolerance test-3 specimens(includes glucose)  Glaucoma screening (for individuals with diabetes mellitus, family history of glaucoma, -Americans (> or =) age 50, -Americans (> or =) age 65)  Influenza Vaccine and Administration  Lung Cancer Screening Counseling and Annual Screening for Lung Cancer with Low Dose Computed Tomography (LDCT); (use of smartset for low dose CT for lung cancer screening for documentation and orders)  Prostate Cancer Screening     Advance Care Planning:  ACP discussion was held with the patient during this visit. Patient does not have an advance directive, declines further assistance.    Diagnoses and all orders for this visit:    1. Medicare annual wellness visit, subsequent (Primary)    2. Essential hypertension  -     Discontinue: umeclidinium-vilanterol (ANORO ELLIPTA) 62.5-25 MCG/INH aerosol powder  inhaler; Inhale 1 puff Daily.  Dispense: 14 each; Refill: 2  -     CBC & Differential; Future  -     Comprehensive Metabolic Panel; Future  -     Magnesium; Future  -     TSH; Future  -     Vitamin B12; Future  -     atenolol (TENORMIN) 25 MG tablet; Take 1 tablet by mouth 2 (Two) Times a Day.  Dispense: 60 tablet; Refill: 5  -     aspirin 81 MG EC tablet; Take 1 tablet by mouth Daily.  Dispense: 30 tablet; Refill: 5  -     umeclidinium-vilanterol (ANORO ELLIPTA) 62.5-25 MCG/INH aerosol powder  inhaler; Inhale 1 puff Daily.  Dispense: 60 each; Refill: 5  -     CBC & Differential  -     Comprehensive Metabolic  Panel  -     Magnesium  -     TSH  -     Vitamin B12  -     CBC Auto Differential    3. Mixed hyperlipidemia  -     Discontinue: umeclidinium-vilanterol (ANORO ELLIPTA) 62.5-25 MCG/INH aerosol powder  inhaler; Inhale 1 puff Daily.  Dispense: 14 each; Refill: 2  -     CBC & Differential; Future  -     Comprehensive Metabolic Panel; Future  -     Magnesium; Future  -     TSH; Future  -     Vitamin B12; Future  -     atorvastatin (LIPITOR) 40 MG tablet; Take 1 tablet by mouth Every Night.  Dispense: 30 tablet; Refill: 5  -     umeclidinium-vilanterol (ANORO ELLIPTA) 62.5-25 MCG/INH aerosol powder  inhaler; Inhale 1 puff Daily.  Dispense: 60 each; Refill: 5  -     CBC & Differential  -     Comprehensive Metabolic Panel  -     Magnesium  -     TSH  -     Vitamin B12  -     CBC Auto Differential    4. Centrilobular emphysema (CMS/HCC)  -     Discontinue: umeclidinium-vilanterol (ANORO ELLIPTA) 62.5-25 MCG/INH aerosol powder  inhaler; Inhale 1 puff Daily.  Dispense: 14 each; Refill: 2  -     CBC & Differential; Future  -     Comprehensive Metabolic Panel; Future  -     Magnesium; Future  -     TSH; Future  -     Vitamin B12; Future  -     umeclidinium-vilanterol (ANORO ELLIPTA) 62.5-25 MCG/INH aerosol powder  inhaler; Inhale 1 puff Daily.  Dispense: 60 each; Refill: 5  -     CBC & Differential  -     Comprehensive Metabolic Panel  -     Magnesium  -     TSH  -     Vitamin B12  -     CBC Auto Differential    5. B12 deficiency  -     Discontinue: umeclidinium-vilanterol (ANORO ELLIPTA) 62.5-25 MCG/INH aerosol powder  inhaler; Inhale 1 puff Daily.  Dispense: 14 each; Refill: 2  -     CBC & Differential; Future  -     Comprehensive Metabolic Panel; Future  -     Magnesium; Future  -     TSH; Future  -     Vitamin B12; Future  -     umeclidinium-vilanterol (ANORO ELLIPTA) 62.5-25 MCG/INH aerosol powder  inhaler; Inhale 1 puff Daily.  Dispense: 60 each; Refill: 5  -     CBC & Differential  -     Comprehensive Metabolic Panel  -      Magnesium  -     TSH  -     Vitamin B12  -     CBC Auto Differential    6. Hypothyroidism, unspecified type  -     Discontinue: umeclidinium-vilanterol (ANORO ELLIPTA) 62.5-25 MCG/INH aerosol powder  inhaler; Inhale 1 puff Daily.  Dispense: 14 each; Refill: 2  -     CBC & Differential; Future  -     Comprehensive Metabolic Panel; Future  -     Magnesium; Future  -     TSH; Future  -     Vitamin B12; Future  -     umeclidinium-vilanterol (ANORO ELLIPTA) 62.5-25 MCG/INH aerosol powder  inhaler; Inhale 1 puff Daily.  Dispense: 60 each; Refill: 5  -     CBC & Differential  -     Comprehensive Metabolic Panel  -     Magnesium  -     TSH  -     Vitamin B12  -     CBC Auto Differential    7. Chronic obstructive pulmonary disease, unspecified COPD type (CMS/HCC)  -     Discontinue: umeclidinium-vilanterol (ANORO ELLIPTA) 62.5-25 MCG/INH aerosol powder  inhaler; Inhale 1 puff Daily.  Dispense: 14 each; Refill: 2  -     CBC & Differential; Future  -     Comprehensive Metabolic Panel; Future  -     Magnesium; Future  -     TSH; Future  -     Vitamin B12; Future  -     umeclidinium-vilanterol (ANORO ELLIPTA) 62.5-25 MCG/INH aerosol powder  inhaler; Inhale 1 puff Daily.  Dispense: 60 each; Refill: 5  -     CBC & Differential  -     Comprehensive Metabolic Panel  -     Magnesium  -     TSH  -     Vitamin B12  -     CBC Auto Differential    8. Seborrheic dermatitis  -     ketoconazole (NIZORAL) 2 % shampoo; Apply  topically to the appropriate area as directed 2 (Two) Times a Week.  Dispense: 120 mL; Refill: 5    9. Skin lesion  -     Ambulatory Referral to Dermatology        An After Visit Summary and PPPS with all of these plans were given to the patient.      Follow Up:  Return in about 4 months (around 6/6/2020).

## 2020-02-07 ENCOUNTER — TELEPHONE (OUTPATIENT)
Dept: FAMILY MEDICINE CLINIC | Facility: CLINIC | Age: 75
End: 2020-02-07

## 2020-02-07 DIAGNOSIS — J44.9 CHRONIC OBSTRUCTIVE PULMONARY DISEASE, UNSPECIFIED COPD TYPE (HCC): ICD-10-CM

## 2020-02-07 LAB
ALBUMIN SERPL-MCNC: 4.1 G/DL (ref 3.5–5.2)
ALBUMIN/GLOB SERPL: 1.4 G/DL
ALP SERPL-CCNC: 74 U/L (ref 39–117)
ALT SERPL W P-5'-P-CCNC: 12 U/L (ref 1–41)
ANION GAP SERPL CALCULATED.3IONS-SCNC: 12.1 MMOL/L (ref 5–15)
AST SERPL-CCNC: 17 U/L (ref 1–40)
BASOPHILS # BLD AUTO: 0.06 10*3/MM3 (ref 0–0.2)
BASOPHILS NFR BLD AUTO: 0.7 % (ref 0–1.5)
BILIRUB SERPL-MCNC: 0.3 MG/DL (ref 0.2–1.2)
BUN BLD-MCNC: 10 MG/DL (ref 8–23)
BUN/CREAT SERPL: 12.3 (ref 7–25)
CALCIUM SPEC-SCNC: 9 MG/DL (ref 8.6–10.5)
CHLORIDE SERPL-SCNC: 101 MMOL/L (ref 98–107)
CO2 SERPL-SCNC: 24.9 MMOL/L (ref 22–29)
CREAT BLD-MCNC: 0.81 MG/DL (ref 0.76–1.27)
DEPRECATED RDW RBC AUTO: 42.2 FL (ref 37–54)
EOSINOPHIL # BLD AUTO: 0.95 10*3/MM3 (ref 0–0.4)
EOSINOPHIL NFR BLD AUTO: 10.9 % (ref 0.3–6.2)
ERYTHROCYTE [DISTWIDTH] IN BLOOD BY AUTOMATED COUNT: 12.8 % (ref 12.3–15.4)
GFR SERPL CREATININE-BSD FRML MDRD: 93 ML/MIN/1.73
GLOBULIN UR ELPH-MCNC: 2.9 GM/DL
GLUCOSE BLD-MCNC: 90 MG/DL (ref 65–99)
HCT VFR BLD AUTO: 37.5 % (ref 37.5–51)
HGB BLD-MCNC: 12.7 G/DL (ref 13–17.7)
IMM GRANULOCYTES # BLD AUTO: 0.03 10*3/MM3 (ref 0–0.05)
IMM GRANULOCYTES NFR BLD AUTO: 0.3 % (ref 0–0.5)
LYMPHOCYTES # BLD AUTO: 1.72 10*3/MM3 (ref 0.7–3.1)
LYMPHOCYTES NFR BLD AUTO: 19.7 % (ref 19.6–45.3)
MAGNESIUM SERPL-MCNC: 2.2 MG/DL (ref 1.6–2.4)
MCH RBC QN AUTO: 31.1 PG (ref 26.6–33)
MCHC RBC AUTO-ENTMCNC: 33.9 G/DL (ref 31.5–35.7)
MCV RBC AUTO: 91.7 FL (ref 79–97)
MONOCYTES # BLD AUTO: 1.02 10*3/MM3 (ref 0.1–0.9)
MONOCYTES NFR BLD AUTO: 11.7 % (ref 5–12)
NEUTROPHILS # BLD AUTO: 4.94 10*3/MM3 (ref 1.7–7)
NEUTROPHILS NFR BLD AUTO: 56.7 % (ref 42.7–76)
NRBC BLD AUTO-RTO: 0 /100 WBC (ref 0–0.2)
PLATELET # BLD AUTO: 226 10*3/MM3 (ref 140–450)
PMV BLD AUTO: 11.4 FL (ref 6–12)
POTASSIUM BLD-SCNC: 4.6 MMOL/L (ref 3.5–5.2)
PROT SERPL-MCNC: 7 G/DL (ref 6–8.5)
RBC # BLD AUTO: 4.09 10*6/MM3 (ref 4.14–5.8)
SODIUM BLD-SCNC: 138 MMOL/L (ref 136–145)
TSH SERPL DL<=0.05 MIU/L-ACNC: 3.85 UIU/ML (ref 0.27–4.2)
VIT B12 BLD-MCNC: 311 PG/ML (ref 211–946)
WBC NRBC COR # BLD: 8.72 10*3/MM3 (ref 3.4–10.8)

## 2020-02-07 RX ORDER — ALBUTEROL SULFATE 90 UG/1
2 AEROSOL, METERED RESPIRATORY (INHALATION) EVERY 6 HOURS PRN
Qty: 18 G | Refills: 5 | Status: SHIPPED | OUTPATIENT
Start: 2020-02-07 | End: 2020-07-09 | Stop reason: SDUPTHER

## 2020-02-07 RX ORDER — AZELASTINE 1 MG/ML
2 SPRAY, METERED NASAL 2 TIMES DAILY
Qty: 30 ML | Refills: 12 | Status: SHIPPED | OUTPATIENT
Start: 2020-02-07 | End: 2020-11-06 | Stop reason: SDUPTHER

## 2020-02-07 NOTE — TELEPHONE ENCOUNTER
----- Message from BEATRICE Vallejo sent at 2/7/2020  8:34 AM EST -----  His labs are stable.  No change in medications.  Please let him know    Patient notified and verbally understands.

## 2020-04-29 DIAGNOSIS — N52.9 ERECTILE DYSFUNCTION, UNSPECIFIED ERECTILE DYSFUNCTION TYPE: ICD-10-CM

## 2020-04-29 RX ORDER — SILDENAFIL CITRATE 20 MG/1
TABLET ORAL
Qty: 60 TABLET | Refills: 1 | OUTPATIENT
Start: 2020-04-29

## 2020-05-01 DIAGNOSIS — N52.9 ERECTILE DYSFUNCTION, UNSPECIFIED ERECTILE DYSFUNCTION TYPE: ICD-10-CM

## 2020-05-05 RX ORDER — SILDENAFIL CITRATE 20 MG/1
TABLET ORAL
Qty: 60 TABLET | Refills: 1 | Status: SHIPPED | OUTPATIENT
Start: 2020-05-05 | End: 2020-07-09 | Stop reason: SDUPTHER

## 2020-07-09 ENCOUNTER — OFFICE VISIT (OUTPATIENT)
Dept: FAMILY MEDICINE CLINIC | Facility: CLINIC | Age: 75
End: 2020-07-09

## 2020-07-09 VITALS
BODY MASS INDEX: 22.81 KG/M2 | WEIGHT: 154 LBS | TEMPERATURE: 97.3 F | DIASTOLIC BLOOD PRESSURE: 62 MMHG | OXYGEN SATURATION: 98 % | HEART RATE: 89 BPM | HEIGHT: 69 IN | SYSTOLIC BLOOD PRESSURE: 118 MMHG

## 2020-07-09 DIAGNOSIS — E53.8 B12 DEFICIENCY: ICD-10-CM

## 2020-07-09 DIAGNOSIS — J43.2 CENTRILOBULAR EMPHYSEMA (HCC): ICD-10-CM

## 2020-07-09 DIAGNOSIS — E78.2 MIXED HYPERLIPIDEMIA: ICD-10-CM

## 2020-07-09 DIAGNOSIS — I10 ESSENTIAL HYPERTENSION: ICD-10-CM

## 2020-07-09 DIAGNOSIS — J44.9 CHRONIC OBSTRUCTIVE PULMONARY DISEASE, UNSPECIFIED COPD TYPE (HCC): Primary | ICD-10-CM

## 2020-07-09 DIAGNOSIS — N52.9 ERECTILE DYSFUNCTION, UNSPECIFIED ERECTILE DYSFUNCTION TYPE: ICD-10-CM

## 2020-07-09 DIAGNOSIS — E03.9 HYPOTHYROIDISM, UNSPECIFIED TYPE: ICD-10-CM

## 2020-07-09 DIAGNOSIS — K04.7 DENTAL ABSCESS: ICD-10-CM

## 2020-07-09 DIAGNOSIS — Z12.5 SCREENING PSA (PROSTATE SPECIFIC ANTIGEN): ICD-10-CM

## 2020-07-09 PROCEDURE — 99214 OFFICE O/P EST MOD 30 MIN: CPT | Performed by: NURSE PRACTITIONER

## 2020-07-09 RX ORDER — ALBUTEROL SULFATE 90 UG/1
2 AEROSOL, METERED RESPIRATORY (INHALATION) EVERY 6 HOURS PRN
Qty: 18 G | Refills: 5 | Status: SHIPPED | OUTPATIENT
Start: 2020-07-09 | End: 2020-09-30 | Stop reason: SDUPTHER

## 2020-07-09 RX ORDER — ATENOLOL 25 MG/1
25 TABLET ORAL 2 TIMES DAILY
Qty: 60 TABLET | Refills: 5 | Status: SHIPPED | OUTPATIENT
Start: 2020-07-09 | End: 2021-03-09 | Stop reason: SDUPTHER

## 2020-07-09 RX ORDER — ATORVASTATIN CALCIUM 40 MG/1
40 TABLET, FILM COATED ORAL NIGHTLY
Qty: 30 TABLET | Refills: 5 | Status: SHIPPED | OUTPATIENT
Start: 2020-07-09 | End: 2020-11-09 | Stop reason: SDUPTHER

## 2020-07-09 RX ORDER — AMOXICILLIN 875 MG/1
875 TABLET, COATED ORAL 2 TIMES DAILY
Qty: 20 TABLET | Refills: 0 | Status: SHIPPED | OUTPATIENT
Start: 2020-07-09 | End: 2020-09-29

## 2020-07-09 RX ORDER — SILDENAFIL CITRATE 20 MG/1
TABLET ORAL
Qty: 30 TABLET | Refills: 1 | Status: SHIPPED | OUTPATIENT
Start: 2020-07-09 | End: 2021-02-05

## 2020-07-09 NOTE — PROGRESS NOTES
Subjective   Paul Gomez is a 74 y.o. male.     Chief Complaint: Follow-up and Hypertension    Dental Pain    This is a recurrent problem. The current episode started 1 to 4 weeks ago. The problem occurs constantly. The problem has been unchanged. The pain is moderate. Associated symptoms comments: Several teeth decays with swelling of gums.   COPD   This is a chronic problem. The current episode started more than 1 year ago. The problem has been gradually improving. Associated symptoms include coughing. Pertinent negatives include no chest pain or congestion. Exacerbated by: second hand smoke. Treatments tried: Anoro, albuterol, Duoneb. The treatment provided significant relief.   Hypothyroidism   This is a chronic problem. The current episode started more than 1 year ago. Associated symptoms include coughing. Pertinent negatives include no chest pain or congestion. Nothing aggravates the symptoms. Treatments tried: levothyroxine. The treatment provided significant relief.   Hypertension   This is a chronic problem. The current episode started more than 1 year ago. The problem is controlled. Pertinent negatives include no chest pain, palpitations or peripheral edema. Agents associated with hypertension include thyroid hormones. Current antihypertension treatment includes beta blockers. The current treatment provides significant improvement. Compliance problems include exercise and diet.  Hypertensive end-organ damage includes CVA. Identifiable causes of hypertension include a thyroid problem.   He has still been without smoking now and states he is breathing much better.   Erectile Dysfunction   This is a chronic problem. The current episode started more than 1 year ago. The problem is unchanged. The nature of his difficulty is achieving erection and maintaining erection. Non-physiologic factors contributing to erectile dysfunction are a decreased libido. Irritative symptoms do not include frequency, nocturia or  urgency. Obstructive symptoms do not include incomplete emptying, an intermittent stream or straining. Pertinent negatives include no genital pain or hematuria. Nothing aggravates the symptoms. Past treatments include sildenafil.  He has tolerated the medication well without side effects.       Family History   Problem Relation Age of Onset   • Cancer Mother        Social History     Socioeconomic History   • Marital status:      Spouse name: Not on file   • Number of children: Not on file   • Years of education: Not on file   • Highest education level: Not on file   Tobacco Use   • Smoking status: Former Smoker     Packs/day: 0.25     Types: Cigarettes     Last attempt to quit: 2018     Years since quittin.5   • Smokeless tobacco: Never Used   • Tobacco comment: patient stated he is down to about 4 cigarettes a day   Substance and Sexual Activity   • Alcohol use: No   • Drug use: No   • Sexual activity: Defer       Past Medical History:   Diagnosis Date   • COPD (chronic obstructive pulmonary disease) (CMS/[facility])    • Gout    • Hearing loss    • History of degenerative disc disease    • Hyperlipidemia    • Hypertension    • Low back pain    • Pedal edema    • Prediabetes        Review of Systems   Constitutional: Negative.    HENT: Negative.    Respiratory: Negative.    Cardiovascular: Negative.    Gastrointestinal: Negative.    Musculoskeletal: Negative.    Skin: Negative.    Neurological: Negative.    Psychiatric/Behavioral: Negative.        Objective   Physical Exam   Constitutional: He is oriented to person, place, and time. He appears well-developed and well-nourished.   Neck: Normal range of motion. Neck supple.   Cardiovascular: Normal rate, regular rhythm and normal heart sounds.   Pulmonary/Chest: Effort normal and breath sounds normal.   Neurological: He is alert and oriented to person, place, and time.   Skin: Skin is warm and dry.   Psychiatric: He has a normal mood and affect. His  "behavior is normal. Judgment and thought content normal.   Nursing note and vitals reviewed.      Procedures    Vitals: Blood pressure 118/62, pulse 89, temperature 97.3 °F (36.3 °C), height 175.3 cm (69\"), weight 69.9 kg (154 lb), SpO2 98 %.    Body mass index is 22.74 kg/m².     Allergies:   Allergies   Allergen Reactions   • Penicillins Unknown (See Comments)     Pt previous;y received ROCEPHIN without adverse effect.   Pt states he was allergic when he was 18 but has had it since and did not have any problems.         During this visit the following were done:  Labs Reviewed []    Labs Ordered []    Radiology Reports Reviewed []    Radiology Ordered []    PCP Records Reviewed []    Referring Provider Records Reviewed []    ER Records Reviewed []    Hospital Records Reviewed []    History Obtained From Family []    Radiology Images Reviewed []    Other Reviewed []    Records Requested []      Assessment/Plan   Ray was seen today for follow-up and hypertension.    Diagnoses and all orders for this visit:    Chronic obstructive pulmonary disease, unspecified COPD type (CMS/[facility])  -     umeclidinium-vilanterol (Anoro Ellipta) 62.5-25 MCG/INH aerosol powder  inhaler; Inhale 1 puff Daily.  -     albuterol sulfate  (90 Base) MCG/ACT inhaler; Inhale 2 puffs Every 6 (Six) Hours As Needed for Wheezing or Shortness of Air.  -     CBC & Differential; Future  -     Comprehensive Metabolic Panel; Future  -     TSH; Future  -     Vitamin B12; Future  -     Magnesium; Future  -     PSA Screen; Future  -     Lipid Panel; Future    Essential hypertension  -     atenolol (TENORMIN) 25 MG tablet; Take 1 tablet by mouth 2 (Two) Times a Day.  -     CBC & Differential; Future  -     Comprehensive Metabolic Panel; Future  -     TSH; Future  -     Vitamin B12; Future  -     Magnesium; Future  -     PSA Screen; Future  -     Lipid Panel; Future    Hypothyroidism, unspecified type  -     CBC & Differential; Future  -     " Comprehensive Metabolic Panel; Future  -     TSH; Future  -     Vitamin B12; Future  -     Magnesium; Future  -     PSA Screen; Future  -     Lipid Panel; Future    Mixed hyperlipidemia  -     atorvastatin (LIPITOR) 40 MG tablet; Take 1 tablet by mouth Every Night.  -     CBC & Differential; Future  -     Comprehensive Metabolic Panel; Future  -     TSH; Future  -     Vitamin B12; Future  -     Magnesium; Future  -     PSA Screen; Future  -     Lipid Panel; Future    Centrilobular emphysema (CMS/HCC)  -     umeclidinium-vilanterol (Anoro Ellipta) 62.5-25 MCG/INH aerosol powder  inhaler; Inhale 1 puff Daily.  -     albuterol sulfate  (90 Base) MCG/ACT inhaler; Inhale 2 puffs Every 6 (Six) Hours As Needed for Wheezing or Shortness of Air.  -     CBC & Differential; Future  -     Comprehensive Metabolic Panel; Future  -     TSH; Future  -     Vitamin B12; Future  -     Magnesium; Future  -     PSA Screen; Future  -     Lipid Panel; Future    B12 deficiency  -     CBC & Differential; Future  -     Comprehensive Metabolic Panel; Future  -     TSH; Future  -     Vitamin B12; Future  -     Magnesium; Future  -     PSA Screen; Future  -     Lipid Panel; Future    Screening PSA (prostate specific antigen)  -     PSA Screen; Future    Erectile dysfunction, unspecified erectile dysfunction type  -     CBC & Differential; Future  -     Comprehensive Metabolic Panel; Future  -     TSH; Future  -     Vitamin B12; Future  -     Magnesium; Future  -     PSA Screen; Future  -     Lipid Panel; Future  -     sildenafil (REVATIO) 20 MG tablet; Take 1-2 tablets by mouth one hour prior to sexual activity    Dental abscess  -     amoxicillin (AMOXIL) 875 MG tablet; Take 1 tablet by mouth 2 (Two) Times a Day.

## 2020-09-29 ENCOUNTER — OFFICE VISIT (OUTPATIENT)
Dept: FAMILY MEDICINE CLINIC | Facility: CLINIC | Age: 75
End: 2020-09-29

## 2020-09-29 VITALS
TEMPERATURE: 97.1 F | HEART RATE: 75 BPM | SYSTOLIC BLOOD PRESSURE: 98 MMHG | OXYGEN SATURATION: 94 % | BODY MASS INDEX: 22.81 KG/M2 | DIASTOLIC BLOOD PRESSURE: 56 MMHG | WEIGHT: 154 LBS | HEIGHT: 69 IN

## 2020-09-29 DIAGNOSIS — I10 ESSENTIAL HYPERTENSION: ICD-10-CM

## 2020-09-29 DIAGNOSIS — E53.8 B12 DEFICIENCY: ICD-10-CM

## 2020-09-29 DIAGNOSIS — E78.2 MIXED HYPERLIPIDEMIA: ICD-10-CM

## 2020-09-29 DIAGNOSIS — J44.9 CHRONIC OBSTRUCTIVE PULMONARY DISEASE, UNSPECIFIED COPD TYPE (HCC): ICD-10-CM

## 2020-09-29 DIAGNOSIS — Z12.5 SCREENING PSA (PROSTATE SPECIFIC ANTIGEN): ICD-10-CM

## 2020-09-29 DIAGNOSIS — J43.2 CENTRILOBULAR EMPHYSEMA (HCC): ICD-10-CM

## 2020-09-29 DIAGNOSIS — E03.9 HYPOTHYROIDISM, UNSPECIFIED TYPE: ICD-10-CM

## 2020-09-29 DIAGNOSIS — N52.9 ERECTILE DYSFUNCTION, UNSPECIFIED ERECTILE DYSFUNCTION TYPE: ICD-10-CM

## 2020-09-29 DIAGNOSIS — J44.1 COPD WITH EXACERBATION (HCC): Primary | ICD-10-CM

## 2020-09-29 PROCEDURE — 80053 COMPREHEN METABOLIC PANEL: CPT | Performed by: NURSE PRACTITIONER

## 2020-09-29 PROCEDURE — 85025 COMPLETE CBC W/AUTO DIFF WBC: CPT | Performed by: NURSE PRACTITIONER

## 2020-09-29 PROCEDURE — 84443 ASSAY THYROID STIM HORMONE: CPT | Performed by: NURSE PRACTITIONER

## 2020-09-29 PROCEDURE — 83735 ASSAY OF MAGNESIUM: CPT | Performed by: NURSE PRACTITIONER

## 2020-09-29 PROCEDURE — 96372 THER/PROPH/DIAG INJ SC/IM: CPT | Performed by: NURSE PRACTITIONER

## 2020-09-29 PROCEDURE — G0103 PSA SCREENING: HCPCS | Performed by: NURSE PRACTITIONER

## 2020-09-29 PROCEDURE — 80061 LIPID PANEL: CPT | Performed by: NURSE PRACTITIONER

## 2020-09-29 PROCEDURE — 82607 VITAMIN B-12: CPT | Performed by: NURSE PRACTITIONER

## 2020-09-29 PROCEDURE — 99214 OFFICE O/P EST MOD 30 MIN: CPT | Performed by: NURSE PRACTITIONER

## 2020-09-29 RX ORDER — DEXAMETHASONE SODIUM PHOSPHATE 4 MG/ML
4 INJECTION, SOLUTION INTRA-ARTICULAR; INTRALESIONAL; INTRAMUSCULAR; INTRAVENOUS; SOFT TISSUE ONCE
Status: COMPLETED | OUTPATIENT
Start: 2020-09-29 | End: 2020-09-29

## 2020-09-29 RX ORDER — AMOXICILLIN 875 MG/1
875 TABLET, COATED ORAL 2 TIMES DAILY
Qty: 20 TABLET | Refills: 0 | Status: SHIPPED | OUTPATIENT
Start: 2020-09-29 | End: 2020-11-06

## 2020-09-29 RX ADMIN — DEXAMETHASONE SODIUM PHOSPHATE 4 MG: 4 INJECTION, SOLUTION INTRA-ARTICULAR; INTRALESIONAL; INTRAMUSCULAR; INTRAVENOUS; SOFT TISSUE at 15:41

## 2020-09-29 NOTE — PROGRESS NOTES
Subjective   Paul Gomez is a 74 y.o. male.     Chief Complaint: Cough    Wheezing   This is a new problem. The current episode started 1 to 4 weeks ago. The problem occurs constantly. The problem has been unchanged. Associated symptoms include coughing and sputum production. The symptoms are aggravated by smoke (he states that he smokes 1 cigarette a day). He has tried steroid inhaler for the symptoms. The treatment provided no relief. His past medical history is significant for COPD.   COPD   This is a chronic problem. The current episode started more than 1 year ago. The problem has been gradually improving. Associated symptoms include coughing. Pertinent negatives include no chest pain or congestion. Exacerbated by: second hand smoke. Treatments tried: Anoro, albuterol, Duoneb. The treatment provided significant relief.   Hypothyroidism   This is a chronic problem. The current episode started more than 1 year ago. Associated symptoms include coughing. Pertinent negatives include no chest pain or congestion. Nothing aggravates the symptoms. Treatments tried: levothyroxine. The treatment provided significant relief.   Hypertension   This is a chronic problem. The current episode started more than 1 year ago. The problem is controlled. Pertinent negatives include no chest pain, palpitations or peripheral edema. Agents associated with hypertension include thyroid hormones. Current antihypertension treatment includes beta blockers. The current treatment provides significant improvement. Compliance problems include exercise and diet.  Hypertensive end-organ damage includes CVA. Identifiable causes of hypertension include a thyroid problem.   He has still been without smoking now and states he is breathing much better.      Family History   Problem Relation Age of Onset   • Cancer Mother        Social History     Socioeconomic History   • Marital status:      Spouse name: Not on file   • Number of children:  "Not on file   • Years of education: Not on file   • Highest education level: Not on file   Tobacco Use   • Smoking status: Former Smoker     Packs/day: 0.25     Types: Cigarettes     Quit date: 2018     Years since quittin.8   • Smokeless tobacco: Never Used   • Tobacco comment: patient stated he is down to about 4 cigarettes a day   Substance and Sexual Activity   • Alcohol use: No   • Drug use: No   • Sexual activity: Defer       Past Medical History:   Diagnosis Date   • COPD (chronic obstructive pulmonary disease) (CMS/[facility])    • Gout    • Hearing loss    • History of degenerative disc disease    • Hyperlipidemia    • Hypertension    • Low back pain    • Pedal edema    • Prediabetes        Review of Systems   Constitutional: Negative.    HENT: Negative.    Respiratory: Positive for cough, sputum production and wheezing.    Cardiovascular: Negative.    Gastrointestinal: Negative.    Musculoskeletal: Negative.    Skin: Negative.    Neurological: Negative.    Psychiatric/Behavioral: Negative.        Objective   Physical Exam  Vitals signs and nursing note reviewed.   Constitutional:       Appearance: He is well-developed.   Neck:      Musculoskeletal: Normal range of motion and neck supple.   Cardiovascular:      Rate and Rhythm: Normal rate and regular rhythm.      Heart sounds: Normal heart sounds.   Pulmonary:      Effort: Pulmonary effort is normal. No respiratory distress.      Breath sounds: Wheezing present.   Skin:     General: Skin is warm and dry.   Neurological:      Mental Status: He is alert and oriented to person, place, and time.   Psychiatric:         Behavior: Behavior normal.         Thought Content: Thought content normal.         Judgment: Judgment normal.         Procedures    Vitals: Blood pressure 98/56, pulse 75, temperature 97.1 °F (36.2 °C), height 175.3 cm (69\"), weight 69.9 kg (154 lb), SpO2 94 %.    Body mass index is 22.74 kg/m².     Allergies:   Allergies   Allergen Reactions "   • Penicillins Unknown (See Comments)     Pt previous;y received ROCEPHIN without adverse effect.   Pt states he was allergic when he was 18 but has had it since and did not have any problems.         During this visit the following were done:  Labs Reviewed []    Labs Ordered []    Radiology Reports Reviewed []    Radiology Ordered []    PCP Records Reviewed []    Referring Provider Records Reviewed []    ER Records Reviewed []    Hospital Records Reviewed []    History Obtained From Family []    Radiology Images Reviewed []    Other Reviewed []    Records Requested []      Assessment/Plan   Ray was seen today for cough.    Diagnoses and all orders for this visit:    COPD with exacerbation (CMS/HCC)  -     amoxicillin (AMOXIL) 875 MG tablet; Take 1 tablet by mouth 2 (Two) Times a Day.  -     dexamethasone (DECADRON) injection 4 mg    Chronic obstructive pulmonary disease, unspecified COPD type (CMS/HCC)  -     CBC & Differential  -     Comprehensive Metabolic Panel  -     TSH  -     Vitamin B12  -     Magnesium  -     PSA Screen  -     Lipid Panel  -     CBC Auto Differential    Essential hypertension  -     CBC & Differential  -     Comprehensive Metabolic Panel  -     TSH  -     Vitamin B12  -     Magnesium  -     PSA Screen  -     Lipid Panel  -     CBC Auto Differential    Hypothyroidism, unspecified type  -     CBC & Differential  -     Comprehensive Metabolic Panel  -     TSH  -     Vitamin B12  -     Magnesium  -     PSA Screen  -     Lipid Panel  -     CBC Auto Differential    Mixed hyperlipidemia  -     CBC & Differential  -     Comprehensive Metabolic Panel  -     TSH  -     Vitamin B12  -     Magnesium  -     PSA Screen  -     Lipid Panel  -     CBC Auto Differential    B12 deficiency  -     CBC & Differential  -     Comprehensive Metabolic Panel  -     TSH  -     Vitamin B12  -     Magnesium  -     PSA Screen  -     Lipid Panel  -     CBC Auto Differential       Screening PSA (prostate specific  antigen)  -     PSA Screen

## 2020-09-30 ENCOUNTER — TELEPHONE (OUTPATIENT)
Dept: FAMILY MEDICINE CLINIC | Facility: CLINIC | Age: 75
End: 2020-09-30

## 2020-09-30 DIAGNOSIS — J44.9 CHRONIC OBSTRUCTIVE PULMONARY DISEASE, UNSPECIFIED COPD TYPE (HCC): ICD-10-CM

## 2020-09-30 DIAGNOSIS — J43.2 CENTRILOBULAR EMPHYSEMA (HCC): ICD-10-CM

## 2020-09-30 DIAGNOSIS — Z12.11 SCREEN FOR COLON CANCER: Primary | ICD-10-CM

## 2020-09-30 LAB
ALBUMIN SERPL-MCNC: 3.8 G/DL (ref 3.5–5.2)
ALBUMIN/GLOB SERPL: 1.4 G/DL
ALP SERPL-CCNC: 91 U/L (ref 39–117)
ALT SERPL W P-5'-P-CCNC: 17 U/L (ref 1–41)
ANION GAP SERPL CALCULATED.3IONS-SCNC: 7.1 MMOL/L (ref 5–15)
AST SERPL-CCNC: 20 U/L (ref 1–40)
BASOPHILS # BLD AUTO: 0.04 10*3/MM3 (ref 0–0.2)
BASOPHILS NFR BLD AUTO: 0.6 % (ref 0–1.5)
BILIRUB SERPL-MCNC: 0.2 MG/DL (ref 0–1.2)
BUN SERPL-MCNC: 13 MG/DL (ref 8–23)
BUN/CREAT SERPL: 14 (ref 7–25)
CALCIUM SPEC-SCNC: 8.7 MG/DL (ref 8.6–10.5)
CHLORIDE SERPL-SCNC: 106 MMOL/L (ref 98–107)
CHOLEST SERPL-MCNC: 103 MG/DL (ref 0–200)
CO2 SERPL-SCNC: 27.9 MMOL/L (ref 22–29)
CREAT SERPL-MCNC: 0.93 MG/DL (ref 0.76–1.27)
DEPRECATED RDW RBC AUTO: 41.8 FL (ref 37–54)
EOSINOPHIL # BLD AUTO: 1.36 10*3/MM3 (ref 0–0.4)
EOSINOPHIL NFR BLD AUTO: 21.8 % (ref 0.3–6.2)
ERYTHROCYTE [DISTWIDTH] IN BLOOD BY AUTOMATED COUNT: 12.7 % (ref 12.3–15.4)
GFR SERPL CREATININE-BSD FRML MDRD: 79 ML/MIN/1.73
GLOBULIN UR ELPH-MCNC: 2.7 GM/DL
GLUCOSE SERPL-MCNC: 83 MG/DL (ref 65–99)
HCT VFR BLD AUTO: 34.6 % (ref 37.5–51)
HDLC SERPL-MCNC: 41 MG/DL (ref 40–60)
HGB BLD-MCNC: 12 G/DL (ref 13–17.7)
IMM GRANULOCYTES # BLD AUTO: 0.03 10*3/MM3 (ref 0–0.05)
IMM GRANULOCYTES NFR BLD AUTO: 0.5 % (ref 0–0.5)
LDLC SERPL CALC-MCNC: 54 MG/DL (ref 0–100)
LDLC/HDLC SERPL: 1.31 {RATIO}
LYMPHOCYTES # BLD AUTO: 1.55 10*3/MM3 (ref 0.7–3.1)
LYMPHOCYTES NFR BLD AUTO: 24.9 % (ref 19.6–45.3)
MAGNESIUM SERPL-MCNC: 2.3 MG/DL (ref 1.6–2.4)
MCH RBC QN AUTO: 31.6 PG (ref 26.6–33)
MCHC RBC AUTO-ENTMCNC: 34.7 G/DL (ref 31.5–35.7)
MCV RBC AUTO: 91.1 FL (ref 79–97)
MONOCYTES # BLD AUTO: 0.86 10*3/MM3 (ref 0.1–0.9)
MONOCYTES NFR BLD AUTO: 13.8 % (ref 5–12)
NEUTROPHILS NFR BLD AUTO: 2.39 10*3/MM3 (ref 1.7–7)
NEUTROPHILS NFR BLD AUTO: 38.4 % (ref 42.7–76)
NRBC BLD AUTO-RTO: 0 /100 WBC (ref 0–0.2)
PLATELET # BLD AUTO: 222 10*3/MM3 (ref 140–450)
PMV BLD AUTO: 11.1 FL (ref 6–12)
POTASSIUM SERPL-SCNC: 4 MMOL/L (ref 3.5–5.2)
PROT SERPL-MCNC: 6.5 G/DL (ref 6–8.5)
PSA SERPL-MCNC: 0.64 NG/ML (ref 0–4)
RBC # BLD AUTO: 3.8 10*6/MM3 (ref 4.14–5.8)
SODIUM SERPL-SCNC: 141 MMOL/L (ref 136–145)
TRIGL SERPL-MCNC: 42 MG/DL (ref 0–150)
TSH SERPL DL<=0.05 MIU/L-ACNC: 3.68 UIU/ML (ref 0.27–4.2)
VIT B12 BLD-MCNC: 220 PG/ML (ref 211–946)
VLDLC SERPL-MCNC: 8.4 MG/DL (ref 5–40)
WBC # BLD AUTO: 6.23 10*3/MM3 (ref 3.4–10.8)

## 2020-09-30 RX ORDER — ALBUTEROL SULFATE 90 UG/1
2 AEROSOL, METERED RESPIRATORY (INHALATION) EVERY 6 HOURS PRN
Qty: 18 G | Refills: 5 | Status: SHIPPED | OUTPATIENT
Start: 2020-09-30 | End: 2020-11-30 | Stop reason: SDUPTHER

## 2020-09-30 RX ORDER — IPRATROPIUM BROMIDE AND ALBUTEROL SULFATE 2.5; .5 MG/3ML; MG/3ML
3 SOLUTION RESPIRATORY (INHALATION) EVERY 4 HOURS PRN
Qty: 360 ML | Refills: 3 | Status: SHIPPED | OUTPATIENT
Start: 2020-09-30 | End: 2021-02-04 | Stop reason: SDUPTHER

## 2020-09-30 NOTE — TELEPHONE ENCOUNTER
----- Message from BEATRICE Vallejo sent at 9/30/2020  8:28 AM EDT -----  His labs appear stable at this time.  His hemoglobin is 12 which is slightly low.  He probably needs to have a screening colonoscopy done.  Is he interested in getting that done?      Spoke with patient he is agreeable to Colonoscopy,no preference.    Patient called  Requesting a refill on his nebulizer solution,not listed (Ulises).

## 2020-09-30 NOTE — PROGRESS NOTES
His labs appear stable at this time.  His hemoglobin is 12 which is slightly low.  He probably needs to have a screening colonoscopy done.  Is he interested in getting that done?

## 2020-10-05 DIAGNOSIS — L21.9 SEBORRHEIC DERMATITIS: ICD-10-CM

## 2020-10-06 RX ORDER — KETOCONAZOLE 20 MG/ML
SHAMPOO TOPICAL 2 TIMES WEEKLY
Qty: 120 ML | Refills: 5 | Status: SHIPPED | OUTPATIENT
Start: 2020-10-08 | End: 2020-11-06 | Stop reason: SDUPTHER

## 2020-10-13 ENCOUNTER — APPOINTMENT (OUTPATIENT)
Dept: GENERAL RADIOLOGY | Facility: HOSPITAL | Age: 75
End: 2020-10-13

## 2020-10-13 ENCOUNTER — HOSPITAL ENCOUNTER (EMERGENCY)
Facility: HOSPITAL | Age: 75
Discharge: HOME | End: 2020-10-13
Admitting: FAMILY MEDICINE

## 2020-10-13 VITALS
BODY MASS INDEX: 26.52 KG/M2 | WEIGHT: 175 LBS | TEMPERATURE: 98.3 F | DIASTOLIC BLOOD PRESSURE: 109 MMHG | SYSTOLIC BLOOD PRESSURE: 187 MMHG | RESPIRATION RATE: 20 BRPM | HEART RATE: 89 BPM | OXYGEN SATURATION: 98 % | HEIGHT: 68 IN

## 2020-10-13 DIAGNOSIS — M94.0 COSTOCHONDRITIS, ACUTE: Primary | ICD-10-CM

## 2020-10-13 PROCEDURE — 71101 X-RAY EXAM UNILAT RIBS/CHEST: CPT | Performed by: RADIOLOGY

## 2020-10-13 PROCEDURE — 99283 EMERGENCY DEPT VISIT LOW MDM: CPT

## 2020-10-13 PROCEDURE — 71101 X-RAY EXAM UNILAT RIBS/CHEST: CPT

## 2020-10-13 RX ORDER — HYDROCODONE BITARTRATE AND ACETAMINOPHEN 7.5; 325 MG/1; MG/1
1 TABLET ORAL ONCE
Status: COMPLETED | OUTPATIENT
Start: 2020-10-13 | End: 2020-10-13

## 2020-10-13 RX ORDER — METHYLPREDNISOLONE 4 MG/1
TABLET ORAL
Qty: 21 TABLET | Refills: 0 | Status: SHIPPED | OUTPATIENT
Start: 2020-10-13 | End: 2020-11-06

## 2020-10-13 RX ADMIN — HYDROCODONE BITARTRATE AND ACETAMINOPHEN 1 TABLET: 7.5; 325 TABLET ORAL at 19:55

## 2020-10-13 NOTE — ED PROVIDER NOTES
Subjective   74-year-old male presents to the emergency room with left rib pain.  He states he fell over around an hour ago and injured his ribs.  He denies hitting his head or loss of consciousness.  He denies rib injuries in the past.  Aggravating factors include movement.  Denies any alleviating factors.      History provided by:  Patient   used: No        Review of Systems   Constitutional: Negative.  Negative for fever.   HENT: Negative.    Respiratory: Negative.    Cardiovascular: Negative.  Negative for chest pain.   Gastrointestinal: Negative.  Negative for abdominal pain.   Endocrine: Negative.    Genitourinary: Negative.  Negative for dysuria.   Musculoskeletal:        (+) Left rib pain   Skin: Negative.    Neurological: Negative.    Psychiatric/Behavioral: Negative.    All other systems reviewed and are negative.      Past Medical History:   Diagnosis Date   • COPD (chronic obstructive pulmonary disease) (CMS/[facility])    • Gout    • Hearing loss    • History of degenerative disc disease    • Hyperlipidemia    • Hypertension    • Low back pain    • Pedal edema    • Prediabetes        Allergies   Allergen Reactions   • Penicillins Unknown (See Comments)     Pt previous;y received ROCEPHIN without adverse effect.   Pt states he was allergic when he was 18 but has had it since and did not have any problems.        Past Surgical History:   Procedure Laterality Date   • CHOLECYSTECTOMY     • LUMBAR DISCECTOMY     • SHOULDER SURGERY Left        Family History   Problem Relation Age of Onset   • Cancer Mother        Social History     Socioeconomic History   • Marital status:      Spouse name: Not on file   • Number of children: Not on file   • Years of education: Not on file   • Highest education level: Not on file   Tobacco Use   • Smoking status: Former Smoker     Packs/day: 0.25     Types: Cigarettes     Quit date: 2018     Years since quittin.8   • Smokeless tobacco: Never  Used   • Tobacco comment: patient stated he is down to about 4 cigarettes a day   Substance and Sexual Activity   • Alcohol use: No   • Drug use: No   • Sexual activity: Defer           Objective   Physical Exam  Vitals signs and nursing note reviewed.   Constitutional:       General: He is not in acute distress.     Appearance: He is well-developed. He is not diaphoretic.   HENT:      Head: Normocephalic and atraumatic.      Right Ear: External ear normal.      Left Ear: External ear normal.      Nose: Nose normal.   Eyes:      Conjunctiva/sclera: Conjunctivae normal.      Pupils: Pupils are equal, round, and reactive to light.   Neck:      Musculoskeletal: Normal range of motion and neck supple.      Vascular: No JVD.      Trachea: No tracheal deviation.   Cardiovascular:      Rate and Rhythm: Normal rate and regular rhythm.      Heart sounds: Normal heart sounds. No murmur.   Pulmonary:      Effort: Pulmonary effort is normal. No respiratory distress.      Breath sounds: Normal breath sounds. No wheezing.   Chest:      Chest wall: Tenderness present. No crepitus.       Abdominal:      General: Bowel sounds are normal.      Palpations: Abdomen is soft.      Tenderness: There is no abdominal tenderness.   Musculoskeletal: Normal range of motion.         General: No deformity.   Skin:     General: Skin is warm and dry.      Coloration: Skin is not pale.      Findings: No erythema or rash.   Neurological:      Mental Status: He is alert and oriented to person, place, and time.      Cranial Nerves: No cranial nerve deficit.   Psychiatric:         Behavior: Behavior normal.         Thought Content: Thought content normal.         Procedures           ED Course  ED Course as of Oct 13 2049   Tue Oct 13, 2020   2041 XR Ribs Left With PA Chest [TK]      ED Course User Index  [TK] Erika Cisneros, ISSA                                           MDM  Number of Diagnoses or Management Options  Costochondritis, acute: new  and requires workup     Amount and/or Complexity of Data Reviewed  Tests in the radiology section of CPT®: ordered and reviewed    Risk of Complications, Morbidity, and/or Mortality  Presenting problems: moderate  Diagnostic procedures: moderate  Management options: moderate    Patient Progress  Patient progress: stable      Final diagnoses:   Costochondritis, acute            Erika Cisneros PA-C  10/13/20 2049

## 2020-10-15 ENCOUNTER — PATIENT OUTREACH (OUTPATIENT)
Dept: CASE MANAGEMENT | Facility: OTHER | Age: 75
End: 2020-10-15

## 2020-10-15 NOTE — OUTREACH NOTE
Patient Outreach Note    RN-ACM outreach to patient.  Patient had ED visit at [facility] 10/13/20.  Patient presented with c/o rib pain after a fall.  Patient was treated and discharged to home to follow with PCP.  AVS and education reviewed with patient.  Patient v/u and reported continued pain and discomfort that runs up into the armpit.  Patient declined  f/u appointment sooner than that already scheduled for 11/6/20.  Patient stated desire to wait and see if his pain improved after a few days and if it did not do so he would call PCP to be seen.  AWV up to date.  Patient has declined MyChart and was provided Nurse 24/7 call line with AVS.  RN-ACM contact information provided and patient encouraged to call with care coordination needs.  Other needs/questions/concerns denied at this time.  Patient not interested in ongoing HRCM.     Margo Colon RN  Ambulatory     10/15/2020, 13:30 EDT

## 2020-11-06 ENCOUNTER — OFFICE VISIT (OUTPATIENT)
Dept: FAMILY MEDICINE CLINIC | Facility: CLINIC | Age: 75
End: 2020-11-06

## 2020-11-06 VITALS
OXYGEN SATURATION: 92 % | TEMPERATURE: 96.8 F | SYSTOLIC BLOOD PRESSURE: 110 MMHG | BODY MASS INDEX: 22.58 KG/M2 | WEIGHT: 149 LBS | HEART RATE: 78 BPM | DIASTOLIC BLOOD PRESSURE: 66 MMHG | HEIGHT: 68 IN

## 2020-11-06 DIAGNOSIS — L21.9 SEBORRHEIC DERMATITIS: ICD-10-CM

## 2020-11-06 DIAGNOSIS — G89.4 CHRONIC PAIN SYNDROME: Primary | ICD-10-CM

## 2020-11-06 PROCEDURE — 99213 OFFICE O/P EST LOW 20 MIN: CPT | Performed by: NURSE PRACTITIONER

## 2020-11-06 RX ORDER — AZELASTINE 1 MG/ML
2 SPRAY, METERED NASAL 2 TIMES DAILY
Qty: 30 ML | Refills: 12 | Status: SHIPPED | OUTPATIENT
Start: 2020-11-06 | End: 2021-05-21 | Stop reason: SDUPTHER

## 2020-11-06 RX ORDER — KETOCONAZOLE 20 MG/ML
SHAMPOO TOPICAL 2 TIMES WEEKLY
Qty: 120 ML | Refills: 5 | Status: SHIPPED | OUTPATIENT
Start: 2020-11-09 | End: 2021-05-21 | Stop reason: SDUPTHER

## 2020-11-06 RX ORDER — TRIAMCINOLONE ACETONIDE 1 MG/G
CREAM TOPICAL 2 TIMES DAILY
Qty: 80 G | Refills: 2 | Status: SHIPPED | OUTPATIENT
Start: 2020-11-06 | End: 2021-05-21 | Stop reason: SDUPTHER

## 2020-11-06 NOTE — PROGRESS NOTES
Subjective   Paul Gomez is a 75 y.o. male.     Chief Complaint: Follow-up and COPD    Rash  This is a chronic problem. The current episode started more than 1 year ago. The problem has been waxing and waning since onset. The affected locations include the scalp. The rash is characterized by scaling and itchiness. He was exposed to nothing. Treatments tried: nizoral. The treatment provided significant relief.   Back Pain  This is a chronic problem. The current episode started more than 1 year ago. The problem occurs daily. The problem is unchanged. The pain is present in the lumbar spine. The quality of the pain is described as aching. The pain is worse during the day. The symptoms are aggravated by bending and twisting. (Patient has been following with pain management for several years.  Apparently his pain management provider is no longer practicing in the area and he is needing another referral to a different pain clinic.  He has with him a letter with recommended providers for him.  He is requesting a referral today) Treatments tried: oxycodone, oxymorphone, gabapentin.   COPD   This is a chronic problem. The current episode started more than 1 year ago. The problem has been gradually improving. Associated symptoms include coughing. Pertinent negatives include no chest pain or congestion. Exacerbated by: second hand smoke and self smoking. Treatments tried: Anoro, albuterol, Duoneb. The treatment provided significant relief.     Family History   Problem Relation Age of Onset   • Cancer Mother        Social History     Socioeconomic History   • Marital status:      Spouse name: Not on file   • Number of children: Not on file   • Years of education: Not on file   • Highest education level: Not on file   Tobacco Use   • Smoking status: Former Smoker     Packs/day: 0.25     Types: Cigarettes     Quit date: 2018     Years since quittin.9   • Smokeless tobacco: Never Used   • Tobacco comment:  "patient stated he is down to about 4 cigarettes a day   Substance and Sexual Activity   • Alcohol use: No   • Drug use: No   • Sexual activity: Defer       Past Medical History:   Diagnosis Date   • COPD (chronic obstructive pulmonary disease) (CMS/[facility])    • Gout    • Hearing loss    • History of degenerative disc disease    • Hyperlipidemia    • Hypertension    • Low back pain    • Pedal edema    • Prediabetes        Review of Systems   Constitutional: Negative.    HENT: Negative.    Respiratory: Negative.    Cardiovascular: Negative.    Gastrointestinal: Negative.    Musculoskeletal: Positive for back pain.   Skin: Positive for rash.   Neurological: Negative.    Psychiatric/Behavioral: Negative.        Objective   Physical Exam  Vitals signs and nursing note reviewed.   Constitutional:       Appearance: He is well-developed.   Neck:      Musculoskeletal: Normal range of motion and neck supple.   Cardiovascular:      Rate and Rhythm: Normal rate and regular rhythm.      Heart sounds: Normal heart sounds.   Pulmonary:      Effort: Pulmonary effort is normal.      Breath sounds: Wheezing present.   Skin:     General: Skin is warm and dry.   Neurological:      Mental Status: He is alert and oriented to person, place, and time.   Psychiatric:         Behavior: Behavior normal.         Thought Content: Thought content normal.         Judgment: Judgment normal.         Procedures    Vitals: Blood pressure 110/66, pulse 78, temperature 96.8 °F (36 °C), height 172.7 cm (68\"), weight 67.6 kg (149 lb), SpO2 92 %.    Body mass index is 22.66 kg/m².     Allergies:   Allergies   Allergen Reactions   • Penicillins Unknown (See Comments)     Pt previous;y received ROCEPHIN without adverse effect.   Pt states he was allergic when he was 18 but has had it since and did not have any problems.         During this visit the following were done:  Labs Reviewed []    Labs Ordered []    Radiology Reports Reviewed []    Radiology Ordered " []    PCP Records Reviewed []    Referring Provider Records Reviewed []    ER Records Reviewed []    Hospital Records Reviewed []    History Obtained From Family []    Radiology Images Reviewed []    Other Reviewed []    Records Requested []      Assessment/Plan   Diagnoses and all orders for this visit:    1. Chronic pain syndrome (Primary)  -     Ambulatory Referral to Pain Management    2. Seborrheic dermatitis  -     ketoconazole (NIZORAL) 2 % shampoo; Apply  topically to the appropriate area as directed 2 (Two) Times a Week.  Dispense: 120 mL; Refill: 5    -     azelastine (ASTELIN) 0.1 % nasal spray; 2 sprays into the nostril(s) as directed by provider 2 (Two) Times a Day. Use in each nostril as directed  Dispense: 30 mL; Refill: 12

## 2020-11-09 DIAGNOSIS — E78.2 MIXED HYPERLIPIDEMIA: ICD-10-CM

## 2020-11-09 RX ORDER — ATORVASTATIN CALCIUM 40 MG/1
40 TABLET, FILM COATED ORAL NIGHTLY
Qty: 30 TABLET | Refills: 5 | Status: SHIPPED | OUTPATIENT
Start: 2020-11-09 | End: 2021-04-28

## 2020-11-20 DIAGNOSIS — J43.2 CENTRILOBULAR EMPHYSEMA (HCC): ICD-10-CM

## 2020-11-20 DIAGNOSIS — N52.9 ERECTILE DYSFUNCTION, UNSPECIFIED ERECTILE DYSFUNCTION TYPE: ICD-10-CM

## 2020-11-20 DIAGNOSIS — J44.9 CHRONIC OBSTRUCTIVE PULMONARY DISEASE, UNSPECIFIED COPD TYPE (HCC): ICD-10-CM

## 2020-11-24 RX ORDER — SILDENAFIL CITRATE 20 MG/1
TABLET ORAL
Qty: 30 TABLET | Refills: 1 | OUTPATIENT
Start: 2020-11-24

## 2020-11-30 DIAGNOSIS — J44.9 CHRONIC OBSTRUCTIVE PULMONARY DISEASE, UNSPECIFIED COPD TYPE (HCC): ICD-10-CM

## 2020-11-30 DIAGNOSIS — J43.2 CENTRILOBULAR EMPHYSEMA (HCC): ICD-10-CM

## 2020-11-30 RX ORDER — ALBUTEROL SULFATE 90 UG/1
2 AEROSOL, METERED RESPIRATORY (INHALATION) EVERY 6 HOURS PRN
Qty: 18 G | Refills: 5 | Status: SHIPPED | OUTPATIENT
Start: 2020-11-30 | End: 2021-02-04 | Stop reason: SDUPTHER

## 2020-12-08 ENCOUNTER — TELEPHONE (OUTPATIENT)
Dept: FAMILY MEDICINE CLINIC | Facility: CLINIC | Age: 75
End: 2020-12-08

## 2020-12-08 RX ORDER — AMOXICILLIN 875 MG/1
875 TABLET, COATED ORAL 2 TIMES DAILY
Qty: 20 TABLET | Refills: 0 | Status: SHIPPED | OUTPATIENT
Start: 2020-12-08 | End: 2021-02-04

## 2020-12-08 NOTE — TELEPHONE ENCOUNTER
Patient called requesting Amoxicillin for chest congestion,no fevers,reports he had 4 from a previous Rx & started them just needs enough to finish ,reports it always works for him,offered an acute apt. Stated he had no transportation.

## 2021-01-29 ENCOUNTER — TELEPHONE (OUTPATIENT)
Dept: FAMILY MEDICINE CLINIC | Facility: CLINIC | Age: 76
End: 2021-01-29

## 2021-01-29 NOTE — TELEPHONE ENCOUNTER
LEFT MESSAGE ON PATIENT V/M REGARDING PAIN CLINIC APPOINTMENT. PATIENT IS SCHEDULED WITH DR MARIE, AT [facility] ON 02/12/2021 AT 9:00. ALSO MAILED APPOINTMENT INFORMATION TO PATIENT.

## 2021-02-04 ENCOUNTER — OFFICE VISIT (OUTPATIENT)
Dept: FAMILY MEDICINE CLINIC | Facility: CLINIC | Age: 76
End: 2021-02-04

## 2021-02-04 VITALS
HEART RATE: 93 BPM | OXYGEN SATURATION: 92 % | HEIGHT: 68 IN | WEIGHT: 153 LBS | BODY MASS INDEX: 23.19 KG/M2 | DIASTOLIC BLOOD PRESSURE: 62 MMHG | TEMPERATURE: 97.7 F | SYSTOLIC BLOOD PRESSURE: 124 MMHG

## 2021-02-04 DIAGNOSIS — J44.9 CHRONIC OBSTRUCTIVE PULMONARY DISEASE, UNSPECIFIED COPD TYPE (HCC): ICD-10-CM

## 2021-02-04 DIAGNOSIS — J43.2 CENTRILOBULAR EMPHYSEMA (HCC): ICD-10-CM

## 2021-02-04 PROCEDURE — 99213 OFFICE O/P EST LOW 20 MIN: CPT | Performed by: NURSE PRACTITIONER

## 2021-02-04 RX ORDER — ALBUTEROL SULFATE 90 UG/1
2 AEROSOL, METERED RESPIRATORY (INHALATION) EVERY 6 HOURS PRN
Qty: 18 G | Refills: 5 | Status: SHIPPED | OUTPATIENT
Start: 2021-02-04 | End: 2021-05-21 | Stop reason: SDUPTHER

## 2021-02-04 RX ORDER — IPRATROPIUM BROMIDE AND ALBUTEROL SULFATE 2.5; .5 MG/3ML; MG/3ML
3 SOLUTION RESPIRATORY (INHALATION) EVERY 4 HOURS PRN
Qty: 360 ML | Refills: 3 | Status: SHIPPED | OUTPATIENT
Start: 2021-02-04 | End: 2021-07-20 | Stop reason: SDUPTHER

## 2021-02-04 RX ORDER — MONTELUKAST SODIUM 10 MG/1
10 TABLET ORAL NIGHTLY
Qty: 30 TABLET | Refills: 5 | Status: SHIPPED | OUTPATIENT
Start: 2021-02-04 | End: 2021-05-21 | Stop reason: SDUPTHER

## 2021-02-04 NOTE — PROGRESS NOTES
"Chief Complaint  Follow-up and COPD    Subjective          Paul Gomez presents to [facility] for   History of Present Illness  Wheezing   This is a new problem. The current episode started 1 to 4 weeks ago. The problem occurs constantly. The problem has been unchanged. Associated symptoms include coughing and sputum production. The symptoms are aggravated by smoke (he states that he continues to smoke every day). He has tried steroid inhaler for the symptoms. The treatment provided no relief. His past medical history is significant for COPD.   COPD   This is a chronic problem. The current episode started more than 1 year ago. The problem has been gradually improving. Associated symptoms include coughing. Pertinent negatives include no chest pain or congestion. Exacerbated by: second hand smoke. Treatments tried: Anoro, albuterol, Duoneb. The treatment provided significant relief.     Objective   Vital Signs:   /62 (BP Location: Right arm, Patient Position: Sitting, Cuff Size: Adult)   Pulse 93   Temp 97.7 °F (36.5 °C)   Ht 172.7 cm (68\")   Wt 69.4 kg (153 lb)   SpO2 92% Comment: room air  BMI 23.26 kg/m²     Physical Exam  Vitals signs and nursing note reviewed.   Constitutional:       Appearance: He is well-developed.   Neck:      Musculoskeletal: Normal range of motion and neck supple.   Cardiovascular:      Rate and Rhythm: Normal rate and regular rhythm.      Heart sounds: Normal heart sounds.   Pulmonary:      Effort: Pulmonary effort is normal.      Breath sounds: Wheezing present.   Skin:     General: Skin is warm and dry.   Neurological:      Mental Status: He is alert and oriented to person, place, and time.   Psychiatric:         Behavior: Behavior normal.         Thought Content: Thought content normal.         Judgment: Judgment normal.        Result Review :                 Assessment and Plan    Problem List Items Addressed This Visit        Pulmonary and " Pneumonias    COPD (chronic obstructive pulmonary disease) (CMS/[facility])    Relevant Medications    ipratropium-albuterol (DUO-NEB) 0.5-2.5 mg/3 ml nebulizer    albuterol sulfate  (90 Base) MCG/ACT inhaler    Add montelukast (Singulair) 10 MG tablet    Other Relevant Orders    XR Chest PA & Lateral    XR Chest PA & Lateral          Follow Up   Return in about 4 months (around 6/4/2021).  Patient was given instructions and counseling regarding his condition or for health maintenance advice. Please see specific information pulled into the AVS if appropriate.

## 2021-02-05 ENCOUNTER — TELEPHONE (OUTPATIENT)
Dept: FAMILY MEDICINE CLINIC | Facility: CLINIC | Age: 76
End: 2021-02-05

## 2021-02-05 RX ORDER — SILDENAFIL CITRATE 20 MG/1
TABLET ORAL
Qty: 30 TABLET | Refills: 1 | Status: SHIPPED | OUTPATIENT
Start: 2021-02-05 | End: 2022-01-12

## 2021-02-05 NOTE — TELEPHONE ENCOUNTER
Caller: Paul Gomez    Relationship: Self    Best call back number: [phone]    What medication are you requesting: VIAGRA 40mg    Have you had these symptoms before:    [] Yes  [] No    Have you been treated for these symptoms before:   [] Yes  [] No    If a prescription is needed, what is your preferred pharmacy and phone number: [place], [place] - [address] [place] [address] - [phone]  - [phone]     Additional notes:

## 2021-03-09 DIAGNOSIS — I10 ESSENTIAL HYPERTENSION: ICD-10-CM

## 2021-03-09 RX ORDER — ATENOLOL 25 MG/1
25 TABLET ORAL 2 TIMES DAILY
Qty: 60 TABLET | Refills: 2 | Status: SHIPPED | OUTPATIENT
Start: 2021-03-09 | End: 2021-05-21 | Stop reason: SDUPTHER

## 2021-04-06 ENCOUNTER — OFFICE VISIT (OUTPATIENT)
Dept: FAMILY MEDICINE CLINIC | Facility: CLINIC | Age: 76
End: 2021-04-06

## 2021-04-06 VITALS
DIASTOLIC BLOOD PRESSURE: 64 MMHG | WEIGHT: 151 LBS | HEIGHT: 68 IN | TEMPERATURE: 97.5 F | BODY MASS INDEX: 22.88 KG/M2 | OXYGEN SATURATION: 96 % | HEART RATE: 96 BPM | SYSTOLIC BLOOD PRESSURE: 98 MMHG
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
## 2021-04-06 DIAGNOSIS — G89.29 CHRONIC BILATERAL LOW BACK PAIN WITH RIGHT-SIDED SCIATICA: ICD-10-CM

## 2021-04-06 DIAGNOSIS — M54.41 CHRONIC BILATERAL LOW BACK PAIN WITH RIGHT-SIDED SCIATICA: ICD-10-CM

## 2021-04-06 PROCEDURE — 99213 OFFICE O/P EST LOW 20 MIN: CPT | Performed by: NURSE PRACTITIONER

## 2021-04-06 RX ORDER — OXYMORPHONE HYDROCHLORIDE 40 MG/1
TABLET, FILM COATED, EXTENDED RELEASE ORAL
COMMUNITY
End: 2021-04-06

## 2021-04-06 RX ORDER — OXYMORPHONE HYDROCHLORIDE 30 MG/1
TABLET, FILM COATED, EXTENDED RELEASE ORAL EVERY 12 HOURS
COMMUNITY
End: 2021-04-06

## 2021-04-06 RX ORDER — OXYCODONE HYDROCHLORIDE 20 MG/1
TABLET ORAL
COMMUNITY
Start: 2021-02-12 | End: 2021-04-06

## 2021-04-06 RX ORDER — OXYMORPHONE HYDROCHLORIDE 30 MG/1
30 TABLET, FILM COATED, EXTENDED RELEASE ORAL EVERY 12 HOURS
COMMUNITY
Start: 2021-03-31 | End: 2022-12-21

## 2021-04-06 RX ORDER — LORAZEPAM 0.5 MG/1
TABLET ORAL
COMMUNITY
End: 2021-04-06

## 2021-04-06 RX ORDER — OXYCODONE HYDROCHLORIDE 10 MG/1
10 TABLET ORAL 2 TIMES DAILY PRN
Status: ON HOLD | COMMUNITY
Start: 2021-03-30 | End: 2023-01-31 | Stop reason: SDUPTHER

## 2021-04-06 RX ORDER — OXYCODONE HYDROCHLORIDE 20 MG/1
TABLET ORAL
COMMUNITY
End: 2021-04-06

## 2021-04-06 RX ORDER — HYDROMORPHONE HYDROCHLORIDE 16 MG/1
TABLET, EXTENDED RELEASE ORAL
COMMUNITY
End: 2021-04-06

## 2021-04-06 RX ORDER — OXYMORPHONE HYDROCHLORIDE 20 MG/1
20 TABLET, FILM COATED, EXTENDED RELEASE ORAL
COMMUNITY
End: 2021-04-06

## 2021-04-06 NOTE — PROGRESS NOTES
"Chief Complaint  Fall    Subjective          Paul Gomez presents to [facility]  Fall  The accident occurred more than 1 week ago. Fall occurred: at home; stepped on rug and rug slipped and he fell forward. He landed on hard floor. There was no blood loss. Point of impact: fell forward but caught himself before face hit floor. Pain location: lumbar spine. The pain is moderate. The symptoms are aggravated by sitting. Pertinent negatives include no numbness or tingling. Treatments tried: oxycodone and oxymorphone. The treatment provided mild relief.   Back Pain  This is a chronic problem. The current episode started more than 1 year ago. The problem occurs constantly. The pain is present in the lumbar spine. The quality of the pain is described as aching and cramping. The pain radiates to the right knee. The pain is moderate. The pain is the same all the time. Pertinent negatives include no numbness or tingling. He has tried analgesics for the symptoms. The treatment provided moderate relief.   MRI lumbar spine in 2014:  There is complete obliteration of the disc space and endplate changes at L4-L5. Disc bulges are seen at L3-L4 and at L4-L5.    Objective   Vital Signs:   BP 98/64 (BP Location: Right arm, Patient Position: Sitting, Cuff Size: Adult)   Pulse 96   Temp 97.5 °F (36.4 °C)   Ht 172.7 cm (68\")   Wt 68.5 kg (151 lb)   SpO2 96%   BMI 22.96 kg/m²     Physical Exam  Vitals and nursing note reviewed.   Constitutional:       Appearance: He is well-developed.   Cardiovascular:      Rate and Rhythm: Normal rate and regular rhythm.      Heart sounds: Normal heart sounds.   Pulmonary:      Effort: Pulmonary effort is normal.      Breath sounds: Normal breath sounds.   Musculoskeletal:      Cervical back: Normal range of motion and neck supple.   Skin:     General: Skin is warm and dry.   Neurological:      Mental Status: He is alert and oriented to person, place, and time. "   Psychiatric:         Behavior: Behavior normal.         Thought Content: Thought content normal.         Judgment: Judgment normal.        Result Review :                 Assessment and Plan    Diagnoses and all orders for this visit:    1. Fall, initial encounter (Primary)    2. Chronic bilateral low back pain with right-sided sciatica  -     MRI Lumbar Spine Without Contrast; Future        Follow Up   Return if symptoms worsen or fail to improve.  Patient was given instructions and counseling regarding his condition or for health maintenance advice. Please see specific information pulled into the AVS if appropriate.

## 2021-04-27 ENCOUNTER — HOSPITAL ENCOUNTER (OUTPATIENT)
Dept: MRI IMAGING | Facility: HOSPITAL | Age: 76
Discharge: HOME | End: 2021-04-27
Admitting: NURSE PRACTITIONER

## 2021-04-27 DIAGNOSIS — M54.41 CHRONIC BILATERAL LOW BACK PAIN WITH RIGHT-SIDED SCIATICA: ICD-10-CM

## 2021-04-27 DIAGNOSIS — G89.29 CHRONIC BILATERAL LOW BACK PAIN WITH RIGHT-SIDED SCIATICA: ICD-10-CM

## 2021-04-27 PROCEDURE — 72148 MRI LUMBAR SPINE W/O DYE: CPT

## 2021-04-27 PROCEDURE — 72148 MRI LUMBAR SPINE W/O DYE: CPT | Performed by: RADIOLOGY

## 2021-04-28 DIAGNOSIS — E78.2 MIXED HYPERLIPIDEMIA: ICD-10-CM

## 2021-04-28 RX ORDER — ATORVASTATIN CALCIUM 40 MG/1
TABLET, FILM COATED ORAL
Qty: 30 TABLET | Refills: 5 | Status: SHIPPED | OUTPATIENT
Start: 2021-04-28 | End: 2021-05-21 | Stop reason: SDUPTHER

## 2021-04-29 ENCOUNTER — TELEPHONE (OUTPATIENT)
Dept: FAMILY MEDICINE CLINIC | Facility: CLINIC | Age: 76
End: 2021-04-29

## 2021-05-06 NOTE — TELEPHONE ENCOUNTER
No acute findings(no need for emergent surgery).  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  We will discuss treatment options when you come in.    Left a message to return call.    Patient notified & verbalized understanding.

## 2021-05-06 NOTE — TELEPHONE ENCOUNTER
No acute findings(no need for emergent surgery).  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  We will discuss treatment options when you come in.

## 2021-05-21 ENCOUNTER — OFFICE VISIT (OUTPATIENT)
Dept: FAMILY MEDICINE CLINIC | Facility: CLINIC | Age: 76
End: 2021-05-21

## 2021-05-21 VITALS
HEART RATE: 67 BPM | BODY MASS INDEX: 23.55 KG/M2 | DIASTOLIC BLOOD PRESSURE: 74 MMHG | WEIGHT: 155.4 LBS | TEMPERATURE: 99.1 F | HEIGHT: 68 IN | OXYGEN SATURATION: 98 % | SYSTOLIC BLOOD PRESSURE: 126 MMHG

## 2021-05-21 DIAGNOSIS — J43.2 CENTRILOBULAR EMPHYSEMA (HCC): ICD-10-CM

## 2021-05-21 DIAGNOSIS — D64.9 ANEMIA, UNSPECIFIED TYPE: Primary | ICD-10-CM

## 2021-05-21 DIAGNOSIS — L21.9 SEBORRHEIC DERMATITIS: ICD-10-CM

## 2021-05-21 DIAGNOSIS — E78.2 MIXED HYPERLIPIDEMIA: ICD-10-CM

## 2021-05-21 DIAGNOSIS — I10 ESSENTIAL HYPERTENSION: ICD-10-CM

## 2021-05-21 DIAGNOSIS — J44.9 CHRONIC OBSTRUCTIVE PULMONARY DISEASE, UNSPECIFIED COPD TYPE (HCC): ICD-10-CM

## 2021-05-21 PROCEDURE — 99214 OFFICE O/P EST MOD 30 MIN: CPT | Performed by: FAMILY MEDICINE

## 2021-05-21 RX ORDER — ALBUTEROL SULFATE 90 UG/1
2 AEROSOL, METERED RESPIRATORY (INHALATION) EVERY 6 HOURS PRN
Qty: 18 G | Refills: 5 | Status: SHIPPED | OUTPATIENT
Start: 2021-05-21 | End: 2021-08-23 | Stop reason: SDUPTHER

## 2021-05-21 RX ORDER — TRIAMCINOLONE ACETONIDE 1 MG/G
CREAM TOPICAL 2 TIMES DAILY
Qty: 80 G | Refills: 2 | Status: SHIPPED | OUTPATIENT
Start: 2021-05-21 | End: 2021-06-11

## 2021-05-21 RX ORDER — AZELASTINE 1 MG/ML
2 SPRAY, METERED NASAL 2 TIMES DAILY
Qty: 30 ML | Refills: 12 | Status: SHIPPED | OUTPATIENT
Start: 2021-05-21 | End: 2021-08-23

## 2021-05-21 RX ORDER — GABAPENTIN 800 MG/1
TABLET ORAL
COMMUNITY
Start: 2021-04-20 | End: 2022-12-21

## 2021-05-21 RX ORDER — LORAZEPAM 0.5 MG/1
TABLET ORAL
COMMUNITY
Start: 2021-04-20 | End: 2021-06-11

## 2021-05-21 RX ORDER — ATORVASTATIN CALCIUM 40 MG/1
40 TABLET, FILM COATED ORAL NIGHTLY
Qty: 90 TABLET | Refills: 1 | Status: SHIPPED | OUTPATIENT
Start: 2021-05-21 | End: 2021-08-23 | Stop reason: SDUPTHER

## 2021-05-21 RX ORDER — KETOCONAZOLE 20 MG/ML
SHAMPOO TOPICAL 2 TIMES WEEKLY
Qty: 120 ML | Refills: 5 | Status: SHIPPED | OUTPATIENT
Start: 2021-05-24 | End: 2021-08-23 | Stop reason: SDUPTHER

## 2021-05-21 RX ORDER — ATENOLOL 25 MG/1
25 TABLET ORAL 2 TIMES DAILY
Qty: 180 TABLET | Refills: 0 | Status: SHIPPED | OUTPATIENT
Start: 2021-05-21 | End: 2021-08-23 | Stop reason: SDUPTHER

## 2021-05-21 RX ORDER — ASPIRIN 81 MG/1
81 TABLET ORAL DAILY
Qty: 90 TABLET | Refills: 1 | Status: SHIPPED | OUTPATIENT
Start: 2021-05-21 | End: 2021-10-12

## 2021-05-21 RX ORDER — MONTELUKAST SODIUM 10 MG/1
10 TABLET ORAL NIGHTLY
Qty: 90 TABLET | Refills: 1 | Status: SHIPPED | OUTPATIENT
Start: 2021-05-21 | End: 2021-08-23 | Stop reason: SDUPTHER

## 2021-05-21 NOTE — PROGRESS NOTES
Subjective   Paul Gomez is a 75 y.o. male.   Pt presents today with CC of Back Pain (MRI Results)      History of Present Illness   1.  Patient is here to follow-up on medication refills.  He has COPD for which he takes Singulair and albuterol.  He quit smoking 3 years ago.  #2 he has seborrheic dermatitis for which he uses Nizoral shampoo.  He does well on this regimen.  #3 he has hyperlipidemia for which he takes atorvastatin 40 mg nightly.  His last lipid panel looked good.  #4 he has hypertension for which he takes atenolol 25 mg twice a day, he also takes a daily aspirin.  He does well on this regimen.  #5 he was found to be anemic with hemoglobin 12.0, normocytic 6 months ago.  He does not want labs today.  He is agreeable to return for them next week.  #6 he has chronic pain.  He follows with pain management.  His previous pain manager moved to [place], patient is driving up every 2 months to see his pain manager.  #7 he recently had an MRI.  He request the results today.  Results can be seen in his chart.         The following portions of the patient's history were reviewed and updated as appropriate: allergies, current medications, past family history, past medical history, past social history, past surgical history and problem list.    Review of Systems   Constitutional: Negative for chills, fever and unexpected weight loss.   HENT: Negative for congestion and sore throat.    Eyes: Negative for blurred vision and visual disturbance.   Respiratory: Negative for cough and wheezing.    Cardiovascular: Negative for chest pain and palpitations.   Gastrointestinal: Negative for abdominal pain and diarrhea.   Endocrine: Negative for cold intolerance and heat intolerance.   Genitourinary: Negative for dysuria.   Musculoskeletal: Negative for arthralgias and neck stiffness.   Neurological: Negative for dizziness, seizures and syncope.   Psychiatric/Behavioral: Negative for self-injury, suicidal ideas and  depressed mood.       Objective   Physical Exam  Vitals and nursing note reviewed.   Constitutional:       Appearance: He is well-developed.   HENT:      Head: Normocephalic and atraumatic.      Right Ear: External ear normal.      Left Ear: External ear normal.      Nose: Nose normal.   Eyes:      Conjunctiva/sclera: Conjunctivae normal.      Pupils: Pupils are equal, round, and reactive to light.   Cardiovascular:      Rate and Rhythm: Normal rate and regular rhythm.      Heart sounds: Normal heart sounds.   Pulmonary:      Effort: Pulmonary effort is normal.      Breath sounds: Normal breath sounds.   Abdominal:      General: Bowel sounds are normal.      Palpations: Abdomen is soft.   Musculoskeletal:      Cervical back: Normal range of motion and neck supple.      Comments: Normal gait.   Skin:     General: Skin is warm and dry.   Neurological:      Mental Status: He is alert and oriented to person, place, and time.   Psychiatric:         Behavior: Behavior normal.           Assessment/Plan   Diagnoses and all orders for this visit:    1. Anemia, unspecified type (Primary)  -     CBC Auto Differential; Future    2. Chronic obstructive pulmonary disease, unspecified COPD type (CMS/HCC)  -     montelukast (Singulair) 10 MG tablet; Take 1 tablet by mouth Every Night.  Dispense: 90 tablet; Refill: 1  -     albuterol sulfate  (90 Base) MCG/ACT inhaler; Inhale 2 puffs Every 6 (Six) Hours As Needed for Wheezing or Shortness of Air.  Dispense: 18 g; Refill: 5    3. Centrilobular emphysema (CMS/HCC)  -     montelukast (Singulair) 10 MG tablet; Take 1 tablet by mouth Every Night.  Dispense: 90 tablet; Refill: 1  -     albuterol sulfate  (90 Base) MCG/ACT inhaler; Inhale 2 puffs Every 6 (Six) Hours As Needed for Wheezing or Shortness of Air.  Dispense: 18 g; Refill: 5    4. Seborrheic dermatitis  -     ketoconazole (NIZORAL) 2 % shampoo; Apply  topically to the appropriate area as directed 2 (Two) Times a  Week.  Dispense: 120 mL; Refill: 5    5. Mixed hyperlipidemia  -     atorvastatin (LIPITOR) 40 MG tablet; Take 1 tablet by mouth Every Night.  Dispense: 90 tablet; Refill: 1    6. Essential hypertension  -     atenolol (TENORMIN) 25 MG tablet; Take 1 tablet by mouth 2 (Two) Times a Day.  Dispense: 180 tablet; Refill: 0  -     aspirin (aspirin) 81 MG EC tablet; Take 1 tablet by mouth Daily.  Dispense: 90 tablet; Refill: 1  -     Comprehensive Metabolic Panel; Future  -     CBC Auto Differential; Future    #7 chronic back pain  We discussed his MRI result briefly.  There is dense of impingement on nerve roots and perhaps the cord.  He is going to contact us if he would like to go speak with neurosurgery.  I will send a referral at that time if needed.  No emergent surgery needed.        -     triamcinolone (KENALOG) 0.1 % cream; Apply  topically to the appropriate area as directed 2 (Two) Times a Day.  Dispense: 80 g; Refill: 2  -     azelastine (ASTELIN) 0.1 % nasal spray; 2 sprays into the nostril(s) as directed by provider 2 (Two) Times a Day. Use in each nostril as directed  Dispense: 30 mL; Refill: 12

## 2021-05-26 RX ORDER — SILDENAFIL CITRATE 20 MG/1
TABLET ORAL
Qty: 30 TABLET | Refills: 1 | OUTPATIENT
Start: 2021-05-26

## 2021-06-11 ENCOUNTER — OFFICE VISIT (OUTPATIENT)
Dept: FAMILY MEDICINE CLINIC | Facility: CLINIC | Age: 76
End: 2021-06-11

## 2021-06-11 VITALS
DIASTOLIC BLOOD PRESSURE: 86 MMHG | WEIGHT: 149.6 LBS | OXYGEN SATURATION: 97 % | HEIGHT: 68 IN | BODY MASS INDEX: 22.67 KG/M2 | TEMPERATURE: 96.9 F | SYSTOLIC BLOOD PRESSURE: 154 MMHG | HEART RATE: 58 BPM

## 2021-06-11 DIAGNOSIS — I10 ESSENTIAL HYPERTENSION: ICD-10-CM

## 2021-06-11 DIAGNOSIS — M1A.09X0 CHRONIC GOUT OF MULTIPLE SITES, UNSPECIFIED CAUSE: ICD-10-CM

## 2021-06-11 DIAGNOSIS — M10.9 ACUTE GOUT INVOLVING TOE OF LEFT FOOT, UNSPECIFIED CAUSE: ICD-10-CM

## 2021-06-11 DIAGNOSIS — D64.9 ANEMIA, UNSPECIFIED TYPE: ICD-10-CM

## 2021-06-11 DIAGNOSIS — E78.2 MIXED HYPERLIPIDEMIA: Primary | ICD-10-CM

## 2021-06-11 DIAGNOSIS — M25.50 ACUTE JOINT PAIN: ICD-10-CM

## 2021-06-11 PROCEDURE — 85025 COMPLETE CBC W/AUTO DIFF WBC: CPT | Performed by: FAMILY MEDICINE

## 2021-06-11 PROCEDURE — 80053 COMPREHEN METABOLIC PANEL: CPT | Performed by: FAMILY MEDICINE

## 2021-06-11 PROCEDURE — 80061 LIPID PANEL: CPT | Performed by: FAMILY MEDICINE

## 2021-06-11 PROCEDURE — 84550 ASSAY OF BLOOD/URIC ACID: CPT | Performed by: FAMILY MEDICINE

## 2021-06-11 PROCEDURE — 36415 COLL VENOUS BLD VENIPUNCTURE: CPT | Performed by: FAMILY MEDICINE

## 2021-06-11 PROCEDURE — 86140 C-REACTIVE PROTEIN: CPT | Performed by: FAMILY MEDICINE

## 2021-06-11 PROCEDURE — 99214 OFFICE O/P EST MOD 30 MIN: CPT | Performed by: FAMILY MEDICINE

## 2021-06-11 RX ORDER — ALLOPURINOL 100 MG/1
100 TABLET ORAL DAILY
Qty: 90 TABLET | Refills: 0 | Status: SHIPPED | OUTPATIENT
Start: 2021-06-11 | End: 2021-08-23 | Stop reason: SDUPTHER

## 2021-06-11 RX ORDER — COLCHICINE 0.6 MG/1
0.6 TABLET ORAL DAILY
Qty: 30 TABLET | Refills: 0 | Status: SHIPPED | OUTPATIENT
Start: 2021-06-11 | End: 2021-08-23

## 2021-06-11 NOTE — PROGRESS NOTES
Subjective   Paul Gomez is a 75 y.o. male.   Pt presents today with CC of Back Pain      History of Present Illness   Patient is a 75-year-old male with chronic back pain.  He had a recent MRI.  Follows with pain management for these concerns.  He reports an increase in his back and left foot pain.  He has a history of gout with a uric acid in the past of 7.5.  He was taking allopurinol in the past but it slowly got back off of this medication as he was controlled with diet.  He is concerned that his gout has returned and would like to be checked.  He did well with colchicine in the past as well as allopurinol.  He requests prescriptions for these medications.  Of note, he started taking atorvastatin since the last time he took colchicine.               The following portions of the patient's history were reviewed and updated as appropriate: allergies, current medications, past family history, past medical history, past social history, past surgical history and problem list.    Review of Systems   Constitutional: Negative for chills, fever and unexpected weight loss.   HENT: Negative for congestion and sore throat.    Eyes: Negative for blurred vision and visual disturbance.   Respiratory: Negative for cough and wheezing.    Cardiovascular: Negative for chest pain and palpitations.   Gastrointestinal: Negative for abdominal pain and diarrhea.   Endocrine: Negative for cold intolerance and heat intolerance.   Genitourinary: Negative for dysuria.   Musculoskeletal: Positive for arthralgias and back pain. Negative for neck stiffness.   Neurological: Negative for dizziness, seizures and syncope.   Psychiatric/Behavioral: Negative for self-injury, suicidal ideas and depressed mood.       Objective   Physical Exam  Vitals and nursing note reviewed.   Constitutional:       Appearance: He is well-developed.   HENT:      Head: Normocephalic and atraumatic.      Right Ear: External ear normal.      Left Ear: External ear  normal.      Nose: Nose normal.   Eyes:      Conjunctiva/sclera: Conjunctivae normal.      Pupils: Pupils are equal, round, and reactive to light.   Cardiovascular:      Rate and Rhythm: Normal rate and regular rhythm.      Heart sounds: Normal heart sounds.   Pulmonary:      Effort: Pulmonary effort is normal.      Breath sounds: Normal breath sounds.   Abdominal:      General: Bowel sounds are normal.      Palpations: Abdomen is soft.   Musculoskeletal:      Cervical back: Normal range of motion and neck supple.      Comments: No redness, warmth or swelling around his back.  Neurologic exam is baseline.  His feet are neurovascularly intact.  Great toe MTP joint is painful even with mild movement.  It is not swollen or hot, however.   Skin:     General: Skin is warm and dry.   Neurological:      Mental Status: He is alert and oriented to person, place, and time.   Psychiatric:         Behavior: Behavior normal.           Assessment/Plan   Diagnoses and all orders for this visit:    1. Mixed hyperlipidemia (Primary)  -     C-reactive Protein; Future  -     Uric Acid; Future  -     Lipid Panel; Future  -     C-reactive Protein  -     Uric Acid  -     Lipid Panel    2. Essential hypertension  -     C-reactive Protein; Future  -     Uric Acid; Future  -     Lipid Panel; Future  -     Comprehensive Metabolic Panel  -     CBC Auto Differential  -     C-reactive Protein  -     Uric Acid  -     Lipid Panel    3. Chronic gout of multiple sites, unspecified cause  -     C-reactive Protein; Future  -     Uric Acid; Future  -     colchicine 0.6 MG tablet; Take 1 tablet by mouth Daily.  Dispense: 30 tablet; Refill: 0  -     allopurinol (Zyloprim) 100 MG tablet; Take 1 tablet by mouth Daily. Start 6/14/21  Dispense: 90 tablet; Refill: 0  -     C-reactive Protein  -     Uric Acid    4. Acute joint pain  -     colchicine 0.6 MG tablet; Take 1 tablet by mouth Daily.  Dispense: 30 tablet; Refill: 0  -     allopurinol (Zyloprim) 100  MG tablet; Take 1 tablet by mouth Daily. Start 6/14/21  Dispense: 90 tablet; Refill: 0    5. Acute gout involving toe of left foot, unspecified cause  -     colchicine 0.6 MG tablet; Take 1 tablet by mouth Daily.  Dispense: 30 tablet; Refill: 0  -     allopurinol (Zyloprim) 100 MG tablet; Take 1 tablet by mouth Daily. Start 6/14/21  Dispense: 90 tablet; Refill: 0  Recommend taking colchicine once this morning, and once this evening.  From then on, for the remainder of the 29 days of treatment, he is to take just 1 a day.  Recommend not starting allopurinol until Monday.  I discussed with him that this is because of an an association between gout flares and initially starting allopurinol.  Recommend taking colchicine for about a month, though it was discussed that the evidence is limited for the best amount of time.  He takes atorvastatin.  I recommend holding atorvastatin for the next week until he is established on these other medications as atorvastatin and colchicine sometimes interact.  He is agreeable to plan.  He will be starting back on atorvastatin in 1 week.  Getting blood work today to ensure that gout is the most likely diagnosis.  If his uric acid is less than 6, I am going to assume that gout is not the problem, and I will stop the uric acid lowering agent as that point the potential risk outweighs the benefit.  He is agreeable to the plan.  6. Anemia, unspecified type  -     CBC Auto Differential                 Ray DUARTE Patricia  reports that he has been smoking cigarettes. He has a 15.00 pack-year smoking history. He has never used smokeless tobacco.. I have educated him on the risk of diseases from using tobacco products such as cancer, COPD and heart disease.     I advised him to quit and he is not willing to quit.    I spent 3  minutes counseling the patient.         Patient's Body mass index is 22.75 kg/m². indicating that he is within normal range (BMI 18.5-24.9). No BMI management plan needed..

## 2021-06-12 LAB
ALBUMIN SERPL-MCNC: 4.1 G/DL (ref 3.5–5.2)
ALBUMIN/GLOB SERPL: 1.3 G/DL
ALP SERPL-CCNC: 84 U/L (ref 39–117)
ALT SERPL W P-5'-P-CCNC: 15 U/L (ref 1–41)
ANION GAP SERPL CALCULATED.3IONS-SCNC: 8.4 MMOL/L (ref 5–15)
AST SERPL-CCNC: 23 U/L (ref 1–40)
BASOPHILS # BLD AUTO: 0.06 10*3/MM3 (ref 0–0.2)
BASOPHILS NFR BLD AUTO: 1 % (ref 0–1.5)
BILIRUB SERPL-MCNC: 0.6 MG/DL (ref 0–1.2)
BUN SERPL-MCNC: 9 MG/DL (ref 8–23)
BUN/CREAT SERPL: 11.3 (ref 7–25)
CALCIUM SPEC-SCNC: 9.2 MG/DL (ref 8.6–10.5)
CHLORIDE SERPL-SCNC: 102 MMOL/L (ref 98–107)
CHOLEST SERPL-MCNC: 125 MG/DL (ref 0–200)
CO2 SERPL-SCNC: 26.6 MMOL/L (ref 22–29)
CREAT SERPL-MCNC: 0.8 MG/DL (ref 0.76–1.27)
CRP SERPL-MCNC: <0.3 MG/DL (ref 0–0.5)
DEPRECATED RDW RBC AUTO: 44.7 FL (ref 37–54)
EOSINOPHIL # BLD AUTO: 0.87 10*3/MM3 (ref 0–0.4)
EOSINOPHIL NFR BLD AUTO: 14.5 % (ref 0.3–6.2)
ERYTHROCYTE [DISTWIDTH] IN BLOOD BY AUTOMATED COUNT: 13 % (ref 12.3–15.4)
GFR SERPL CREATININE-BSD FRML MDRD: 94 ML/MIN/1.73
GLOBULIN UR ELPH-MCNC: 3.1 GM/DL
GLUCOSE SERPL-MCNC: 104 MG/DL (ref 65–99)
HCT VFR BLD AUTO: 37.5 % (ref 37.5–51)
HDLC SERPL-MCNC: 54 MG/DL (ref 40–60)
HGB BLD-MCNC: 12.9 G/DL (ref 13–17.7)
IMM GRANULOCYTES # BLD AUTO: 0.02 10*3/MM3 (ref 0–0.05)
IMM GRANULOCYTES NFR BLD AUTO: 0.3 % (ref 0–0.5)
LDLC SERPL CALC-MCNC: 59 MG/DL (ref 0–100)
LDLC/HDLC SERPL: 1.11 {RATIO}
LYMPHOCYTES # BLD AUTO: 1.51 10*3/MM3 (ref 0.7–3.1)
LYMPHOCYTES NFR BLD AUTO: 25.1 % (ref 19.6–45.3)
MCH RBC QN AUTO: 32.3 PG (ref 26.6–33)
MCHC RBC AUTO-ENTMCNC: 34.4 G/DL (ref 31.5–35.7)
MCV RBC AUTO: 93.8 FL (ref 79–97)
MONOCYTES # BLD AUTO: 0.82 10*3/MM3 (ref 0.1–0.9)
MONOCYTES NFR BLD AUTO: 13.6 % (ref 5–12)
NEUTROPHILS NFR BLD AUTO: 2.73 10*3/MM3 (ref 1.7–7)
NEUTROPHILS NFR BLD AUTO: 45.5 % (ref 42.7–76)
NRBC BLD AUTO-RTO: 0 /100 WBC (ref 0–0.2)
PLATELET # BLD AUTO: 238 10*3/MM3 (ref 140–450)
PMV BLD AUTO: 11.2 FL (ref 6–12)
POTASSIUM SERPL-SCNC: 4.7 MMOL/L (ref 3.5–5.2)
PROT SERPL-MCNC: 7.2 G/DL (ref 6–8.5)
RBC # BLD AUTO: 4 10*6/MM3 (ref 4.14–5.8)
SODIUM SERPL-SCNC: 137 MMOL/L (ref 136–145)
TRIGL SERPL-MCNC: 55 MG/DL (ref 0–150)
URATE SERPL-MCNC: 5.9 MG/DL (ref 3.4–7)
VLDLC SERPL-MCNC: 12 MG/DL (ref 5–40)
WBC # BLD AUTO: 6.01 10*3/MM3 (ref 3.4–10.8)

## 2021-06-15 ENCOUNTER — TELEPHONE (OUTPATIENT)
Dept: FAMILY MEDICINE CLINIC | Facility: CLINIC | Age: 76
End: 2021-06-15

## 2021-06-15 NOTE — TELEPHONE ENCOUNTER
----- Message from Luis Dobbs DO sent at 6/14/2021  6:22 PM EDT -----  Blood work looks okay.  Your uric acid was 5.9, because of history of gout, I recommend continuing your current dose of allopurinol.  How is your joint pain?  Are you tolerating the medications?      Spoke with patient & he verbalized understanding,joint pain is better & he is tolerating the medication well.

## 2021-07-20 ENCOUNTER — TELEPHONE (OUTPATIENT)
Dept: FAMILY MEDICINE CLINIC | Facility: CLINIC | Age: 76
End: 2021-07-20

## 2021-07-20 DIAGNOSIS — J43.2 CENTRILOBULAR EMPHYSEMA (HCC): ICD-10-CM

## 2021-07-20 DIAGNOSIS — J44.9 CHRONIC OBSTRUCTIVE PULMONARY DISEASE, UNSPECIFIED COPD TYPE (HCC): ICD-10-CM

## 2021-07-20 RX ORDER — IPRATROPIUM BROMIDE AND ALBUTEROL SULFATE 2.5; .5 MG/3ML; MG/3ML
3 SOLUTION RESPIRATORY (INHALATION) EVERY 4 HOURS PRN
Qty: 360 ML | Refills: 3 | Status: SHIPPED | OUTPATIENT
Start: 2021-07-20 | End: 2021-08-23 | Stop reason: SDUPTHER

## 2021-08-23 ENCOUNTER — OFFICE VISIT (OUTPATIENT)
Dept: FAMILY MEDICINE CLINIC | Facility: CLINIC | Age: 76
End: 2021-08-23

## 2021-08-23 VITALS
WEIGHT: 150.8 LBS | DIASTOLIC BLOOD PRESSURE: 84 MMHG | HEART RATE: 73 BPM | OXYGEN SATURATION: 96 % | TEMPERATURE: 97.1 F | BODY MASS INDEX: 22.85 KG/M2 | SYSTOLIC BLOOD PRESSURE: 120 MMHG | HEIGHT: 68 IN

## 2021-08-23 DIAGNOSIS — J44.9 CHRONIC OBSTRUCTIVE PULMONARY DISEASE, UNSPECIFIED COPD TYPE (HCC): ICD-10-CM

## 2021-08-23 DIAGNOSIS — M10.9 ACUTE GOUT INVOLVING TOE OF LEFT FOOT, UNSPECIFIED CAUSE: ICD-10-CM

## 2021-08-23 DIAGNOSIS — F17.200 CURRENT EVERY DAY SMOKER: ICD-10-CM

## 2021-08-23 DIAGNOSIS — J43.2 CENTRILOBULAR EMPHYSEMA (HCC): ICD-10-CM

## 2021-08-23 DIAGNOSIS — J40 BRONCHITIS: ICD-10-CM

## 2021-08-23 DIAGNOSIS — E78.2 MIXED HYPERLIPIDEMIA: ICD-10-CM

## 2021-08-23 DIAGNOSIS — Z00.00 MEDICARE ANNUAL WELLNESS VISIT, SUBSEQUENT: Primary | ICD-10-CM

## 2021-08-23 DIAGNOSIS — L21.9 SEBORRHEIC DERMATITIS: ICD-10-CM

## 2021-08-23 DIAGNOSIS — M25.50 ACUTE JOINT PAIN: ICD-10-CM

## 2021-08-23 DIAGNOSIS — I10 ESSENTIAL HYPERTENSION: ICD-10-CM

## 2021-08-23 DIAGNOSIS — M1A.09X0 CHRONIC GOUT OF MULTIPLE SITES, UNSPECIFIED CAUSE: ICD-10-CM

## 2021-08-23 PROCEDURE — 99213 OFFICE O/P EST LOW 20 MIN: CPT | Performed by: FAMILY MEDICINE

## 2021-08-23 PROCEDURE — G0439 PPPS, SUBSEQ VISIT: HCPCS | Performed by: FAMILY MEDICINE

## 2021-08-23 RX ORDER — ALLOPURINOL 100 MG/1
100 TABLET ORAL DAILY
Qty: 90 TABLET | Refills: 0 | Status: SHIPPED | OUTPATIENT
Start: 2021-08-23 | End: 2021-12-07

## 2021-08-23 RX ORDER — MONTELUKAST SODIUM 10 MG/1
10 TABLET ORAL NIGHTLY
Qty: 90 TABLET | Refills: 1 | Status: SHIPPED | OUTPATIENT
Start: 2021-08-23 | End: 2021-12-17 | Stop reason: SDUPTHER

## 2021-08-23 RX ORDER — ATENOLOL 25 MG/1
25 TABLET ORAL 2 TIMES DAILY
Qty: 180 TABLET | Refills: 0 | Status: SHIPPED | OUTPATIENT
Start: 2021-08-23 | End: 2021-12-17 | Stop reason: SDUPTHER

## 2021-08-23 RX ORDER — DOXYCYCLINE 100 MG/1
100 CAPSULE ORAL 2 TIMES DAILY
Qty: 14 CAPSULE | Refills: 0 | Status: SHIPPED | OUTPATIENT
Start: 2021-08-23 | End: 2021-12-17

## 2021-08-23 RX ORDER — IPRATROPIUM BROMIDE AND ALBUTEROL SULFATE 2.5; .5 MG/3ML; MG/3ML
3 SOLUTION RESPIRATORY (INHALATION) EVERY 4 HOURS PRN
Qty: 360 ML | Refills: 3 | Status: SHIPPED | OUTPATIENT
Start: 2021-08-23 | End: 2021-12-17 | Stop reason: SDUPTHER

## 2021-08-23 RX ORDER — ALBUTEROL SULFATE 90 UG/1
2 AEROSOL, METERED RESPIRATORY (INHALATION) EVERY 6 HOURS PRN
Qty: 18 G | Refills: 5 | Status: SHIPPED | OUTPATIENT
Start: 2021-08-23 | End: 2021-12-17 | Stop reason: SDUPTHER

## 2021-08-23 RX ORDER — KETOCONAZOLE 20 MG/ML
SHAMPOO TOPICAL 2 TIMES WEEKLY
Qty: 120 ML | Refills: 5 | Status: SHIPPED | OUTPATIENT
Start: 2021-08-23 | End: 2022-10-12

## 2021-08-23 RX ORDER — ATORVASTATIN CALCIUM 40 MG/1
40 TABLET, FILM COATED ORAL NIGHTLY
Qty: 90 TABLET | Refills: 1 | Status: SHIPPED | OUTPATIENT
Start: 2021-08-23 | End: 2021-12-17 | Stop reason: SDUPTHER

## 2021-08-23 NOTE — PROGRESS NOTES
QUICK REFERENCE INFORMATION:  The ABCs of the Annual Wellness Visit    Subsequent Medicare Wellness Visit    HEALTH RISK ASSESSMENT    1945    Recent Hospitalizations:  No hospitalization(s) within the last year..        Current Medical Providers:  Patient Care Team:  Luis Dobbs DO as PCP - General (Family Medicine)        Smoking Status:  Social History     Tobacco Use   Smoking Status Current Every Day Smoker   • Packs/day: 0.25   • Years: 60.00   • Pack years: 15.00   • Types: Cigarettes   Smokeless Tobacco Never Used   Tobacco Comment    patient stated he is down to about 4 cigarettes a day       Alcohol Consumption:  Social History     Substance and Sexual Activity   Alcohol Use No       Depression Screen:   PHQ-2/PHQ-9 Depression Screening 8/23/2021   Little interest or pleasure in doing things 0   Feeling down, depressed, or hopeless 1   Total Score 1       Health Habits and Functional and Cognitive Screening:  Functional & Cognitive Status 8/23/2021   Do you have difficulty preparing food and eating? No   Do you have difficulty bathing yourself, getting dressed or grooming yourself? No   Do you have difficulty using the toilet? No   Do you have difficulty moving around from place to place? No   Do you have trouble with steps or getting out of a bed or a chair? No   Current Diet Well Balanced Diet   Dental Exam Up to date   Eye Exam Up to date   Exercise (times per week) 7 times per week   Current Exercises Include Walking   Current Exercise Activities Include -   Do you need help using the phone?  No   Are you deaf or do you have serious difficulty hearing?  Yes   Do you need help with transportation? No   Do you need help shopping? No   Do you need help preparing meals?  No   Do you need help with housework?  No   Do you need help with laundry? No   Do you need help taking your medications? No   Do you need help managing money? No   Do you ever drive or ride in a car without wearing a seat  belt? No           Does the patient have evidence of cognitive impairment? No    Aspirin use counseling: Taking ASA appropriately as indicated      Recent Lab Results:  CMP:  Lab Results   Component Value Date    GLU 99 10/13/2016    BUN 9 06/11/2021    CREATININE 0.80 06/11/2021    EGFRIFNONA 94 06/11/2021    EGFRIFAFRI 91 10/13/2016    BCR 11.3 06/11/2021     06/11/2021    K 4.7 06/11/2021    CO2 26.6 06/11/2021    CALCIUM 9.2 06/11/2021    PROTENTOTREF 6.6 10/13/2016    ALBUMIN 4.10 06/11/2021    LABGLOBREF 2.7 10/13/2016    LABIL2 1.4 (L) 10/13/2016    BILITOT 0.6 06/11/2021    ALKPHOS 84 06/11/2021    AST 23 06/11/2021    ALT 15 06/11/2021     Lipid Panel:  Lab Results   Component Value Date    CHOL 125 06/11/2021    TRIG 55 06/11/2021    HDL 54 06/11/2021    VLDL 12 06/11/2021    LDLHDL 1.11 06/11/2021     HbA1c:  Lab Results   Component Value Date    HGBA1C 6.00 (H) 12/13/2018       Visual Acuity:  No exam data present    Age-appropriate Screening Schedule:  Refer to the list below for future screening recommendations based on patient's age, sex and/or medical conditions. Orders for these recommended tests are listed in the plan section. The patient has been provided with a written plan.    Health Maintenance   Topic Date Due   • TDAP/TD VACCINES (1 - Tdap) Never done   • ZOSTER VACCINE (1 of 2) Never done   • DIABETIC FOOT EXAM  Never done   • HEMOGLOBIN A1C  06/13/2019   • URINE MICROALBUMIN  04/11/2020   • INFLUENZA VACCINE  10/01/2021   • DIABETIC EYE EXAM  05/24/2022   • LIPID PANEL  06/11/2022        Subjective   History of Present Illness    Paul Gomez is a 75 y.o. male who presents for an Subsequent Wellness Visit.    The following portions of the patient's history were reviewed and updated as appropriate: allergies, current medications, past family history, past medical history, past social history, past surgical history and problem list.    Outpatient Medications Prior to Visit   Medication  Sig Dispense Refill   • albuterol sulfate  (90 Base) MCG/ACT inhaler Inhale 2 puffs Every 6 (Six) Hours As Needed for Wheezing or Shortness of Air. 18 g 5   • allopurinol (Zyloprim) 100 MG tablet Take 1 tablet by mouth Daily. Start 6/14/21 90 tablet 0   • aspirin (aspirin) 81 MG EC tablet Take 1 tablet by mouth Daily. 90 tablet 1   • atenolol (TENORMIN) 25 MG tablet Take 1 tablet by mouth 2 (Two) Times a Day. 180 tablet 0   • atorvastatin (LIPITOR) 40 MG tablet Take 1 tablet by mouth Every Night. 90 tablet 1   • colchicine 0.6 MG tablet Take 1 tablet by mouth Daily. 30 tablet 0   • gabapentin (NEURONTIN) 800 MG tablet      • ipratropium-albuterol (DUO-NEB) 0.5-2.5 mg/3 ml nebulizer Take 3 mL by nebulization Every 4 (Four) Hours As Needed for Wheezing or Shortness of Air. 360 mL 3   • ketoconazole (NIZORAL) 2 % shampoo Apply  topically to the appropriate area as directed 2 (Two) Times a Week. 120 mL 5   • montelukast (Singulair) 10 MG tablet Take 1 tablet by mouth Every Night. 90 tablet 1   • oxyCODONE (ROXICODONE) 10 MG tablet 10 mg 2 (two) times a day.     • oxyMORphone ER (OPANA ER) 30 MG 12 hr tablet Take 30 mg by mouth Every 12 (Twelve) Hours.     • sildenafil (REVATIO) 20 MG tablet 1-2 tabs po 30 minutes prior to sexual activity 30 tablet 1   • azelastine (ASTELIN) 0.1 % nasal spray 2 sprays into the nostril(s) as directed by provider 2 (Two) Times a Day. Use in each nostril as directed 30 mL 12     No facility-administered medications prior to visit.       Patient Active Problem List   Diagnosis   • Low back pain   • Hypertension   • Hyperlipidemia   • History of degenerative disc disease   • COPD (chronic obstructive pulmonary disease) (CMS/HCC)   • Gout   • B12 deficiency   • Seborrheic eczema   • Hypothyroidism   • Dilated pancreatic duct   • Epigastric pain   • Chronic pancreatitis (CMS/HCC)   • Headache, unspecified headache type   • Chest pain   • COPD with exacerbation (CMS/HCC)   • Tobacco abuse  counseling   • Acute exacerbation of chronic obstructive pulmonary disease (COPD) (CMS/[facility])   • Erectile dysfunction   • Acute congestive heart failure (CMS/[facility])   • Bilateral rales   • 1+ pitting edema   • Dyspnea on minimal exertion   • Visual loss   • Altered mental status       Advance Care Planning:  ACP discussion was held with the patient during this visit. Patient does not have an advance directive, information provided.    Identification of Risk Factors:  Risk factors include: Advance Directive Discussion.    Review of Systems   Constitutional: Negative for chills, fever and unexpected weight loss.   HENT: Negative for congestion and sore throat.    Eyes: Negative for blurred vision and visual disturbance.   Respiratory: Negative for cough and wheezing.    Cardiovascular: Negative for chest pain and palpitations.   Gastrointestinal: Negative for abdominal pain and diarrhea.   Endocrine: Negative for cold intolerance and heat intolerance.   Genitourinary: Negative for dysuria.   Musculoskeletal: Negative for arthralgias and neck stiffness.   Neurological: Negative for dizziness, seizures and syncope.   Psychiatric/Behavioral: Negative for self-injury, suicidal ideas and depressed mood.       Compared to one year ago, the patient feels his physical health is the same.  Compared to one year ago, the patient feels his mental health is the same.    Objective     Physical Exam  Vitals and nursing note reviewed.   Constitutional:       Appearance: He is well-developed.   HENT:      Head: Normocephalic and atraumatic.      Right Ear: External ear normal.      Left Ear: External ear normal.      Nose: Nose normal.   Eyes:      Conjunctiva/sclera: Conjunctivae normal.      Pupils: Pupils are equal, round, and reactive to light.   Cardiovascular:      Rate and Rhythm: Normal rate and regular rhythm.      Heart sounds: Normal heart sounds.   Pulmonary:      Effort: Pulmonary effort is normal.      Breath sounds: Normal  "breath sounds.   Abdominal:      General: Bowel sounds are normal.      Palpations: Abdomen is soft.   Musculoskeletal:      Cervical back: Normal range of motion and neck supple.   Skin:     General: Skin is warm and dry.   Neurological:      Mental Status: He is alert and oriented to person, place, and time.   Psychiatric:         Behavior: Behavior normal.         Vitals:    08/23/21 1356   BP: 120/84   BP Location: Right arm   Patient Position: Sitting   Pulse: 73   Temp: 97.1 °F (36.2 °C)   SpO2: 96%   Weight: 68.4 kg (150 lb 12.8 oz)   Height: 172.7 cm (68\")       Patient's Body mass index is 22.93 kg/m². indicating that he is within normal range (BMI 18.5-24.9). No BMI management plan needed..      Assessment/Plan   Patient Self-Management and Personalized Health Advice  The patient has been provided with information about: diet and exercise and preventive services including:   · Annual Wellness Visit (AWV).    Visit Diagnoses:    ICD-10-CM ICD-9-CM   1. Medicare annual wellness visit, subsequent  Z00.00 V70.0   2. Bronchitis  J40 490   3. Current every day smoker  F17.200 305.1   4. Chronic gout of multiple sites, unspecified cause  M1A.09X0 274.02   5. Acute joint pain  M25.50 719.40   6. Acute gout involving toe of left foot, unspecified cause  M10.9 274.01   7. Chronic obstructive pulmonary disease, unspecified COPD type (CMS/[facility])  J44.9 496   8. Centrilobular emphysema (CMS/[facility])  J43.2 492.8   9. Essential hypertension  I10 401.9   10. Mixed hyperlipidemia  E78.2 272.2   11. Seborrheic dermatitis  L21.9 690.10   Refills sent.  He complains today of bronchitis that is been worsening over the past month.  He will rest antibiotic.  Of note, penicillin allergy was noted on his allergy list because of a reaction that he had when he was 18 years old.  He states that he has had amoxicillin multiple times as well as ceftriaxone without any sort of reaction.  He requests penicillin allergy to be removed.  " Prescription for doxycycline sent.    No orders of the defined types were placed in this encounter.      Outpatient Encounter Medications as of 8/23/2021   Medication Sig Dispense Refill   • albuterol sulfate  (90 Base) MCG/ACT inhaler Inhale 2 puffs Every 6 (Six) Hours As Needed for Wheezing or Shortness of Air. 18 g 5   • allopurinol (Zyloprim) 100 MG tablet Take 1 tablet by mouth Daily. Start 6/14/21 90 tablet 0   • aspirin (aspirin) 81 MG EC tablet Take 1 tablet by mouth Daily. 90 tablet 1   • atenolol (TENORMIN) 25 MG tablet Take 1 tablet by mouth 2 (Two) Times a Day. 180 tablet 0   • atorvastatin (LIPITOR) 40 MG tablet Take 1 tablet by mouth Every Night. 90 tablet 1   • colchicine 0.6 MG tablet Take 1 tablet by mouth Daily. 30 tablet 0   • gabapentin (NEURONTIN) 800 MG tablet      • ipratropium-albuterol (DUO-NEB) 0.5-2.5 mg/3 ml nebulizer Take 3 mL by nebulization Every 4 (Four) Hours As Needed for Wheezing or Shortness of Air. 360 mL 3   • ketoconazole (NIZORAL) 2 % shampoo Apply  topically to the appropriate area as directed 2 (Two) Times a Week. 120 mL 5   • montelukast (Singulair) 10 MG tablet Take 1 tablet by mouth Every Night. 90 tablet 1   • oxyCODONE (ROXICODONE) 10 MG tablet 10 mg 2 (two) times a day.     • oxyMORphone ER (OPANA ER) 30 MG 12 hr tablet Take 30 mg by mouth Every 12 (Twelve) Hours.     • sildenafil (REVATIO) 20 MG tablet 1-2 tabs po 30 minutes prior to sexual activity 30 tablet 1   • doxycycline (MONODOX) 100 MG capsule Take 1 capsule by mouth 2 (Two) Times a Day. 14 capsule 0   • [DISCONTINUED] azelastine (ASTELIN) 0.1 % nasal spray 2 sprays into the nostril(s) as directed by provider 2 (Two) Times a Day. Use in each nostril as directed 30 mL 12     No facility-administered encounter medications on file as of 8/23/2021.       Reviewed use of high risk medication in the elderly: yes  Reviewed for potential of harmful drug interactions in the elderly: yes    Follow Up:  Return in  about 3 months (around 11/23/2021) for Recheck.     An After Visit Summary and PPPS with all of these plans were given to the patient.

## 2021-10-12 DIAGNOSIS — I10 ESSENTIAL HYPERTENSION: ICD-10-CM

## 2021-10-12 RX ORDER — ASPIRIN 81 MG/1
TABLET, COATED ORAL
Qty: 90 TABLET | Refills: 1 | Status: ON HOLD | OUTPATIENT
Start: 2021-10-12 | End: 2023-01-21

## 2021-12-07 DIAGNOSIS — M1A.09X0 CHRONIC GOUT OF MULTIPLE SITES, UNSPECIFIED CAUSE: ICD-10-CM

## 2021-12-07 DIAGNOSIS — M25.50 ACUTE JOINT PAIN: ICD-10-CM

## 2021-12-07 DIAGNOSIS — M10.9 ACUTE GOUT INVOLVING TOE OF LEFT FOOT, UNSPECIFIED CAUSE: ICD-10-CM

## 2021-12-07 RX ORDER — ALLOPURINOL 100 MG/1
TABLET ORAL
Qty: 90 TABLET | Refills: 0 | Status: SHIPPED | OUTPATIENT
Start: 2021-12-07 | End: 2021-12-17 | Stop reason: SDUPTHER

## 2021-12-17 ENCOUNTER — OFFICE VISIT (OUTPATIENT)
Dept: FAMILY MEDICINE CLINIC | Facility: CLINIC | Age: 76
End: 2021-12-17

## 2021-12-17 VITALS
OXYGEN SATURATION: 94 % | WEIGHT: 149.4 LBS | HEART RATE: 67 BPM | HEIGHT: 68 IN | DIASTOLIC BLOOD PRESSURE: 68 MMHG | BODY MASS INDEX: 22.64 KG/M2 | TEMPERATURE: 97.7 F | SYSTOLIC BLOOD PRESSURE: 124 MMHG

## 2021-12-17 DIAGNOSIS — I10 ESSENTIAL HYPERTENSION: ICD-10-CM

## 2021-12-17 DIAGNOSIS — E11.65 TYPE 2 DIABETES MELLITUS WITH HYPERGLYCEMIA, WITHOUT LONG-TERM CURRENT USE OF INSULIN (HCC): ICD-10-CM

## 2021-12-17 DIAGNOSIS — J43.2 CENTRILOBULAR EMPHYSEMA (HCC): ICD-10-CM

## 2021-12-17 DIAGNOSIS — M25.50 ACUTE JOINT PAIN: ICD-10-CM

## 2021-12-17 DIAGNOSIS — J44.1 COPD WITH EXACERBATION (HCC): Primary | ICD-10-CM

## 2021-12-17 DIAGNOSIS — M1A.09X0 CHRONIC GOUT OF MULTIPLE SITES, UNSPECIFIED CAUSE: ICD-10-CM

## 2021-12-17 DIAGNOSIS — J44.9 CHRONIC OBSTRUCTIVE PULMONARY DISEASE, UNSPECIFIED COPD TYPE (HCC): ICD-10-CM

## 2021-12-17 DIAGNOSIS — E78.2 MIXED HYPERLIPIDEMIA: ICD-10-CM

## 2021-12-17 DIAGNOSIS — M10.9 ACUTE GOUT INVOLVING TOE OF LEFT FOOT, UNSPECIFIED CAUSE: ICD-10-CM

## 2021-12-17 PROCEDURE — 99214 OFFICE O/P EST MOD 30 MIN: CPT | Performed by: FAMILY MEDICINE

## 2021-12-17 RX ORDER — ATENOLOL 25 MG/1
25 TABLET ORAL 2 TIMES DAILY
Qty: 180 TABLET | Refills: 1 | Status: SHIPPED | OUTPATIENT
Start: 2021-12-17 | End: 2022-01-14 | Stop reason: SDUPTHER

## 2021-12-17 RX ORDER — MONTELUKAST SODIUM 10 MG/1
10 TABLET ORAL NIGHTLY
Qty: 90 TABLET | Refills: 1 | Status: SHIPPED | OUTPATIENT
Start: 2021-12-17 | End: 2022-01-14 | Stop reason: SDUPTHER

## 2021-12-17 RX ORDER — PREDNISONE 50 MG/1
50 TABLET ORAL DAILY
Qty: 4 TABLET | Refills: 0 | Status: SHIPPED | OUTPATIENT
Start: 2021-12-17 | End: 2022-01-14

## 2021-12-17 RX ORDER — ATORVASTATIN CALCIUM 40 MG/1
40 TABLET, FILM COATED ORAL NIGHTLY
Qty: 90 TABLET | Refills: 1 | Status: SHIPPED | OUTPATIENT
Start: 2021-12-17 | End: 2022-01-12

## 2021-12-17 RX ORDER — DOXYCYCLINE 100 MG/1
100 CAPSULE ORAL 2 TIMES DAILY
Qty: 20 CAPSULE | Refills: 0 | Status: SHIPPED | OUTPATIENT
Start: 2021-12-17 | End: 2022-01-14

## 2021-12-17 RX ORDER — ALBUTEROL SULFATE 90 UG/1
2 AEROSOL, METERED RESPIRATORY (INHALATION) EVERY 6 HOURS PRN
Qty: 18 G | Refills: 5 | Status: SHIPPED | OUTPATIENT
Start: 2021-12-17 | End: 2022-05-26 | Stop reason: SDUPTHER

## 2021-12-17 RX ORDER — IPRATROPIUM BROMIDE AND ALBUTEROL SULFATE 2.5; .5 MG/3ML; MG/3ML
3 SOLUTION RESPIRATORY (INHALATION) EVERY 4 HOURS PRN
Qty: 360 ML | Refills: 3 | Status: SHIPPED | OUTPATIENT
Start: 2021-12-17 | End: 2022-01-14 | Stop reason: SDUPTHER

## 2021-12-17 RX ORDER — ALLOPURINOL 100 MG/1
100 TABLET ORAL DAILY
Qty: 90 TABLET | Refills: 1 | Status: SHIPPED | OUTPATIENT
Start: 2021-12-17 | End: 2022-01-14 | Stop reason: SDUPTHER

## 2021-12-17 NOTE — PROGRESS NOTES
Subjective   Paul Gomez is a 76 y.o. male.   Pt presents today with CC of Diabetes      History of Present Illness   1.  Patient is a 76-year-old male with COPD, history of stroke, hypertension, and history of heart failure.  He was previously prescribed atorvastatin 80 mg, he had decrease this to 40 mg, and recently has not even been taking 40 mg.  He feels as though it is making him fatigued.  #2 he has COPD with emphysema.  He is still smoking.  He has been wheezing for 2 weeks and is getting worse.  He would like evaluation of this.  #3 he has hypertension for which he takes atenolol.  He does well on this current regimen.  #4 he has gout for which he takes allopurinol 100 mg daily.  He does well on this current regimen.  #5 he has type 2 diabetes.  Well-controlled with diet.  His last hemoglobin A1c was controlled.         The following portions of the patient's history were reviewed and updated as appropriate: allergies, current medications, past family history, past medical history, past social history, past surgical history and problem list.    Review of Systems   Constitutional: Negative for chills, fever and unexpected weight loss.   HENT: Negative for congestion and sore throat.    Eyes: Negative for blurred vision and visual disturbance.   Respiratory: Positive for wheezing. Negative for cough and shortness of breath.    Cardiovascular: Negative for chest pain, palpitations and leg swelling.   Gastrointestinal: Negative for abdominal pain and diarrhea.   Endocrine: Negative for cold intolerance and heat intolerance.   Genitourinary: Negative for dysuria.   Musculoskeletal: Negative for arthralgias and neck stiffness.   Neurological: Negative for dizziness, seizures and syncope.   Psychiatric/Behavioral: Negative for self-injury, suicidal ideas and depressed mood.       Objective   Physical Exam  Vitals and nursing note reviewed.   Constitutional:       Appearance: He is well-developed.   HENT:       Head: Normocephalic and atraumatic.      Right Ear: External ear normal.      Left Ear: External ear normal.      Nose: Nose normal.   Eyes:      Conjunctiva/sclera: Conjunctivae normal.      Pupils: Pupils are equal, round, and reactive to light.   Cardiovascular:      Rate and Rhythm: Normal rate and regular rhythm.      Heart sounds: Normal heart sounds.   Pulmonary:      Effort: Pulmonary effort is normal.      Breath sounds: Normal breath sounds.      Comments: Scattered wheezes, no rales, consistent with COPD exacerbation, no focal findings suggestive of pneumonia.  Abdominal:      General: Bowel sounds are normal.      Palpations: Abdomen is soft.   Musculoskeletal:      Cervical back: Normal range of motion and neck supple.   Skin:     General: Skin is warm and dry.   Neurological:      Mental Status: He is alert and oriented to person, place, and time.   Psychiatric:         Behavior: Behavior normal.           Assessment/Plan   Diagnoses and all orders for this visit:    1. COPD with exacerbation (HCC) (Primary)  -     CBC Auto Differential; Future  -     Comprehensive Metabolic Panel; Future  -     doxycycline (MONODOX) 100 MG capsule; Take 1 capsule by mouth 2 (Two) Times a Day.  Dispense: 20 capsule; Refill: 0  -     predniSONE (DELTASONE) 50 MG tablet; Take 1 tablet by mouth Daily.  Dispense: 4 tablet; Refill: 0    2. Mixed hyperlipidemia  -     atorvastatin (LIPITOR) 40 MG tablet; Take 1 tablet by mouth Every Night.  Dispense: 90 tablet; Refill: 1  Because of hyperlipidemia associated with history of heart problems, stroke, and diabetes, I strongly recommend restarting atorvastatin at 40 mg daily.  This medication does not typically cause fatigue, at least not any more than some of his other medications.  He is agreeable to restart this medication if he continues to have problems with it, we may consider another option such as Zocor, or atorvastatin at a lower dose  3. Chronic obstructive pulmonary  disease, unspecified COPD type (HCC)  -     ipratropium-albuterol (DUO-NEB) 0.5-2.5 mg/3 ml nebulizer; Take 3 mL by nebulization Every 4 (Four) Hours As Needed for Wheezing or Shortness of Air.  Dispense: 360 mL; Refill: 3  -     albuterol sulfate  (90 Base) MCG/ACT inhaler; Inhale 2 puffs Every 6 (Six) Hours As Needed for Wheezing or Shortness of Air.  Dispense: 18 g; Refill: 5  -     montelukast (Singulair) 10 MG tablet; Take 1 tablet by mouth Every Night.  Dispense: 90 tablet; Refill: 1    4. Centrilobular emphysema (HCC)  -     ipratropium-albuterol (DUO-NEB) 0.5-2.5 mg/3 ml nebulizer; Take 3 mL by nebulization Every 4 (Four) Hours As Needed for Wheezing or Shortness of Air.  Dispense: 360 mL; Refill: 3  -     albuterol sulfate  (90 Base) MCG/ACT inhaler; Inhale 2 puffs Every 6 (Six) Hours As Needed for Wheezing or Shortness of Air.  Dispense: 18 g; Refill: 5  -     montelukast (Singulair) 10 MG tablet; Take 1 tablet by mouth Every Night.  Dispense: 90 tablet; Refill: 1    5. Essential hypertension  -     atenolol (TENORMIN) 25 MG tablet; Take 1 tablet by mouth 2 (Two) Times a Day.  Dispense: 180 tablet; Refill: 1  -     CBC Auto Differential; Future  -     Comprehensive Metabolic Panel; Future    6. Chronic gout of multiple sites, unspecified cause  -     allopurinol (ZYLOPRIM) 100 MG tablet; Take 1 tablet by mouth Daily.  Dispense: 90 tablet; Refill: 1  -     Uric Acid; Future    7. Acute joint pain  -     allopurinol (ZYLOPRIM) 100 MG tablet; Take 1 tablet by mouth Daily.  Dispense: 90 tablet; Refill: 1    8. Acute gout involving toe of left foot, unspecified cause  -     allopurinol (ZYLOPRIM) 100 MG tablet; Take 1 tablet by mouth Daily.  Dispense: 90 tablet; Refill: 1    9. Type 2 diabetes mellitus with hyperglycemia, without long-term current use of insulin ([facility])  -     Hemoglobin A1c; Future      Recommend follow-up next week if his condition does not improve, otherwise, follow-up in a month  to consider starting or treatment for COPD.  Symbicort versus Trelegy will be considered.           Paul RAMACHANDRAN Patricia  reports that he has been smoking cigarettes. He has a 15.00 pack-year smoking history. He has never used smokeless tobacco.. I have educated him on the risk of diseases from using tobacco products such as cancer, COPD and heart disease.     I advised him to quit and he is not willing to quit.    I spent 3  minutes counseling the patient.         Patient's Body mass index is 22.72 kg/m². indicating that he is within normal range (BMI 18.5-24.9). No BMI management plan needed..

## 2022-01-12 ENCOUNTER — OFFICE VISIT (OUTPATIENT)
Dept: FAMILY MEDICINE CLINIC | Facility: CLINIC | Age: 77
End: 2022-01-12

## 2022-01-12 VITALS
DIASTOLIC BLOOD PRESSURE: 78 MMHG | HEIGHT: 68 IN | TEMPERATURE: 96.9 F | WEIGHT: 149.6 LBS | HEART RATE: 59 BPM | OXYGEN SATURATION: 96 % | SYSTOLIC BLOOD PRESSURE: 140 MMHG | BODY MASS INDEX: 22.67 KG/M2

## 2022-01-12 DIAGNOSIS — M1A.09X0 CHRONIC GOUT OF MULTIPLE SITES, UNSPECIFIED CAUSE: ICD-10-CM

## 2022-01-12 DIAGNOSIS — I10 ESSENTIAL HYPERTENSION: Primary | ICD-10-CM

## 2022-01-12 PROCEDURE — 84443 ASSAY THYROID STIM HORMONE: CPT | Performed by: NURSE PRACTITIONER

## 2022-01-12 PROCEDURE — 36415 COLL VENOUS BLD VENIPUNCTURE: CPT | Performed by: NURSE PRACTITIONER

## 2022-01-12 PROCEDURE — 85025 COMPLETE CBC W/AUTO DIFF WBC: CPT | Performed by: NURSE PRACTITIONER

## 2022-01-12 PROCEDURE — 80061 LIPID PANEL: CPT | Performed by: NURSE PRACTITIONER

## 2022-01-12 PROCEDURE — 99213 OFFICE O/P EST LOW 20 MIN: CPT | Performed by: NURSE PRACTITIONER

## 2022-01-12 PROCEDURE — 80053 COMPREHEN METABOLIC PANEL: CPT | Performed by: NURSE PRACTITIONER

## 2022-01-12 PROCEDURE — 80048 BASIC METABOLIC PNL TOTAL CA: CPT | Performed by: NURSE PRACTITIONER

## 2022-01-12 PROCEDURE — 84550 ASSAY OF BLOOD/URIC ACID: CPT | Performed by: NURSE PRACTITIONER

## 2022-01-12 RX ORDER — DOXYCYCLINE 100 MG/1
100 TABLET ORAL
COMMUNITY
Start: 2021-12-17 | End: 2022-01-12

## 2022-01-12 NOTE — PROGRESS NOTES
"Chief Complaint  Hypertension    Subjective          Paul Gomez is a 76 y.o. male who presents today to [facility] for follow up    HPI:   History of Present Illness    Presents to clinic for f/u on HTN. Reports was at pain clinic on 1/10/21 and had elevated blood pressure. Was told cannot get steroid injections as BP was high. Is not sure what BP was at that time but reports \"it was up there\". Associated symptoms: had a headache on and off over the past two weeks, better today. Does not monitor BP at home very often. Takes atenolol 25 mg BID. Misses doses about once a week.    Has been tking doxycycline and prednisone for congestion. Unsure if that has elevated BP. Still taking doxy and finished prednisone 2 days ago.      The following portions of the patient's history were reviewed and updated as appropriate: allergies, current medications, past family history, past medical history, past social history, past surgical history and problem list.    Objective     Problem List:  Patient Active Problem List   Diagnosis   • Low back pain   • Hypertension   • Hyperlipidemia   • History of degenerative disc disease   • COPD (chronic obstructive pulmonary disease) ([facility])   • Gout   • B12 deficiency   • Seborrheic eczema   • Hypothyroidism   • Dilated pancreatic duct   • Epigastric pain   • Chronic pancreatitis ([facility])   • Headache, unspecified headache type   • Chest pain   • COPD with exacerbation ([facility])   • Tobacco abuse counseling   • Acute exacerbation of chronic obstructive pulmonary disease (COPD) ([facility])   • Erectile dysfunction   • Acute congestive heart failure ([facility])   • Bilateral rales   • 1+ pitting edema   • Dyspnea on minimal exertion   • Visual loss   • Altered mental status       Allergy:   No Known Allergies     Discontinued Medications:  Medications Discontinued During This Encounter   Medication Reason   • doxycycline (ADOXA) 100 MG tablet Historical Med - Therapy completed "   • atorvastatin (LIPITOR) 40 MG tablet Historical Med - Therapy completed   • sildenafil (REVATIO) 20 MG tablet Historical Med - Therapy completed       Current Medications:   Current Outpatient Medications   Medication Sig Dispense Refill   • albuterol sulfate  (90 Base) MCG/ACT inhaler Inhale 2 puffs Every 6 (Six) Hours As Needed for Wheezing or Shortness of Air. 18 g 5   • allopurinol (ZYLOPRIM) 100 MG tablet Take 1 tablet by mouth Daily. 90 tablet 1   • Aspirin Low Dose 81 MG EC tablet TAKE ONE (1) TABLET BY MOUTH ONCE DAILY 90 tablet 1   • atenolol (TENORMIN) 25 MG tablet Take 1 tablet by mouth 2 (Two) Times a Day. 180 tablet 1   • ketoconazole (NIZORAL) 2 % shampoo Apply  topically to the appropriate area as directed 2 (Two) Times a Week. 120 mL 5   • montelukast (Singulair) 10 MG tablet Take 1 tablet by mouth Every Night. 90 tablet 1   • oxyCODONE (ROXICODONE) 10 MG tablet 10 mg 2 (two) times a day.     • oxyMORphone ER (OPANA ER) 30 MG 12 hr tablet Take 30 mg by mouth Every 12 (Twelve) Hours.     • predniSONE (DELTASONE) 50 MG tablet Take 1 tablet by mouth Daily. 4 tablet 0   • doxycycline (MONODOX) 100 MG capsule Take 1 capsule by mouth 2 (Two) Times a Day. 20 capsule 0   • gabapentin (NEURONTIN) 800 MG tablet      • ipratropium-albuterol (DUO-NEB) 0.5-2.5 mg/3 ml nebulizer Take 3 mL by nebulization Every 4 (Four) Hours As Needed for Wheezing or Shortness of Air. 360 mL 3     No current facility-administered medications for this visit.       Past Medical History:  Past Medical History:   Diagnosis Date   • COPD (chronic obstructive pulmonary disease) (HCC)    • Gout    • Hearing loss    • History of degenerative disc disease    • Hyperlipidemia    • Hypertension    • Low back pain    • Pedal edema    • Prediabetes        Past Surgical History:  Past Surgical History:   Procedure Laterality Date   • CHOLECYSTECTOMY     • LUMBAR DISCECTOMY     • SHOULDER SURGERY Left        Social History:  Social  History     Socioeconomic History   • Marital status:    Tobacco Use   • Smoking status: Current Every Day Smoker     Packs/day: 0.25     Years: 60.00     Pack years: 15.00     Types: Cigarettes   • Smokeless tobacco: Never Used   • Tobacco comment: patient stated he is down to about 4 cigarettes a day   Vaping Use   • Vaping Use: Never used   Substance and Sexual Activity   • Alcohol use: No   • Drug use: No   • Sexual activity: Defer       Family History:  Family History   Problem Relation Age of Onset   • Cancer Mother        Review of Systems:  Review of Systems   Respiratory: Negative for shortness of breath.    Cardiovascular: Negative for chest pain, palpitations and leg swelling.       Physical Exam:  Physical Exam  Vitals and nursing note reviewed.   Constitutional:       General: He is not in acute distress.     Appearance: Normal appearance. He is not ill-appearing or toxic-appearing.   HENT:      Head: Normocephalic.   Eyes:      Pupils: Pupils are equal, round, and reactive to light.   Cardiovascular:      Rate and Rhythm: Normal rate and regular rhythm.      Heart sounds: Normal heart sounds.   Pulmonary:      Effort: Pulmonary effort is normal. No respiratory distress.      Breath sounds: Normal breath sounds.   Abdominal:      General: Bowel sounds are normal. There is no distension.      Palpations: Abdomen is soft.   Musculoskeletal:         General: No swelling.   Lymphadenopathy:      Cervical: No cervical adenopathy.   Skin:     General: Skin is warm and dry.      Capillary Refill: Capillary refill takes less than 2 seconds.      Coloration: Skin is not pale.   Neurological:      Mental Status: He is alert and oriented to person, place, and time.   Psychiatric:         Mood and Affect: Mood normal.         Behavior: Behavior normal.         Thought Content: Thought content normal.         Judgment: Judgment normal.         Vital Signs:   /78 (BP Location: Right arm)   Pulse 59    "Temp 96.9 °F (36.1 °C) (Temporal)   Ht 172.7 cm (68\")   Wt 67.9 kg (149 lb 9.6 oz)   SpO2 96%   BMI 22.75 kg/m²  RR: 16          Assessment and Plan   Diagnoses and all orders for this visit:    1. Essential hypertension (Primary)  -     CBC & Differential  -     TSH Rfx On Abnormal To Free T4; Future  -     Basic metabolic panel; Future    Blood pressure given elevated due to taking the steroids.  However, monitor blood pressure readings to see if medication changes need to be made.  Monitor BP and HR BID, keep log, bring to your f/u appointment with Dr Dobbs. Do not miss BP medicine doses.  Provided with parameters for BP levels. Follow-up sooner if blood pressure is outside of these parameters or if you develop symptoms.    Discussed possible differential diagnoses, testing, treatment, recommended non-pharmacological interventions, risks, warning signs to monitor for that would indicate need for follow-up in clinic or ER. If no improvement with these regimens or you have new or worsening symptoms follow-up. Patient verbalizes understanding and agreement with plan of care. Denies further needs or concerns.     I spent 20 minutes caring for patient on this date of service. This time includes time spent by me in the following activities: preparing for the visit, reviewing tests, obtaining and/or reviewing a separately obtained history, performing a medically appropriate examination and/or evaluation, counseling and educating the patient/family/caregiver, ordering medications, tests, or procedures and documenting information in the medical record    Meds ordered during this visit:  No orders of the defined types were placed in this encounter.      Patient Instructions:  Patient instructions given for the following visit diagnosis:    ICD-10-CM ICD-9-CM   1. Essential hypertension  I10 401.9       Follow Up   Return for Recheck.        This document has been electronically signed by BEATRICE Wright  January 12, " 2022 09:47 EST    Patient was given instructions and counseling regarding his condition or for health maintenance advice. Please see specific information pulled into the AVS if appropriate.     Part of this note may be an electronic transcription/translation of spoken language to printed text using the Dragon Dictation System.

## 2022-01-13 ENCOUNTER — TELEPHONE (OUTPATIENT)
Dept: FAMILY MEDICINE CLINIC | Facility: CLINIC | Age: 77
End: 2022-01-13

## 2022-01-13 LAB
ANION GAP SERPL CALCULATED.3IONS-SCNC: 8.8 MMOL/L (ref 5–15)
BASOPHILS # BLD AUTO: 0.06 10*3/MM3 (ref 0–0.2)
BASOPHILS NFR BLD AUTO: 0.7 % (ref 0–1.5)
BUN SERPL-MCNC: 19 MG/DL (ref 8–23)
BUN/CREAT SERPL: 20.2 (ref 7–25)
CALCIUM SPEC-SCNC: 9.4 MG/DL (ref 8.6–10.5)
CHLORIDE SERPL-SCNC: 103 MMOL/L (ref 98–107)
CO2 SERPL-SCNC: 26.2 MMOL/L (ref 22–29)
CREAT SERPL-MCNC: 0.94 MG/DL (ref 0.76–1.27)
DEPRECATED RDW RBC AUTO: 41 FL (ref 37–54)
EOSINOPHIL # BLD AUTO: 1.72 10*3/MM3 (ref 0–0.4)
EOSINOPHIL NFR BLD AUTO: 18.8 % (ref 0.3–6.2)
ERYTHROCYTE [DISTWIDTH] IN BLOOD BY AUTOMATED COUNT: 12.6 % (ref 12.3–15.4)
GFR SERPL CREATININE-BSD FRML MDRD: 78 ML/MIN/1.73
GLUCOSE SERPL-MCNC: 99 MG/DL (ref 65–99)
HCT VFR BLD AUTO: 37 % (ref 37.5–51)
HGB BLD-MCNC: 12.8 G/DL (ref 13–17.7)
IMM GRANULOCYTES # BLD AUTO: 0.05 10*3/MM3 (ref 0–0.05)
IMM GRANULOCYTES NFR BLD AUTO: 0.5 % (ref 0–0.5)
LYMPHOCYTES # BLD AUTO: 1.99 10*3/MM3 (ref 0.7–3.1)
LYMPHOCYTES NFR BLD AUTO: 21.7 % (ref 19.6–45.3)
MCH RBC QN AUTO: 31.6 PG (ref 26.6–33)
MCHC RBC AUTO-ENTMCNC: 34.6 G/DL (ref 31.5–35.7)
MCV RBC AUTO: 91.4 FL (ref 79–97)
MONOCYTES # BLD AUTO: 1.1 10*3/MM3 (ref 0.1–0.9)
MONOCYTES NFR BLD AUTO: 12 % (ref 5–12)
NEUTROPHILS NFR BLD AUTO: 4.24 10*3/MM3 (ref 1.7–7)
NEUTROPHILS NFR BLD AUTO: 46.3 % (ref 42.7–76)
NRBC BLD AUTO-RTO: 0 /100 WBC (ref 0–0.2)
PLATELET # BLD AUTO: 222 10*3/MM3 (ref 140–450)
PMV BLD AUTO: 11.5 FL (ref 6–12)
POTASSIUM SERPL-SCNC: 4.7 MMOL/L (ref 3.5–5.2)
RBC # BLD AUTO: 4.05 10*6/MM3 (ref 4.14–5.8)
SODIUM SERPL-SCNC: 138 MMOL/L (ref 136–145)
TSH SERPL DL<=0.05 MIU/L-ACNC: 3.24 UIU/ML (ref 0.27–4.2)
WBC NRBC COR # BLD: 9.16 10*3/MM3 (ref 3.4–10.8)

## 2022-01-13 NOTE — TELEPHONE ENCOUNTER
----- Message from BEATRICE Wright sent at 1/13/2022  7:53 AM EST -----  Please call the patient regarding his result.    Labs are okay        Patient notified & verbalized understanding.

## 2022-01-14 ENCOUNTER — OFFICE VISIT (OUTPATIENT)
Dept: FAMILY MEDICINE CLINIC | Facility: CLINIC | Age: 77
End: 2022-01-14

## 2022-01-14 VITALS
HEIGHT: 68 IN | DIASTOLIC BLOOD PRESSURE: 52 MMHG | WEIGHT: 148.2 LBS | SYSTOLIC BLOOD PRESSURE: 130 MMHG | TEMPERATURE: 97.8 F | HEART RATE: 83 BPM | OXYGEN SATURATION: 98 % | BODY MASS INDEX: 22.46 KG/M2

## 2022-01-14 DIAGNOSIS — M1A.09X0 CHRONIC GOUT OF MULTIPLE SITES, UNSPECIFIED CAUSE: ICD-10-CM

## 2022-01-14 DIAGNOSIS — J43.2 CENTRILOBULAR EMPHYSEMA: ICD-10-CM

## 2022-01-14 DIAGNOSIS — J44.9 CHRONIC OBSTRUCTIVE PULMONARY DISEASE, UNSPECIFIED COPD TYPE: ICD-10-CM

## 2022-01-14 DIAGNOSIS — I63.9 CEREBROVASCULAR ACCIDENT (CVA), UNSPECIFIED MECHANISM: ICD-10-CM

## 2022-01-14 DIAGNOSIS — Z87.891 FORMER SMOKER: Primary | ICD-10-CM

## 2022-01-14 DIAGNOSIS — M25.50 ACUTE JOINT PAIN: ICD-10-CM

## 2022-01-14 DIAGNOSIS — M10.9 ACUTE GOUT INVOLVING TOE OF LEFT FOOT, UNSPECIFIED CAUSE: ICD-10-CM

## 2022-01-14 DIAGNOSIS — I10 ESSENTIAL HYPERTENSION: ICD-10-CM

## 2022-01-14 LAB
ALBUMIN SERPL-MCNC: 3.9 G/DL (ref 3.5–5.2)
ALBUMIN/GLOB SERPL: 1.6 G/DL
ALP SERPL-CCNC: 79 U/L (ref 39–117)
ALT SERPL W P-5'-P-CCNC: 54 U/L (ref 1–41)
ANION GAP SERPL CALCULATED.3IONS-SCNC: 9.3 MMOL/L (ref 5–15)
AST SERPL-CCNC: 59 U/L (ref 1–40)
BILIRUB SERPL-MCNC: 0.3 MG/DL (ref 0–1.2)
BUN SERPL-MCNC: 19 MG/DL (ref 8–23)
BUN/CREAT SERPL: 18.6 (ref 7–25)
CALCIUM SPEC-SCNC: 9.7 MG/DL (ref 8.6–10.5)
CHLORIDE SERPL-SCNC: 105 MMOL/L (ref 98–107)
CHOLEST SERPL-MCNC: 143 MG/DL (ref 0–200)
CO2 SERPL-SCNC: 25.7 MMOL/L (ref 22–29)
CREAT SERPL-MCNC: 1.02 MG/DL (ref 0.76–1.27)
GFR SERPL CREATININE-BSD FRML MDRD: 71 ML/MIN/1.73
GLOBULIN UR ELPH-MCNC: 2.5 GM/DL
GLUCOSE SERPL-MCNC: 89 MG/DL (ref 65–99)
HDLC SERPL-MCNC: 65 MG/DL (ref 40–60)
LDLC SERPL CALC-MCNC: 67 MG/DL (ref 0–100)
LDLC/HDLC SERPL: 1.05 {RATIO}
POTASSIUM SERPL-SCNC: 5 MMOL/L (ref 3.5–5.2)
PROT SERPL-MCNC: 6.4 G/DL (ref 6–8.5)
SODIUM SERPL-SCNC: 140 MMOL/L (ref 136–145)
TRIGL SERPL-MCNC: 50 MG/DL (ref 0–150)
URATE SERPL-MCNC: 5.4 MG/DL (ref 3.4–7)
VLDLC SERPL-MCNC: 11 MG/DL (ref 5–40)

## 2022-01-14 PROCEDURE — 99214 OFFICE O/P EST MOD 30 MIN: CPT | Performed by: FAMILY MEDICINE

## 2022-01-14 RX ORDER — ATORVASTATIN CALCIUM 20 MG/1
20 TABLET, FILM COATED ORAL NIGHTLY
Qty: 90 TABLET | Refills: 0 | Status: SHIPPED | OUTPATIENT
Start: 2022-01-14 | End: 2022-03-08

## 2022-01-14 RX ORDER — MONTELUKAST SODIUM 10 MG/1
10 TABLET ORAL NIGHTLY
Qty: 90 TABLET | Refills: 1 | Status: SHIPPED | OUTPATIENT
Start: 2022-01-14 | End: 2022-03-08

## 2022-01-14 RX ORDER — ATORVASTATIN CALCIUM 20 MG/1
20 TABLET, FILM COATED ORAL DAILY
Qty: 90 TABLET | Refills: 0 | Status: SHIPPED | OUTPATIENT
Start: 2022-01-14 | End: 2022-01-14 | Stop reason: SDUPTHER

## 2022-01-14 RX ORDER — IPRATROPIUM BROMIDE AND ALBUTEROL SULFATE 2.5; .5 MG/3ML; MG/3ML
3 SOLUTION RESPIRATORY (INHALATION) EVERY 4 HOURS PRN
Qty: 360 ML | Refills: 3 | Status: SHIPPED | OUTPATIENT
Start: 2022-01-14 | End: 2022-05-26 | Stop reason: SDUPTHER

## 2022-01-14 RX ORDER — ALLOPURINOL 100 MG/1
100 TABLET ORAL DAILY
Qty: 90 TABLET | Refills: 1 | Status: SHIPPED | OUTPATIENT
Start: 2022-01-14 | End: 2022-03-08

## 2022-01-14 RX ORDER — ATENOLOL 25 MG/1
25 TABLET ORAL 2 TIMES DAILY
Qty: 180 TABLET | Refills: 1 | Status: SHIPPED | OUTPATIENT
Start: 2022-01-14 | End: 2022-05-26

## 2022-01-14 RX ORDER — LORAZEPAM 0.5 MG/1
TABLET ORAL
COMMUNITY
Start: 2022-01-10 | End: 2022-03-08

## 2022-01-17 NOTE — PROGRESS NOTES
Subjective   Paul Gomez is a 76 y.o. male.   Pt presents today with CC of COPD      History of Present Illness   1.  Patient is a 76-year-old male here to follow-up on COPD.  He takes Singulair, DuoNebs, currently has no concerns.  #2 he is a former smoker.  #3 he has gout for which he takes allopurinol just 100 mg daily.  #4 he has hypertension for which he takes atenolol 25 mg twice a day.  #5 he has a history of stroke.  He takes atorvastatin 20 mg nightly.         The following portions of the patient's history were reviewed and updated as appropriate: allergies, current medications, past family history, past medical history, past social history, past surgical history and problem list.    Review of Systems   Constitutional: Negative for chills, fever and unexpected weight loss.   HENT: Negative for congestion and sore throat.    Eyes: Negative for blurred vision and visual disturbance.   Respiratory: Negative for cough and wheezing.    Cardiovascular: Negative for chest pain and palpitations.   Gastrointestinal: Negative for abdominal pain and diarrhea.   Endocrine: Negative for cold intolerance and heat intolerance.   Genitourinary: Negative for dysuria.   Musculoskeletal: Negative for arthralgias and neck stiffness.   Neurological: Negative for dizziness, seizures and syncope.   Psychiatric/Behavioral: Negative for self-injury, suicidal ideas and depressed mood.       Objective   Physical Exam  Vitals and nursing note reviewed.   Constitutional:       Appearance: He is well-developed.   HENT:      Head: Normocephalic and atraumatic.      Right Ear: External ear normal.      Left Ear: External ear normal.      Nose: Nose normal.   Eyes:      Conjunctiva/sclera: Conjunctivae normal.      Pupils: Pupils are equal, round, and reactive to light.   Cardiovascular:      Rate and Rhythm: Normal rate and regular rhythm.      Heart sounds: Normal heart sounds.   Pulmonary:      Effort: Pulmonary effort is normal.       Breath sounds: Normal breath sounds.   Abdominal:      General: Bowel sounds are normal.      Palpations: Abdomen is soft.   Musculoskeletal:      Cervical back: Normal range of motion and neck supple.   Skin:     General: Skin is warm and dry.   Neurological:      Mental Status: He is alert and oriented to person, place, and time.   Psychiatric:         Behavior: Behavior normal.           Assessment/Plan   Diagnoses and all orders for this visit:    1. Former smoker (Primary)    2. Chronic obstructive pulmonary disease, unspecified COPD type (HCC)  -     montelukast (Singulair) 10 MG tablet; Take 1 tablet by mouth Every Night.  Dispense: 90 tablet; Refill: 1  -     ipratropium-albuterol (DUO-NEB) 0.5-2.5 mg/3 ml nebulizer; Take 3 mL by nebulization Every 4 (Four) Hours As Needed for Wheezing or Shortness of Air.  Dispense: 360 mL; Refill: 3    3. Centrilobular emphysema (HCC)  -     montelukast (Singulair) 10 MG tablet; Take 1 tablet by mouth Every Night.  Dispense: 90 tablet; Refill: 1  -     ipratropium-albuterol (DUO-NEB) 0.5-2.5 mg/3 ml nebulizer; Take 3 mL by nebulization Every 4 (Four) Hours As Needed for Wheezing or Shortness of Air.  Dispense: 360 mL; Refill: 3    4. Chronic gout of multiple sites, unspecified cause  -     allopurinol (ZYLOPRIM) 100 MG tablet; Take 1 tablet by mouth Daily.  Dispense: 90 tablet; Refill: 1  -     Uric Acid; Future    5. Acute joint pain  -     allopurinol (ZYLOPRIM) 100 MG tablet; Take 1 tablet by mouth Daily.  Dispense: 90 tablet; Refill: 1    6. Acute gout involving toe of left foot, unspecified cause  -     allopurinol (ZYLOPRIM) 100 MG tablet; Take 1 tablet by mouth Daily.  Dispense: 90 tablet; Refill: 1    7. Essential hypertension  -     atenolol (TENORMIN) 25 MG tablet; Take 1 tablet by mouth 2 (Two) Times a Day.  Dispense: 180 tablet; Refill: 1  -     Comprehensive Metabolic Panel; Future  -     Lipid Panel; Future  He is agreeable to blood work today.  He is fasting  today.  8. Cerebrovascular accident (CVA), unspecified mechanism (HCC)  -     Discontinue: atorvastatin (LIPITOR) 20 MG tablet; Take 1 tablet by mouth Daily.  Dispense: 90 tablet; Refill: 0  -     atorvastatin (LIPITOR) 20 MG tablet; Take 1 tablet by mouth Every Night.  Dispense: 90 tablet; Refill: 0                 Patient's Body mass index is 22.53 kg/m². indicating that he is within normal range (BMI 18.5-24.9). No BMI management plan needed..

## 2022-01-19 ENCOUNTER — TELEPHONE (OUTPATIENT)
Dept: FAMILY MEDICINE CLINIC | Facility: CLINIC | Age: 77
End: 2022-01-19

## 2022-01-19 NOTE — TELEPHONE ENCOUNTER
----- Message from Luis Dobbs DO sent at 1/19/2022  1:38 PM EST -----  I reviewed your liver function.  It was the only abnormality on any of your blood work that is of concern.  Your ALT and AST have both went up out of the normal range.  Have you had any yellowing of your skin or eyes?  Any abdominal pain?  Any changes in your medications that you believe may be contributing?  Do you drink alcohol regularly?      Spoke with patient & he verbalized understanding,answered NO to all of the above questions.

## 2022-03-08 ENCOUNTER — OFFICE VISIT (OUTPATIENT)
Dept: FAMILY MEDICINE CLINIC | Facility: CLINIC | Age: 77
End: 2022-03-08

## 2022-03-08 VITALS
WEIGHT: 147.6 LBS | OXYGEN SATURATION: 96 % | HEIGHT: 68 IN | HEART RATE: 53 BPM | DIASTOLIC BLOOD PRESSURE: 70 MMHG | TEMPERATURE: 97.1 F | BODY MASS INDEX: 22.37 KG/M2 | SYSTOLIC BLOOD PRESSURE: 120 MMHG

## 2022-03-08 DIAGNOSIS — J01.40 ACUTE NON-RECURRENT PANSINUSITIS: ICD-10-CM

## 2022-03-08 DIAGNOSIS — F17.200 SMOKER: ICD-10-CM

## 2022-03-08 DIAGNOSIS — J44.1 COPD WITH EXACERBATION: Primary | ICD-10-CM

## 2022-03-08 PROCEDURE — 99214 OFFICE O/P EST MOD 30 MIN: CPT | Performed by: NURSE PRACTITIONER

## 2022-03-08 RX ORDER — TRIAMCINOLONE ACETONIDE 1 MG/G
CREAM TOPICAL
COMMUNITY
Start: 2022-02-19 | End: 2022-08-17

## 2022-03-08 RX ORDER — PREDNISONE 50 MG/1
50 TABLET ORAL DAILY
Qty: 5 TABLET | Refills: 0 | Status: SHIPPED | OUTPATIENT
Start: 2022-03-08 | End: 2022-03-13

## 2022-03-08 RX ORDER — AMOXICILLIN AND CLAVULANATE POTASSIUM 875; 125 MG/1; MG/1
1 TABLET, FILM COATED ORAL 2 TIMES DAILY
Qty: 10 TABLET | Refills: 0 | Status: SHIPPED | OUTPATIENT
Start: 2022-03-08 | End: 2022-03-13

## 2022-03-08 RX ORDER — GUAIFENESIN 600 MG/1
1200 TABLET, EXTENDED RELEASE ORAL 2 TIMES DAILY
Qty: 40 TABLET | Refills: 0 | Status: SHIPPED | OUTPATIENT
Start: 2022-03-08 | End: 2022-03-18

## 2022-03-08 NOTE — PROGRESS NOTES
"Chief Complaint  URI    Jerry Gomez is a 76 y.o. male who presents today to [facility] for initial evaluation     HPI:   History of Present Illness      Presents to clinic for c/o cold x 2-3 weeks. Now having cough, wheezing, soa, increased sputum production. Symptoms improve with inhaler and neb treatments.  Smokes some, but reports has \"basically quit\". Treated with doxy and prednisone on 12/17/21 for COPD exacerbation. Denies any hospitalizations for COPD.      The following portions of the patient's history were reviewed and updated as appropriate: allergies, current medications, past family history, past medical history, past social history, past surgical history and problem list.    Objective     Problem List:  Patient Active Problem List   Diagnosis   • Low back pain   • Hypertension   • Hyperlipidemia   • History of degenerative disc disease   • COPD (chronic obstructive pulmonary disease) ([facility])   • Gout   • B12 deficiency   • Seborrheic eczema   • Hypothyroidism   • Dilated pancreatic duct   • Epigastric pain   • Chronic pancreatitis ([facility])   • Headache, unspecified headache type   • Chest pain   • COPD with exacerbation ([facility])   • Former smoker   • Acute exacerbation of chronic obstructive pulmonary disease (COPD) ([facility])   • Erectile dysfunction   • Acute congestive heart failure ([facility])   • Bilateral rales   • 1+ pitting edema   • Dyspnea on minimal exertion   • Visual loss   • Altered mental status       Allergy:   No Known Allergies     Discontinued Medications:  Medications Discontinued During This Encounter   Medication Reason   • allopurinol (ZYLOPRIM) 100 MG tablet Historical Med - Therapy completed   • atorvastatin (LIPITOR) 20 MG tablet Historical Med - Therapy completed   • LORazepam (ATIVAN) 0.5 MG tablet Historical Med - Therapy completed   • montelukast (Singulair) 10 MG tablet Historical Med - Therapy completed       Current Medications: "   Current Outpatient Medications   Medication Sig Dispense Refill   • albuterol sulfate  (90 Base) MCG/ACT inhaler Inhale 2 puffs Every 6 (Six) Hours As Needed for Wheezing or Shortness of Air. 18 g 5   • Aspirin Low Dose 81 MG EC tablet TAKE ONE (1) TABLET BY MOUTH ONCE DAILY 90 tablet 1   • atenolol (TENORMIN) 25 MG tablet Take 1 tablet by mouth 2 (Two) Times a Day. 180 tablet 1   • gabapentin (NEURONTIN) 800 MG tablet      • ipratropium-albuterol (DUO-NEB) 0.5-2.5 mg/3 ml nebulizer Take 3 mL by nebulization Every 4 (Four) Hours As Needed for Wheezing or Shortness of Air. 360 mL 3   • ketoconazole (NIZORAL) 2 % shampoo Apply  topically to the appropriate area as directed 2 (Two) Times a Week. 120 mL 5   • oxyCODONE (ROXICODONE) 10 MG tablet 10 mg 2 (two) times a day.     • oxyMORphone ER (OPANA ER) 30 MG 12 hr tablet Take 30 mg by mouth Every 12 (Twelve) Hours.     • triamcinolone (KENALOG) 0.1 % cream      • amoxicillin-clavulanate (Augmentin) 875-125 MG per tablet Take 1 tablet by mouth 2 (Two) Times a Day for 5 days. 10 tablet 0   • guaiFENesin (Mucinex) 600 MG 12 hr tablet Take 2 tablets by mouth 2 (Two) Times a Day for 10 days. 40 tablet 0   • predniSONE (DELTASONE) 50 MG tablet Take 1 tablet by mouth Daily for 5 days. 5 tablet 0     No current facility-administered medications for this visit.       Past Medical History:  Past Medical History:   Diagnosis Date   • COPD (chronic obstructive pulmonary disease) (HCC)    • Gout    • Hearing loss    • History of degenerative disc disease    • Hyperlipidemia    • Hypertension    • Low back pain    • Pedal edema    • Prediabetes        Past Surgical History:  Past Surgical History:   Procedure Laterality Date   • CHOLECYSTECTOMY     • LUMBAR DISCECTOMY     • SHOULDER SURGERY Left        Social History:  Social History     Socioeconomic History   • Marital status:    Tobacco Use   • Smoking status: Former Smoker     Packs/day: 0.25     Years: 60.00      Pack years: 15.00     Types: Cigarettes     Start date:      Quit date:      Years since quittin.1   • Smokeless tobacco: Never Used   • Tobacco comment: patient stated he is down to about 4 cigarettes a day   Vaping Use   • Vaping Use: Never used   Substance and Sexual Activity   • Alcohol use: No   • Drug use: No   • Sexual activity: Defer       Family History:  Family History   Problem Relation Age of Onset   • Cancer Mother        Review of Systems:  Review of Systems   Constitutional: Negative for chills and fever.   HENT: Positive for sinus pressure.        Physical Exam:  Physical Exam  Vitals and nursing note reviewed.   Constitutional:       General: He is not in acute distress.     Appearance: Normal appearance. He is not ill-appearing or toxic-appearing.   HENT:      Head: Normocephalic.      Right Ear: Tympanic membrane, ear canal and external ear normal.      Left Ear: Tympanic membrane, ear canal and external ear normal.      Nose: Nose normal.      Mouth/Throat:      Mouth: Mucous membranes are moist.      Pharynx: Oropharynx is clear.   Eyes:      Conjunctiva/sclera: Conjunctivae normal.   Cardiovascular:      Rate and Rhythm: Normal rate and regular rhythm.      Heart sounds: Normal heart sounds.   Pulmonary:      Effort: Pulmonary effort is normal. No respiratory distress.      Breath sounds: Wheezing present.   Abdominal:      General: Bowel sounds are normal.      Palpations: Abdomen is soft.   Lymphadenopathy:      Cervical: No cervical adenopathy.   Skin:     General: Skin is warm and dry.      Coloration: Skin is not pale.   Neurological:      Mental Status: He is alert and oriented to person, place, and time.   Psychiatric:         Mood and Affect: Mood normal.         Behavior: Behavior normal.         Thought Content: Thought content normal.         Judgment: Judgment normal.         Vital Signs:   /70 (BP Location: Left arm, Patient Position: Sitting, Cuff Size: Adult)    "Pulse 53   Temp 97.1 °F (36.2 °C) (Temporal)   Ht 172.7 cm (68\")   Wt 67 kg (147 lb 9.6 oz)   SpO2 96%   BMI 22.44 kg/m²  RR; 18                Assessment and Plan   Diagnoses and all orders for this visit:    1. COPD with exacerbation (HCC) (Primary)  -     predniSONE (DELTASONE) 50 MG tablet; Take 1 tablet by mouth Daily for 5 days.  Dispense: 5 tablet; Refill: 0  -     guaiFENesin (Mucinex) 600 MG 12 hr tablet; Take 2 tablets by mouth 2 (Two) Times a Day for 10 days.  Dispense: 40 tablet; Refill: 0  -     amoxicillin-clavulanate (Augmentin) 875-125 MG per tablet; Take 1 tablet by mouth 2 (Two) Times a Day for 5 days.  Dispense: 10 tablet; Refill: 0  Regimen as above.  Cough and deep breathe.  Increase humidification and hydration.    2. Smoker  Stop smoking.  Patient agreeable to try to cut back.    3. Acute non-recurrent pansinusitis  -     amoxicillin-clavulanate (Augmentin) 875-125 MG per tablet; Take 1 tablet by mouth 2 (Two) Times a Day for 5 days.  Dispense: 10 tablet; Refill: 0  Regimen as above.      Discussed possible differential diagnoses, testing, treatment, recommended non-pharmacological interventions, risks, warning signs to monitor for that would indicate need for follow-up in clinic or ER. If no improvement with these regimens or you have new or worsening symptoms follow-up. Patient verbalizes understanding and agreement with plan of care. Denies further needs or concerns.     I spent 20 minutes caring for patient on this date of service. This time includes time spent by me in the following activities: preparing for the visit, reviewing tests, obtaining and/or reviewing a separately obtained history, performing a medically appropriate examination and/or evaluation, counseling and educating the patient/family/caregiver, ordering medications, tests, or procedures and documenting information in the medical record    Meds ordered during this visit:  New Medications Ordered This Visit "   Medications   • predniSONE (DELTASONE) 50 MG tablet     Sig: Take 1 tablet by mouth Daily for 5 days.     Dispense:  5 tablet     Refill:  0   • guaiFENesin (Mucinex) 600 MG 12 hr tablet     Sig: Take 2 tablets by mouth 2 (Two) Times a Day for 10 days.     Dispense:  40 tablet     Refill:  0   • amoxicillin-clavulanate (Augmentin) 875-125 MG per tablet     Sig: Take 1 tablet by mouth 2 (Two) Times a Day for 5 days.     Dispense:  10 tablet     Refill:  0       Patient Instructions:  Patient instructions given for the following visit diagnosis:    ICD-10-CM ICD-9-CM   1. COPD with exacerbation (HCC)  J44.1 491.21   2. Smoker  F17.200 305.1   3. Acute non-recurrent pansinusitis  J01.40 461.8       Follow Up   Return if symptoms worsen or fail to improve.        This document has been electronically signed by BEATRICE Wright  March 8, 2022 15:47 EST    Patient was given instructions and counseling regarding his condition or for health maintenance advice. Please see specific information pulled into the AVS if appropriate.     Part of this note may be an electronic transcription/translation of spoken language to printed text using the Dragon Dictation System.

## 2022-04-29 ENCOUNTER — TELEPHONE (OUTPATIENT)
Dept: FAMILY MEDICINE CLINIC | Facility: CLINIC | Age: 77
End: 2022-04-29

## 2022-04-29 DIAGNOSIS — K04.7 DENTAL INFECTION: Primary | ICD-10-CM

## 2022-04-29 RX ORDER — AMOXICILLIN 875 MG/1
875 TABLET, COATED ORAL 2 TIMES DAILY
Qty: 14 TABLET | Refills: 0 | Status: SHIPPED | OUTPATIENT
Start: 2022-04-29 | End: 2022-05-05 | Stop reason: SDUPTHER

## 2022-05-05 ENCOUNTER — OFFICE VISIT (OUTPATIENT)
Dept: FAMILY MEDICINE CLINIC | Facility: CLINIC | Age: 77
End: 2022-05-05

## 2022-05-05 VITALS
OXYGEN SATURATION: 93 % | TEMPERATURE: 98.2 F | SYSTOLIC BLOOD PRESSURE: 130 MMHG | HEIGHT: 68 IN | HEART RATE: 84 BPM | DIASTOLIC BLOOD PRESSURE: 74 MMHG | BODY MASS INDEX: 22.34 KG/M2 | WEIGHT: 147.4 LBS

## 2022-05-05 DIAGNOSIS — R06.2 WHEEZING: ICD-10-CM

## 2022-05-05 DIAGNOSIS — K08.9 CHRONIC DENTAL PAIN: ICD-10-CM

## 2022-05-05 DIAGNOSIS — J44.1 COPD WITH EXACERBATION: ICD-10-CM

## 2022-05-05 DIAGNOSIS — G89.29 CHRONIC DENTAL PAIN: ICD-10-CM

## 2022-05-05 DIAGNOSIS — R25.2 LEG CRAMPING: ICD-10-CM

## 2022-05-05 DIAGNOSIS — M1A.09X0 CHRONIC GOUT OF MULTIPLE SITES, UNSPECIFIED CAUSE: ICD-10-CM

## 2022-05-05 DIAGNOSIS — E11.65 TYPE 2 DIABETES MELLITUS WITH HYPERGLYCEMIA, WITHOUT LONG-TERM CURRENT USE OF INSULIN: ICD-10-CM

## 2022-05-05 DIAGNOSIS — K04.7 DENTAL INFECTION: ICD-10-CM

## 2022-05-05 DIAGNOSIS — I10 ESSENTIAL HYPERTENSION: ICD-10-CM

## 2022-05-05 DIAGNOSIS — R74.8 ELEVATED LIVER ENZYMES: Primary | ICD-10-CM

## 2022-05-05 PROCEDURE — 85025 COMPLETE CBC W/AUTO DIFF WBC: CPT | Performed by: FAMILY MEDICINE

## 2022-05-05 PROCEDURE — 99214 OFFICE O/P EST MOD 30 MIN: CPT | Performed by: FAMILY MEDICINE

## 2022-05-05 PROCEDURE — 83735 ASSAY OF MAGNESIUM: CPT | Performed by: FAMILY MEDICINE

## 2022-05-05 PROCEDURE — 83036 HEMOGLOBIN GLYCOSYLATED A1C: CPT | Performed by: FAMILY MEDICINE

## 2022-05-05 PROCEDURE — 80053 COMPREHEN METABOLIC PANEL: CPT | Performed by: FAMILY MEDICINE

## 2022-05-05 PROCEDURE — 84550 ASSAY OF BLOOD/URIC ACID: CPT | Performed by: FAMILY MEDICINE

## 2022-05-05 RX ORDER — AMOXICILLIN 875 MG/1
875 TABLET, COATED ORAL 2 TIMES DAILY
Qty: 6 TABLET | Refills: 0 | Status: SHIPPED | OUTPATIENT
Start: 2022-05-05 | End: 2022-05-26

## 2022-05-05 RX ORDER — IBUPROFEN 800 MG/1
800 TABLET ORAL EVERY 8 HOURS PRN
Qty: 90 TABLET | Refills: 0 | Status: SHIPPED | OUTPATIENT
Start: 2022-05-05 | End: 2022-05-26 | Stop reason: SDUPTHER

## 2022-05-05 NOTE — PROGRESS NOTES
Subjective   Paul Gomez is a 76 y.o. male.   Pt presents today with CC of COPD      History of Present Illness   1.  Patient is a 76-year-old male on chronic narcotics for chronic pain who also has COPD.  He quit smoking a few months ago.  He reports that his breathing has improved though he has been wheezing in recent days..  #2 he had elevated liver enzymes on previous blood work.  He is agreeable to recheck today.  #3 he has chronic dental pain for which he takes ibuprofen as needed.  He has a lower incisor that has been painful in recent days.  He has completed 6 days of a amoxicillin course.  He reports that it is getting better, but he is concerned that the infection has not gone.  #4 he has gout for which he takes uric acid.  #5 he has diabetes.  He is agreeable to hemoglobin A1c checked today.       The following portions of the patient's history were reviewed and updated as appropriate: allergies, current medications, past family history, past medical history, past social history, past surgical history and problem list.    Review of Systems   Constitutional: Negative for chills, fever and unexpected weight loss.   HENT: Negative for congestion and sore throat.    Eyes: Negative for blurred vision and visual disturbance.   Respiratory: Positive for wheezing. Negative for cough and shortness of breath.    Cardiovascular: Negative for chest pain and palpitations.   Gastrointestinal: Negative for abdominal pain and diarrhea.   Endocrine: Negative for cold intolerance and heat intolerance.   Genitourinary: Negative for dysuria.   Musculoskeletal: Negative for arthralgias and neck stiffness.   Neurological: Negative for dizziness, seizures and syncope.   Psychiatric/Behavioral: Negative for self-injury, suicidal ideas and depressed mood.       Objective   Physical Exam  Vitals and nursing note reviewed.   Constitutional:       Appearance: He is well-developed.   HENT:      Head: Normocephalic and atraumatic.       Right Ear: External ear normal.      Left Ear: External ear normal.      Nose: Nose normal.   Eyes:      Conjunctiva/sclera: Conjunctivae normal.      Pupils: Pupils are equal, round, and reactive to light.   Cardiovascular:      Rate and Rhythm: Normal rate and regular rhythm.      Heart sounds: Normal heart sounds.   Pulmonary:      Effort: Pulmonary effort is normal. No respiratory distress.      Breath sounds: Wheezing present.   Abdominal:      General: Bowel sounds are normal.      Palpations: Abdomen is soft.   Musculoskeletal:      Cervical back: Normal range of motion and neck supple.   Skin:     General: Skin is warm and dry.   Neurological:      Mental Status: He is alert and oriented to person, place, and time.   Psychiatric:         Behavior: Behavior normal.           Assessment/Plan   Diagnoses and all orders for this visit:    1. Elevated liver enzymes (Primary)  -     CBC Auto Differential; Future  -     Comprehensive Metabolic Panel; Future  -     Magnesium; Future  -     Cancel: CBC Auto Differential  -     Cancel: Comprehensive Metabolic Panel  -     Magnesium    2. Chronic dental pain  -     ibuprofen (ADVIL,MOTRIN) 800 MG tablet; Take 1 tablet by mouth Every 8 (Eight) Hours As Needed for Mild Pain .  Dispense: 90 tablet; Refill: 0    3. Dental infection  -     amoxicillin (AMOXIL) 875 MG tablet; Take 1 tablet by mouth 2 (Two) Times a Day.  Dispense: 6 tablet; Refill: 0  Recommend amoxicillin for 3 additional days to complete the course.  4. Leg cramping    5. Wheezing    6. Essential hypertension  -     CBC Auto Differential  -     Comprehensive Metabolic Panel    7. COPD with exacerbation (HCC)  -     CBC Auto Differential  -     Comprehensive Metabolic Panel  He is wheezing today.  His oxygen is a little lower than normal.  We discussed options.  He would like to hold off on steroids.  He has been doing better with just using his nebulizer.  If his condition worsens, he needs to follow-up.   At this time, he is stable.  His oxygen levels did improve back to his baseline.  8. Chronic gout of multiple sites, unspecified cause  -     Uric Acid    9. Type 2 diabetes mellitus with hyperglycemia, without long-term current use of insulin (HCC)  -     Hemoglobin A1c        BMI is within normal parameters. No follow-up required.               BMI is within normal parameters. No follow-up required.

## 2022-05-06 ENCOUNTER — TELEPHONE (OUTPATIENT)
Dept: FAMILY MEDICINE CLINIC | Facility: CLINIC | Age: 77
End: 2022-05-06

## 2022-05-06 LAB
ALBUMIN SERPL-MCNC: 4 G/DL (ref 3.5–5.2)
ALBUMIN/GLOB SERPL: 1.5 G/DL
ALP SERPL-CCNC: 60 U/L (ref 39–117)
ALT SERPL W P-5'-P-CCNC: 17 U/L (ref 1–41)
ANION GAP SERPL CALCULATED.3IONS-SCNC: 13 MMOL/L (ref 5–15)
AST SERPL-CCNC: 31 U/L (ref 1–40)
BASOPHILS # BLD AUTO: 0.09 10*3/MM3 (ref 0–0.2)
BASOPHILS NFR BLD AUTO: 1.4 % (ref 0–1.5)
BILIRUB SERPL-MCNC: <0.2 MG/DL (ref 0–1.2)
BUN SERPL-MCNC: 17 MG/DL (ref 8–23)
BUN/CREAT SERPL: 12.5 (ref 7–25)
CALCIUM SPEC-SCNC: 8.8 MG/DL (ref 8.6–10.5)
CHLORIDE SERPL-SCNC: 102 MMOL/L (ref 98–107)
CO2 SERPL-SCNC: 27 MMOL/L (ref 22–29)
CREAT SERPL-MCNC: 1.36 MG/DL (ref 0.76–1.27)
DEPRECATED RDW RBC AUTO: 45.3 FL (ref 37–54)
EGFRCR SERPLBLD CKD-EPI 2021: 53.9 ML/MIN/1.73
EOSINOPHIL # BLD AUTO: 1.34 10*3/MM3 (ref 0–0.4)
EOSINOPHIL NFR BLD AUTO: 21.3 % (ref 0.3–6.2)
ERYTHROCYTE [DISTWIDTH] IN BLOOD BY AUTOMATED COUNT: 12.6 % (ref 12.3–15.4)
GLOBULIN UR ELPH-MCNC: 2.7 GM/DL
GLUCOSE SERPL-MCNC: 112 MG/DL (ref 65–99)
HBA1C MFR BLD: 5.4 % (ref 4.8–5.6)
HCT VFR BLD AUTO: 37.8 % (ref 37.5–51)
HGB BLD-MCNC: 12.3 G/DL (ref 13–17.7)
IMM GRANULOCYTES # BLD AUTO: 0.01 10*3/MM3 (ref 0–0.05)
IMM GRANULOCYTES NFR BLD AUTO: 0.2 % (ref 0–0.5)
LYMPHOCYTES # BLD AUTO: 1.58 10*3/MM3 (ref 0.7–3.1)
LYMPHOCYTES NFR BLD AUTO: 25.2 % (ref 19.6–45.3)
MAGNESIUM SERPL-MCNC: 2.2 MG/DL (ref 1.6–2.4)
MCH RBC QN AUTO: 31.5 PG (ref 26.6–33)
MCHC RBC AUTO-ENTMCNC: 32.5 G/DL (ref 31.5–35.7)
MCV RBC AUTO: 96.9 FL (ref 79–97)
MONOCYTES # BLD AUTO: 0.8 10*3/MM3 (ref 0.1–0.9)
MONOCYTES NFR BLD AUTO: 12.7 % (ref 5–12)
NEUTROPHILS NFR BLD AUTO: 2.46 10*3/MM3 (ref 1.7–7)
NEUTROPHILS NFR BLD AUTO: 39.2 % (ref 42.7–76)
NRBC BLD AUTO-RTO: 0.2 /100 WBC (ref 0–0.2)
PLATELET # BLD AUTO: 181 10*3/MM3 (ref 140–450)
PMV BLD AUTO: 11.6 FL (ref 6–12)
POTASSIUM SERPL-SCNC: 4 MMOL/L (ref 3.5–5.2)
PROT SERPL-MCNC: 6.7 G/DL (ref 6–8.5)
RBC # BLD AUTO: 3.9 10*6/MM3 (ref 4.14–5.8)
SODIUM SERPL-SCNC: 142 MMOL/L (ref 136–145)
URATE SERPL-MCNC: 5.4 MG/DL (ref 3.4–7)
WBC NRBC COR # BLD: 6.28 10*3/MM3 (ref 3.4–10.8)

## 2022-05-06 NOTE — TELEPHONE ENCOUNTER
----- Message from Luis Dobbs DO sent at 5/6/2022 10:08 AM EDT -----  There were a couple of abnormalities on your blood work.  Your serum creatinine went up a little bit indicating your kidney function was down from previous.  Recommend rechecking at follow-up.  Chronic pain continues to be an issue, however I recommend limiting your ibuprofen use to a minimum.  1 or 2 doses weekly I think a reasonable.  Also, your blood count dropped a little.  We should recheck both when you come back in in a couple weeks.      Patient notified & verbalized understanding.

## 2022-05-17 ENCOUNTER — HOSPITAL ENCOUNTER (EMERGENCY)
Facility: HOSPITAL | Age: 77
Discharge: HOME | End: 2022-05-18
Attending: EMERGENCY MEDICINE | Admitting: EMERGENCY MEDICINE

## 2022-05-17 ENCOUNTER — APPOINTMENT (OUTPATIENT)
Dept: CT IMAGING | Facility: HOSPITAL | Age: 77
End: 2022-05-17

## 2022-05-17 ENCOUNTER — APPOINTMENT (OUTPATIENT)
Dept: GENERAL RADIOLOGY | Facility: HOSPITAL | Age: 77
End: 2022-05-17

## 2022-05-17 DIAGNOSIS — I10 HYPERTENSION, UNSPECIFIED TYPE: Primary | ICD-10-CM

## 2022-05-17 DIAGNOSIS — I67.2 CEREBRAL ARTERIOSCLEROSIS WITH HISTORY OF PREVIOUS STROKE: ICD-10-CM

## 2022-05-17 DIAGNOSIS — Z86.73 CEREBRAL ARTERIOSCLEROSIS WITH HISTORY OF PREVIOUS STROKE: ICD-10-CM

## 2022-05-17 LAB
ABO GROUP BLD: NORMAL
ALBUMIN SERPL-MCNC: 3.95 G/DL (ref 3.5–5.2)
ALBUMIN/GLOB SERPL: 1.1 G/DL
ALP SERPL-CCNC: 85 U/L (ref 39–117)
ALT SERPL W P-5'-P-CCNC: 12 U/L (ref 1–41)
ANION GAP SERPL CALCULATED.3IONS-SCNC: 15.9 MMOL/L (ref 5–15)
APTT PPP: 28.9 SECONDS (ref 26.5–34.5)
AST SERPL-CCNC: 20 U/L (ref 1–40)
BASOPHILS # BLD AUTO: 0.06 10*3/MM3 (ref 0–0.2)
BASOPHILS NFR BLD AUTO: 0.4 % (ref 0–1.5)
BILIRUB SERPL-MCNC: 0.7 MG/DL (ref 0–1.2)
BLD GP AB SCN SERPL QL: NEGATIVE
BUN SERPL-MCNC: 14 MG/DL (ref 8–23)
BUN/CREAT SERPL: 17.3 (ref 7–25)
CALCIUM SPEC-SCNC: 9.4 MG/DL (ref 8.6–10.5)
CHLORIDE SERPL-SCNC: 100 MMOL/L (ref 98–107)
CO2 SERPL-SCNC: 20.1 MMOL/L (ref 22–29)
CREAT BLDA-MCNC: 0.8 MG/DL (ref 0.6–1.3)
CREAT SERPL-MCNC: 0.81 MG/DL (ref 0.76–1.27)
DEPRECATED RDW RBC AUTO: 41.1 FL (ref 37–54)
EGFRCR SERPLBLD CKD-EPI 2021: 91.4 ML/MIN/1.73
EOSINOPHIL # BLD AUTO: 0.18 10*3/MM3 (ref 0–0.4)
EOSINOPHIL NFR BLD AUTO: 1.3 % (ref 0.3–6.2)
ERYTHROCYTE [DISTWIDTH] IN BLOOD BY AUTOMATED COUNT: 12.6 % (ref 12.3–15.4)
GLOBULIN UR ELPH-MCNC: 3.5 GM/DL
GLUCOSE BLDC GLUCOMTR-MCNC: 109 MG/DL (ref 70–130)
GLUCOSE SERPL-MCNC: 120 MG/DL (ref 65–99)
HCT VFR BLD AUTO: 41.9 % (ref 37.5–51)
HGB BLD-MCNC: 14.6 G/DL (ref 13–17.7)
HOLD SPECIMEN: NORMAL
HOLD SPECIMEN: NORMAL
IMM GRANULOCYTES # BLD AUTO: 0.05 10*3/MM3 (ref 0–0.05)
IMM GRANULOCYTES NFR BLD AUTO: 0.4 % (ref 0–0.5)
INR PPP: 1.14 (ref 0.9–1.1)
LYMPHOCYTES # BLD AUTO: 1.09 10*3/MM3 (ref 0.7–3.1)
LYMPHOCYTES NFR BLD AUTO: 8.2 % (ref 19.6–45.3)
MCH RBC QN AUTO: 31.3 PG (ref 26.6–33)
MCHC RBC AUTO-ENTMCNC: 34.8 G/DL (ref 31.5–35.7)
MCV RBC AUTO: 89.9 FL (ref 79–97)
MONOCYTES # BLD AUTO: 0.61 10*3/MM3 (ref 0.1–0.9)
MONOCYTES NFR BLD AUTO: 4.6 % (ref 5–12)
NEUTROPHILS NFR BLD AUTO: 11.37 10*3/MM3 (ref 1.7–7)
NEUTROPHILS NFR BLD AUTO: 85.1 % (ref 42.7–76)
NRBC BLD AUTO-RTO: 0 /100 WBC (ref 0–0.2)
PLATELET # BLD AUTO: 253 10*3/MM3 (ref 140–450)
PMV BLD AUTO: 10 FL (ref 6–12)
POTASSIUM SERPL-SCNC: 4.1 MMOL/L (ref 3.5–5.2)
PROT SERPL-MCNC: 7.4 G/DL (ref 6–8.5)
PROTHROMBIN TIME: 14.9 SECONDS (ref 12.1–14.7)
RBC # BLD AUTO: 4.66 10*6/MM3 (ref 4.14–5.8)
RH BLD: POSITIVE
SODIUM SERPL-SCNC: 136 MMOL/L (ref 136–145)
T&S EXPIRATION DATE: NORMAL
TROPONIN T SERPL-MCNC: <0.01 NG/ML (ref 0–0.03)
WBC NRBC COR # BLD: 13.36 10*3/MM3 (ref 3.4–10.8)
WHOLE BLOOD HOLD COAG: NORMAL
WHOLE BLOOD HOLD SPECIMEN: NORMAL

## 2022-05-17 PROCEDURE — 80053 COMPREHEN METABOLIC PANEL: CPT | Performed by: EMERGENCY MEDICINE

## 2022-05-17 PROCEDURE — 94640 AIRWAY INHALATION TREATMENT: CPT

## 2022-05-17 PROCEDURE — 82565 ASSAY OF CREATININE: CPT

## 2022-05-17 PROCEDURE — 99284 EMERGENCY DEPT VISIT MOD MDM: CPT

## 2022-05-17 PROCEDURE — 96376 TX/PRO/DX INJ SAME DRUG ADON: CPT

## 2022-05-17 PROCEDURE — 70498 CT ANGIOGRAPHY NECK: CPT

## 2022-05-17 PROCEDURE — 96374 THER/PROPH/DIAG INJ IV PUSH: CPT

## 2022-05-17 PROCEDURE — 86901 BLOOD TYPING SEROLOGIC RH(D): CPT

## 2022-05-17 PROCEDURE — 93010 ELECTROCARDIOGRAM REPORT: CPT | Performed by: INTERNAL MEDICINE

## 2022-05-17 PROCEDURE — 25010000002 HYDROMORPHONE 1 MG/ML SOLUTION: Performed by: EMERGENCY MEDICINE

## 2022-05-17 PROCEDURE — 70496 CT ANGIOGRAPHY HEAD: CPT

## 2022-05-17 PROCEDURE — 86901 BLOOD TYPING SEROLOGIC RH(D): CPT | Performed by: EMERGENCY MEDICINE

## 2022-05-17 PROCEDURE — 0 IOPAMIDOL PER 1 ML: Performed by: EMERGENCY MEDICINE

## 2022-05-17 PROCEDURE — 86850 RBC ANTIBODY SCREEN: CPT | Performed by: EMERGENCY MEDICINE

## 2022-05-17 PROCEDURE — 70450 CT HEAD/BRAIN W/O DYE: CPT

## 2022-05-17 PROCEDURE — 86900 BLOOD TYPING SEROLOGIC ABO: CPT | Performed by: EMERGENCY MEDICINE

## 2022-05-17 PROCEDURE — 93005 ELECTROCARDIOGRAM TRACING: CPT | Performed by: EMERGENCY MEDICINE

## 2022-05-17 PROCEDURE — 86900 BLOOD TYPING SEROLOGIC ABO: CPT

## 2022-05-17 PROCEDURE — 70450 CT HEAD/BRAIN W/O DYE: CPT | Performed by: RADIOLOGY

## 2022-05-17 PROCEDURE — 85730 THROMBOPLASTIN TIME PARTIAL: CPT | Performed by: EMERGENCY MEDICINE

## 2022-05-17 PROCEDURE — 71045 X-RAY EXAM CHEST 1 VIEW: CPT

## 2022-05-17 PROCEDURE — 85025 COMPLETE CBC W/AUTO DIFF WBC: CPT | Performed by: EMERGENCY MEDICINE

## 2022-05-17 PROCEDURE — 85610 PROTHROMBIN TIME: CPT | Performed by: EMERGENCY MEDICINE

## 2022-05-17 PROCEDURE — 0042T HC CT CEREBRAL PERFUSION W/WO CONTRAST: CPT

## 2022-05-17 PROCEDURE — 94799 UNLISTED PULMONARY SVC/PX: CPT

## 2022-05-17 PROCEDURE — 84484 ASSAY OF TROPONIN QUANT: CPT | Performed by: EMERGENCY MEDICINE

## 2022-05-17 PROCEDURE — 94761 N-INVAS EAR/PLS OXIMETRY MLT: CPT

## 2022-05-17 PROCEDURE — 96375 TX/PRO/DX INJ NEW DRUG ADDON: CPT

## 2022-05-17 PROCEDURE — 82962 GLUCOSE BLOOD TEST: CPT

## 2022-05-17 PROCEDURE — 25010000002 ONDANSETRON PER 1 MG: Performed by: EMERGENCY MEDICINE

## 2022-05-17 RX ORDER — LABETALOL HYDROCHLORIDE 5 MG/ML
20 INJECTION, SOLUTION INTRAVENOUS ONCE
Status: COMPLETED | OUTPATIENT
Start: 2022-05-17 | End: 2022-05-17

## 2022-05-17 RX ORDER — ONDANSETRON 2 MG/ML
4 INJECTION INTRAMUSCULAR; INTRAVENOUS ONCE
Status: COMPLETED | OUTPATIENT
Start: 2022-05-17 | End: 2022-05-17

## 2022-05-17 RX ORDER — CLONIDINE HYDROCHLORIDE 0.1 MG/1
0.2 TABLET ORAL ONCE
Status: COMPLETED | OUTPATIENT
Start: 2022-05-17 | End: 2022-05-17

## 2022-05-17 RX ORDER — HYDROCODONE BITARTRATE AND ACETAMINOPHEN 5; 325 MG/1; MG/1
2 TABLET ORAL ONCE
Status: COMPLETED | OUTPATIENT
Start: 2022-05-17 | End: 2022-05-17

## 2022-05-17 RX ORDER — SODIUM CHLORIDE 0.9 % (FLUSH) 0.9 %
10 SYRINGE (ML) INJECTION AS NEEDED
Status: DISCONTINUED | OUTPATIENT
Start: 2022-05-17 | End: 2022-05-18 | Stop reason: HOSPADM

## 2022-05-17 RX ORDER — ALBUTEROL SULFATE 90 UG/1
2 AEROSOL, METERED RESPIRATORY (INHALATION) ONCE
Status: COMPLETED | OUTPATIENT
Start: 2022-05-17 | End: 2022-05-17

## 2022-05-17 RX ORDER — IPRATROPIUM BROMIDE AND ALBUTEROL SULFATE 2.5; .5 MG/3ML; MG/3ML
3 SOLUTION RESPIRATORY (INHALATION) ONCE
Status: COMPLETED | OUTPATIENT
Start: 2022-05-17 | End: 2022-05-17

## 2022-05-17 RX ADMIN — LABETALOL HYDROCHLORIDE 20 MG: 5 INJECTION INTRAVENOUS at 19:46

## 2022-05-17 RX ADMIN — CLONIDINE HYDROCHLORIDE 0.2 MG: 0.1 TABLET ORAL at 22:33

## 2022-05-17 RX ADMIN — IPRATROPIUM BROMIDE AND ALBUTEROL SULFATE 3 ML: .5; 3 SOLUTION RESPIRATORY (INHALATION) at 20:16

## 2022-05-17 RX ADMIN — ALBUTEROL SULFATE 2 PUFF: 90 AEROSOL, METERED RESPIRATORY (INHALATION) at 23:52

## 2022-05-17 RX ADMIN — ONDANSETRON 4 MG: 2 INJECTION INTRAMUSCULAR; INTRAVENOUS at 22:33

## 2022-05-17 RX ADMIN — HYDROCODONE BITARTRATE AND ACETAMINOPHEN 2 TABLET: 5; 325 TABLET ORAL at 20:13

## 2022-05-17 RX ADMIN — LABETALOL HYDROCHLORIDE 20 MG: 5 INJECTION INTRAVENOUS at 22:34

## 2022-05-17 RX ADMIN — IOPAMIDOL 140 ML: 755 INJECTION, SOLUTION INTRAVENOUS at 19:09

## 2022-05-17 RX ADMIN — CLONIDINE HYDROCHLORIDE 0.2 MG: 0.1 TABLET ORAL at 19:46

## 2022-05-17 RX ADMIN — HYDROMORPHONE HYDROCHLORIDE 1 MG: 1 INJECTION, SOLUTION INTRAMUSCULAR; INTRAVENOUS; SUBCUTANEOUS at 22:33

## 2022-05-17 NOTE — ED PROVIDER NOTES
"Subjective   I was asked to see this patient because of a stroke alert being called.  I examined him when he was getting ready to have CT scan.  Patient says that this morning around 8:00 he said he \"felt like my blood pressure was up\" and had his wife check and it was somewhat elevated.  He said that he had a stroke about 3 years ago that affected his coordination on both sides.  He was asked times about neurologic deficit and does not indicate that 1 side with the other is weak or that he is having trouble speaking or seeing.  He denies headache.          Review of Systems   Constitutional: Negative.  Negative for fever.   HENT: Negative.         The patient said he just had a couple of teeth pulled yesterday.   Respiratory: Negative.    Cardiovascular: Negative.  Negative for chest pain.   Gastrointestinal: Negative.  Negative for abdominal pain.   Endocrine: Negative.    Genitourinary: Negative.  Negative for dysuria.   Skin: Negative.    Neurological: Negative.    Psychiatric/Behavioral: Negative.    All other systems reviewed and are negative.      Past Medical History:   Diagnosis Date   • COPD (chronic obstructive pulmonary disease) (HCC)    • Gout    • Hearing loss    • History of degenerative disc disease    • Hyperlipidemia    • Hypertension    • Low back pain    • Pedal edema    • Prediabetes        No Known Allergies    Past Surgical History:   Procedure Laterality Date   • CHOLECYSTECTOMY     • LUMBAR DISCECTOMY     • SHOULDER SURGERY Left        Family History   Problem Relation Age of Onset   • Cancer Mother        Social History     Socioeconomic History   • Marital status:    Tobacco Use   • Smoking status: Former Smoker     Packs/day: 0.25     Years: 60.00     Pack years: 15.00     Types: Cigarettes     Start date:      Quit date:      Years since quittin.3   • Smokeless tobacco: Never Used   • Tobacco comment: patient stated he is down to about 4 cigarettes a day   Vaping Use "   • Vaping Use: Never used   Substance and Sexual Activity   • Alcohol use: No   • Drug use: No   • Sexual activity: Defer           Objective   Physical Exam  Vitals and nursing note reviewed.   Constitutional:       Appearance: He is well-developed and normal weight. He is not ill-appearing, toxic-appearing or diaphoretic.   HENT:      Head: Normocephalic and atraumatic.      Right Ear: External ear normal.      Left Ear: External ear normal.      Nose: Nose normal.   Eyes:      Conjunctiva/sclera: Conjunctivae normal.      Pupils: Pupils are equal, round, and reactive to light.   Neck:      Vascular: No JVD.      Trachea: No tracheal deviation.   Cardiovascular:      Rate and Rhythm: Normal rate and regular rhythm.      Heart sounds: Normal heart sounds. No murmur heard.  Pulmonary:      Effort: Pulmonary effort is normal. No respiratory distress.      Breath sounds: Normal breath sounds. No wheezing.   Abdominal:      General: Bowel sounds are normal.      Palpations: Abdomen is soft.      Tenderness: There is no abdominal tenderness.   Musculoskeletal:         General: No deformity. Normal range of motion.      Cervical back: Normal range of motion and neck supple.   Skin:     General: Skin is warm and dry.      Coloration: Skin is not pale.      Findings: No erythema or rash.   Neurological:      Mental Status: He is alert and oriented to person, place, and time.      Cranial Nerves: No cranial nerve deficit.      Comments: Neurologic exam: Patient is awake and alert and oriented.  There are no visual field deficits.  Speech is fluent.  There is perhaps minimal weakness of the right leg versus the left but this is not consistent.  He was able to hold both lower extremities off of the bed for 10 seconds.  There is also perhaps minimal weakness of the right arm.   Psychiatric:         Behavior: Behavior normal.         Thought Content: Thought content normal.         Procedures           ED Course  ED Course as  of 05/18/22 0140   Tue May 17, 2022   1850 Patient not a candidate for stroke team evaluation.  In fact it is not clear that there is neurologic deficit at all and the findings may represent old injury that he describes from 3 years ago.  We will proceed with a work-up and go from there. [DS]   2222 The patient asked me to speak with him.  His blood pressures have come down and gone up and come down and have generally trended better and he says that his vision is returning to normal.  He wants to go home where he has pain medication he can take for his back and he has asked if I would give him something for his discomfort which we will do.  Another dose of medicine for his high blood pressure is also recommended. [DS]   Wed May 18, 2022   0006 After receiving pain medication and additional blood pressure medicine a clonidine 0.2 and labetalol 20 the patient's blood pressures have returned to normal.  He went off to sleep and wakes easily and says that his visual symptoms have resolved.  He would like to go home and this is felt appropriate.  However he is only on atenolol which she has been on for a number of years after cardiologist prescribed it as his only blood pressure medication. [DS]      ED Course User Index  [DS] Greyson Donato MD                                                 [facility]    Final diagnoses:   Hypertension, unspecified type   Cerebral arteriosclerosis with history of previous stroke       ED Disposition  ED Disposition     ED Disposition   Discharge    Condition   Stable    Comment   --             No follow-up provider specified.       Medication List      New Prescriptions    lisinopril-hydrochlorothiazide 20-12.5 MG per tablet  Commonly known as: PRINZIDE,ZESTORETIC  Take 2 tablets by mouth Daily.           Where to Get Your Medications      You can get these medications from any pharmacy    Bring a paper prescription for each of these medications  · lisinopril-hydrochlorothiazide  20-12.5 MG per tablet          Greyson Donato MD  05/18/22 0146

## 2022-05-17 NOTE — CONSULTS
[facility]   Teleneurology Note    Patient Name: Paul Gomez  : 1945  MRN: [id number]  Primary Care Physician: Luis Dobbs DO  Referring Site: [place]  Referring Provider: Dr. Donato    Subjective   Teleneurology Initial Data     Arrival Date Telestroke Site: 22     Neurologist Evaluation Date: 22 Neurologist Evaluation Time:    Date Last Known Well: 22 Time Last Known Well: 1300     History     Chief Complaint: headache and blurry vision    Stroke Risk Factors/ Pertinent Data     Stroke risk factors: hypertension  Anticoagulants prior to arrival: none  Antiplatelets prior to arrival: aspirin     Pre- Stroke Modified Bradford Scale     Pre-Stroke Modified Bradford Scale: 2 - Slight disability.  Unable to carry out all previous activities but able to look after own affairs without assistance.    NIH Stroke Scale     NIHSS Performed Date: 22 NIHSS Performed Time:    Interval: baseline  1a. Level of Consciousness: 0-->Alert, keenly responsive  1b. LOC Questions: 0-->Answers both questions correctly  1c. LOC Commands: 0-->Performs both tasks correctly  2. Best Gaze: 0-->Normal  3. Visual: 3-->Bilateral hemianopia (blind including cortical blindness)  4. Facial Palsy: 0-->Normal symmetrical movements  5a. Motor Arm, Left: 0-->No drift, limb holds 90 (or 45) degrees for full 10 secs  5b. Motor Arm, Right: 0-->No drift, limb holds 90 (or 45) degrees for full 10 secs  6a. Motor Leg, Left: 0-->No drift, leg holds 30 degree position for full 5 secs  6b. Motor Leg, Right: 0-->No drift, leg holds 30 degree position for full 5 secs  7. Limb Ataxia: 0-->Absent  8. Sensory: 0-->Normal, no sensory loss  9. Best Language: 0-->No aphasia, normal  10. Dysarthria: 1-->Mild-to-moderate dysarthria, patient slurs at least some words and, at worst, can be understood with some difficulty  11. Extinction and Inattention (formerly Neglect): 0-->No abnormality  Total (NIH Stroke Scale): 4      Results        Personal review of CNS imaging:(Official report by radiologist pending)  Imaging  CT Imaging Review: CT Imaging reviewed, NO acute infarct/ hemorrhage seen  CTA Imaging Review: CTA Imaging reviewed, NO large vessel occlusion or severe stenosis seen    Thrombolytic   Thrombolytics: tPA not given  Tissue Plasminogen Activator (tPA) Exclusion Criteria: Onset unknown or GREATER than 4.5 hours     Assessment & Plan   Assessment/ Plan     Assessment:  Acute Stroke Evaluation: Stroke not suspected/ Other (Specify)- the risks of IV thrombolytic outweigh the benefits of treatment     Mr. Gomez is a 77 yo man with a hx of HTN, HLD, CKD, in 2019 bilateral occipital lobe infarcts in the setting of HTN and renal failure presenting with headache, vision changes and elevated blood pressure.    Mr. Gomez presents with a family member.    He reports over the last few days his blood pressure has been significantly elevated, 190s systolic. He reports medications compliance with his antihypertensives. He said starting five hours prior to presentation his vision became progressively blurry to the point of rihgt now where he can see light but is unable to count fingers or see faces clearly. He reports no other focal neurologic symptoms - no weakness, no sensory changes, no speech or comprehension concerns. Current     CTb demonstrates old infarcts in the bilateral occipital lobes    Recommendations   ##Hypertensive emergency, concerning for PRES   ##Bilateral vision changes   ##Hx of bilateral occipital ischemic strokes   - recommend urgent blood pressure control, with caution to avoid dropping >25% in the first 2-6 hours.   - obtain MRI brain   - evaluate for toxic metabolic causes of deranged BP parameters   - continue home ASA   - [ ] lipids, [id number]            Disposition     Disposition: The patient will remain at the referring institution for further evaluation and management    Medical Decision  Making  Medical Data Reviewed: Data reviewed including: clinical labs, radiology and/or medical tests  Length of visit: 60 minutes    I, Gill Hassan MD, saw the patient on 05/17/22 at 1948 for an initial in-patient or emergency room telememedicine face to face consult using interactive technology for 60 minutes. The location of the patient was [place]. I was located at  . I was assisted with the exam by Nurse Rodriguez.    I have proceeded with this evaluation at the request of the referring practitioner as it is felt to be an emergency setting and no appropriate specialist is available to perform this evaluation. The originating hospital has reported that this is the correct patient and has obtained consent from the patient/surrogate to perform this telemedicine evaluation(including obtaining history, performing examination and reviewing data provided by the patient an/or originating site of care provider)    I have introduced myself to the patient, provided my credentials, disclosed my location, and determined that, based on review of the patient's chart and discussion with the patient's primary team, telemedicine via a HIPAA compliant, real-time, face-to-face two-way, interactive audio and video platform is an appropriate and effective means of providing the service.    The patient/surrogate has a right to refuse this evaluation as they have been explained risks including potential loss of confidentiality, benefits, alternatives, and the potential need for subsequent face-to-face care. In this evaluation, we will be providing recommendations only.  The ultimate decision to follow or not to follow these recommendations will be left to the bedside treating/requesting practitioner.    The patient/surrogate has been notified that other healthcare professionals including technical person may be involved in this A/V evaluation.  All laws concerning confidentiality and patient access to medical records and copies  of medical records apply to telemedicine.  The patient/surrogate has received the originating site's Health Notice of Privacy Practices.    Gill Hassan MD

## 2022-05-18 VITALS
HEIGHT: 70 IN | DIASTOLIC BLOOD PRESSURE: 88 MMHG | SYSTOLIC BLOOD PRESSURE: 147 MMHG | OXYGEN SATURATION: 94 % | TEMPERATURE: 98.2 F | RESPIRATION RATE: 20 BRPM | WEIGHT: 147 LBS | BODY MASS INDEX: 21.05 KG/M2 | HEART RATE: 83 BPM

## 2022-05-18 LAB
QT INTERVAL: 404 MS
QTC INTERVAL: 420 MS

## 2022-05-18 RX ORDER — LISINOPRIL AND HYDROCHLOROTHIAZIDE 20; 12.5 MG/1; MG/1
2 TABLET ORAL DAILY
Qty: 60 TABLET | Refills: 0 | Status: SHIPPED | OUTPATIENT
Start: 2022-05-18 | End: 2022-05-26 | Stop reason: SDUPTHER

## 2022-05-18 NOTE — DISCHARGE INSTRUCTIONS
We think the main problem is with your blood pressure so a new medication has been prescribed and is a combination of 2 different medications in the same tablet.  Take it once daily.  We recommend that you see your primary care physician this week in the next few days if possible.  If your symptoms return or if there are more concerns we recommend that you return here for reevaluation.

## 2022-05-26 ENCOUNTER — OFFICE VISIT (OUTPATIENT)
Dept: FAMILY MEDICINE CLINIC | Facility: CLINIC | Age: 77
End: 2022-05-26

## 2022-05-26 VITALS
TEMPERATURE: 98 F | WEIGHT: 148.2 LBS | SYSTOLIC BLOOD PRESSURE: 116 MMHG | HEART RATE: 86 BPM | HEIGHT: 70 IN | BODY MASS INDEX: 21.22 KG/M2 | OXYGEN SATURATION: 93 % | DIASTOLIC BLOOD PRESSURE: 60 MMHG

## 2022-05-26 DIAGNOSIS — J44.9 CHRONIC OBSTRUCTIVE PULMONARY DISEASE, UNSPECIFIED COPD TYPE: ICD-10-CM

## 2022-05-26 DIAGNOSIS — I10 PRIMARY HYPERTENSION: Primary | ICD-10-CM

## 2022-05-26 DIAGNOSIS — G89.29 CHRONIC DENTAL PAIN: ICD-10-CM

## 2022-05-26 DIAGNOSIS — K08.9 CHRONIC DENTAL PAIN: ICD-10-CM

## 2022-05-26 DIAGNOSIS — J43.2 CENTRILOBULAR EMPHYSEMA: ICD-10-CM

## 2022-05-26 DIAGNOSIS — G47.00 INSOMNIA, UNSPECIFIED TYPE: ICD-10-CM

## 2022-05-26 PROCEDURE — 99214 OFFICE O/P EST MOD 30 MIN: CPT | Performed by: FAMILY MEDICINE

## 2022-05-26 RX ORDER — IBUPROFEN 800 MG/1
800 TABLET ORAL EVERY 8 HOURS PRN
Qty: 90 TABLET | Refills: 0 | Status: SHIPPED | OUTPATIENT
Start: 2022-05-26 | End: 2022-08-18 | Stop reason: SDUPTHER

## 2022-05-26 RX ORDER — IPRATROPIUM BROMIDE AND ALBUTEROL SULFATE 2.5; .5 MG/3ML; MG/3ML
3 SOLUTION RESPIRATORY (INHALATION) EVERY 4 HOURS PRN
Qty: 360 ML | Refills: 3 | Status: SHIPPED | OUTPATIENT
Start: 2022-05-26 | End: 2022-08-18 | Stop reason: SDUPTHER

## 2022-05-26 RX ORDER — ALBUTEROL SULFATE 90 UG/1
2 AEROSOL, METERED RESPIRATORY (INHALATION) EVERY 6 HOURS PRN
Qty: 18 G | Refills: 5 | Status: SHIPPED | OUTPATIENT
Start: 2022-05-26 | End: 2022-08-18 | Stop reason: SDUPTHER

## 2022-05-26 RX ORDER — LISINOPRIL AND HYDROCHLOROTHIAZIDE 20; 12.5 MG/1; MG/1
2 TABLET ORAL DAILY
Qty: 180 TABLET | Refills: 0 | Status: SHIPPED | OUTPATIENT
Start: 2022-05-26 | End: 2022-08-18 | Stop reason: SDUPTHER

## 2022-05-26 RX ORDER — MIRTAZAPINE 7.5 MG/1
7.5 TABLET, FILM COATED ORAL NIGHTLY
Qty: 30 TABLET | Refills: 1 | Status: SHIPPED | OUTPATIENT
Start: 2022-05-26 | End: 2022-08-18

## 2022-05-26 RX ORDER — LISINOPRIL AND HYDROCHLOROTHIAZIDE 20; 12.5 MG/1; MG/1
2 TABLET ORAL DAILY
Qty: 60 TABLET | Refills: 0 | Status: SHIPPED | OUTPATIENT
Start: 2022-05-26 | End: 2022-05-26 | Stop reason: SDUPTHER

## 2022-05-26 NOTE — PROGRESS NOTES
Subjective   Paul Gomez is a 76 y.o. male.   Pt presents today with CC of Hypertension      History of Present Illness   1.  Patient is a 76-year-old male here to follow-up on hypertension.  He takes Zestoretic 20-12 0.5 twice daily.  He is doing well on this regimen.  He also takes NSAIDs, of note.  #2 he has COPD/emphysema for which he takes albuterol and DuoNebs.  He does well on this current regimen.    #3 he is following up on chronic dental pain.  His recent prescription with antibiotic cleared up his pain.  He is still taking ibuprofen as needed for joint pain.  He has a dentist taking care of him.  #4 he reports poor appetite, difficulty sleeping, and frequently waking up at night.  He requests something to help him sleep.  He is never taken anything for sleep other than Benadryl over-the-counter.  He does not currently take that.           The following portions of the patient's history were reviewed and updated as appropriate: allergies, current medications, past family history, past medical history, past social history, past surgical history and problem list.    Review of Systems   Constitutional: Negative for chills, fever and unexpected weight loss.   HENT: Negative for congestion and sore throat.    Eyes: Negative for blurred vision and visual disturbance.   Respiratory: Negative for cough and wheezing.    Cardiovascular: Negative for chest pain and palpitations.   Gastrointestinal: Negative for abdominal pain and diarrhea.   Endocrine: Negative for cold intolerance and heat intolerance.   Genitourinary: Negative for dysuria.   Musculoskeletal: Negative for arthralgias and neck stiffness.   Neurological: Negative for dizziness, seizures and syncope.   Psychiatric/Behavioral: Positive for sleep disturbance. Negative for self-injury, suicidal ideas and depressed mood.       Objective   Physical Exam  Vitals and nursing note reviewed.   Constitutional:       Appearance: He is well-developed.   HENT:       Head: Normocephalic and atraumatic.      Right Ear: External ear normal.      Left Ear: External ear normal.      Nose: Nose normal.   Eyes:      Conjunctiva/sclera: Conjunctivae normal.      Pupils: Pupils are equal, round, and reactive to light.   Cardiovascular:      Rate and Rhythm: Normal rate and regular rhythm.      Heart sounds: Normal heart sounds.   Pulmonary:      Effort: Pulmonary effort is normal.      Breath sounds: Normal breath sounds.   Abdominal:      General: Bowel sounds are normal.      Palpations: Abdomen is soft.   Musculoskeletal:      Cervical back: Normal range of motion and neck supple.   Skin:     General: Skin is warm and dry.   Neurological:      Mental Status: He is alert and oriented to person, place, and time.   Psychiatric:         Behavior: Behavior normal.           Assessment & Plan   Diagnoses and all orders for this visit:    1. Primary hypertension (Primary)  -     Discontinue: lisinopril-hydrochlorothiazide (PRINZIDE,ZESTORETIC) 20-12.5 MG per tablet; Take 2 tablets by mouth Daily.  Dispense: 60 tablet; Refill: 0  -     lisinopril-hydrochlorothiazide (PRINZIDE,ZESTORETIC) 20-12.5 MG per tablet; Take 2 tablets by mouth Daily.  Dispense: 180 tablet; Refill: 0  Blood pressure well controlled today.  We will continue to monitor.  2. Chronic obstructive pulmonary disease, unspecified COPD type (HCC)  -     albuterol sulfate  (90 Base) MCG/ACT inhaler; Inhale 2 puffs Every 6 (Six) Hours As Needed for Wheezing or Shortness of Air.  Dispense: 18 g; Refill: 5  -     ipratropium-albuterol (DUO-NEB) 0.5-2.5 mg/3 ml nebulizer; Take 3 mL by nebulization Every 4 (Four) Hours As Needed for Wheezing or Shortness of Air.  Dispense: 360 mL; Refill: 3    3. Centrilobular emphysema (HCC)  -     albuterol sulfate  (90 Base) MCG/ACT inhaler; Inhale 2 puffs Every 6 (Six) Hours As Needed for Wheezing or Shortness of Air.  Dispense: 18 g; Refill: 5  -     ipratropium-albuterol  (DUO-NEB) 0.5-2.5 mg/3 ml nebulizer; Take 3 mL by nebulization Every 4 (Four) Hours As Needed for Wheezing or Shortness of Air.  Dispense: 360 mL; Refill: 3    4. Chronic dental pain  -     ibuprofen (ADVIL,MOTRIN) 800 MG tablet; Take 1 tablet by mouth Every 8 (Eight) Hours As Needed for Mild Pain .  Dispense: 90 tablet; Refill: 0    5. Insomnia, unspecified type  -     mirtazapine (REMERON) 7.5 MG tablet; Take 1 tablet by mouth Every Night.  Dispense: 30 tablet; Refill: 1  We discussed options.  Recommend against diphenhydramine long-term.  It is okay once in a while, however.  Melatonin has not been tried, though will try Remeron first, if Remeron does not help at 7.5 or 15 mg, will consider melatonin.                BMI is within normal parameters. No other follow-up for BMI required.

## 2022-06-19 ENCOUNTER — APPOINTMENT (OUTPATIENT)
Dept: GENERAL RADIOLOGY | Facility: HOSPITAL | Age: 77
End: 2022-06-19

## 2022-06-19 ENCOUNTER — HOSPITAL ENCOUNTER (EMERGENCY)
Facility: HOSPITAL | Age: 77
Discharge: HOME | End: 2022-06-19
Attending: EMERGENCY MEDICINE | Admitting: EMERGENCY MEDICINE

## 2022-06-19 VITALS
RESPIRATION RATE: 18 BRPM | BODY MASS INDEX: 22.73 KG/M2 | HEART RATE: 61 BPM | DIASTOLIC BLOOD PRESSURE: 65 MMHG | WEIGHT: 150 LBS | OXYGEN SATURATION: 95 % | HEIGHT: 68 IN | SYSTOLIC BLOOD PRESSURE: 106 MMHG | TEMPERATURE: 98.6 F

## 2022-06-19 DIAGNOSIS — U07.1 COVID: Primary | ICD-10-CM

## 2022-06-19 LAB
A-A DO2: 40.2 MMHG (ref 0–300)
ALBUMIN SERPL-MCNC: 3.45 G/DL (ref 3.5–5.2)
ALBUMIN/GLOB SERPL: 1.3 G/DL
ALP SERPL-CCNC: 74 U/L (ref 39–117)
ALT SERPL W P-5'-P-CCNC: 15 U/L (ref 1–41)
ANION GAP SERPL CALCULATED.3IONS-SCNC: 12.7 MMOL/L (ref 5–15)
ARTERIAL PATENCY WRIST A: POSITIVE
AST SERPL-CCNC: 25 U/L (ref 1–40)
ATMOSPHERIC PRESS: 732 MMHG
BASE EXCESS BLDA CALC-SCNC: -2.1 MMOL/L (ref 0–2)
BASOPHILS # BLD AUTO: 0.02 10*3/MM3 (ref 0–0.2)
BASOPHILS NFR BLD AUTO: 0.5 % (ref 0–1.5)
BDY SITE: ABNORMAL
BILIRUB SERPL-MCNC: 0.2 MG/DL (ref 0–1.2)
BODY TEMPERATURE: 0 C
BUN SERPL-MCNC: 25 MG/DL (ref 8–23)
BUN/CREAT SERPL: 19.8 (ref 7–25)
CALCIUM SPEC-SCNC: 8.2 MG/DL (ref 8.6–10.5)
CHLORIDE SERPL-SCNC: 102 MMOL/L (ref 98–107)
CO2 BLDA-SCNC: 23.9 MMOL/L (ref 22–33)
CO2 SERPL-SCNC: 21.3 MMOL/L (ref 22–29)
COHGB MFR BLD: 2.5 % (ref 0–5)
CREAT SERPL-MCNC: 1.26 MG/DL (ref 0.76–1.27)
D-LACTATE SERPL-SCNC: 1.2 MMOL/L (ref 0.5–2)
DEPRECATED RDW RBC AUTO: 42.3 FL (ref 37–54)
EGFRCR SERPLBLD CKD-EPI 2021: 59.1 ML/MIN/1.73
EOSINOPHIL # BLD AUTO: 0.06 10*3/MM3 (ref 0–0.4)
EOSINOPHIL NFR BLD AUTO: 1.4 % (ref 0.3–6.2)
ERYTHROCYTE [DISTWIDTH] IN BLOOD BY AUTOMATED COUNT: 12.7 % (ref 12.3–15.4)
FLUAV SUBTYP SPEC NAA+PROBE: NOT DETECTED
FLUBV RNA ISLT QL NAA+PROBE: NOT DETECTED
GLOBULIN UR ELPH-MCNC: 2.8 GM/DL
GLUCOSE SERPL-MCNC: 109 MG/DL (ref 65–99)
HCO3 BLDA-SCNC: 22.8 MMOL/L (ref 20–26)
HCT VFR BLD AUTO: 36.2 % (ref 37.5–51)
HCT VFR BLD CALC: 37.9 % (ref 38–51)
HGB BLD-MCNC: 12.4 G/DL (ref 13–17.7)
HGB BLDA-MCNC: 12.4 G/DL (ref 14–18)
HOLD SPECIMEN: NORMAL
HOLD SPECIMEN: NORMAL
IMM GRANULOCYTES # BLD AUTO: 0.04 10*3/MM3 (ref 0–0.05)
IMM GRANULOCYTES NFR BLD AUTO: 0.9 % (ref 0–0.5)
INHALED O2 CONCENTRATION: 21 %
LYMPHOCYTES # BLD AUTO: 1.27 10*3/MM3 (ref 0.7–3.1)
LYMPHOCYTES NFR BLD AUTO: 28.9 % (ref 19.6–45.3)
Lab: ABNORMAL
MCH RBC QN AUTO: 31.2 PG (ref 26.6–33)
MCHC RBC AUTO-ENTMCNC: 34.3 G/DL (ref 31.5–35.7)
MCV RBC AUTO: 91.2 FL (ref 79–97)
METHGB BLD QL: 0 % (ref 0–3)
MODALITY: ABNORMAL
MONOCYTES # BLD AUTO: 1.18 10*3/MM3 (ref 0.1–0.9)
MONOCYTES NFR BLD AUTO: 26.8 % (ref 5–12)
NEUTROPHILS NFR BLD AUTO: 1.83 10*3/MM3 (ref 1.7–7)
NEUTROPHILS NFR BLD AUTO: 41.5 % (ref 42.7–76)
NOTE: ABNORMAL
NOTIFIED BY: ABNORMAL
NOTIFIED WHO: ABNORMAL
NRBC BLD AUTO-RTO: 0 /100 WBC (ref 0–0.2)
NT-PROBNP SERPL-MCNC: 281.7 PG/ML (ref 0–1800)
OXYHGB MFR BLDV: 88.8 % (ref 94–99)
PCO2 BLDA: 38.4 MM HG (ref 35–45)
PCO2 TEMP ADJ BLD: ABNORMAL MM[HG]
PH BLDA: 7.38 PH UNITS (ref 7.35–7.45)
PH, TEMP CORRECTED: ABNORMAL
PLATELET # BLD AUTO: 157 10*3/MM3 (ref 140–450)
PMV BLD AUTO: 10.7 FL (ref 6–12)
PO2 BLDA: 60.3 MM HG (ref 83–108)
PO2 TEMP ADJ BLD: ABNORMAL MM[HG]
POTASSIUM SERPL-SCNC: 4.1 MMOL/L (ref 3.5–5.2)
PROT SERPL-MCNC: 6.2 G/DL (ref 6–8.5)
RBC # BLD AUTO: 3.97 10*6/MM3 (ref 4.14–5.8)
SAO2 % BLDCOA: 91.1 % (ref 94–99)
SARS-COV-2 RNA PNL SPEC NAA+PROBE: DETECTED
SODIUM SERPL-SCNC: 136 MMOL/L (ref 136–145)
TROPONIN T SERPL-MCNC: <0.01 NG/ML (ref 0–0.03)
VENTILATOR MODE: ABNORMAL
WBC NRBC COR # BLD: 4.4 10*3/MM3 (ref 3.4–10.8)
WHOLE BLOOD HOLD COAG: NORMAL
WHOLE BLOOD HOLD SPECIMEN: NORMAL

## 2022-06-19 PROCEDURE — 93005 ELECTROCARDIOGRAM TRACING: CPT | Performed by: EMERGENCY MEDICINE

## 2022-06-19 PROCEDURE — 87636 SARSCOV2 & INF A&B AMP PRB: CPT | Performed by: EMERGENCY MEDICINE

## 2022-06-19 PROCEDURE — 93010 ELECTROCARDIOGRAM REPORT: CPT | Performed by: INTERNAL MEDICINE

## 2022-06-19 PROCEDURE — 84484 ASSAY OF TROPONIN QUANT: CPT | Performed by: EMERGENCY MEDICINE

## 2022-06-19 PROCEDURE — 99284 EMERGENCY DEPT VISIT MOD MDM: CPT

## 2022-06-19 PROCEDURE — 80053 COMPREHEN METABOLIC PANEL: CPT | Performed by: EMERGENCY MEDICINE

## 2022-06-19 PROCEDURE — 83880 ASSAY OF NATRIURETIC PEPTIDE: CPT | Performed by: EMERGENCY MEDICINE

## 2022-06-19 PROCEDURE — 83050 HGB METHEMOGLOBIN QUAN: CPT

## 2022-06-19 PROCEDURE — 83605 ASSAY OF LACTIC ACID: CPT | Performed by: EMERGENCY MEDICINE

## 2022-06-19 PROCEDURE — 82375 ASSAY CARBOXYHB QUANT: CPT

## 2022-06-19 PROCEDURE — 82805 BLOOD GASES W/O2 SATURATION: CPT

## 2022-06-19 PROCEDURE — 71045 X-RAY EXAM CHEST 1 VIEW: CPT

## 2022-06-19 PROCEDURE — 36600 WITHDRAWAL OF ARTERIAL BLOOD: CPT

## 2022-06-19 PROCEDURE — 85025 COMPLETE CBC W/AUTO DIFF WBC: CPT | Performed by: EMERGENCY MEDICINE

## 2022-06-19 RX ORDER — SODIUM CHLORIDE 0.9 % (FLUSH) 0.9 %
10 SYRINGE (ML) INJECTION AS NEEDED
Status: DISCONTINUED | OUTPATIENT
Start: 2022-06-19 | End: 2022-06-19 | Stop reason: HOSPADM

## 2022-06-20 LAB
QT INTERVAL: 388 MS
QTC INTERVAL: 397 MS

## 2022-06-20 NOTE — ED PROVIDER NOTES
Subjective   76-year-old male with past medical history of COPD hypertension hyperlipidemia states he has been feeling bad for the past few days, fevers chills, muscle aches, tested positive for COVID on home test this morning came in to see what treatment options were available.  States his oxygen numbers are usually within the mid 90s at home, everything's been stable, just that he feels bad.  He did take Tylenol approximately 3 hours before coming in which has been helping control his fever.  He denies any cough or shortness of breath.          Review of Systems   Constitutional: Positive for fever. Negative for activity change, appetite change, chills, diaphoresis, fatigue and unexpected weight change.   HENT: Negative for congestion and sore throat.    Eyes: Negative for discharge, itching and visual disturbance.   Respiratory: Negative for shortness of breath.    Cardiovascular: Negative for chest pain.   Gastrointestinal: Negative for abdominal distention, abdominal pain, constipation, diarrhea, nausea, rectal pain and vomiting.   Genitourinary: Negative for decreased urine volume, dysuria and testicular pain.   Musculoskeletal: Positive for myalgias. Negative for arthralgias and neck stiffness.   Skin: Negative for rash.   Neurological: Negative for dizziness.   Hematological: Negative for adenopathy.   Psychiatric/Behavioral: Negative for agitation.   All other systems reviewed and are negative.      Past Medical History:   Diagnosis Date   • COPD (chronic obstructive pulmonary disease) (HCC)    • Gout    • Hearing loss    • History of degenerative disc disease    • Hyperlipidemia    • Hypertension    • Low back pain    • Pedal edema    • Prediabetes        No Known Allergies    Past Surgical History:   Procedure Laterality Date   • CHOLECYSTECTOMY     • LUMBAR DISCECTOMY     • SHOULDER SURGERY Left        Family History   Problem Relation Age of Onset   • Cancer Mother        Social History      Socioeconomic History   • Marital status:    Tobacco Use   • Smoking status: Former Smoker     Packs/day: 0.25     Years: 60.00     Pack years: 15.00     Types: Cigarettes     Start date:      Quit date:      Years since quittin.4   • Smokeless tobacco: Never Used   • Tobacco comment: patient stated he is down to about 4 cigarettes a day   Vaping Use   • Vaping Use: Never used   Substance and Sexual Activity   • Alcohol use: No   • Drug use: No   • Sexual activity: Defer           Objective   Physical Exam  Vitals and nursing note reviewed. Exam conducted with a chaperone present.   Constitutional:       General: He is not in acute distress.     Appearance: He is well-developed. He is not ill-appearing or toxic-appearing.   HENT:      Head: Normocephalic and atraumatic.      Mouth/Throat:      Mouth: Mucous membranes are moist.      Pharynx: Oropharynx is clear.   Eyes:      Extraocular Movements: Extraocular movements intact.      Pupils: Pupils are equal, round, and reactive to light.   Cardiovascular:      Rate and Rhythm: Normal rate and regular rhythm.      Heart sounds: Normal heart sounds. No murmur heard.    No friction rub. No gallop.   Pulmonary:      Effort: Pulmonary effort is normal.   Abdominal:      General: Abdomen is flat. Bowel sounds are normal. There is no distension.      Palpations: Abdomen is soft.      Tenderness: There is no abdominal tenderness.      Hernia: No hernia is present.   Skin:     General: Skin is dry.      Capillary Refill: Capillary refill takes less than 2 seconds.      Coloration: Skin is not cyanotic.      Findings: No rash.   Neurological:      General: No focal deficit present.      Mental Status: He is alert.   Psychiatric:         Mood and Affect: Mood normal.         Behavior: Behavior normal.         Procedures           ED Course  ED Course as of 22   Sun  EKG normal sinus rhythm 63 bpm no T wave inversion ST  segment elevation or depression no sign of Brugada, Wellens, WPW, QTC prolongation or signs of tamponade   [JM]      ED Course User Index  [JM] James Arreaga MD                                                 MDM  Number of Diagnoses or Management Options  Diagnosis management comments: Had a discussion with the patient and his grandson in the room, offered antibody treatment here in the ED, versus home antiviral therapy, they prefer just to get the home antiviral therapy and return if any worsens.  Oxygen sats on the monitor 94-96, no respiratory distress, stable for discharge.       Amount and/or Complexity of Data Reviewed  Clinical lab tests: reviewed  Tests in the medicine section of CPT®: reviewed        Final diagnoses:   COVID       ED Disposition  ED Disposition     ED Disposition   Discharge    Condition   Stable    Comment   --             Luis Dobbs, DO  [address] [place] [address]  [phone]    Schedule an appointment as soon as possible for a visit       [facility]  [address] [place] [id number]  [phone]  Go to   If symptoms worsen         Medication List      New Prescriptions    Nirmatrelvir & Ritonavir 20 x 150 MG & 10 x 100MG tablet therapy pack tablet  Commonly known as: PAXLOVID  Take 2 tablets by mouth 2 (Two) Times a Day for 5 days.           Where to Get Your Medications      You can get these medications from any pharmacy    Bring a paper prescription for each of these medications  · Nirmatrelvir & Ritonavir 20 x 150 MG & 10 x 100MG tablet therapy pack tablet          James Arreaga MD  06/19/22 2027       James Arreaga MD  06/19/22 2052

## 2022-06-24 ENCOUNTER — APPOINTMENT (OUTPATIENT)
Dept: GENERAL RADIOLOGY | Facility: HOSPITAL | Age: 77
End: 2022-06-24

## 2022-06-24 ENCOUNTER — APPOINTMENT (OUTPATIENT)
Dept: CT IMAGING | Facility: HOSPITAL | Age: 77
End: 2022-06-24

## 2022-06-24 ENCOUNTER — HOSPITAL ENCOUNTER (EMERGENCY)
Facility: HOSPITAL | Age: 77
Discharge: LEFT BEFORE DISCHARGE | End: 2022-06-24
Attending: STUDENT IN AN ORGANIZED HEALTH CARE EDUCATION/TRAINING PROGRAM | Admitting: STUDENT IN AN ORGANIZED HEALTH CARE EDUCATION/TRAINING PROGRAM

## 2022-06-24 VITALS
OXYGEN SATURATION: 90 % | DIASTOLIC BLOOD PRESSURE: 89 MMHG | BODY MASS INDEX: 22.73 KG/M2 | HEIGHT: 68 IN | RESPIRATION RATE: 20 BRPM | WEIGHT: 150 LBS | HEART RATE: 71 BPM | SYSTOLIC BLOOD PRESSURE: 155 MMHG | TEMPERATURE: 98 F

## 2022-06-24 DIAGNOSIS — U07.1 COVID-19: ICD-10-CM

## 2022-06-24 DIAGNOSIS — J44.1 COPD EXACERBATION: Primary | ICD-10-CM

## 2022-06-24 LAB
A-A DO2: 29.7 MMHG (ref 0–300)
ALBUMIN SERPL-MCNC: 4.09 G/DL (ref 3.5–5.2)
ALBUMIN/GLOB SERPL: 1.3 G/DL
ALP SERPL-CCNC: 78 U/L (ref 39–117)
ALT SERPL W P-5'-P-CCNC: 14 U/L (ref 1–41)
ANION GAP SERPL CALCULATED.3IONS-SCNC: 11.1 MMOL/L (ref 5–15)
ARTERIAL PATENCY WRIST A: ABNORMAL
AST SERPL-CCNC: 26 U/L (ref 1–40)
ATMOSPHERIC PRESS: 727 MMHG
BASE EXCESS BLDA CALC-SCNC: -3.5 MMOL/L (ref 0–2)
BASOPHILS # BLD AUTO: 0.02 10*3/MM3 (ref 0–0.2)
BASOPHILS NFR BLD AUTO: 0.5 % (ref 0–1.5)
BDY SITE: ABNORMAL
BILIRUB SERPL-MCNC: 0.2 MG/DL (ref 0–1.2)
BODY TEMPERATURE: 0 C
BUN SERPL-MCNC: 32 MG/DL (ref 8–23)
BUN/CREAT SERPL: 28.3 (ref 7–25)
CALCIUM SPEC-SCNC: 9 MG/DL (ref 8.6–10.5)
CHLORIDE SERPL-SCNC: 101 MMOL/L (ref 98–107)
CO2 BLDA-SCNC: 24 MMOL/L (ref 22–33)
CO2 SERPL-SCNC: 21.9 MMOL/L (ref 22–29)
COHGB MFR BLD: 1 % (ref 0–5)
CREAT SERPL-MCNC: 1.13 MG/DL (ref 0.76–1.27)
DEPRECATED RDW RBC AUTO: 43.1 FL (ref 37–54)
EGFRCR SERPLBLD CKD-EPI 2021: 67.4 ML/MIN/1.73
EOSINOPHIL # BLD AUTO: 0.14 10*3/MM3 (ref 0–0.4)
EOSINOPHIL NFR BLD AUTO: 3.4 % (ref 0.3–6.2)
ERYTHROCYTE [DISTWIDTH] IN BLOOD BY AUTOMATED COUNT: 12.7 % (ref 12.3–15.4)
GLOBULIN UR ELPH-MCNC: 3.1 GM/DL
GLUCOSE SERPL-MCNC: 106 MG/DL (ref 65–99)
HCO3 BLDA-SCNC: 22.7 MMOL/L (ref 20–26)
HCT VFR BLD AUTO: 37.8 % (ref 37.5–51)
HCT VFR BLD CALC: 39.5 % (ref 38–51)
HGB BLD-MCNC: 12.8 G/DL (ref 13–17.7)
HGB BLDA-MCNC: 12.9 G/DL (ref 14–18)
HOLD SPECIMEN: NORMAL
HOLD SPECIMEN: NORMAL
IMM GRANULOCYTES # BLD AUTO: 0.02 10*3/MM3 (ref 0–0.05)
IMM GRANULOCYTES NFR BLD AUTO: 0.5 % (ref 0–0.5)
INHALED O2 CONCENTRATION: 21 %
LYMPHOCYTES # BLD AUTO: 1.25 10*3/MM3 (ref 0.7–3.1)
LYMPHOCYTES NFR BLD AUTO: 30.7 % (ref 19.6–45.3)
Lab: ABNORMAL
MCH RBC QN AUTO: 31.3 PG (ref 26.6–33)
MCHC RBC AUTO-ENTMCNC: 33.9 G/DL (ref 31.5–35.7)
MCV RBC AUTO: 92.4 FL (ref 79–97)
METHGB BLD QL: 0.2 % (ref 0–3)
MODALITY: ABNORMAL
MONOCYTES # BLD AUTO: 0.71 10*3/MM3 (ref 0.1–0.9)
MONOCYTES NFR BLD AUTO: 17.4 % (ref 5–12)
NEUTROPHILS NFR BLD AUTO: 1.93 10*3/MM3 (ref 1.7–7)
NEUTROPHILS NFR BLD AUTO: 47.5 % (ref 42.7–76)
NOTE: ABNORMAL
NRBC BLD AUTO-RTO: 0 /100 WBC (ref 0–0.2)
NT-PROBNP SERPL-MCNC: 98 PG/ML (ref 0–1800)
OXYHGB MFR BLDV: 90.3 % (ref 94–99)
PCO2 BLDA: 44 MM HG (ref 35–45)
PCO2 TEMP ADJ BLD: ABNORMAL MM[HG]
PH BLDA: 7.32 PH UNITS (ref 7.35–7.45)
PH, TEMP CORRECTED: ABNORMAL
PLATELET # BLD AUTO: 148 10*3/MM3 (ref 140–450)
PMV BLD AUTO: 10.9 FL (ref 6–12)
PO2 BLDA: 63.5 MM HG (ref 83–108)
PO2 TEMP ADJ BLD: ABNORMAL MM[HG]
POTASSIUM SERPL-SCNC: 4.5 MMOL/L (ref 3.5–5.2)
PROT SERPL-MCNC: 7.2 G/DL (ref 6–8.5)
RBC # BLD AUTO: 4.09 10*6/MM3 (ref 4.14–5.8)
SAO2 % BLDCOA: 91.4 % (ref 94–99)
SODIUM SERPL-SCNC: 134 MMOL/L (ref 136–145)
TROPONIN T SERPL-MCNC: <0.01 NG/ML (ref 0–0.03)
VENTILATOR MODE: ABNORMAL
WBC NRBC COR # BLD: 4.07 10*3/MM3 (ref 3.4–10.8)
WHOLE BLOOD HOLD COAG: NORMAL
WHOLE BLOOD HOLD SPECIMEN: NORMAL

## 2022-06-24 PROCEDURE — 84484 ASSAY OF TROPONIN QUANT: CPT | Performed by: STUDENT IN AN ORGANIZED HEALTH CARE EDUCATION/TRAINING PROGRAM

## 2022-06-24 PROCEDURE — 80053 COMPREHEN METABOLIC PANEL: CPT | Performed by: STUDENT IN AN ORGANIZED HEALTH CARE EDUCATION/TRAINING PROGRAM

## 2022-06-24 PROCEDURE — 82805 BLOOD GASES W/O2 SATURATION: CPT

## 2022-06-24 PROCEDURE — 85025 COMPLETE CBC W/AUTO DIFF WBC: CPT | Performed by: STUDENT IN AN ORGANIZED HEALTH CARE EDUCATION/TRAINING PROGRAM

## 2022-06-24 PROCEDURE — 36415 COLL VENOUS BLD VENIPUNCTURE: CPT

## 2022-06-24 PROCEDURE — 96374 THER/PROPH/DIAG INJ IV PUSH: CPT

## 2022-06-24 PROCEDURE — 36600 WITHDRAWAL OF ARTERIAL BLOOD: CPT

## 2022-06-24 PROCEDURE — 71045 X-RAY EXAM CHEST 1 VIEW: CPT

## 2022-06-24 PROCEDURE — 99283 EMERGENCY DEPT VISIT LOW MDM: CPT

## 2022-06-24 PROCEDURE — 25010000002 METHYLPREDNISOLONE PER 125 MG: Performed by: STUDENT IN AN ORGANIZED HEALTH CARE EDUCATION/TRAINING PROGRAM

## 2022-06-24 PROCEDURE — 83880 ASSAY OF NATRIURETIC PEPTIDE: CPT | Performed by: STUDENT IN AN ORGANIZED HEALTH CARE EDUCATION/TRAINING PROGRAM

## 2022-06-24 PROCEDURE — 82375 ASSAY CARBOXYHB QUANT: CPT

## 2022-06-24 PROCEDURE — 83050 HGB METHEMOGLOBIN QUAN: CPT

## 2022-06-24 PROCEDURE — 93005 ELECTROCARDIOGRAM TRACING: CPT | Performed by: STUDENT IN AN ORGANIZED HEALTH CARE EDUCATION/TRAINING PROGRAM

## 2022-06-24 RX ORDER — SODIUM CHLORIDE 0.9 % (FLUSH) 0.9 %
10 SYRINGE (ML) INJECTION AS NEEDED
Status: DISCONTINUED | OUTPATIENT
Start: 2022-06-24 | End: 2022-06-25 | Stop reason: HOSPADM

## 2022-06-24 RX ORDER — METHYLPREDNISOLONE SODIUM SUCCINATE 125 MG/2ML
125 INJECTION, POWDER, LYOPHILIZED, FOR SOLUTION INTRAMUSCULAR; INTRAVENOUS ONCE
Status: COMPLETED | OUTPATIENT
Start: 2022-06-24 | End: 2022-06-24

## 2022-06-24 RX ORDER — ALBUTEROL SULFATE 90 UG/1
2 AEROSOL, METERED RESPIRATORY (INHALATION) ONCE
Status: DISCONTINUED | OUTPATIENT
Start: 2022-06-24 | End: 2022-06-25 | Stop reason: HOSPADM

## 2022-06-24 RX ADMIN — METHYLPREDNISOLONE SODIUM SUCCINATE 125 MG: 125 INJECTION, POWDER, FOR SOLUTION INTRAMUSCULAR; INTRAVENOUS at 21:28

## 2022-06-25 LAB
QT INTERVAL: 400 MS
QTC INTERVAL: 434 MS

## 2022-08-17 RX ORDER — TRIAMCINOLONE ACETONIDE 1 MG/G
CREAM TOPICAL
Qty: 15 G | Refills: 0 | Status: SHIPPED | OUTPATIENT
Start: 2022-08-17 | End: 2022-10-12

## 2022-08-18 ENCOUNTER — OFFICE VISIT (OUTPATIENT)
Dept: FAMILY MEDICINE CLINIC | Facility: CLINIC | Age: 77
End: 2022-08-18

## 2022-08-18 VITALS
WEIGHT: 139.6 LBS | OXYGEN SATURATION: 93 % | SYSTOLIC BLOOD PRESSURE: 130 MMHG | HEIGHT: 68 IN | TEMPERATURE: 97.3 F | BODY MASS INDEX: 21.16 KG/M2 | HEART RATE: 89 BPM | DIASTOLIC BLOOD PRESSURE: 76 MMHG

## 2022-08-18 DIAGNOSIS — J43.2 CENTRILOBULAR EMPHYSEMA: Primary | ICD-10-CM

## 2022-08-18 DIAGNOSIS — G89.29 CHRONIC DENTAL PAIN: ICD-10-CM

## 2022-08-18 DIAGNOSIS — I10 PRIMARY HYPERTENSION: ICD-10-CM

## 2022-08-18 DIAGNOSIS — J44.1 COPD WITH EXACERBATION: ICD-10-CM

## 2022-08-18 DIAGNOSIS — K08.9 CHRONIC DENTAL PAIN: ICD-10-CM

## 2022-08-18 PROCEDURE — 99214 OFFICE O/P EST MOD 30 MIN: CPT | Performed by: FAMILY MEDICINE

## 2022-08-18 RX ORDER — PREDNISONE 50 MG/1
50 TABLET ORAL DAILY
Qty: 4 TABLET | Refills: 0 | Status: SHIPPED | OUTPATIENT
Start: 2022-08-18 | End: 2022-10-18

## 2022-08-18 RX ORDER — FLUTICASONE PROPIONATE AND SALMETEROL XINAFOATE 115; 21 UG/1; UG/1
2 AEROSOL, METERED RESPIRATORY (INHALATION)
Qty: 12 G | Refills: 11 | Status: SHIPPED | OUTPATIENT
Start: 2022-08-18 | End: 2022-10-18

## 2022-08-18 RX ORDER — LISINOPRIL AND HYDROCHLOROTHIAZIDE 20; 12.5 MG/1; MG/1
2 TABLET ORAL DAILY
Qty: 180 TABLET | Refills: 0 | Status: SHIPPED | OUTPATIENT
Start: 2022-08-18 | End: 2022-10-18 | Stop reason: SDUPTHER

## 2022-08-18 RX ORDER — IBUPROFEN 800 MG/1
800 TABLET ORAL EVERY 8 HOURS PRN
Qty: 90 TABLET | Refills: 0 | Status: SHIPPED | OUTPATIENT
Start: 2022-08-18 | End: 2023-01-31 | Stop reason: HOSPADM

## 2022-08-18 RX ORDER — DOXYCYCLINE 100 MG/1
100 CAPSULE ORAL 2 TIMES DAILY
Qty: 20 CAPSULE | Refills: 0 | Status: SHIPPED | OUTPATIENT
Start: 2022-08-18 | End: 2022-10-18

## 2022-08-18 RX ORDER — IPRATROPIUM BROMIDE AND ALBUTEROL SULFATE 2.5; .5 MG/3ML; MG/3ML
3 SOLUTION RESPIRATORY (INHALATION) EVERY 4 HOURS PRN
Qty: 360 ML | Refills: 3 | Status: SHIPPED | OUTPATIENT
Start: 2022-08-18 | End: 2022-10-18 | Stop reason: SDUPTHER

## 2022-08-18 RX ORDER — ALBUTEROL SULFATE 90 UG/1
2 AEROSOL, METERED RESPIRATORY (INHALATION) EVERY 6 HOURS PRN
Qty: 18 G | Refills: 5 | Status: SHIPPED | OUTPATIENT
Start: 2022-08-18 | End: 2022-10-18 | Stop reason: SDUPTHER

## 2022-08-18 NOTE — PROGRESS NOTES
Subjective   Paul Gomez is a 76 y.o. male.   Pt presents today with CC of Fever      History of Present Illness   Patient is a 76-year-old male with emphysema, multiple COPD exacerbations, history of CHF, and chronic pain for which he takes high-dose narcotics.  He is here complaining of 6 weeks of worsening wheezing and shortness of breath.  His last COPD exacerbation was about 2 months ago.  He still smokes cigarettes.       The following portions of the patient's history were reviewed and updated as appropriate: allergies, current medications, past family history, past medical history, past social history, past surgical history and problem list.    Review of Systems   Constitutional: Negative for chills, fever and unexpected weight loss.   HENT: Negative for congestion and sore throat.    Eyes: Negative for blurred vision and visual disturbance.   Respiratory: Positive for shortness of breath and wheezing. Negative for cough.    Cardiovascular: Negative for chest pain and palpitations.   Gastrointestinal: Negative for abdominal pain and diarrhea.   Endocrine: Negative for cold intolerance and heat intolerance.   Genitourinary: Negative for dysuria.   Musculoskeletal: Negative for arthralgias and neck stiffness.   Neurological: Negative for dizziness, seizures and syncope.   Psychiatric/Behavioral: Negative for self-injury, suicidal ideas and depressed mood.       Objective   Physical Exam  Vitals and nursing note reviewed.   Constitutional:       Appearance: He is well-developed.   HENT:      Head: Normocephalic and atraumatic.      Right Ear: External ear normal.      Left Ear: External ear normal.      Nose: Nose normal.   Eyes:      Conjunctiva/sclera: Conjunctivae normal.      Pupils: Pupils are equal, round, and reactive to light.   Cardiovascular:      Rate and Rhythm: Normal rate and regular rhythm.      Heart sounds: Normal heart sounds.   Pulmonary:      Comments: Scattered wheezes throughout, no rales  noted, a few scattered rhonchi(chronic?)  Abdominal:      General: Bowel sounds are normal.      Palpations: Abdomen is soft.   Musculoskeletal:      Cervical back: Normal range of motion and neck supple.   Skin:     General: Skin is warm and dry.   Neurological:      Mental Status: He is alert and oriented to person, place, and time.   Psychiatric:         Behavior: Behavior normal.           Assessment & Plan   Diagnoses and all orders for this visit:    1. Centrilobular emphysema (HCC) (Primary)  -     XR Chest 2 View  -     albuterol sulfate  (90 Base) MCG/ACT inhaler; Inhale 2 puffs Every 6 (Six) Hours As Needed for Wheezing or Shortness of Air.  Dispense: 18 g; Refill: 5  -     ipratropium-albuterol (DUO-NEB) 0.5-2.5 mg/3 ml nebulizer; Take 3 mL by nebulization Every 4 (Four) Hours As Needed for Wheezing or Shortness of Air.  Dispense: 360 mL; Refill: 3   -     fluticasone-salmeterol (Advair HFA) 115-21 MCG/ACT inhaler; Inhale 2 puffs 2 (Two) Times a Day.  Dispense: 12 g; Refill: 11  2. Primary hypertension  -     lisinopril-hydrochlorothiazide (PRINZIDE,ZESTORETIC) 20-12.5 MG per tablet; Take 2 tablets by mouth Daily.  Dispense: 180 tablet; Refill: 0    3. Chronic dental pain  -     ibuprofen (ADVIL,MOTRIN) 800 MG tablet; Take 1 tablet by mouth Every 8 (Eight) Hours As Needed for Mild Pain .  Dispense: 90 tablet; Refill: 0    4. COPD with exacerbation (HCC)  -     predniSONE (DELTASONE) 50 MG tablet; Take 1 tablet by mouth Daily.  Dispense: 4 tablet; Refill: 0  -     doxycycline (MONODOX) 100 MG capsule; Take 1 capsule by mouth 2 (Two) Times a Day.  Dispense: 20 capsule; Refill: 0  We will get a chest x-ray today to rule out pneumonia and CHF.  I doubt both.  We will be starting a new inhaler to avoid further exacerbations.  Per insurance coverage, will be starting Advair HFA.  Directions for use given.  If he has any further questions, he can ask his pharmacist or he can bring it in for his first dose  so they can show how to use it.  We do not have samples today unfortunately.    Recommend follow-up in a few weeks to recheck.             Paul RAMACHANDRAN Patricia  reports that he has been smoking cigarettes. He started smoking about 57 years ago. He has a 15.00 pack-year smoking history. He has never used smokeless tobacco.. I have educated him on the risk of diseases from using tobacco products such as cancer, COPD and heart disease.     I advised him to quit and he is not willing to quit.    I spent 3  minutes counseling the patient.         BMI is within normal parameters. No other follow-up for BMI required.

## 2022-08-23 ENCOUNTER — TELEPHONE (OUTPATIENT)
Dept: FAMILY MEDICINE CLINIC | Facility: CLINIC | Age: 77
End: 2022-08-23

## 2022-10-11 DIAGNOSIS — L21.9 SEBORRHEIC DERMATITIS: ICD-10-CM

## 2022-10-12 RX ORDER — KETOCONAZOLE 20 MG/ML
SHAMPOO TOPICAL
Qty: 120 EACH | Refills: 5 | Status: ON HOLD | OUTPATIENT
Start: 2022-10-12 | End: 2023-01-21

## 2022-10-12 RX ORDER — TRIAMCINOLONE ACETONIDE 1 MG/G
CREAM TOPICAL
Qty: 30 G | Refills: 0 | Status: ON HOLD | OUTPATIENT
Start: 2022-10-12 | End: 2023-01-21

## 2022-10-18 ENCOUNTER — OFFICE VISIT (OUTPATIENT)
Dept: FAMILY MEDICINE CLINIC | Facility: CLINIC | Age: 77
End: 2022-10-18

## 2022-10-18 VITALS
WEIGHT: 147 LBS | HEIGHT: 68 IN | BODY MASS INDEX: 22.28 KG/M2 | DIASTOLIC BLOOD PRESSURE: 54 MMHG | TEMPERATURE: 97.7 F | SYSTOLIC BLOOD PRESSURE: 110 MMHG | OXYGEN SATURATION: 92 % | HEART RATE: 87 BPM

## 2022-10-18 DIAGNOSIS — I10 PRIMARY HYPERTENSION: ICD-10-CM

## 2022-10-18 DIAGNOSIS — J43.2 CENTRILOBULAR EMPHYSEMA: Primary | ICD-10-CM

## 2022-10-18 DIAGNOSIS — R60.9 1+ PITTING EDEMA: ICD-10-CM

## 2022-10-18 PROCEDURE — 99214 OFFICE O/P EST MOD 30 MIN: CPT | Performed by: FAMILY MEDICINE

## 2022-10-18 PROCEDURE — 80053 COMPREHEN METABOLIC PANEL: CPT | Performed by: FAMILY MEDICINE

## 2022-10-18 PROCEDURE — 85025 COMPLETE CBC W/AUTO DIFF WBC: CPT | Performed by: FAMILY MEDICINE

## 2022-10-18 RX ORDER — LISINOPRIL AND HYDROCHLOROTHIAZIDE 20; 12.5 MG/1; MG/1
2 TABLET ORAL DAILY
Qty: 180 TABLET | Refills: 0 | Status: SHIPPED | OUTPATIENT
Start: 2022-10-18 | End: 2022-12-21 | Stop reason: SDUPTHER

## 2022-10-18 RX ORDER — IPRATROPIUM BROMIDE AND ALBUTEROL SULFATE 2.5; .5 MG/3ML; MG/3ML
3 SOLUTION RESPIRATORY (INHALATION) EVERY 4 HOURS PRN
Qty: 360 ML | Refills: 3 | Status: SHIPPED | OUTPATIENT
Start: 2022-10-18 | End: 2022-12-21 | Stop reason: SDUPTHER

## 2022-10-18 RX ORDER — ALBUTEROL SULFATE 90 UG/1
2 AEROSOL, METERED RESPIRATORY (INHALATION) EVERY 6 HOURS PRN
Qty: 18 G | Refills: 5 | Status: SHIPPED | OUTPATIENT
Start: 2022-10-18 | End: 2022-12-21 | Stop reason: SDUPTHER

## 2022-10-18 RX ORDER — FLUTICASONE PROPIONATE AND SALMETEROL XINAFOATE 230; 21 UG/1; UG/1
2 AEROSOL, METERED RESPIRATORY (INHALATION)
Qty: 12 G | Refills: 11 | Status: SHIPPED | OUTPATIENT
Start: 2022-10-18 | End: 2022-12-21 | Stop reason: SDUPTHER

## 2022-10-19 LAB
ALBUMIN SERPL-MCNC: 4 G/DL (ref 3.5–5.2)
ALBUMIN/GLOB SERPL: 1.5 G/DL
ALP SERPL-CCNC: 67 U/L (ref 39–117)
ALT SERPL W P-5'-P-CCNC: 15 U/L (ref 1–41)
ANION GAP SERPL CALCULATED.3IONS-SCNC: 11.7 MMOL/L (ref 5–15)
AST SERPL-CCNC: 25 U/L (ref 1–40)
BASOPHILS # BLD AUTO: 0.06 10*3/MM3 (ref 0–0.2)
BASOPHILS NFR BLD AUTO: 0.8 % (ref 0–1.5)
BILIRUB SERPL-MCNC: 0.3 MG/DL (ref 0–1.2)
BUN SERPL-MCNC: 15 MG/DL (ref 8–23)
BUN/CREAT SERPL: 12.1 (ref 7–25)
CALCIUM SPEC-SCNC: 8.9 MG/DL (ref 8.6–10.5)
CHLORIDE SERPL-SCNC: 99 MMOL/L (ref 98–107)
CO2 SERPL-SCNC: 26.3 MMOL/L (ref 22–29)
CREAT SERPL-MCNC: 1.24 MG/DL (ref 0.76–1.27)
DEPRECATED RDW RBC AUTO: 41.8 FL (ref 37–54)
EGFRCR SERPLBLD CKD-EPI 2021: 60.3 ML/MIN/1.73
EOSINOPHIL # BLD AUTO: 0.94 10*3/MM3 (ref 0–0.4)
EOSINOPHIL NFR BLD AUTO: 11.9 % (ref 0.3–6.2)
ERYTHROCYTE [DISTWIDTH] IN BLOOD BY AUTOMATED COUNT: 11.9 % (ref 12.3–15.4)
GLOBULIN UR ELPH-MCNC: 2.7 GM/DL
GLUCOSE SERPL-MCNC: 112 MG/DL (ref 65–99)
HCT VFR BLD AUTO: 37.6 % (ref 37.5–51)
HGB BLD-MCNC: 12.4 G/DL (ref 13–17.7)
IMM GRANULOCYTES # BLD AUTO: 0.05 10*3/MM3 (ref 0–0.05)
IMM GRANULOCYTES NFR BLD AUTO: 0.6 % (ref 0–0.5)
LYMPHOCYTES # BLD AUTO: 1.38 10*3/MM3 (ref 0.7–3.1)
LYMPHOCYTES NFR BLD AUTO: 17.4 % (ref 19.6–45.3)
MCH RBC QN AUTO: 31.5 PG (ref 26.6–33)
MCHC RBC AUTO-ENTMCNC: 33 G/DL (ref 31.5–35.7)
MCV RBC AUTO: 95.4 FL (ref 79–97)
MONOCYTES # BLD AUTO: 0.85 10*3/MM3 (ref 0.1–0.9)
MONOCYTES NFR BLD AUTO: 10.7 % (ref 5–12)
NEUTROPHILS NFR BLD AUTO: 4.64 10*3/MM3 (ref 1.7–7)
NEUTROPHILS NFR BLD AUTO: 58.6 % (ref 42.7–76)
NRBC BLD AUTO-RTO: 0 /100 WBC (ref 0–0.2)
PLATELET # BLD AUTO: 237 10*3/MM3 (ref 140–450)
PMV BLD AUTO: 11.2 FL (ref 6–12)
POTASSIUM SERPL-SCNC: 4.1 MMOL/L (ref 3.5–5.2)
PROT SERPL-MCNC: 6.7 G/DL (ref 6–8.5)
RBC # BLD AUTO: 3.94 10*6/MM3 (ref 4.14–5.8)
SODIUM SERPL-SCNC: 137 MMOL/L (ref 136–145)
WBC NRBC COR # BLD: 7.92 10*3/MM3 (ref 3.4–10.8)

## 2022-10-20 ENCOUNTER — TELEPHONE (OUTPATIENT)
Dept: FAMILY MEDICINE CLINIC | Facility: CLINIC | Age: 77
End: 2022-10-20

## 2022-10-20 NOTE — TELEPHONE ENCOUNTER
----- Message from Luis Dobbs DO sent at 10/20/2022 11:06 AM EDT -----  No problems on your blood work        Patient notified & verbalized understanding.

## 2022-10-20 NOTE — TELEPHONE ENCOUNTER
"Patient called indicating he needed a statement faxed to his pain clinic that states he \"has no serious health conditions\".  No additional information is needed in the documentation.     [phone] phone  (patient doesn't have Pain Clinic fax number)  "

## 2022-10-24 NOTE — TELEPHONE ENCOUNTER
Needs to be more specific.  You do have COPD that occasionally results in exacerbation, and you do have a history of CHF.  From my standpoint, both of these are stable and unlikely to affect your pain treatment.  What I would like to know,' why is the pain clinic asking?'

## 2022-10-24 NOTE — TELEPHONE ENCOUNTER
Needs to be more specific.  You do have COPD that occasionally results in exacerbation, and you do have a history of CHF.  From my standpoint, both of these are stable and unlikely to affect your pain treatment.  What I would like to know,' why is the pain clinic asking?'      Not available at this time.    Spoke with patient he reports the last time he was there his feet were swollen & his BP was elevated but he reports he has seen you since then & everything is ok so they told him they needed a statement that he was stable.

## 2022-12-21 ENCOUNTER — OFFICE VISIT (OUTPATIENT)
Dept: FAMILY MEDICINE CLINIC | Facility: CLINIC | Age: 77
End: 2022-12-21

## 2022-12-21 VITALS
SYSTOLIC BLOOD PRESSURE: 126 MMHG | BODY MASS INDEX: 21.82 KG/M2 | OXYGEN SATURATION: 99 % | WEIGHT: 144 LBS | HEART RATE: 86 BPM | TEMPERATURE: 98.4 F | DIASTOLIC BLOOD PRESSURE: 70 MMHG | HEIGHT: 68 IN

## 2022-12-21 DIAGNOSIS — Z78.9 DNI (DO NOT INTUBATE): ICD-10-CM

## 2022-12-21 DIAGNOSIS — Z71.89 ADVANCED CARE PLANNING/COUNSELING DISCUSSION: ICD-10-CM

## 2022-12-21 DIAGNOSIS — J43.2 CENTRILOBULAR EMPHYSEMA: ICD-10-CM

## 2022-12-21 DIAGNOSIS — Z66 DNR (DO NOT RESUSCITATE): ICD-10-CM

## 2022-12-21 DIAGNOSIS — Z78.9 MEDICALLY COMPLEX PATIENT: ICD-10-CM

## 2022-12-21 DIAGNOSIS — Z00.00 MEDICARE ANNUAL WELLNESS VISIT, SUBSEQUENT: Primary | ICD-10-CM

## 2022-12-21 DIAGNOSIS — D64.9 NORMOCYTIC ANEMIA: ICD-10-CM

## 2022-12-21 DIAGNOSIS — I10 PRIMARY HYPERTENSION: ICD-10-CM

## 2022-12-21 PROCEDURE — 1170F FXNL STATUS ASSESSED: CPT | Performed by: FAMILY MEDICINE

## 2022-12-21 PROCEDURE — 1126F AMNT PAIN NOTED NONE PRSNT: CPT | Performed by: FAMILY MEDICINE

## 2022-12-21 PROCEDURE — 1159F MED LIST DOCD IN RCRD: CPT | Performed by: FAMILY MEDICINE

## 2022-12-21 PROCEDURE — 99497 ADVNCD CARE PLAN 30 MIN: CPT | Performed by: FAMILY MEDICINE

## 2022-12-21 PROCEDURE — G0439 PPPS, SUBSEQ VISIT: HCPCS | Performed by: FAMILY MEDICINE

## 2022-12-21 RX ORDER — IPRATROPIUM BROMIDE AND ALBUTEROL SULFATE 2.5; .5 MG/3ML; MG/3ML
3 SOLUTION RESPIRATORY (INHALATION) EVERY 4 HOURS PRN
Qty: 360 ML | Refills: 3 | Status: SHIPPED | OUTPATIENT
Start: 2022-12-21 | End: 2023-03-06 | Stop reason: SDUPTHER

## 2022-12-21 RX ORDER — FLUTICASONE PROPIONATE AND SALMETEROL XINAFOATE 230; 21 UG/1; UG/1
2 AEROSOL, METERED RESPIRATORY (INHALATION)
Qty: 12 G | Refills: 11 | Status: ON HOLD | OUTPATIENT
Start: 2022-12-21 | End: 2023-01-21

## 2022-12-21 RX ORDER — LORAZEPAM 0.5 MG/1
TABLET ORAL
COMMUNITY
Start: 2022-10-28 | End: 2022-12-21

## 2022-12-21 RX ORDER — GABAPENTIN 600 MG/1
600 TABLET ORAL 3 TIMES DAILY
Status: ON HOLD | COMMUNITY
Start: 2022-12-05 | End: 2023-01-31 | Stop reason: SDUPTHER

## 2022-12-21 RX ORDER — ALBUTEROL SULFATE 90 UG/1
2 AEROSOL, METERED RESPIRATORY (INHALATION) EVERY 6 HOURS PRN
Qty: 18 G | Refills: 5 | Status: SHIPPED | OUTPATIENT
Start: 2022-12-21 | End: 2023-03-06 | Stop reason: SDUPTHER

## 2022-12-21 RX ORDER — LISINOPRIL AND HYDROCHLOROTHIAZIDE 20; 12.5 MG/1; MG/1
2 TABLET ORAL DAILY
Qty: 180 TABLET | Refills: 0 | Status: SHIPPED | OUTPATIENT
Start: 2022-12-21 | End: 2023-01-31 | Stop reason: HOSPADM

## 2022-12-21 RX ORDER — OXYMORPHONE HYDROCHLORIDE 20 MG/1
20 TABLET, FILM COATED, EXTENDED RELEASE ORAL EVERY 12 HOURS
COMMUNITY
Start: 2022-12-06 | End: 2023-04-04

## 2022-12-21 NOTE — PROGRESS NOTES
QUICK REFERENCE INFORMATION:  The ABCs of the Annual Wellness Visit    Subsequent Medicare Wellness Visit    HEALTH RISK ASSESSMENT    1945    Recent Hospitalizations:  No hospitalization(s) within the last year..        Current Medical Providers:  Patient Care Team:  Luis Dobbs DO as PCP - General (Family Medicine)        Smoking Status:  Social History     Tobacco Use   Smoking Status Some Days   • Packs/day: 0.25   • Years: 60.00   • Pack years: 15.00   • Types: Cigarettes   • Start date: 1965   Smokeless Tobacco Never   Tobacco Comments    patient stated he is down to about 4 cigarettes a day       Alcohol Consumption:  Social History     Substance and Sexual Activity   Alcohol Use No       Depression Screen:   PHQ-2/PHQ-9 Depression Screening 12/21/2022   Retired PHQ-9 Total Score -   Retired Total Score -   Little Interest or Pleasure in Doing Things 0-->not at all   Feeling Down, Depressed or Hopeless 0-->not at all   PHQ-9: Brief Depression Severity Measure Score 0       Health Habits and Functional and Cognitive Screening:  Functional & Cognitive Status 12/21/2022   Do you have difficulty preparing food and eating? No   Do you have difficulty bathing yourself, getting dressed or grooming yourself? No   Do you have difficulty using the toilet? No   Do you have difficulty moving around from place to place? No   Do you have trouble with steps or getting out of a bed or a chair? No   Current Diet Well Balanced Diet   Dental Exam Up to date   Eye Exam Up to date   Exercise (times per week) 7 times per week   Current Exercises Include Walking   Current Exercise Activities Include -   Do you need help using the phone?  No   Are you deaf or do you have serious difficulty hearing?  Yes   Do you need help with transportation? Yes   Do you need help shopping? Yes   Do you need help preparing meals?  No   Do you need help with housework?  No   Do you need help with laundry? No   Do you need help taking  your medications? No   Do you need help managing money? No   Do you ever drive or ride in a car without wearing a seat belt? No           Does the patient have evidence of cognitive impairment? No    Aspirin use counseling: Taking ASA appropriately as indicated      Recent Lab Results:  CMP:  Lab Results   Component Value Date    BUN 15 10/18/2022    CREATININE 1.24 10/18/2022    EGFRIFNONA 78 01/12/2022    EGFRIFNONA 71 01/12/2022    EGFRIFAFRI 91 10/13/2016    BCR 12.1 10/18/2022     10/18/2022    K 4.1 10/18/2022    CO2 26.3 10/18/2022    CALCIUM 8.9 10/18/2022    PROTENTOTREF 6.6 10/13/2016    ALBUMIN 4.00 10/18/2022    LABGLOBREF 2.7 10/13/2016    LABIL2 1.4 (L) 10/13/2016    BILITOT 0.3 10/18/2022    ALKPHOS 67 10/18/2022    AST 25 10/18/2022    ALT 15 10/18/2022     Lipid Panel:  Lab Results   Component Value Date    CHOL 143 01/12/2022    TRIG 50 01/12/2022    HDL 65 (H) 01/12/2022    VLDL 11 01/12/2022    LDLHDL 1.05 01/12/2022     HbA1c:  Lab Results   Component Value Date    HGBA1C 5.40 05/05/2022       Visual Acuity:  Vision Screening    Right eye Left eye Both eyes   Without correction 20/20 20/30 20/25   With correction          Age-appropriate Screening Schedule:  Refer to the list below for future screening recommendations based on patient's age, sex and/or medical conditions. Orders for these recommended tests are listed in the plan section. The patient has been provided with a written plan.    Health Maintenance   Topic Date Due   • TDAP/TD VACCINES (1 - Tdap) Never done   • ZOSTER VACCINE (1 of 2) Never done   • DIABETIC FOOT EXAM  Never done   • URINE MICROALBUMIN  04/11/2020   • DIABETIC EYE EXAM  05/24/2022   • INFLUENZA VACCINE  08/01/2022   • HEMOGLOBIN A1C  11/05/2022   • LIPID PANEL  01/12/2023        Subjective   History of Present Illness    Paul Gomez is a 77 y.o. male who presents for an Subsequent Wellness Visit.    The following portions of the patient's history were reviewed  and updated as appropriate: allergies, current medications, past family history, past medical history, past social history, past surgical history and problem list.    Outpatient Medications Prior to Visit   Medication Sig Dispense Refill   • albuterol sulfate  (90 Base) MCG/ACT inhaler Inhale 2 puffs Every 6 (Six) Hours As Needed for Wheezing or Shortness of Air. 18 g 5   • Aspirin Low Dose 81 MG EC tablet TAKE ONE (1) TABLET BY MOUTH ONCE DAILY 90 tablet 1   • fluticasone-salmeterol (Advair HFA) 230-21 MCG/ACT inhaler Inhale 2 puffs 2 (Two) Times a Day. 12 g 11   • gabapentin (NEURONTIN) 600 MG tablet      • ibuprofen (ADVIL,MOTRIN) 800 MG tablet Take 1 tablet by mouth Every 8 (Eight) Hours As Needed for Mild Pain . 90 tablet 0   • ipratropium-albuterol (DUO-NEB) 0.5-2.5 mg/3 ml nebulizer Take 3 mL by nebulization Every 4 (Four) Hours As Needed for Wheezing or Shortness of Air. 360 mL 3   • ketoconazole (NIZORAL) 2 % shampoo APPLY TOPICALLY TO THE APPROPRIATE AREA AS DIRECTED TWO TIMES A WEEK. 120 each 5   • lisinopril-hydrochlorothiazide (PRINZIDE,ZESTORETIC) 20-12.5 MG per tablet Take 2 tablets by mouth Daily. 180 tablet 0   • oxyCODONE (ROXICODONE) 10 MG tablet 10 mg 2 (two) times a day.     • oxyMORphone ER (OPANA ER) 20 MG 12 hr tablet      • triamcinolone (KENALOG) 0.1 % cream APPLY TOPICALLY TO THE APPROPRIATE AREA AS DIRECTED TWICE DAILY 30 g 0   • gabapentin (NEURONTIN) 800 MG tablet      • LORazepam (ATIVAN) 0.5 MG tablet      • oxyMORphone ER (OPANA ER) 30 MG 12 hr tablet Take 30 mg by mouth Every 12 (Twelve) Hours.       No facility-administered medications prior to visit.       Patient Active Problem List   Diagnosis   • Low back pain   • Hypertension   • Hyperlipidemia   • History of degenerative disc disease   • COPD (chronic obstructive pulmonary disease) (HCC)   • Gout   • B12 deficiency   • Seborrheic eczema   • Hypothyroidism   • Dilated pancreatic duct   • Epigastric pain   • Chronic  pancreatitis (HCC)   • Headache, unspecified headache type   • Chest pain   • COPD with exacerbation (HCC)   • Former smoker   • Acute exacerbation of chronic obstructive pulmonary disease (COPD) (HCC)   • Erectile dysfunction   • Acute congestive heart failure (HCC)   • Bilateral rales   • 1+ pitting edema   • Dyspnea on minimal exertion   • Visual loss   • Altered mental status       Advance Care Planning:  ACP discussion was held with the patient during this visit. Patient has an advance directive in EMR which is still valid.     Identification of Risk Factors:  Risk factors include: Advance Directive Discussion.    Review of Systems   Constitutional: Negative for chills, fever and unexpected weight loss.   HENT: Negative for congestion and sore throat.    Eyes: Negative for blurred vision and visual disturbance.   Respiratory: Negative for cough and wheezing.    Cardiovascular: Negative for chest pain and palpitations.   Gastrointestinal: Negative for abdominal pain and diarrhea.   Endocrine: Negative for cold intolerance and heat intolerance.   Genitourinary: Negative for dysuria.   Musculoskeletal: Negative for arthralgias and neck stiffness.   Neurological: Negative for dizziness, seizures and syncope.   Psychiatric/Behavioral: Negative for self-injury, suicidal ideas and depressed mood.       Compared to one year ago, the patient feels his physical health is the same.  Compared to one year ago, the patient feels his mental health is the same.    Objective     Physical Exam  Vitals and nursing note reviewed.   Constitutional:       Appearance: He is well-developed.   HENT:      Head: Normocephalic and atraumatic.      Right Ear: External ear normal.      Left Ear: External ear normal.      Nose: Nose normal.   Eyes:      Conjunctiva/sclera: Conjunctivae normal.      Pupils: Pupils are equal, round, and reactive to light.   Cardiovascular:      Rate and Rhythm: Normal rate and regular rhythm.      Heart  "sounds: Normal heart sounds.   Pulmonary:      Effort: Pulmonary effort is normal.      Breath sounds: Normal breath sounds.   Abdominal:      General: Bowel sounds are normal.      Palpations: Abdomen is soft.   Musculoskeletal:      Cervical back: Normal range of motion and neck supple.   Skin:     General: Skin is warm and dry.   Neurological:      Mental Status: He is alert and oriented to person, place, and time.   Psychiatric:         Behavior: Behavior normal.         Vitals:    12/21/22 1006   BP: 126/70   BP Location: Right arm   Patient Position: Sitting   Cuff Size: Adult   Pulse: 86   Temp: 98.4 °F (36.9 °C)   TempSrc: Temporal   SpO2: 99%   Weight: 65.3 kg (144 lb)   Height: 172.7 cm (68\")   PainSc: 0-No pain       BMI is within normal parameters. No other follow-up for BMI required.      Assessment & Plan   Patient Self-Management and Personalized Health Advice  The patient has been provided with information about: diet and exercise and preventive services including:   · Annual Wellness Visit (AWV).    Visit Diagnoses:    ICD-10-CM ICD-9-CM   1. Medicare annual wellness visit, subsequent  Z00.00 V70.0   2. Advanced care planning/counseling discussion  Z71.89 V65.49   3. Centrilobular emphysema (HCC)  J43.2 492.8   4. Primary hypertension  I10 401.9   5. Normocytic anemia  D64.9 285.9   6. DNR (do not resuscitate)  Z66 V49.86   7. DNI (do not intubate)  Z78.9 V49.89   8. Medically complex patient  Z78.9 V49.9   #1 he has no concerns on Medicare wellness questioning today other than chronic pain and debility that is associated.  He has several risk factors, he is chronically ill, but his pain continues to be his major problem.  He follows with the pain clinic.  Though I do not have records from his pain clinic, he reports that pain doctor has been reluctant to do steroid injections because of aberrant blood pressure and multiple medical comorbidities.  Mr. Gomez has failed treatment with Tylenol/NSAIDs, " strong narcotics, therapy, and home exercise programs.  Mr. Gomez would like to move forward with injections.  He states that he needs medical clearance from PCP.  Note can be found scanned into his chart.  Mr. Gomez feels as though the risk is worth the benefit.  He understands the risks involved with steroid injections, particularly around the spine.  #2 advanced care discussion today.  We spent 20 minutes discussing MOST form, CODE STATUS, and advanced directives including POA.  He has advanced directive from previously..  His MOST form was updated today to include DNR, and DNI.  Please see updated.  He does not currently have a POA.  He intends on making someone his POA, he has someone in mind.  He was given the paperwork today.        No orders of the defined types were placed in this encounter.      Outpatient Encounter Medications as of 12/21/2022   Medication Sig Dispense Refill   • albuterol sulfate  (90 Base) MCG/ACT inhaler Inhale 2 puffs Every 6 (Six) Hours As Needed for Wheezing or Shortness of Air. 18 g 5   • Aspirin Low Dose 81 MG EC tablet TAKE ONE (1) TABLET BY MOUTH ONCE DAILY 90 tablet 1   • fluticasone-salmeterol (Advair HFA) 230-21 MCG/ACT inhaler Inhale 2 puffs 2 (Two) Times a Day. 12 g 11   • gabapentin (NEURONTIN) 600 MG tablet      • ibuprofen (ADVIL,MOTRIN) 800 MG tablet Take 1 tablet by mouth Every 8 (Eight) Hours As Needed for Mild Pain . 90 tablet 0   • ipratropium-albuterol (DUO-NEB) 0.5-2.5 mg/3 ml nebulizer Take 3 mL by nebulization Every 4 (Four) Hours As Needed for Wheezing or Shortness of Air. 360 mL 3   • ketoconazole (NIZORAL) 2 % shampoo APPLY TOPICALLY TO THE APPROPRIATE AREA AS DIRECTED TWO TIMES A WEEK. 120 each 5   • lisinopril-hydrochlorothiazide (PRINZIDE,ZESTORETIC) 20-12.5 MG per tablet Take 2 tablets by mouth Daily. 180 tablet 0   • oxyCODONE (ROXICODONE) 10 MG tablet 10 mg 2 (two) times a day.     • oxyMORphone ER (OPANA ER) 20 MG 12 hr tablet      •  triamcinolone (KENALOG) 0.1 % cream APPLY TOPICALLY TO THE APPROPRIATE AREA AS DIRECTED TWICE DAILY 30 g 0   • [DISCONTINUED] gabapentin (NEURONTIN) 800 MG tablet      • LORazepam (ATIVAN) 0.5 MG tablet      • [DISCONTINUED] oxyMORphone ER (OPANA ER) 30 MG 12 hr tablet Take 30 mg by mouth Every 12 (Twelve) Hours.       No facility-administered encounter medications on file as of 12/21/2022.       Reviewed use of high risk medication in the elderly: yes  Reviewed for potential of harmful drug interactions in the elderly: yes    Follow Up:  Return in about 3 months (around 3/21/2023).     An After Visit Summary and PPPS with all of these plans were given to the patient.

## 2023-01-20 ENCOUNTER — HOSPITAL ENCOUNTER (INPATIENT)
Facility: HOSPITAL | Age: 78
LOS: 5 days | Discharge: NURSING, REHAB OR LONG-TERM CARE | DRG: 522 | End: 2023-01-25
Attending: EMERGENCY MEDICINE | Admitting: HOSPITALIST
Payer: MEDICARE

## 2023-01-20 ENCOUNTER — APPOINTMENT (OUTPATIENT)
Dept: GENERAL RADIOLOGY | Facility: HOSPITAL | Age: 78
DRG: 522 | End: 2023-01-20
Payer: MEDICARE

## 2023-01-20 ENCOUNTER — APPOINTMENT (OUTPATIENT)
Dept: CT IMAGING | Facility: HOSPITAL | Age: 78
DRG: 522 | End: 2023-01-20
Payer: MEDICARE

## 2023-01-20 DIAGNOSIS — S72.002A CLOSED FRACTURE OF NECK OF LEFT FEMUR, INITIAL ENCOUNTER: Primary | ICD-10-CM

## 2023-01-20 LAB
ALBUMIN SERPL-MCNC: 4 G/DL (ref 3.5–5.2)
ALBUMIN/GLOB SERPL: 1.3 G/DL
ALP SERPL-CCNC: 102 U/L (ref 39–117)
ALT SERPL W P-5'-P-CCNC: 24 U/L (ref 1–41)
ANION GAP SERPL CALCULATED.3IONS-SCNC: 10.7 MMOL/L (ref 5–15)
APTT PPP: 33.5 SECONDS (ref 26.5–34.5)
AST SERPL-CCNC: 33 U/L (ref 1–40)
BASOPHILS # BLD AUTO: 0.04 10*3/MM3 (ref 0–0.2)
BASOPHILS NFR BLD AUTO: 0.4 % (ref 0–1.5)
BILIRUB SERPL-MCNC: 0.5 MG/DL (ref 0–1.2)
BUN SERPL-MCNC: 28 MG/DL (ref 8–23)
BUN/CREAT SERPL: 19.7 (ref 7–25)
CALCIUM SPEC-SCNC: 8.7 MG/DL (ref 8.6–10.5)
CHLORIDE SERPL-SCNC: 105 MMOL/L (ref 98–107)
CK SERPL-CCNC: 38 U/L (ref 20–200)
CO2 SERPL-SCNC: 21.3 MMOL/L (ref 22–29)
CREAT SERPL-MCNC: 1.42 MG/DL (ref 0.76–1.27)
CRP SERPL-MCNC: 0.6 MG/DL (ref 0–0.5)
DEPRECATED RDW RBC AUTO: 45 FL (ref 37–54)
EGFRCR SERPLBLD CKD-EPI 2021: 50.9 ML/MIN/1.73
EOSINOPHIL # BLD AUTO: 0.29 10*3/MM3 (ref 0–0.4)
EOSINOPHIL NFR BLD AUTO: 3.2 % (ref 0.3–6.2)
ERYTHROCYTE [DISTWIDTH] IN BLOOD BY AUTOMATED COUNT: 13.2 % (ref 12.3–15.4)
ERYTHROCYTE [SEDIMENTATION RATE] IN BLOOD: 12 MM/HR (ref 0–20)
ETHANOL BLD-MCNC: <10 MG/DL (ref 0–10)
ETHANOL UR QL: <0.01 %
FLUAV RNA RESP QL NAA+PROBE: NOT DETECTED
FLUBV RNA ISLT QL NAA+PROBE: NOT DETECTED
GLOBULIN UR ELPH-MCNC: 3.1 GM/DL
GLUCOSE SERPL-MCNC: 107 MG/DL (ref 65–99)
HBA1C MFR BLD: 5.4 % (ref 4.8–5.6)
HCT VFR BLD AUTO: 37.3 % (ref 37.5–51)
HGB BLD-MCNC: 12.8 G/DL (ref 13–17.7)
IMM GRANULOCYTES # BLD AUTO: 0.06 10*3/MM3 (ref 0–0.05)
IMM GRANULOCYTES NFR BLD AUTO: 0.7 % (ref 0–0.5)
INR PPP: 1.11 (ref 0.9–1.1)
LYMPHOCYTES # BLD AUTO: 1.45 10*3/MM3 (ref 0.7–3.1)
LYMPHOCYTES NFR BLD AUTO: 15.9 % (ref 19.6–45.3)
MAGNESIUM SERPL-MCNC: 1.8 MG/DL (ref 1.6–2.4)
MCH RBC QN AUTO: 31.8 PG (ref 26.6–33)
MCHC RBC AUTO-ENTMCNC: 34.3 G/DL (ref 31.5–35.7)
MCV RBC AUTO: 92.6 FL (ref 79–97)
MONOCYTES # BLD AUTO: 1.11 10*3/MM3 (ref 0.1–0.9)
MONOCYTES NFR BLD AUTO: 12.2 % (ref 5–12)
NEUTROPHILS NFR BLD AUTO: 6.16 10*3/MM3 (ref 1.7–7)
NEUTROPHILS NFR BLD AUTO: 67.6 % (ref 42.7–76)
NRBC BLD AUTO-RTO: 0 /100 WBC (ref 0–0.2)
PLATELET # BLD AUTO: 180 10*3/MM3 (ref 140–450)
PMV BLD AUTO: 10.9 FL (ref 6–12)
POTASSIUM SERPL-SCNC: 4.3 MMOL/L (ref 3.5–5.2)
PROT SERPL-MCNC: 7.1 G/DL (ref 6–8.5)
PROTHROMBIN TIME: 14.5 SECONDS (ref 12.1–14.7)
RBC # BLD AUTO: 4.03 10*6/MM3 (ref 4.14–5.8)
SARS-COV-2 RNA PNL SPEC NAA+PROBE: NOT DETECTED
SODIUM SERPL-SCNC: 137 MMOL/L (ref 136–145)
T4 FREE SERPL-MCNC: 1.39 NG/DL (ref 0.93–1.7)
TROPONIN T SERPL-MCNC: <0.01 NG/ML (ref 0–0.03)
TROPONIN T SERPL-MCNC: <0.01 NG/ML (ref 0–0.03)
TSH SERPL DL<=0.05 MIU/L-ACNC: 6.06 UIU/ML (ref 0.27–4.2)
WBC NRBC COR # BLD: 9.11 10*3/MM3 (ref 3.4–10.8)

## 2023-01-20 PROCEDURE — 99285 EMERGENCY DEPT VISIT HI MDM: CPT

## 2023-01-20 PROCEDURE — 99223 1ST HOSP IP/OBS HIGH 75: CPT | Performed by: HOSPITALIST

## 2023-01-20 PROCEDURE — 87636 SARSCOV2 & INF A&B AMP PRB: CPT | Performed by: EMERGENCY MEDICINE

## 2023-01-20 PROCEDURE — 84484 ASSAY OF TROPONIN QUANT: CPT | Performed by: EMERGENCY MEDICINE

## 2023-01-20 PROCEDURE — 84481 FREE ASSAY (FT-3): CPT | Performed by: HOSPITALIST

## 2023-01-20 PROCEDURE — 85730 THROMBOPLASTIN TIME PARTIAL: CPT | Performed by: EMERGENCY MEDICINE

## 2023-01-20 PROCEDURE — 83036 HEMOGLOBIN GLYCOSYLATED A1C: CPT | Performed by: HOSPITALIST

## 2023-01-20 PROCEDURE — 85610 PROTHROMBIN TIME: CPT | Performed by: EMERGENCY MEDICINE

## 2023-01-20 PROCEDURE — 84443 ASSAY THYROID STIM HORMONE: CPT | Performed by: EMERGENCY MEDICINE

## 2023-01-20 PROCEDURE — 36415 COLL VENOUS BLD VENIPUNCTURE: CPT

## 2023-01-20 PROCEDURE — 25010000002 MORPHINE PER 10 MG: Performed by: HOSPITALIST

## 2023-01-20 PROCEDURE — 80053 COMPREHEN METABOLIC PANEL: CPT | Performed by: EMERGENCY MEDICINE

## 2023-01-20 PROCEDURE — 85652 RBC SED RATE AUTOMATED: CPT | Performed by: EMERGENCY MEDICINE

## 2023-01-20 PROCEDURE — 86140 C-REACTIVE PROTEIN: CPT | Performed by: EMERGENCY MEDICINE

## 2023-01-20 PROCEDURE — 82550 ASSAY OF CK (CPK): CPT | Performed by: EMERGENCY MEDICINE

## 2023-01-20 PROCEDURE — 84439 ASSAY OF FREE THYROXINE: CPT | Performed by: EMERGENCY MEDICINE

## 2023-01-20 PROCEDURE — 73502 X-RAY EXAM HIP UNI 2-3 VIEWS: CPT

## 2023-01-20 PROCEDURE — 74176 CT ABD & PELVIS W/O CONTRAST: CPT

## 2023-01-20 PROCEDURE — 25010000002 ONDANSETRON PER 1 MG: Performed by: EMERGENCY MEDICINE

## 2023-01-20 PROCEDURE — 82077 ASSAY SPEC XCP UR&BREATH IA: CPT | Performed by: EMERGENCY MEDICINE

## 2023-01-20 PROCEDURE — 71045 X-RAY EXAM CHEST 1 VIEW: CPT

## 2023-01-20 PROCEDURE — 93005 ELECTROCARDIOGRAM TRACING: CPT | Performed by: EMERGENCY MEDICINE

## 2023-01-20 PROCEDURE — 83735 ASSAY OF MAGNESIUM: CPT | Performed by: EMERGENCY MEDICINE

## 2023-01-20 PROCEDURE — 25010000002 MORPHINE PER 10 MG: Performed by: EMERGENCY MEDICINE

## 2023-01-20 PROCEDURE — 85025 COMPLETE CBC W/AUTO DIFF WBC: CPT | Performed by: EMERGENCY MEDICINE

## 2023-01-20 RX ORDER — BUDESONIDE AND FORMOTEROL FUMARATE DIHYDRATE 160; 4.5 UG/1; UG/1
2 AEROSOL RESPIRATORY (INHALATION)
Status: DISCONTINUED | OUTPATIENT
Start: 2023-01-20 | End: 2023-01-25 | Stop reason: HOSPADM

## 2023-01-20 RX ORDER — MORPHINE SULFATE 2 MG/ML
2 INJECTION, SOLUTION INTRAMUSCULAR; INTRAVENOUS EVERY 4 HOURS PRN
Status: DISCONTINUED | OUTPATIENT
Start: 2023-01-20 | End: 2023-01-25 | Stop reason: HOSPADM

## 2023-01-20 RX ORDER — ONDANSETRON 2 MG/ML
4 INJECTION INTRAMUSCULAR; INTRAVENOUS ONCE
Status: COMPLETED | OUTPATIENT
Start: 2023-01-20 | End: 2023-01-20

## 2023-01-20 RX ORDER — IPRATROPIUM BROMIDE AND ALBUTEROL SULFATE 2.5; .5 MG/3ML; MG/3ML
3 SOLUTION RESPIRATORY (INHALATION) EVERY 4 HOURS PRN
Status: DISCONTINUED | OUTPATIENT
Start: 2023-01-20 | End: 2023-01-25

## 2023-01-20 RX ORDER — SODIUM CHLORIDE 0.9 % (FLUSH) 0.9 %
10 SYRINGE (ML) INJECTION AS NEEDED
Status: DISCONTINUED | OUTPATIENT
Start: 2023-01-20 | End: 2023-01-25 | Stop reason: HOSPADM

## 2023-01-20 RX ORDER — SODIUM CHLORIDE 9 MG/ML
100 INJECTION, SOLUTION INTRAVENOUS CONTINUOUS
Status: DISCONTINUED | OUTPATIENT
Start: 2023-01-20 | End: 2023-01-22

## 2023-01-20 RX ORDER — SODIUM CHLORIDE 9 MG/ML
40 INJECTION, SOLUTION INTRAVENOUS AS NEEDED
Status: DISCONTINUED | OUTPATIENT
Start: 2023-01-20 | End: 2023-01-25 | Stop reason: HOSPADM

## 2023-01-20 RX ORDER — SODIUM CHLORIDE 0.9 % (FLUSH) 0.9 %
10 SYRINGE (ML) INJECTION EVERY 12 HOURS SCHEDULED
Status: DISCONTINUED | OUTPATIENT
Start: 2023-01-20 | End: 2023-01-25 | Stop reason: HOSPADM

## 2023-01-20 RX ORDER — NALOXONE HCL 0.4 MG/ML
0.4 VIAL (ML) INJECTION
Status: DISCONTINUED | OUTPATIENT
Start: 2023-01-20 | End: 2023-01-25 | Stop reason: HOSPADM

## 2023-01-20 RX ADMIN — MORPHINE SULFATE 4 MG: 4 INJECTION, SOLUTION INTRAMUSCULAR; INTRAVENOUS at 20:46

## 2023-01-20 RX ADMIN — SODIUM CHLORIDE 1000 ML: 9 INJECTION, SOLUTION INTRAVENOUS at 19:30

## 2023-01-20 RX ADMIN — SODIUM CHLORIDE 100 ML/HR: 9 INJECTION, SOLUTION INTRAVENOUS at 23:02

## 2023-01-20 RX ADMIN — MORPHINE SULFATE 2 MG: 2 INJECTION, SOLUTION INTRAMUSCULAR; INTRAVENOUS at 22:59

## 2023-01-20 RX ADMIN — ONDANSETRON 4 MG: 2 INJECTION INTRAMUSCULAR; INTRAVENOUS at 20:46

## 2023-01-20 RX ADMIN — Medication 10 ML: at 23:06

## 2023-01-21 ENCOUNTER — ANESTHESIA EVENT (OUTPATIENT)
Dept: PERIOP | Facility: HOSPITAL | Age: 78
DRG: 522 | End: 2023-01-21
Payer: MEDICARE

## 2023-01-21 ENCOUNTER — ANESTHESIA (OUTPATIENT)
Dept: PERIOP | Facility: HOSPITAL | Age: 78
DRG: 522 | End: 2023-01-21
Payer: MEDICARE

## 2023-01-21 ENCOUNTER — APPOINTMENT (OUTPATIENT)
Dept: GENERAL RADIOLOGY | Facility: HOSPITAL | Age: 78
DRG: 522 | End: 2023-01-21
Payer: MEDICARE

## 2023-01-21 LAB
ABO GROUP BLD: NORMAL
ALBUMIN SERPL-MCNC: 3.3 G/DL (ref 3.5–5.2)
ALBUMIN/GLOB SERPL: 1.3 G/DL
ALP SERPL-CCNC: 88 U/L (ref 39–117)
ALT SERPL W P-5'-P-CCNC: 19 U/L (ref 1–41)
ANION GAP SERPL CALCULATED.3IONS-SCNC: 6.3 MMOL/L (ref 5–15)
AST SERPL-CCNC: 21 U/L (ref 1–40)
BASOPHILS # BLD AUTO: 0.05 10*3/MM3 (ref 0–0.2)
BASOPHILS NFR BLD AUTO: 0.8 % (ref 0–1.5)
BILIRUB SERPL-MCNC: 0.6 MG/DL (ref 0–1.2)
BILIRUB UR QL STRIP: NEGATIVE
BLD GP AB SCN SERPL QL: NEGATIVE
BUN SERPL-MCNC: 29 MG/DL (ref 8–23)
BUN/CREAT SERPL: 22.8 (ref 7–25)
CALCIUM SPEC-SCNC: 8.1 MG/DL (ref 8.6–10.5)
CHLORIDE SERPL-SCNC: 106 MMOL/L (ref 98–107)
CLARITY UR: CLEAR
CO2 SERPL-SCNC: 21.7 MMOL/L (ref 22–29)
COLOR UR: YELLOW
CREAT SERPL-MCNC: 1.27 MG/DL (ref 0.76–1.27)
DEPRECATED RDW RBC AUTO: 45.4 FL (ref 37–54)
EGFRCR SERPLBLD CKD-EPI 2021: 58.2 ML/MIN/1.73
EOSINOPHIL # BLD AUTO: 0.49 10*3/MM3 (ref 0–0.4)
EOSINOPHIL NFR BLD AUTO: 7.5 % (ref 0.3–6.2)
ERYTHROCYTE [DISTWIDTH] IN BLOOD BY AUTOMATED COUNT: 13.2 % (ref 12.3–15.4)
GLOBULIN UR ELPH-MCNC: 2.5 GM/DL
GLUCOSE SERPL-MCNC: 100 MG/DL (ref 65–99)
GLUCOSE UR STRIP-MCNC: NEGATIVE MG/DL
HCT VFR BLD AUTO: 31.1 % (ref 37.5–51)
HGB BLD-MCNC: 10.6 G/DL (ref 13–17.7)
HGB UR QL STRIP.AUTO: NEGATIVE
IMM GRANULOCYTES # BLD AUTO: 0.04 10*3/MM3 (ref 0–0.05)
IMM GRANULOCYTES NFR BLD AUTO: 0.6 % (ref 0–0.5)
KETONES UR QL STRIP: NEGATIVE
LEUKOCYTE ESTERASE UR QL STRIP.AUTO: NEGATIVE
LYMPHOCYTES # BLD AUTO: 1.09 10*3/MM3 (ref 0.7–3.1)
LYMPHOCYTES NFR BLD AUTO: 16.7 % (ref 19.6–45.3)
MCH RBC QN AUTO: 32.3 PG (ref 26.6–33)
MCHC RBC AUTO-ENTMCNC: 34.1 G/DL (ref 31.5–35.7)
MCV RBC AUTO: 94.8 FL (ref 79–97)
MONOCYTES # BLD AUTO: 0.69 10*3/MM3 (ref 0.1–0.9)
MONOCYTES NFR BLD AUTO: 10.6 % (ref 5–12)
NEUTROPHILS NFR BLD AUTO: 4.15 10*3/MM3 (ref 1.7–7)
NEUTROPHILS NFR BLD AUTO: 63.8 % (ref 42.7–76)
NITRITE UR QL STRIP: NEGATIVE
NRBC BLD AUTO-RTO: 0 /100 WBC (ref 0–0.2)
PH UR STRIP.AUTO: 5.5 [PH] (ref 5–8)
PLATELET # BLD AUTO: 144 10*3/MM3 (ref 140–450)
PMV BLD AUTO: 10.9 FL (ref 6–12)
POTASSIUM SERPL-SCNC: 4.3 MMOL/L (ref 3.5–5.2)
PROT SERPL-MCNC: 5.8 G/DL (ref 6–8.5)
PROT UR QL STRIP: NEGATIVE
QT INTERVAL: 356 MS
QTC INTERVAL: 430 MS
RBC # BLD AUTO: 3.28 10*6/MM3 (ref 4.14–5.8)
RH BLD: POSITIVE
SODIUM SERPL-SCNC: 134 MMOL/L (ref 136–145)
SP GR UR STRIP: 1.02 (ref 1–1.03)
T&S EXPIRATION DATE: NORMAL
T3FREE SERPL-MCNC: 3.54 PG/ML (ref 2–4.4)
UROBILINOGEN UR QL STRIP: NORMAL
WBC NRBC COR # BLD: 6.51 10*3/MM3 (ref 3.4–10.8)

## 2023-01-21 PROCEDURE — 94799 UNLISTED PULMONARY SVC/PX: CPT

## 2023-01-21 PROCEDURE — 25010000002 MORPHINE PER 10 MG: Performed by: HOSPITALIST

## 2023-01-21 PROCEDURE — C1776 JOINT DEVICE (IMPLANTABLE): HCPCS | Performed by: ORTHOPAEDIC SURGERY

## 2023-01-21 PROCEDURE — 25010000002 PROPOFOL 10 MG/ML EMULSION: Performed by: NURSE ANESTHETIST, CERTIFIED REGISTERED

## 2023-01-21 PROCEDURE — 99232 SBSQ HOSP IP/OBS MODERATE 35: CPT | Performed by: NURSE PRACTITIONER

## 2023-01-21 PROCEDURE — 94761 N-INVAS EAR/PLS OXIMETRY MLT: CPT

## 2023-01-21 PROCEDURE — 25010000002 CEFAZOLIN PER 500 MG: Performed by: NURSE ANESTHETIST, CERTIFIED REGISTERED

## 2023-01-21 PROCEDURE — 25010000002 HYDROMORPHONE PER 4 MG: Performed by: NURSE ANESTHETIST, CERTIFIED REGISTERED

## 2023-01-21 PROCEDURE — 25010000002 CEFAZOLIN PER 500 MG: Performed by: ORTHOPAEDIC SURGERY

## 2023-01-21 PROCEDURE — 25010000002 FENTANYL CITRATE (PF) 50 MCG/ML SOLUTION: Performed by: ANESTHESIOLOGY

## 2023-01-21 PROCEDURE — 86900 BLOOD TYPING SEROLOGIC ABO: CPT | Performed by: ORTHOPAEDIC SURGERY

## 2023-01-21 PROCEDURE — 94640 AIRWAY INHALATION TREATMENT: CPT

## 2023-01-21 PROCEDURE — 73552 X-RAY EXAM OF FEMUR 2/>: CPT

## 2023-01-21 PROCEDURE — 0SRS01Z REPLACEMENT OF LEFT HIP JOINT, FEMORAL SURFACE WITH METAL SYNTHETIC SUBSTITUTE, OPEN APPROACH: ICD-10-PCS | Performed by: ORTHOPAEDIC SURGERY

## 2023-01-21 PROCEDURE — 86923 COMPATIBILITY TEST ELECTRIC: CPT

## 2023-01-21 PROCEDURE — 80053 COMPREHEN METABOLIC PANEL: CPT | Performed by: HOSPITALIST

## 2023-01-21 PROCEDURE — 81003 URINALYSIS AUTO W/O SCOPE: CPT | Performed by: ORTHOPAEDIC SURGERY

## 2023-01-21 PROCEDURE — 25010000002 FENTANYL CITRATE (PF) 50 MCG/ML SOLUTION: Performed by: NURSE ANESTHETIST, CERTIFIED REGISTERED

## 2023-01-21 PROCEDURE — 25010000002 VANCOMYCIN 1 G RECONSTITUTED SOLUTION: Performed by: ORTHOPAEDIC SURGERY

## 2023-01-21 PROCEDURE — 25010000002 NEOSTIGMINE 10 MG/10ML SOLUTION: Performed by: NURSE ANESTHETIST, CERTIFIED REGISTERED

## 2023-01-21 PROCEDURE — 25010000002 MORPHINE PER 10 MG: Performed by: ORTHOPAEDIC SURGERY

## 2023-01-21 PROCEDURE — 25010000002 ONDANSETRON PER 1 MG: Performed by: NURSE ANESTHETIST, CERTIFIED REGISTERED

## 2023-01-21 PROCEDURE — 85025 COMPLETE CBC W/AUTO DIFF WBC: CPT | Performed by: HOSPITALIST

## 2023-01-21 PROCEDURE — 86850 RBC ANTIBODY SCREEN: CPT | Performed by: ORTHOPAEDIC SURGERY

## 2023-01-21 PROCEDURE — 72170 X-RAY EXAM OF PELVIS: CPT

## 2023-01-21 PROCEDURE — 86901 BLOOD TYPING SEROLOGIC RH(D): CPT | Performed by: ORTHOPAEDIC SURGERY

## 2023-01-21 DEVICE — CAP BIPOL HIP CORAIL ACTIS: Type: IMPLANTABLE DEVICE | Site: HIP | Status: FUNCTIONAL

## 2023-01-21 DEVICE — CORAIL HIP SYSTEM CEMENTLESS FEMORAL STEM 12/14 AMT 125 DEGREES KLA SIZE 13 HA COATED HIGH OFFSET COLLAR
Type: IMPLANTABLE DEVICE | Site: HIP | Status: FUNCTIONAL
Brand: CORAIL

## 2023-01-21 DEVICE — ARTICUL/EZE FEMORAL HEAD DIAMETER 28MM +1.5 12/14 TAPER
Type: IMPLANTABLE DEVICE | Site: HIP | Status: FUNCTIONAL
Brand: ARTICUL/EZE

## 2023-01-21 DEVICE — VIOLET ANTIBACTERIAL POLYDIOXANONE, KNOTLESS TISSUE CONTROL DEVICE
Type: IMPLANTABLE DEVICE | Site: HIP | Status: FUNCTIONAL
Brand: STRATAFIX

## 2023-01-21 DEVICE — SELF CENTERING BI-POLAR HEAD 28MM ID 50MM OD
Type: IMPLANTABLE DEVICE | Site: HIP | Status: FUNCTIONAL
Brand: SELF CENTERING

## 2023-01-21 DEVICE — CAP BIPOL HIP HD AND S.CTR CUP: Type: IMPLANTABLE DEVICE | Site: HIP | Status: FUNCTIONAL

## 2023-01-21 RX ORDER — ONDANSETRON 2 MG/ML
4 INJECTION INTRAMUSCULAR; INTRAVENOUS AS NEEDED
Status: DISCONTINUED | OUTPATIENT
Start: 2023-01-21 | End: 2023-01-21 | Stop reason: HOSPADM

## 2023-01-21 RX ORDER — SODIUM CHLORIDE, SODIUM LACTATE, POTASSIUM CHLORIDE, CALCIUM CHLORIDE 600; 310; 30; 20 MG/100ML; MG/100ML; MG/100ML; MG/100ML
100 INJECTION, SOLUTION INTRAVENOUS ONCE AS NEEDED
Status: DISCONTINUED | OUTPATIENT
Start: 2023-01-21 | End: 2023-01-21 | Stop reason: HOSPADM

## 2023-01-21 RX ORDER — ONDANSETRON 2 MG/ML
INJECTION INTRAMUSCULAR; INTRAVENOUS AS NEEDED
Status: DISCONTINUED | OUTPATIENT
Start: 2023-01-21 | End: 2023-01-21 | Stop reason: SURG

## 2023-01-21 RX ORDER — NEOSTIGMINE METHYLSULFATE 1 MG/ML
INJECTION, SOLUTION INTRAVENOUS AS NEEDED
Status: DISCONTINUED | OUTPATIENT
Start: 2023-01-21 | End: 2023-01-21 | Stop reason: SURG

## 2023-01-21 RX ORDER — FENTANYL CITRATE 50 UG/ML
100 INJECTION, SOLUTION INTRAMUSCULAR; INTRAVENOUS ONCE
Status: COMPLETED | OUTPATIENT
Start: 2023-01-21 | End: 2023-01-21

## 2023-01-21 RX ORDER — KETOCONAZOLE 20 MG/ML
1 SHAMPOO TOPICAL 2 TIMES WEEKLY
Status: DISCONTINUED | OUTPATIENT
Start: 2023-01-23 | End: 2023-01-25 | Stop reason: HOSPADM

## 2023-01-21 RX ORDER — FENTANYL CITRATE 50 UG/ML
INJECTION, SOLUTION INTRAMUSCULAR; INTRAVENOUS AS NEEDED
Status: DISCONTINUED | OUTPATIENT
Start: 2023-01-21 | End: 2023-01-21 | Stop reason: SURG

## 2023-01-21 RX ORDER — LISINOPRIL 10 MG/1
10 TABLET ORAL
Status: DISCONTINUED | OUTPATIENT
Start: 2023-01-21 | End: 2023-01-21

## 2023-01-21 RX ORDER — HYDROCHLOROTHIAZIDE 12.5 MG/1
12.5 TABLET ORAL
Status: DISCONTINUED | OUTPATIENT
Start: 2023-01-21 | End: 2023-01-21

## 2023-01-21 RX ORDER — FAMOTIDINE 10 MG/ML
INJECTION, SOLUTION INTRAVENOUS AS NEEDED
Status: DISCONTINUED | OUTPATIENT
Start: 2023-01-21 | End: 2023-01-21 | Stop reason: SURG

## 2023-01-21 RX ORDER — MAGNESIUM HYDROXIDE 1200 MG/15ML
LIQUID ORAL AS NEEDED
Status: DISCONTINUED | OUTPATIENT
Start: 2023-01-21 | End: 2023-01-21 | Stop reason: HOSPADM

## 2023-01-21 RX ORDER — SODIUM CHLORIDE 0.9 % (FLUSH) 0.9 %
10 SYRINGE (ML) INJECTION AS NEEDED
Status: DISCONTINUED | OUTPATIENT
Start: 2023-01-21 | End: 2023-01-21 | Stop reason: HOSPADM

## 2023-01-21 RX ORDER — IPRATROPIUM BROMIDE AND ALBUTEROL SULFATE 2.5; .5 MG/3ML; MG/3ML
3 SOLUTION RESPIRATORY (INHALATION) ONCE AS NEEDED
Status: DISCONTINUED | OUTPATIENT
Start: 2023-01-21 | End: 2023-01-21 | Stop reason: HOSPADM

## 2023-01-21 RX ORDER — ENOXAPARIN SODIUM 100 MG/ML
40 INJECTION SUBCUTANEOUS DAILY
Status: DISCONTINUED | OUTPATIENT
Start: 2023-01-22 | End: 2023-01-25 | Stop reason: HOSPADM

## 2023-01-21 RX ORDER — SODIUM CHLORIDE 9 MG/ML
40 INJECTION, SOLUTION INTRAVENOUS AS NEEDED
Status: DISCONTINUED | OUTPATIENT
Start: 2023-01-21 | End: 2023-01-21 | Stop reason: HOSPADM

## 2023-01-21 RX ORDER — ALBUTEROL SULFATE 90 UG/1
2 AEROSOL, METERED RESPIRATORY (INHALATION) EVERY 6 HOURS PRN
Status: DISCONTINUED | OUTPATIENT
Start: 2023-01-21 | End: 2023-01-25 | Stop reason: HOSPADM

## 2023-01-21 RX ORDER — ONDANSETRON 2 MG/ML
4 INJECTION INTRAMUSCULAR; INTRAVENOUS EVERY 6 HOURS PRN
Status: DISCONTINUED | OUTPATIENT
Start: 2023-01-21 | End: 2023-01-25 | Stop reason: HOSPADM

## 2023-01-21 RX ORDER — ASPIRIN 81 MG/1
81 TABLET ORAL DAILY
COMMUNITY

## 2023-01-21 RX ORDER — CEFAZOLIN SODIUM 1 G/3ML
INJECTION, POWDER, FOR SOLUTION INTRAMUSCULAR; INTRAVENOUS AS NEEDED
Status: DISCONTINUED | OUTPATIENT
Start: 2023-01-21 | End: 2023-01-21 | Stop reason: SURG

## 2023-01-21 RX ORDER — TRIAMCINOLONE ACETONIDE 1 MG/G
CREAM TOPICAL 2 TIMES DAILY PRN
Status: DISCONTINUED | OUTPATIENT
Start: 2023-01-21 | End: 2023-01-25 | Stop reason: HOSPADM

## 2023-01-21 RX ORDER — OXYCODONE HCL 20 MG/1
20 TABLET, FILM COATED, EXTENDED RELEASE ORAL EVERY 12 HOURS SCHEDULED
Status: DISCONTINUED | OUTPATIENT
Start: 2023-01-21 | End: 2023-01-25 | Stop reason: HOSPADM

## 2023-01-21 RX ORDER — BUDESONIDE AND FORMOTEROL FUMARATE DIHYDRATE 160; 4.5 UG/1; UG/1
2 AEROSOL RESPIRATORY (INHALATION)
Status: CANCELLED | OUTPATIENT
Start: 2023-01-21

## 2023-01-21 RX ORDER — HYDROMORPHONE HYDROCHLORIDE 1 MG/ML
0.5 INJECTION, SOLUTION INTRAMUSCULAR; INTRAVENOUS; SUBCUTANEOUS ONCE
Status: COMPLETED | OUTPATIENT
Start: 2023-01-21 | End: 2023-01-21

## 2023-01-21 RX ORDER — GLYCOPYRROLATE 0.2 MG/ML
INJECTION INTRAMUSCULAR; INTRAVENOUS AS NEEDED
Status: DISCONTINUED | OUTPATIENT
Start: 2023-01-21 | End: 2023-01-21 | Stop reason: SURG

## 2023-01-21 RX ORDER — OXYCODONE HYDROCHLORIDE AND ACETAMINOPHEN 5; 325 MG/1; MG/1
1 TABLET ORAL ONCE AS NEEDED
Status: DISCONTINUED | OUTPATIENT
Start: 2023-01-21 | End: 2023-01-21 | Stop reason: HOSPADM

## 2023-01-21 RX ORDER — ROCURONIUM BROMIDE 10 MG/ML
INJECTION, SOLUTION INTRAVENOUS AS NEEDED
Status: DISCONTINUED | OUTPATIENT
Start: 2023-01-21 | End: 2023-01-21 | Stop reason: SURG

## 2023-01-21 RX ORDER — PROPOFOL 10 MG/ML
VIAL (ML) INTRAVENOUS AS NEEDED
Status: DISCONTINUED | OUTPATIENT
Start: 2023-01-21 | End: 2023-01-21 | Stop reason: SURG

## 2023-01-21 RX ORDER — KETOCONAZOLE 20 MG/ML
1 SHAMPOO TOPICAL 2 TIMES WEEKLY
COMMUNITY
End: 2023-03-06 | Stop reason: SDUPTHER

## 2023-01-21 RX ORDER — VANCOMYCIN HYDROCHLORIDE 1 G/20ML
INJECTION, POWDER, LYOPHILIZED, FOR SOLUTION INTRAVENOUS AS NEEDED
Status: DISCONTINUED | OUTPATIENT
Start: 2023-01-21 | End: 2023-01-21 | Stop reason: HOSPADM

## 2023-01-21 RX ORDER — OXYCODONE HYDROCHLORIDE 5 MG/1
10 TABLET ORAL 2 TIMES DAILY PRN
Status: DISCONTINUED | OUTPATIENT
Start: 2023-01-21 | End: 2023-01-25 | Stop reason: HOSPADM

## 2023-01-21 RX ORDER — ASPIRIN 81 MG/1
81 TABLET ORAL DAILY
Status: CANCELLED | OUTPATIENT
Start: 2023-01-21

## 2023-01-21 RX ORDER — MIDAZOLAM HYDROCHLORIDE 1 MG/ML
0.5 INJECTION INTRAMUSCULAR; INTRAVENOUS
Status: DISCONTINUED | OUTPATIENT
Start: 2023-01-21 | End: 2023-01-21 | Stop reason: HOSPADM

## 2023-01-21 RX ORDER — SODIUM CHLORIDE 0.9 % (FLUSH) 0.9 %
10 SYRINGE (ML) INJECTION EVERY 12 HOURS SCHEDULED
Status: DISCONTINUED | OUTPATIENT
Start: 2023-01-21 | End: 2023-01-21 | Stop reason: HOSPADM

## 2023-01-21 RX ORDER — ASPIRIN 81 MG/1
81 TABLET ORAL DAILY
Status: DISCONTINUED | OUTPATIENT
Start: 2023-01-21 | End: 2023-01-25 | Stop reason: HOSPADM

## 2023-01-21 RX ORDER — FENTANYL CITRATE 50 UG/ML
50 INJECTION, SOLUTION INTRAMUSCULAR; INTRAVENOUS
Status: DISCONTINUED | OUTPATIENT
Start: 2023-01-21 | End: 2023-01-21 | Stop reason: HOSPADM

## 2023-01-21 RX ORDER — PHENYLEPHRINE HCL IN 0.9% NACL 1 MG/10 ML
SYRINGE (ML) INTRAVENOUS AS NEEDED
Status: DISCONTINUED | OUTPATIENT
Start: 2023-01-21 | End: 2023-01-21 | Stop reason: SURG

## 2023-01-21 RX ORDER — SODIUM CHLORIDE, SODIUM LACTATE, POTASSIUM CHLORIDE, CALCIUM CHLORIDE 600; 310; 30; 20 MG/100ML; MG/100ML; MG/100ML; MG/100ML
125 INJECTION, SOLUTION INTRAVENOUS ONCE
Status: DISCONTINUED | OUTPATIENT
Start: 2023-01-21 | End: 2023-01-21 | Stop reason: HOSPADM

## 2023-01-21 RX ORDER — GABAPENTIN 300 MG/1
300 CAPSULE ORAL EVERY 8 HOURS SCHEDULED
Status: DISCONTINUED | OUTPATIENT
Start: 2023-01-21 | End: 2023-01-25 | Stop reason: HOSPADM

## 2023-01-21 RX ADMIN — ASPIRIN 81 MG: 81 TABLET, COATED ORAL at 17:13

## 2023-01-21 RX ADMIN — MORPHINE SULFATE 4 MG: 4 INJECTION, SOLUTION INTRAMUSCULAR; INTRAVENOUS at 14:23

## 2023-01-21 RX ADMIN — FENTANYL CITRATE 50 MCG: 50 INJECTION, SOLUTION INTRAMUSCULAR; INTRAVENOUS at 12:10

## 2023-01-21 RX ADMIN — GLYCOPYRROLATE 0.4 MG: 0.2 INJECTION INTRAMUSCULAR; INTRAVENOUS at 11:35

## 2023-01-21 RX ADMIN — HYDROMORPHONE HYDROCHLORIDE 0.5 MG: 1 INJECTION, SOLUTION INTRAMUSCULAR; INTRAVENOUS; SUBCUTANEOUS at 12:15

## 2023-01-21 RX ADMIN — FENTANYL CITRATE 50 MCG: 50 INJECTION INTRAMUSCULAR; INTRAVENOUS at 10:36

## 2023-01-21 RX ADMIN — SODIUM CHLORIDE 100 ML/HR: 9 INJECTION, SOLUTION INTRAVENOUS at 15:37

## 2023-01-21 RX ADMIN — FENTANYL CITRATE 100 MCG: 50 INJECTION, SOLUTION INTRAMUSCULAR; INTRAVENOUS at 13:22

## 2023-01-21 RX ADMIN — FENTANYL CITRATE 50 MCG: 50 INJECTION, SOLUTION INTRAMUSCULAR; INTRAVENOUS at 12:05

## 2023-01-21 RX ADMIN — PROPOFOL 150 MG: 10 INJECTION, EMULSION INTRAVENOUS at 10:39

## 2023-01-21 RX ADMIN — CEFAZOLIN 2 G: 1 INJECTION, POWDER, FOR SOLUTION INTRAMUSCULAR; INTRAVENOUS at 10:59

## 2023-01-21 RX ADMIN — FENTANYL CITRATE 50 MCG: 50 INJECTION INTRAMUSCULAR; INTRAVENOUS at 11:44

## 2023-01-21 RX ADMIN — ROCURONIUM BROMIDE 30 MG: 50 INJECTION INTRAVENOUS at 10:39

## 2023-01-21 RX ADMIN — FENTANYL CITRATE 100 MCG: 50 INJECTION, SOLUTION INTRAMUSCULAR; INTRAVENOUS at 09:41

## 2023-01-21 RX ADMIN — NEOSTIGMINE METHYLSULFATE 3 MG: 0.5 INJECTION INTRAVENOUS at 11:35

## 2023-01-21 RX ADMIN — MORPHINE SULFATE 2 MG: 2 INJECTION, SOLUTION INTRAMUSCULAR; INTRAVENOUS at 19:57

## 2023-01-21 RX ADMIN — BUDESONIDE AND FORMOTEROL FUMARATE DIHYDRATE 2 PUFF: 160; 4.5 AEROSOL RESPIRATORY (INHALATION) at 00:40

## 2023-01-21 RX ADMIN — Medication 100 MCG: at 11:32

## 2023-01-21 RX ADMIN — MORPHINE SULFATE 2 MG: 2 INJECTION, SOLUTION INTRAMUSCULAR; INTRAVENOUS at 04:07

## 2023-01-21 RX ADMIN — CEFAZOLIN 2 G: 2 INJECTION, POWDER, FOR SOLUTION INTRAMUSCULAR; INTRAVENOUS at 18:10

## 2023-01-21 RX ADMIN — ONDANSETRON 4 MG: 2 INJECTION INTRAMUSCULAR; INTRAVENOUS at 10:36

## 2023-01-21 RX ADMIN — OXYCODONE HYDROCHLORIDE 20 MG: 20 TABLET, FILM COATED, EXTENDED RELEASE ORAL at 22:29

## 2023-01-21 RX ADMIN — SODIUM CHLORIDE: 9 INJECTION, SOLUTION INTRAVENOUS at 11:31

## 2023-01-21 RX ADMIN — OXYCODONE HYDROCHLORIDE 10 MG: 5 TABLET ORAL at 17:13

## 2023-01-21 RX ADMIN — GABAPENTIN 300 MG: 300 CAPSULE ORAL at 14:23

## 2023-01-21 RX ADMIN — GABAPENTIN 300 MG: 300 CAPSULE ORAL at 21:19

## 2023-01-21 RX ADMIN — FAMOTIDINE 20 MG: 10 INJECTION, SOLUTION INTRAVENOUS at 10:36

## 2023-01-21 RX ADMIN — SODIUM CHLORIDE 1000 ML: 9 INJECTION, SOLUTION INTRAVENOUS at 14:23

## 2023-01-21 RX ADMIN — MORPHINE SULFATE 2 MG: 2 INJECTION, SOLUTION INTRAMUSCULAR; INTRAVENOUS at 23:51

## 2023-01-21 RX ADMIN — Medication 10 ML: at 20:01

## 2023-01-21 RX ADMIN — Medication 100 MCG: at 10:52

## 2023-01-21 RX ADMIN — BUDESONIDE AND FORMOTEROL FUMARATE DIHYDRATE 2 PUFF: 160; 4.5 AEROSOL RESPIRATORY (INHALATION) at 06:55

## 2023-01-21 NOTE — ANESTHESIA PREPROCEDURE EVALUATION
Anesthesia Evaluation     no history of anesthetic complications:  NPO Solid Status: > 8 hours  NPO Liquid Status: > 8 hours           Airway   Mallampati: II  TM distance: >3 FB  Neck ROM: full  No difficulty expected  Dental    (+) poor dentition        Pulmonary - normal exam   (+) a smoker, COPD, shortness of breath,   Cardiovascular - normal exam    (+) hypertension, CHF , hyperlipidemia,       Neuro/Psych  (+) headaches,    GI/Hepatic/Renal/Endo    (+)   thyroid problem hypothyroidism    Musculoskeletal     (+) back pain, neck pain,   Abdominal  - normal exam    Bowel sounds: normal.   Substance History      OB/GYN          Other        ROS/Med Hx Other: Gout                      Anesthesia Plan    ASA 4     general     intravenous induction     Anesthetic plan, risks, benefits, and alternatives have been provided, discussed and informed consent has been obtained with: patient.        CODE STATUS:    Level Of Support Discussed With: Patient  Code Status (Patient has no pulse and is not breathing): CPR (Attempt to Resuscitate)  Medical Interventions (Patient has pulse or is breathing): Full Support

## 2023-01-21 NOTE — ANESTHESIA PROCEDURE NOTES
Airway  Urgency: elective    Date/Time: 1/21/2023 10:41 AM  End Time:1/21/2023 10:41 AM  Airway not difficult    General Information and Staff    Patient location during procedure: OR  CRNA/CAA: Robbin Burgess CRNA    Indications and Patient Condition  Indications for airway management: airway protection    Preoxygenated: yes  MILS maintained throughout  Mask difficulty assessment: 0 - not attempted    Final Airway Details  Final airway type: endotracheal airway      Successful airway: ETT  Cuffed: yes   Successful intubation technique: direct laryngoscopy  Endotracheal tube insertion site: oral  Blade: Vandana  Blade size: 3  ETT size (mm): 7.0  Cormack-Lehane Classification: grade IIa - partial view of glottis  Placement verified by: chest auscultation, capnometry and palpation of cuff   Cuff volume (mL): 8  Measured from: lips  ETT/EBT  to lips (cm): 22  Number of attempts at approach: 1  Assessment: lips, teeth, and gum same as pre-op and atraumatic intubation

## 2023-01-21 NOTE — ANESTHESIA POSTPROCEDURE EVALUATION
Patient: Paul RAMACHANDRAN Patricia    Procedure Summary     Date: 01/21/23 Room / Location: [facility] OR 03 /  [facility] OR    Anesthesia Start: 1036 Anesthesia Stop: 1147    Procedure: HIP HEMIARTHROPLASTY (Left: Hip) Diagnosis:       Closed fracture of neck of left femur, initial encounter ([facility])      (Closed fracture of neck of left femur, initial encounter ([facility]) [S72.002A])    Surgeons: Ananth Bates DO Provider: Laurent Coleman MD    Anesthesia Type: general ASA Status: 4          Anesthesia Type: general    Vitals  Vitals Value Taken Time   /82 01/21/23 1148   Temp 97.8 °F (36.6 °C) 01/21/23 1148   Pulse 107 01/21/23 1148   Resp 20 01/21/23 1148   SpO2 95 % 01/21/23 1148           Post Anesthesia Care and Evaluation    Patient location during evaluation: PHASE II  Patient participation: complete - patient participated  Level of consciousness: awake and alert  Pain score: 1  Pain management: adequate    Airway patency: patent  Anesthetic complications: No anesthetic complications  PONV Status: controlled  Cardiovascular status: acceptable  Respiratory status: acceptable  Hydration status: acceptable

## 2023-01-22 LAB
ALBUMIN SERPL-MCNC: 2.7 G/DL (ref 3.5–5.2)
ALBUMIN/GLOB SERPL: 1.1 G/DL
ALP SERPL-CCNC: 73 U/L (ref 39–117)
ALT SERPL W P-5'-P-CCNC: 11 U/L (ref 1–41)
ANION GAP SERPL CALCULATED.3IONS-SCNC: 8.3 MMOL/L (ref 5–15)
AST SERPL-CCNC: 24 U/L (ref 1–40)
BASOPHILS # BLD AUTO: 0.04 10*3/MM3 (ref 0–0.2)
BASOPHILS NFR BLD AUTO: 0.5 % (ref 0–1.5)
BILIRUB SERPL-MCNC: 0.4 MG/DL (ref 0–1.2)
BUN SERPL-MCNC: 16 MG/DL (ref 8–23)
BUN/CREAT SERPL: 17.6 (ref 7–25)
CALCIUM SPEC-SCNC: 7.5 MG/DL (ref 8.6–10.5)
CHLORIDE SERPL-SCNC: 105 MMOL/L (ref 98–107)
CO2 SERPL-SCNC: 18.7 MMOL/L (ref 22–29)
CREAT SERPL-MCNC: 0.91 MG/DL (ref 0.76–1.27)
DEPRECATED RDW RBC AUTO: 46.3 FL (ref 37–54)
EGFRCR SERPLBLD CKD-EPI 2021: 86.8 ML/MIN/1.73
EOSINOPHIL # BLD AUTO: 0.1 10*3/MM3 (ref 0–0.4)
EOSINOPHIL NFR BLD AUTO: 1.2 % (ref 0.3–6.2)
ERYTHROCYTE [DISTWIDTH] IN BLOOD BY AUTOMATED COUNT: 13.1 % (ref 12.3–15.4)
GLOBULIN UR ELPH-MCNC: 2.4 GM/DL
GLUCOSE BLDC GLUCOMTR-MCNC: 133 MG/DL (ref 70–130)
GLUCOSE SERPL-MCNC: 121 MG/DL (ref 65–99)
HCT VFR BLD AUTO: 26.3 % (ref 37.5–51)
HGB BLD-MCNC: 8.7 G/DL (ref 13–17.7)
IMM GRANULOCYTES # BLD AUTO: 0.05 10*3/MM3 (ref 0–0.05)
IMM GRANULOCYTES NFR BLD AUTO: 0.6 % (ref 0–0.5)
LYMPHOCYTES # BLD AUTO: 0.67 10*3/MM3 (ref 0.7–3.1)
LYMPHOCYTES NFR BLD AUTO: 7.8 % (ref 19.6–45.3)
MAGNESIUM SERPL-MCNC: 1.5 MG/DL (ref 1.6–2.4)
MCH RBC QN AUTO: 31.6 PG (ref 26.6–33)
MCHC RBC AUTO-ENTMCNC: 33.1 G/DL (ref 31.5–35.7)
MCV RBC AUTO: 95.6 FL (ref 79–97)
MONOCYTES # BLD AUTO: 0.7 10*3/MM3 (ref 0.1–0.9)
MONOCYTES NFR BLD AUTO: 8.1 % (ref 5–12)
NEUTROPHILS NFR BLD AUTO: 7.04 10*3/MM3 (ref 1.7–7)
NEUTROPHILS NFR BLD AUTO: 81.8 % (ref 42.7–76)
NRBC BLD AUTO-RTO: 0 /100 WBC (ref 0–0.2)
PLATELET # BLD AUTO: 113 10*3/MM3 (ref 140–450)
PMV BLD AUTO: 10.8 FL (ref 6–12)
POTASSIUM SERPL-SCNC: 3.8 MMOL/L (ref 3.5–5.2)
POTASSIUM SERPL-SCNC: 3.9 MMOL/L (ref 3.5–5.2)
PROT SERPL-MCNC: 5.1 G/DL (ref 6–8.5)
RBC # BLD AUTO: 2.75 10*6/MM3 (ref 4.14–5.8)
SODIUM SERPL-SCNC: 132 MMOL/L (ref 136–145)
WBC NRBC COR # BLD: 8.6 10*3/MM3 (ref 3.4–10.8)

## 2023-01-22 PROCEDURE — 25010000002 MORPHINE PER 10 MG: Performed by: HOSPITALIST

## 2023-01-22 PROCEDURE — 80053 COMPREHEN METABOLIC PANEL: CPT | Performed by: ORTHOPAEDIC SURGERY

## 2023-01-22 PROCEDURE — 99232 SBSQ HOSP IP/OBS MODERATE 35: CPT | Performed by: NURSE PRACTITIONER

## 2023-01-22 PROCEDURE — 25010000002 MORPHINE PER 10 MG: Performed by: ORTHOPAEDIC SURGERY

## 2023-01-22 PROCEDURE — 94761 N-INVAS EAR/PLS OXIMETRY MLT: CPT

## 2023-01-22 PROCEDURE — 83735 ASSAY OF MAGNESIUM: CPT

## 2023-01-22 PROCEDURE — 94799 UNLISTED PULMONARY SVC/PX: CPT

## 2023-01-22 PROCEDURE — 84132 ASSAY OF SERUM POTASSIUM: CPT

## 2023-01-22 PROCEDURE — 25010000002 CEFAZOLIN PER 500 MG: Performed by: ORTHOPAEDIC SURGERY

## 2023-01-22 PROCEDURE — 85025 COMPLETE CBC W/AUTO DIFF WBC: CPT | Performed by: ORTHOPAEDIC SURGERY

## 2023-01-22 PROCEDURE — 25010000002 ENOXAPARIN PER 10 MG: Performed by: ORTHOPAEDIC SURGERY

## 2023-01-22 PROCEDURE — 82962 GLUCOSE BLOOD TEST: CPT

## 2023-01-22 RX ORDER — MAGNESIUM SULFATE HEPTAHYDRATE 40 MG/ML
2 INJECTION, SOLUTION INTRAVENOUS
Status: DISPENSED | OUTPATIENT
Start: 2023-01-23 | End: 2023-01-23

## 2023-01-22 RX ORDER — MAGNESIUM SULFATE HEPTAHYDRATE 40 MG/ML
2 INJECTION, SOLUTION INTRAVENOUS AS NEEDED
Status: DISCONTINUED | OUTPATIENT
Start: 2023-01-22 | End: 2023-01-25 | Stop reason: HOSPADM

## 2023-01-22 RX ORDER — PANTOPRAZOLE SODIUM 40 MG/1
40 TABLET, DELAYED RELEASE ORAL
Status: DISCONTINUED | OUTPATIENT
Start: 2023-01-23 | End: 2023-01-25 | Stop reason: HOSPADM

## 2023-01-22 RX ORDER — MORPHINE SULFATE 2 MG/ML
2 INJECTION, SOLUTION INTRAMUSCULAR; INTRAVENOUS ONCE
Status: COMPLETED | OUTPATIENT
Start: 2023-01-22 | End: 2023-01-22

## 2023-01-22 RX ORDER — CALCIUM CARBONATE 200(500)MG
2 TABLET,CHEWABLE ORAL 3 TIMES DAILY PRN
Status: DISCONTINUED | OUTPATIENT
Start: 2023-01-22 | End: 2023-01-25 | Stop reason: HOSPADM

## 2023-01-22 RX ORDER — MAGNESIUM SULFATE HEPTAHYDRATE 40 MG/ML
4 INJECTION, SOLUTION INTRAVENOUS AS NEEDED
Status: DISCONTINUED | OUTPATIENT
Start: 2023-01-22 | End: 2023-01-25 | Stop reason: HOSPADM

## 2023-01-22 RX ADMIN — BUDESONIDE AND FORMOTEROL FUMARATE DIHYDRATE 2 PUFF: 160; 4.5 AEROSOL RESPIRATORY (INHALATION) at 18:56

## 2023-01-22 RX ADMIN — BUDESONIDE AND FORMOTEROL FUMARATE DIHYDRATE 2 PUFF: 160; 4.5 AEROSOL RESPIRATORY (INHALATION) at 06:51

## 2023-01-22 RX ADMIN — MORPHINE SULFATE 2 MG: 2 INJECTION, SOLUTION INTRAMUSCULAR; INTRAVENOUS at 01:36

## 2023-01-22 RX ADMIN — MORPHINE SULFATE 2 MG: 2 INJECTION, SOLUTION INTRAMUSCULAR; INTRAVENOUS at 17:34

## 2023-01-22 RX ADMIN — GABAPENTIN 300 MG: 300 CAPSULE ORAL at 14:34

## 2023-01-22 RX ADMIN — MORPHINE SULFATE 2 MG: 2 INJECTION, SOLUTION INTRAMUSCULAR; INTRAVENOUS at 11:42

## 2023-01-22 RX ADMIN — ASPIRIN 81 MG: 81 TABLET, COATED ORAL at 08:24

## 2023-01-22 RX ADMIN — OXYCODONE HYDROCHLORIDE 20 MG: 20 TABLET, FILM COATED, EXTENDED RELEASE ORAL at 08:34

## 2023-01-22 RX ADMIN — OXYCODONE HYDROCHLORIDE 20 MG: 20 TABLET, FILM COATED, EXTENDED RELEASE ORAL at 21:20

## 2023-01-22 RX ADMIN — Medication 10 ML: at 08:24

## 2023-01-22 RX ADMIN — GABAPENTIN 300 MG: 300 CAPSULE ORAL at 05:52

## 2023-01-22 RX ADMIN — ENOXAPARIN SODIUM 40 MG: 40 INJECTION SUBCUTANEOUS at 08:26

## 2023-01-22 RX ADMIN — OXYCODONE HYDROCHLORIDE 10 MG: 5 TABLET ORAL at 22:59

## 2023-01-22 RX ADMIN — SODIUM CHLORIDE 100 ML/HR: 9 INJECTION, SOLUTION INTRAVENOUS at 01:38

## 2023-01-22 RX ADMIN — CEFAZOLIN 2 G: 2 INJECTION, POWDER, FOR SOLUTION INTRAMUSCULAR; INTRAVENOUS at 02:18

## 2023-01-22 RX ADMIN — OXYCODONE HYDROCHLORIDE 10 MG: 5 TABLET ORAL at 04:27

## 2023-01-22 RX ADMIN — Medication 10 ML: at 23:00

## 2023-01-23 LAB
ANION GAP SERPL CALCULATED.3IONS-SCNC: 8.8 MMOL/L (ref 5–15)
BASOPHILS # BLD AUTO: 0.02 10*3/MM3 (ref 0–0.2)
BASOPHILS NFR BLD AUTO: 0.2 % (ref 0–1.5)
BUN SERPL-MCNC: 15 MG/DL (ref 8–23)
BUN/CREAT SERPL: 17.9 (ref 7–25)
CALCIUM SPEC-SCNC: 7.7 MG/DL (ref 8.6–10.5)
CHLORIDE SERPL-SCNC: 103 MMOL/L (ref 98–107)
CO2 SERPL-SCNC: 19.2 MMOL/L (ref 22–29)
CREAT SERPL-MCNC: 0.84 MG/DL (ref 0.76–1.27)
DEPRECATED RDW RBC AUTO: 44.6 FL (ref 37–54)
EGFRCR SERPLBLD CKD-EPI 2021: 89.8 ML/MIN/1.73
EOSINOPHIL # BLD AUTO: 0.05 10*3/MM3 (ref 0–0.4)
EOSINOPHIL NFR BLD AUTO: 0.6 % (ref 0.3–6.2)
ERYTHROCYTE [DISTWIDTH] IN BLOOD BY AUTOMATED COUNT: 13.1 % (ref 12.3–15.4)
GLUCOSE SERPL-MCNC: 133 MG/DL (ref 65–99)
HCT VFR BLD AUTO: 20.9 % (ref 37.5–51)
HCT VFR BLD AUTO: 21.7 % (ref 37.5–51)
HCT VFR BLD AUTO: 21.8 % (ref 37.5–51)
HCT VFR BLD AUTO: 21.9 % (ref 37.5–51)
HGB BLD-MCNC: 6.8 G/DL (ref 13–17.7)
HGB BLD-MCNC: 7.4 G/DL (ref 13–17.7)
HGB BLD-MCNC: 7.5 G/DL (ref 13–17.7)
HGB BLD-MCNC: 7.7 G/DL (ref 13–17.7)
IMM GRANULOCYTES # BLD AUTO: 0.04 10*3/MM3 (ref 0–0.05)
IMM GRANULOCYTES NFR BLD AUTO: 0.5 % (ref 0–0.5)
LYMPHOCYTES # BLD AUTO: 1.2 10*3/MM3 (ref 0.7–3.1)
LYMPHOCYTES NFR BLD AUTO: 13.5 % (ref 19.6–45.3)
MAGNESIUM SERPL-MCNC: 2.5 MG/DL (ref 1.6–2.4)
MCH RBC QN AUTO: 32.1 PG (ref 26.6–33)
MCHC RBC AUTO-ENTMCNC: 34.2 G/DL (ref 31.5–35.7)
MCV RBC AUTO: 93.6 FL (ref 79–97)
MONOCYTES # BLD AUTO: 1.03 10*3/MM3 (ref 0.1–0.9)
MONOCYTES NFR BLD AUTO: 11.6 % (ref 5–12)
NEUTROPHILS NFR BLD AUTO: 6.52 10*3/MM3 (ref 1.7–7)
NEUTROPHILS NFR BLD AUTO: 73.6 % (ref 42.7–76)
NRBC BLD AUTO-RTO: 0 /100 WBC (ref 0–0.2)
PLATELET # BLD AUTO: 113 10*3/MM3 (ref 140–450)
PMV BLD AUTO: 10.6 FL (ref 6–12)
POTASSIUM SERPL-SCNC: 3.7 MMOL/L (ref 3.5–5.2)
RBC # BLD AUTO: 2.34 10*6/MM3 (ref 4.14–5.8)
SODIUM SERPL-SCNC: 131 MMOL/L (ref 136–145)
WBC NRBC COR # BLD: 8.86 10*3/MM3 (ref 3.4–10.8)

## 2023-01-23 PROCEDURE — 86900 BLOOD TYPING SEROLOGIC ABO: CPT

## 2023-01-23 PROCEDURE — 97162 PT EVAL MOD COMPLEX 30 MIN: CPT

## 2023-01-23 PROCEDURE — 94761 N-INVAS EAR/PLS OXIMETRY MLT: CPT

## 2023-01-23 PROCEDURE — 99232 SBSQ HOSP IP/OBS MODERATE 35: CPT | Performed by: STUDENT IN AN ORGANIZED HEALTH CARE EDUCATION/TRAINING PROGRAM

## 2023-01-23 PROCEDURE — 94799 UNLISTED PULMONARY SVC/PX: CPT

## 2023-01-23 PROCEDURE — 25010000002 MORPHINE PER 10 MG: Performed by: ORTHOPAEDIC SURGERY

## 2023-01-23 PROCEDURE — 80048 BASIC METABOLIC PNL TOTAL CA: CPT | Performed by: INTERNAL MEDICINE

## 2023-01-23 PROCEDURE — P9016 RBC LEUKOCYTES REDUCED: HCPCS

## 2023-01-23 PROCEDURE — 97167 OT EVAL HIGH COMPLEX 60 MIN: CPT

## 2023-01-23 PROCEDURE — 85025 COMPLETE CBC W/AUTO DIFF WBC: CPT | Performed by: INTERNAL MEDICINE

## 2023-01-23 PROCEDURE — 25010000002 MAGNESIUM SULFATE 2 GM/50ML SOLUTION: Performed by: INTERNAL MEDICINE

## 2023-01-23 PROCEDURE — 85014 HEMATOCRIT: CPT | Performed by: STUDENT IN AN ORGANIZED HEALTH CARE EDUCATION/TRAINING PROGRAM

## 2023-01-23 PROCEDURE — 85018 HEMOGLOBIN: CPT | Performed by: STUDENT IN AN ORGANIZED HEALTH CARE EDUCATION/TRAINING PROGRAM

## 2023-01-23 PROCEDURE — 25010000002 ENOXAPARIN PER 10 MG: Performed by: ORTHOPAEDIC SURGERY

## 2023-01-23 PROCEDURE — 36430 TRANSFUSION BLD/BLD COMPNT: CPT

## 2023-01-23 PROCEDURE — 83735 ASSAY OF MAGNESIUM: CPT | Performed by: INTERNAL MEDICINE

## 2023-01-23 RX ADMIN — OXYCODONE HYDROCHLORIDE 10 MG: 5 TABLET ORAL at 14:55

## 2023-01-23 RX ADMIN — PANTOPRAZOLE SODIUM 40 MG: 40 TABLET, DELAYED RELEASE ORAL at 05:14

## 2023-01-23 RX ADMIN — OXYCODONE HYDROCHLORIDE 20 MG: 20 TABLET, FILM COATED, EXTENDED RELEASE ORAL at 21:06

## 2023-01-23 RX ADMIN — ASPIRIN 81 MG: 81 TABLET, COATED ORAL at 08:39

## 2023-01-23 RX ADMIN — GABAPENTIN 300 MG: 300 CAPSULE ORAL at 05:51

## 2023-01-23 RX ADMIN — Medication 10 ML: at 08:41

## 2023-01-23 RX ADMIN — GABAPENTIN 300 MG: 300 CAPSULE ORAL at 21:05

## 2023-01-23 RX ADMIN — ENOXAPARIN SODIUM 40 MG: 40 INJECTION SUBCUTANEOUS at 08:39

## 2023-01-23 RX ADMIN — ANTACID TABLETS 2 TABLET: 500 TABLET, CHEWABLE ORAL at 00:04

## 2023-01-23 RX ADMIN — MORPHINE SULFATE 2 MG: 2 INJECTION, SOLUTION INTRAMUSCULAR; INTRAVENOUS at 22:30

## 2023-01-23 RX ADMIN — MAGNESIUM SULFATE HEPTAHYDRATE 2 G: 40 INJECTION, SOLUTION INTRAVENOUS at 03:10

## 2023-01-23 RX ADMIN — MAGNESIUM SULFATE HEPTAHYDRATE 2 G: 40 INJECTION, SOLUTION INTRAVENOUS at 01:24

## 2023-01-23 RX ADMIN — Medication 10 ML: at 21:06

## 2023-01-23 RX ADMIN — OXYCODONE HYDROCHLORIDE 20 MG: 20 TABLET, FILM COATED, EXTENDED RELEASE ORAL at 10:09

## 2023-01-23 RX ADMIN — KETOCONAZOLE 1 APPLICATION: 20 SHAMPOO, SUSPENSION TOPICAL at 08:40

## 2023-01-23 RX ADMIN — MORPHINE SULFATE 2 MG: 2 INJECTION, SOLUTION INTRAMUSCULAR; INTRAVENOUS at 08:36

## 2023-01-24 ENCOUNTER — APPOINTMENT (OUTPATIENT)
Dept: CT IMAGING | Facility: HOSPITAL | Age: 78
DRG: 522 | End: 2023-01-24
Payer: MEDICARE

## 2023-01-24 ENCOUNTER — APPOINTMENT (OUTPATIENT)
Dept: ULTRASOUND IMAGING | Facility: HOSPITAL | Age: 78
DRG: 522 | End: 2023-01-24
Payer: MEDICARE

## 2023-01-24 LAB
ANION GAP SERPL CALCULATED.3IONS-SCNC: 7.3 MMOL/L (ref 5–15)
BH BB BLOOD EXPIRATION DATE: NORMAL
BH BB BLOOD EXPIRATION DATE: NORMAL
BH BB BLOOD TYPE BARCODE: 6200
BH BB BLOOD TYPE BARCODE: 6200
BH BB DISPENSE STATUS: NORMAL
BH BB DISPENSE STATUS: NORMAL
BH BB PRODUCT CODE: NORMAL
BH BB PRODUCT CODE: NORMAL
BH BB UNIT NUMBER: NORMAL
BH BB UNIT NUMBER: NORMAL
BUN SERPL-MCNC: 16 MG/DL (ref 8–23)
BUN/CREAT SERPL: 21.1 (ref 7–25)
CALCIUM SPEC-SCNC: 7.7 MG/DL (ref 8.6–10.5)
CHLORIDE SERPL-SCNC: 101 MMOL/L (ref 98–107)
CO2 SERPL-SCNC: 21.7 MMOL/L (ref 22–29)
CREAT SERPL-MCNC: 0.76 MG/DL (ref 0.76–1.27)
CROSSMATCH INTERPRETATION: NORMAL
CROSSMATCH INTERPRETATION: NORMAL
DEPRECATED RDW RBC AUTO: 49.7 FL (ref 37–54)
EGFRCR SERPLBLD CKD-EPI 2021: 92.6 ML/MIN/1.73
ERYTHROCYTE [DISTWIDTH] IN BLOOD BY AUTOMATED COUNT: 15 % (ref 12.3–15.4)
GLUCOSE BLDC GLUCOMTR-MCNC: 111 MG/DL (ref 70–130)
GLUCOSE SERPL-MCNC: 131 MG/DL (ref 65–99)
HCT VFR BLD AUTO: 20.6 % (ref 37.5–51)
HCT VFR BLD AUTO: 20.9 % (ref 37.5–51)
HCT VFR BLD AUTO: 23.6 % (ref 37.5–51)
HGB BLD-MCNC: 7.1 G/DL (ref 13–17.7)
HGB BLD-MCNC: 7.1 G/DL (ref 13–17.7)
HGB BLD-MCNC: 8.2 G/DL (ref 13–17.7)
MCH RBC QN AUTO: 30.9 PG (ref 26.6–33)
MCHC RBC AUTO-ENTMCNC: 34 G/DL (ref 31.5–35.7)
MCV RBC AUTO: 90.9 FL (ref 79–97)
PLATELET # BLD AUTO: 104 10*3/MM3 (ref 140–450)
PMV BLD AUTO: 10.9 FL (ref 6–12)
POTASSIUM SERPL-SCNC: 3.7 MMOL/L (ref 3.5–5.2)
RBC # BLD AUTO: 2.3 10*6/MM3 (ref 4.14–5.8)
SODIUM SERPL-SCNC: 130 MMOL/L (ref 136–145)
UNIT  ABO: NORMAL
UNIT  ABO: NORMAL
UNIT  RH: NORMAL
UNIT  RH: NORMAL
WBC NRBC COR # BLD: 7.17 10*3/MM3 (ref 3.4–10.8)

## 2023-01-24 PROCEDURE — 97530 THERAPEUTIC ACTIVITIES: CPT

## 2023-01-24 PROCEDURE — 25010000002 ENOXAPARIN PER 10 MG: Performed by: ORTHOPAEDIC SURGERY

## 2023-01-24 PROCEDURE — 72192 CT PELVIS W/O DYE: CPT

## 2023-01-24 PROCEDURE — 85018 HEMOGLOBIN: CPT

## 2023-01-24 PROCEDURE — 94761 N-INVAS EAR/PLS OXIMETRY MLT: CPT

## 2023-01-24 PROCEDURE — 93971 EXTREMITY STUDY: CPT

## 2023-01-24 PROCEDURE — 82962 GLUCOSE BLOOD TEST: CPT

## 2023-01-24 PROCEDURE — 85018 HEMOGLOBIN: CPT | Performed by: STUDENT IN AN ORGANIZED HEALTH CARE EDUCATION/TRAINING PROGRAM

## 2023-01-24 PROCEDURE — 97110 THERAPEUTIC EXERCISES: CPT

## 2023-01-24 PROCEDURE — 85014 HEMATOCRIT: CPT

## 2023-01-24 PROCEDURE — 72192 CT PELVIS W/O DYE: CPT | Performed by: RADIOLOGY

## 2023-01-24 PROCEDURE — 25010000002 MORPHINE PER 10 MG: Performed by: ORTHOPAEDIC SURGERY

## 2023-01-24 PROCEDURE — 85027 COMPLETE CBC AUTOMATED: CPT | Performed by: STUDENT IN AN ORGANIZED HEALTH CARE EDUCATION/TRAINING PROGRAM

## 2023-01-24 PROCEDURE — 36430 TRANSFUSION BLD/BLD COMPNT: CPT

## 2023-01-24 PROCEDURE — 94799 UNLISTED PULMONARY SVC/PX: CPT

## 2023-01-24 PROCEDURE — 93971 EXTREMITY STUDY: CPT | Performed by: RADIOLOGY

## 2023-01-24 PROCEDURE — 86900 BLOOD TYPING SEROLOGIC ABO: CPT

## 2023-01-24 PROCEDURE — 94664 DEMO&/EVAL PT USE INHALER: CPT

## 2023-01-24 PROCEDURE — 85014 HEMATOCRIT: CPT | Performed by: STUDENT IN AN ORGANIZED HEALTH CARE EDUCATION/TRAINING PROGRAM

## 2023-01-24 PROCEDURE — 99232 SBSQ HOSP IP/OBS MODERATE 35: CPT | Performed by: STUDENT IN AN ORGANIZED HEALTH CARE EDUCATION/TRAINING PROGRAM

## 2023-01-24 PROCEDURE — P9016 RBC LEUKOCYTES REDUCED: HCPCS

## 2023-01-24 PROCEDURE — 80048 BASIC METABOLIC PNL TOTAL CA: CPT | Performed by: STUDENT IN AN ORGANIZED HEALTH CARE EDUCATION/TRAINING PROGRAM

## 2023-01-24 RX ADMIN — MORPHINE SULFATE 2 MG: 2 INJECTION, SOLUTION INTRAMUSCULAR; INTRAVENOUS at 20:14

## 2023-01-24 RX ADMIN — OXYCODONE HYDROCHLORIDE 10 MG: 5 TABLET ORAL at 01:16

## 2023-01-24 RX ADMIN — GABAPENTIN 300 MG: 300 CAPSULE ORAL at 05:01

## 2023-01-24 RX ADMIN — GABAPENTIN 300 MG: 300 CAPSULE ORAL at 21:36

## 2023-01-24 RX ADMIN — ASPIRIN 81 MG: 81 TABLET, COATED ORAL at 08:11

## 2023-01-24 RX ADMIN — PANTOPRAZOLE SODIUM 40 MG: 40 TABLET, DELAYED RELEASE ORAL at 05:01

## 2023-01-24 RX ADMIN — BUDESONIDE AND FORMOTEROL FUMARATE DIHYDRATE 2 PUFF: 160; 4.5 AEROSOL RESPIRATORY (INHALATION) at 18:57

## 2023-01-24 RX ADMIN — OXYCODONE HYDROCHLORIDE 20 MG: 20 TABLET, FILM COATED, EXTENDED RELEASE ORAL at 08:13

## 2023-01-24 RX ADMIN — BUDESONIDE AND FORMOTEROL FUMARATE DIHYDRATE 2 PUFF: 160; 4.5 AEROSOL RESPIRATORY (INHALATION) at 07:13

## 2023-01-24 RX ADMIN — Medication 10 ML: at 08:12

## 2023-01-24 RX ADMIN — MORPHINE SULFATE 2 MG: 2 INJECTION, SOLUTION INTRAMUSCULAR; INTRAVENOUS at 10:22

## 2023-01-24 RX ADMIN — MORPHINE SULFATE 2 MG: 2 INJECTION, SOLUTION INTRAMUSCULAR; INTRAVENOUS at 15:57

## 2023-01-24 RX ADMIN — GABAPENTIN 300 MG: 300 CAPSULE ORAL at 13:59

## 2023-01-24 RX ADMIN — ENOXAPARIN SODIUM 40 MG: 40 INJECTION SUBCUTANEOUS at 08:11

## 2023-01-24 RX ADMIN — OXYCODONE HYDROCHLORIDE 20 MG: 20 TABLET, FILM COATED, EXTENDED RELEASE ORAL at 21:37

## 2023-01-24 RX ADMIN — Medication 10 ML: at 20:14

## 2023-01-25 ENCOUNTER — HOSPITAL ENCOUNTER (INPATIENT)
Facility: HOSPITAL | Age: 78
LOS: 6 days | Discharge: HOME HEALTH | DRG: 560 | End: 2023-01-31
Attending: FAMILY MEDICINE | Admitting: INTERNAL MEDICINE
Payer: MEDICARE

## 2023-01-25 ENCOUNTER — APPOINTMENT (OUTPATIENT)
Dept: GENERAL RADIOLOGY | Facility: HOSPITAL | Age: 78
DRG: 522 | End: 2023-01-25
Payer: MEDICARE

## 2023-01-25 ENCOUNTER — APPOINTMENT (OUTPATIENT)
Dept: ULTRASOUND IMAGING | Facility: HOSPITAL | Age: 78
DRG: 522 | End: 2023-01-25
Payer: MEDICARE

## 2023-01-25 VITALS
OXYGEN SATURATION: 98 % | HEIGHT: 68 IN | HEART RATE: 97 BPM | DIASTOLIC BLOOD PRESSURE: 60 MMHG | WEIGHT: 136.5 LBS | BODY MASS INDEX: 20.69 KG/M2 | SYSTOLIC BLOOD PRESSURE: 102 MMHG | RESPIRATION RATE: 20 BRPM | TEMPERATURE: 99.7 F

## 2023-01-25 DIAGNOSIS — S72.002S CLOSED LEFT HIP FRACTURE, SEQUELA: Primary | ICD-10-CM

## 2023-01-25 LAB
ANION GAP SERPL CALCULATED.3IONS-SCNC: 8.2 MMOL/L (ref 5–15)
BILIRUB UR QL STRIP: NEGATIVE
BUN SERPL-MCNC: 15 MG/DL (ref 8–23)
BUN/CREAT SERPL: 19.7 (ref 7–25)
CALCIUM SPEC-SCNC: 7.6 MG/DL (ref 8.6–10.5)
CHLORIDE SERPL-SCNC: 104 MMOL/L (ref 98–107)
CLARITY UR: CLEAR
CO2 SERPL-SCNC: 21.8 MMOL/L (ref 22–29)
COLOR UR: YELLOW
CREAT SERPL-MCNC: 0.76 MG/DL (ref 0.76–1.27)
DEPRECATED RDW RBC AUTO: 52.3 FL (ref 37–54)
EGFRCR SERPLBLD CKD-EPI 2021: 92.6 ML/MIN/1.73
ERYTHROCYTE [DISTWIDTH] IN BLOOD BY AUTOMATED COUNT: 15.9 % (ref 12.3–15.4)
GLUCOSE SERPL-MCNC: 122 MG/DL (ref 65–99)
GLUCOSE UR STRIP-MCNC: NEGATIVE MG/DL
HCT VFR BLD AUTO: 24.2 % (ref 37.5–51)
HGB BLD-MCNC: 8.1 G/DL (ref 13–17.7)
HGB UR QL STRIP.AUTO: NEGATIVE
KETONES UR QL STRIP: NEGATIVE
LEUKOCYTE ESTERASE UR QL STRIP.AUTO: NEGATIVE
MCH RBC QN AUTO: 30 PG (ref 26.6–33)
MCHC RBC AUTO-ENTMCNC: 33.5 G/DL (ref 31.5–35.7)
MCV RBC AUTO: 89.6 FL (ref 79–97)
NITRITE UR QL STRIP: NEGATIVE
PH UR STRIP.AUTO: 7 [PH] (ref 5–8)
PLATELET # BLD AUTO: 141 10*3/MM3 (ref 140–450)
PMV BLD AUTO: 10.5 FL (ref 6–12)
POTASSIUM SERPL-SCNC: 3.8 MMOL/L (ref 3.5–5.2)
PROT UR QL STRIP: NEGATIVE
QT INTERVAL: 338 MS
QTC INTERVAL: 446 MS
RBC # BLD AUTO: 2.7 10*6/MM3 (ref 4.14–5.8)
SARS-COV-2 RNA RESP QL NAA+PROBE: NOT DETECTED
SODIUM SERPL-SCNC: 134 MMOL/L (ref 136–145)
SP GR UR STRIP: 1.01 (ref 1–1.03)
UROBILINOGEN UR QL STRIP: NORMAL
WBC NRBC COR # BLD: 6.22 10*3/MM3 (ref 3.4–10.8)

## 2023-01-25 PROCEDURE — 94799 UNLISTED PULMONARY SVC/PX: CPT

## 2023-01-25 PROCEDURE — 93010 ELECTROCARDIOGRAM REPORT: CPT | Performed by: INTERNAL MEDICINE

## 2023-01-25 PROCEDURE — 81003 URINALYSIS AUTO W/O SCOPE: CPT | Performed by: FAMILY MEDICINE

## 2023-01-25 PROCEDURE — 93005 ELECTROCARDIOGRAM TRACING: CPT | Performed by: STUDENT IN AN ORGANIZED HEALTH CARE EDUCATION/TRAINING PROGRAM

## 2023-01-25 PROCEDURE — 97116 GAIT TRAINING THERAPY: CPT

## 2023-01-25 PROCEDURE — 73552 X-RAY EXAM OF FEMUR 2/>: CPT | Performed by: RADIOLOGY

## 2023-01-25 PROCEDURE — 99239 HOSP IP/OBS DSCHRG MGMT >30: CPT | Performed by: PHYSICIAN ASSISTANT

## 2023-01-25 PROCEDURE — 94761 N-INVAS EAR/PLS OXIMETRY MLT: CPT

## 2023-01-25 PROCEDURE — 73552 X-RAY EXAM OF FEMUR 2/>: CPT

## 2023-01-25 PROCEDURE — 87040 BLOOD CULTURE FOR BACTERIA: CPT | Performed by: FAMILY MEDICINE

## 2023-01-25 PROCEDURE — 80048 BASIC METABOLIC PNL TOTAL CA: CPT | Performed by: STUDENT IN AN ORGANIZED HEALTH CARE EDUCATION/TRAINING PROGRAM

## 2023-01-25 PROCEDURE — 25010000002 MORPHINE PER 10 MG: Performed by: ORTHOPAEDIC SURGERY

## 2023-01-25 PROCEDURE — 97110 THERAPEUTIC EXERCISES: CPT

## 2023-01-25 PROCEDURE — 76882 US LMTD JT/FCL EVL NVASC XTR: CPT | Performed by: RADIOLOGY

## 2023-01-25 PROCEDURE — 97530 THERAPEUTIC ACTIVITIES: CPT

## 2023-01-25 PROCEDURE — U0005 INFEC AGEN DETEC AMPLI PROBE: HCPCS | Performed by: PHYSICIAN ASSISTANT

## 2023-01-25 PROCEDURE — 25010000002 ENOXAPARIN PER 10 MG: Performed by: STUDENT IN AN ORGANIZED HEALTH CARE EDUCATION/TRAINING PROGRAM

## 2023-01-25 PROCEDURE — U0003 INFECTIOUS AGENT DETECTION BY NUCLEIC ACID (DNA OR RNA); SEVERE ACUTE RESPIRATORY SYNDROME CORONAVIRUS 2 (SARS-COV-2) (CORONAVIRUS DISEASE [COVID-19]), AMPLIFIED PROBE TECHNIQUE, MAKING USE OF HIGH THROUGHPUT TECHNOLOGIES AS DESCRIBED BY CMS-2020-01-R: HCPCS | Performed by: PHYSICIAN ASSISTANT

## 2023-01-25 PROCEDURE — 85027 COMPLETE CBC AUTOMATED: CPT | Performed by: STUDENT IN AN ORGANIZED HEALTH CARE EDUCATION/TRAINING PROGRAM

## 2023-01-25 PROCEDURE — 76882 US LMTD JT/FCL EVL NVASC XTR: CPT

## 2023-01-25 RX ORDER — ASPIRIN 81 MG/1
81 TABLET ORAL DAILY
Status: DISCONTINUED | OUTPATIENT
Start: 2023-01-26 | End: 2023-01-31 | Stop reason: HOSPADM

## 2023-01-25 RX ORDER — SODIUM CHLORIDE 9 MG/ML
40 INJECTION, SOLUTION INTRAVENOUS AS NEEDED
Status: CANCELLED | OUTPATIENT
Start: 2023-01-25

## 2023-01-25 RX ORDER — SODIUM CHLORIDE 0.9 % (FLUSH) 0.9 %
10 SYRINGE (ML) INJECTION EVERY 12 HOURS SCHEDULED
Status: DISCONTINUED | OUTPATIENT
Start: 2023-01-25 | End: 2023-01-31 | Stop reason: HOSPADM

## 2023-01-25 RX ORDER — MAGNESIUM SULFATE HEPTAHYDRATE 40 MG/ML
2 INJECTION, SOLUTION INTRAVENOUS AS NEEDED
Status: CANCELLED | OUTPATIENT
Start: 2023-01-25

## 2023-01-25 RX ORDER — POLYETHYLENE GLYCOL 3350 17 G/17G
17 POWDER, FOR SOLUTION ORAL DAILY
Status: DISCONTINUED | OUTPATIENT
Start: 2023-01-26 | End: 2023-01-31 | Stop reason: HOSPADM

## 2023-01-25 RX ORDER — ALBUTEROL SULFATE 2.5 MG/3ML
2.5 SOLUTION RESPIRATORY (INHALATION) EVERY 6 HOURS PRN
Status: CANCELLED | OUTPATIENT
Start: 2023-01-25

## 2023-01-25 RX ORDER — MORPHINE SULFATE 2 MG/ML
2 INJECTION, SOLUTION INTRAMUSCULAR; INTRAVENOUS EVERY 4 HOURS PRN
Status: DISCONTINUED | OUTPATIENT
Start: 2023-01-25 | End: 2023-01-25

## 2023-01-25 RX ORDER — ONDANSETRON 2 MG/ML
4 INJECTION INTRAMUSCULAR; INTRAVENOUS EVERY 6 HOURS PRN
Status: DISCONTINUED | OUTPATIENT
Start: 2023-01-25 | End: 2023-01-31 | Stop reason: HOSPADM

## 2023-01-25 RX ORDER — AMOXICILLIN 250 MG
2 CAPSULE ORAL 2 TIMES DAILY
Status: DISCONTINUED | OUTPATIENT
Start: 2023-01-25 | End: 2023-01-25 | Stop reason: HOSPADM

## 2023-01-25 RX ORDER — SODIUM CHLORIDE 0.9 % (FLUSH) 0.9 %
10 SYRINGE (ML) INJECTION EVERY 12 HOURS SCHEDULED
Status: CANCELLED | OUTPATIENT
Start: 2023-01-25

## 2023-01-25 RX ORDER — BUDESONIDE AND FORMOTEROL FUMARATE DIHYDRATE 160; 4.5 UG/1; UG/1
2 AEROSOL RESPIRATORY (INHALATION)
Status: CANCELLED | OUTPATIENT
Start: 2023-01-25

## 2023-01-25 RX ORDER — ENOXAPARIN SODIUM 100 MG/ML
40 INJECTION SUBCUTANEOUS DAILY
Status: CANCELLED | OUTPATIENT
Start: 2023-01-26

## 2023-01-25 RX ORDER — ACETAMINOPHEN 325 MG/1
650 TABLET ORAL EVERY 6 HOURS PRN
Status: DISCONTINUED | OUTPATIENT
Start: 2023-01-25 | End: 2023-01-31 | Stop reason: HOSPADM

## 2023-01-25 RX ORDER — ENOXAPARIN SODIUM 100 MG/ML
40 INJECTION SUBCUTANEOUS DAILY
Status: DISCONTINUED | OUTPATIENT
Start: 2023-01-26 | End: 2023-01-31 | Stop reason: HOSPADM

## 2023-01-25 RX ORDER — KETOCONAZOLE 20 MG/ML
1 SHAMPOO TOPICAL 2 TIMES WEEKLY
Status: DISCONTINUED | OUTPATIENT
Start: 2023-01-26 | End: 2023-01-31 | Stop reason: HOSPADM

## 2023-01-25 RX ORDER — TRIAMCINOLONE ACETONIDE 1 MG/G
CREAM TOPICAL 2 TIMES DAILY PRN
Status: CANCELLED | OUTPATIENT
Start: 2023-01-25

## 2023-01-25 RX ORDER — OXYCODONE HYDROCHLORIDE 5 MG/1
10 TABLET ORAL 2 TIMES DAILY PRN
Status: CANCELLED | OUTPATIENT
Start: 2023-01-25

## 2023-01-25 RX ORDER — NALOXONE HCL 0.4 MG/ML
0.4 VIAL (ML) INJECTION
Status: CANCELLED | OUTPATIENT
Start: 2023-01-25

## 2023-01-25 RX ORDER — BUDESONIDE AND FORMOTEROL FUMARATE DIHYDRATE 160; 4.5 UG/1; UG/1
2 AEROSOL RESPIRATORY (INHALATION)
Status: DISCONTINUED | OUTPATIENT
Start: 2023-01-25 | End: 2023-01-31 | Stop reason: HOSPADM

## 2023-01-25 RX ORDER — ONDANSETRON 2 MG/ML
4 INJECTION INTRAMUSCULAR; INTRAVENOUS EVERY 6 HOURS PRN
Status: CANCELLED | OUTPATIENT
Start: 2023-01-25

## 2023-01-25 RX ORDER — TRIAMCINOLONE ACETONIDE 1 MG/G
CREAM TOPICAL 2 TIMES DAILY PRN
Status: DISCONTINUED | OUTPATIENT
Start: 2023-01-25 | End: 2023-01-31 | Stop reason: HOSPADM

## 2023-01-25 RX ORDER — ASPIRIN 81 MG/1
81 TABLET ORAL DAILY
Status: CANCELLED | OUTPATIENT
Start: 2023-01-26

## 2023-01-25 RX ORDER — PANTOPRAZOLE SODIUM 40 MG/1
40 TABLET, DELAYED RELEASE ORAL
Status: DISCONTINUED | OUTPATIENT
Start: 2023-01-26 | End: 2023-01-31 | Stop reason: HOSPADM

## 2023-01-25 RX ORDER — ALBUTEROL SULFATE 2.5 MG/3ML
2.5 SOLUTION RESPIRATORY (INHALATION) EVERY 6 HOURS PRN
Status: DISCONTINUED | OUTPATIENT
Start: 2023-01-25 | End: 2023-01-31 | Stop reason: HOSPADM

## 2023-01-25 RX ORDER — MAGNESIUM SULFATE HEPTAHYDRATE 40 MG/ML
2 INJECTION, SOLUTION INTRAVENOUS AS NEEDED
Status: DISCONTINUED | OUTPATIENT
Start: 2023-01-25 | End: 2023-01-31 | Stop reason: HOSPADM

## 2023-01-25 RX ORDER — NALOXONE HCL 0.4 MG/ML
0.4 VIAL (ML) INJECTION
Status: DISCONTINUED | OUTPATIENT
Start: 2023-01-25 | End: 2023-01-25

## 2023-01-25 RX ORDER — CALCIUM CARBONATE 200(500)MG
2 TABLET,CHEWABLE ORAL 3 TIMES DAILY PRN
Status: CANCELLED | OUTPATIENT
Start: 2023-01-25

## 2023-01-25 RX ORDER — CALCIUM CARBONATE 200(500)MG
2 TABLET,CHEWABLE ORAL 3 TIMES DAILY PRN
Status: DISCONTINUED | OUTPATIENT
Start: 2023-01-25 | End: 2023-01-31 | Stop reason: HOSPADM

## 2023-01-25 RX ORDER — POLYETHYLENE GLYCOL 3350 17 G/17G
17 POWDER, FOR SOLUTION ORAL DAILY
Status: DISCONTINUED | OUTPATIENT
Start: 2023-01-25 | End: 2023-01-25 | Stop reason: HOSPADM

## 2023-01-25 RX ORDER — AMOXICILLIN 250 MG
2 CAPSULE ORAL 2 TIMES DAILY
Status: CANCELLED | OUTPATIENT
Start: 2023-01-25

## 2023-01-25 RX ORDER — OXYCODONE HYDROCHLORIDE 5 MG/1
10 TABLET ORAL 2 TIMES DAILY PRN
Status: DISCONTINUED | OUTPATIENT
Start: 2023-01-25 | End: 2023-01-30

## 2023-01-25 RX ORDER — OXYCODONE HCL 20 MG/1
20 TABLET, FILM COATED, EXTENDED RELEASE ORAL EVERY 12 HOURS SCHEDULED
Status: DISCONTINUED | OUTPATIENT
Start: 2023-01-25 | End: 2023-01-31 | Stop reason: HOSPADM

## 2023-01-25 RX ORDER — ACETAMINOPHEN 325 MG/1
650 TABLET ORAL EVERY 6 HOURS PRN
Status: CANCELLED | OUTPATIENT
Start: 2023-01-25

## 2023-01-25 RX ORDER — AMOXICILLIN 250 MG
2 CAPSULE ORAL 2 TIMES DAILY
Status: DISCONTINUED | OUTPATIENT
Start: 2023-01-25 | End: 2023-01-31 | Stop reason: HOSPADM

## 2023-01-25 RX ORDER — SODIUM CHLORIDE 0.9 % (FLUSH) 0.9 %
10 SYRINGE (ML) INJECTION AS NEEDED
Status: CANCELLED | OUTPATIENT
Start: 2023-01-25

## 2023-01-25 RX ORDER — PANTOPRAZOLE SODIUM 40 MG/1
40 TABLET, DELAYED RELEASE ORAL
Status: CANCELLED | OUTPATIENT
Start: 2023-01-26

## 2023-01-25 RX ORDER — GABAPENTIN 300 MG/1
300 CAPSULE ORAL EVERY 8 HOURS SCHEDULED
Status: DISCONTINUED | OUTPATIENT
Start: 2023-01-25 | End: 2023-01-31 | Stop reason: HOSPADM

## 2023-01-25 RX ORDER — LACTULOSE 10 G/15ML
30 SOLUTION ORAL ONCE
Status: COMPLETED | OUTPATIENT
Start: 2023-01-25 | End: 2023-01-25

## 2023-01-25 RX ORDER — KETOCONAZOLE 20 MG/ML
1 SHAMPOO TOPICAL 2 TIMES WEEKLY
Status: CANCELLED | OUTPATIENT
Start: 2023-01-26

## 2023-01-25 RX ORDER — OXYCODONE HCL 20 MG/1
20 TABLET, FILM COATED, EXTENDED RELEASE ORAL EVERY 12 HOURS SCHEDULED
Status: CANCELLED | OUTPATIENT
Start: 2023-01-25

## 2023-01-25 RX ORDER — SODIUM CHLORIDE 0.9 % (FLUSH) 0.9 %
10 SYRINGE (ML) INJECTION AS NEEDED
Status: DISCONTINUED | OUTPATIENT
Start: 2023-01-25 | End: 2023-01-31 | Stop reason: HOSPADM

## 2023-01-25 RX ORDER — SODIUM CHLORIDE 9 MG/ML
40 INJECTION, SOLUTION INTRAVENOUS AS NEEDED
Status: DISCONTINUED | OUTPATIENT
Start: 2023-01-25 | End: 2023-01-31 | Stop reason: HOSPADM

## 2023-01-25 RX ORDER — MORPHINE SULFATE 2 MG/ML
2 INJECTION, SOLUTION INTRAMUSCULAR; INTRAVENOUS EVERY 4 HOURS PRN
Status: CANCELLED | OUTPATIENT
Start: 2023-01-25 | End: 2023-01-27

## 2023-01-25 RX ORDER — MAGNESIUM SULFATE HEPTAHYDRATE 40 MG/ML
4 INJECTION, SOLUTION INTRAVENOUS AS NEEDED
Status: DISCONTINUED | OUTPATIENT
Start: 2023-01-25 | End: 2023-01-31 | Stop reason: HOSPADM

## 2023-01-25 RX ORDER — POLYETHYLENE GLYCOL 3350 17 G/17G
17 POWDER, FOR SOLUTION ORAL DAILY
Status: CANCELLED | OUTPATIENT
Start: 2023-01-26

## 2023-01-25 RX ORDER — ACETAMINOPHEN 325 MG/1
650 TABLET ORAL EVERY 6 HOURS PRN
Status: DISCONTINUED | OUTPATIENT
Start: 2023-01-25 | End: 2023-01-25 | Stop reason: HOSPADM

## 2023-01-25 RX ORDER — GABAPENTIN 300 MG/1
300 CAPSULE ORAL EVERY 8 HOURS SCHEDULED
Status: CANCELLED | OUTPATIENT
Start: 2023-01-25

## 2023-01-25 RX ORDER — MAGNESIUM SULFATE HEPTAHYDRATE 40 MG/ML
4 INJECTION, SOLUTION INTRAVENOUS AS NEEDED
Status: CANCELLED | OUTPATIENT
Start: 2023-01-25

## 2023-01-25 RX ADMIN — Medication 10 ML: at 21:03

## 2023-01-25 RX ADMIN — POLYETHYLENE GLYCOL 3350 17 G: 17 POWDER, FOR SOLUTION ORAL at 08:04

## 2023-01-25 RX ADMIN — DOCUSATE SODIUM 50 MG AND SENNOSIDES 8.6 MG 2 TABLET: 8.6; 5 TABLET, FILM COATED ORAL at 02:00

## 2023-01-25 RX ADMIN — GABAPENTIN 300 MG: 300 CAPSULE ORAL at 21:02

## 2023-01-25 RX ADMIN — ENOXAPARIN SODIUM 40 MG: 40 INJECTION SUBCUTANEOUS at 08:04

## 2023-01-25 RX ADMIN — ASPIRIN 81 MG: 81 TABLET, COATED ORAL at 08:04

## 2023-01-25 RX ADMIN — DOCUSATE SODIUM 50 MG AND SENNOSIDES 8.6 MG 2 TABLET: 8.6; 5 TABLET, FILM COATED ORAL at 08:04

## 2023-01-25 RX ADMIN — DOCUSATE SODIUM 50 MG AND SENNOSIDES 8.6 MG 2 TABLET: 8.6; 5 TABLET, FILM COATED ORAL at 21:03

## 2023-01-25 RX ADMIN — OXYCODONE HYDROCHLORIDE 10 MG: 5 TABLET ORAL at 02:00

## 2023-01-25 RX ADMIN — MORPHINE SULFATE 2 MG: 2 INJECTION, SOLUTION INTRAMUSCULAR; INTRAVENOUS at 15:12

## 2023-01-25 RX ADMIN — GABAPENTIN 300 MG: 300 CAPSULE ORAL at 14:15

## 2023-01-25 RX ADMIN — MORPHINE SULFATE 2 MG: 2 INJECTION, SOLUTION INTRAMUSCULAR; INTRAVENOUS at 06:13

## 2023-01-25 RX ADMIN — GABAPENTIN 300 MG: 300 CAPSULE ORAL at 06:13

## 2023-01-25 RX ADMIN — OXYCODONE HYDROCHLORIDE 20 MG: 20 TABLET, FILM COATED, EXTENDED RELEASE ORAL at 21:02

## 2023-01-25 RX ADMIN — BUDESONIDE AND FORMOTEROL FUMARATE DIHYDRATE 2 PUFF: 160; 4.5 AEROSOL RESPIRATORY (INHALATION) at 19:15

## 2023-01-25 RX ADMIN — Medication 10 ML: at 08:05

## 2023-01-25 RX ADMIN — OXYCODONE 10 MG: 5 TABLET ORAL at 19:17

## 2023-01-25 RX ADMIN — LACTULOSE 30 G: 20 SOLUTION ORAL at 02:00

## 2023-01-25 RX ADMIN — OXYCODONE HYDROCHLORIDE 20 MG: 20 TABLET, FILM COATED, EXTENDED RELEASE ORAL at 09:05

## 2023-01-25 RX ADMIN — PANTOPRAZOLE SODIUM 40 MG: 40 TABLET, DELAYED RELEASE ORAL at 06:13

## 2023-01-26 LAB
25(OH)D3 SERPL-MCNC: <6 NG/ML (ref 30–100)
ANION GAP SERPL CALCULATED.3IONS-SCNC: 7.1 MMOL/L (ref 5–15)
BASOPHILS # BLD AUTO: 0.03 10*3/MM3 (ref 0–0.2)
BASOPHILS NFR BLD AUTO: 0.5 % (ref 0–1.5)
BUN SERPL-MCNC: 16 MG/DL (ref 8–23)
BUN/CREAT SERPL: 23.5 (ref 7–25)
CALCIUM SPEC-SCNC: 7.8 MG/DL (ref 8.6–10.5)
CHLORIDE SERPL-SCNC: 100 MMOL/L (ref 98–107)
CO2 SERPL-SCNC: 22.9 MMOL/L (ref 22–29)
CREAT SERPL-MCNC: 0.68 MG/DL (ref 0.76–1.27)
DEPRECATED RDW RBC AUTO: 51.1 FL (ref 37–54)
EGFRCR SERPLBLD CKD-EPI 2021: 95.7 ML/MIN/1.73
EOSINOPHIL # BLD AUTO: 0.43 10*3/MM3 (ref 0–0.4)
EOSINOPHIL NFR BLD AUTO: 7.1 % (ref 0.3–6.2)
ERYTHROCYTE [DISTWIDTH] IN BLOOD BY AUTOMATED COUNT: 15.3 % (ref 12.3–15.4)
GLUCOSE SERPL-MCNC: 128 MG/DL (ref 65–99)
HCT VFR BLD AUTO: 25.5 % (ref 37.5–51)
HGB BLD-MCNC: 8.5 G/DL (ref 13–17.7)
IMM GRANULOCYTES # BLD AUTO: 0.03 10*3/MM3 (ref 0–0.05)
IMM GRANULOCYTES NFR BLD AUTO: 0.5 % (ref 0–0.5)
LYMPHOCYTES # BLD AUTO: 1.14 10*3/MM3 (ref 0.7–3.1)
LYMPHOCYTES NFR BLD AUTO: 18.7 % (ref 19.6–45.3)
MAGNESIUM SERPL-MCNC: 1.7 MG/DL (ref 1.6–2.4)
MCH RBC QN AUTO: 30.4 PG (ref 26.6–33)
MCHC RBC AUTO-ENTMCNC: 33.3 G/DL (ref 31.5–35.7)
MCV RBC AUTO: 91.1 FL (ref 79–97)
MONOCYTES # BLD AUTO: 1.17 10*3/MM3 (ref 0.1–0.9)
MONOCYTES NFR BLD AUTO: 19.2 % (ref 5–12)
NEUTROPHILS NFR BLD AUTO: 3.29 10*3/MM3 (ref 1.7–7)
NEUTROPHILS NFR BLD AUTO: 54 % (ref 42.7–76)
NRBC BLD AUTO-RTO: 0 /100 WBC (ref 0–0.2)
PLATELET # BLD AUTO: 188 10*3/MM3 (ref 140–450)
PMV BLD AUTO: 10.3 FL (ref 6–12)
POTASSIUM SERPL-SCNC: 3.8 MMOL/L (ref 3.5–5.2)
RBC # BLD AUTO: 2.8 10*6/MM3 (ref 4.14–5.8)
SODIUM SERPL-SCNC: 130 MMOL/L (ref 136–145)
WBC NRBC COR # BLD: 6.09 10*3/MM3 (ref 3.4–10.8)

## 2023-01-26 PROCEDURE — 97162 PT EVAL MOD COMPLEX 30 MIN: CPT

## 2023-01-26 PROCEDURE — 25010000002 ENOXAPARIN PER 10 MG: Performed by: FAMILY MEDICINE

## 2023-01-26 PROCEDURE — 94761 N-INVAS EAR/PLS OXIMETRY MLT: CPT

## 2023-01-26 PROCEDURE — 85025 COMPLETE CBC W/AUTO DIFF WBC: CPT | Performed by: FAMILY MEDICINE

## 2023-01-26 PROCEDURE — 97116 GAIT TRAINING THERAPY: CPT

## 2023-01-26 PROCEDURE — 0 MAGNESIUM SULFATE 4 GM/100ML SOLUTION: Performed by: FAMILY MEDICINE

## 2023-01-26 PROCEDURE — 94799 UNLISTED PULMONARY SVC/PX: CPT

## 2023-01-26 PROCEDURE — 83735 ASSAY OF MAGNESIUM: CPT | Performed by: FAMILY MEDICINE

## 2023-01-26 PROCEDURE — 97110 THERAPEUTIC EXERCISES: CPT

## 2023-01-26 PROCEDURE — 97530 THERAPEUTIC ACTIVITIES: CPT | Performed by: OCCUPATIONAL THERAPIST

## 2023-01-26 PROCEDURE — 97166 OT EVAL MOD COMPLEX 45 MIN: CPT | Performed by: OCCUPATIONAL THERAPIST

## 2023-01-26 PROCEDURE — 97535 SELF CARE MNGMENT TRAINING: CPT | Performed by: OCCUPATIONAL THERAPIST

## 2023-01-26 PROCEDURE — 80048 BASIC METABOLIC PNL TOTAL CA: CPT | Performed by: FAMILY MEDICINE

## 2023-01-26 PROCEDURE — 99223 1ST HOSP IP/OBS HIGH 75: CPT | Performed by: FAMILY MEDICINE

## 2023-01-26 PROCEDURE — 82306 VITAMIN D 25 HYDROXY: CPT | Performed by: FAMILY MEDICINE

## 2023-01-26 RX ORDER — MAGNESIUM SULFATE HEPTAHYDRATE 40 MG/ML
4 INJECTION, SOLUTION INTRAVENOUS ONCE
Status: COMPLETED | OUTPATIENT
Start: 2023-01-26 | End: 2023-01-26

## 2023-01-26 RX ORDER — NICOTINE 21 MG/24HR
1 PATCH, TRANSDERMAL 24 HOURS TRANSDERMAL
Status: DISCONTINUED | OUTPATIENT
Start: 2023-01-26 | End: 2023-01-26

## 2023-01-26 RX ORDER — BACITRACIN ZINC 500 [USP'U]/G
1 OINTMENT TOPICAL EVERY 12 HOURS SCHEDULED
Status: DISCONTINUED | OUTPATIENT
Start: 2023-01-26 | End: 2023-01-31 | Stop reason: HOSPADM

## 2023-01-26 RX ADMIN — OXYCODONE 10 MG: 5 TABLET ORAL at 09:50

## 2023-01-26 RX ADMIN — NICOTINE 1 PATCH: 7 PATCH, EXTENDED RELEASE TRANSDERMAL at 14:26

## 2023-01-26 RX ADMIN — BUDESONIDE AND FORMOTEROL FUMARATE DIHYDRATE 2 PUFF: 160; 4.5 AEROSOL RESPIRATORY (INHALATION) at 19:56

## 2023-01-26 RX ADMIN — ACETAMINOPHEN 650 MG: 325 TABLET ORAL at 02:12

## 2023-01-26 RX ADMIN — GABAPENTIN 300 MG: 300 CAPSULE ORAL at 21:12

## 2023-01-26 RX ADMIN — DOCUSATE SODIUM 50 MG AND SENNOSIDES 8.6 MG 2 TABLET: 8.6; 5 TABLET, FILM COATED ORAL at 08:14

## 2023-01-26 RX ADMIN — BACITRACIN 1 APPLICATION: 500 OINTMENT TOPICAL at 11:24

## 2023-01-26 RX ADMIN — GABAPENTIN 300 MG: 300 CAPSULE ORAL at 05:12

## 2023-01-26 RX ADMIN — PANTOPRAZOLE SODIUM 40 MG: 40 TABLET, DELAYED RELEASE ORAL at 05:12

## 2023-01-26 RX ADMIN — DOCUSATE SODIUM 50 MG AND SENNOSIDES 8.6 MG 2 TABLET: 8.6; 5 TABLET, FILM COATED ORAL at 21:12

## 2023-01-26 RX ADMIN — BACITRACIN 1 APPLICATION: 500 OINTMENT TOPICAL at 21:12

## 2023-01-26 RX ADMIN — MAGNESIUM SULFATE HEPTAHYDRATE 4 G: 40 INJECTION, SOLUTION INTRAVENOUS at 03:05

## 2023-01-26 RX ADMIN — POLYETHYLENE GLYCOL (3350) 17 G: 17 POWDER, FOR SOLUTION ORAL at 08:14

## 2023-01-26 RX ADMIN — ASPIRIN 81 MG: 81 TABLET, COATED ORAL at 08:14

## 2023-01-26 RX ADMIN — Medication 10 ML: at 08:15

## 2023-01-26 RX ADMIN — BUDESONIDE AND FORMOTEROL FUMARATE DIHYDRATE 2 PUFF: 160; 4.5 AEROSOL RESPIRATORY (INHALATION) at 07:45

## 2023-01-26 RX ADMIN — KETOCONAZOLE 1 APPLICATION: 20 SHAMPOO, SUSPENSION TOPICAL at 08:15

## 2023-01-26 RX ADMIN — Medication 10 ML: at 21:13

## 2023-01-26 RX ADMIN — GABAPENTIN 300 MG: 300 CAPSULE ORAL at 14:26

## 2023-01-26 RX ADMIN — ENOXAPARIN SODIUM 40 MG: 40 INJECTION SUBCUTANEOUS at 08:14

## 2023-01-26 RX ADMIN — OXYCODONE HYDROCHLORIDE 20 MG: 20 TABLET, FILM COATED, EXTENDED RELEASE ORAL at 08:13

## 2023-01-26 RX ADMIN — OXYCODONE HYDROCHLORIDE 20 MG: 20 TABLET, FILM COATED, EXTENDED RELEASE ORAL at 21:12

## 2023-01-27 LAB
ANION GAP SERPL CALCULATED.3IONS-SCNC: 3.4 MMOL/L (ref 5–15)
BASOPHILS # BLD AUTO: 0.03 10*3/MM3 (ref 0–0.2)
BASOPHILS NFR BLD AUTO: 0.5 % (ref 0–1.5)
BUN SERPL-MCNC: 18 MG/DL (ref 8–23)
BUN/CREAT SERPL: 22.5 (ref 7–25)
CALCIUM SPEC-SCNC: 7.8 MG/DL (ref 8.6–10.5)
CHLORIDE SERPL-SCNC: 98 MMOL/L (ref 98–107)
CO2 SERPL-SCNC: 25.6 MMOL/L (ref 22–29)
CREAT SERPL-MCNC: 0.8 MG/DL (ref 0.76–1.27)
DEPRECATED RDW RBC AUTO: 50.5 FL (ref 37–54)
EGFRCR SERPLBLD CKD-EPI 2021: 91.2 ML/MIN/1.73
EOSINOPHIL # BLD AUTO: 0.44 10*3/MM3 (ref 0–0.4)
EOSINOPHIL NFR BLD AUTO: 6.8 % (ref 0.3–6.2)
ERYTHROCYTE [DISTWIDTH] IN BLOOD BY AUTOMATED COUNT: 15.2 % (ref 12.3–15.4)
GLUCOSE SERPL-MCNC: 115 MG/DL (ref 65–99)
HCT VFR BLD AUTO: 24.6 % (ref 37.5–51)
HGB BLD-MCNC: 8.1 G/DL (ref 13–17.7)
IMM GRANULOCYTES # BLD AUTO: 0.07 10*3/MM3 (ref 0–0.05)
IMM GRANULOCYTES NFR BLD AUTO: 1.1 % (ref 0–0.5)
LYMPHOCYTES # BLD AUTO: 1.09 10*3/MM3 (ref 0.7–3.1)
LYMPHOCYTES NFR BLD AUTO: 16.8 % (ref 19.6–45.3)
MAGNESIUM SERPL-MCNC: 2 MG/DL (ref 1.6–2.4)
MCH RBC QN AUTO: 30.3 PG (ref 26.6–33)
MCHC RBC AUTO-ENTMCNC: 32.9 G/DL (ref 31.5–35.7)
MCV RBC AUTO: 92.1 FL (ref 79–97)
MONOCYTES # BLD AUTO: 1.26 10*3/MM3 (ref 0.1–0.9)
MONOCYTES NFR BLD AUTO: 19.4 % (ref 5–12)
NEUTROPHILS NFR BLD AUTO: 3.6 10*3/MM3 (ref 1.7–7)
NEUTROPHILS NFR BLD AUTO: 55.4 % (ref 42.7–76)
NRBC BLD AUTO-RTO: 0 /100 WBC (ref 0–0.2)
PLATELET # BLD AUTO: 227 10*3/MM3 (ref 140–450)
PMV BLD AUTO: 10 FL (ref 6–12)
POTASSIUM SERPL-SCNC: 4.1 MMOL/L (ref 3.5–5.2)
RBC # BLD AUTO: 2.67 10*6/MM3 (ref 4.14–5.8)
SODIUM SERPL-SCNC: 127 MMOL/L (ref 136–145)
WBC NRBC COR # BLD: 6.49 10*3/MM3 (ref 3.4–10.8)

## 2023-01-27 PROCEDURE — 94799 UNLISTED PULMONARY SVC/PX: CPT

## 2023-01-27 PROCEDURE — 80048 BASIC METABOLIC PNL TOTAL CA: CPT | Performed by: FAMILY MEDICINE

## 2023-01-27 PROCEDURE — 97530 THERAPEUTIC ACTIVITIES: CPT

## 2023-01-27 PROCEDURE — 97110 THERAPEUTIC EXERCISES: CPT | Performed by: OCCUPATIONAL THERAPIST

## 2023-01-27 PROCEDURE — 97110 THERAPEUTIC EXERCISES: CPT

## 2023-01-27 PROCEDURE — 97530 THERAPEUTIC ACTIVITIES: CPT | Performed by: OCCUPATIONAL THERAPIST

## 2023-01-27 PROCEDURE — 94761 N-INVAS EAR/PLS OXIMETRY MLT: CPT

## 2023-01-27 PROCEDURE — 25010000002 ENOXAPARIN PER 10 MG: Performed by: FAMILY MEDICINE

## 2023-01-27 PROCEDURE — 97116 GAIT TRAINING THERAPY: CPT

## 2023-01-27 PROCEDURE — 94640 AIRWAY INHALATION TREATMENT: CPT

## 2023-01-27 PROCEDURE — 97535 SELF CARE MNGMENT TRAINING: CPT | Performed by: OCCUPATIONAL THERAPIST

## 2023-01-27 PROCEDURE — 99231 SBSQ HOSP IP/OBS SF/LOW 25: CPT | Performed by: FAMILY MEDICINE

## 2023-01-27 PROCEDURE — 85025 COMPLETE CBC W/AUTO DIFF WBC: CPT | Performed by: FAMILY MEDICINE

## 2023-01-27 PROCEDURE — 83735 ASSAY OF MAGNESIUM: CPT | Performed by: FAMILY MEDICINE

## 2023-01-27 RX ADMIN — POLYETHYLENE GLYCOL (3350) 17 G: 17 POWDER, FOR SOLUTION ORAL at 09:06

## 2023-01-27 RX ADMIN — PANTOPRAZOLE SODIUM 40 MG: 40 TABLET, DELAYED RELEASE ORAL at 05:47

## 2023-01-27 RX ADMIN — DOCUSATE SODIUM 50 MG AND SENNOSIDES 8.6 MG 2 TABLET: 8.6; 5 TABLET, FILM COATED ORAL at 20:31

## 2023-01-27 RX ADMIN — ASPIRIN 81 MG: 81 TABLET, COATED ORAL at 09:07

## 2023-01-27 RX ADMIN — DOCUSATE SODIUM 50 MG AND SENNOSIDES 8.6 MG 2 TABLET: 8.6; 5 TABLET, FILM COATED ORAL at 09:07

## 2023-01-27 RX ADMIN — GABAPENTIN 300 MG: 300 CAPSULE ORAL at 05:47

## 2023-01-27 RX ADMIN — BUDESONIDE AND FORMOTEROL FUMARATE DIHYDRATE 2 PUFF: 160; 4.5 AEROSOL RESPIRATORY (INHALATION) at 19:57

## 2023-01-27 RX ADMIN — OXYCODONE HYDROCHLORIDE 20 MG: 20 TABLET, FILM COATED, EXTENDED RELEASE ORAL at 20:32

## 2023-01-27 RX ADMIN — BACITRACIN 1 APPLICATION: 500 OINTMENT TOPICAL at 09:05

## 2023-01-27 RX ADMIN — BACITRACIN 1 APPLICATION: 500 OINTMENT TOPICAL at 20:31

## 2023-01-27 RX ADMIN — OXYCODONE 10 MG: 5 TABLET ORAL at 09:39

## 2023-01-27 RX ADMIN — GABAPENTIN 300 MG: 300 CAPSULE ORAL at 15:28

## 2023-01-27 RX ADMIN — NICOTINE 1 PATCH: 7 PATCH, EXTENDED RELEASE TRANSDERMAL at 09:06

## 2023-01-27 RX ADMIN — ENOXAPARIN SODIUM 40 MG: 40 INJECTION SUBCUTANEOUS at 09:06

## 2023-01-27 RX ADMIN — BUDESONIDE AND FORMOTEROL FUMARATE DIHYDRATE 2 PUFF: 160; 4.5 AEROSOL RESPIRATORY (INHALATION) at 07:09

## 2023-01-27 RX ADMIN — GABAPENTIN 300 MG: 300 CAPSULE ORAL at 20:31

## 2023-01-27 RX ADMIN — OXYCODONE 10 MG: 5 TABLET ORAL at 22:16

## 2023-01-27 RX ADMIN — Medication 10 ML: at 09:07

## 2023-01-27 RX ADMIN — OXYCODONE HYDROCHLORIDE 20 MG: 20 TABLET, FILM COATED, EXTENDED RELEASE ORAL at 09:05

## 2023-01-28 LAB
ANION GAP SERPL CALCULATED.3IONS-SCNC: 6.9 MMOL/L (ref 5–15)
BASOPHILS # BLD AUTO: 0.05 10*3/MM3 (ref 0–0.2)
BASOPHILS NFR BLD AUTO: 0.7 % (ref 0–1.5)
BUN SERPL-MCNC: 18 MG/DL (ref 8–23)
BUN/CREAT SERPL: 24.7 (ref 7–25)
CALCIUM SPEC-SCNC: 8.1 MG/DL (ref 8.6–10.5)
CHLORIDE SERPL-SCNC: 98 MMOL/L (ref 98–107)
CO2 SERPL-SCNC: 24.1 MMOL/L (ref 22–29)
CREAT SERPL-MCNC: 0.73 MG/DL (ref 0.76–1.27)
DEPRECATED RDW RBC AUTO: 51.4 FL (ref 37–54)
EGFRCR SERPLBLD CKD-EPI 2021: 93.7 ML/MIN/1.73
EOSINOPHIL # BLD AUTO: 0.5 10*3/MM3 (ref 0–0.4)
EOSINOPHIL NFR BLD AUTO: 7 % (ref 0.3–6.2)
ERYTHROCYTE [DISTWIDTH] IN BLOOD BY AUTOMATED COUNT: 15.1 % (ref 12.3–15.4)
GLUCOSE SERPL-MCNC: 133 MG/DL (ref 65–99)
HCT VFR BLD AUTO: 25 % (ref 37.5–51)
HGB BLD-MCNC: 8 G/DL (ref 13–17.7)
IMM GRANULOCYTES # BLD AUTO: 0.06 10*3/MM3 (ref 0–0.05)
IMM GRANULOCYTES NFR BLD AUTO: 0.8 % (ref 0–0.5)
LYMPHOCYTES # BLD AUTO: 1.19 10*3/MM3 (ref 0.7–3.1)
LYMPHOCYTES NFR BLD AUTO: 16.6 % (ref 19.6–45.3)
MCH RBC QN AUTO: 30.1 PG (ref 26.6–33)
MCHC RBC AUTO-ENTMCNC: 32 G/DL (ref 31.5–35.7)
MCV RBC AUTO: 94 FL (ref 79–97)
MONOCYTES # BLD AUTO: 1.2 10*3/MM3 (ref 0.1–0.9)
MONOCYTES NFR BLD AUTO: 16.7 % (ref 5–12)
NEUTROPHILS NFR BLD AUTO: 4.18 10*3/MM3 (ref 1.7–7)
NEUTROPHILS NFR BLD AUTO: 58.2 % (ref 42.7–76)
NRBC BLD AUTO-RTO: 0 /100 WBC (ref 0–0.2)
PLATELET # BLD AUTO: 293 10*3/MM3 (ref 140–450)
PMV BLD AUTO: 10.5 FL (ref 6–12)
POTASSIUM SERPL-SCNC: 4.5 MMOL/L (ref 3.5–5.2)
RBC # BLD AUTO: 2.66 10*6/MM3 (ref 4.14–5.8)
SODIUM SERPL-SCNC: 129 MMOL/L (ref 136–145)
WBC NRBC COR # BLD: 7.18 10*3/MM3 (ref 3.4–10.8)

## 2023-01-28 PROCEDURE — 85025 COMPLETE CBC W/AUTO DIFF WBC: CPT | Performed by: FAMILY MEDICINE

## 2023-01-28 PROCEDURE — 80048 BASIC METABOLIC PNL TOTAL CA: CPT | Performed by: FAMILY MEDICINE

## 2023-01-28 PROCEDURE — 99231 SBSQ HOSP IP/OBS SF/LOW 25: CPT | Performed by: FAMILY MEDICINE

## 2023-01-28 PROCEDURE — 25010000002 ENOXAPARIN PER 10 MG: Performed by: FAMILY MEDICINE

## 2023-01-28 PROCEDURE — 97110 THERAPEUTIC EXERCISES: CPT

## 2023-01-28 PROCEDURE — 97116 GAIT TRAINING THERAPY: CPT

## 2023-01-28 PROCEDURE — 94799 UNLISTED PULMONARY SVC/PX: CPT

## 2023-01-28 PROCEDURE — 97530 THERAPEUTIC ACTIVITIES: CPT

## 2023-01-28 PROCEDURE — 97535 SELF CARE MNGMENT TRAINING: CPT

## 2023-01-28 PROCEDURE — 97150 GROUP THERAPEUTIC PROCEDURES: CPT

## 2023-01-28 PROCEDURE — 94760 N-INVAS EAR/PLS OXIMETRY 1: CPT

## 2023-01-28 RX ORDER — LACTULOSE 10 G/15ML
30 SOLUTION ORAL 3 TIMES DAILY PRN
Status: DISCONTINUED | OUTPATIENT
Start: 2023-01-28 | End: 2023-01-31 | Stop reason: HOSPADM

## 2023-01-28 RX ADMIN — ASPIRIN 81 MG: 81 TABLET, COATED ORAL at 08:25

## 2023-01-28 RX ADMIN — Medication 10 ML: at 21:51

## 2023-01-28 RX ADMIN — LACTULOSE 30 G: 20 SOLUTION ORAL at 18:20

## 2023-01-28 RX ADMIN — GABAPENTIN 300 MG: 300 CAPSULE ORAL at 14:02

## 2023-01-28 RX ADMIN — OXYCODONE HYDROCHLORIDE 20 MG: 20 TABLET, FILM COATED, EXTENDED RELEASE ORAL at 21:56

## 2023-01-28 RX ADMIN — GABAPENTIN 300 MG: 300 CAPSULE ORAL at 21:56

## 2023-01-28 RX ADMIN — ENOXAPARIN SODIUM 40 MG: 40 INJECTION SUBCUTANEOUS at 08:25

## 2023-01-28 RX ADMIN — BACITRACIN 1 APPLICATION: 500 OINTMENT TOPICAL at 08:25

## 2023-01-28 RX ADMIN — OXYCODONE HYDROCHLORIDE 20 MG: 20 TABLET, FILM COATED, EXTENDED RELEASE ORAL at 08:35

## 2023-01-28 RX ADMIN — BUDESONIDE AND FORMOTEROL FUMARATE DIHYDRATE 2 PUFF: 160; 4.5 AEROSOL RESPIRATORY (INHALATION) at 07:53

## 2023-01-28 RX ADMIN — BUDESONIDE AND FORMOTEROL FUMARATE DIHYDRATE 2 PUFF: 160; 4.5 AEROSOL RESPIRATORY (INHALATION) at 19:31

## 2023-01-28 RX ADMIN — DOCUSATE SODIUM 50 MG AND SENNOSIDES 8.6 MG 2 TABLET: 8.6; 5 TABLET, FILM COATED ORAL at 21:57

## 2023-01-28 RX ADMIN — NICOTINE 1 PATCH: 7 PATCH, EXTENDED RELEASE TRANSDERMAL at 08:33

## 2023-01-28 RX ADMIN — PANTOPRAZOLE SODIUM 40 MG: 40 TABLET, DELAYED RELEASE ORAL at 05:51

## 2023-01-28 RX ADMIN — BACITRACIN 1 APPLICATION: 500 OINTMENT TOPICAL at 21:50

## 2023-01-28 RX ADMIN — Medication 10 ML: at 08:33

## 2023-01-28 RX ADMIN — DOCUSATE SODIUM 50 MG AND SENNOSIDES 8.6 MG 2 TABLET: 8.6; 5 TABLET, FILM COATED ORAL at 08:33

## 2023-01-28 RX ADMIN — POLYETHYLENE GLYCOL (3350) 17 G: 17 POWDER, FOR SOLUTION ORAL at 08:25

## 2023-01-28 RX ADMIN — GABAPENTIN 300 MG: 300 CAPSULE ORAL at 05:51

## 2023-01-29 PROCEDURE — 94761 N-INVAS EAR/PLS OXIMETRY MLT: CPT

## 2023-01-29 PROCEDURE — 25010000002 ENOXAPARIN PER 10 MG: Performed by: FAMILY MEDICINE

## 2023-01-29 PROCEDURE — 94799 UNLISTED PULMONARY SVC/PX: CPT

## 2023-01-29 PROCEDURE — 94760 N-INVAS EAR/PLS OXIMETRY 1: CPT

## 2023-01-29 RX ADMIN — OXYCODONE HYDROCHLORIDE 20 MG: 20 TABLET, FILM COATED, EXTENDED RELEASE ORAL at 20:53

## 2023-01-29 RX ADMIN — BACITRACIN 1 APPLICATION: 500 OINTMENT TOPICAL at 20:55

## 2023-01-29 RX ADMIN — GABAPENTIN 300 MG: 300 CAPSULE ORAL at 21:01

## 2023-01-29 RX ADMIN — PANTOPRAZOLE SODIUM 40 MG: 40 TABLET, DELAYED RELEASE ORAL at 05:47

## 2023-01-29 RX ADMIN — ENOXAPARIN SODIUM 40 MG: 40 INJECTION SUBCUTANEOUS at 08:36

## 2023-01-29 RX ADMIN — OXYCODONE 10 MG: 5 TABLET ORAL at 01:52

## 2023-01-29 RX ADMIN — DOCUSATE SODIUM 50 MG AND SENNOSIDES 8.6 MG 2 TABLET: 8.6; 5 TABLET, FILM COATED ORAL at 20:54

## 2023-01-29 RX ADMIN — DOCUSATE SODIUM 50 MG AND SENNOSIDES 8.6 MG 2 TABLET: 8.6; 5 TABLET, FILM COATED ORAL at 08:36

## 2023-01-29 RX ADMIN — BACITRACIN 1 APPLICATION: 500 OINTMENT TOPICAL at 08:35

## 2023-01-29 RX ADMIN — OXYCODONE HYDROCHLORIDE 20 MG: 20 TABLET, FILM COATED, EXTENDED RELEASE ORAL at 08:35

## 2023-01-29 RX ADMIN — Medication 10 ML: at 08:37

## 2023-01-29 RX ADMIN — OXYCODONE 10 MG: 5 TABLET ORAL at 14:44

## 2023-01-29 RX ADMIN — ASPIRIN 81 MG: 81 TABLET, COATED ORAL at 08:36

## 2023-01-29 RX ADMIN — GABAPENTIN 300 MG: 300 CAPSULE ORAL at 05:47

## 2023-01-29 RX ADMIN — POLYETHYLENE GLYCOL (3350) 17 G: 17 POWDER, FOR SOLUTION ORAL at 08:36

## 2023-01-29 RX ADMIN — BUDESONIDE AND FORMOTEROL FUMARATE DIHYDRATE 2 PUFF: 160; 4.5 AEROSOL RESPIRATORY (INHALATION) at 20:04

## 2023-01-29 RX ADMIN — BUDESONIDE AND FORMOTEROL FUMARATE DIHYDRATE 2 PUFF: 160; 4.5 AEROSOL RESPIRATORY (INHALATION) at 08:09

## 2023-01-29 RX ADMIN — NICOTINE 1 PATCH: 7 PATCH, EXTENDED RELEASE TRANSDERMAL at 08:36

## 2023-01-29 RX ADMIN — GABAPENTIN 300 MG: 300 CAPSULE ORAL at 13:17

## 2023-01-29 RX ADMIN — Medication 10 ML: at 20:54

## 2023-01-30 LAB
ANION GAP SERPL CALCULATED.3IONS-SCNC: 9.4 MMOL/L (ref 5–15)
BACTERIA SPEC AEROBE CULT: NORMAL
BACTERIA SPEC AEROBE CULT: NORMAL
BASOPHILS # BLD AUTO: 0.04 10*3/MM3 (ref 0–0.2)
BASOPHILS NFR BLD AUTO: 0.6 % (ref 0–1.5)
BUN SERPL-MCNC: 16 MG/DL (ref 8–23)
BUN/CREAT SERPL: 21.3 (ref 7–25)
CALCIUM SPEC-SCNC: 7.9 MG/DL (ref 8.6–10.5)
CHLORIDE SERPL-SCNC: 101 MMOL/L (ref 98–107)
CO2 SERPL-SCNC: 25.6 MMOL/L (ref 22–29)
CREAT SERPL-MCNC: 0.75 MG/DL (ref 0.76–1.27)
DEPRECATED RDW RBC AUTO: 50.2 FL (ref 37–54)
EGFRCR SERPLBLD CKD-EPI 2021: 92.9 ML/MIN/1.73
EOSINOPHIL # BLD AUTO: 0.42 10*3/MM3 (ref 0–0.4)
EOSINOPHIL NFR BLD AUTO: 6.2 % (ref 0.3–6.2)
ERYTHROCYTE [DISTWIDTH] IN BLOOD BY AUTOMATED COUNT: 14.7 % (ref 12.3–15.4)
GLUCOSE SERPL-MCNC: 124 MG/DL (ref 65–99)
HCT VFR BLD AUTO: 25.3 % (ref 37.5–51)
HGB BLD-MCNC: 8.3 G/DL (ref 13–17.7)
IMM GRANULOCYTES # BLD AUTO: 0.08 10*3/MM3 (ref 0–0.05)
IMM GRANULOCYTES NFR BLD AUTO: 1.2 % (ref 0–0.5)
LYMPHOCYTES # BLD AUTO: 0.94 10*3/MM3 (ref 0.7–3.1)
LYMPHOCYTES NFR BLD AUTO: 13.9 % (ref 19.6–45.3)
MCH RBC QN AUTO: 31 PG (ref 26.6–33)
MCHC RBC AUTO-ENTMCNC: 32.8 G/DL (ref 31.5–35.7)
MCV RBC AUTO: 94.4 FL (ref 79–97)
MONOCYTES # BLD AUTO: 1.02 10*3/MM3 (ref 0.1–0.9)
MONOCYTES NFR BLD AUTO: 15.1 % (ref 5–12)
NEUTROPHILS NFR BLD AUTO: 4.24 10*3/MM3 (ref 1.7–7)
NEUTROPHILS NFR BLD AUTO: 63 % (ref 42.7–76)
NRBC BLD AUTO-RTO: 0 /100 WBC (ref 0–0.2)
PLATELET # BLD AUTO: 392 10*3/MM3 (ref 140–450)
PMV BLD AUTO: 10 FL (ref 6–12)
POTASSIUM SERPL-SCNC: 4.3 MMOL/L (ref 3.5–5.2)
RBC # BLD AUTO: 2.68 10*6/MM3 (ref 4.14–5.8)
SODIUM SERPL-SCNC: 136 MMOL/L (ref 136–145)
WBC NRBC COR # BLD: 6.74 10*3/MM3 (ref 3.4–10.8)

## 2023-01-30 PROCEDURE — 97530 THERAPEUTIC ACTIVITIES: CPT

## 2023-01-30 PROCEDURE — 97530 THERAPEUTIC ACTIVITIES: CPT | Performed by: OCCUPATIONAL THERAPIST

## 2023-01-30 PROCEDURE — 85025 COMPLETE CBC W/AUTO DIFF WBC: CPT | Performed by: FAMILY MEDICINE

## 2023-01-30 PROCEDURE — 97110 THERAPEUTIC EXERCISES: CPT

## 2023-01-30 PROCEDURE — 94761 N-INVAS EAR/PLS OXIMETRY MLT: CPT

## 2023-01-30 PROCEDURE — 99231 SBSQ HOSP IP/OBS SF/LOW 25: CPT | Performed by: INTERNAL MEDICINE

## 2023-01-30 PROCEDURE — 94799 UNLISTED PULMONARY SVC/PX: CPT

## 2023-01-30 PROCEDURE — 25010000002 ENOXAPARIN PER 10 MG: Performed by: FAMILY MEDICINE

## 2023-01-30 PROCEDURE — 97535 SELF CARE MNGMENT TRAINING: CPT | Performed by: OCCUPATIONAL THERAPIST

## 2023-01-30 PROCEDURE — 80048 BASIC METABOLIC PNL TOTAL CA: CPT | Performed by: FAMILY MEDICINE

## 2023-01-30 PROCEDURE — 97116 GAIT TRAINING THERAPY: CPT

## 2023-01-30 PROCEDURE — 97110 THERAPEUTIC EXERCISES: CPT | Performed by: OCCUPATIONAL THERAPIST

## 2023-01-30 RX ORDER — OXYCODONE HYDROCHLORIDE 5 MG/1
5 TABLET ORAL 2 TIMES DAILY PRN
Status: DISCONTINUED | OUTPATIENT
Start: 2023-01-30 | End: 2023-01-31 | Stop reason: HOSPADM

## 2023-01-30 RX ADMIN — BACITRACIN 1 APPLICATION: 500 OINTMENT TOPICAL at 20:53

## 2023-01-30 RX ADMIN — KETOCONAZOLE 1 APPLICATION: 20 SHAMPOO, SUSPENSION TOPICAL at 09:34

## 2023-01-30 RX ADMIN — OXYCODONE 10 MG: 5 TABLET ORAL at 02:22

## 2023-01-30 RX ADMIN — ALBUTEROL SULFATE 2.5 MG: 2.5 SOLUTION RESPIRATORY (INHALATION) at 05:37

## 2023-01-30 RX ADMIN — ACETAMINOPHEN 650 MG: 325 TABLET ORAL at 22:38

## 2023-01-30 RX ADMIN — BUDESONIDE AND FORMOTEROL FUMARATE DIHYDRATE 2 PUFF: 160; 4.5 AEROSOL RESPIRATORY (INHALATION) at 19:36

## 2023-01-30 RX ADMIN — DOCUSATE SODIUM 50 MG AND SENNOSIDES 8.6 MG 2 TABLET: 8.6; 5 TABLET, FILM COATED ORAL at 20:53

## 2023-01-30 RX ADMIN — OXYCODONE HYDROCHLORIDE 20 MG: 20 TABLET, FILM COATED, EXTENDED RELEASE ORAL at 09:36

## 2023-01-30 RX ADMIN — NICOTINE 1 PATCH: 7 PATCH, EXTENDED RELEASE TRANSDERMAL at 09:33

## 2023-01-30 RX ADMIN — BACITRACIN 1 APPLICATION: 500 OINTMENT TOPICAL at 09:34

## 2023-01-30 RX ADMIN — ENOXAPARIN SODIUM 40 MG: 40 INJECTION SUBCUTANEOUS at 09:33

## 2023-01-30 RX ADMIN — BUDESONIDE AND FORMOTEROL FUMARATE DIHYDRATE 2 PUFF: 160; 4.5 AEROSOL RESPIRATORY (INHALATION) at 07:55

## 2023-01-30 RX ADMIN — POLYETHYLENE GLYCOL (3350) 17 G: 17 POWDER, FOR SOLUTION ORAL at 09:33

## 2023-01-30 RX ADMIN — GABAPENTIN 300 MG: 300 CAPSULE ORAL at 20:53

## 2023-01-30 RX ADMIN — PANTOPRAZOLE SODIUM 40 MG: 40 TABLET, DELAYED RELEASE ORAL at 05:08

## 2023-01-30 RX ADMIN — OXYCODONE HYDROCHLORIDE 20 MG: 20 TABLET, FILM COATED, EXTENDED RELEASE ORAL at 20:52

## 2023-01-30 RX ADMIN — OXYCODONE 5 MG: 5 TABLET ORAL at 14:41

## 2023-01-30 RX ADMIN — Medication 10 ML: at 20:54

## 2023-01-30 RX ADMIN — DOCUSATE SODIUM 50 MG AND SENNOSIDES 8.6 MG 2 TABLET: 8.6; 5 TABLET, FILM COATED ORAL at 09:33

## 2023-01-30 RX ADMIN — Medication 10 ML: at 09:34

## 2023-01-30 RX ADMIN — ASPIRIN 81 MG: 81 TABLET, COATED ORAL at 09:33

## 2023-01-30 RX ADMIN — GABAPENTIN 300 MG: 300 CAPSULE ORAL at 05:08

## 2023-01-30 RX ADMIN — GABAPENTIN 300 MG: 300 CAPSULE ORAL at 14:41

## 2023-01-31 VITALS
HEART RATE: 89 BPM | SYSTOLIC BLOOD PRESSURE: 126 MMHG | HEIGHT: 68 IN | RESPIRATION RATE: 18 BRPM | DIASTOLIC BLOOD PRESSURE: 72 MMHG | WEIGHT: 136.47 LBS | BODY MASS INDEX: 20.68 KG/M2 | OXYGEN SATURATION: 96 % | TEMPERATURE: 98.1 F

## 2023-01-31 LAB
ANION GAP SERPL CALCULATED.3IONS-SCNC: 9.1 MMOL/L (ref 5–15)
BASOPHILS # BLD AUTO: 0.05 10*3/MM3 (ref 0–0.2)
BASOPHILS NFR BLD AUTO: 0.7 % (ref 0–1.5)
BUN SERPL-MCNC: 19 MG/DL (ref 8–23)
BUN/CREAT SERPL: 23.5 (ref 7–25)
CALCIUM SPEC-SCNC: 8.2 MG/DL (ref 8.6–10.5)
CHLORIDE SERPL-SCNC: 103 MMOL/L (ref 98–107)
CO2 SERPL-SCNC: 23.9 MMOL/L (ref 22–29)
CREAT SERPL-MCNC: 0.81 MG/DL (ref 0.76–1.27)
DEPRECATED RDW RBC AUTO: 50.2 FL (ref 37–54)
EGFRCR SERPLBLD CKD-EPI 2021: 90.8 ML/MIN/1.73
EOSINOPHIL # BLD AUTO: 0.2 10*3/MM3 (ref 0–0.4)
EOSINOPHIL NFR BLD AUTO: 2.9 % (ref 0.3–6.2)
ERYTHROCYTE [DISTWIDTH] IN BLOOD BY AUTOMATED COUNT: 14.7 % (ref 12.3–15.4)
GLUCOSE SERPL-MCNC: 115 MG/DL (ref 65–99)
HCT VFR BLD AUTO: 26.3 % (ref 37.5–51)
HGB BLD-MCNC: 8.5 G/DL (ref 13–17.7)
IMM GRANULOCYTES # BLD AUTO: 0.05 10*3/MM3 (ref 0–0.05)
IMM GRANULOCYTES NFR BLD AUTO: 0.7 % (ref 0–0.5)
LYMPHOCYTES # BLD AUTO: 1.28 10*3/MM3 (ref 0.7–3.1)
LYMPHOCYTES NFR BLD AUTO: 18.8 % (ref 19.6–45.3)
MCH RBC QN AUTO: 30.5 PG (ref 26.6–33)
MCHC RBC AUTO-ENTMCNC: 32.3 G/DL (ref 31.5–35.7)
MCV RBC AUTO: 94.3 FL (ref 79–97)
MONOCYTES # BLD AUTO: 0.94 10*3/MM3 (ref 0.1–0.9)
MONOCYTES NFR BLD AUTO: 13.8 % (ref 5–12)
NEUTROPHILS NFR BLD AUTO: 4.28 10*3/MM3 (ref 1.7–7)
NEUTROPHILS NFR BLD AUTO: 63.1 % (ref 42.7–76)
NRBC BLD AUTO-RTO: 0 /100 WBC (ref 0–0.2)
PLATELET # BLD AUTO: 460 10*3/MM3 (ref 140–450)
PMV BLD AUTO: 9.9 FL (ref 6–12)
POTASSIUM SERPL-SCNC: 4.1 MMOL/L (ref 3.5–5.2)
RBC # BLD AUTO: 2.79 10*6/MM3 (ref 4.14–5.8)
SODIUM SERPL-SCNC: 136 MMOL/L (ref 136–145)
WBC NRBC COR # BLD: 6.8 10*3/MM3 (ref 3.4–10.8)

## 2023-01-31 PROCEDURE — 97535 SELF CARE MNGMENT TRAINING: CPT | Performed by: OCCUPATIONAL THERAPIST

## 2023-01-31 PROCEDURE — 25010000002 ENOXAPARIN PER 10 MG: Performed by: FAMILY MEDICINE

## 2023-01-31 PROCEDURE — 85025 COMPLETE CBC W/AUTO DIFF WBC: CPT | Performed by: INTERNAL MEDICINE

## 2023-01-31 PROCEDURE — 94799 UNLISTED PULMONARY SVC/PX: CPT

## 2023-01-31 PROCEDURE — 80048 BASIC METABOLIC PNL TOTAL CA: CPT | Performed by: INTERNAL MEDICINE

## 2023-01-31 PROCEDURE — 97530 THERAPEUTIC ACTIVITIES: CPT

## 2023-01-31 PROCEDURE — 99239 HOSP IP/OBS DSCHRG MGMT >30: CPT | Performed by: INTERNAL MEDICINE

## 2023-01-31 RX ORDER — POLYETHYLENE GLYCOL 3350 17 G/17G
17 POWDER, FOR SOLUTION ORAL DAILY
Qty: 30 EACH | Refills: 0 | Status: SHIPPED | OUTPATIENT
Start: 2023-02-01 | End: 2023-02-13

## 2023-01-31 RX ORDER — OXYCODONE HYDROCHLORIDE 10 MG/1
5 TABLET ORAL 2 TIMES DAILY PRN
Refills: 0
Start: 2023-01-31

## 2023-01-31 RX ORDER — ENOXAPARIN SODIUM 100 MG/ML
40 INJECTION SUBCUTANEOUS DAILY
Qty: 4.4 ML | Refills: 0 | Status: SHIPPED | OUTPATIENT
Start: 2023-02-01 | End: 2023-02-13

## 2023-01-31 RX ORDER — GABAPENTIN 600 MG/1
300 TABLET ORAL 3 TIMES DAILY
Start: 2023-01-31

## 2023-01-31 RX ORDER — AMOXICILLIN 250 MG
1 CAPSULE ORAL 2 TIMES DAILY
Qty: 60 TABLET | Refills: 0 | Status: SHIPPED | OUTPATIENT
Start: 2023-01-31 | End: 2023-03-06

## 2023-01-31 RX ADMIN — BUDESONIDE AND FORMOTEROL FUMARATE DIHYDRATE 2 PUFF: 160; 4.5 AEROSOL RESPIRATORY (INHALATION) at 08:07

## 2023-01-31 RX ADMIN — ASPIRIN 81 MG: 81 TABLET, COATED ORAL at 09:15

## 2023-01-31 RX ADMIN — ACETAMINOPHEN 650 MG: 325 TABLET ORAL at 05:07

## 2023-01-31 RX ADMIN — Medication 10 ML: at 09:16

## 2023-01-31 RX ADMIN — DOCUSATE SODIUM 50 MG AND SENNOSIDES 8.6 MG 2 TABLET: 8.6; 5 TABLET, FILM COATED ORAL at 09:14

## 2023-01-31 RX ADMIN — POLYETHYLENE GLYCOL (3350) 17 G: 17 POWDER, FOR SOLUTION ORAL at 09:15

## 2023-01-31 RX ADMIN — GABAPENTIN 300 MG: 300 CAPSULE ORAL at 05:07

## 2023-01-31 RX ADMIN — BACITRACIN 1 APPLICATION: 500 OINTMENT TOPICAL at 09:15

## 2023-01-31 RX ADMIN — PANTOPRAZOLE SODIUM 40 MG: 40 TABLET, DELAYED RELEASE ORAL at 05:07

## 2023-01-31 RX ADMIN — NICOTINE 1 PATCH: 7 PATCH, EXTENDED RELEASE TRANSDERMAL at 09:16

## 2023-01-31 RX ADMIN — OXYCODONE HYDROCHLORIDE 20 MG: 20 TABLET, FILM COATED, EXTENDED RELEASE ORAL at 09:15

## 2023-01-31 RX ADMIN — OXYCODONE 5 MG: 5 TABLET ORAL at 02:43

## 2023-01-31 RX ADMIN — ENOXAPARIN SODIUM 40 MG: 40 INJECTION SUBCUTANEOUS at 09:15

## 2023-02-01 ENCOUNTER — TRANSITIONAL CARE MANAGEMENT TELEPHONE ENCOUNTER (OUTPATIENT)
Dept: CALL CENTER | Facility: HOSPITAL | Age: 78
End: 2023-02-01
Payer: MEDICARE

## 2023-02-01 ENCOUNTER — READMISSION MANAGEMENT (OUTPATIENT)
Dept: CALL CENTER | Facility: HOSPITAL | Age: 78
End: 2023-02-01
Payer: MEDICARE

## 2023-02-01 DIAGNOSIS — K08.9 CHRONIC DENTAL PAIN: ICD-10-CM

## 2023-02-01 DIAGNOSIS — G89.29 CHRONIC DENTAL PAIN: ICD-10-CM

## 2023-02-01 RX ORDER — IBUPROFEN 800 MG/1
TABLET ORAL
Qty: 90 TABLET | Refills: 0 | OUTPATIENT
Start: 2023-02-01

## 2023-02-01 NOTE — OUTREACH NOTE
Prep Survey    Flowsheet Row Responses   [facility] patient discharged from? [facility]   Is LACE score < 7 ? Yes   Eligibility [facility]-rehab unit   Date of Admission 01/25/23   Date of Discharge 01/31/23   Discharge Disposition [place] [facility]   Discharge diagnosis s/p Left hip hemiarthroplasty    Does the patient have one of the following disease processes/diagnoses(primary or secondary)? General Surgery   Does the patient have Home health ordered? Yes   What is the Home health agency?  [facility]    Is there a DME ordered? Yes   What DME was ordered? wheelchair and rolling walker per [facility]    Prep survey completed? Yes          MIKKI COPPOLA - Registered Nurse

## 2023-02-01 NOTE — OUTREACH NOTE
Call Center TCM Note    Flowsheet Row Responses   [facility] patient discharged from? [facility]   Does the patient have one of the following disease processes/diagnoses(primary or secondary)? General Surgery   TCM attempt successful? Yes   Call start time 0845   Call end time 0847   Discharge diagnosis s/p Left hip hemiarthroplasty    Person spoke with today (if not patient) and relationship Patient   Meds reviewed with patient/caregiver? Yes   Prescription comments No concerns was able to get all medication picked up from [facility]   Is the patient taking all medications as directed (includes completed medication regime)? Yes   Comments  Patient has hospital fu this friday 02/03 @ 245   Does the patient have an appointment with their PCP within 7 days of discharge? Yes   What is the Home health agency?  [facility]    Has home health visited the patient within 72 hours of discharge? Call prior to 72 hours   Home health comments They have not reached out yet per patient. d/c yesterday   What DME was ordered? Patient has WC and Walker   Has all DME been delivered? Yes   Psychosocial issues? No   Did the patient receive a copy of their discharge instructions? Yes   Nursing interventions Reviewed instructions with patient   What is the patient's perception of their health status since discharge? Improving   Is the patient /caregiver able to teach back basic post-op care? Keep incision areas clean,dry and protected, Drive as instructed by MD in discharge instructions   Is the patient/caregiver able to teach back signs and symptoms of incisional infection? Increased redness, swelling or pain at the incisonal site, Increased drainage or bleeding, Incisional warmth, Pus or odor from incision, Fever   Is the patient/caregiver able to teach back steps to recovery at home? Set small, achievable goals for return to baseline health, Rest and rebuild strength, gradually increase activity   Is the  patient/caregiver able to teach back the hierarchy of who to call/visit for symptoms/problems? PCP, Specialist, Home health nurse, Urgent Care, ED, 911 Yes   TCM call completed? Yes   Wrap up additional comments Patient states he is doing well with no questions or concerns. Denies any s/s of infection but is aware of what to monitor for   Call end time 0847   Would this patient benefit from a Referral to [facility] Social Work? No   Is the patient interested in additional calls from an ambulatory ?  NOTE:  applies to high risk patients requiring additional follow-up. No          Carrie Marie, RN    2/1/2023, 08:48 EST

## 2023-02-01 NOTE — TELEPHONE ENCOUNTER
Patient called reports he just got out of the hospital where he fell & broke his hip,is requesting a refill on  mg,reports he used to take it & is requesting a refill.

## 2023-02-03 ENCOUNTER — LAB REQUISITION (OUTPATIENT)
Dept: LAB | Facility: HOSPITAL | Age: 78
End: 2023-02-03
Payer: MEDICARE

## 2023-02-03 DIAGNOSIS — R53.83 OTHER FATIGUE: ICD-10-CM

## 2023-02-03 LAB
ANION GAP SERPL CALCULATED.3IONS-SCNC: 9.7 MMOL/L (ref 5–15)
BASOPHILS # BLD AUTO: 0.04 10*3/MM3 (ref 0–0.2)
BASOPHILS NFR BLD AUTO: 0.7 % (ref 0–1.5)
BUN SERPL-MCNC: 41 MG/DL (ref 8–23)
BUN/CREAT SERPL: 22 (ref 7–25)
CALCIUM SPEC-SCNC: 8 MG/DL (ref 8.6–10.5)
CHLORIDE SERPL-SCNC: 101 MMOL/L (ref 98–107)
CO2 SERPL-SCNC: 25.3 MMOL/L (ref 22–29)
CREAT SERPL-MCNC: 1.86 MG/DL (ref 0.76–1.27)
DEPRECATED RDW RBC AUTO: 51.9 FL (ref 37–54)
EGFRCR SERPLBLD CKD-EPI 2021: 36.8 ML/MIN/1.73
EOSINOPHIL # BLD AUTO: 0.67 10*3/MM3 (ref 0–0.4)
EOSINOPHIL NFR BLD AUTO: 11.2 % (ref 0.3–6.2)
ERYTHROCYTE [DISTWIDTH] IN BLOOD BY AUTOMATED COUNT: 15.2 % (ref 12.3–15.4)
GLUCOSE SERPL-MCNC: 108 MG/DL (ref 65–99)
HCT VFR BLD AUTO: 26.1 % (ref 37.5–51)
HGB BLD-MCNC: 8.2 G/DL (ref 13–17.7)
IMM GRANULOCYTES # BLD AUTO: 0.05 10*3/MM3 (ref 0–0.05)
IMM GRANULOCYTES NFR BLD AUTO: 0.8 % (ref 0–0.5)
LYMPHOCYTES # BLD AUTO: 1.49 10*3/MM3 (ref 0.7–3.1)
LYMPHOCYTES NFR BLD AUTO: 24.9 % (ref 19.6–45.3)
MCH RBC QN AUTO: 30 PG (ref 26.6–33)
MCHC RBC AUTO-ENTMCNC: 31.4 G/DL (ref 31.5–35.7)
MCV RBC AUTO: 95.6 FL (ref 79–97)
MONOCYTES # BLD AUTO: 0.88 10*3/MM3 (ref 0.1–0.9)
MONOCYTES NFR BLD AUTO: 14.7 % (ref 5–12)
NEUTROPHILS NFR BLD AUTO: 2.85 10*3/MM3 (ref 1.7–7)
NEUTROPHILS NFR BLD AUTO: 47.7 % (ref 42.7–76)
NRBC BLD AUTO-RTO: 0 /100 WBC (ref 0–0.2)
PLATELET # BLD AUTO: 592 10*3/MM3 (ref 140–450)
PMV BLD AUTO: 9.8 FL (ref 6–12)
POTASSIUM SERPL-SCNC: 5.4 MMOL/L (ref 3.5–5.2)
RBC # BLD AUTO: 2.73 10*6/MM3 (ref 4.14–5.8)
SODIUM SERPL-SCNC: 136 MMOL/L (ref 136–145)
WBC NRBC COR # BLD: 5.98 10*3/MM3 (ref 3.4–10.8)

## 2023-02-03 PROCEDURE — 80048 BASIC METABOLIC PNL TOTAL CA: CPT

## 2023-02-03 PROCEDURE — 85025 COMPLETE CBC W/AUTO DIFF WBC: CPT

## 2023-02-06 NOTE — PROGRESS NOTES
PPS CMG Coordinator  Inpatient Rehabilitation Discharge    Mode of Locomotion: Both walking and wheelchair.    Discharge Against Medical Advice:  No.  Discharge Information  Patient Discharged Alive:  Yes  Discharge Destination/Living Setting: Home with Home Health Services  Diagnosis for Interruption/Death:    Impairment Group: 08.11 Status Post Unilateral Hip Fracture    Comorbidities: Rank Code      Description      1    D62       Acute posthemorrhagic anemia  2    I10       Essential (primary) hypertension  3    J44.9     Chronic obstructive pulmonary disease,                 unspecified  4    E87.1     Hypo-osmolality and hyponatremia  5    E78.5     Hyperlipidemia, unspecified  6    F17.210   Nicotine dependence, cigarettes, uncomplicated  7    G89.29    Other chronic pain  8    W19.XXXA  Unspecified fall, initial encounter    Complications:      JAVY Bladder Accidents:   - Accidents.    JAVY Bowel Accident:   - Accidents.    Signed by: Nurse Nadeem

## 2023-02-13 ENCOUNTER — OFFICE VISIT (OUTPATIENT)
Dept: FAMILY MEDICINE CLINIC | Facility: CLINIC | Age: 78
End: 2023-02-13
Payer: MEDICARE

## 2023-02-13 VITALS
TEMPERATURE: 98 F | BODY MASS INDEX: 21.07 KG/M2 | HEIGHT: 68 IN | SYSTOLIC BLOOD PRESSURE: 120 MMHG | HEART RATE: 92 BPM | OXYGEN SATURATION: 98 % | WEIGHT: 139 LBS | DIASTOLIC BLOOD PRESSURE: 64 MMHG

## 2023-02-13 DIAGNOSIS — Z48.02 ENCOUNTER FOR STAPLE REMOVAL: Primary | ICD-10-CM

## 2023-02-13 DIAGNOSIS — D62 ACUTE BLOOD LOSS ANEMIA (ABLA): ICD-10-CM

## 2023-02-13 DIAGNOSIS — S72.002S CLOSED LEFT HIP FRACTURE, SEQUELA: ICD-10-CM

## 2023-02-13 DIAGNOSIS — E87.5 HYPERKALEMIA: ICD-10-CM

## 2023-02-13 DIAGNOSIS — R79.89 ELEVATED SERUM CREATININE: ICD-10-CM

## 2023-02-13 DIAGNOSIS — Z09 HOSPITAL DISCHARGE FOLLOW-UP: ICD-10-CM

## 2023-02-13 PROCEDURE — 1111F DSCHRG MED/CURRENT MED MERGE: CPT | Performed by: FAMILY MEDICINE

## 2023-02-13 PROCEDURE — 99495 TRANSJ CARE MGMT MOD F2F 14D: CPT | Performed by: FAMILY MEDICINE

## 2023-02-13 NOTE — PROGRESS NOTES
Assessment/Plan:    No problem-specific Assessment & Plan notes found for this encounter  Diagnoses and all orders for this visit:    Left leg pain  -     Ambulatory referral to Orthopedic Surgery; Future        Subjective:      Patient ID: Syed Landeros is a 66 y o  male  HPI    Lamine Rees is here today for visit accompanied by his wife Marilia Fox who a meeting for the 1st time  They have moved into a [address] apartment at [facility]  Has a long history of allergies and was being treated for this however he was referred to Dr Giovani valentino we did a rather excellent evaluation of his situation and decided he did not have any allergies the he and his wife for delayed by their visit with Dr Deana Wiley  He has some rather loose of pain in his left leg which is reminiscent of the the piriformis syndrome her iliotibial band syndrome  He had extensive questions about his vitamin-D levels and all of the large variety of supplements that he takes  He also has been plagued by run nausea syndrome he has always had a relatively low blood pressure we had which has made treating his Raynaud's syndrome problematic  He will be seeing Dr Teresa Renteria his cardiologist in near future and is also treated by Dr Charity Fragoso  his urologist       The following portions of the patient's history were reviewed and updated as appropriate: allergies, current medications, past family history, past medical history, past social history, past surgical history and problem list     Review of Systems      Objective:      Pulse 61   Temp 98 7 °F (37 1 °C) (Tympanic)   Ht 5' 8" (1 727 m)   Wt 63 1 kg (139 lb 3 2 oz)   SpO2 97%   BMI 21 17 kg/m²          Physical Exam  he appears well in no distress his pressure is 106/68  The carotids are quiet the heart lungs and extremities unremarkable    The vast majority of this long visit consisted of answering questions reviewing lab studies visits and Jerry Gomez is a 77 y.o. male.   Pt presents today with CC of Transitional Care Management      History of Present Illness   History of Present Illness  Patient is a 77-year-old male who was admitted on 1/25/2023 and discharged on 1/31/2023 for left hip fracture.  He has not followed up with orthopedic surgery, his staples are still in place.  He has no other concerns today.  He is agreeable to blood work.  Near the time of discharge, he had some blood loss, he is agreeable to recheck this.  He also had elevated serum creatinine and hyperkalemia.  He is agreeable to recheck.  27 staples removed today.  They were scheduled to be removed 1 week after discharge, this was about 5 days ago.  No major problems getting the staples out.  Wound is well-healed.           The following portions of the patient's history were reviewed and updated as appropriate: allergies, current medications, past family history, past medical history, past social history, past surgical history and problem list.    Review of Systems   Constitutional: Negative for chills, fever and unexpected weight loss.   HENT: Negative for congestion and sore throat.    Eyes: Negative for blurred vision and visual disturbance.   Respiratory: Negative for cough and wheezing.    Cardiovascular: Negative for chest pain and palpitations.   Gastrointestinal: Negative for abdominal pain and diarrhea.   Endocrine: Negative for cold intolerance and heat intolerance.   Genitourinary: Negative for dysuria.   Musculoskeletal: Negative for arthralgias and neck stiffness.   Neurological: Negative for dizziness, seizures and syncope.   Psychiatric/Behavioral: Negative for self-injury, suicidal ideas and depressed mood.       Objective   Physical Exam  Vitals and nursing note reviewed.   Constitutional:       Appearance: He is well-developed.   HENT:      Head: Normocephalic and atraumatic.      Right Ear: External ear normal.      Left Ear: External ear  explaining the futility of multiple supplements I recommended that he stop the fish oil the bottles of various vitamin supplements calcium prevagen, cinnamon saw palmetto and so forth will see him back in 6 months we also discussed his immunizations and give him a note to read rate read about the shingles vaccine normal.      Nose: Nose normal.   Eyes:      Conjunctiva/sclera: Conjunctivae normal.      Pupils: Pupils are equal, round, and reactive to light.   Cardiovascular:      Rate and Rhythm: Normal rate and regular rhythm.      Heart sounds: Normal heart sounds.   Pulmonary:      Effort: Pulmonary effort is normal.      Breath sounds: Normal breath sounds.   Abdominal:      General: Bowel sounds are normal.      Palpations: Abdomen is soft.   Musculoskeletal:      Cervical back: Normal range of motion and neck supple.      Comments: 27 staples removed.  No bleeding, wound seems to be well-healed, no drainage at all.  The skin was beginning to grow over a few of the staples.   Skin:     General: Skin is warm and dry.   Neurological:      Mental Status: He is alert and oriented to person, place, and time.   Psychiatric:         Behavior: Behavior normal.           Assessment & Plan   Diagnoses and all orders for this visit:    1. Encounter for staple removal (Primary)  They were scheduled to be removed 5 days ago.  He did not have an appointment with orthopedics.  Remove them today to prevent infection and so that he can take shower.  Everything looks great.  27 staples removed  2. Hospital discharge follow-up  -     Comprehensive metabolic panel; Future  -     CBC No Differential; Future    3. Closed left hip fracture, sequela    4. Acute blood loss anemia (ABLA)  -     CBC No Differential; Future    5. Elevated serum creatinine  -     Comprehensive metabolic panel; Future    6. Hyperkalemia  -     Comprehensive metabolic panel; Future                    BMI is within normal parameters. No other follow-up for BMI required.          This document has been electronically signed by Luis Dobbs DO  February 13, 2023 15:21 EST    Dictated Utilizing Dragon Dictation: Part of this note may be an electronic transcription/translation of spoken language to printed text using the Dragon Dictation System.

## 2023-03-06 ENCOUNTER — OFFICE VISIT (OUTPATIENT)
Dept: FAMILY MEDICINE CLINIC | Facility: CLINIC | Age: 78
End: 2023-03-06
Payer: MEDICARE

## 2023-03-06 VITALS
OXYGEN SATURATION: 95 % | BODY MASS INDEX: 21.55 KG/M2 | WEIGHT: 142.2 LBS | SYSTOLIC BLOOD PRESSURE: 126 MMHG | HEART RATE: 105 BPM | TEMPERATURE: 97.8 F | DIASTOLIC BLOOD PRESSURE: 60 MMHG | HEIGHT: 68 IN

## 2023-03-06 DIAGNOSIS — Z80.42 FH: PROSTATE CANCER: ICD-10-CM

## 2023-03-06 DIAGNOSIS — D62 ACUTE BLOOD LOSS ANEMIA (ABLA): Primary | ICD-10-CM

## 2023-03-06 DIAGNOSIS — R19.5 LOOSE BOWEL MOVEMENTS: ICD-10-CM

## 2023-03-06 DIAGNOSIS — E87.5 HYPERKALEMIA: ICD-10-CM

## 2023-03-06 DIAGNOSIS — Z12.5 PROSTATE CANCER SCREENING: ICD-10-CM

## 2023-03-06 DIAGNOSIS — R79.89 ELEVATED SERUM CREATININE: ICD-10-CM

## 2023-03-06 DIAGNOSIS — J43.2 CENTRILOBULAR EMPHYSEMA: ICD-10-CM

## 2023-03-06 PROCEDURE — 99214 OFFICE O/P EST MOD 30 MIN: CPT | Performed by: FAMILY MEDICINE

## 2023-03-06 RX ORDER — KETOCONAZOLE 20 MG/ML
1 SHAMPOO TOPICAL 2 TIMES WEEKLY
Qty: 120 ML | Refills: 3 | Status: SHIPPED | OUTPATIENT
Start: 2023-03-06 | End: 2023-04-04

## 2023-03-06 RX ORDER — ALBUTEROL SULFATE 90 UG/1
2 AEROSOL, METERED RESPIRATORY (INHALATION) EVERY 6 HOURS PRN
Qty: 18 G | Refills: 5 | Status: SHIPPED | OUTPATIENT
Start: 2023-03-06

## 2023-03-06 RX ORDER — IPRATROPIUM BROMIDE AND ALBUTEROL SULFATE 2.5; .5 MG/3ML; MG/3ML
3 SOLUTION RESPIRATORY (INHALATION) EVERY 4 HOURS PRN
Qty: 360 ML | Refills: 3 | Status: SHIPPED | OUTPATIENT
Start: 2023-03-06

## 2023-03-06 NOTE — PROGRESS NOTES
Subjective   Paul Gomez is a 77 y.o. male.   Pt presents today with CC of Hypertension      History of Present Illness   History of Present Illness  1.  Patient is a 77-year-old male here to follow-up from acute blood loss after hip surgery.  He returned last week for staple removal.  He has not had any further problems.  He also had electrolyte abnormalities and elevated serum creatinine.  He is agreeable to recheck today.  #2 he has emphysema for which he takes albuterol and DuoNebs.  He does also take Advair.  He is stable on his current regimen  #3 he has a family history of prostate cancer and requests another PSA.  His last PSA was normal.   #4 he was started on stool softener as he is taking high-dose narcotics daily.  He states since starting the stool softener he has had loose bowel movements, he stopped taking the stool softener about a week ago and the problem has not gotten better.         The following portions of the patient's history were reviewed and updated as appropriate: allergies, current medications, past family history, past medical history, past social history, past surgical history and problem list.    Review of Systems   Constitutional: Negative for chills, fever and unexpected weight loss.   HENT: Negative for congestion and sore throat.    Eyes: Negative for blurred vision and visual disturbance.   Respiratory: Negative for cough and wheezing.    Cardiovascular: Negative for chest pain and palpitations.   Gastrointestinal: Negative for abdominal pain and diarrhea.   Endocrine: Negative for cold intolerance and heat intolerance.   Genitourinary: Negative for dysuria.   Musculoskeletal: Negative for arthralgias and neck stiffness.   Neurological: Negative for dizziness, seizures and syncope.   Psychiatric/Behavioral: Negative for self-injury, suicidal ideas and depressed mood.       Objective   Physical Exam  Vitals and nursing note reviewed.   Constitutional:       Appearance: He is  well-developed.   HENT:      Head: Normocephalic and atraumatic.      Right Ear: External ear normal.      Left Ear: External ear normal.      Nose: Nose normal.   Eyes:      Conjunctiva/sclera: Conjunctivae normal.      Pupils: Pupils are equal, round, and reactive to light.   Cardiovascular:      Rate and Rhythm: Normal rate and regular rhythm.      Heart sounds: Normal heart sounds.   Pulmonary:      Effort: Pulmonary effort is normal.      Breath sounds: Normal breath sounds.   Abdominal:      General: Bowel sounds are normal.      Palpations: Abdomen is soft.   Musculoskeletal:      Cervical back: Normal range of motion and neck supple.   Skin:     General: Skin is warm and dry.   Neurological:      Mental Status: He is alert and oriented to person, place, and time.   Psychiatric:         Behavior: Behavior normal.           Assessment & Plan   Diagnoses and all orders for this visit:    1. Acute blood loss anemia (ABLA) (Primary)  -     CBC No Differential; Future  We will recheck blood counts to ensure no further loss of blood.  2. Centrilobular emphysema (HCC)  -     albuterol sulfate  (90 Base) MCG/ACT inhaler; Inhale 2 puffs Every 6 (Six) Hours As Needed for Wheezing or Shortness of Air.  Dispense: 18 g; Refill: 5  -     ipratropium-albuterol (DUO-NEB) 0.5-2.5 mg/3 ml nebulizer; Take 3 mL by nebulization Every 4 (Four) Hours As Needed for Wheezing or Shortness of Air.  Dispense: 360 mL; Refill: 3    3. Elevated serum creatinine  -     Comprehensive metabolic panel; Future  Rechecking to ensure that his creatinine is not worsening.   4. Hyperkalemia  -     Comprehensive metabolic panel; Future    5. FH: prostate cancer  -     PSA SCREENING; Future  No prostate exam today per patient request.  If PSA is out of the normal range, will refer him to urology for their opinion.  6. Prostate cancer screening  -     PSA SCREENING; Future    7. Loose bowel movements  He has stopped the stool softener.  He is  still having loose bowel movements.  I would expect them to improve soon.  It is reasonable to think that the effect may last a week or so.  If he is still having loose bowel movements in the next week, stool studies will be necessary as he did recently have surgery,(prophylactic antibiotics)  Other orders  -     fluticasone-salmeterol (ADVAIR HFA) 230-21 MCG/ACT inhaler; Inhale 2 puffs 2 (Two) Times a Day.; Refill: 11  -     ketoconazole (NIZORAL) 2 % shampoo; Apply 1 application topically to the appropriate area as directed 2 (Two) Times a Week.  Dispense: 120 mL; Refill: 3                  BMI is within normal parameters. No other follow-up for BMI required.          This document has been electronically signed by Carrie Torres  March 6, 2023 16:06 EST    Dictated Utilizing Dragon Dictation: Part of this note may be an electronic transcription/translation of spoken language to printed text using the Dragon Dictation System.

## 2023-03-27 ENCOUNTER — TELEPHONE (OUTPATIENT)
Dept: FAMILY MEDICINE CLINIC | Facility: CLINIC | Age: 78
End: 2023-03-27
Payer: MEDICARE

## 2023-03-27 NOTE — TELEPHONE ENCOUNTER
Contacted the pt in regards to overdue lab work. The pt stated that he would try to get in to get those done soon.

## 2023-03-30 ENCOUNTER — LAB (OUTPATIENT)
Dept: FAMILY MEDICINE CLINIC | Facility: CLINIC | Age: 78
End: 2023-03-30
Payer: MEDICARE

## 2023-03-30 DIAGNOSIS — Z12.5 PROSTATE CANCER SCREENING: ICD-10-CM

## 2023-03-30 DIAGNOSIS — D62 ACUTE BLOOD LOSS ANEMIA (ABLA): ICD-10-CM

## 2023-03-30 DIAGNOSIS — Z80.42 FH: PROSTATE CANCER: ICD-10-CM

## 2023-03-30 DIAGNOSIS — E87.5 HYPERKALEMIA: ICD-10-CM

## 2023-03-30 DIAGNOSIS — R79.89 ELEVATED SERUM CREATININE: ICD-10-CM

## 2023-03-30 LAB
ALBUMIN SERPL-MCNC: 4 G/DL (ref 3.5–5.2)
ALBUMIN/GLOB SERPL: 1.3 G/DL
ALP SERPL-CCNC: 105 U/L (ref 39–117)
ALT SERPL W P-5'-P-CCNC: 10 U/L (ref 1–41)
ANION GAP SERPL CALCULATED.3IONS-SCNC: 7 MMOL/L (ref 5–15)
AST SERPL-CCNC: 17 U/L (ref 1–40)
BILIRUB SERPL-MCNC: 0.3 MG/DL (ref 0–1.2)
BUN SERPL-MCNC: 18 MG/DL (ref 8–23)
BUN/CREAT SERPL: 18.2 (ref 7–25)
CALCIUM SPEC-SCNC: 9.1 MG/DL (ref 8.6–10.5)
CHLORIDE SERPL-SCNC: 105 MMOL/L (ref 98–107)
CO2 SERPL-SCNC: 25 MMOL/L (ref 22–29)
CREAT SERPL-MCNC: 0.99 MG/DL (ref 0.76–1.27)
DEPRECATED RDW RBC AUTO: 43.3 FL (ref 37–54)
EGFRCR SERPLBLD CKD-EPI 2021: 78.5 ML/MIN/1.73
ERYTHROCYTE [DISTWIDTH] IN BLOOD BY AUTOMATED COUNT: 12.8 % (ref 12.3–15.4)
GLOBULIN UR ELPH-MCNC: 3 GM/DL
GLUCOSE SERPL-MCNC: 112 MG/DL (ref 65–99)
HCT VFR BLD AUTO: 35.8 % (ref 37.5–51)
HGB BLD-MCNC: 12 G/DL (ref 13–17.7)
MCH RBC QN AUTO: 30.7 PG (ref 26.6–33)
MCHC RBC AUTO-ENTMCNC: 33.5 G/DL (ref 31.5–35.7)
MCV RBC AUTO: 91.6 FL (ref 79–97)
PLATELET # BLD AUTO: 204 10*3/MM3 (ref 140–450)
PMV BLD AUTO: 12 FL (ref 6–12)
POTASSIUM SERPL-SCNC: 5.1 MMOL/L (ref 3.5–5.2)
PROT SERPL-MCNC: 7 G/DL (ref 6–8.5)
PSA SERPL-MCNC: 0.76 NG/ML (ref 0–4)
RBC # BLD AUTO: 3.91 10*6/MM3 (ref 4.14–5.8)
SODIUM SERPL-SCNC: 137 MMOL/L (ref 136–145)
WBC NRBC COR # BLD: 5.82 10*3/MM3 (ref 3.4–10.8)

## 2023-03-30 PROCEDURE — G0103 PSA SCREENING: HCPCS | Performed by: FAMILY MEDICINE

## 2023-03-30 PROCEDURE — 36415 COLL VENOUS BLD VENIPUNCTURE: CPT

## 2023-03-30 PROCEDURE — 85027 COMPLETE CBC AUTOMATED: CPT | Performed by: FAMILY MEDICINE

## 2023-03-30 PROCEDURE — 80053 COMPREHEN METABOLIC PANEL: CPT | Performed by: FAMILY MEDICINE

## 2023-04-03 ENCOUNTER — TELEPHONE (OUTPATIENT)
Dept: FAMILY MEDICINE CLINIC | Facility: CLINIC | Age: 78
End: 2023-04-03
Payer: MEDICARE

## 2023-04-03 NOTE — TELEPHONE ENCOUNTER
Patient called in regards to recent labs.  He indicated he is concerned of increased weight loss (10 pounds most recently).  Please advise.

## 2023-04-04 ENCOUNTER — TELEPHONE (OUTPATIENT)
Dept: FAMILY MEDICINE CLINIC | Facility: CLINIC | Age: 78
End: 2023-04-04
Payer: MEDICARE

## 2023-04-04 ENCOUNTER — OFFICE VISIT (OUTPATIENT)
Dept: FAMILY MEDICINE CLINIC | Facility: CLINIC | Age: 78
End: 2023-04-04
Payer: MEDICARE

## 2023-04-04 VITALS
BODY MASS INDEX: 20.28 KG/M2 | OXYGEN SATURATION: 94 % | WEIGHT: 133.8 LBS | DIASTOLIC BLOOD PRESSURE: 70 MMHG | HEIGHT: 68 IN | HEART RATE: 89 BPM | TEMPERATURE: 98.2 F | SYSTOLIC BLOOD PRESSURE: 112 MMHG

## 2023-04-04 DIAGNOSIS — R19.7 DIARRHEA, UNSPECIFIED TYPE: Primary | ICD-10-CM

## 2023-04-04 PROCEDURE — 3074F SYST BP LT 130 MM HG: CPT | Performed by: FAMILY MEDICINE

## 2023-04-04 PROCEDURE — 1159F MED LIST DOCD IN RCRD: CPT | Performed by: FAMILY MEDICINE

## 2023-04-04 PROCEDURE — 1160F RVW MEDS BY RX/DR IN RCRD: CPT | Performed by: FAMILY MEDICINE

## 2023-04-04 PROCEDURE — 99214 OFFICE O/P EST MOD 30 MIN: CPT | Performed by: FAMILY MEDICINE

## 2023-04-04 PROCEDURE — 3078F DIAST BP <80 MM HG: CPT | Performed by: FAMILY MEDICINE

## 2023-04-04 RX ORDER — CELECOXIB 200 MG/1
CAPSULE ORAL
COMMUNITY
Start: 2023-04-03 | End: 2023-04-04

## 2023-04-04 RX ORDER — LOPERAMIDE HYDROCHLORIDE 2 MG/1
2 TABLET ORAL 4 TIMES DAILY PRN
Qty: 56 TABLET | Refills: 0 | Status: SHIPPED | OUTPATIENT
Start: 2023-04-04

## 2023-04-04 RX ORDER — ALLOPURINOL 100 MG/1
100 TABLET ORAL DAILY
COMMUNITY
End: 2023-04-04 | Stop reason: SDUPTHER

## 2023-04-04 RX ORDER — OXYCODONE HYDROCHLORIDE 15 MG/1
TABLET ORAL EVERY 6 HOURS
COMMUNITY
End: 2023-04-04

## 2023-04-04 RX ORDER — ALLOPURINOL 100 MG/1
100 TABLET ORAL DAILY
Qty: 30 TABLET | Refills: 2 | Status: SHIPPED | OUTPATIENT
Start: 2023-04-04

## 2023-04-04 NOTE — TELEPHONE ENCOUNTER
----- Message from Luis Dobbs DO sent at 4/3/2023  6:23 PM EDT -----  No problems on blood work.  Hemoglobin has returned to a safe level(12.0)    Patient notified & verbalized understanding.

## 2023-04-04 NOTE — TELEPHONE ENCOUNTER
He has an appointment tomorrow.  Recommend discussion at that time    Patient notified & verbalized understanding.

## 2023-04-04 NOTE — PROGRESS NOTES
"Chief Complaint  Weight Loss    Subjective          Paul Gomez presents to [facility]  Diarrhea   This is a new problem. The current episode started more than 1 month ago. The problem occurs 2 to 4 times per day. The problem has been unchanged. The stool consistency is described as watery. Associated symptoms include weight loss. Pertinent negatives include no bloating or vomiting. He has tried increased fluids and change of diet for the symptoms.     States he needs a refill on his zyloprim, is doing well with it at this time.     Review of Systems   Constitutional: Positive for weight loss.   Gastrointestinal: Positive for diarrhea. Negative for bloating and vomiting.         Objective   Vital Signs:   /70 (BP Location: Right arm, Patient Position: Sitting)   Pulse 89   Temp 98.2 °F (36.8 °C) (Temporal)   Ht 172.7 cm (68\")   Wt 60.7 kg (133 lb 12.8 oz)   SpO2 94%   BMI 20.34 kg/m²     Physical Exam  Constitutional:       General: He is not in acute distress.     Appearance: Normal appearance. He is well-developed and well-groomed. He is not ill-appearing, toxic-appearing or diaphoretic.   HENT:      Head: Normocephalic.      Nose: Nose normal. No congestion or rhinorrhea.      Mouth/Throat:      Mouth: Mucous membranes are moist.      Pharynx: Oropharynx is clear. No oropharyngeal exudate or posterior oropharyngeal erythema.   Eyes:      General: Lids are normal.         Right eye: No discharge.         Left eye: No discharge.      Extraocular Movements: Extraocular movements intact.      Pupils: Pupils are equal, round, and reactive to light.   Neck:      Vascular: No carotid bruit.   Cardiovascular:      Rate and Rhythm: Normal rate and regular rhythm.      Pulses: Normal pulses.      Heart sounds: Normal heart sounds. No murmur heard.    No friction rub. No gallop.   Pulmonary:      Effort: Pulmonary effort is normal. No respiratory distress.      Breath sounds: " Normal breath sounds. No stridor. No wheezing, rhonchi or rales.   Chest:      Chest wall: No tenderness.   Abdominal:      General: Bowel sounds are normal. There is no distension.      Palpations: Abdomen is soft. There is no mass.      Tenderness: There is no abdominal tenderness. There is no right CVA tenderness, left CVA tenderness, guarding or rebound.      Hernia: No hernia is present.   Musculoskeletal:         General: No swelling or tenderness. Normal range of motion.      Cervical back: Normal range of motion and neck supple. No rigidity or tenderness.      Right lower leg: No edema.      Left lower leg: No edema.   Lymphadenopathy:      Cervical: No cervical adenopathy.   Skin:     General: Skin is warm.      Capillary Refill: Capillary refill takes less than 2 seconds.      Coloration: Skin is not jaundiced.      Findings: No bruising, erythema or rash.   Neurological:      General: No focal deficit present.      Mental Status: He is alert and oriented to person, place, and time.      Motor: Motor function is intact. No weakness.      Coordination: Coordination is intact.      Gait: Gait is intact. Gait normal.   Psychiatric:         Attention and Perception: Attention normal.         Mood and Affect: Mood normal.         Speech: Speech normal.         Behavior: Behavior normal.         Cognition and Memory: Cognition normal.         Judgment: Judgment normal.        Result Review :                 Assessment and Plan    Diagnoses and all orders for this visit:    1. Diarrhea, unspecified type (Primary)  -     loperamide (Imodium A-D) 2 MG tablet; Take 1 tablet by mouth 4 (Four) Times a Day As Needed for Diarrhea.  Dispense: 56 tablet; Refill: 0  -     Clostridioides difficile Toxin, PCR - Stool, Per Rectum; Future    Other orders  -     allopurinol (ZYLOPRIM) 100 MG tablet; Take 1 tablet by mouth Daily.  Dispense: 30 tablet; Refill: 2      Patient's Body mass index is 20.34 kg/m². indicating that he is  within normal range (BMI 18.5-24.9). No BMI management plan needed..    Follow Up   Return in about 1 week (around 4/11/2023), or if symptoms worsen or fail to improve.  Patient was given instructions and counseling regarding his condition or for health maintenance advice. Please see specific information pulled into the AVS if appropriate.     This document has been electronically signed by BEATRICE Cho  April 10, 2023 14:29 EDT

## 2023-04-20 ENCOUNTER — TELEPHONE (OUTPATIENT)
Dept: FAMILY MEDICINE CLINIC | Facility: CLINIC | Age: 78
End: 2023-04-20
Payer: MEDICARE

## 2023-04-20 ENCOUNTER — LAB (OUTPATIENT)
Dept: LAB | Facility: HOSPITAL | Age: 78
End: 2023-04-20
Payer: MEDICARE

## 2023-04-20 DIAGNOSIS — R19.7 DIARRHEA, UNSPECIFIED TYPE: ICD-10-CM

## 2023-04-20 LAB
027 TOXIN: NORMAL
C DIFF TOX GENS STL QL NAA+PROBE: NEGATIVE

## 2023-04-20 PROCEDURE — 87493 C DIFF AMPLIFIED PROBE: CPT

## 2023-04-20 NOTE — TELEPHONE ENCOUNTER
----- Message from BEATRICE Cho sent at 4/20/2023  2:14 PM EDT -----  Please let patient know results are normal.  ( C Diff.) Thank you.       Voice mail is not set up at this time.    Hub to read.    Patient notified & verbalized understanding.

## 2023-06-05 ENCOUNTER — OFFICE VISIT (OUTPATIENT)
Dept: FAMILY MEDICINE CLINIC | Facility: CLINIC | Age: 78
End: 2023-06-05
Payer: MEDICARE

## 2023-06-05 VITALS
BODY MASS INDEX: 20.64 KG/M2 | DIASTOLIC BLOOD PRESSURE: 74 MMHG | HEIGHT: 68 IN | HEART RATE: 104 BPM | OXYGEN SATURATION: 92 % | WEIGHT: 136.2 LBS | TEMPERATURE: 98.2 F | SYSTOLIC BLOOD PRESSURE: 122 MMHG

## 2023-06-05 DIAGNOSIS — M1A.0720 IDIOPATHIC CHRONIC GOUT OF LEFT FOOT WITHOUT TOPHUS: ICD-10-CM

## 2023-06-05 DIAGNOSIS — R79.89 ELEVATED SERUM CREATININE: ICD-10-CM

## 2023-06-05 DIAGNOSIS — F17.200 CURRENT EVERY DAY SMOKER: ICD-10-CM

## 2023-06-05 DIAGNOSIS — J43.2 CENTRILOBULAR EMPHYSEMA: ICD-10-CM

## 2023-06-05 DIAGNOSIS — D62 ACUTE BLOOD LOSS ANEMIA (ABLA): ICD-10-CM

## 2023-06-05 DIAGNOSIS — L30.8 OTHER ECZEMA: ICD-10-CM

## 2023-06-05 DIAGNOSIS — E87.5 HYPERKALEMIA: ICD-10-CM

## 2023-06-05 DIAGNOSIS — J44.1 COPD WITH EXACERBATION: Primary | ICD-10-CM

## 2023-06-05 RX ORDER — ALBUTEROL SULFATE 90 UG/1
2 AEROSOL, METERED RESPIRATORY (INHALATION) EVERY 6 HOURS PRN
Qty: 18 G | Refills: 5 | Status: SHIPPED | OUTPATIENT
Start: 2023-06-05

## 2023-06-05 RX ORDER — TRIAMCINOLONE ACETONIDE 1 MG/G
1 CREAM TOPICAL 2 TIMES DAILY
Qty: 80 G | Refills: 1 | Status: SHIPPED | OUTPATIENT
Start: 2023-06-05

## 2023-06-05 RX ORDER — ALLOPURINOL 100 MG/1
100 TABLET ORAL DAILY
Qty: 30 TABLET | Refills: 2 | Status: SHIPPED | OUTPATIENT
Start: 2023-06-05

## 2023-06-05 RX ORDER — CELECOXIB 200 MG/1
200 CAPSULE ORAL DAILY
COMMUNITY
Start: 2023-05-03 | End: 2023-06-05 | Stop reason: SDUPTHER

## 2023-06-05 RX ORDER — IPRATROPIUM BROMIDE AND ALBUTEROL SULFATE 2.5; .5 MG/3ML; MG/3ML
3 SOLUTION RESPIRATORY (INHALATION) EVERY 4 HOURS PRN
Qty: 360 ML | Refills: 3 | Status: SHIPPED | OUTPATIENT
Start: 2023-06-05

## 2023-06-05 RX ORDER — KETOCONAZOLE 20 MG/ML
SHAMPOO TOPICAL
COMMUNITY
Start: 2023-05-03

## 2023-06-05 RX ORDER — DOXYCYCLINE 100 MG/1
100 CAPSULE ORAL 2 TIMES DAILY
Qty: 20 CAPSULE | Refills: 0 | Status: SHIPPED | OUTPATIENT
Start: 2023-06-05

## 2023-06-05 RX ORDER — CELECOXIB 200 MG/1
200 CAPSULE ORAL DAILY
Qty: 90 CAPSULE | Refills: 1 | Status: SHIPPED | OUTPATIENT
Start: 2023-06-05

## 2023-06-05 RX ORDER — FLUTICASONE PROPIONATE AND SALMETEROL XINAFOATE 230; 21 UG/1; UG/1
2 AEROSOL, METERED RESPIRATORY (INHALATION) 2 TIMES DAILY
Qty: 12 G | Refills: 11 | Status: SHIPPED | OUTPATIENT
Start: 2023-06-05

## 2023-06-05 RX ORDER — PREDNISONE 10 MG/1
TABLET ORAL
Qty: 38 TABLET | Refills: 0 | Status: SHIPPED | OUTPATIENT
Start: 2023-06-05

## 2023-06-05 NOTE — PROGRESS NOTES
Subjective   Paul Gomez is a 77 y.o. male.   Pt presents today with CC of Hypertension      History of Present Illness   History of Present Illness  1.  Patient is a 77-year-old male with COPD.  He reports that he has become increasingly short of breath over the past 3 weeks.  He request antibiotic and steroid.  #2 he has COPD for which he takes DuoNebs, Advair, and albuterol as needed.  #3 he is a current everyday smoker.  He has attempted multiple times to try and quit.  He had quit previously and is started back again.  #4 he has history of acute blood loss anemia.  He is due for blood work.  He also had hyperkalemia on his prior blood work.  He is agreeable to recheck.  #5 he has chronic gout.  He takes allopurinol 100 mg daily.  He is agreeable to check his levels  #6 he has eczema for which he takes triamcinolone ointment as needed.  He requests refill.       The following portions of the patient's history were reviewed and updated as appropriate: allergies, current medications, past family history, past medical history, past social history, past surgical history, and problem list.    Review of Systems   Constitutional:  Negative for chills, fever and unexpected weight loss.   HENT:  Negative for congestion and sore throat.    Eyes:  Negative for blurred vision and visual disturbance.   Respiratory:  Positive for cough, shortness of breath and wheezing.    Cardiovascular:  Negative for chest pain and palpitations.   Gastrointestinal:  Negative for abdominal pain and diarrhea.   Endocrine: Negative for cold intolerance and heat intolerance.   Genitourinary:  Negative for dysuria.   Musculoskeletal:  Negative for arthralgias and neck stiffness.   Neurological:  Negative for dizziness, seizures and syncope.   Psychiatric/Behavioral:  Negative for self-injury, suicidal ideas and depressed mood.      Objective   Physical Exam  Vitals and nursing note reviewed.   Constitutional:       Appearance: He is  well-developed.   HENT:      Head: Normocephalic and atraumatic.      Right Ear: External ear normal.      Left Ear: External ear normal.      Nose: Nose normal.   Eyes:      Conjunctiva/sclera: Conjunctivae normal.      Pupils: Pupils are equal, round, and reactive to light.   Cardiovascular:      Rate and Rhythm: Normal rate and regular rhythm.      Heart sounds: Normal heart sounds.   Pulmonary:      Effort: Pulmonary effort is normal. No respiratory distress.      Breath sounds: Wheezing present.   Abdominal:      General: Bowel sounds are normal.      Palpations: Abdomen is soft.   Musculoskeletal:      Cervical back: Normal range of motion and neck supple.   Skin:     General: Skin is warm and dry.   Neurological:      Mental Status: He is alert and oriented to person, place, and time.   Psychiatric:         Behavior: Behavior normal.         Assessment & Plan   Diagnoses and all orders for this visit:    1. COPD with exacerbation (Primary)  -     predniSONE (DELTASONE) 10 MG tablet; 6TABS/day 2 days, 5 tabs day 2 days, 4 tabs daily 2 days, 3tabs daily 1 day, 2 tabs daily 1 day, then 1 tab daily 3 days  Dispense: 38 tablet; Refill: 0  -     doxycycline (MONODOX) 100 MG capsule; Take 1 capsule by mouth 2 (Two) Times a Day.  Dispense: 20 capsule; Refill: 0  -     XR Chest 2 View  Did well on doxycycline in the past.  COPD exacerbation is evident, we will get an x-ray to ensure that we do not need to do a dual antibiotic therapy.  If lobar pneumonia is suggested, will add another antibiotic.  If his condition worsens he is can go to the emergency room.  I would like for him to follow-up in 2 weeks, sooner if needed.  2. Centrilobular emphysema  -     ipratropium-albuterol (DUO-NEB) 0.5-2.5 mg/3 ml nebulizer; Take 3 mL by nebulization Every 4 (Four) Hours As Needed for Wheezing or Shortness of Air.  Dispense: 360 mL; Refill: 3  -     fluticasone-salmeterol (ADVAIR HFA) 230-21 MCG/ACT inhaler; Inhale 2 puffs 2  (Two) Times a Day.  Dispense: 12 g; Refill: 11  -     albuterol sulfate  (90 Base) MCG/ACT inhaler; Inhale 2 puffs Every 6 (Six) Hours As Needed for Wheezing or Shortness of Air.  Dispense: 18 g; Refill: 5    3. Hyperkalemia  -     Comprehensive metabolic panel; Future    4. Acute blood loss anemia (ABLA)  -     CBC No Differential; Future    5. Elevated serum creatinine  -     Comprehensive metabolic panel; Future    6. Idiopathic chronic gout of left foot without tophus  -     celecoxib (CeleBREX) 200 MG capsule; Take 1 capsule by mouth Daily.  Dispense: 90 capsule; Refill: 1  -     allopurinol (ZYLOPRIM) 100 MG tablet; Take 1 tablet by mouth Daily.  Dispense: 30 tablet; Refill: 2  -     Uric acid; Future    7. Other eczema  -     triamcinolone (KENALOG) 0.1 % cream; Apply 1 application topically to the appropriate area as directed 2 (Two) Times a Day.  Dispense: 80 g; Refill: 1    8. Current every day smoker                  BMI is within normal parameters. No other follow-up for BMI required.          This document has been electronically signed by Carrie Torres  June 5, 2023 15:57 EDT    Dictated Utilizing Dragon Dictation: Part of this note may be an electronic transcription/translation of spoken language to printed text using the Dragon Dictation System.

## 2023-06-06 ENCOUNTER — TELEPHONE (OUTPATIENT)
Dept: FAMILY MEDICINE CLINIC | Facility: CLINIC | Age: 78
End: 2023-06-06
Payer: MEDICARE

## 2023-06-06 NOTE — TELEPHONE ENCOUNTER
----- Message from Luis Dobbs DO sent at 6/5/2023  6:55 PM EDT -----  No concerns on his x-ray.  Continue current treatment for COPD exacerbation.  Prednisone and doxycycline.    Patient notified and verbalized understanding.

## 2023-06-19 ENCOUNTER — OFFICE VISIT (OUTPATIENT)
Dept: FAMILY MEDICINE CLINIC | Facility: CLINIC | Age: 78
End: 2023-06-19
Payer: MEDICARE

## 2023-06-19 VITALS
OXYGEN SATURATION: 97 % | SYSTOLIC BLOOD PRESSURE: 100 MMHG | BODY MASS INDEX: 20.49 KG/M2 | WEIGHT: 135.2 LBS | TEMPERATURE: 97.5 F | HEIGHT: 68 IN | DIASTOLIC BLOOD PRESSURE: 58 MMHG | HEART RATE: 87 BPM

## 2023-06-19 DIAGNOSIS — J43.2 CENTRILOBULAR EMPHYSEMA: ICD-10-CM

## 2023-06-19 DIAGNOSIS — G89.4 CHRONIC PAIN SYNDROME: Primary | ICD-10-CM

## 2023-06-19 PROCEDURE — 3074F SYST BP LT 130 MM HG: CPT | Performed by: FAMILY MEDICINE

## 2023-06-19 PROCEDURE — 3078F DIAST BP <80 MM HG: CPT | Performed by: FAMILY MEDICINE

## 2023-06-19 PROCEDURE — 99213 OFFICE O/P EST LOW 20 MIN: CPT | Performed by: FAMILY MEDICINE

## 2023-06-19 NOTE — LETTER
June 19, 2023     Patient: Paul Gomez   YOB: 1945   Date of Visit: 6/19/2023       To Whom It May Concern:    It is my medical opinion that Paul Gomez has pulmonary emphysema.  He was recently treated for an exacerbation of COPD with steroids and antibiotic.  His pulmonary function is back to baseline as of 6/19/2023.  He may resume his previous treatments for chronic pain.        Sincerely,        Luis Dobbs, DO

## 2023-06-19 NOTE — PROGRESS NOTES
Subjective   Paul Gomez is a 77 y.o. male.   Pt presents today with CC of COPD      History of Present Illness   History of Present Illness  Patient is a 77-year-old male with emphysema.  He quit smoking about 5 months ago.  He recently had a COPD exacerbation and has a recovered back to his baseline.  He took all the medication as prescribed.  Of note, he requires a letter for his pain management doctor so that he can resume treatments for chronic pain treatments.     The following portions of the patient's history were reviewed and updated as appropriate: allergies, current medications, past family history, past medical history, past social history, past surgical history, and problem list.    Review of Systems   Constitutional:  Negative for chills, fever and unexpected weight loss.   HENT:  Negative for congestion and sore throat.    Eyes:  Negative for blurred vision and visual disturbance.   Respiratory:  Negative for cough and wheezing.    Cardiovascular:  Negative for chest pain and palpitations.   Gastrointestinal:  Negative for abdominal pain and diarrhea.   Endocrine: Negative for cold intolerance and heat intolerance.   Genitourinary:  Negative for dysuria.   Musculoskeletal:  Negative for arthralgias and neck stiffness.   Neurological:  Negative for dizziness, seizures and syncope.   Psychiatric/Behavioral:  Negative for self-injury, suicidal ideas and depressed mood.      Objective   Physical Exam  Vitals and nursing note reviewed.   Constitutional:       Appearance: He is well-developed.   HENT:      Head: Normocephalic and atraumatic.      Right Ear: External ear normal.      Left Ear: External ear normal.      Nose: Nose normal.   Eyes:      Conjunctiva/sclera: Conjunctivae normal.      Pupils: Pupils are equal, round, and reactive to light.   Cardiovascular:      Rate and Rhythm: Normal rate and regular rhythm.      Heart sounds: Normal heart sounds.   Pulmonary:      Effort: Pulmonary effort is  normal.      Breath sounds: Normal breath sounds.      Comments: Scattered wheezes are still present.  This is his baseline.  Abdominal:      General: Bowel sounds are normal.      Palpations: Abdomen is soft.   Musculoskeletal:      Cervical back: Normal range of motion and neck supple.   Skin:     General: Skin is warm and dry.   Neurological:      Mental Status: He is alert and oriented to person, place, and time.   Psychiatric:         Behavior: Behavior normal.         Assessment & Plan   Diagnoses and all orders for this visit:    1. Chronic pain syndrome (Primary)  He requested a letter for his pain manager so that he can resume pain treatments with steroids and oral medications.  It is my medical opinion that he is back to baseline, I have no concerns.  2. Centrilobular emphysema    COPD exacerbation is resolved.              BMI is within normal parameters. No other follow-up for BMI required.          This document has been electronically signed by Carrie Torres  June 19, 2023 15:00 EDT    Dictated Utilizing Dragon Dictation: Part of this note may be an electronic transcription/translation of spoken language to printed text using the Dragon Dictation System.

## 2023-08-08 ENCOUNTER — APPOINTMENT (OUTPATIENT)
Dept: CT IMAGING | Facility: HOSPITAL | Age: 78
DRG: 871 | End: 2023-08-08
Payer: MEDICARE

## 2023-08-08 ENCOUNTER — APPOINTMENT (OUTPATIENT)
Dept: GENERAL RADIOLOGY | Facility: HOSPITAL | Age: 78
DRG: 871 | End: 2023-08-08
Payer: MEDICARE

## 2023-08-08 ENCOUNTER — HOSPITAL ENCOUNTER (INPATIENT)
Facility: HOSPITAL | Age: 78
LOS: 3 days | Discharge: HOME | DRG: 871 | End: 2023-08-11
Attending: STUDENT IN AN ORGANIZED HEALTH CARE EDUCATION/TRAINING PROGRAM | Admitting: INTERNAL MEDICINE
Payer: MEDICARE

## 2023-08-08 ENCOUNTER — APPOINTMENT (OUTPATIENT)
Dept: CARDIOLOGY | Facility: HOSPITAL | Age: 78
DRG: 871 | End: 2023-08-08
Payer: MEDICARE

## 2023-08-08 DIAGNOSIS — I21.4 NSTEMI, INITIAL EPISODE OF CARE: ICD-10-CM

## 2023-08-08 DIAGNOSIS — J96.02 ACUTE RESPIRATORY FAILURE WITH HYPOXIA AND HYPERCAPNIA: ICD-10-CM

## 2023-08-08 DIAGNOSIS — Z87.39 HISTORY OF DEGENERATIVE DISC DISEASE: ICD-10-CM

## 2023-08-08 DIAGNOSIS — J43.2 CENTRILOBULAR EMPHYSEMA: ICD-10-CM

## 2023-08-08 DIAGNOSIS — I21.3 ST ELEVATION MYOCARDIAL INFARCTION (STEMI), UNSPECIFIED ARTERY: Primary | ICD-10-CM

## 2023-08-08 DIAGNOSIS — R41.82 ALTERED MENTAL STATUS, UNSPECIFIED ALTERED MENTAL STATUS TYPE: ICD-10-CM

## 2023-08-08 DIAGNOSIS — J96.01 ACUTE RESPIRATORY FAILURE WITH HYPOXIA AND HYPERCAPNIA: ICD-10-CM

## 2023-08-08 DIAGNOSIS — F11.90 CHRONIC, CONTINUOUS USE OF OPIOIDS: ICD-10-CM

## 2023-08-08 DIAGNOSIS — I21.A1 TYPE 2 MYOCARDIAL INFARCTION: ICD-10-CM

## 2023-08-08 PROBLEM — J96.91 RESPIRATORY FAILURE WITH HYPOXIA AND HYPERCAPNIA: Status: ACTIVE | Noted: 2023-08-08

## 2023-08-08 PROBLEM — J44.1 COPD EXACERBATION: Status: ACTIVE | Noted: 2023-08-08

## 2023-08-08 PROBLEM — J96.92 RESPIRATORY FAILURE WITH HYPOXIA AND HYPERCAPNIA: Status: ACTIVE | Noted: 2023-08-08

## 2023-08-08 LAB
A-A DO2: 192.7 MMHG (ref 0–300)
A-A DO2: 261.8 MMHG (ref 0–300)
A-A DO2: 84.4 MMHG (ref 0–300)
ABO GROUP BLD: NORMAL
ALBUMIN SERPL-MCNC: 3.5 G/DL (ref 3.5–5.2)
ALBUMIN/GLOB SERPL: 1.4 G/DL
ALP SERPL-CCNC: 87 U/L (ref 39–117)
ALT SERPL W P-5'-P-CCNC: 35 U/L (ref 1–41)
ANION GAP SERPL CALCULATED.3IONS-SCNC: 9.7 MMOL/L (ref 5–15)
APTT PPP: 73 SECONDS (ref 26.5–34.5)
ARTERIAL PATENCY WRIST A: ABNORMAL
ARTERIAL PATENCY WRIST A: POSITIVE
ARTERIAL PATENCY WRIST A: POSITIVE
AST SERPL-CCNC: 76 U/L (ref 1–40)
ATMOSPHERIC PRESS: 726 MMHG
B PARAPERT DNA SPEC QL NAA+PROBE: NOT DETECTED
B PERT DNA SPEC QL NAA+PROBE: NOT DETECTED
BASE EXCESS BLDA CALC-SCNC: -11.4 MMOL/L (ref 0–2)
BASE EXCESS BLDA CALC-SCNC: 2.1 MMOL/L (ref 0–2)
BASE EXCESS BLDA CALC-SCNC: 2.7 MMOL/L (ref 0–2)
BASOPHILS # BLD MANUAL: 0.12 10*3/MM3 (ref 0–0.2)
BASOPHILS NFR BLD MANUAL: 1 % (ref 0–1.5)
BDY SITE: ABNORMAL
BH CV ECHO MEAS - ACS: 2 CM
BH CV ECHO MEAS - AO MAX PG: 7.4 MMHG
BH CV ECHO MEAS - AO MEAN PG: 4 MMHG
BH CV ECHO MEAS - AO ROOT DIAM: 3.4 CM
BH CV ECHO MEAS - AO V2 MAX: 136 CM/SEC
BH CV ECHO MEAS - AO V2 VTI: 25.2 CM
BH CV ECHO MEAS - EDV(CUBED): 69.4 ML
BH CV ECHO MEAS - EDV(MOD-SP4): 82.2 ML
BH CV ECHO MEAS - EF(MOD-SP4): 61.8 %
BH CV ECHO MEAS - ESV(CUBED): 26.5 ML
BH CV ECHO MEAS - ESV(MOD-SP4): 31.4 ML
BH CV ECHO MEAS - FS: 27.5 %
BH CV ECHO MEAS - IVS/LVPW: 1.37 CM
BH CV ECHO MEAS - IVSD: 1.14 CM
BH CV ECHO MEAS - LA DIMENSION: 3.2 CM
BH CV ECHO MEAS - LAT PEAK E' VEL: 4.8 CM/SEC
BH CV ECHO MEAS - LV DIASTOLIC VOL/BSA (35-75): 47.5 CM2
BH CV ECHO MEAS - LV MASS(C)D: 130.1 GRAMS
BH CV ECHO MEAS - LV SYSTOLIC VOL/BSA (12-30): 18.2 CM2
BH CV ECHO MEAS - LVIDD: 4.1 CM
BH CV ECHO MEAS - LVIDS: 3 CM
BH CV ECHO MEAS - LVOT AREA: 4.2 CM2
BH CV ECHO MEAS - LVOT DIAM: 2.3 CM
BH CV ECHO MEAS - LVPWD: 0.83 CM
BH CV ECHO MEAS - MED PEAK E' VEL: 5.7 CM/SEC
BH CV ECHO MEAS - MV A MAX VEL: 119 CM/SEC
BH CV ECHO MEAS - MV E MAX VEL: 80.2 CM/SEC
BH CV ECHO MEAS - MV E/A: 0.67
BH CV ECHO MEAS - RAP SYSTOLE: 10 MMHG
BH CV ECHO MEAS - RVSP: 33.4 MMHG
BH CV ECHO MEAS - SI(MOD-SP4): 29.4 ML/M2
BH CV ECHO MEAS - SV(MOD-SP4): 50.8 ML
BH CV ECHO MEAS - TR MAX PG: 23.4 MMHG
BH CV ECHO MEAS - TR MAX VEL: 242 CM/SEC
BH CV ECHO MEASUREMENTS AVERAGE E/E' RATIO: 15.28
BILIRUB SERPL-MCNC: 0.3 MG/DL (ref 0–1.2)
BLD GP AB SCN SERPL QL: NEGATIVE
BUN SERPL-MCNC: 12 MG/DL (ref 8–23)
BUN/CREAT SERPL: 12.8 (ref 7–25)
C PNEUM DNA NPH QL NAA+NON-PROBE: NOT DETECTED
CALCIUM SPEC-SCNC: 8.3 MG/DL (ref 8.6–10.5)
CHLORIDE SERPL-SCNC: 109 MMOL/L (ref 98–107)
CO2 BLDA-SCNC: 21.8 MMOL/L (ref 22–33)
CO2 BLDA-SCNC: 26.7 MMOL/L (ref 22–33)
CO2 BLDA-SCNC: 30.5 MMOL/L (ref 22–33)
CO2 SERPL-SCNC: 26.3 MMOL/L (ref 22–29)
COHGB MFR BLD: 1.3 % (ref 0–5)
COHGB MFR BLD: 1.5 % (ref 0–5)
COHGB MFR BLD: 1.7 % (ref 0–5)
CREAT SERPL-MCNC: 0.94 MG/DL (ref 0.76–1.27)
D-LACTATE SERPL-SCNC: 1.2 MMOL/L (ref 0.5–2)
D-LACTATE SERPL-SCNC: 5.3 MMOL/L (ref 0.5–2)
DEPRECATED RDW RBC AUTO: 48.3 FL (ref 37–54)
EGFRCR SERPLBLD CKD-EPI 2021: 83.5 ML/MIN/1.73
EOSINOPHIL # BLD MANUAL: 0.49 10*3/MM3 (ref 0–0.4)
EOSINOPHIL NFR BLD MANUAL: 4 % (ref 0.3–6.2)
ERYTHROCYTE [DISTWIDTH] IN BLOOD BY AUTOMATED COUNT: 13 % (ref 12.3–15.4)
FLUAV RNA RESP QL NAA+PROBE: NOT DETECTED
FLUAV SUBTYP SPEC NAA+PROBE: NOT DETECTED
FLUBV RNA ISLT QL NAA+PROBE: NOT DETECTED
FLUBV RNA RESP QL NAA+PROBE: NOT DETECTED
GEN 5 2HR TROPONIN T REFLEX: 127 NG/L
GLOBULIN UR ELPH-MCNC: 2.5 GM/DL
GLUCOSE BLDC GLUCOMTR-MCNC: 215 MG/DL (ref 70–130)
GLUCOSE SERPL-MCNC: 162 MG/DL (ref 65–99)
HADV DNA SPEC NAA+PROBE: NOT DETECTED
HCO3 BLDA-SCNC: 19.7 MMOL/L (ref 20–26)
HCO3 BLDA-SCNC: 25.6 MMOL/L (ref 20–26)
HCO3 BLDA-SCNC: 28.9 MMOL/L (ref 20–26)
HCOV 229E RNA SPEC QL NAA+PROBE: NOT DETECTED
HCOV HKU1 RNA SPEC QL NAA+PROBE: NOT DETECTED
HCOV NL63 RNA SPEC QL NAA+PROBE: NOT DETECTED
HCOV OC43 RNA SPEC QL NAA+PROBE: NOT DETECTED
HCT VFR BLD AUTO: 40.9 % (ref 37.5–51)
HCT VFR BLD CALC: 38.7 % (ref 38–51)
HCT VFR BLD CALC: 39 % (ref 38–51)
HCT VFR BLD CALC: 40.3 % (ref 38–51)
HGB BLD-MCNC: 13 G/DL (ref 13–17.7)
HGB BLDA-MCNC: 12.6 G/DL (ref 14–18)
HGB BLDA-MCNC: 12.7 G/DL (ref 14–18)
HGB BLDA-MCNC: 13.2 G/DL (ref 14–18)
HMPV RNA NPH QL NAA+NON-PROBE: NOT DETECTED
HOLD SPECIMEN: NORMAL
HOLD SPECIMEN: NORMAL
HPIV1 RNA ISLT QL NAA+PROBE: NOT DETECTED
HPIV2 RNA SPEC QL NAA+PROBE: NOT DETECTED
HPIV3 RNA NPH QL NAA+PROBE: NOT DETECTED
HPIV4 P GENE NPH QL NAA+PROBE: NOT DETECTED
INHALED O2 CONCENTRATION: 100 %
INHALED O2 CONCENTRATION: 30 %
INHALED O2 CONCENTRATION: 50 %
INR PPP: 1 (ref 0.9–1.1)
LYMPHOCYTES # BLD MANUAL: 5.46 10*3/MM3 (ref 0.7–3.1)
LYMPHOCYTES NFR BLD MANUAL: 7 % (ref 5–12)
Lab: ABNORMAL
M PNEUMO IGG SER IA-ACNC: NOT DETECTED
MAGNESIUM SERPL-MCNC: 2.1 MG/DL (ref 1.6–2.4)
MCH RBC QN AUTO: 31.9 PG (ref 26.6–33)
MCHC RBC AUTO-ENTMCNC: 31.8 G/DL (ref 31.5–35.7)
MCV RBC AUTO: 100.2 FL (ref 79–97)
METHGB BLD QL: 0 % (ref 0–3)
METHGB BLD QL: 0.2 % (ref 0–3)
METHGB BLD QL: <-0.1 % (ref 0–3)
MODALITY: ABNORMAL
MONOCYTES # BLD: 0.85 10*3/MM3 (ref 0.1–0.9)
NEUTROPHILS # BLD AUTO: 5.22 10*3/MM3 (ref 1.7–7)
NEUTROPHILS NFR BLD MANUAL: 41 % (ref 42.7–76)
NEUTS BAND NFR BLD MANUAL: 2 % (ref 0–5)
NOTE: ABNORMAL
NOTIFIED BY: ABNORMAL
NOTIFIED WHO: ABNORMAL
OXYHGB MFR BLDV: 95.6 % (ref 94–99)
OXYHGB MFR BLDV: 95.7 % (ref 94–99)
OXYHGB MFR BLDV: 98.2 % (ref 94–99)
PCO2 BLDA: 35.4 MM HG (ref 35–45)
PCO2 BLDA: 50.5 MM HG (ref 35–45)
PCO2 BLDA: 68.9 MM HG (ref 35–45)
PCO2 TEMP ADJ BLD: ABNORMAL MM[HG]
PEEP RESPIRATORY: 5 CM[H2O]
PH BLDA: 7.07 PH UNITS (ref 7.35–7.45)
PH BLDA: 7.37 PH UNITS (ref 7.35–7.45)
PH BLDA: 7.47 PH UNITS (ref 7.35–7.45)
PH, TEMP CORRECTED: ABNORMAL
PLAT MORPH BLD: NORMAL
PLATELET # BLD AUTO: 201 10*3/MM3 (ref 140–450)
PMV BLD AUTO: 11.1 FL (ref 6–12)
PO2 BLDA: 348 MM HG (ref 83–108)
PO2 BLDA: 79.9 MM HG (ref 83–108)
PO2 BLDA: 92.1 MM HG (ref 83–108)
PO2 TEMP ADJ BLD: ABNORMAL MM[HG]
POTASSIUM SERPL-SCNC: 4.8 MMOL/L (ref 3.5–5.2)
PROT SERPL-MCNC: 6 G/DL (ref 6–8.5)
PROTHROMBIN TIME: 13.7 SECONDS (ref 12.1–14.7)
PSV: 8 CMH2O
QT INTERVAL: 252 MS
QT INTERVAL: 286 MS
QT INTERVAL: 376 MS
QT INTERVAL: 392 MS
QTC INTERVAL: 383 MS
QTC INTERVAL: 415 MS
QTC INTERVAL: 464 MS
QTC INTERVAL: 469 MS
RBC # BLD AUTO: 4.08 10*6/MM3 (ref 4.14–5.8)
RBC MORPH BLD: NORMAL
RH BLD: POSITIVE
RHINOVIRUS RNA SPEC NAA+PROBE: NOT DETECTED
RSV RNA NPH QL NAA+NON-PROBE: NOT DETECTED
S PNEUM AG SPEC QL LA: NEGATIVE
SAO2 % BLDCOA: 97.1 % (ref 94–99)
SAO2 % BLDCOA: 97.1 % (ref 94–99)
SAO2 % BLDCOA: >99.2 % (ref 94–99)
SARS-COV-2 RNA NPH QL NAA+NON-PROBE: NOT DETECTED
SARS-COV-2 RNA RESP QL NAA+PROBE: NOT DETECTED
SCAN SLIDE: NORMAL
SET MECH RESP RATE: 20
SET MECH RESP RATE: 20
SODIUM SERPL-SCNC: 145 MMOL/L (ref 136–145)
T&S EXPIRATION DATE: NORMAL
TROPONIN T DELTA: 20 NG/L
TROPONIN T SERPL HS-MCNC: 107 NG/L
UFH PPP CHRO-ACNC: 0.34 IU/ML (ref 0.3–0.7)
UFH PPP CHRO-ACNC: <0.1 IU/ML (ref 0.3–0.7)
UFH PPP CHRO-ACNC: <0.1 IU/ML (ref 0.3–0.7)
VARIANT LYMPHS NFR BLD MANUAL: 45 % (ref 19.6–45.3)
VENTILATOR MODE: ABNORMAL
VT ON VENT VENT: 450 ML
VT ON VENT VENT: 450 ML
WBC NRBC COR # BLD: 12.13 10*3/MM3 (ref 3.4–10.8)
WHOLE BLOOD HOLD COAG: NORMAL
WHOLE BLOOD HOLD SPECIMEN: NORMAL

## 2023-08-08 PROCEDURE — 74176 CT ABD & PELVIS W/O CONTRAST: CPT

## 2023-08-08 PROCEDURE — 87185 SC STD ENZYME DETCJ PER NZM: CPT | Performed by: STUDENT IN AN ORGANIZED HEALTH CARE EDUCATION/TRAINING PROGRAM

## 2023-08-08 PROCEDURE — 94799 UNLISTED PULMONARY SVC/PX: CPT

## 2023-08-08 PROCEDURE — 25010000002 FENTANYL CITRATE (PF) 50 MCG/ML SOLUTION

## 2023-08-08 PROCEDURE — 94002 VENT MGMT INPAT INIT DAY: CPT

## 2023-08-08 PROCEDURE — 70450 CT HEAD/BRAIN W/O DYE: CPT

## 2023-08-08 PROCEDURE — 87636 SARSCOV2 & INF A&B AMP PRB: CPT | Performed by: STUDENT IN AN ORGANIZED HEALTH CARE EDUCATION/TRAINING PROGRAM

## 2023-08-08 PROCEDURE — 86901 BLOOD TYPING SEROLOGIC RH(D): CPT | Performed by: STUDENT IN AN ORGANIZED HEALTH CARE EDUCATION/TRAINING PROGRAM

## 2023-08-08 PROCEDURE — 5A1935Z RESPIRATORY VENTILATION, LESS THAN 24 CONSECUTIVE HOURS: ICD-10-PCS | Performed by: INTERNAL MEDICINE

## 2023-08-08 PROCEDURE — 87205 SMEAR GRAM STAIN: CPT | Performed by: STUDENT IN AN ORGANIZED HEALTH CARE EDUCATION/TRAINING PROGRAM

## 2023-08-08 PROCEDURE — 71045 X-RAY EXAM CHEST 1 VIEW: CPT

## 2023-08-08 PROCEDURE — 86850 RBC ANTIBODY SCREEN: CPT | Performed by: STUDENT IN AN ORGANIZED HEALTH CARE EDUCATION/TRAINING PROGRAM

## 2023-08-08 PROCEDURE — 0202U NFCT DS 22 TRGT SARS-COV-2: CPT | Performed by: INTERNAL MEDICINE

## 2023-08-08 PROCEDURE — C1751 CATH, INF, PER/CENT/MIDLINE: HCPCS

## 2023-08-08 PROCEDURE — 93005 ELECTROCARDIOGRAM TRACING: CPT | Performed by: INTERNAL MEDICINE

## 2023-08-08 PROCEDURE — 82805 BLOOD GASES W/O2 SATURATION: CPT

## 2023-08-08 PROCEDURE — 25010000002 PROPOFOL 1000 MG/100ML EMULSION: Performed by: STUDENT IN AN ORGANIZED HEALTH CARE EDUCATION/TRAINING PROGRAM

## 2023-08-08 PROCEDURE — 94664 DEMO&/EVAL PT USE INHALER: CPT

## 2023-08-08 PROCEDURE — 83605 ASSAY OF LACTIC ACID: CPT | Performed by: STUDENT IN AN ORGANIZED HEALTH CARE EDUCATION/TRAINING PROGRAM

## 2023-08-08 PROCEDURE — 25010000002 LEVOFLOXACIN PER 250 MG: Performed by: INTERNAL MEDICINE

## 2023-08-08 PROCEDURE — 25010000002 HEPARIN (PORCINE) 25000-0.45 UT/250ML-% SOLUTION: Performed by: STUDENT IN AN ORGANIZED HEALTH CARE EDUCATION/TRAINING PROGRAM

## 2023-08-08 PROCEDURE — 87186 SC STD MICRODIL/AGAR DIL: CPT | Performed by: STUDENT IN AN ORGANIZED HEALTH CARE EDUCATION/TRAINING PROGRAM

## 2023-08-08 PROCEDURE — 83735 ASSAY OF MAGNESIUM: CPT | Performed by: STUDENT IN AN ORGANIZED HEALTH CARE EDUCATION/TRAINING PROGRAM

## 2023-08-08 PROCEDURE — 82948 REAGENT STRIP/BLOOD GLUCOSE: CPT

## 2023-08-08 PROCEDURE — 25010000002 MORPHINE PER 10 MG: Performed by: STUDENT IN AN ORGANIZED HEALTH CARE EDUCATION/TRAINING PROGRAM

## 2023-08-08 PROCEDURE — 83050 HGB METHEMOGLOBIN QUAN: CPT

## 2023-08-08 PROCEDURE — 25010000002 SUCCINYLCHOLINE PER 20 MG: Performed by: STUDENT IN AN ORGANIZED HEALTH CARE EDUCATION/TRAINING PROGRAM

## 2023-08-08 PROCEDURE — 87040 BLOOD CULTURE FOR BACTERIA: CPT | Performed by: STUDENT IN AN ORGANIZED HEALTH CARE EDUCATION/TRAINING PROGRAM

## 2023-08-08 PROCEDURE — 74018 RADEX ABDOMEN 1 VIEW: CPT

## 2023-08-08 PROCEDURE — 85025 COMPLETE CBC W/AUTO DIFF WBC: CPT | Performed by: STUDENT IN AN ORGANIZED HEALTH CARE EDUCATION/TRAINING PROGRAM

## 2023-08-08 PROCEDURE — 99222 1ST HOSP IP/OBS MODERATE 55: CPT | Performed by: INTERNAL MEDICINE

## 2023-08-08 PROCEDURE — 93010 ELECTROCARDIOGRAM REPORT: CPT | Performed by: INTERNAL MEDICINE

## 2023-08-08 PROCEDURE — 25010000002 MIDAZOLAM PER 1 MG

## 2023-08-08 PROCEDURE — 25510000001 IOPAMIDOL PER 1 ML: Performed by: STUDENT IN AN ORGANIZED HEALTH CARE EDUCATION/TRAINING PROGRAM

## 2023-08-08 PROCEDURE — 82375 ASSAY CARBOXYHB QUANT: CPT

## 2023-08-08 PROCEDURE — 25010000002 PROPOFOL 10 MG/ML EMULSION: Performed by: STUDENT IN AN ORGANIZED HEALTH CARE EDUCATION/TRAINING PROGRAM

## 2023-08-08 PROCEDURE — 87070 CULTURE OTHR SPECIMN AEROBIC: CPT | Performed by: STUDENT IN AN ORGANIZED HEALTH CARE EDUCATION/TRAINING PROGRAM

## 2023-08-08 PROCEDURE — 36600 WITHDRAWAL OF ARTERIAL BLOOD: CPT

## 2023-08-08 PROCEDURE — 93005 ELECTROCARDIOGRAM TRACING: CPT | Performed by: STUDENT IN AN ORGANIZED HEALTH CARE EDUCATION/TRAINING PROGRAM

## 2023-08-08 PROCEDURE — 94640 AIRWAY INHALATION TREATMENT: CPT

## 2023-08-08 PROCEDURE — 80053 COMPREHEN METABOLIC PANEL: CPT | Performed by: STUDENT IN AN ORGANIZED HEALTH CARE EDUCATION/TRAINING PROGRAM

## 2023-08-08 PROCEDURE — 99291 CRITICAL CARE FIRST HOUR: CPT

## 2023-08-08 PROCEDURE — 87077 CULTURE AEROBIC IDENTIFY: CPT | Performed by: STUDENT IN AN ORGANIZED HEALTH CARE EDUCATION/TRAINING PROGRAM

## 2023-08-08 PROCEDURE — 25010000002 MIDAZOLAM PER 1 MG: Performed by: STUDENT IN AN ORGANIZED HEALTH CARE EDUCATION/TRAINING PROGRAM

## 2023-08-08 PROCEDURE — 0 METHYLPREDNISOLONE PER 125 MG: Performed by: INTERNAL MEDICINE

## 2023-08-08 PROCEDURE — 71275 CT ANGIOGRAPHY CHEST: CPT

## 2023-08-08 PROCEDURE — 87076 CULTURE ANAEROBE IDENT EACH: CPT | Performed by: STUDENT IN AN ORGANIZED HEALTH CARE EDUCATION/TRAINING PROGRAM

## 2023-08-08 PROCEDURE — 25010000002 HEPARIN (PORCINE) PER 1000 UNITS: Performed by: STUDENT IN AN ORGANIZED HEALTH CARE EDUCATION/TRAINING PROGRAM

## 2023-08-08 PROCEDURE — 31500 INSERT EMERGENCY AIRWAY: CPT

## 2023-08-08 PROCEDURE — 86900 BLOOD TYPING SEROLOGIC ABO: CPT | Performed by: STUDENT IN AN ORGANIZED HEALTH CARE EDUCATION/TRAINING PROGRAM

## 2023-08-08 PROCEDURE — 85610 PROTHROMBIN TIME: CPT | Performed by: STUDENT IN AN ORGANIZED HEALTH CARE EDUCATION/TRAINING PROGRAM

## 2023-08-08 PROCEDURE — 51702 INSERT TEMP BLADDER CATH: CPT

## 2023-08-08 PROCEDURE — 85520 HEPARIN ASSAY: CPT | Performed by: STUDENT IN AN ORGANIZED HEALTH CARE EDUCATION/TRAINING PROGRAM

## 2023-08-08 PROCEDURE — 25010000002 SULFUR HEXAFLUORIDE MICROSPH 60.7-25 MG RECONSTITUTED SUSPENSION: Performed by: STUDENT IN AN ORGANIZED HEALTH CARE EDUCATION/TRAINING PROGRAM

## 2023-08-08 PROCEDURE — 85007 BL SMEAR W/DIFF WBC COUNT: CPT | Performed by: STUDENT IN AN ORGANIZED HEALTH CARE EDUCATION/TRAINING PROGRAM

## 2023-08-08 PROCEDURE — 87147 CULTURE TYPE IMMUNOLOGIC: CPT | Performed by: STUDENT IN AN ORGANIZED HEALTH CARE EDUCATION/TRAINING PROGRAM

## 2023-08-08 PROCEDURE — 93306 TTE W/DOPPLER COMPLETE: CPT

## 2023-08-08 PROCEDURE — 25010000002 CEFTRIAXONE PER 250 MG: Performed by: STUDENT IN AN ORGANIZED HEALTH CARE EDUCATION/TRAINING PROGRAM

## 2023-08-08 PROCEDURE — 0BH17EZ INSERTION OF ENDOTRACHEAL AIRWAY INTO TRACHEA, VIA NATURAL OR ARTIFICIAL OPENING: ICD-10-PCS | Performed by: STUDENT IN AN ORGANIZED HEALTH CARE EDUCATION/TRAINING PROGRAM

## 2023-08-08 PROCEDURE — 0 MIDAZOLAM 1 MG/ML 100ML NS 100 MG/100ML SOLUTION: Performed by: STUDENT IN AN ORGANIZED HEALTH CARE EDUCATION/TRAINING PROGRAM

## 2023-08-08 PROCEDURE — 25010000002 DEXAMETHASONE SODIUM PHOSPHATE 10 MG/ML SOLUTION: Performed by: STUDENT IN AN ORGANIZED HEALTH CARE EDUCATION/TRAINING PROGRAM

## 2023-08-08 PROCEDURE — 84484 ASSAY OF TROPONIN QUANT: CPT | Performed by: STUDENT IN AN ORGANIZED HEALTH CARE EDUCATION/TRAINING PROGRAM

## 2023-08-08 PROCEDURE — 85730 THROMBOPLASTIN TIME PARTIAL: CPT | Performed by: STUDENT IN AN ORGANIZED HEALTH CARE EDUCATION/TRAINING PROGRAM

## 2023-08-08 PROCEDURE — 87150 DNA/RNA AMPLIFIED PROBE: CPT | Performed by: STUDENT IN AN ORGANIZED HEALTH CARE EDUCATION/TRAINING PROGRAM

## 2023-08-08 PROCEDURE — 94761 N-INVAS EAR/PLS OXIMETRY MLT: CPT

## 2023-08-08 PROCEDURE — 02HV33Z INSERTION OF INFUSION DEVICE INTO SUPERIOR VENA CAVA, PERCUTANEOUS APPROACH: ICD-10-PCS | Performed by: STUDENT IN AN ORGANIZED HEALTH CARE EDUCATION/TRAINING PROGRAM

## 2023-08-08 PROCEDURE — 93306 TTE W/DOPPLER COMPLETE: CPT | Performed by: INTERNAL MEDICINE

## 2023-08-08 RX ORDER — MIDAZOLAM IN NACL,ISO-OSMOT/PF 50 MG/50ML
1-10 INFUSION BOTTLE (ML) INTRAVENOUS
Status: DISCONTINUED | OUTPATIENT
Start: 2023-08-08 | End: 2023-08-08

## 2023-08-08 RX ORDER — SUCCINYLCHOLINE CHLORIDE 20 MG/ML
50 INJECTION INTRAMUSCULAR; INTRAVENOUS ONCE
Status: COMPLETED | OUTPATIENT
Start: 2023-08-08 | End: 2023-08-08

## 2023-08-08 RX ORDER — OXYCODONE HYDROCHLORIDE 10 MG/1
10 TABLET ORAL EVERY 6 HOURS PRN
Status: DISCONTINUED | OUTPATIENT
Start: 2023-08-08 | End: 2023-08-10

## 2023-08-08 RX ORDER — SODIUM CHLORIDE 0.9 % (FLUSH) 0.9 %
10 SYRINGE (ML) INJECTION AS NEEDED
Status: DISCONTINUED | OUTPATIENT
Start: 2023-08-08 | End: 2023-08-11 | Stop reason: HOSPADM

## 2023-08-08 RX ORDER — BUDESONIDE AND FORMOTEROL FUMARATE DIHYDRATE 160; 4.5 UG/1; UG/1
2 AEROSOL RESPIRATORY (INHALATION)
Status: CANCELLED | OUTPATIENT
Start: 2023-08-08

## 2023-08-08 RX ORDER — BISACODYL 5 MG/1
5 TABLET, DELAYED RELEASE ORAL DAILY PRN
Status: DISCONTINUED | OUTPATIENT
Start: 2023-08-08 | End: 2023-08-11 | Stop reason: HOSPADM

## 2023-08-08 RX ORDER — ALBUTEROL SULFATE 2.5 MG/3ML
2.5 SOLUTION RESPIRATORY (INHALATION) ONCE
Status: COMPLETED | OUTPATIENT
Start: 2023-08-08 | End: 2023-08-08

## 2023-08-08 RX ORDER — HEPARIN SODIUM 10000 [USP'U]/100ML
23 INJECTION, SOLUTION INTRAVENOUS
Status: DISCONTINUED | OUTPATIENT
Start: 2023-08-08 | End: 2023-08-11 | Stop reason: HOSPADM

## 2023-08-08 RX ORDER — ASPIRIN 81 MG/1
81 TABLET ORAL DAILY
Status: DISCONTINUED | OUTPATIENT
Start: 2023-08-08 | End: 2023-08-11 | Stop reason: HOSPADM

## 2023-08-08 RX ORDER — GABAPENTIN 800 MG/1
800 TABLET ORAL 3 TIMES DAILY
Status: ON HOLD | COMMUNITY
End: 2023-08-11 | Stop reason: SDUPTHER

## 2023-08-08 RX ORDER — DEXAMETHASONE SODIUM PHOSPHATE 10 MG/ML
10 INJECTION, SOLUTION INTRAMUSCULAR; INTRAVENOUS ONCE
Status: COMPLETED | OUTPATIENT
Start: 2023-08-08 | End: 2023-08-08

## 2023-08-08 RX ORDER — METHYLPREDNISOLONE SODIUM SUCCINATE 125 MG/2ML
62.5 INJECTION, POWDER, LYOPHILIZED, FOR SOLUTION INTRAMUSCULAR; INTRAVENOUS DAILY
Status: DISCONTINUED | OUTPATIENT
Start: 2023-08-08 | End: 2023-08-08

## 2023-08-08 RX ORDER — ASPIRIN 81 MG/1
81 TABLET ORAL DAILY
Status: CANCELLED | OUTPATIENT
Start: 2023-08-08

## 2023-08-08 RX ORDER — IPRATROPIUM BROMIDE AND ALBUTEROL SULFATE 2.5; .5 MG/3ML; MG/3ML
3 SOLUTION RESPIRATORY (INHALATION) 4 TIMES DAILY PRN
Status: CANCELLED | OUTPATIENT
Start: 2023-08-08

## 2023-08-08 RX ORDER — HEPARIN SODIUM 10000 [USP'U]/100ML
12 INJECTION, SOLUTION INTRAVENOUS
Status: DISCONTINUED | OUTPATIENT
Start: 2023-08-08 | End: 2023-08-08

## 2023-08-08 RX ORDER — MIDAZOLAM HYDROCHLORIDE 1 MG/ML
6 INJECTION INTRAMUSCULAR; INTRAVENOUS ONCE
Status: DISCONTINUED | OUTPATIENT
Start: 2023-08-08 | End: 2023-08-08

## 2023-08-08 RX ORDER — ALLOPURINOL 100 MG/1
100 TABLET ORAL DAILY
Status: DISCONTINUED | OUTPATIENT
Start: 2023-08-08 | End: 2023-08-11 | Stop reason: HOSPADM

## 2023-08-08 RX ORDER — FENTANYL CITRATE 50 UG/ML
INJECTION, SOLUTION INTRAMUSCULAR; INTRAVENOUS
Status: COMPLETED
Start: 2023-08-08 | End: 2023-08-08

## 2023-08-08 RX ORDER — LEVOFLOXACIN 5 MG/ML
750 INJECTION, SOLUTION INTRAVENOUS DAILY
Status: DISCONTINUED | OUTPATIENT
Start: 2023-08-08 | End: 2023-08-11

## 2023-08-08 RX ORDER — MORPHINE SULFATE 2 MG/ML
2 INJECTION, SOLUTION INTRAMUSCULAR; INTRAVENOUS EVERY 4 HOURS PRN
Status: COMPLETED | OUTPATIENT
Start: 2023-08-08 | End: 2023-08-09

## 2023-08-08 RX ORDER — HEPARIN SODIUM 5000 [USP'U]/ML
5000 INJECTION, SOLUTION INTRAVENOUS; SUBCUTANEOUS ONCE
Status: COMPLETED | OUTPATIENT
Start: 2023-08-08 | End: 2023-08-08

## 2023-08-08 RX ORDER — SODIUM CHLORIDE 9 MG/ML
40 INJECTION, SOLUTION INTRAVENOUS AS NEEDED
Status: DISCONTINUED | OUTPATIENT
Start: 2023-08-08 | End: 2023-08-11 | Stop reason: HOSPADM

## 2023-08-08 RX ORDER — BISACODYL 10 MG
10 SUPPOSITORY, RECTAL RECTAL DAILY PRN
Status: DISCONTINUED | OUTPATIENT
Start: 2023-08-08 | End: 2023-08-11 | Stop reason: HOSPADM

## 2023-08-08 RX ORDER — TRIAMCINOLONE ACETONIDE 1 MG/G
1 CREAM TOPICAL 2 TIMES DAILY
Status: CANCELLED | OUTPATIENT
Start: 2023-08-08

## 2023-08-08 RX ORDER — SODIUM CHLORIDE 0.9 % (FLUSH) 0.9 %
10 SYRINGE (ML) INJECTION EVERY 12 HOURS SCHEDULED
Status: DISCONTINUED | OUTPATIENT
Start: 2023-08-08 | End: 2023-08-11 | Stop reason: HOSPADM

## 2023-08-08 RX ORDER — SODIUM CHLORIDE 0.9 % (FLUSH) 0.9 %
20 SYRINGE (ML) INJECTION AS NEEDED
Status: DISCONTINUED | OUTPATIENT
Start: 2023-08-08 | End: 2023-08-11 | Stop reason: HOSPADM

## 2023-08-08 RX ORDER — OXYCODONE HYDROCHLORIDE 15 MG/1
15 TABLET ORAL EVERY 6 HOURS PRN
Status: CANCELLED | OUTPATIENT
Start: 2023-08-08

## 2023-08-08 RX ORDER — CELECOXIB 200 MG/1
200 CAPSULE ORAL DAILY
COMMUNITY

## 2023-08-08 RX ORDER — ASPIRIN 300 MG/1
300 SUPPOSITORY RECTAL ONCE
Status: COMPLETED | OUTPATIENT
Start: 2023-08-08 | End: 2023-08-08

## 2023-08-08 RX ORDER — ETOMIDATE 2 MG/ML
10 INJECTION INTRAVENOUS ONCE
Status: COMPLETED | OUTPATIENT
Start: 2023-08-08 | End: 2023-08-08

## 2023-08-08 RX ORDER — ALBUTEROL SULFATE 90 UG/1
2 AEROSOL, METERED RESPIRATORY (INHALATION) EVERY 6 HOURS PRN
Status: CANCELLED | OUTPATIENT
Start: 2023-08-08

## 2023-08-08 RX ORDER — MIDAZOLAM HYDROCHLORIDE 1 MG/ML
INJECTION INTRAMUSCULAR; INTRAVENOUS
Status: COMPLETED
Start: 2023-08-08 | End: 2023-08-08

## 2023-08-08 RX ORDER — GABAPENTIN 300 MG/1
600 CAPSULE ORAL EVERY 8 HOURS SCHEDULED
Status: CANCELLED | OUTPATIENT
Start: 2023-08-08

## 2023-08-08 RX ORDER — PROPOFOL 10 MG/ML
40 VIAL (ML) INTRAVENOUS ONCE
Status: COMPLETED | OUTPATIENT
Start: 2023-08-08 | End: 2023-08-08

## 2023-08-08 RX ORDER — MIDAZOLAM HYDROCHLORIDE 1 MG/ML
1 INJECTION INTRAMUSCULAR; INTRAVENOUS ONCE
Status: COMPLETED | OUTPATIENT
Start: 2023-08-08 | End: 2023-08-08

## 2023-08-08 RX ORDER — NITROGLYCERIN 0.4 MG/1
0.4 TABLET SUBLINGUAL
Status: DISCONTINUED | OUTPATIENT
Start: 2023-08-08 | End: 2023-08-11 | Stop reason: HOSPADM

## 2023-08-08 RX ORDER — ROSUVASTATIN CALCIUM 20 MG/1
40 TABLET, COATED ORAL NIGHTLY
Status: DISCONTINUED | OUTPATIENT
Start: 2023-08-08 | End: 2023-08-11 | Stop reason: HOSPADM

## 2023-08-08 RX ORDER — IPRATROPIUM BROMIDE AND ALBUTEROL SULFATE 2.5; .5 MG/3ML; MG/3ML
3 SOLUTION RESPIRATORY (INHALATION)
Status: DISCONTINUED | OUTPATIENT
Start: 2023-08-08 | End: 2023-08-11 | Stop reason: HOSPADM

## 2023-08-08 RX ORDER — ASPIRIN 300 MG/1
300 SUPPOSITORY RECTAL EVERY 6 HOURS PRN
Status: DISCONTINUED | OUTPATIENT
Start: 2023-08-08 | End: 2023-08-08

## 2023-08-08 RX ORDER — OXYCODONE HYDROCHLORIDE 15 MG/1
15 TABLET ORAL EVERY 6 HOURS PRN
COMMUNITY

## 2023-08-08 RX ORDER — AMOXICILLIN 250 MG
2 CAPSULE ORAL 2 TIMES DAILY
Status: DISCONTINUED | OUTPATIENT
Start: 2023-08-08 | End: 2023-08-11 | Stop reason: HOSPADM

## 2023-08-08 RX ORDER — CHOLECALCIFEROL (VITAMIN D3) 125 MCG
5 CAPSULE ORAL NIGHTLY PRN
Status: DISCONTINUED | OUTPATIENT
Start: 2023-08-08 | End: 2023-08-11 | Stop reason: HOSPADM

## 2023-08-08 RX ORDER — TRIAMCINOLONE ACETONIDE 1 MG/G
1 CREAM TOPICAL 2 TIMES DAILY
COMMUNITY

## 2023-08-08 RX ORDER — POLYETHYLENE GLYCOL 3350 17 G/17G
17 POWDER, FOR SOLUTION ORAL DAILY PRN
Status: DISCONTINUED | OUTPATIENT
Start: 2023-08-08 | End: 2023-08-11 | Stop reason: HOSPADM

## 2023-08-08 RX ORDER — METHYLPREDNISOLONE SODIUM SUCCINATE 40 MG/ML
40 INJECTION, POWDER, LYOPHILIZED, FOR SOLUTION INTRAMUSCULAR; INTRAVENOUS DAILY
Status: DISCONTINUED | OUTPATIENT
Start: 2023-08-09 | End: 2023-08-10

## 2023-08-08 RX ORDER — GABAPENTIN 100 MG/1
200 CAPSULE ORAL EVERY 12 HOURS SCHEDULED
Status: DISCONTINUED | OUTPATIENT
Start: 2023-08-08 | End: 2023-08-11 | Stop reason: HOSPADM

## 2023-08-08 RX ADMIN — MORPHINE SULFATE 2 MG: 2 INJECTION, SOLUTION INTRAMUSCULAR; INTRAVENOUS at 17:02

## 2023-08-08 RX ADMIN — MORPHINE SULFATE 2 MG: 2 INJECTION, SOLUTION INTRAMUSCULAR; INTRAVENOUS at 21:15

## 2023-08-08 RX ADMIN — CEFTRIAXONE 1000 MG: 1 INJECTION, POWDER, FOR SOLUTION INTRAMUSCULAR; INTRAVENOUS at 04:18

## 2023-08-08 RX ADMIN — ALLOPURINOL 100 MG: 100 TABLET ORAL at 14:48

## 2023-08-08 RX ADMIN — PROPOFOL 50 MCG/KG/MIN: 10 INJECTION, EMULSION INTRAVENOUS at 06:42

## 2023-08-08 RX ADMIN — Medication 10 ML: at 20:24

## 2023-08-08 RX ADMIN — DOCUSATE SODIUM 50 MG AND SENNOSIDES 8.6 MG 2 TABLET: 8.6; 5 TABLET, FILM COATED ORAL at 08:35

## 2023-08-08 RX ADMIN — LEVOFLOXACIN 750 MG: 750 INJECTION, SOLUTION INTRAVENOUS at 08:35

## 2023-08-08 RX ADMIN — Medication 5 MG: at 20:23

## 2023-08-08 RX ADMIN — MIDAZOLAM HYDROCHLORIDE 1 MG/HR: 1 INJECTION, SOLUTION INTRAVENOUS at 04:40

## 2023-08-08 RX ADMIN — ASPIRIN 300 MG: 300 SUPPOSITORY RECTAL at 02:45

## 2023-08-08 RX ADMIN — IPRATROPIUM BROMIDE AND ALBUTEROL SULFATE 3 ML: 2.5; .5 SOLUTION RESPIRATORY (INHALATION) at 13:40

## 2023-08-08 RX ADMIN — SULFUR HEXAFLUORIDE 3 ML: KIT at 15:02

## 2023-08-08 RX ADMIN — ALBUTEROL SULFATE 2.5 MG: 2.5 SOLUTION RESPIRATORY (INHALATION) at 02:55

## 2023-08-08 RX ADMIN — ASPIRIN 81 MG: 81 TABLET, COATED ORAL at 08:35

## 2023-08-08 RX ADMIN — FENTANYL CITRATE 50 MCG/ML: 50 INJECTION, SOLUTION INTRAMUSCULAR; INTRAVENOUS at 03:21

## 2023-08-08 RX ADMIN — IPRATROPIUM BROMIDE AND ALBUTEROL SULFATE 3 ML: 2.5; .5 SOLUTION RESPIRATORY (INHALATION) at 18:52

## 2023-08-08 RX ADMIN — ROSUVASTATIN CALCIUM 40 MG: 20 TABLET, FILM COATED ORAL at 20:23

## 2023-08-08 RX ADMIN — MIDAZOLAM HYDROCHLORIDE 1 MG: 1 INJECTION, SOLUTION INTRAMUSCULAR; INTRAVENOUS at 02:53

## 2023-08-08 RX ADMIN — SODIUM CHLORIDE 1000 ML: 9 INJECTION, SOLUTION INTRAVENOUS at 03:22

## 2023-08-08 RX ADMIN — TICAGRELOR 180 MG: 90 TABLET ORAL at 02:45

## 2023-08-08 RX ADMIN — IPRATROPIUM BROMIDE AND ALBUTEROL SULFATE 3 ML: 2.5; .5 SOLUTION RESPIRATORY (INHALATION) at 07:24

## 2023-08-08 RX ADMIN — Medication 10 ML: at 08:36

## 2023-08-08 RX ADMIN — GABAPENTIN 200 MG: 100 CAPSULE ORAL at 14:48

## 2023-08-08 RX ADMIN — METOPROLOL TARTRATE 25 MG: 25 TABLET, FILM COATED ORAL at 08:35

## 2023-08-08 RX ADMIN — HEPARIN SODIUM 5000 UNITS: 5000 INJECTION, SOLUTION INTRAVENOUS; SUBCUTANEOUS at 02:45

## 2023-08-08 RX ADMIN — DEXAMETHASONE SODIUM PHOSPHATE 10 MG: 10 INJECTION INTRAMUSCULAR; INTRAVENOUS at 04:17

## 2023-08-08 RX ADMIN — OXYCODONE HYDROCHLORIDE 10 MG: 10 TABLET ORAL at 15:22

## 2023-08-08 RX ADMIN — Medication 10 ML: at 20:23

## 2023-08-08 RX ADMIN — SODIUM BICARBONATE 150 MEQ: 84 INJECTION INTRAVENOUS at 02:24

## 2023-08-08 RX ADMIN — PROPOFOL 40 MCG/KG/MIN: 10 INJECTION, EMULSION INTRAVENOUS at 02:50

## 2023-08-08 RX ADMIN — ETOMIDATE 10 MG: 2 INJECTION, SOLUTION INTRAVENOUS at 01:58

## 2023-08-08 RX ADMIN — MIDAZOLAM HYDROCHLORIDE 1 MG: 1 INJECTION, SOLUTION INTRAMUSCULAR; INTRAVENOUS at 03:25

## 2023-08-08 RX ADMIN — METOPROLOL TARTRATE 25 MG: 25 TABLET, FILM COATED ORAL at 20:23

## 2023-08-08 RX ADMIN — PROPOFOL 40 MG: 10 INJECTION, EMULSION INTRAVENOUS at 02:05

## 2023-08-08 RX ADMIN — IOPAMIDOL 80 ML: 755 INJECTION, SOLUTION INTRAVENOUS at 04:01

## 2023-08-08 RX ADMIN — PROPOFOL 20 MCG/KG/MIN: 10 INJECTION, EMULSION INTRAVENOUS at 02:19

## 2023-08-08 RX ADMIN — METHYLPREDNISOLONE SODIUM SUCCINATE 62.5 MG: 1 INJECTION INTRAMUSCULAR; INTRAVENOUS at 08:36

## 2023-08-08 RX ADMIN — SUCCINYLCHOLINE CHLORIDE 50 MG: 20 INJECTION, SOLUTION INTRAMUSCULAR; INTRAVENOUS at 01:59

## 2023-08-08 RX ADMIN — GABAPENTIN 200 MG: 100 CAPSULE ORAL at 20:23

## 2023-08-08 RX ADMIN — HEPARIN SODIUM 10 UNITS/KG/HR: 10000 INJECTION, SOLUTION INTRAVENOUS at 05:00

## 2023-08-08 RX ADMIN — ALBUTEROL SULFATE 2.5 MG: 2.5 SOLUTION RESPIRATORY (INHALATION) at 02:54

## 2023-08-08 NOTE — H&P
"    [facility] HISTORY AND PHYSICAL    Patient Identification:  Name:  Paul Gomez  Age:  77 y.o.  Sex:  male  :  1945  MRN:  [id number]   Visit Number:  [id number]  Admit Date: 2023   Room number:  101/01  Primary Care Physician:  Luis Dobbs,     Date of Admission: 2023     Subjective     Chief complaint:    Chief Complaint   Patient presents with    Shortness of Breath    Loss of Consciousness    Chest Pain     History of presenting illness:  77 y.o. male who was admitted on 2023 from the ED with gurgling sound heard by the family.  Please note that the patient is currently intubated and thus I am unable to obtain an HPI from him.  Please note that no family members are at bedside.  Upon review of his medical records, the patient has a history of essential hypertension, hyperlipidemia, COPD, gout, bilateral hearing loss, and chronic low back pain.  Per my review of the emergency department records and my discussion of the patient with the emergency department attending, the patient went to bed at midnight and family noted him gurgling 30 minutes later.  Patient was very confused and thus they called EMS.  When EMS arrived, they described the patient as \"guppy breathing\".  Patient was placed on 15 L nonrebreather and brought into the emergency department as an infield STEMI.  The emergency department performed a 1 push and Dr. Sanchez with interventional cardiology came to the ER to evaluate the patient.  The patient was so dyspneic that he was intubated almost immediately.  His initial ABG the right after he was intubated showed pH 7.065 with a PaCO2 of 68.9.  The first EKG performed prior to intubation showed ST elevation in the inferior and lateral leads; repeat EKG after intubation showed resolution of these changes.  Dr. Sanchez thus determined that the patient was not having a real ST elevation MI and thus elected to not take the patient to " the catheterization lab.  Thus, the patient will be admitted to the medicine service and placed in the critical care unit for treatment of a suspected acute COPD exacerbation causing acute hypercapnic respiratory failure.    Of note, upon review of the [facility] report, the patient is chronically prescribed 800 of gabapentin 3 times and Roxicodone 15 mg every 6 hrs.   ---------------------------------------------------------------------------------------------------------------------   Review of Systems   Unable to perform ROS: Intubated   ---------------------------------------------------------------------------------------------------------------------   Past Medical History:   Diagnosis Date    COPD (chronic obstructive pulmonary disease)     Gout     Hearing loss     History of degenerative disc disease     Hyperlipidemia     Hypertension     Low back pain     Pedal edema     Prediabetes      Past Surgical History:   Procedure Laterality Date    CHOLECYSTECTOMY      HIP HEMIARTHROPLASTY Left 2023    Procedure: HIP HEMIARTHROPLASTY;  Surgeon: Ananth Bates DO;  Location: [facility];  Service: Orthopedics;  Laterality: Left;    LUMBAR DISCECTOMY      SHOULDER SURGERY Left      Family History   Problem Relation Age of Onset    Cancer Mother      Social History     Socioeconomic History    Marital status:    Tobacco Use    Smoking status: Former     Packs/day: 0.50     Years: 60.00     Pack years: 30.00     Types: Cigarettes     Start date:      Quit date:      Years since quittin.6    Smokeless tobacco: Never    Tobacco comments:     patient stated he is down to about 4 cigarettes a day   Vaping Use    Vaping Use: Some days    Substances: Nicotine    Devices: Disposable   Substance and Sexual Activity    Alcohol use: No    Drug use: No    Sexual activity: Defer     ---------------------------------------------------------------------------------------------------------------------    Allergies:  Patient has no known allergies.  ---------------------------------------------------------------------------------------------------------------------   Medications below are reported home medications pulling from within the system; at this time, these medications have not been reconciled unless otherwise specified and are in the verification process for further verification as current home medications.      Prior to Admission Medications       Prescriptions Last Dose Informant Patient Reported? Taking?    albuterol sulfate  (90 Base) MCG/ACT inhaler   No Yes    Inhale 2 puffs Every 6 (Six) Hours As Needed for Wheezing or Shortness of Air.    allopurinol (ZYLOPRIM) 100 MG tablet   No Yes    Take 1 tablet by mouth Daily.    celecoxib (CeleBREX) 200 MG capsule   No Yes    Take 1 capsule by mouth Daily.    fluticasone-salmeterol (ADVAIR HFA) 230-21 MCG/ACT inhaler   No Yes    Inhale 2 puffs 2 (Two) Times a Day.    gabapentin (NEURONTIN) 800 MG tablet   Yes Yes    Take 1 tablet by mouth 3 (Three) Times a Day.    ketoconazole (NIZORAL) 2 % shampoo   Yes Yes    oxyCODONE (ROXICODONE) 15 MG immediate release tablet   Yes Yes    Take 1 tablet by mouth Every 6 (Six) Hours As Needed for Moderate Pain.    aspirin 81 MG EC tablet  Self Yes No    Take 1 tablet by mouth Daily.    ipratropium-albuterol (DUO-NEB) 0.5-2.5 mg/3 ml nebulizer   No No    Take 3 mL by nebulization Every 4 (Four) Hours As Needed for Wheezing or Shortness of Air.          Objective     Vital Signs:  Temp:  [97.4 øF (36.3 øC)] 97.4 øF (36.3 øC)  Heart Rate:  [] 100  Resp:  [20-30] 20  BP: (121-203)/() 149/89  FiO2 (%):  [40 %-100 %] 40 %    Mean Arterial Pressure (Non-Invasive) for the past 24 hrs (Last 3 readings):   Noninvasive MAP (mmHg)   08/08/23 0359 113   08/08/23 0333 98   08/08/23 0328 115     SpO2:  [96 %-100 %] 100 %  on   ;   Device (Oxygen Therapy): ventilator  Body mass index is 20.53 kg/mý.    Wt Readings from  Last 3 Encounters:   08/08/23 61.2 kg (135 lb)   06/19/23 61.3 kg (135 lb 3.2 oz)   06/05/23 61.8 kg (136 lb 3.2 oz)      ----------------------------------------------------------------------------------------------------------------------  Physical Exam  Vitals and nursing note reviewed.   Constitutional:       General: He is in acute distress.      Appearance: He is well-developed and underweight. He is ill-appearing. He is not toxic-appearing or diaphoretic.      Interventions: He is sedated and intubated.   HENT:      Head: Normocephalic and atraumatic.      Right Ear: External ear normal.      Left Ear: External ear normal.      Nose: Nose normal.   Eyes:      General: No scleral icterus.        Right eye: No discharge.         Left eye: No discharge.      Conjunctiva/sclera: Conjunctivae normal.      Pupils: Pupils are equal, round, and reactive to light.   Cardiovascular:      Rate and Rhythm: Normal rate and regular rhythm.      Pulses: Normal pulses.      Heart sounds: No murmur heard.  Pulmonary:      Effort: Accessory muscle usage, prolonged expiration and respiratory distress present. He is intubated.      Breath sounds: Decreased air movement present. No transmitted upper airway sounds. Examination of the right-middle field reveals rales. Examination of the right-lower field reveals rales. Rales present. No wheezing.      Comments: Please note that the breath sounds on the right lower lobe were louder than those on the left lower lobe.  Abdominal:      General: Bowel sounds are normal. There is no distension.      Palpations: Abdomen is soft.      Comments: An OG tube was present and it was on continuous wall suction.  There was about 200 mL or rust colored stomach contents in the collection container.   Genitourinary:     Comments: Balderas catheter in place with very dilute and pale yellow urine in the Balderas catheter.  Musculoskeletal:         General: No swelling, deformity or signs of injury.    Skin:     Capillary Refill: Capillary refill takes less than 2 seconds.      Coloration: Skin is not jaundiced or pale.   Neurological:      Comments: The patient is intubated and being sedated with Versed drip and propofol drip; therefore, I was unable to perform this portion of the physical examination.  However, the patient's pupillary reflexes were normal.  He did not open his eyes with tactile nor verbal stimulation but I did see him moving his mouth around the ET tube, as if he was making chewing movements.   Psychiatric:      Comments: The patient is intubated and being sedated with Versed drip and propofol drip; therefore, I was unable to perform this portion of the physical examination.     ---------------------------------------------------------------------------------------------------------------------  EKG: EKG prior to intubation is as follows: Sinus tachycardia with a heart rate of 139 and ST elevation in the inferior and lateral leads.  EKG after intubation is as follows: Sinus tachycardia with heart rate of 127 and QTc of 415 ms.  The prior ST elevation in the inferior and lateral leads has resolved.  Please note that I compared both admission EKGs with 2 EKGs dated 1/20/2023 and 1/25/2023; the post intubation EKG is similar to the comparison ones.  Please note that I have personally looked at the EKG from this admission, the comparison EKGs in the medical records, and the above is my interpretation of this admission's EKG; I await the final cardiology read.      Telemetry: Normal sinus rhythm with heart rates 80s to 90s.  Please note that I personally looked at the telemetry strips.      Last echocardiogram:  Results for orders placed during the hospital encounter of 04/11/19    Adult Transthoracic Echo Limited W/ Cont if Necessary Per Protocol    Interpretation Summary  ú The study is technically difficult for diagnosis.Apical views only obtained..  ú Normal left ventricular cavity size and  wall thickness noted. All left ventricular wall segments contract normally  ú Estimated EF appears to be in the range of 61 - 65%.  ú Left ventricular diastolic dysfunction (grade I) consistent with impaired relaxation.  ú The mitral valve is normal in structure. No mitral valve regurgitation is present. No significant mitral valve stenosis is present.  ú The aortic valve is not well visualized  ú Tricuspid valve not well visualized.  ú There is no evidence of pericardial effusion.    I have personally read the ECHO final report.  --------------------------------------------------------------------------------------------------------------------  LABS:    CBC and coagulation:  Results from last 7 days   Lab Units 08/08/23  0214 08/08/23  0208   LACTATE mmol/L 5.3*  --    WBC 10*3/mm3  --  12.13*   HEMOGLOBIN g/dL  --  13.0   HEMATOCRIT %  --  40.9   MCV fL  --  100.2*   MCHC g/dL  --  31.8   PLATELETS 10*3/mm3  --  201   INR   --  1.00     Acid/base balance:  Results from last 7 days   Lab Units 08/08/23  0328 08/08/23  0215   PH, ARTERIAL pH units 7.367 7.065*   PCO2, ARTERIAL mm Hg 50.5* 68.9*   PO2 ART mm Hg 92.1 348.0*   HCO3 ART mmol/L 28.9* 19.7*     Renal and electrolytes:  Results from last 7 days   Lab Units 08/08/23  0250   SODIUM mmol/L 145   POTASSIUM mmol/L 4.8   MAGNESIUM mg/dL 2.1   CHLORIDE mmol/L 109*   CO2 mmol/L 26.3   BUN mg/dL 12   CREATININE mg/dL 0.94   CALCIUM mg/dL 8.3*   GLUCOSE mg/dL 162*   ANION GAP mmol/L 9.7     Estimated Creatinine Clearance: 57 mL/min (by C-G formula based on SCr of 0.94 mg/dL).    Liver and pancreatic function:  Results from last 7 days   Lab Units 08/08/23  0250   ALBUMIN g/dL 3.5   BILIRUBIN mg/dL 0.3   ALK PHOS U/L 87   AST (SGOT) U/L 76*   ALT (SGPT) U/L 35     Endocrine function:  Lab Results   Component Value Date    HGBA1C 5.40 01/20/2023     Lab Results   Component Value Date    TSH 6.060 (H) 01/20/2023    FREET4 1.39 01/20/2023     Cardiac:  Results from  last 7 days   Lab Units 08/08/23  0250   HSTROP T ng/L 107*       Cultures:  Lab Results   Component Value Date    COLORU Yellow 01/25/2023    CLARITYU Clear 01/25/2023    PHUR 7.0 01/25/2023    PROTEINUR 16.0 04/11/2019    GLUCOSEU Negative 01/25/2023    KETONESU Negative 01/25/2023    BLOODU Negative 01/25/2023    NITRITEU Negative 01/25/2023    LEUKOCYTESUR Negative 01/25/2023    BILIRUBINUR Negative 01/25/2023    UROBILINOGEN 0.2 E.U./dL 01/25/2023    RBCUA 0-2 04/11/2019    WBCUA 0-2 04/11/2019    BACTERIA None Seen 04/11/2019     Microbiology Results (last 10 days)       Procedure Component Value - Date/Time    COVID PRE-OP / PRE-PROCEDURE SCREENING ORDER (NO ISOLATION) - Swab, Nasopharynx [[id number]]  (Normal) Collected: 08/08/23 0207    Lab Status: Final result Specimen: Swab from Nasopharynx Updated: 08/08/23 0232    Narrative:      The following orders were created for panel order COVID PRE-OP / PRE-PROCEDURE SCREENING ORDER (NO ISOLATION) - Swab, Nasopharynx.  Procedure                               Abnormality         Status                     ---------                               -----------         ------                     COVID-19 and FLU A/B PCR...[[id number]]  Normal              Final result                 Please view results for these tests on the individual orders.    COVID-19 and FLU A/B PCR - Swab, Nasopharynx [[id number]]  (Normal) Collected: 08/08/23 0207    Lab Status: Final result Specimen: Swab from Nasopharynx Updated: 08/08/23 0232     COVID19 Not Detected     Influenza A PCR Not Detected     Influenza B PCR Not Detected    Narrative:      Fact sheet for providers: https://www.fda.gov/media/480486/download    Fact sheet for patients: https://www.fda.gov/media/824102/download    Test performed by PCR.          I have personally looked at the labs and they are summarized  above.  ----------------------------------------------------------------------------------------------------------------------  Detailed radiology reports for the last 24 hours:    Imaging Results (Last 24 Hours)       Procedure Component Value Units Date/Time    CT Head Without Contrast [[id number]] Collected: 08/08/23 0440     Updated: 08/08/23 0447    Narrative:      Examination: CT head without contrast     CLINICAL HISTORY: Altered mental status     COMPARISON: None. Correlation is made to the report from the MRI  4/15/2019. No images are available for review.     TECHNIQUE: Contiguous 3 mm thick slices were obtained from the skull  base to the vertex without contrast. Coronal and sagittal reformats were  performed.     FINDINGS:  Mild generalized volume loss with prominence of the sulci and  ventricular system. White matter changes are noted in a pattern  suggesting chronic small vessel ischemic disease. There is no acute  intracranial hemorrhage. Areas of low-attenuation are noted within both  occipital regions. This is likely the sequela of remote ischemia and was  described on prior MRI 4/15/2019 although no images are available for  review. There is no acute intracranial hemorrhage. No definite acute  territorial infarct. No extra-axial collection. No shift of midline  structures. No acute calvarial fracture. The visualized paranasal  sinuses and mastoid air cells are relatively clear.       Impression:      1. No acute intracranial process. If symptoms persist, MRI is  recommended for further evaluation.  2. Low-attenuation areas within the posterior occipital regions. These  are likely related to remote ischemia.     This report was finalized on 8/8/2023 4:45 AM by Ag Menendez MD.       CT Abdomen Pelvis Without Contrast [[id number]] Collected: 08/08/23 0437     Updated: 08/08/23 0442    Narrative:      Examination: CT abdomen and pelvis without contrast     CLINICAL HISTORY: Acute respiratory failure      COMPARISON: None     TECHNIQUE: Contiguous 5 mm thick slices were obtained through the  abdomen and pelvis without contrast. Coronal and sagittal reformats were  performed.     FINDINGS:     ABDOMEN:  For description of the findings within the lung bases, please refer to  the dedicated chest CT performed at the same time. There has been prior  cholecystectomy. The lack of intravenous contrast material limits  evaluation of the solid abdominal viscera. However, no contour deforming  lesion is appreciated within the unenhanced liver, spleen, adrenal  glands or kidneys. Slight fullness of both renal collecting systems is  likely on the basis of extrarenal pelves. No definite hydronephrosis is  appreciated. No ureteral calculus. A Balderas catheter is in place. The  bladder is quite distended. Recommend close correlation for functional  status of the catheter. A left hip arthroplasty is noted with resultant  artifacts. The pancreas is rather atrophic. Calcifications within the  pancreas are presumably related to changes associated with chronic  pancreatitis. Recommend close correlation with history.     PELVIS:   Prominent formed stool is noted suggesting constipation. There is no  bowel obstruction. No free air is identified suggest perforation. There  is degradation of image quality due to motion artifact. No colitis or  diverticulitis. The appendix is not well visualized. No definite  inflammatory changes are appreciated within the right lower quadrant  suggest acute appendicitis.     Extensive atherosclerotic calcification is noted within the aorta and  its major branches.       Impression:      1. Prominent formed colonic stool suggesting constipation.  2. No bowel obstruction or perforation.  3. No hydronephrosis or ureteral calculus.  4. Likely sequela of prior episodes of pancreatitis.     This report was finalized on 8/8/2023 4:40 AM by Ag Menendez MD.       CT Angiogram Chest Pulmonary Embolism [[id number]]  Collected: 08/08/23 0432     Updated: 08/08/23 0438    Narrative:      Examination: CT angiogram chest with contrast     CLINICAL HISTORY: Short of breath     COMPARISON: None     TECHNIQUE: Contiguous 2 mm thick slices were obtained through the chest  after the administration of intravenous contrast. Coronal and sagittal  reformats were performed.     FINDINGS:  Endotracheal and nasogastric tubes are noted. The thoracic aorta is  normal in caliber. No aneurysmal dilatation. No dissection. The central  pulmonary arteries are normal in caliber. No pulmonary embolus. No  pneumothorax. There is mild degradation of image quality due to motion  artifact. Patchy left lower lobe airspace opacity likely represents  pneumonia in the correct clinical setting. Likely scarring or  atelectasis posteriorly within the lower lobes. Bronchial wall  thickening is noted suggesting ongoing inflammation commonly seen in the  setting of bronchitis or reactive airway disease. No upper abdominal  ascites. There is been prior cholecystectomy.       Impression:      1. No pulmonary embolus.  2. Normal caliber thoracic aorta without aneurysmal dilatation.  3. Patchy airspace disease most prevalent within the left lower lobe  likely representing pneumonia in the correct clinical setting.     This report was finalized on 8/8/2023 4:36 AM by Ag Menendez MD.       XR Chest 1 View [[id number]] Collected: 08/08/23 0341     Updated: 08/08/23 0345    Narrative:      CLINICAL HISTORY: Central line confirmation.     COMPARISON: None available.     TECHNIQUE: Single portable upright AP view of the chest was obtained.     FINDINGS: Endotracheal tube tip is 3.9 cm above the jules.  Right  internal jugular vein central venous catheter tip is in the mid SVC.   Nasogastric tube tip is not seen but is below the gastroesophageal  junction.     There is no pneumothorax or pleural effusion.  Heart size is normal.   The aortic knob is calcified.  Lungs are  clear.       Impression:         RIGHT INTERNAL JUGULAR VEIN CATHETER TIP IN THE MID SVC.  NEGATIVE FOR  PNEUMOTHORAX.     This report was finalized on 8/8/2023 3:42 AM by Ariadna Steen MD.       XR Chest 1 View [[id number]] Collected: 08/08/23 0226     Updated: 08/08/23 0229    Narrative:      INDICATION: Tube placement     TECHNIQUE: Frontal radiograph of the chest.     COMPARISON: Chest radiograph 6/5/2023       Impression:      FINDINGS/IMPRESSION:   Endotracheal tube tip approximately 2.5 cm above the jules. Enteric  tube in stomach.  Chronic appearing interstitial lung markings. No pulmonary infiltrate,  pleural effusion or pneumothorax. The heart is not enlarged.  Retained colonic stool. No bowel dilation. No free air in the abdomen.     This report was finalized on 8/8/2023 2:27 AM by Alex Pallas, DO.       XR Abdomen KUB [[id number]] Collected: 08/08/23 0226     Updated: 08/08/23 0229    Narrative:      INDICATION: Tube placement     TECHNIQUE: Frontal radiograph of the chest.     COMPARISON: Chest radiograph 6/5/2023       Impression:      FINDINGS/IMPRESSION:   Endotracheal tube tip approximately 2.5 cm above the jules. Enteric  tube in stomach.  Chronic appearing interstitial lung markings. No pulmonary infiltrate,  pleural effusion or pneumothorax. The heart is not enlarged.  Retained colonic stool. No bowel dilation. No free air in the abdomen.     This report was finalized on 8/8/2023 2:27 AM by Alex Pallas, DO.             I have personally looked at the radiology images and I have read the available final reports.    Assessment & Plan       -Acute hypercapnic respiratory failure that was present on admission, due to an acute COPD exacerbation that then led to ST elevations in the inferior and lateral leads  -ST elevation in the inferior and lateral leads, present on admission, deemed not an ST elevation MI by interventional cardiology  -Elevated troponin on admission, undetermined which type of MI  at this time  -Meets CMS criteria for septic shock, present on admission, due to left lower lobe pneumonia (lactic acid 5.3, respiratory rate 30, heart rate 141, white blood cell count 12,130)  -Acute and severe systemic acidemia that was present on admission, due to respiratory acidosis, also present is a partially compensatory metabolic alkalosis  -Lactic acidosis that was present admission, clinically significant, suspect multifactorial from acute hypercapnic respiratory failure and sepsis  -Incidentally found low-attenuation areas within the posterior occipital regions, likely related to remote ischemia, found on CT scan of the head this admission  -History of essential hypertension  -History of hyperlipidemia  -History of gout  -History of chronic low back pain  -History of bilateral hearing loss    Will admit to the critical care unit.  Will start the patient on Levaquin, Solu-Medrol, and scheduled DuoNeb breathing treatments.  Will order a sputum culture, an expanded respiratory pain, and Streptococcal pneumoniae.  Will consult pulmonology.  Will start a heparin drip as the troponin increased by 20 in 2 hours; await further recommendations from cardiology.  Will start on metoprolol so as to make the heart rates around 60 in order to decrease any damage to his heart from an acute MI.  Will continue giving aspirin daily and add Crestor.  Patient received Brilinta and aspirin while in the emergency department.  Will trend the creatinine, electrolytes, and troponin levels.  Will perform serial EKGs.  For sedation, we will continue the Versed drip and the propofol that was started in the emergency department; even with this medication, the patient was still combative in the emergency department and trying to pull out the ET tube.  Thus, I gave a one-time dose of 6 mg of Versed IV push and ordered soft wrist restraints.  If the patient continues to be combative, then may have to either increase the Versed drip or  add fentanyl.  Will obtain echocardiogram to evaluate the heart post MI/respiratory.  Will obtain strict input and output measurements.  Will trend the lactic acid levels.        VTE Prophylaxis:  Pharmalogical Order History:        Ordered     Dose Route Frequency Stop    08/08/23 0432  heparin [id number] units/250 mL (100 units/mL) in 0.45 % NaCl infusion  6.12 mL/hr         10 Units/kg/hr IV Titrated --    08/08/23 0404  heparin [id number] units/250 mL (100 units/mL) in 0.45 % NaCl infusion  7.34 mL/hr,   Status:  Discontinued         12 Units/kg/hr IV Titrated 08/08/23 0407    08/08/23 0404  Pharmacy to Dose Heparin         -- XX Continuous PRN --    08/08/23 0222  heparin (porcine) 5000 UNIT/ML injection 5,000 Units         5,000 Units SC Once 08/08/23 0245                  The patient is high risk due to the following diagnoses/reasons: Acute hypercapnic respiratory failure resulting in temporary ST elevations in the inferior and lateral leads, severe and acute systemic acidemia, and CMS criteria for septic shock    Total critical care time is 45 minutes.    Edita Jones MD  [facility]  08/08/23  05:11 EDT

## 2023-08-08 NOTE — CONSULTS
Consults    Patient Identification:    Name:  Paul Gomez  Age:  77 y.o.  Sex:  male  :  1945  MRN:  [id number]  Visit Number:  [id number]  Primary care provider:  Luis Dobbs DO    Chief complaint:   Shortness of breath    History of presenting illness:   Patient is a 77-year-old gentleman with a known history of hypertension, dyslipidemia, chronic COPD from tobacco smoking, presented to our emergency room for sudden shortness of breath, patient was altered mental status and was intubated to protect airways.  His initial EKG in the EMS showed subtle inferior lead ST elevations in the setting of sinus tachycardia, repeat EKG showed resolved ST changes in the inferior leads and there was no evidence of acute ST-T changes.  Patient emergent ABG did show hypercapnic respiratory failure likely secondary to COPD exacerbation with his history of COPD and past history of COPD exacerbations.  ---------------------------------------------------------------------------------------------------------------------  Review of Systems not obtained as patient was intubated  ---------------------------------------------------------------------------------------------------------------------   Past History:   Family History   Problem Relation Age of Onset    Cancer Mother      Past Medical History:   Diagnosis Date    COPD (chronic obstructive pulmonary disease)     Gout     Hearing loss     History of degenerative disc disease     Hyperlipidemia     Hypertension     Low back pain     Pedal edema     Prediabetes      Past Surgical History:   Procedure Laterality Date    CHOLECYSTECTOMY      HIP HEMIARTHROPLASTY Left 2023    Procedure: HIP HEMIARTHROPLASTY;  Surgeon: Ananth Bates DO;  Location: [facility];  Service: Orthopedics;  Laterality: Left;    LUMBAR DISCECTOMY      SHOULDER SURGERY Left      Social History     Socioeconomic History    Marital status:    Tobacco Use    Smoking status:  Former     Packs/day: 0.50     Years: 60.00     Pack years: 30.00     Types: Cigarettes     Start date:      Quit date:      Years since quittin.6    Smokeless tobacco: Never    Tobacco comments:     patient stated he is down to about 4 cigarettes a day   Vaping Use    Vaping Use: Some days    Substances: Nicotine    Devices: Disposable   Substance and Sexual Activity    Alcohol use: No    Drug use: No    Sexual activity: Defer     ---------------------------------------------------------------------------------------------------------------------   Allergies:  Patient has no known allergies.  ---------------------------------------------------------------------------------------------------------------------   Prior to Admission Medications       Prescriptions Last Dose Informant Patient Reported? Taking?    albuterol sulfate  (90 Base) MCG/ACT inhaler   No No    Inhale 2 puffs Every 6 (Six) Hours As Needed for Wheezing or Shortness of Air.    allopurinol (ZYLOPRIM) 100 MG tablet   No No    Take 1 tablet by mouth Daily.    aspirin 81 MG EC tablet  Self Yes No    Take 1 tablet by mouth Daily.    celecoxib (CeleBREX) 200 MG capsule   No No    Take 1 capsule by mouth Daily.    fluticasone-salmeterol (ADVAIR HFA) 230-21 MCG/ACT inhaler   No No    Inhale 2 puffs 2 (Two) Times a Day.    gabapentin (NEURONTIN) 600 MG tablet   No No    Take 0.5 tablets by mouth 3 (Three) Times a Day.    ipratropium-albuterol (DUO-NEB) 0.5-2.5 mg/3 ml nebulizer   No No    Take 3 mL by nebulization Every 4 (Four) Hours As Needed for Wheezing or Shortness of Air.    ketoconazole (NIZORAL) 2 % shampoo   Yes No    oxyCODONE (ROXICODONE) 10 MG tablet   No No    Take 0.5 tablets by mouth 2 (Two) Times a Day As Needed for Moderate Pain.    triamcinolone (KENALOG) 0.1 % cream   No No    Apply 1 application topically to the appropriate area as directed 2 (Two) Times a Day.          [facility] Meds:  aspirin, 300 mg, Rectal,  Once  dexamethasone, 10 mg, Intravenous, Once  heparin (porcine), 5,000 Units, Subcutaneous, Once  midazolam, , ,   senna-docusate sodium, 2 tablet, Oral, BID  ticagrelor, 180 mg, Oral, Once      propofol, 5-50 mcg/kg/min, Last Rate: 20 mcg/kg/min (08/08/23 0219)      ---------------------------------------------------------------------------------------------------------------------   Vital Signs:  Temp:  [97.4 øF (36.3 øC)] 97.4 øF (36.3 øC)  Heart Rate:  [124-141] 124  Resp:  [20-30] 20  BP: (203)/(129) 203/129  FiO2 (%):  [50 %-100 %] 50 %      08/08/23  0203   Weight: 61.2 kg (135 lb)     Body mass index is 20.53 kg/mý.  ---------------------------------------------------------------------------------------------------------------------   Physical exam:   Constitutional:  Well-developed and well-nourished.  No respiratory distress.      HENT:  Head: Normocephalic and atraumatic.  Mouth:  Moist mucous membranes.    Eyes:  Conjunctivae and EOM are normal.  Pupils are equal, round, and reactive to light.  No scleral icterus.  Neck:  Neck supple.  No JVD present.    Cardiovascular:  Normal rate, regular rhythm and normal heart sounds with no murmur.  Pulmonary/Chest:  No respiratory distress, no wheezes, no crackles, with normal breath sounds and good air movement.  Abdominal:  Soft.  Bowel sounds are normal.  No distension and no tenderness.   Musculoskeletal:  No edema, no tenderness, and no deformity.  No red or swollen joints anywhere.    Neurological:  Alert and oriented to person, place, and time.  No cranial nerve deficit.  No tongue deviation.  No facial droop.  No slurred speech.   Skin:  Skin is warm and dry.  No rash noted.  No pallor.   Psychiatric:  Normal mood and affect.  Behavior is normal.  Judgment and thought content normal.   Peripheral vascular:  No edema and strong pulses on all 4  extremities.    ---------------------------------------------------------------------------------------------------------------------   EKG: Sinus tachycardia, nonspecific ST-T changes  Telemetry: Sinus tachycardia  I have personally looked at both the EKG and the telemetry strips.  Echo:  Results for orders placed during the hospital encounter of 04/11/19    Adult Transthoracic Echo Limited W/ Cont if Necessary Per Protocol    Interpretation Summary  ú The study is technically difficult for diagnosis.Apical views only obtained..  ú Normal left ventricular cavity size and wall thickness noted. All left ventricular wall segments contract normally  ú Estimated EF appears to be in the range of 61 - 65%.  ú Left ventricular diastolic dysfunction (grade I) consistent with impaired relaxation.  ú The mitral valve is normal in structure. No mitral valve regurgitation is present. No significant mitral valve stenosis is present.  ú The aortic valve is not well visualized  ú Tricuspid valve not well visualized.  ú There is no evidence of pericardial effusion.    ---------------------------------------------------------------------------------------------------------------------   Results from last 7 days   Lab Units 08/08/23  0214 08/08/23  0208   LACTATE mmol/L 5.3*  --    WBC 10*3/mm3  --  12.13*   HEMOGLOBIN g/dL  --  13.0   HEMATOCRIT %  --  40.9   MCV fL  --  100.2*   MCHC g/dL  --  31.8   PLATELETS 10*3/mm3  --  201   INR   --  1.00     Results from last 7 days   Lab Units 08/08/23  0215   PH, ARTERIAL pH units 7.065*   PO2 ART mm Hg 348.0*   PCO2, ARTERIAL mm Hg 68.9*   HCO3 ART mmol/L 19.7*           Invalid input(s): PROTCrCl cannot be calculated (Patient's most recent lab result is older than the maximum 30 days allowed.).  No results found for: AMMONIA          Lab Results   Component Value Date    HGBA1C 5.40 01/20/2023     Lab Results   Component Value Date    TSH 6.060 (H) 01/20/2023    FREET4 1.39 01/20/2023      No results found for: PREGTESTUR, PREGSERUM, HCG, HCGQUANT  Pain Management Panel  More data exists         Latest Ref Rng & Units 4/11/2019 12/6/2018   Pain Management Panel   Creatinine, Urine mg/dL 75.0  130.3  130.3    Amphetamine, Urine Qual Negative Negative  -   Barbiturates Screen, Urine Negative Negative  -   Benzodiazepine Screen, Urine Negative Negative  -   Buprenorphine, Screen, Urine Negative Negative  -   Cocaine Screen, Urine Negative Negative  -   Methadone Screen , Urine Negative Negative  -     No results found for: BLOODCX  No results found for: URINECX  No results found for: WOUNDCX  No results found for: STOOLCX        ---------------------------------------------------------------------------------------------------------------------   Imaging Results (Last 7 Days)       Procedure Component Value Units Date/Time    XR Chest 1 View [[id number]] Collected: 08/08/23 0226     Updated: 08/08/23 0229    Narrative:      INDICATION: Tube placement     TECHNIQUE: Frontal radiograph of the chest.     COMPARISON: Chest radiograph 6/5/2023       Impression:      FINDINGS/IMPRESSION:   Endotracheal tube tip approximately 2.5 cm above the jules. Enteric  tube in stomach.  Chronic appearing interstitial lung markings. No pulmonary infiltrate,  pleural effusion or pneumothorax. The heart is not enlarged.  Retained colonic stool. No bowel dilation. No free air in the abdomen.     This report was finalized on 8/8/2023 2:27 AM by Alex Pallas, DO.       XR Abdomen KUB [[id number]] Collected: 08/08/23 0226     Updated: 08/08/23 0229    Narrative:      INDICATION: Tube placement     TECHNIQUE: Frontal radiograph of the chest.     COMPARISON: Chest radiograph 6/5/2023       Impression:      FINDINGS/IMPRESSION:   Endotracheal tube tip approximately 2.5 cm above the jules. Enteric  tube in stomach.  Chronic appearing interstitial lung markings. No pulmonary infiltrate,  pleural effusion or pneumothorax. The heart  is not enlarged.  Retained colonic stool. No bowel dilation. No free air in the abdomen.     This report was finalized on 8/8/2023 2:27 AM by Alex Pallas, DO.             ----------------------------------------------------------------------------------------------------------------------  Assessment:   Acute hypoxic hypercapnic respiratory failure secondary to likely COPD exacerbation requiring emergent endotracheal intubation  Lactic acidosis suspect secondary to hypercapnic respiratory failure  Hypertension  Dyslipidemia      Plan:   I have called to the emergency room physician and also with the patient family that his EKG is not consistent with ST elevation MI.  I would anticipate that his troponins would elevate given his presentation of acute hypoxic hypercapnic respiratory failure and he also has lactic acidosis, if troponins are significant elevated I would recommend to start on IV heparin drip and continue to trend troponins.  We will check an echocardiogram in a.m.  I will defer further management of his respiratory status to internal medicine team.  Other labs are pending at this time.  Thank you for the consult.          Tony Sanchez MD, [facility] with [facility]  Interventional Cardiology      08/08/23  02:55 EDT

## 2023-08-08 NOTE — PROGRESS NOTES
[facility] HOSPITALIST PROGRESS NOTE     Patient Identification:  Name:  Paul Gomez  Age:  77 y.o.  Sex:  male  :  1945  MRN:  [id number]  Visit Number:  [id number]  ROOM: [address]     Primary Care Provider:  Luis Dobbs DO    Length of stay in inpatient status:  0    Subjective     Chief Compliant:    Chief Complaint   Patient presents with    Shortness of Breath    Loss of Consciousness    Chest Pain       History of Presenting Illness: Patient seen and evaluated in follow-up for acute hypercapnic respiratory failure admitted earlier this morning by overnight hospitalist service.  Patient with markedly improved ABG and proceeding with breathing trial.  Overnight patient and documentation.    Objective     Current Hospital Meds:  allopurinol, 100 mg, Oral, Daily  aspirin, 81 mg, Oral, Daily  gabapentin, 200 mg, Oral, Q12H  ipratropium-albuterol, 3 mL, Nebulization, Q6H - RT  levoFLOXacin, 750 mg, Intravenous, Daily  [START ON 2023] methylPREDNISolone sodium succinate, 40 mg, Intravenous, Daily  metoprolol tartrate, 25 mg, Oral, Q12H  midazolam, 6 mg, Intravenous, Once  rosuvastatin, 40 mg, Oral, Nightly  senna-docusate sodium, 2 tablet, Oral, BID  sodium chloride, 10 mL, Intravenous, Q12H  sodium chloride, 10 mL, Intravenous, Q12H  sodium chloride, 10 mL, Intravenous, Q12H      fentanyl 10 mcg/mL,  mcg/hr  heparin, 16 Units/kg/hr, Last Rate: 16 Units/kg/hr (23 5428)  Pharmacy to Dose Heparin,   propofol, 5-50 mcg/kg/min, Last Rate: Stopped (23 1237)      ----------------------------------------------------------------------------------------------------------------------  Vital Signs:  Temp:  [97.4 øF (36.3 øC)-98.6 øF (37 øC)] 97.8 øF (36.6 øC)  Heart Rate:  [] 89  Resp:  [19-30] 22  BP: (101-203)/() 130/77  FiO2 (%):  [30 %-100 %] 30 %  SpO2:  [95 %-100 %] 98 %  on  Flow (L/min):  [2] 2;   Device (Oxygen Therapy): nasal cannula  Body mass index  is 20.53 kg/mý.      Intake/Output Summary (Last 24 hours) at 8/8/2023 1903  Last data filed at 8/8/2023 1800  Gross per 24 hour   Intake 1100 ml   Output 625 ml   Net 475 ml      ----------------------------------------------------------------------------------------------------------------------  Physical exam:  Constitutional: Elderly chronically ill-appearing adult male resting in bed currently sedated on mechanical ventilation.  HENT:  Head:  Normocephalic and atraumatic.  Mouth:  Moist mucous membranes.  ET tube in place.  Eyes:  Conjunctivae and EOM are normal. No scleral icterus.    Cardiovascular:  Normal rate, regular rhythm and normal heart sounds with no murmur.  Pulmonary/Chest:  No respiratory distress, no wheezes, no crackles, with diminished breath sounds by  Abdominal:  Soft.  Bowel sounds are normal.  No distension and no tenderness.   Musculoskeletal:  No deformity.  No red or swollen joints anywhere.  Functional ROM intact.   Neurological: Sedated on mechanical ventilation.  No focal neurological deficits on exam.    Skin:  Skin is warm and dry. No rash or lesion noted. No pallor.   Peripheral vascular:  Pulses in all 4 extremities with no clubbing, no cyanosis, no edema.  Psychiatric: Appropriate mood and affect, pleasant.   ----------------------------------------------------------------------------------------------------------------------  WBC/HGB/HCT/PLT   12.13/13.0/40.9/201 (08/08 0208)  BUN/CREAT/GLUC/ALT/AST/SOLITARIO/LIP    12/0.94/162/35/76/--/-- (08/08 0250)  LYTES - Na/K/Cl/CO2: 145/4.8/109*/26.3 (08/08 0250)  COAG - PT/INR/PTT: --/--/73.0* (08/08 0406)  pH/pCO2/pO2/HCO3   7.468/35.4/79.9/25.6 (08/08 1311)  No results found for: URINECX  No results found for: BLOODCX    I have personally looked at the labs and they are summarized above.  ----------------------------------------------------------------------------------------------------------------------  Detailed radiology reports for the  last 24 hours:  CT Abdomen Pelvis Without Contrast    Result Date: 8/8/2023  1. Prominent formed colonic stool suggesting constipation. 2. No bowel obstruction or perforation. 3. No hydronephrosis or ureteral calculus. 4. Likely sequela of prior episodes of pancreatitis.  This report was finalized on 8/8/2023 4:40 AM by Ag Menendez MD.      CT Head Without Contrast    Result Date: 8/8/2023  1. No acute intracranial process. If symptoms persist, MRI is recommended for further evaluation. 2. Low-attenuation areas within the posterior occipital regions. These are likely related to remote ischemia.  This report was finalized on 8/8/2023 4:45 AM by Ag Menendez MD.      XR Chest 1 View    Result Date: 8/8/2023   RIGHT INTERNAL JUGULAR VEIN CATHETER TIP IN THE MID SVC.  NEGATIVE FOR PNEUMOTHORAX.  This report was finalized on 8/8/2023 3:42 AM by Ariadna Steen MD.      XR Chest 1 View    Result Date: 8/8/2023  FINDINGS/IMPRESSION: Endotracheal tube tip approximately 2.5 cm above the jules. Enteric tube in stomach. Chronic appearing interstitial lung markings. No pulmonary infiltrate, pleural effusion or pneumothorax. The heart is not enlarged. Retained colonic stool. No bowel dilation. No free air in the abdomen.  This report was finalized on 8/8/2023 2:27 AM by Alex Pallas, DO.      CT Angiogram Chest Pulmonary Embolism    Result Date: 8/8/2023  1. No pulmonary embolus. 2. Normal caliber thoracic aorta without aneurysmal dilatation. 3. Patchy airspace disease most prevalent within the left lower lobe likely representing pneumonia in the correct clinical setting.  This report was finalized on 8/8/2023 4:36 AM by Ag Menendez MD.      XR Abdomen KUB    Result Date: 8/8/2023  FINDINGS/IMPRESSION: Endotracheal tube tip approximately 2.5 cm above the jules. Enteric tube in stomach. Chronic appearing interstitial lung markings. No pulmonary infiltrate, pleural effusion or pneumothorax. The heart is not enlarged. Retained  colonic stool. No bowel dilation. No free air in the abdomen.  This report was finalized on 8/8/2023 2:27 AM by Alex Pallas, DO.     Assessment & Plan      Acute hypercapnic respiratory failure  Acute exacerbation of COPD  Elevated troponin second to respiratory failure  Left lower lobe pneumonia  Severe sepsis    -Patient presenting with episode of unresponsiveness and hypoxia from home and requiring intubation and mechanical ventilation.    -Lactic acidemia likely secondary to both infectious etiology and acute respiratory failure.  Continue IV fluids    -Continue Levaquin monotherapy for now    -Sputum culture collected and pending from ET suction    -Continue DuoNebs    -Cardiology consulted while in ER and evaluated the patient and did not feel that patient met criteria for ST elevation myocardial infarction and patient was not taken to Cath Lab as EKG changes changes with respiratory support and treatment.    -Repeat troponin in the a.m. as patient's presentation and troponin elevation more consistent with his significant respiratory failure and if troponin trending down and patient continues to be chest pain-free will discontinue heparin drip    Hypertension  Hyperlipidemia    -Continue home regimen    Gout    -Continue allopurinol    Chronic low back pain  Chronic neuropathy    -Continue home medication    Copied text in portions of the note has been reviewed and is accurate as of 08/08/23    VTE Prophylaxis:   Mechanical Order History:       None          Pharmalogical Order History:        Ordered     Dose Route Frequency Stop    08/08/23 0432  heparin 65298 units/250 mL (100 units/mL) in 0.45 % NaCl infusion  9.79 mL/hr         16 Units/kg/hr IV Titrated --    08/08/23 0404  heparin 83626 units/250 mL (100 units/mL) in 0.45 % NaCl infusion  7.34 mL/hr,   Status:  Discontinued         12 Units/kg/hr IV Titrated 08/08/23 0407    08/08/23 0404  Pharmacy to Dose Heparin         -- XX Continuous PRN --     08/08/23 0222  heparin (porcine) 5000 UNIT/ML injection 5,000 Units         5,000 Units SC Once 08/08/23 0245                    Disposition likely home in the next 1 to 2 days    Sriram Parmar DO  [facility]  08/08/23  19:03 EDT

## 2023-08-08 NOTE — ED PROVIDER NOTES
Subjective   History of Present Illness  77-year-old male presents by EMS home after family heard the patient moaning and was found to be making and audible sounds and appeared altered.  On arrival EMS states that the patient was altered and they gave him 4 mg of intranasal Narcan as he is prescribed gabapentin and chronic pain meds; for, patient had no response in level of consciousness.  EMS stated that they had to Ambu assist his respiratory effort as he was shallow breathing with irregular breathing pattern.  EMS states that patient had a pulse being found to be sinus tachycardia    Review of [facility] shows that patient is chronically prescribed 800 of gabapentin 3 times and Roxicodone 15 mg  every 6 hrs.       Review of Systems   Unable to perform ROS: Mental status change     Past Medical History:   Diagnosis Date    COPD (chronic obstructive pulmonary disease)     Gout     Hearing loss     History of degenerative disc disease     Hyperlipidemia     Hypertension     Low back pain     Pedal edema     Prediabetes        No Known Allergies    Past Surgical History:   Procedure Laterality Date    CHOLECYSTECTOMY      HIP HEMIARTHROPLASTY Left 2023    Procedure: HIP HEMIARTHROPLASTY;  Surgeon: Ananth Bates DO;  Location: [facility];  Service: Orthopedics;  Laterality: Left;    LUMBAR DISCECTOMY      SHOULDER SURGERY Left        Family History   Problem Relation Age of Onset    Cancer Mother        Social History     Socioeconomic History    Marital status:    Tobacco Use    Smoking status: Former     Packs/day: 0.50     Years: 60.00     Pack years: 30.00     Types: Cigarettes     Start date:      Quit date:      Years since quittin.6    Smokeless tobacco: Never    Tobacco comments:     patient stated he is down to about 4 cigarettes a day   Vaping Use    Vaping Use: Some days    Substances: Nicotine    Devices: Disposable   Substance and Sexual Activity    Alcohol use: No    Drug use:  No    Sexual activity: Defer           Objective   Physical Exam  Vitals and nursing note reviewed.   Constitutional:       General: He is in acute distress.      Appearance: He is toxic-appearing.      Comments: Patient is altered on arrival not protecting his airway.  Making inaudible noises.  No visible cyanosis on arrival.  Patient was being assisted with ventilation by Ambu bag by EMS with patient taking intermittent spontaneous breaths in an irregular pattern   HENT:      Head: Normocephalic and atraumatic.      Mouth/Throat:      Mouth: Mucous membranes are moist.   Eyes:      Extraocular Movements: Extraocular movements intact.      Pupils: Pupils are equal, round, and reactive to light.   Cardiovascular:      Rate and Rhythm: Regular rhythm. Tachycardia present.   Pulmonary:      Effort: Respiratory distress present.      Comments:  irregular respiratory effort.  Increased oral secretions.      Abdominal:      General: Bowel sounds are normal.      Palpations: Abdomen is soft.   Musculoskeletal:      Cervical back: Normal range of motion.      Comments: Spontaneous moving extremities but not following commands.   Skin:     General: Skin is warm and dry.   Neurological:      Mental Status: He is lethargic.      GCS: GCS eye subscore is 1. GCS verbal subscore is 2. GCS motor subscore is 3.       Intubation    Date/Time: 8/8/2023 2:14 AM  Performed by: Anahi Stacy DO  Authorized by: Anahi Stacy DO     Consent:     Consent obtained:  Emergent situation  Universal protocol:     Patient identity confirmed:  Arm band and hospital-assigned identification number  Pre-procedure details:     Indication: failure to protect airway      Mallampati score:  I    Neck mobility: normal      Pharmacologic strategy: RSI      Induction agents:  Etomidate    Paralytics:  Succinylcholine  Procedure details:     Preoxygenation:  Bag valve mask    CPR in progress: no      Intubation technique: video assisted      Laryngoscope  blade:  Mac 4    Grade view: I      Tube size (mm):  7.5    Number of attempts:  1    Tube visualized through cords: yes    Placement assessment:     ETT at teeth/gumline (cm):  26    Tube secured with:  ETT de anda    Breath sounds:  Equal and absent over the epigastrium    Placement verification: chest rise, colorimetric ETCO2, CXR verification, direct visualization, equal breath sounds and tube exhalation      CXR findings:  Appropriate position  Post-procedure details:     Procedure completion:  Tolerated well, no immediate complications  Critical Care  Performed by: Anahi Stacy DO  Authorized by: Anahi Stacy DO     Critical care provider statement:     Critical care time (minutes):  60    Critical care time was exclusive of:  Separately billable procedures and treating other patients and teaching time    Critical care was necessary to treat or prevent imminent or life-threatening deterioration of the following conditions:  Cardiac failure, circulatory failure, CNS failure or compromise, metabolic crisis, endocrine crisis, respiratory failure, sepsis and dehydration    Critical care was time spent personally by me on the following activities:  Blood draw for specimens, discussions with consultants, discussions with primary provider, evaluation of patient's response to treatment, ordering and performing treatments and interventions, ordering and review of laboratory studies, ordering and review of radiographic studies, pulse oximetry, re-evaluation of patient's condition, review of old charts, vascular access procedures, ventilator management, examination of patient, interpretation of cardiac output measurements and obtaining history from patient or surrogate    I assumed direction of critical care for this patient from another provider in my specialty: no      Care discussed with: admitting provider    Central Line At Bedside    Date/Time: 8/8/2023 3:46 AM  Performed by: Anahi Stacy DO  Authorized by:  Anahi Stacy DO     Consent:     Consent obtained:  Emergent situation  Universal protocol:     Patient identity confirmed:  Hospital-assigned identification number and arm band  Pre-procedure details:     Indication(s): central venous access and insufficient peripheral access      Hand hygiene: Hand hygiene performed prior to insertion      Sterile barrier technique: All elements of maximal sterile technique followed      Skin preparation:  Chlorhexidine  Anesthesia:     Anesthesia method:  Local infiltration    Local anesthetic:  Lidocaine 1% w/o epi  Procedure details:     Location:  R internal jugular    Patient position:  Supine    Procedural supplies:  Triple lumen    Catheter size:  7 Fr    Landmarks identified: yes      Ultrasound guidance: yes      Ultrasound guidance timing: prior to insertion and real time      Sterile ultrasound techniques: Sterile gel and sterile probe covers were used      Number of attempts:  1    Successful placement: yes    Post-procedure details:     Post-procedure:  Dressing applied and line sutured    Assessment:  Blood return through all ports, free fluid flow, no pneumothorax on x-ray and placement verified by x-ray    Procedure completion:  Tolerated well, no immediate complications       XR Chest 1 View   Final Result       RIGHT INTERNAL JUGULAR VEIN CATHETER TIP IN THE MID SVC.  NEGATIVE FOR   PNEUMOTHORAX.       This report was finalized on 8/8/2023 3:42 AM by Ariadna Steen MD.          XR Abdomen KUB   Final Result   FINDINGS/IMPRESSION:    Endotracheal tube tip approximately 2.5 cm above the jules. Enteric   tube in stomach.   Chronic appearing interstitial lung markings. No pulmonary infiltrate,   pleural effusion or pneumothorax. The heart is not enlarged.   Retained colonic stool. No bowel dilation. No free air in the abdomen.       This report was finalized on 8/8/2023 2:27 AM by Alex Pallas, DO.          XR Chest 1 View   Final Result   FINDINGS/IMPRESSION:     Endotracheal tube tip approximately 2.5 cm above the jules. Enteric   tube in stomach.   Chronic appearing interstitial lung markings. No pulmonary infiltrate,   pleural effusion or pneumothorax. The heart is not enlarged.   Retained colonic stool. No bowel dilation. No free air in the abdomen.       This report was finalized on 8/8/2023 2:27 AM by Alex Pallas, DO.          CT Head Without Contrast    (Results Pending)   CT Angiogram Chest Pulmonary Embolism    (Results Pending)   CT Abdomen Pelvis Without Contrast    (Results Pending)           ED Course  ED Course as of 08/08/23 0422   Tue Aug 08, 2023   0212 ECG 12 Lead Chest Pain  Sinus tachycardia with occasional premature ventricular complexes.  Rate 139  QRS 94 QTc 383  Acute ST elevation in leads II, III, aVF [LK]   0219 Initial ABG shows hypercapnic hypoxemic respiratory failure. [LK]   0221 ECG 12 Lead Chest Pain  EKG performed at 0221 shows sinus tachycardia septal infarct of undetermined age  ST elevation in 2 3 and aVF have now acutely resolved  Rate 127  QRS 80 QTc 415. [LK]   0222 ECG 12 Lead Chest Pain  Sinus tachycardia rate 120  QRS 96 QTc 429  Electronically signed by Anahi Stacy DO, 08/08/23, 0222 AM EDT.   [LK]   0224  I Spoke with Dr. Aguilar after repeat EKG sent to him and he reviewed personally. He  States that he is canceling 1 push to the Cath Lab at this time.  He feels Patient likely has COPD  with Initial ABG shows hypercapnic hypoxemic respiratory failure.     Repolarization abnormalities given tachycardia [LK]   0227 Once patient was intubated, ST elevations began to improve.  Patient still received aspirin rectally, 5000 heparin IVP x1 and 180 of Brilinta.    Currently heart rate 106 99% on FiO2 of 50%, respiratory rate of 20 blood pressure 161/97.   [LK]   0232 Review of medical record shows that most recent ABG on 6/24/2022  pH 7.320 PCO2 44 PaO2 of 63.5 and SPO2 of 91.4% [LK]   0237 Current ventilator settings  after initial ABG showed significant hyperoxygenation.    AC/tidal volume 450, FiO2 50% PaO2 [LK]   0343 Patient currently being taken over to CT for CT head CTA chest to rule out pulmonary embolism and underlying pneumonia and CT abdomen pelvis without.    Repeat ABG 7.367 PCO2 50.5 PaO2 92.1 with an SPO2 of 97.1 on FiO2 of 50%    Patient turned down to FIO2 40% now [LK]   0402 Patient troponin that was elevated with no chest compressions being administered by EMS or by ED medical personnel as patient had a continuous pulse of a sinus tachycardia.  Repeat troponin has been ordered, patient will be placed on a heparin drip at this time.  He has been accepted for admission by Dr. Jones with hospitalist service. [LK]      ED Course User Index  [LK] Anahi Stacy DO KASPER reviewed by Edita Jones MD, Anahi Stacy DO       Medical Decision Making  Problems Addressed:  Acute respiratory failure with hypoxia and hypercapnia: complicated acute illness or injury  Altered mental status, unspecified altered mental status type: complicated acute illness or injury  ST elevation myocardial infarction (STEMI), unspecified artery: complicated acute illness or injury    Amount and/or Complexity of Data Reviewed  Labs: ordered.  Radiology: ordered.  ECG/medicine tests: ordered. Decision-making details documented in ED Course.    Risk  OTC drugs.  Prescription drug management.        Final diagnoses:   ST elevation myocardial infarction (STEMI), unspecified artery   Altered mental status, unspecified altered mental status type   Acute respiratory failure with hypoxia and hypercapnia   Chronic, continuous use of opioids       ED Disposition  ED Disposition       ED Disposition   Decision to Admit    Condition   --    Comment   Level of Care: Critical Care [6]   Diagnosis: Respiratory failure with hypoxia and hypercapnia [[id number]]   Certification: I Certify That Inpatient Hospital Services  Are Medically Necessary For Greater Than 2 Midnights                 No follow-up provider specified.       Medication List        ASK your doctor about these medications      gabapentin 800 MG tablet  Commonly known as: NEURONTIN  Ask about: Which instructions should I use?     oxyCODONE 15 MG immediate release tablet  Commonly known as: ROXICODONE  Ask about: Which instructions should I use?                 Anahi Stacy DO  08/08/23 0427

## 2023-08-08 NOTE — Clinical Note
Hemostasis started on the right radial artery. R-Band was used in achieving hemostasis. Radial compression device applied to vessel. Hemostasis achieved successfully. Closure device additional comment: 12ml of air

## 2023-08-08 NOTE — PROGRESS NOTES
COPD Education    COPD Education can not be completed at this time due to Mr. Gomez' current condition. He is currently requiring mechanical ventilation and unable to participate in a discussion. Once his condition improves, I will be glad to discuss COPD Education with him.     Thanks for consulting COPD Education and including me in his care.     TIFF Lua, RRT  COPD Navigator

## 2023-08-08 NOTE — NURSING NOTE
Cardiac Rehab referral received on patient. After reviewing patient information there is no qualifying diagnosis noted at this time .Qualifications for Cardiac Rehab include Coronary Stenting, AMI (NSTEMI Type 1, STEMI), Stable Angina, CABG, Heart Valve Repair/Replacement, Heart Transplant or Stable CHF (with these specific qualifications, Left Ventricular Ejection Fraction of 35% or less, NYHA class II to IV symptoms despite optimal heart failure therapy for at least 6 weeks and has not had a recent (less than or equal to 6 weeks) or planned (less than or equal to 6 months) major cardiovascular hospitalizations or procedures. Due to patient being in the hospital, does not meet criteria at this time) Please contact us at [phone] with questions.    If the diagnosis is CHF when the patient meets the CHF criteria for Cardiac Rehab with a new order we will gladly schedule them.

## 2023-08-08 NOTE — PLAN OF CARE
Goal Outcome Evaluation:  Problem: Restraint, Nonviolent  Goal: Absence of Harm or Injury  Outcome: Ongoing, Progressing  Intervention: Implement Least Restrictive Safety Strategies  Recent Flowsheet Documentation  Taken 8/8/2023 0633 by Fide Preciado RN  Medical Device Protection:   IV pole/bag removed from visual field   tubing secured  Less Restrictive Alternative:   1:1 observation maintained   bed alarm in use   calming techniques promoted   coping techniques promoted   environment adjusted   medication offered   sensory stimulation limited   sensory stimulation provided   surveillance provided  De-Escalation Techniques:   1:1 observation initiated   appropriate behavior reinforced   medication administered   reoriented   stimulation decreased   time out facilitated   verbally redirected  Diversional Activities: television  Taken 8/8/2023 0610 by Fide Preciado RN  Medical Device Protection:   IV pole/bag removed from visual field   tubing secured  Diversional Activities: television  Intervention: Protect Dignity, Rights, and Personal Wellbeing  Recent Flowsheet Documentation  Taken 8/8/2023 0610 by Fide Preciado RN  Trust Relationship/Rapport:   choices provided   care explained  Intervention: Protect Skin and Joint Integrity  Recent Flowsheet Documentation  Taken 8/8/2023 0633 by Fide Preciado RN  Body Position:   turned   side-lying   right  Taken 8/8/2023 0610 by Fide Preciado RN  Body Position: supine

## 2023-08-08 NOTE — PROGRESS NOTES
LOS: 0 days     Name: Paul Gomez  Age/Sex: 77 y.o. male  :  1945        PCP: Luis Dobbs DO    Principal Problem:    Respiratory failure with hypoxia and hypercapnia  Active Problems:    COPD exacerbation      Chief Complaint: Follow up elevated troponin in setting of acute hypoxic respiratory failure    Interval history: Pt was seen and examined.  He was extubated earlier today.  He remains confused and asks several times  what hospital he is in.  He denies any chest pain.  He does remember that he was having some shortness of breath yesterday.  He asks for his pain medication.  His states that his back hurts.      Subjective         Vital Signs  Vital Signs (last 72 hrs)         08/ 0700  08/06 0659 08 0700  08 0659  0700   0659  0700   1446   Most Recent      Temp (øF)     97.4 -  98.6      97.8     97.8 (36.6) 08/08 0900    Heart Rate     86 -  141    78 -  95     78 08/08 1350    Resp     20 -  30      20     20 08/08 1350    BP     111/93 -  203/129    101/66 -  137/82     137/82 08/08 1200    SpO2 (%)     96 -  100    95 -  100     98 08/08 1340          Temp:  [97.4 øF (36.3 øC)-98.6 øF (37 øC)] 97.8 øF (36.6 øC)  Heart Rate:  [] 78  Resp:  [20-30] 20  BP: (101-203)/() 137/82  FiO2 (%):  [30 %-100 %] 30 %  Body mass index is 20.53 kg/mý.      Intake/Output Summary (Last 24 hours) at 2023 1446  Last data filed at 2023 0448  Gross per 24 hour   Intake 1100 ml   Output --   Net 1100 ml       Vitals reviewed.   Constitutional:       Appearance: Well-developed.   Neck:      Vascular: No carotid bruit or JVD.   Pulmonary:      Effort: Pulmonary effort is normal. No respiratory distress.      Breath sounds: Normal breath sounds. No wheezing. No rales.   Cardiovascular:      Normal rate. Regular rhythm.      Comments: No lower extremity edema  Skin:     General: Skin is warm and dry.   Neurological:      Mental Status: Alert and oriented to  person, place, and time.       Telemetry:  sinus 80s       Results Review:     Results from last 7 days   Lab Units 08/08/23  0208   WBC 10*3/mm3 12.13*   HEMOGLOBIN g/dL 13.0   PLATELETS 10*3/mm3 201     Results from last 7 days   Lab Units 08/08/23  0250   SODIUM mmol/L 145   POTASSIUM mmol/L 4.8   CHLORIDE mmol/L 109*   CO2 mmol/L 26.3   BUN mg/dL 12   CREATININE mg/dL 0.94   CALCIUM mg/dL 8.3*   GLUCOSE mg/dL 162*     Results from last 7 days   Lab Units 08/08/23  0452 08/08/23  0250   HSTROP T ng/L 127* 107*       Results from last 7 days   Lab Units 08/08/23  0208   INR  1.00       I reviewed the patient's new clinical results.  I reviewed the patient's new imaging results and agree with the interpretation.  I personally viewed and interpreted the patient's EKG/Telemetry data      Medication Review:   allopurinol, 100 mg, Oral, Daily  aspirin, 81 mg, Oral, Daily  gabapentin, 200 mg, Oral, Q12H  ipratropium-albuterol, 3 mL, Nebulization, Q6H - RT  levoFLOXacin, 750 mg, Intravenous, Daily  [START ON 8/9/2023] methylPREDNISolone sodium succinate, 40 mg, Intravenous, Daily  metoprolol tartrate, 25 mg, Oral, Q12H  midazolam, 6 mg, Intravenous, Once  rosuvastatin, 40 mg, Oral, Nightly  senna-docusate sodium, 2 tablet, Oral, BID  sodium chloride, 10 mL, Intravenous, Q12H  sodium chloride, 10 mL, Intravenous, Q12H  sodium chloride, 10 mL, Intravenous, Q12H      fentanyl 10 mcg/mL,  mcg/hr  heparin, 10 Units/kg/hr, Last Rate: 13 Units/kg/hr (08/08/23 1154)  Pharmacy to Dose Heparin,   propofol, 5-50 mcg/kg/min, Last Rate: Stopped (08/08/23 1237)        Assessment:        Recommendations:      I discussed the patients findings and my recommendations with patient and family    Electronically signed by BEATRICE Meyer, 08/08/23, 2:52 PM EDT.

## 2023-08-08 NOTE — PROGRESS NOTES
HEPARIN INFUSION  Paul Gomez is a  77 y.o. male receiving heparin infusion.     Therapy for (VTE/Cardiac):   cardiac  Patient Dosing Weight: 61.2 kg  Initial Bolus (Y/N):   N  Any Bolus (Y/N):   N        Signs or Symptoms of Bleeding: N    Cardiac or Other (Not VTE)   Initial Bolus: 60 units/kg (Max 4,000 units)  Initial rate: 12 units/kg/hr (Max 1,000 units/hr)   Anti Xa Rebolus Infusion Hold time Change infusion Dose (Units/kg/hr) Next Anti Xa or aPTT Level Due   < 0.11 50 Units/kg  (4000 Units Max) None Increase by  3 Units/kg/hr 6 hours   0.11- 0.19 25 Units/kg  (2000 Units Max) None Increase by  2 Units/kg/hr 6 hours   0.2 - 0.29 0 None Increase by  1 Units/kg/hr 6 hours   0.3 - 0.5 0 None No Change 6 hours (after 2 consecutive levels in range check q24h @0700)   0.51 - 0.6 0 None Decrease by  1 Units/kg/hr 6 hours   0.61 - 0.8 0 30 Minutes Decrease by  2 Units/kg/hr 6 hours   0.81 - 1 0 60 Minutes Decrease by  3 Units/kg/hr 6 hours   >1 0 Hold  After Anti Xa less than 0.5 decrease previous rate by  4 Units/kg/hr  Every 2 hours until Anti Xa  less than 0.5 then when infusion restarts in 6 hours       Recommend anti-Xa every 6 hours.          Date   Time   Anti-Xa Current Rate (Unit/kg/hr) Bolus   (Units) Rate Change   (Unit/kg/hr) New Rate (Unit/kg/hr) Next   Anti-Xa Comments  Pump Check Daily   08/08 0435 0.34 - None ever -2 10 1100 Pt received a heparin 5000 unit bolus at  0245. I reduced his starting dose by 2 U/kg/hr per the flow chart based on his current anti-Xa. Notified  nurses, Michell and Alysa, that they could start the drip, no bolus.                                                                                                                                                                                                                                  Pharmacy will continue to follow anti-Xa results and monitor for signs and symptoms of bleeding or thrombosis.    Keiko Walters  PharmD  08/08/23 04:41 EDT

## 2023-08-08 NOTE — PROGRESS NOTES
HEPARIN INFUSION  Paul Gomez is a  77 y.o. male receiving heparin infusion.     Therapy for (VTE/Cardiac):   cardiac  Patient Dosing Weight: 61.2 kg  Initial Bolus (Y/N):   N  Any Bolus (Y/N):   N        Signs or Symptoms of Bleeding: N    Cardiac or Other (Not VTE)   Initial Bolus: 60 units/kg (Max 4,000 units)  Initial rate: 12 units/kg/hr (Max 1,000 units/hr)   Anti Xa Rebolus Infusion Hold time Change infusion Dose (Units/kg/hr) Next Anti Xa or aPTT Level Due   < 0.11 50 Units/kg  (4000 Units Max) None Increase by  3 Units/kg/hr 6 hours   0.11- 0.19 25 Units/kg  (2000 Units Max) None Increase by  2 Units/kg/hr 6 hours   0.2 - 0.29 0 None Increase by  1 Units/kg/hr 6 hours   0.3 - 0.5 0 None No Change 6 hours (after 2 consecutive levels in range check q24h @0700)   0.51 - 0.6 0 None Decrease by  1 Units/kg/hr 6 hours   0.61 - 0.8 0 30 Minutes Decrease by  2 Units/kg/hr 6 hours   0.81 - 1 0 60 Minutes Decrease by  3 Units/kg/hr 6 hours   >1 0 Hold  After Anti Xa less than 0.5 decrease previous rate by  4 Units/kg/hr  Every 2 hours until Anti Xa  less than 0.5 then when infusion restarts in 6 hours       Recommend anti-Xa every 6 hours.          Date   Time   Anti-Xa Current Rate (Unit/kg/hr) Bolus   (Units) Rate Change   (Unit/kg/hr) New Rate (Unit/kg/hr) Next   Anti-Xa Comments  Pump Check Daily   08/08 0435 0.34 - None ever -2 10 1100 Pt received a heparin 5000 unit bolus at  0245. I reduced his starting dose by 2 U/kg/hr per the flow chart based on his current anti-Xa. Notified  nurses, Michell and Alysa, that they could start the drip, no bolus.    8/8 1101 < 0.10 10 None ever +3 13 1800 No bolus ever, rate change no s/s bleeding, d/w  orestes ruiz RN                                                                                                                                                                                                                      Pharmacy will continue to follow anti-Xa  results and monitor for signs and symptoms of bleeding or thrombosis.      08/08/23

## 2023-08-09 LAB
ALBUMIN SERPL-MCNC: 3.2 G/DL (ref 3.5–5.2)
ALBUMIN/GLOB SERPL: 1.3 G/DL
ALP SERPL-CCNC: 73 U/L (ref 39–117)
ALT SERPL W P-5'-P-CCNC: 27 U/L (ref 1–41)
ANION GAP SERPL CALCULATED.3IONS-SCNC: 9.5 MMOL/L (ref 5–15)
AST SERPL-CCNC: 39 U/L (ref 1–40)
BACTERIA BLD CULT: ABNORMAL
BASOPHILS # BLD AUTO: 0.01 10*3/MM3 (ref 0–0.2)
BASOPHILS NFR BLD AUTO: 0.1 % (ref 0–1.5)
BILIRUB SERPL-MCNC: 0.3 MG/DL (ref 0–1.2)
BOTTLE TYPE: ABNORMAL
BUN SERPL-MCNC: 18 MG/DL (ref 8–23)
BUN/CREAT SERPL: 18 (ref 7–25)
CALCIUM SPEC-SCNC: 8.5 MG/DL (ref 8.6–10.5)
CHLORIDE SERPL-SCNC: 103 MMOL/L (ref 98–107)
CO2 SERPL-SCNC: 23.5 MMOL/L (ref 22–29)
CREAT SERPL-MCNC: 1 MG/DL (ref 0.76–1.27)
DEPRECATED RDW RBC AUTO: 45.8 FL (ref 37–54)
EGFRCR SERPLBLD CKD-EPI 2021: 77.5 ML/MIN/1.73
EOSINOPHIL # BLD AUTO: 0 10*3/MM3 (ref 0–0.4)
EOSINOPHIL NFR BLD AUTO: 0 % (ref 0.3–6.2)
ERYTHROCYTE [DISTWIDTH] IN BLOOD BY AUTOMATED COUNT: 12.8 % (ref 12.3–15.4)
GLOBULIN UR ELPH-MCNC: 2.5 GM/DL
GLUCOSE SERPL-MCNC: 131 MG/DL (ref 65–99)
HCT VFR BLD AUTO: 33.4 % (ref 37.5–51)
HGB BLD-MCNC: 10.9 G/DL (ref 13–17.7)
IMM GRANULOCYTES # BLD AUTO: 0.05 10*3/MM3 (ref 0–0.05)
IMM GRANULOCYTES NFR BLD AUTO: 0.5 % (ref 0–0.5)
LYMPHOCYTES # BLD AUTO: 0.79 10*3/MM3 (ref 0.7–3.1)
LYMPHOCYTES NFR BLD AUTO: 8.6 % (ref 19.6–45.3)
MCH RBC QN AUTO: 32 PG (ref 26.6–33)
MCHC RBC AUTO-ENTMCNC: 32.6 G/DL (ref 31.5–35.7)
MCV RBC AUTO: 97.9 FL (ref 79–97)
MONOCYTES # BLD AUTO: 0.71 10*3/MM3 (ref 0.1–0.9)
MONOCYTES NFR BLD AUTO: 7.7 % (ref 5–12)
NEUTROPHILS NFR BLD AUTO: 7.63 10*3/MM3 (ref 1.7–7)
NEUTROPHILS NFR BLD AUTO: 83.1 % (ref 42.7–76)
NRBC BLD AUTO-RTO: 0 /100 WBC (ref 0–0.2)
PLATELET # BLD AUTO: 151 10*3/MM3 (ref 140–450)
PMV BLD AUTO: 11.6 FL (ref 6–12)
POTASSIUM SERPL-SCNC: 3.9 MMOL/L (ref 3.5–5.2)
PROT SERPL-MCNC: 5.7 G/DL (ref 6–8.5)
RBC # BLD AUTO: 3.41 10*6/MM3 (ref 4.14–5.8)
SODIUM SERPL-SCNC: 136 MMOL/L (ref 136–145)
TROPONIN T SERPL HS-MCNC: 74 NG/L
UFH PPP CHRO-ACNC: 0.23 IU/ML (ref 0.3–0.7)
UFH PPP CHRO-ACNC: 0.26 IU/ML (ref 0.3–0.7)
UFH PPP CHRO-ACNC: 0.26 IU/ML (ref 0.3–0.7)
UFH PPP CHRO-ACNC: <0.1 IU/ML (ref 0.3–0.7)
WBC NRBC COR # BLD: 9.19 10*3/MM3 (ref 3.4–10.8)

## 2023-08-09 PROCEDURE — 25010000002 VANCOMYCIN 5 G RECONSTITUTED SOLUTION: Performed by: INTERNAL MEDICINE

## 2023-08-09 PROCEDURE — 0 METHYLPREDNISOLONE PER 125 MG: Performed by: STUDENT IN AN ORGANIZED HEALTH CARE EDUCATION/TRAINING PROGRAM

## 2023-08-09 PROCEDURE — 85520 HEPARIN ASSAY: CPT | Performed by: STUDENT IN AN ORGANIZED HEALTH CARE EDUCATION/TRAINING PROGRAM

## 2023-08-09 PROCEDURE — 94664 DEMO&/EVAL PT USE INHALER: CPT

## 2023-08-09 PROCEDURE — 84484 ASSAY OF TROPONIN QUANT: CPT | Performed by: STUDENT IN AN ORGANIZED HEALTH CARE EDUCATION/TRAINING PROGRAM

## 2023-08-09 PROCEDURE — 94799 UNLISTED PULMONARY SVC/PX: CPT

## 2023-08-09 PROCEDURE — 80053 COMPREHEN METABOLIC PANEL: CPT | Performed by: STUDENT IN AN ORGANIZED HEALTH CARE EDUCATION/TRAINING PROGRAM

## 2023-08-09 PROCEDURE — 25010000002 MORPHINE PER 10 MG: Performed by: STUDENT IN AN ORGANIZED HEALTH CARE EDUCATION/TRAINING PROGRAM

## 2023-08-09 PROCEDURE — 99232 SBSQ HOSP IP/OBS MODERATE 35: CPT | Performed by: INTERNAL MEDICINE

## 2023-08-09 PROCEDURE — 94761 N-INVAS EAR/PLS OXIMETRY MLT: CPT

## 2023-08-09 PROCEDURE — 87040 BLOOD CULTURE FOR BACTERIA: CPT | Performed by: INTERNAL MEDICINE

## 2023-08-09 PROCEDURE — 25010000002 LEVOFLOXACIN PER 250 MG: Performed by: INTERNAL MEDICINE

## 2023-08-09 PROCEDURE — 85025 COMPLETE CBC W/AUTO DIFF WBC: CPT | Performed by: STUDENT IN AN ORGANIZED HEALTH CARE EDUCATION/TRAINING PROGRAM

## 2023-08-09 PROCEDURE — 25010000002 HEPARIN (PORCINE) 25000-0.45 UT/250ML-% SOLUTION: Performed by: STUDENT IN AN ORGANIZED HEALTH CARE EDUCATION/TRAINING PROGRAM

## 2023-08-09 RX ORDER — ACETAMINOPHEN 325 MG/1
650 TABLET ORAL EVERY 6 HOURS PRN
Status: DISCONTINUED | OUTPATIENT
Start: 2023-08-09 | End: 2023-08-11 | Stop reason: SDUPTHER

## 2023-08-09 RX ADMIN — Medication 10 ML: at 20:10

## 2023-08-09 RX ADMIN — IPRATROPIUM BROMIDE AND ALBUTEROL SULFATE 3 ML: 2.5; .5 SOLUTION RESPIRATORY (INHALATION) at 18:43

## 2023-08-09 RX ADMIN — MORPHINE SULFATE 2 MG: 2 INJECTION, SOLUTION INTRAMUSCULAR; INTRAVENOUS at 03:01

## 2023-08-09 RX ADMIN — Medication 10 ML: at 20:09

## 2023-08-09 RX ADMIN — ASPIRIN 81 MG: 81 TABLET, COATED ORAL at 08:19

## 2023-08-09 RX ADMIN — GABAPENTIN 200 MG: 100 CAPSULE ORAL at 20:09

## 2023-08-09 RX ADMIN — Medication 5 MG: at 23:35

## 2023-08-09 RX ADMIN — MUPIROCIN 1 APPLICATION: 20 OINTMENT TOPICAL at 20:09

## 2023-08-09 RX ADMIN — ACETAMINOPHEN 650 MG: 325 TABLET ORAL at 16:57

## 2023-08-09 RX ADMIN — GABAPENTIN 200 MG: 100 CAPSULE ORAL at 08:19

## 2023-08-09 RX ADMIN — IPRATROPIUM BROMIDE AND ALBUTEROL SULFATE 3 ML: 2.5; .5 SOLUTION RESPIRATORY (INHALATION) at 12:42

## 2023-08-09 RX ADMIN — METOPROLOL TARTRATE 25 MG: 25 TABLET, FILM COATED ORAL at 08:20

## 2023-08-09 RX ADMIN — OXYCODONE HYDROCHLORIDE 10 MG: 10 TABLET ORAL at 14:18

## 2023-08-09 RX ADMIN — LEVOFLOXACIN 750 MG: 750 INJECTION, SOLUTION INTRAVENOUS at 08:18

## 2023-08-09 RX ADMIN — HEPARIN SODIUM 20 UNITS/KG/HR: 10000 INJECTION, SOLUTION INTRAVENOUS at 12:02

## 2023-08-09 RX ADMIN — OXYCODONE HYDROCHLORIDE 10 MG: 10 TABLET ORAL at 08:19

## 2023-08-09 RX ADMIN — Medication 10 ML: at 08:21

## 2023-08-09 RX ADMIN — OXYCODONE HYDROCHLORIDE 10 MG: 10 TABLET ORAL at 00:22

## 2023-08-09 RX ADMIN — ALLOPURINOL 100 MG: 100 TABLET ORAL at 08:20

## 2023-08-09 RX ADMIN — IPRATROPIUM BROMIDE AND ALBUTEROL SULFATE 3 ML: 2.5; .5 SOLUTION RESPIRATORY (INHALATION) at 06:41

## 2023-08-09 RX ADMIN — ROSUVASTATIN CALCIUM 40 MG: 20 TABLET, FILM COATED ORAL at 20:09

## 2023-08-09 RX ADMIN — DOCUSATE SODIUM 50 MG AND SENNOSIDES 8.6 MG 2 TABLET: 8.6; 5 TABLET, FILM COATED ORAL at 08:20

## 2023-08-09 RX ADMIN — IPRATROPIUM BROMIDE AND ALBUTEROL SULFATE 3 ML: 2.5; .5 SOLUTION RESPIRATORY (INHALATION) at 00:01

## 2023-08-09 RX ADMIN — OXYCODONE HYDROCHLORIDE 10 MG: 10 TABLET ORAL at 20:09

## 2023-08-09 RX ADMIN — METOPROLOL TARTRATE 25 MG: 25 TABLET, FILM COATED ORAL at 20:09

## 2023-08-09 RX ADMIN — METHYLPREDNISOLONE SODIUM SUCCINATE 40 MG: 1 INJECTION INTRAMUSCULAR; INTRAVENOUS at 08:20

## 2023-08-09 RX ADMIN — VANCOMYCIN HYDROCHLORIDE 1250 MG: 5 INJECTION, POWDER, LYOPHILIZED, FOR SOLUTION INTRAVENOUS at 04:50

## 2023-08-09 NOTE — NURSING NOTE
Transitional Care Note    Enrolled in [facility] Transitional Care Note    Enrolled in [facility] Transitional Care Services under theTCM Model to be followed for 6 weeks post discharge.  [facility] will assist with support and education at time of transition home from hospital.  Hospital  will follow throughout the stay at [facility].  Home  will visit within 48 hours of discharge and follow with home vitals and telephone contact for 6 weeks.      Patient admitted to [facility] via Emergency Department. Admitted for further treatment of resp failure.    Explained transition to home program and Patient is agreeable to home visits.    Home  will be Yolande Vega LPN

## 2023-08-09 NOTE — PROGRESS NOTES
HEPARIN INFUSION  Paul Gomez is a  77 y.o. male receiving heparin infusion.     Therapy for (VTE/Cardiac):   cardiac  Patient Dosing Weight: 61.2 kg  Initial Bolus (Y/N):   N  Any Bolus (Y/N):   N        Signs or Symptoms of Bleeding: N    Cardiac or Other (Not VTE)   Initial Bolus: 60 units/kg (Max 4,000 units)  Initial rate: 12 units/kg/hr (Max 1,000 units/hr)   Anti Xa Rebolus Infusion Hold time Change infusion Dose (Units/kg/hr) Next Anti Xa or aPTT Level Due   < 0.11 50 Units/kg  (4000 Units Max) None Increase by  3 Units/kg/hr 6 hours   0.11- 0.19 25 Units/kg  (2000 Units Max) None Increase by  2 Units/kg/hr 6 hours   0.2 - 0.29 0 None Increase by  1 Units/kg/hr 6 hours   0.3 - 0.5 0 None No Change 6 hours (after 2 consecutive levels in range check q24h @0700)   0.51 - 0.6 0 None Decrease by  1 Units/kg/hr 6 hours   0.61 - 0.8 0 30 Minutes Decrease by  2 Units/kg/hr 6 hours   0.81 - 1 0 60 Minutes Decrease by  3 Units/kg/hr 6 hours   >1 0 Hold  After Anti Xa less than 0.5 decrease previous rate by  4 Units/kg/hr  Every 2 hours until Anti Xa  less than 0.5 then when infusion restarts in 6 hours       Recommend anti-Xa every 6 hours.          Date   Time   Anti-Xa Current Rate (Unit/kg/hr) Bolus   (Units) Rate Change   (Unit/kg/hr) New Rate (Unit/kg/hr) Next   Anti-Xa Comments  Pump Check Daily   08/08 0435 0.34 - None ever -2 10 1100 Pt received a heparin 5000 unit bolus at  0245. I reduced his starting dose by 2 U/kg/hr per the flow chart based on his current anti-Xa. Notified  nurses, Michell and Alysa, that they could start the drip, no bolus.    8/8 1101 < 0.10 10 None ever +3 13 1800 No bolus ever, rate change no s/s bleeding, d/w  orestes ruiz RN   8/8 1826 <0.10 13 Never +3 16 0030 No s/s of bleeding or stops per nurse Malika. Order in EPIC still 10 U/kg/hr. MAR and nursing confirm actual rate  has been 13 U/kg/hr.     8/9 0050 <0.1 16 never +3 19 0700 Spoke with Malathi about the need for a rate  increase. No s/s of bleeding.    8/9 0701 0.23 19 never +1 20 1500 Rate increase , no s/s bleeding, d/w Tyree RN                                                                                                                                                                                     Pharmacy will continue to follow anti-Xa results and monitor for signs and symptoms of bleeding or thrombosis.        08/09/23

## 2023-08-09 NOTE — PROGRESS NOTES
[facility] HOSPITALIST PROGRESS NOTE     Patient Identification:  Name:  Paul Gomze  Age:  77 y.o.  Sex:  male  :  1945  MRN:  [id number]  Visit Number:  [id number]  ROOM: [address]     Primary Care Provider:  Luis Dobbs DO    Length of stay in inpatient status:  1    Subjective     Chief Compliant:    Chief Complaint   Patient presents with    Shortness of Breath    Loss of Consciousness    Chest Pain       History of Presenting Illness: Patient seen and evaluated in follow-up for acute hypercapnic respiratory failure admitted earlier this morning by overnight hospitalist service.  Patient successfully extubated on 2023.  Patient today on room air and denying any acute complaints.  Discussed with cardiology and given concern for mild ST elevation but not consistent with STEMI presentation and elevated troponin planning for left heart cath tomorrow..    Objective     Current Hospital Meds:  allopurinol, 100 mg, Oral, Daily  aspirin, 81 mg, Oral, Daily  gabapentin, 200 mg, Oral, Q12H  ipratropium-albuterol, 3 mL, Nebulization, Q6H - RT  levoFLOXacin, 750 mg, Intravenous, Daily  methylPREDNISolone sodium succinate, 40 mg, Intravenous, Daily  metoprolol tartrate, 25 mg, Oral, Q12H  mupirocin, 1 application , Each Nare, BID  rosuvastatin, 40 mg, Oral, Nightly  senna-docusate sodium, 2 tablet, Oral, BID  sodium chloride, 10 mL, Intravenous, Q12H  sodium chloride, 10 mL, Intravenous, Q12H  sodium chloride, 10 mL, Intravenous, Q12H  [START ON 8/10/2023] vancomycin, 1,250 mg, Intravenous, Q24H      heparin, 21 Units/kg/hr, Last Rate: 21 Units/kg/hr (23 1543)  Pharmacy to Dose Heparin,       ----------------------------------------------------------------------------------------------------------------------  Vital Signs:  Temp:  [97.6 øF (36.4 øC)-98.7 øF (37.1 øC)] 97.6 øF (36.4 øC)  Heart Rate:  [56-93] 68  Resp:  [8-20] 20  BP: (107-155)/() 122/67  SpO2:  [90 %-99 %] 95  %  on  Flow (L/min):  [2] 2;   Device (Oxygen Therapy): room air  Body mass index is 20.53 kg/mý.      Intake/Output Summary (Last 24 hours) at 8/9/2023 1924  Last data filed at 8/9/2023 1745  Gross per 24 hour   Intake 1613.07 ml   Output 1250 ml   Net 363.07 ml      ----------------------------------------------------------------------------------------------------------------------  Physical exam:  Constitutional: Elderly chronically ill-appearing adult male resting in bed breathing comfortably on room air.  HENT:  Head:  Normocephalic and atraumatic.  Mouth:  Moist mucous membranes.  ET tube in place.  Eyes:  Conjunctivae and EOM are normal. No scleral icterus.    Cardiovascular:  Normal rate, regular rhythm and normal heart sounds with no murmur.  Pulmonary/Chest:  No respiratory distress, no wheezes, no crackles, with diminished breath sounds by  Abdominal:  Soft.  Bowel sounds are normal.  No distension and no tenderness.   Musculoskeletal:  No deformity.  No red or swollen joints anywhere.  Functional ROM intact.   Neurological: Sedated on mechanical ventilation.  No focal neurological deficits on exam.    Skin:  Skin is warm and dry. No rash or lesion noted. No pallor.   Peripheral vascular:  Pulses in all 4 extremities with no clubbing, no cyanosis, no edema.  Psychiatric: Appropriate mood and affect, pleasant.   ----------------------------------------------------------------------------------------------------------------------  WBC/HGB/HCT/PLT   9.19/10.9/33.4/151 (08/09 0013)  BUN/CREAT/GLUC/ALT/AST/SOLITARIO/LIP    18/1.00/131/27/39/--/-- (08/09 0013)  LYTES - Na/K/Cl/CO2: 136/3.9/103/23.5 (08/09 0013)        No results found for: URINECX  No results found for: BLOODCX    I have personally looked at the labs and they are summarized above.  ----------------------------------------------------------------------------------------------------------------------  Detailed radiology reports for the last 24  hours:  CT Abdomen Pelvis Without Contrast    Result Date: 8/8/2023  1. Prominent formed colonic stool suggesting constipation. 2. No bowel obstruction or perforation. 3. No hydronephrosis or ureteral calculus. 4. Likely sequela of prior episodes of pancreatitis.  This report was finalized on 8/8/2023 4:40 AM by Ag Menendez MD.      CT Head Without Contrast    Result Date: 8/8/2023  1. No acute intracranial process. If symptoms persist, MRI is recommended for further evaluation. 2. Low-attenuation areas within the posterior occipital regions. These are likely related to remote ischemia.  This report was finalized on 8/8/2023 4:45 AM by Ag Menendez MD.      XR Chest 1 View    Result Date: 8/8/2023   RIGHT INTERNAL JUGULAR VEIN CATHETER TIP IN THE MID SVC.  NEGATIVE FOR PNEUMOTHORAX.  This report was finalized on 8/8/2023 3:42 AM by Ariadna Steen MD.      XR Chest 1 View    Result Date: 8/8/2023  FINDINGS/IMPRESSION: Endotracheal tube tip approximately 2.5 cm above the jules. Enteric tube in stomach. Chronic appearing interstitial lung markings. No pulmonary infiltrate, pleural effusion or pneumothorax. The heart is not enlarged. Retained colonic stool. No bowel dilation. No free air in the abdomen.  This report was finalized on 8/8/2023 2:27 AM by Alex Pallas, DO.      CT Angiogram Chest Pulmonary Embolism    Result Date: 8/8/2023  1. No pulmonary embolus. 2. Normal caliber thoracic aorta without aneurysmal dilatation. 3. Patchy airspace disease most prevalent within the left lower lobe likely representing pneumonia in the correct clinical setting.  This report was finalized on 8/8/2023 4:36 AM by Ag Menendez MD.      XR Abdomen KUB    Result Date: 8/8/2023  FINDINGS/IMPRESSION: Endotracheal tube tip approximately 2.5 cm above the jules. Enteric tube in stomach. Chronic appearing interstitial lung markings. No pulmonary infiltrate, pleural effusion or pneumothorax. The heart is not enlarged. Retained colonic  stool. No bowel dilation. No free air in the abdomen.  This report was finalized on 8/8/2023 2:27 AM by Alex Pallas, DO.       Assessment & Plan      Acute hypercapnic respiratory failure  Acute exacerbation of COPD  Elevated troponin second to respiratory failure  Left lower lobe pneumonia  Severe sepsis  Gram-positive bacteremia    -Patient presenting with episode of unresponsiveness and hypoxia from home and requiring intubation and mechanical ventilation.  Patient admits that he smoked marijuana as well as took some extra of his home which I suspect led to decreased respiratory status and subsequent unresponsive episode and respiratory failure.    -Lactic acidemia likely secondary to both infectious etiology and acute respiratory failure.      -Patient pulmonary respiratory culture showing normal cici and will discontinue Levaquin monotherapy and transition to vancomycin given positive blood cultures with 2 out of 2 positive for gram-positive cocci in clusters.    -Sputum culture collected and pending from ET suction    -Continue DuoNebs    -Cardiology consulted while in ER and evaluated the patient and did not feel that patient met criteria for ST elevation myocardial infarction and patient was not taken to Cath Lab as EKG changes changes with respiratory support and treatment.  Cardiology tentatively planning for left heart cath tomorrow however will update regarding positive blood cultures as this may change plans.    Hypertension  Hyperlipidemia    -Continue home regimen    Gout    -Continue allopurinol    Chronic low back pain  Chronic neuropathy    -Continue home medication    Copied text in portions of the note has been reviewed and is accurate as of 08/09/23    VTE Prophylaxis:   Mechanical Order History:       None          Pharmalogical Order History:        Ordered     Dose Route Frequency Stop    08/08/23 0432  heparin 71922 units/250 mL (100 units/mL) in 0.45 % NaCl infusion  9.79 mL/hr         16  Units/kg/hr IV Titrated --    08/08/23 0404  heparin 73885 units/250 mL (100 units/mL) in 0.45 % NaCl infusion  7.34 mL/hr,   Status:  Discontinued         12 Units/kg/hr IV Titrated 08/08/23 0407    08/08/23 0404  Pharmacy to Dose Heparin         -- XX Continuous PRN --    08/08/23 0222  heparin (porcine) 5000 UNIT/ML injection 5,000 Units         5,000 Units SC Once 08/08/23 0245                    Disposition Home pending clinical course    Sriram Parmar DO  [facility]  08/09/23  19:24 EDT

## 2023-08-09 NOTE — CASE MANAGEMENT/SOCIAL WORK
Continued Stay Note  [facility]     Patient Name: Paul Gomez  MRN: [id number]  Today's Date: 8/9/2023    Admit Date: 8/8/2023    Plan: Spoke with patient at bedside. Pt has neb at home from unknown provider. Pt is independent with ADLs. Lives with his ex-wife, Chari, @ [address] [facility] [place], [place]. PCP is Lusi Dobbs. Pt has Medicare A&B and Medicaid. Pt does not drive due to visual disturbances from previous stroke. Ex-wife provides transportation to appointments. Ex-wife or brother will provide transportation at discharge. Pt utilizes [facility] pharmacy.   Discharge Plan       Row Name 08/09/23 1134       Plan    Plan Spoke with patient at bedside. Pt has neb at home from unknown provider. Pt is independent with ADLs. Lives with his ex-wife, Chari, @ [address] [facility] [place], [place]. PCP is Luis Dobbs. Pt has Medicare A&B and Medicaid. Pt does not drive due to visual disturbances from previous stroke. Ex-wife provides transportation to appointments. Ex-wife or brother will provide transportation at discharge. Pt utilizes [facility] pharmacy.                     Malika Smith RN

## 2023-08-09 NOTE — PROGRESS NOTES
HEPARIN INFUSION  Paul Gomez is a  77 y.o. male receiving heparin infusion.     Therapy for (VTE/Cardiac):   cardiac  Patient Dosing Weight: 61.2 kg  Initial Bolus (Y/N):   N  Any Bolus (Y/N):   N        Signs or Symptoms of Bleeding: N    Cardiac or Other (Not VTE)   Initial Bolus: 60 units/kg (Max 4,000 units)  Initial rate: 12 units/kg/hr (Max 1,000 units/hr)   Anti Xa Rebolus Infusion Hold time Change infusion Dose (Units/kg/hr) Next Anti Xa or aPTT Level Due   < 0.11 50 Units/kg  (4000 Units Max) None Increase by  3 Units/kg/hr 6 hours   0.11- 0.19 25 Units/kg  (2000 Units Max) None Increase by  2 Units/kg/hr 6 hours   0.2 - 0.29 0 None Increase by  1 Units/kg/hr 6 hours   0.3 - 0.5 0 None No Change 6 hours (after 2 consecutive levels in range check q24h @0700)   0.51 - 0.6 0 None Decrease by  1 Units/kg/hr 6 hours   0.61 - 0.8 0 30 Minutes Decrease by  2 Units/kg/hr 6 hours   0.81 - 1 0 60 Minutes Decrease by  3 Units/kg/hr 6 hours   >1 0 Hold  After Anti Xa less than 0.5 decrease previous rate by  4 Units/kg/hr  Every 2 hours until Anti Xa  less than 0.5 then when infusion restarts in 6 hours       Recommend anti-Xa every 6 hours.          Date   Time   Anti-Xa Current Rate (Unit/kg/hr) Bolus   (Units) Rate Change   (Unit/kg/hr) New Rate (Unit/kg/hr) Next   Anti-Xa Comments  Pump Check Daily   08/08 0435 0.34 - None ever -2 10 1100 Pt received a heparin 5000 unit bolus at  0245. I reduced his starting dose by 2 U/kg/hr per the flow chart based on his current anti-Xa. Notified  nurses, Michell and Alysa, that they could start the drip, no bolus.    8/8 1101 < 0.10 10 None ever +3 13 1800 No bolus ever, rate change no s/s bleeding, d/w  orestes ruiz RN   8/8 1826 <0.10 13 Never +3 16 0030 No s/s of bleeding or stops per nurse Malika. Order in EPIC still 10 U/kg/hr. MAR and nursing confirm actual rate  has been 13 U/kg/hr.     8/9 0050 <0.1 16 never +3 19 0700 Spoke with Malathi about the need for a rate  increase. No s/s of bleeding.                                                                                                                                                                                                 Pharmacy will continue to follow anti-Xa results and monitor for signs and symptoms of bleeding or thrombosis.    Keiko Walters, PharmSANTA  00:53 EDT.  08/09/23

## 2023-08-09 NOTE — PLAN OF CARE
Problem: Restraint, Nonviolent  Goal: Absence of Harm or Injury  8/9/2023 0253 by Risa Erickson RN  Outcome: Ongoing, Progressing  8/9/2023 0252 by Risa Erickson RN  Outcome: Ongoing, Progressing  8/9/2023 0252 by Risa Erickson RN  Outcome: Ongoing, Progressing  Intervention: Implement Least Restrictive Safety Strategies  Recent Flowsheet Documentation  Taken 8/9/2023 0200 by Risa Erickson RN  Diversional Activities: television  Taken 8/8/2023 2000 by Risa Erickson RN  Diversional Activities: television  Taken 8/8/2023 1900 by Risa Erickson RN  Medical Device Protection:   IV pole/bag removed from visual field   tubing secured  Intervention: Protect Dignity, Rights, and Personal Wellbeing  Recent Flowsheet Documentation  Taken 8/9/2023 0200 by Risa Erickson RN  Trust Relationship/Rapport:   choices provided   care explained   emotional support provided   empathic listening provided   questions answered   questions encouraged   reassurance provided   thoughts/feelings acknowledged  Taken 8/8/2023 2000 by Risa Erickson RN  Trust Relationship/Rapport:   care explained   choices provided   emotional support provided   empathic listening provided   questions answered   questions encouraged   reassurance provided   thoughts/feelings acknowledged  Intervention: Protect Skin and Joint Integrity  Recent Flowsheet Documentation  Taken 8/9/2023 0200 by Risa Erickson RN  Body Position:   position changed independently   left   side-lying  Taken 8/9/2023 0000 by Risa Erickson RN  Body Position:   position changed independently   left   side-lying  Taken 8/8/2023 2200 by Risa Erickson RN  Body Position: position changed independently  Taken 8/8/2023 2000 by Risa Erickson RN  Body Position: position changed independently     Problem: Skin Injury Risk Increased  Goal: Skin Health and Integrity  8/9/2023 0253 by Risa Erickson RN  Outcome: Ongoing, Progressing  8/9/2023 0252 by Risa Erickson RN  Outcome:  Ongoing, Progressing  8/9/2023 0252 by Risa Erickson RN  Outcome: Ongoing, Progressing  Intervention: Promote and Optimize Oral Intake  Recent Flowsheet Documentation  Taken 8/9/2023 0200 by Risa Erickson RN  Oral Nutrition Promotion: rest periods promoted  Taken 8/8/2023 2000 by Risa Erickson RN  Oral Nutrition Promotion: rest periods promoted  Intervention: Optimize Skin Protection  Recent Flowsheet Documentation  Taken 8/9/2023 0200 by Risa Erickson RN  Head of Bed (HOB) Positioning: HOB elevated  Taken 8/9/2023 0000 by Risa Erickson RN  Head of Bed (HOB) Positioning: HOB elevated  Taken 8/8/2023 2200 by Risa Erickson RN  Head of Bed (HOB) Positioning: HOB elevated  Taken 8/8/2023 2000 by Risa Erickson RN  Pressure Reduction Techniques:   frequent weight shift encouraged   pressure points protected   heels elevated off bed  Head of Bed (HOB) Positioning: HOB elevated  Pressure Reduction Devices:   pressure-redistributing mattress utilized   positioning supports utilized   heel offloading device utilized  Skin Protection:   adhesive use limited   tubing/devices free from skin contact     Problem: Fall Injury Risk  Goal: Absence of Fall and Fall-Related Injury  8/9/2023 0253 by Risa Erickson RN  Outcome: Ongoing, Progressing  8/9/2023 0252 by Risa Erickson RN  Outcome: Ongoing, Progressing  8/9/2023 0252 by Risa Erickson RN  Outcome: Ongoing, Progressing  Intervention: Identify and Manage Contributors  Recent Flowsheet Documentation  Taken 8/8/2023 1900 by Risa Erickson RN  Medication Review/Management: medications reviewed  Intervention: Promote Injury-Free Environment  Recent Flowsheet Documentation  Taken 8/9/2023 0200 by Risa Erickson RN  Safety Promotion/Fall Prevention:   activity supervised   clutter free environment maintained   lighting adjusted   room organization consistent   safety round/check completed  Taken 8/9/2023 0100 by Risa Erickson RN  Safety Promotion/Fall  Prevention:   activity supervised   clutter free environment maintained   lighting adjusted   nonskid shoes/slippers when out of bed   room organization consistent   safety round/check completed  Taken 8/9/2023 0000 by Risa Erickson RN  Safety Promotion/Fall Prevention:   activity supervised   clutter free environment maintained   lighting adjusted   nonskid shoes/slippers when out of bed   room organization consistent   safety round/check completed  Taken 8/8/2023 2300 by Risa Erickson RN  Safety Promotion/Fall Prevention:   activity supervised   clutter free environment maintained   lighting adjusted   nonskid shoes/slippers when out of bed   room organization consistent   safety round/check completed  Taken 8/8/2023 2200 by Risa Erickson RN  Safety Promotion/Fall Prevention:   activity supervised   clutter free environment maintained   lighting adjusted   room organization consistent   safety round/check completed  Taken 8/8/2023 2100 by Risa Erickson RN  Safety Promotion/Fall Prevention:   activity supervised   clutter free environment maintained   lighting adjusted   nonskid shoes/slippers when out of bed   safety round/check completed   room organization consistent  Taken 8/8/2023 2000 by Risa Erickson RN  Safety Promotion/Fall Prevention:   activity supervised   clutter free environment maintained   lighting adjusted   nonskid shoes/slippers when out of bed   room organization consistent   safety round/check completed  Taken 8/8/2023 1900 by Risa Erickson RN  Safety Promotion/Fall Prevention:   activity supervised   clutter free environment maintained   lighting adjusted   nonskid shoes/slippers when out of bed   safety round/check completed   room organization consistent     Problem: COPD (Chronic Obstructive Pulmonary Disease) Comorbidity  Goal: Maintenance of COPD Symptom Control  8/9/2023 0253 by Risa Erickson, RN  Outcome: Ongoing, Progressing  8/9/2023 0252 by Risa Erickson, RN  Outcome:  Ongoing, Progressing  8/9/2023 0252 by Risa Erickson RN  Outcome: Ongoing, Progressing  Intervention: Maintain COPD-Symptom Control  Recent Flowsheet Documentation  Taken 8/8/2023 1900 by Risa Erickson RN  Medication Review/Management: medications reviewed     Problem: Adjustment to Illness (Sepsis/Septic Shock)  Goal: Optimal Coping  8/9/2023 0253 by Risa Erickson RN  Outcome: Ongoing, Progressing  8/9/2023 0252 by Risa Erickson RN  Outcome: Ongoing, Progressing  8/9/2023 0252 by Risa Erickson RN  Outcome: Ongoing, Progressing  Intervention: Optimize Psychosocial Adjustment to Illness  Recent Flowsheet Documentation  Taken 8/9/2023 0200 by Risa Erickson RN  Family/Support System Care:   support provided   self-care encouraged  Taken 8/8/2023 2000 by Risa Erickson RN  Family/Support System Care:   self-care encouraged   support provided     Problem: Bleeding (Sepsis/Septic Shock)  Goal: Absence of Bleeding  8/9/2023 0253 by Risa Erickson RN  Outcome: Ongoing, Progressing  8/9/2023 0252 by Risa Erickson RN  Outcome: Ongoing, Progressing  8/9/2023 0252 by Risa Erickson RN  Outcome: Ongoing, Progressing  Intervention: Monitor and Manage Bleeding  Recent Flowsheet Documentation  Taken 8/8/2023 2000 by Risa Erickson RN  Bleeding Precautions: blood pressure closely monitored     Problem: Glycemic Control Impaired (Sepsis/Septic Shock)  Goal: Blood Glucose Level Within Desired Range  8/9/2023 0253 by Risa Erickson RN  Outcome: Ongoing, Progressing  8/9/2023 0252 by Risa Erickson RN  Outcome: Ongoing, Progressing  8/9/2023 0252 by Risa Erickson RN  Outcome: Ongoing, Progressing  Intervention: Optimize Glycemic Control  Recent Flowsheet Documentation  Taken 8/9/2023 0200 by Risa Erickson RN  Glycemic Management:   blood glucose monitored   oral hydration promoted  Taken 8/8/2023 2000 by Risa Erickson RN  Glycemic Management:   blood glucose monitored   oral hydration promoted     Problem:  Infection Progression (Sepsis/Septic Shock)  Goal: Absence of Infection Signs and Symptoms  8/9/2023 0253 by Risa Erickson RN  Outcome: Ongoing, Progressing  8/9/2023 0252 by Risa Erickson RN  Outcome: Ongoing, Progressing  8/9/2023 0252 by Risa Erickson RN  Outcome: Ongoing, Progressing  Intervention: Initiate Sepsis Management  Recent Flowsheet Documentation  Taken 8/8/2023 1900 by Risa Erickson RN  Infection Prevention:   environmental surveillance performed   hand hygiene promoted   rest/sleep promoted   single patient room provided  Intervention: Promote Recovery  Recent Flowsheet Documentation  Taken 8/9/2023 0200 by Risa Erickson RN  Activity Management: bedrest  Taken 8/8/2023 2000 by Risa Erickson RN  Activity Management: bedrest  Intervention: Promote Stabilization  Recent Flowsheet Documentation  Taken 8/9/2023 0200 by Risa Erickson RN  Fluid/Electrolyte Management: fluids provided  Taken 8/8/2023 2000 by Risa Erickson RN  Fluid/Electrolyte Management: fluids provided     Problem: Nutrition Impaired (Sepsis/Septic Shock)  Goal: Optimal Nutrition Intake  8/9/2023 0253 by Risa Erickson RN  Outcome: Ongoing, Progressing  8/9/2023 0252 by Risa Erickson RN  Outcome: Ongoing, Progressing  8/9/2023 0252 by Risa Erickson RN  Outcome: Ongoing, Progressing     Problem: Communication Impairment (Mechanical Ventilation, Invasive)  Goal: Effective Communication  8/9/2023 0253 by Risa Erickson RN  Outcome: Ongoing, Progressing  8/9/2023 0252 by Risa Erickson RN  Outcome: Ongoing, Progressing  8/9/2023 0252 by Risa Erickson RN  Outcome: Ongoing, Progressing     Problem: Device-Related Complication Risk (Mechanical Ventilation, Invasive)  Goal: Optimal Device Function  8/9/2023 0253 by Risa Erickson RN  Outcome: Ongoing, Progressing  8/9/2023 0252 by Risa Erickson RN  Outcome: Ongoing, Progressing  8/9/2023 0252 by Risa Erickson RN  Outcome: Ongoing, Progressing  Intervention: Optimize  Device Care and Function  Recent Flowsheet Documentation  Taken 8/8/2023 2000 by Risa Erickson RN  Aspiration Precautions: awake/alert before oral intake  Taken 8/8/2023 1900 by Risa Erickson RN  Airway Safety Measures: suction at bedside     Problem: Inability to Wean (Mechanical Ventilation, Invasive)  Goal: Mechanical Ventilation Liberation  8/9/2023 0253 by Risa Erickson RN  Outcome: Ongoing, Progressing  8/9/2023 0252 by Risa Erickson RN  Outcome: Ongoing, Progressing  8/9/2023 0252 by Risa Erickson RN  Outcome: Ongoing, Progressing  Intervention: Promote Extubation and Mechanical Ventilation Liberation  Recent Flowsheet Documentation  Taken 8/8/2023 1900 by Risa Erickson RN  Medication Review/Management: medications reviewed     Problem: Nutrition Impairment (Mechanical Ventilation, Invasive)  Goal: Optimal Nutrition Delivery  8/9/2023 0253 by Risa Erickson RN  Outcome: Ongoing, Progressing  8/9/2023 0252 by Risa Erickson RN  Outcome: Ongoing, Progressing  8/9/2023 0252 by Risa Erickson RN  Outcome: Ongoing, Progressing     Problem: Skin and Tissue Injury (Mechanical Ventilation, Invasive)  Goal: Absence of Device-Related Skin and Tissue Injury  8/9/2023 0253 by Risa Erickson RN  Outcome: Ongoing, Progressing  8/9/2023 0252 by Risa Erickson RN  Outcome: Ongoing, Progressing  8/9/2023 0252 by Risa Erickson RN  Outcome: Ongoing, Progressing  Intervention: Maintain Skin and Tissue Health  Recent Flowsheet Documentation  Taken 8/8/2023 2000 by Risa Erickson RN  Device Skin Pressure Protection:   absorbent pad utilized/changed   skin-to-device areas padded     Problem: Ventilator-Induced Lung Injury (Mechanical Ventilation, Invasive)  Goal: Absence of Ventilator-Induced Lung Injury  8/9/2023 0253 by Risa Erickson RN  Outcome: Ongoing, Progressing  8/9/2023 0252 by Risa Erickson RN  Outcome: Ongoing, Progressing  8/9/2023 0252 by Risa Erickson RN  Outcome: Ongoing,  Progressing  Intervention: Prevent Ventilator-Associated Pneumonia  Recent Flowsheet Documentation  Taken 8/9/2023 0200 by Risa Erickson RN  Head of Bed (HOB) Positioning: HOB elevated  Taken 8/9/2023 0000 by Risa Erickson RN  Head of Bed (HOB) Positioning: HOB elevated  Taken 8/8/2023 2200 by Risa Erickson RN  Head of Bed (HOB) Positioning: HOB elevated  Taken 8/8/2023 2000 by Risa Erickson RN  Head of Bed (HOB) Positioning: HOB elevated     Problem: Adult Inpatient Plan of Care  Goal: Plan of Care Review  Outcome: Ongoing, Progressing  Flowsheets (Taken 8/9/2023 0253)  Outcome Evaluation: Pt resting in bed, VSS, RA, A+Ox4. Central line and mccullough cath in place. Heparin gtt infusing. Prn pain medications provided. Regular diet.  Goal: Patient-Specific Goal (Individualized)  Outcome: Ongoing, Progressing  Goal: Absence of Hospital-Acquired Illness or Injury  Outcome: Ongoing, Progressing  Intervention: Identify and Manage Fall Risk  Recent Flowsheet Documentation  Taken 8/9/2023 0200 by Risa Erickson RN  Safety Promotion/Fall Prevention:   activity supervised   clutter free environment maintained   lighting adjusted   room organization consistent   safety round/check completed  Taken 8/9/2023 0100 by Risa Erickson RN  Safety Promotion/Fall Prevention:   activity supervised   clutter free environment maintained   lighting adjusted   nonskid shoes/slippers when out of bed   room organization consistent   safety round/check completed  Taken 8/9/2023 0000 by Risa Erickson RN  Safety Promotion/Fall Prevention:   activity supervised   clutter free environment maintained   lighting adjusted   nonskid shoes/slippers when out of bed   room organization consistent   safety round/check completed  Taken 8/8/2023 2300 by Risa Erickson RN  Safety Promotion/Fall Prevention:   activity supervised   clutter free environment maintained   lighting adjusted   nonskid shoes/slippers when out of bed   room organization  consistent   safety round/check completed  Taken 8/8/2023 2200 by Risa Erickson RN  Safety Promotion/Fall Prevention:   activity supervised   clutter free environment maintained   lighting adjusted   room organization consistent   safety round/check completed  Taken 8/8/2023 2100 by Risa Erickson RN  Safety Promotion/Fall Prevention:   activity supervised   clutter free environment maintained   lighting adjusted   nonskid shoes/slippers when out of bed   safety round/check completed   room organization consistent  Taken 8/8/2023 2000 by Risa Erickson RN  Safety Promotion/Fall Prevention:   activity supervised   clutter free environment maintained   lighting adjusted   nonskid shoes/slippers when out of bed   room organization consistent   safety round/check completed  Taken 8/8/2023 1900 by Risa Erickson RN  Safety Promotion/Fall Prevention:   activity supervised   clutter free environment maintained   lighting adjusted   nonskid shoes/slippers when out of bed   safety round/check completed   room organization consistent  Intervention: Prevent Skin Injury  Recent Flowsheet Documentation  Taken 8/9/2023 0200 by Risa Erickson RN  Body Position:   position changed independently   left   side-lying  Taken 8/9/2023 0000 by Risa Erickson RN  Body Position:   position changed independently   left   side-lying  Taken 8/8/2023 2200 by Risa Erickson RN  Body Position: position changed independently  Taken 8/8/2023 2000 by Risa Erickson RN  Body Position: position changed independently  Skin Protection:   adhesive use limited   tubing/devices free from skin contact  Intervention: Prevent and Manage VTE (Venous Thromboembolism) Risk  Recent Flowsheet Documentation  Taken 8/9/2023 0200 by Risa Erickson RN  Activity Management: bedrest  Taken 8/8/2023 2000 by Risa Erickson RN  Activity Management: bedrest  VTE Prevention/Management: (heparin gtt) other (see comments)  Intervention: Prevent Infection  Recent  Flowsheet Documentation  Taken 8/8/2023 1900 by Risa Erickson, RN  Infection Prevention:   environmental surveillance performed   hand hygiene promoted   rest/sleep promoted   single patient room provided  Goal: Optimal Comfort and Wellbeing  Outcome: Ongoing, Progressing  Intervention: Provide Person-Centered Care  Recent Flowsheet Documentation  Taken 8/9/2023 0200 by Risa Erickson RN  Trust Relationship/Rapport:   choices provided   care explained   emotional support provided   empathic listening provided   questions answered   questions encouraged   reassurance provided   thoughts/feelings acknowledged  Taken 8/8/2023 2000 by Risa Erickson RN  Trust Relationship/Rapport:   care explained   choices provided   emotional support provided   empathic listening provided   questions answered   questions encouraged   reassurance provided   thoughts/feelings acknowledged  Goal: Readiness for Transition of Care  Outcome: Ongoing, Progressing   Goal Outcome Evaluation:              Outcome Evaluation: Pt resting in bed, VSS, RA, A+Ox4. Central line and mccullough cath in place. Heparin gtt infusing. Prn pain medications provided. Regular diet.

## 2023-08-09 NOTE — PLAN OF CARE
Problem: Restraint, Nonviolent  Goal: Absence of Harm or Injury  Outcome: Ongoing, Progressing  Intervention: Implement Least Restrictive Safety Strategies  Recent Flowsheet Documentation  Taken 8/9/2023 1400 by Tyree Arguelles RN  Medical Device Protection: tubing secured  Taken 8/9/2023 0743 by Tyree Arguelles RN  Medical Device Protection: tubing secured  Diversional Activities: television  Intervention: Protect Dignity, Rights, and Personal Wellbeing  Recent Flowsheet Documentation  Taken 8/9/2023 0743 by Tyree Arguelles RN  Trust Relationship/Rapport:   care explained   choices provided   emotional support provided   empathic listening provided   questions answered   questions encouraged   reassurance provided   thoughts/feelings acknowledged  Intervention: Protect Skin and Joint Integrity  Recent Flowsheet Documentation  Taken 8/9/2023 1600 by Tyree Arguelles RN  Body Position: position changed independently  Taken 8/9/2023 1500 by Tyree Arguelles RN  Body Position: position changed independently  Taken 8/9/2023 1400 by Tyree Arguelles RN  Body Position: position changed independently  Taken 8/9/2023 1200 by Tyree Arguelles RN  Body Position: position changed independently  Taken 8/9/2023 1000 by Tyree Arguelles RN  Body Position: position changed independently  Taken 8/9/2023 0800 by Tyree Arguelles RN  Body Position: (up to chair) position changed independently  Taken 8/9/2023 0743 by Tyree Arguelles RN  Body Position: (educated patient on the importance of turning and repositioning patient to protect skin integrity) position changed independently   Goal Outcome Evaluation:  Plan of Care Reviewed With: patient        Progress: no change  Outcome Evaluation: patient alert and oriented x4, patient ambulated to the chair today and back, mccullough catheter removed with adequate uop using a urinal, central line to be removed today, afebrile, bed in lowest position, call light in reach, bed alarm set, VSS, patient to be NPO  at midnight for plans for a stress test tomorrow, SHADI

## 2023-08-09 NOTE — PROGRESS NOTES
"Patient Identification:  Name:  Paul Gomez  Age:  77 y.o.  Sex:  male  :  1945  MRN:  [id number]  Visit Number:  [id number]    Chief Complaint:   Chief Complaint   Patient presents with    Shortness of Breath    Loss of Consciousness    Chest Pain       Reason for Consult:  Follow up elevated troponin in setting of acute hypoxic respiratory failure     Interventional Cardiology Consulting Physician: Dr. Tony Sanchez MD, [facility]  Subjective Data:   Interval History:   Patient is in room CCU 8 and was seen and examined.  HS Troponin is trending to 74.  Hemoglobin has slightly decreased to 10.9 from 13.0 on admission patient is currently on IV heparin drip.  Platelet count is 151 versus 201 on admission.  Patient is lying in bed resting quietly.  No acute distress noted at this time.  Patient reports he did have some chest pain earlier today before he started eating but he relates it to being \"gas\".  He described the pain as quick and sharp on the left anterior chest wall, he denies any shortness of breath, nausea, diaphoresis, or vomiting.  Patient reports he has had some pain on the right side of his chest that is \"chronic\" in nature for him.    Current Hospital Medications:   allopurinol, 100 mg, Oral, Daily  aspirin, 81 mg, Oral, Daily  gabapentin, 200 mg, Oral, Q12H  ipratropium-albuterol, 3 mL, Nebulization, Q6H - RT  levoFLOXacin, 750 mg, Intravenous, Daily  methylPREDNISolone sodium succinate, 40 mg, Intravenous, Daily  metoprolol tartrate, 25 mg, Oral, Q12H  mupirocin, 1 application , Each Nare, BID  rosuvastatin, 40 mg, Oral, Nightly  senna-docusate sodium, 2 tablet, Oral, BID  sodium chloride, 10 mL, Intravenous, Q12H  sodium chloride, 10 mL, Intravenous, Q12H  sodium chloride, 10 mL, Intravenous, Q12H  [START ON 8/10/2023] vancomycin, 1,250 mg, Intravenous, Q24H      heparin, 20 Units/kg/hr, Last Rate: 20 Units/kg/hr (23 1202)  Pharmacy to Dose Heparin,         Objective Data:   Vital " Signs:  Temp:  [97.7 øF (36.5 øC)-98.7 øF (37.1 øC)] 97.7 øF (36.5 øC)  Heart Rate:  [56-90] 62  Resp:  [8-23] 20  BP: (107-155)/() 124/75      08/08/23  0203   Weight: 61.2 kg (135 lb)     Body mass index is 20.53 kg/mý.    Intake/Output Summary (Last 24 hours) at 8/9/2023 1338  Last data filed at 8/9/2023 1202  Gross per 24 hour   Intake 1026.27 ml   Output 1375 ml   Net -348.73 ml     Diet: Regular/House Diet; Texture: Regular Texture (IDDSI 7); Fluid Consistency: Thin (IDDSI 0)  NPO Diet NPO Type: Strict NPO    Physical Exam    Constitutional:       Appearance: Well-developed.   Neck:      Vascular: No carotid bruit or JVD.   Pulmonary:      Effort: Pulmonary effort is normal. No respiratory distress.      Breath sounds: Normal breath sounds. No wheezing. No rales.   Cardiovascular:      Normal rate. Regular rhythm.      Murmurs: No murmur, gallop or rub noted.     Comments: no edema.  Skin:     General: Skin is warm and dry.   Neurological:      Mental Status: Alert and oriented to person, place, and time.     Results Reviewed:   TELEMETRY:   SR 60's      ECHO:  Results for orders placed during the hospital encounter of 08/08/23    Adult Transthoracic Echo Complete W/ Cont if Necessary Per Protocol    Interpretation Summary    Left ventricular systolic function is normal. Left ventricular ejection fraction appears to be 51 - 55%.    Left ventricular diastolic function is consistent with (grade I) impaired relaxation.    Estimated right ventricular systolic pressure from tricuspid regurgitation is normal (<35 mmHg).    There is no evidence of pericardial effusion        STRESS TEST: None found in patient's chart        HEART CATH: None found in patient's chart        Results from last 7 days   Lab Units 08/09/23  0013 08/08/23  0452 08/08/23  0250   HSTROP T ng/L 74* 127* 107*     Results from last 7 days   Lab Units 08/09/23  0013 08/08/23  0452 08/08/23  0214 08/08/23  0208   LACTATE mmol/L  --  1.2 5.3*   --    WBC 10*3/mm3 9.19  --   --  12.13*   HEMOGLOBIN g/dL 10.9*  --   --  13.0   HEMATOCRIT % 33.4*  --   --  40.9   MCV fL 97.9*  --   --  100.2*   MCHC g/dL 32.6  --   --  31.8   PLATELETS 10*3/mm3 151  --   --  201   INR   --   --   --  1.00     Results from last 7 days   Lab Units 08/08/23  1311   PH, ARTERIAL pH units 7.468*   PO2 ART mm Hg 79.9*   PCO2, ARTERIAL mm Hg 35.4   HCO3 ART mmol/L 25.6     Results from last 7 days   Lab Units 08/09/23  0013 08/08/23  0250   SODIUM mmol/L 136 145   POTASSIUM mmol/L 3.9 4.8   MAGNESIUM mg/dL  --  2.1   CHLORIDE mmol/L 103 109*   CO2 mmol/L 23.5 26.3   BUN mg/dL 18 12   CREATININE mg/dL 1.00 0.94   CALCIUM mg/dL 8.5* 8.3*   GLUCOSE mg/dL 131* 162*   ALBUMIN g/dL 3.2* 3.5   BILIRUBIN mg/dL 0.3 0.3   ALK PHOS U/L 73 87   AST (SGOT) U/L 39 76*   ALT (SGPT) U/L 27 35   Estimated Creatinine Clearance: 53.6 mL/min (by C-G formula based on SCr of 1 mg/dL).    No results found for: AMMONIA      Blood Culture   Date Value Ref Range Status   08/08/2023 Abnormal Stain (C)  Preliminary   08/08/2023 Abnormal Stain (C)  Preliminary     No results found for: URINECX  No results found for: WOUNDCX  No results found for: STOOLCX    I have personally looked at the labs and they are summarized above.    Imaging Results (Last 24 Hours)       ** No results found for the last 24 hours. **              Assessment:   1.  Acute hypoxic respiratory failure  Non-STEMI, ischemic evaluation pending  Chronic active tobacco use disorder  Hypertension  Dyslipidemia  COPD          Plan:   NPO after midnight for planned cardiac catheterization on Thursday, 08/10/2023.  Further protect management based on his coronary anatomy.  I have explained the risks associated with the procedure to the patient including but not limited to an allergic reaction to the contrast material or medications used during the procedure bleeding, infection, and bruising at the catheter insertion site blood clots, which may  trigger heart attack, stroke,   damage to the artery where the catheter was inserted, or damage to the arteries as the catheter travels through your body, irregular heart rhythm arrhythmias, kidney damage caused by the contrast material.        Thank you very much for asking us to be involved in this patient's care.  We will follow along with you.    I have discussed the patients findings and my recommendations with patient.

## 2023-08-10 PROBLEM — I21.4 NSTEMI, INITIAL EPISODE OF CARE: Status: ACTIVE | Noted: 2023-08-08

## 2023-08-10 LAB
ANION GAP SERPL CALCULATED.3IONS-SCNC: 10.2 MMOL/L (ref 5–15)
BACTERIA SPEC RESP CULT: NORMAL
BUN SERPL-MCNC: 20 MG/DL (ref 8–23)
BUN/CREAT SERPL: 20.6 (ref 7–25)
CALCIUM SPEC-SCNC: 8.2 MG/DL (ref 8.6–10.5)
CHLORIDE SERPL-SCNC: 106 MMOL/L (ref 98–107)
CO2 SERPL-SCNC: 21.8 MMOL/L (ref 22–29)
CREAT SERPL-MCNC: 0.97 MG/DL (ref 0.76–1.27)
DEPRECATED RDW RBC AUTO: 46.5 FL (ref 37–54)
EGFRCR SERPLBLD CKD-EPI 2021: 80.4 ML/MIN/1.73
ERYTHROCYTE [DISTWIDTH] IN BLOOD BY AUTOMATED COUNT: 12.8 % (ref 12.3–15.4)
GLUCOSE SERPL-MCNC: 119 MG/DL (ref 65–99)
GRAM STN SPEC: NORMAL
HCT VFR BLD AUTO: 31.8 % (ref 37.5–51)
HGB BLD-MCNC: 10.5 G/DL (ref 13–17.7)
MCH RBC QN AUTO: 32.4 PG (ref 26.6–33)
MCHC RBC AUTO-ENTMCNC: 33 G/DL (ref 31.5–35.7)
MCV RBC AUTO: 98.1 FL (ref 79–97)
PLATELET # BLD AUTO: 150 10*3/MM3 (ref 140–450)
PMV BLD AUTO: 11.5 FL (ref 6–12)
POTASSIUM SERPL-SCNC: 3.6 MMOL/L (ref 3.5–5.2)
RBC # BLD AUTO: 3.24 10*6/MM3 (ref 4.14–5.8)
SODIUM SERPL-SCNC: 138 MMOL/L (ref 136–145)
UFH PPP CHRO-ACNC: 0.24 IU/ML (ref 0.3–0.7)
UFH PPP CHRO-ACNC: 0.3 IU/ML (ref 0.3–0.7)
UFH PPP CHRO-ACNC: 0.36 IU/ML (ref 0.3–0.7)
WBC NRBC COR # BLD: 8.23 10*3/MM3 (ref 3.4–10.8)

## 2023-08-10 PROCEDURE — 85520 HEPARIN ASSAY: CPT | Performed by: STUDENT IN AN ORGANIZED HEALTH CARE EDUCATION/TRAINING PROGRAM

## 2023-08-10 PROCEDURE — 94799 UNLISTED PULMONARY SVC/PX: CPT

## 2023-08-10 PROCEDURE — 25010000002 VANCOMYCIN 5 G RECONSTITUTED SOLUTION: Performed by: INTERNAL MEDICINE

## 2023-08-10 PROCEDURE — 94664 DEMO&/EVAL PT USE INHALER: CPT

## 2023-08-10 PROCEDURE — 25010000002 LEVOFLOXACIN PER 250 MG: Performed by: STUDENT IN AN ORGANIZED HEALTH CARE EDUCATION/TRAINING PROGRAM

## 2023-08-10 PROCEDURE — 99232 SBSQ HOSP IP/OBS MODERATE 35: CPT | Performed by: INTERNAL MEDICINE

## 2023-08-10 PROCEDURE — 85520 HEPARIN ASSAY: CPT

## 2023-08-10 PROCEDURE — 94761 N-INVAS EAR/PLS OXIMETRY MLT: CPT

## 2023-08-10 PROCEDURE — 80048 BASIC METABOLIC PNL TOTAL CA: CPT | Performed by: STUDENT IN AN ORGANIZED HEALTH CARE EDUCATION/TRAINING PROGRAM

## 2023-08-10 PROCEDURE — 85027 COMPLETE CBC AUTOMATED: CPT | Performed by: STUDENT IN AN ORGANIZED HEALTH CARE EDUCATION/TRAINING PROGRAM

## 2023-08-10 PROCEDURE — 25010000002 HEPARIN (PORCINE) 25000-0.45 UT/250ML-% SOLUTION: Performed by: STUDENT IN AN ORGANIZED HEALTH CARE EDUCATION/TRAINING PROGRAM

## 2023-08-10 RX ORDER — OXYCODONE HYDROCHLORIDE 15 MG/1
15 TABLET ORAL EVERY 6 HOURS PRN
Status: DISCONTINUED | OUTPATIENT
Start: 2023-08-10 | End: 2023-08-11 | Stop reason: HOSPADM

## 2023-08-10 RX ADMIN — IPRATROPIUM BROMIDE AND ALBUTEROL SULFATE 3 ML: 2.5; .5 SOLUTION RESPIRATORY (INHALATION) at 00:17

## 2023-08-10 RX ADMIN — ROSUVASTATIN CALCIUM 40 MG: 20 TABLET, FILM COATED ORAL at 20:28

## 2023-08-10 RX ADMIN — OXYCODONE HYDROCHLORIDE 15 MG: 15 TABLET ORAL at 20:45

## 2023-08-10 RX ADMIN — ALLOPURINOL 100 MG: 100 TABLET ORAL at 08:40

## 2023-08-10 RX ADMIN — GABAPENTIN 200 MG: 100 CAPSULE ORAL at 08:39

## 2023-08-10 RX ADMIN — OXYCODONE HYDROCHLORIDE 10 MG: 10 TABLET ORAL at 02:04

## 2023-08-10 RX ADMIN — OXYCODONE HYDROCHLORIDE 10 MG: 10 TABLET ORAL at 14:45

## 2023-08-10 RX ADMIN — IPRATROPIUM BROMIDE AND ALBUTEROL SULFATE 3 ML: 2.5; .5 SOLUTION RESPIRATORY (INHALATION) at 18:29

## 2023-08-10 RX ADMIN — VANCOMYCIN HYDROCHLORIDE 1250 MG: 5 INJECTION, POWDER, LYOPHILIZED, FOR SOLUTION INTRAVENOUS at 05:31

## 2023-08-10 RX ADMIN — Medication 10 ML: at 08:40

## 2023-08-10 RX ADMIN — IPRATROPIUM BROMIDE AND ALBUTEROL SULFATE 3 ML: 2.5; .5 SOLUTION RESPIRATORY (INHALATION) at 06:31

## 2023-08-10 RX ADMIN — ASPIRIN 81 MG: 81 TABLET, COATED ORAL at 08:40

## 2023-08-10 RX ADMIN — IPRATROPIUM BROMIDE AND ALBUTEROL SULFATE 3 ML: 2.5; .5 SOLUTION RESPIRATORY (INHALATION) at 12:20

## 2023-08-10 RX ADMIN — METOPROLOL TARTRATE 25 MG: 25 TABLET, FILM COATED ORAL at 08:40

## 2023-08-10 RX ADMIN — OXYCODONE HYDROCHLORIDE 10 MG: 10 TABLET ORAL at 08:39

## 2023-08-10 RX ADMIN — GABAPENTIN 200 MG: 100 CAPSULE ORAL at 20:28

## 2023-08-10 RX ADMIN — MUPIROCIN 1 APPLICATION: 20 OINTMENT TOPICAL at 08:41

## 2023-08-10 RX ADMIN — LEVOFLOXACIN 750 MG: 750 INJECTION, SOLUTION INTRAVENOUS at 08:40

## 2023-08-10 RX ADMIN — HEPARIN SODIUM 23 UNITS/KG/HR: 10000 INJECTION, SOLUTION INTRAVENOUS at 12:44

## 2023-08-10 RX ADMIN — DOCUSATE SODIUM 50 MG AND SENNOSIDES 8.6 MG 2 TABLET: 8.6; 5 TABLET, FILM COATED ORAL at 08:40

## 2023-08-10 RX ADMIN — METOPROLOL TARTRATE 25 MG: 25 TABLET, FILM COATED ORAL at 20:28

## 2023-08-10 NOTE — NURSING NOTE
Report called to Deana RODARTE on [address], updated patient's spouse Chari camacho at [phone] regarding patient being transferred to [address] [place] [address] room 303. Transport requested, patient has no belongings to transport with him, SHADI FUNES

## 2023-08-10 NOTE — CONSULTS
"        INFECTIOUS DISEASE CONSULTATION REPORT        Patient Identification:  Name:  Paul Gomez  Age:  77 y.o.  Sex:  male  :  1945  MRN:  [id number]   Visit Number:  [id number]  Primary Care Physician:  Luis Dobbs DO        Referring Provider: Dr. Sriram Parmar    Reason for consult: Bacteremia       LOS: 2 days        Subjective       Subjective     History of present illness:      Thank you Dr. Parmar for allowing us to participate in the care of your patient.  As you well know, Mr. Paul Gomez is a 77 y.o. male with past medical history significant for essential hypertension, hyperlipidemia, COPD, gout, bilateral hearing loss and chronic low back pain per medical records, who presented to [facility] Emergency Department on 2023 for evaluation of gurgling sound heard by family.  Per record review, the patient's family reported he went to bed around midnight and was noted to be gurgling around 30 minutes later.  The patient was very confused and EMS was called.  When EMS arrived they reported the description as \"guppy breathing\".  Patient was placed on 15 L nonrebreather and brought to the ED as an infield STEMI.  The ED performed 1 poterendira Martínez with interventional cardiology came to the ER to evaluate.  The patient was very dyspneic and was intubated almost immediately upon arrival to ED.  EKG prior to intubation showed ST elevation in the inferior and lateral leads, repeat EKG after intubation showed resolution of these changes.  Dr. German determined the patient was not having true ST elevation MI and elected not to take the patient for catheterization.  The patient was admitted for treatment of suspected acute COPD exacerbation causing acute hypercapnic respiratory failure.    2 out of 2 blood cultures from  were positive with coagulase-negative Staphylococcus with BC ID reporting staph species not aureus or lugdunensis.  Repeat blood cultures from " 8/9/2023 preliminarily report no growth at 24 hours.  WBC normal at 8.23.  Transthoracic echo from 8/8 does not report any evidence of vegetation.    The patient is awake and alert, sitting up in bed comfortably.  On room air with no apparent distress.  Afebrile.  Denies diarrhea.  Lung exam is remarkable for) expiratory wheezing bilaterally.  Abdomen soft and nontender with normoactive bowel sounds.      Infectious Disease consultation was requested for antimicrobial management.      ---------------------------------------------------------------------------------------------------------------------     Review Of Systems:    Constitutional: no fever, chills and night sweats. No appetite change or unexpected weight change. No fatigue.  Eyes: no eye drainage, itching or redness.  HEENT: no mouth sores, dysphagia or nose bleed.  Respiratory: no for shortness of breath, cough or production of sputum.  Cardiovascular: no chest pain, no palpitations, no orthopnea.  Gastrointestinal: no nausea, vomiting or diarrhea. No abdominal pain, hematemesis or rectal bleeding.  Genitourinary: no dysuria or polyuria.  Hematologic/lymphatic: no lymph node abnormalities, no easy bruising or easy bleeding.  Musculoskeletal: no muscle or joint pain.  Skin: No rash and no itching.  Neurological: no loss of consciousness, no seizure, no headache.  Behavioral/Psych: no depression or suicidal ideation.  Endocrine: no hot flashes.  Immunologic: negative.    ---------------------------------------------------------------------------------------------------------------------     Past Medical History    Past Medical History:   Diagnosis Date    COPD (chronic obstructive pulmonary disease)     Gout     Hearing loss     History of degenerative disc disease     Hyperlipidemia     Hypertension     Low back pain     Pedal edema     Prediabetes        Past Surgical History    Past Surgical History:   Procedure Laterality Date    CHOLECYSTECTOMY       HIP HEMIARTHROPLASTY Left 1/21/2023    Procedure: HIP HEMIARTHROPLASTY;  Surgeon: Ananth Bates DO;  Location: [facility];  Service: Orthopedics;  Laterality: Left;    LUMBAR DISCECTOMY      SHOULDER SURGERY Left        Family History    Family History   Problem Relation Age of Onset    Cancer Mother        Social History    Social History     Tobacco Use    Smoking status: Every Day     Packs/day: 1.00     Years: 60.00     Pack years: 60.00     Types: Cigarettes     Start date: 1965    Smokeless tobacco: Never    Tobacco comments:     Patient intubated and sedated   Vaping Use    Vaping Use: Every day    Substances: Nicotine    Devices: Disposable   Substance Use Topics    Alcohol use: No    Drug use: No       Allergies    Patient has no known allergies.  ---------------------------------------------------------------------------------------------------------------------     Home Medications:    Prior to Admission Medications       Prescriptions Last Dose Informant Patient Reported? Taking?    albuterol sulfate  (90 Base) MCG/ACT inhaler Unknown  No No    Inhale 2 puffs Every 6 (Six) Hours As Needed for Wheezing or Shortness of Air.    allopurinol (ZYLOPRIM) 100 MG tablet Unknown  No No    Take 1 tablet by mouth Daily.    aspirin 81 MG EC tablet Unknown Self Yes No    Take 1 tablet by mouth Daily.    celecoxib (CeleBREX) 200 MG capsule Unknown Pharmacy Yes No    Take 1 capsule by mouth Daily.    fluticasone-salmeterol (ADVAIR HFA) 230-21 MCG/ACT inhaler Unknown  No No    Inhale 2 puffs 2 (Two) Times a Day.    gabapentin (NEURONTIN) 800 MG tablet Unknown  Yes No    Take 1 tablet by mouth 3 (Three) Times a Day.    oxyCODONE (ROXICODONE) 15 MG immediate release tablet Unknown  Yes No    Take 1 tablet by mouth Every 6 (Six) Hours As Needed for Moderate Pain.    triamcinolone (KENALOG) 0.1 % cream Unknown Pharmacy Yes No    Apply 1 application  topically to the appropriate area as directed 2 (Two) Times a  Day. Apply to affected area          ---------------------------------------------------------------------------------------------------------------------    Objective       Objective     Hospital Scheduled Meds:  allopurinol, 100 mg, Oral, Daily  aspirin, 81 mg, Oral, Daily  gabapentin, 200 mg, Oral, Q12H  ipratropium-albuterol, 3 mL, Nebulization, Q6H - RT  levoFLOXacin, 750 mg, Intravenous, Daily  metoprolol tartrate, 25 mg, Oral, Q12H  mupirocin, 1 application , Each Nare, BID  rosuvastatin, 40 mg, Oral, Nightly  senna-docusate sodium, 2 tablet, Oral, BID  sodium chloride, 10 mL, Intravenous, Q12H  sodium chloride, 10 mL, Intravenous, Q12H  sodium chloride, 10 mL, Intravenous, Q12H  vancomycin, 1,250 mg, Intravenous, Q24H      heparin, 23 Units/kg/hr, Last Rate: 23 Units/kg/hr (08/10/23 0528)  Pharmacy to Dose Heparin,       ---------------------------------------------------------------------------------------------------------------------   Vital Signs:  Temp:  [97.6 øF (36.4 øC)-98.7 øF (37.1 øC)] 98.5 øF (36.9 øC)  Heart Rate:  [53-93] 56  Resp:  [8-20] 18  BP: (114-153)/(64-92) 134/82  Mean Arterial Pressure (Non-Invasive) for the past 24 hrs (Last 3 readings):   Noninvasive MAP (mmHg)   08/10/23 1100 102   08/10/23 1000 87   08/10/23 0900 97     SpO2 Percentage    08/10/23 0900 08/10/23 1000 08/10/23 1100   SpO2: 93% 95% 95%     SpO2:  [93 %-98 %] 95 %  on   ;   Device (Oxygen Therapy): room air    Body mass index is 20.53 kg/mý.  Wt Readings from Last 3 Encounters:   08/08/23 61.2 kg (135 lb)   06/19/23 61.3 kg (135 lb 3.2 oz)   06/05/23 61.8 kg (136 lb 3.2 oz)     ---------------------------------------------------------------------------------------------------------------------     Physical Exam:    Constitutional: Chronically ill-appearing elderly  male.  Sitting up comfortably in bed on room air with no apparent distress.  Denies any complaints or issues.      HENT:  Head: Normocephalic and  atraumatic.  Mouth:  Moist mucous membranes.    Eyes:  Conjunctivae and EOM are normal.  No scleral icterus.  Neck:  Neck supple.  No JVD present.    Cardiovascular:  Normal rate, regular rhythm and normal heart sounds with no murmur. No edema.  Pulmonary/Chest: Lung exam with bilateral inspiratory and expiratory wheezing  Abdominal:  Soft.  Bowel sounds are normal.  No distension and no tenderness.   Musculoskeletal:  No edema, no tenderness, and no deformity.  No swelling or redness of joints.  Neurological:  Alert and oriented to person, place, and time.  No facial droop.  No slurred speech.   Skin:  Skin is warm and dry.  No rash noted.  No pallor.   Psychiatric:  Normal mood and affect.  Behavior is normal.    ---------------------------------------------------------------------------------------------------------------------    Results from last 7 days   Lab Units 08/09/23  0013 08/08/23  0452 08/08/23  0250   HSTROP T ng/L 74* 127* 107*         Results from last 7 days   Lab Units 08/08/23  1311   PH, ARTERIAL pH units 7.468*   PO2 ART mm Hg 79.9*   PCO2, ARTERIAL mm Hg 35.4   HCO3 ART mmol/L 25.6     Results from last 7 days   Lab Units 08/10/23  0936 08/09/23  0013 08/08/23  0452 08/08/23  0214 08/08/23  0208   LACTATE mmol/L  --   --  1.2 5.3*  --    WBC 10*3/mm3 8.23 9.19  --   --  12.13*   HEMOGLOBIN g/dL 10.5* 10.9*  --   --  13.0   HEMATOCRIT % 31.8* 33.4*  --   --  40.9   MCV fL 98.1* 97.9*  --   --  100.2*   MCHC g/dL 33.0 32.6  --   --  31.8   PLATELETS 10*3/mm3 150 151  --   --  201   INR   --   --   --   --  1.00     Results from last 7 days   Lab Units 08/10/23  0936 08/09/23  0013 08/08/23  0250   SODIUM mmol/L 138 136 145   POTASSIUM mmol/L 3.6 3.9 4.8   MAGNESIUM mg/dL  --   --  2.1   CHLORIDE mmol/L 106 103 109*   CO2 mmol/L 21.8* 23.5 26.3   BUN mg/dL 20 18 12   CREATININE mg/dL 0.97 1.00 0.94   CALCIUM mg/dL 8.2* 8.5* 8.3*   GLUCOSE mg/dL 119* 131* 162*   ALBUMIN g/dL  --  3.2* 3.5    BILIRUBIN mg/dL  --  0.3 0.3   ALK PHOS U/L  --  73 87   AST (SGOT) U/L  --  39 76*   ALT (SGPT) U/L  --  27 35   Estimated Creatinine Clearance: 55.2 mL/min (by C-G formula based on SCr of 0.97 mg/dL).  No results found for: AMMONIA    Glucose   Date/Time Value Ref Range Status   08/08/2023 0211 215 (H) 70 - 130 mg/dL Final     Comment:     Meter: [id number] : [id number] LOGAN BRAVO     Lab Results   Component Value Date    HGBA1C 5.40 01/20/2023     Lab Results   Component Value Date    TSH 6.060 (H) 01/20/2023    FREET4 1.39 01/20/2023       Blood Culture   Date Value Ref Range Status   08/09/2023 No growth at 24 hours  Preliminary   08/09/2023 No growth at 24 hours  Preliminary   08/08/2023 Staphylococcus, coagulase negative (C)  Preliminary   08/08/2023 Staphylococcus, coagulase negative (C)  Preliminary     No results found for: URINECX  No results found for: WOUNDCX  No results found for: STOOLCX  Respiratory Culture   Date Value Ref Range Status   08/08/2023   Final    Scant growth (1+) Normal respiratory cici. No S. aureus or Pseudomonas aeruginosa detected. Final report.     Pain Management Panel  More data exists         Latest Ref Rng & Units 4/11/2019 12/6/2018   Pain Management Panel   Creatinine, Urine mg/dL 75.0  130.3  130.3    Amphetamine, Urine Qual Negative Negative  -   Barbiturates Screen, Urine Negative Negative  -   Benzodiazepine Screen, Urine Negative Negative  -   Buprenorphine, Screen, Urine Negative Negative  -   Cocaine Screen, Urine Negative Negative  -   Methadone Screen , Urine Negative Negative  -     I have personally reviewed the above laboratory results.   ---------------------------------------------------------------------------------------------------------------------  Imaging Results (Last 7 Days)       Procedure Component Value Units Date/Time    CT Head Without Contrast [[id number]] Collected: 08/08/23 0440     Updated: 08/08/23 0447    Narrative:      Examination:  CT head without contrast     CLINICAL HISTORY: Altered mental status     COMPARISON: None. Correlation is made to the report from the MRI  4/15/2019. No images are available for review.     TECHNIQUE: Contiguous 3 mm thick slices were obtained from the skull  base to the vertex without contrast. Coronal and sagittal reformats were  performed.     FINDINGS:  Mild generalized volume loss with prominence of the sulci and  ventricular system. White matter changes are noted in a pattern  suggesting chronic small vessel ischemic disease. There is no acute  intracranial hemorrhage. Areas of low-attenuation are noted within both  occipital regions. This is likely the sequela of remote ischemia and was  described on prior MRI 4/15/2019 although no images are available for  review. There is no acute intracranial hemorrhage. No definite acute  territorial infarct. No extra-axial collection. No shift of midline  structures. No acute calvarial fracture. The visualized paranasal  sinuses and mastoid air cells are relatively clear.       Impression:      1. No acute intracranial process. If symptoms persist, MRI is  recommended for further evaluation.  2. Low-attenuation areas within the posterior occipital regions. These  are likely related to remote ischemia.     This report was finalized on 8/8/2023 4:45 AM by Ag Menendez MD.       CT Abdomen Pelvis Without Contrast [[id number]] Collected: 08/08/23 0437     Updated: 08/08/23 0442    Narrative:      Examination: CT abdomen and pelvis without contrast     CLINICAL HISTORY: Acute respiratory failure     COMPARISON: None     TECHNIQUE: Contiguous 5 mm thick slices were obtained through the  abdomen and pelvis without contrast. Coronal and sagittal reformats were  performed.     FINDINGS:     ABDOMEN:  For description of the findings within the lung bases, please refer to  the dedicated chest CT performed at the same time. There has been prior  cholecystectomy. The lack of  intravenous contrast material limits  evaluation of the solid abdominal viscera. However, no contour deforming  lesion is appreciated within the unenhanced liver, spleen, adrenal  glands or kidneys. Slight fullness of both renal collecting systems is  likely on the basis of extrarenal pelves. No definite hydronephrosis is  appreciated. No ureteral calculus. A Balderas catheter is in place. The  bladder is quite distended. Recommend close correlation for functional  status of the catheter. A left hip arthroplasty is noted with resultant  artifacts. The pancreas is rather atrophic. Calcifications within the  pancreas are presumably related to changes associated with chronic  pancreatitis. Recommend close correlation with history.     PELVIS:   Prominent formed stool is noted suggesting constipation. There is no  bowel obstruction. No free air is identified suggest perforation. There  is degradation of image quality due to motion artifact. No colitis or  diverticulitis. The appendix is not well visualized. No definite  inflammatory changes are appreciated within the right lower quadrant  suggest acute appendicitis.     Extensive atherosclerotic calcification is noted within the aorta and  its major branches.       Impression:      1. Prominent formed colonic stool suggesting constipation.  2. No bowel obstruction or perforation.  3. No hydronephrosis or ureteral calculus.  4. Likely sequela of prior episodes of pancreatitis.     This report was finalized on 8/8/2023 4:40 AM by Ag Menendez MD.       CT Angiogram Chest Pulmonary Embolism [[id number]] Collected: 08/08/23 0432     Updated: 08/08/23 0438    Narrative:      Examination: CT angiogram chest with contrast     CLINICAL HISTORY: Short of breath     COMPARISON: None     TECHNIQUE: Contiguous 2 mm thick slices were obtained through the chest  after the administration of intravenous contrast. Coronal and sagittal  reformats were performed.     FINDINGS:  Endotracheal  and nasogastric tubes are noted. The thoracic aorta is  normal in caliber. No aneurysmal dilatation. No dissection. The central  pulmonary arteries are normal in caliber. No pulmonary embolus. No  pneumothorax. There is mild degradation of image quality due to motion  artifact. Patchy left lower lobe airspace opacity likely represents  pneumonia in the correct clinical setting. Likely scarring or  atelectasis posteriorly within the lower lobes. Bronchial wall  thickening is noted suggesting ongoing inflammation commonly seen in the  setting of bronchitis or reactive airway disease. No upper abdominal  ascites. There is been prior cholecystectomy.       Impression:      1. No pulmonary embolus.  2. Normal caliber thoracic aorta without aneurysmal dilatation.  3. Patchy airspace disease most prevalent within the left lower lobe  likely representing pneumonia in the correct clinical setting.     This report was finalized on 8/8/2023 4:36 AM by Ag Menendez MD.       XR Chest 1 View [[id number]] Collected: 08/08/23 0341     Updated: 08/08/23 0345    Narrative:      CLINICAL HISTORY: Central line confirmation.     COMPARISON: None available.     TECHNIQUE: Single portable upright AP view of the chest was obtained.     FINDINGS: Endotracheal tube tip is 3.9 cm above the jules.  Right  internal jugular vein central venous catheter tip is in the mid SVC.   Nasogastric tube tip is not seen but is below the gastroesophageal  junction.     There is no pneumothorax or pleural effusion.  Heart size is normal.   The aortic knob is calcified.  Lungs are clear.       Impression:         RIGHT INTERNAL JUGULAR VEIN CATHETER TIP IN THE MID SVC.  NEGATIVE FOR  PNEUMOTHORAX.     This report was finalized on 8/8/2023 3:42 AM by Ariadna Steen MD.       XR Chest 1 View [[id number]] Collected: 08/08/23 0226     Updated: 08/08/23 0229    Narrative:      INDICATION: Tube placement     TECHNIQUE: Frontal radiograph of the chest.      COMPARISON: Chest radiograph 6/5/2023       Impression:      FINDINGS/IMPRESSION:   Endotracheal tube tip approximately 2.5 cm above the jules. Enteric  tube in stomach.  Chronic appearing interstitial lung markings. No pulmonary infiltrate,  pleural effusion or pneumothorax. The heart is not enlarged.  Retained colonic stool. No bowel dilation. No free air in the abdomen.     This report was finalized on 8/8/2023 2:27 AM by Alex Pallas, DO.       XR Abdomen KUB [[id number]] Collected: 08/08/23 0226     Updated: 08/08/23 0229    Narrative:      INDICATION: Tube placement     TECHNIQUE: Frontal radiograph of the chest.     COMPARISON: Chest radiograph 6/5/2023       Impression:      FINDINGS/IMPRESSION:   Endotracheal tube tip approximately 2.5 cm above the jules. Enteric  tube in stomach.  Chronic appearing interstitial lung markings. No pulmonary infiltrate,  pleural effusion or pneumothorax. The heart is not enlarged.  Retained colonic stool. No bowel dilation. No free air in the abdomen.     This report was finalized on 8/8/2023 2:27 AM by Alex Pallas, DO.             I have personally reviewed the above radiology results.   ---------------------------------------------------------------------------------------------------------------------      Pertinent Infectious Disease Results    2 out of 2 blood cultures from 8/8 were positive with coagulase-negative Staphylococcus with BC ID reporting staph species not aureus or lugdunensis.  Repeat blood cultures from 8/9/2023 preliminarily report no growth at 24 hours.  WBC normal at 8.23.  Transthoracic echo from 8/8 does not report any evidence of vegetation.      Assessment & Plan      Assessment      Staphylococcal bacteremia versus contaminant  COPD exacerbation      Plan        The patient is awake and alert, sitting up in bed comfortably.  On room air with no apparent distress.  Afebrile.  Denies diarrhea.  Lung exam is remarkable for) expiratory wheezing  bilaterally.  Abdomen soft and nontender with normoactive bowel sounds.    After reviewing repeat blood culture collection times as well as first vancomycin dose time, it is likely that blood cultures cleared prior to the first dose of vancomycin and prior positive blood cultures may be result of contaminant.  For now we will continue with vancomycin dosing while awaiting repeat blood cultures to finalize.  Will continue to monitor closely and adjust antibiotic coverage as appropriate.        ANTIMICROBIAL THERAPY    levoFLOXacin (LEVAQUIN) 750 mg/150 mL D5W (premix) - 750 MG/150ML  vancomycin       Again, thank you Dr. Parmar for allowing us to participate in the care of your patient and please feel free to call for any questions you may have.        Code Status:     Code Status and Medical Interventions:   Ordered at: 08/08/23 0702     Level Of Support Discussed With:    Patient     Code Status (Patient has no pulse and is not breathing):    CPR (Attempt to Resuscitate)     Medical Interventions (Patient has pulse or is breathing):    Full Support     Release to patient:    Routine Release         BEATRICE Corrales  08/10/23  12:07 EDT     Electronically signed by BEATRICE Corrales, 08/10/23, 12:17 PM EDT.

## 2023-08-10 NOTE — PROGRESS NOTES
[facility]  PROGRESS NOTE    Patient information:  Name: Paul Gomez  Age/Sex: 77 y.o. male  :  1945        PCP: Luis Dobbs DO  Attending: Edita Jones MD  MRN:  [id number]  Visit Number:  [id number]    LOS:  LOS: 2 days     CODE STATUS:    Code Status and Medical Interventions:   Ordered at: 23 0702     Level Of Support Discussed With:    Patient     Code Status (Patient has no pulse and is not breathing):    CPR (Attempt to Resuscitate)     Medical Interventions (Patient has pulse or is breathing):    Full Support     Release to patient:    Routine Release       PROBLEM LIST:Principal Problem:    Respiratory failure with hypoxia and hypercapnia  Active Problems:    COPD exacerbation    NSTEMI, initial episode of care    Interventional Cardiology Consulting Physician: Dr. Tony Sanchez MD, [facility]     Reason for Cardiology follow-up: elevated troponin in setting of acute hypoxic respiratory failure      Subjective   ADMISSION INFORMATION:  Chief Complaint   Patient presents with    Shortness of Breath    Loss of Consciousness    Chest Pain       Interval History:   Patient is in room CCU 8  and was examined by Dr. Aguilar.  Patients cardiac catheterization was postponed today after he had + blood cultures to return.  Infectious disease team is following with patient.  Patient is lying in bed resting quietly.  No acute distress noted at this time.  Patient denies any chest pain, shortness of breath, or palpitations.  However patient does report uncontrolled chronic back pain from multiple injuries.      Vital Signs  Temp:  [97.6 øF (36.4 øC)-98.7 øF (37.1 øC)] 98.5 øF (36.9 øC)  Heart Rate:  [52-93] 59  Resp:  [8-20] 19  BP: (113-153)/(58-92) 113/58  Vital Signs (last 72 hrs)          0700  08/08 0659 08/ 0700  08/ 0659  0700  08/10 0659 08/10 0700  08/10 1328   Most Recent      Temp (øF) 97.4 -  98.6    97.8 -  98.7     97.6 -  98    98.5 -  98.7     98.5 (36.9) 08/10 1130    Heart Rate 86 -  141    60 -  95    53 -  93    52 -  75     59 08/10 1300    Resp 20 -  30    8 -  23    8 -  20    16 -  20     19 08/10 1220    /93 -  203/129    101/66 -  153/94    114/66 -  155/82    113/58 -  136/69     113/58 08/10 1300    SpO2 (%) 96 -  100    91 -  100    90 -  98    93 -  96     95 08/10 1300          Body mass index is 20.53 kg/mý.    Intake/Output Summary (Last 24 hours) at 8/10/2023 1328  Last data filed at 8/10/2023 1239  Gross per 24 hour   Intake 1232.8 ml   Output 1550 ml   Net -317.2 ml       Objective     Physical Exam:      Constitutional:       Appearance: Well-developed.  No acute distress noted at this time.  Neck:      Vascular: No carotid bruit or JVD.   Pulmonary:      Effort: Pulmonary effort is normal. No respiratory distress.      Breath sounds: Normal breath sounds. No wheezing. No rales.   Cardiovascular:      Normal rate. Regular rhythm.      Murmurs: No murmur, gallop or rub noted.     Comments: no edema.  Skin:     General: Skin is warm and dry.  No edema noted.  Neurological:      Mental Status: Alert and oriented to person, place, and time.       Results review   Results Review:   Results from last 7 days   Lab Units 08/10/23  0936 08/09/23  0013 08/08/23  0208   WBC 10*3/mm3 8.23 9.19 12.13*   HEMOGLOBIN g/dL 10.5* 10.9* 13.0   PLATELETS 10*3/mm3 150 151 201     Results from last 7 days   Lab Units 08/10/23  0936 08/09/23  0013 08/08/23  0250   SODIUM mmol/L 138 136 145   POTASSIUM mmol/L 3.6 3.9 4.8   CHLORIDE mmol/L 106 103 109*   CO2 mmol/L 21.8* 23.5 26.3   BUN mg/dL 20 18 12   CREATININE mg/dL 0.97 1.00 0.94   CALCIUM mg/dL 8.2* 8.5* 8.3*   GLUCOSE mg/dL 119* 131* 162*   ALT (SGPT) U/L  --  27 35   AST (SGOT) U/L  --  39 76*     Results from last 7 days   Lab Units 08/09/23  0013 08/08/23  0452 08/08/23  0250   HSTROP T ng/L 74* 127* 107*     Lab Results   Component Value Date    PROBNP 98.0  06/24/2022    PROBNP 281.7 06/19/2022     No results found.     Lab Results   Component Value Date    INR 1.00 08/08/2023    INR 1.11 (H) 01/20/2023    INR 1.14 (H) 05/17/2022    INR 0.92 06/12/2018     Lab Results   Component Value Date    MG 2.1 08/08/2023    MG 2.0 01/27/2023    MG 1.7 01/26/2023     Lab Results   Component Value Date    TSH 6.060 (H) 01/20/2023    PSA 0.760 03/30/2023      No results found for: CHOL, TRIG, HDL, LDL  Pain Management Panel  More data exists         Latest Ref Rng & Units 4/11/2019 12/6/2018   Pain Management Panel   Creatinine, Urine mg/dL 75.0  130.3  130.3    Amphetamine, Urine Qual Negative Negative  -   Barbiturates Screen, Urine Negative Negative  -   Benzodiazepine Screen, Urine Negative Negative  -   Buprenorphine, Screen, Urine Negative Negative  -   Cocaine Screen, Urine Negative Negative  -   Methadone Screen , Urine Negative Negative  -     Microbiology Results (last 10 days)       Procedure Component Value - Date/Time    Blood Culture - Blood, Arm, Left [[id number]]  (Normal) Collected: 08/09/23 0526    Lab Status: Preliminary result Specimen: Blood from Arm, Left Updated: 08/10/23 0530     Blood Culture No growth at 24 hours    Blood Culture - Blood, Arm, Left [[id number]]  (Normal) Collected: 08/09/23 0444    Lab Status: Preliminary result Specimen: Blood from Arm, Left Updated: 08/10/23 0501     Blood Culture No growth at 24 hours    Respiratory Culture - Sputum, ET Suction [[id number]] Collected: 08/08/23 1025    Lab Status: Final result Specimen: Sputum from ET Suction Updated: 08/10/23 1007     Respiratory Culture Scant growth (1+) Normal respiratory cici. No S. aureus or Pseudomonas aeruginosa detected. Final report.     Gram Stain Moderate (3+) WBCs seen      Few (2+) Epithelial cells seen      Mixed cici    Respiratory Panel PCR w/COVID-19(SARS-CoV-2) ONDINA/ARTI/GENET/PAD/COR/MAD/MARIKA In-House, NP Swab in UTM/VTM, 3-4 HR TAT - Swab, Nasopharynx [[id number]]  (Normal)  Collected: 08/08/23 0837    Lab Status: Final result Specimen: Swab from Nasopharynx Updated: 08/08/23 0950     ADENOVIRUS, PCR Not Detected     Coronavirus 229E Not Detected     Coronavirus HKU1 Not Detected     Coronavirus NL63 Not Detected     Coronavirus OC43 Not Detected     COVID19 Not Detected     Human Metapneumovirus Not Detected     Human Rhinovirus/Enterovirus Not Detected     Influenza A PCR Not Detected     Influenza B PCR Not Detected     Parainfluenza Virus 1 Not Detected     Parainfluenza Virus 2 Not Detected     Parainfluenza Virus 3 Not Detected     Parainfluenza Virus 4 Not Detected     RSV, PCR Not Detected     Bordetella pertussis pcr Not Detected     Bordetella parapertussis PCR Not Detected     Chlamydophila pneumoniae PCR Not Detected     Mycoplasma pneumo by PCR Not Detected    Narrative:      In the setting of a positive respiratory panel with a viral infection PLUS a negative procalcitonin without other underlying concern for bacterial infection, consider observing off antibiotics or discontinuation of antibiotics and continue supportive care. If the respiratory panel is positive for atypical bacterial infection (Bordetella pertussis, Chlamydophila pneumoniae, or Mycoplasma pneumoniae), consider antibiotic de-escalation to target atypical bacterial infection.    S. Pneumo Ag Urine or CSF - Urine, Urine, Clean Catch [[id number]]  (Normal) Collected: 08/08/23 0837    Lab Status: Final result Specimen: Urine, Clean Catch Updated: 08/08/23 1812     Strep Pneumo Ag Negative    Blood Culture - Blood, Arm, Right [[id number]]  (Abnormal) Collected: 08/08/23 0406    Lab Status: Preliminary result Specimen: Blood from Arm, Right Updated: 08/10/23 0810     Blood Culture Staphylococcus, coagulase negative     Isolated from Aerobic and Anaerobic Bottles     Gram Stain Aerobic Bottle Gram positive cocci in clusters      Anaerobic Bottle Gram positive cocci in clusters    Blood Culture - Blood, Arm, Left  [[id number]]  (Abnormal) Collected: 08/08/23 0321    Lab Status: Preliminary result Specimen: Blood from Arm, Left Updated: 08/10/23 0809     Blood Culture Staphylococcus, coagulase negative     Isolated from Aerobic Bottle     Gram Stain Aerobic Bottle Gram positive cocci in clusters    Blood Culture ID, PCR - Blood, Arm, Left [[id number]]  (Abnormal) Collected: 08/08/23 0321    Lab Status: Final result Specimen: Blood from Arm, Left Updated: 08/09/23 0243     BCID, PCR Staph spp, not aureus or lugdunensis. Identification by BCID2 PCR.     BOTTLE TYPE Aerobic Bottle    COVID PRE-OP / PRE-PROCEDURE SCREENING ORDER (NO ISOLATION) - Swab, Nasopharynx [[id number]]  (Normal) Collected: 08/08/23 0207    Lab Status: Final result Specimen: Swab from Nasopharynx Updated: 08/08/23 0232    Narrative:      The following orders were created for panel order COVID PRE-OP / PRE-PROCEDURE SCREENING ORDER (NO ISOLATION) - Swab, Nasopharynx.  Procedure                               Abnormality         Status                     ---------                               -----------         ------                     COVID-19 and FLU A/B PCR...[[id number]]  Normal              Final result                 Please view results for these tests on the individual orders.    COVID-19 and FLU A/B PCR - Swab, Nasopharynx [[id number]]  (Normal) Collected: 08/08/23 0207    Lab Status: Final result Specimen: Swab from Nasopharynx Updated: 08/08/23 0232     COVID19 Not Detected     Influenza A PCR Not Detected     Influenza B PCR Not Detected    Narrative:      Fact sheet for providers: https://www.fda.gov/media/730890/download    Fact sheet for patients: https://www.fda.gov/media/953502/download    Test performed by PCR.           Imaging Results (Last 24 Hours)       ** No results found for the last 24 hours. **            ECHO:  Results for orders placed during the hospital encounter of 08/08/23    Adult Transthoracic Echo Complete W/ Cont if  Necessary Per Protocol    Interpretation Summary    Left ventricular systolic function is normal. Left ventricular ejection fraction appears to be 51 - 55%.    Left ventricular diastolic function is consistent with (grade I) impaired relaxation.    Estimated right ventricular systolic pressure from tricuspid regurgitation is normal (<35 mmHg).    There is no evidence of pericardial effusion      STRESS TEST:   No results found for this or any previous visit.      HEART CATH:  No results found for this or any previous visit.      TELEMETRY:  SR 60's              I reviewed the patient's new clinical results.    Medication Review:   Current list of medications may not reflect those currently placed in orders that are not signed or are being held.     allopurinol, 100 mg, Oral, Daily  aspirin, 81 mg, Oral, Daily  gabapentin, 200 mg, Oral, Q12H  ipratropium-albuterol, 3 mL, Nebulization, Q6H - RT  levoFLOXacin, 750 mg, Intravenous, Daily  metoprolol tartrate, 25 mg, Oral, Q12H  mupirocin, 1 application , Each Nare, BID  rosuvastatin, 40 mg, Oral, Nightly  senna-docusate sodium, 2 tablet, Oral, BID  sodium chloride, 10 mL, Intravenous, Q12H  sodium chloride, 10 mL, Intravenous, Q12H  sodium chloride, 10 mL, Intravenous, Q12H  vancomycin, 1,250 mg, Intravenous, Q24H      heparin, 23 Units/kg/hr, Last Rate: 23 Units/kg/hr (08/10/23 1244)  Pharmacy to Dose Heparin,         acetaminophen    senna-docusate sodium **AND** polyethylene glycol **AND** bisacodyl **AND** bisacodyl    melatonin    nitroglycerin    oxyCODONE    Pharmacy to Dose Heparin    sodium chloride    sodium chloride    sodium chloride    sodium chloride    Assessment      Acute hypoxic respiratory failure, improved  2. Non-STEMI, ischemic evaluation pending  3. Chronic active tobacco use disorder  4. Hypertension  5. Dyslipidemia  6. COPD  7. Bacteremia  8. Chronic Back Pain      Plan   Recommendations:  Patient's cardiac catheterization will be delayed until  the bacteremia is cleared.  Continue aspirin, Lopressor, and Crestor  Bacteremia management per ID.   Pain management per Primary Care Team        I have discussed this patient with Dr. Aguilar and together, we have formed a plan of care for this patient as stated above in the recommendations.       I have discussed the patients findings and my recommendations with patient.    Electronically signed by BEATRICE Wu, 08/10/23, 3:56 PM EDT.             Please note that portions of this note were completed with a voice recognition program.    Please note that portions of this note were copied and has been reviewed and is accurate as of 8/10/2023 .         Addendum:  Patient continues to be asymptomatic from cardiac standpoint, we will wait for his cultures to clear before proceeding with coronary angiogram given that he is asymptomatic and has normal LV systolic function.  I did explain to the patient that we will recommend coronary angiogram given that he had inferior ST changes when he came in with hypoxic respiratory failure.  Patient understood verbally.    I have explained the risks associated with the procedure to the patient including but not limited to an allergic reaction to the contrast material or medications used during the procedure bleeding, infection, and bruising at the catheter insertion site blood clots, which may trigger heart attack, stroke,   damage to the artery where the catheter was inserted, or damage to the arteries as the catheter travels through your body, irregular heart rhythm arrhythmias, kidney damage caused by the contrast material.            Tony Sanchez MD, [facility]  Interventional Cardiology

## 2023-08-10 NOTE — PROGRESS NOTES
HEPARIN INFUSION  Paul Gomez is a  77 y.o. male receiving heparin infusion.     Therapy for (VTE/Cardiac):   cardiac  Patient Dosing Weight: 61.2 kg  Initial Bolus (Y/N):   N  Any Bolus (Y/N):   N        Signs or Symptoms of Bleeding: N    Cardiac or Other (Not VTE)   Initial Bolus: 60 units/kg (Max 4,000 units)  Initial rate: 12 units/kg/hr (Max 1,000 units/hr)   Anti Xa Rebolus Infusion Hold time Change infusion Dose (Units/kg/hr) Next Anti Xa or aPTT Level Due   < 0.11 50 Units/kg  (4000 Units Max) None Increase by  3 Units/kg/hr 6 hours   0.11- 0.19 25 Units/kg  (2000 Units Max) None Increase by  2 Units/kg/hr 6 hours   0.2 - 0.29 0 None Increase by  1 Units/kg/hr 6 hours   0.3 - 0.5 0 None No Change 6 hours (after 2 consecutive levels in range check q24h @0700)   0.51 - 0.6 0 None Decrease by  1 Units/kg/hr 6 hours   0.61 - 0.8 0 30 Minutes Decrease by  2 Units/kg/hr 6 hours   0.81 - 1 0 60 Minutes Decrease by  3 Units/kg/hr 6 hours   >1 0 Hold  After Anti Xa less than 0.5 decrease previous rate by  4 Units/kg/hr  Every 2 hours until Anti Xa  less than 0.5 then when infusion restarts in 6 hours       Recommend anti-Xa every 6 hours.          Date   Time   Anti-Xa Current Rate (Unit/kg/hr) Bolus   (Units) Rate Change   (Unit/kg/hr) New Rate (Unit/kg/hr) Next   Anti-Xa Comments  Pump Check Daily   08/08 0435 0.34 - None ever -2 10 1100 Pt received a heparin 5000 unit bolus at  0245. I reduced his starting dose by 2 U/kg/hr per the flow chart based on his current anti-Xa. Notified  nurses, Michell and Alysa, that they could start the drip, no bolus.    8/8 1101 < 0.10 10 None ever +3 13 1800 No bolus ever, rate change no s/s bleeding, d/w  orestes ruiz RN   8/8 1826 <0.10 13 Never +3 16 0030 No s/s of bleeding or stops per nurse Malika. Order in EPIC still 10 U/kg/hr. MAR and nursing confirm actual rate  has been 13 U/kg/hr.     8/9 0050 <0.1 16 never +3 19 0700 Spoke with Malathi about the need for a rate  increase. No s/s of bleeding.    8/9 0701 0.23 19 never +1 20 1500 Rate increase , no s/s bleeding, d/w Tyree RN   8/9 1443 0.26 20 Never  +1 21 2200 Rate increase, no s/s bleeding d/w Chen  RN   08/09 2225 0.26 21 never +1 22 0400 Rate inccrease , no s/s of bleeding per Annabella Preciado RN    08/10 0458 0.24 22 never +1 23 1100 Rate increase, no s/s bleeding per CAYDEN Zamarripa    8/10 0950  23    1100 Pump check- rate and weight are correct.   8/10 0936 0.36 23 never - 23 1600 Therapeutic x 1, continue current rate discussed with nurse Tyree, no s/sx bleeding.                                                                                                                              Pharmacy will continue to follow anti-Xa results and monitor for signs and symptoms of bleeding or thrombosis.    Thank you,    Natividad Christina, PharmD  8/10/2023  11:06 EDT

## 2023-08-10 NOTE — PROGRESS NOTES
[facility] HOSPITALIST PROGRESS NOTE     Patient Identification:  Name:  Paul Gomez  Age:  77 y.o.  Sex:  male  :  1945  MRN:  [id number]  Visit Number:  [id number]  ROOM: [address] [place] [address]     Primary Care Provider:  Luis Dobbs DO    Length of stay in inpatient status:  2    Subjective     Chief Compliant:    Chief Complaint   Patient presents with    Shortness of Breath    Loss of Consciousness    Chest Pain       History of Presenting Illness: Patient seen and evaluated in follow-up for acute hypercapnic respiratory failure admitted earlier this morning by overnight hospitalist service.  Patient remains on room air at time of exam without any acute complaints.  Initially plan for left heart catheter today but deferred in the setting of possible bacteremia.  Infectious disease consulted.    Objective     Current Hospital Meds:  allopurinol, 100 mg, Oral, Daily  aspirin, 81 mg, Oral, Daily  gabapentin, 200 mg, Oral, Q12H  ipratropium-albuterol, 3 mL, Nebulization, Q6H - RT  levoFLOXacin, 750 mg, Intravenous, Daily  metoprolol tartrate, 25 mg, Oral, Q12H  mupirocin, 1 application , Each Nare, BID  rosuvastatin, 40 mg, Oral, Nightly  senna-docusate sodium, 2 tablet, Oral, BID  sodium chloride, 10 mL, Intravenous, Q12H  sodium chloride, 10 mL, Intravenous, Q12H  sodium chloride, 10 mL, Intravenous, Q12H  vancomycin, 1,250 mg, Intravenous, Q24H      heparin, 23 Units/kg/hr, Last Rate: 23 Units/kg/hr (08/10/23 1244)  Pharmacy to Dose Heparin,       ----------------------------------------------------------------------------------------------------------------------  Vital Signs:  Temp:  [98 øF (36.7 øC)-98.7 øF (37.1 øC)] 98 øF (36.7 øC)  Heart Rate:  [52-84] 67  Resp:  [8-20] 20  BP: (112-151)/(58-83) 122/66  SpO2:  [92 %-98 %] 93 %  on   ;   Device (Oxygen Therapy): room air  Body mass index is 20.53 kg/mý.      Intake/Output Summary (Last 24 hours) at 8/10/2023 1957  Last data filed at  8/10/2023 1557  Gross per 24 hour   Intake 646 ml   Output 1530 ml   Net -884 ml      ----------------------------------------------------------------------------------------------------------------------  Physical exam:  Constitutional: Elderly chronically ill-appearing adult male resting in bed breathing comfortably on room air.  HENT:  Head:  Normocephalic and atraumatic.  Mouth:  Moist mucous membranes.  ET tube in place.  Eyes:  Conjunctivae and EOM are normal. No scleral icterus.    Cardiovascular:  Normal rate, regular rhythm and normal heart sounds with no murmur.  Pulmonary/Chest:  No respiratory distress, no wheezes, no crackles, with diminished breath sounds by  Abdominal:  Soft.  Bowel sounds are normal.  No distension and no tenderness.   Musculoskeletal:  No deformity.  No red or swollen joints anywhere.  Functional ROM intact.   Neurological: Sedated on mechanical ventilation.  No focal neurological deficits on exam.    Skin:  Skin is warm and dry. No rash or lesion noted. No pallor.   Peripheral vascular:  Pulses in all 4 extremities with no clubbing, no cyanosis, no edema.  Psychiatric: Appropriate mood and affect, pleasant.   ----------------------------------------------------------------------------------------------------------------------  WBC/HGB/HCT/PLT   8.23/10.5/31.8/150 (08/10 0936)  BUN/CREAT/GLUC/ALT/AST/SOLITARIO/LIP    20/0.97/119/--/--/--/-- (08/10 0936)  ABIGAIL - Na/K/Cl/CO2: 138/3.6/106/21.8* (08/10 0936)        No results found for: URINECX  No results found for: BLOODCX    I have personally looked at the labs and they are summarized above.  ----------------------------------------------------------------------------------------------------------------------  Detailed radiology reports for the last 24 hours:  No radiology results for the last day    Assessment & Plan      Acute hypercapnic respiratory failure  Acute exacerbation of COPD  Elevated troponin second to respiratory  failure  Left lower lobe pneumonia  Severe sepsis  Gram-positive bacteremia    -Patient presenting with episode of unresponsiveness and hypoxia from home and requiring intubation and mechanical ventilation.  Patient admits that he smoked marijuana as well as took some extra of his home which I suspect led to decreased respiratory status and subsequent unresponsive episode and respiratory failure.    -Lactic acidemia likely secondary to both infectious etiology and acute respiratory failure.      -Patient pulmonary respiratory culture showing normal cici and will discontinue Levaquin monotherapy and transition to vancomycin given positive blood cultures with 2 out of 2 positive for gram-positive cocci in clusters.    -Infectious disease consulted and recommends continuation of vancomycin however vancomycin was technically started post collection of repeat cultures which so far have showed no growth.  If these cultures remain negative likely could be representative of contamination and the original 2 out of 2 cultures.    -Sputum culture collected and pending from ET suction    -Continue DuoNebs    -Cardiology consulted while in ER and evaluated the patient and did not feel that patient met criteria for ST elevation myocardial infarction and patient was not taken to Cath Lab as EKG changes changes with respiratory support and treatment.  Cardiology tentatively planning for left heart cath on 8/10 but deferred in the setting of possible bacteremia.  Will keep patient n.p.o. after midnight on the off chance the patient has no growth on cultures at 48 hours and could be candidate for possible cath tomorrow.    Hypertension  Hyperlipidemia    -Continue home regimen    Gout    -Continue allopurinol    Chronic low back pain  Chronic neuropathy    -Continue home medication    Copied text in portions of the note has been reviewed and is accurate as of 08/10/23    VTE Prophylaxis:   Mechanical Order History:       None           Pharmalogical Order History:        Ordered     Dose Route Frequency Stop    08/08/23 0432  heparin 18202 units/250 mL (100 units/mL) in 0.45 % NaCl infusion  9.79 mL/hr         16 Units/kg/hr IV Titrated --    08/08/23 0404  heparin 07712 units/250 mL (100 units/mL) in 0.45 % NaCl infusion  7.34 mL/hr,   Status:  Discontinued         12 Units/kg/hr IV Titrated 08/08/23 0407    08/08/23 0404  Pharmacy to Dose Heparin         -- XX Continuous PRN --    08/08/23 0222  heparin (porcine) 5000 UNIT/ML injection 5,000 Units         5,000 Units SC Once 08/08/23 0245                    Disposition Home pending clinical course    Sriram Parmar DO  [facility] Hospitalist  08/10/23  19:57 EDT

## 2023-08-10 NOTE — CONSULTS
COPD Education        Referring Provider: Dr. Jones  Reason for Consultation: COPD Exacerbation  Pulmonologist: None  Last outpatient pulmonary visit: n/a  Length of Diagnosis: years per his recollection  Last Hospital stay for COPD? > 1 year    Subjective .     Age: 77 y.o.  Sex: male  What stage have you been told you are in? [place]  Do you use a CPAP or BIPAP? no   Have you had any recent weight loss? no  How many pillows do you use? 2    Last PFT/when/where? none  FEV1: n/a    Classification of Airflow Limitation Severity in COPD (Based on Post-Bronchodilator FEV1)  Gold 1: Mild FEV1 ? 80% predicted   Gold 2:  Moderate 50% ? FEV1 < 80% predicted   Gold 3: Severe 30% ? FEV1 < 50% predicted   Gold 4: Very Severe FEV1 < 30% predicted     FEV1/FVC: n/a    Do you have dyspnea? yes Dyspnea on exertion  Home O2: no    Social History:  Social History     Socioeconomic History    Marital status:    Tobacco Use    Smoking status: Every Day     Packs/day: 1.00     Years: 60.00     Pack years: 60.00     Types: Cigarettes     Start date: 1965    Smokeless tobacco: Never    Tobacco comments:     Patient intubated and sedated   Vaping Use    Vaping Use: Every day    Substances: Nicotine    Devices: Disposable   Substance and Sexual Activity    Alcohol use: No    Drug use: No    Sexual activity: Defer       Smoking Cessation: No    Airway Clearance methods utilized: no    History of Sleep Apnea: no  Patient's Last ABG:  Site   Date Value Ref Range Status   08/08/2023 Right Brachial  Final     Jr's Test   Date Value Ref Range Status   08/08/2023 N/A  Final     pH, Arterial   Date Value Ref Range Status   08/08/2023 7.468 (H) 7.350 - 7.450 pH units Final     Comment:     83 Value above reference range     pCO2, Arterial   Date Value Ref Range Status   08/08/2023 35.4 35.0 - 45.0 mm Hg Final     pO2, Arterial   Date Value Ref Range Status   08/08/2023 79.9 (L) 83.0 - 108.0 mm Hg Final     Comment:     84 Value  below reference range     HCO3, Arterial   Date Value Ref Range Status   08/08/2023 25.6 20.0 - 26.0 mmol/L Final     Base Excess, Arterial   Date Value Ref Range Status   08/08/2023 2.1 (H) 0.0 - 2.0 mmol/L Final     O2 Saturation, Arterial   Date Value Ref Range Status   08/08/2023 97.1 94.0 - 99.0 % Final     Hemoglobin, Blood Gas   Date Value Ref Range Status   08/08/2023 12.6 (L) 14 - 18 g/dL Final     Comment:     84 Value below reference range     Hematocrit, Blood Gas   Date Value Ref Range Status   08/08/2023 38.7 38.0 - 51.0 % Final     Oxyhemoglobin   Date Value Ref Range Status   08/08/2023 95.6 94 - 99 % Final     Methemoglobin   Date Value Ref Range Status   08/08/2023 0.20 0.00 - 3.00 % Final     Carboxyhemoglobin   Date Value Ref Range Status   08/08/2023 1.3 0 - 5 % Final     CO2 Content   Date Value Ref Range Status   08/08/2023 26.7 22 - 33 mmol/L Final     Barometric Pressure for Blood Gas   Date Value Ref Range Status   08/08/2023 726 mmHg Final     Modality   Date Value Ref Range Status   08/08/2023 Ventilator  Final     FIO2   Date Value Ref Range Status   08/08/2023 30 % Final        Objective     SpO2 SpO2: 92 % (08/10/23 1610)  Device Device (Oxygen Therapy): room air (08/10/23 1220)  Flow Flow (L/min):  (2 liters Nasal Cannula not in use.) (08/09/23 0641)  Breath Sounds: diminished breath sounds- throughout               Home Medications:  Medications Prior to Admission   Medication Sig Dispense Refill Last Dose    albuterol sulfate  (90 Base) MCG/ACT inhaler Inhale 2 puffs Every 6 (Six) Hours As Needed for Wheezing or Shortness of Air. 18 g 5 Unknown    allopurinol (ZYLOPRIM) 100 MG tablet Take 1 tablet by mouth Daily. 30 tablet 2 Unknown    aspirin 81 MG EC tablet Take 1 tablet by mouth Daily.   Unknown    celecoxib (CeleBREX) 200 MG capsule Take 1 capsule by mouth Daily.   Unknown    fluticasone-salmeterol (ADVAIR HFA) 230-21 MCG/ACT inhaler Inhale 2 puffs 2 (Two) Times a Day. 12  "g 11 Unknown    gabapentin (NEURONTIN) 800 MG tablet Take 1 tablet by mouth 3 (Three) Times a Day.   Unknown    oxyCODONE (ROXICODONE) 15 MG immediate release tablet Take 1 tablet by mouth Every 6 (Six) Hours As Needed for Moderate Pain.   Unknown    triamcinolone (KENALOG) 0.1 % cream Apply 1 application  topically to the appropriate area as directed 2 (Two) Times a Day. Apply to affected area   Unknown     Barriers to Learning? No    Discussion: COPD education was given to Mr. Gomez via the booklet , \"A Patient's Guide to COPD\". Discussion of the COPD zones (Green/Yellow/Red) was competed with emphasizes on what to do in the yellow and red zones. He was in his room and pleasantly interested in COPD education.      Proper Inhaler technique was illustrated with and without the given Aero Chamber spacer I provided to the patient. Instruction on rescue and maintenance medications, the function of each and the importance of using them as prescribed.      Pursed Lip breathing was instructed as well as when to utilize the breathing technique.      Ms. Gomez utilizes Advair for his COPD maintenance at home. He stated he was not using Advair 2 puffs BID as directed. We discussed in detail the importance of using his COPD maintenance inhaler everyday, SOB or not. He seemed to understand the benefit after discussing the advantages of maintenance medication for COPD with him.      I did offer him a pneumonia vaccination but he respectively declined.       He and I also discussed the COPD Clinic and how his participation would help him better control his COPD symptoms. Per his permission, I placed a ambulatory referral to the COPD Clinic and have him on the clinic schedule for 8/24/23 at 1:30.     Thank you for consulting COPD Education and allowing me to be apart of Mr. Gomez care.     TIFF Lua, RRT  COPD Navigator  08/10/23  16:38 EDT        "

## 2023-08-10 NOTE — PLAN OF CARE
Problem: Restraint, Nonviolent  Goal: Absence of Harm or Injury  Outcome: Ongoing, Progressing  Intervention: Implement Least Restrictive Safety Strategies  Recent Flowsheet Documentation  Taken 8/9/2023 2000 by Risa Erickson RN  Diversional Activities: television  Taken 8/9/2023 1900 by Risa Erickson RN  Medical Device Protection:   IV pole/bag removed from visual field   tubing secured  Intervention: Protect Dignity, Rights, and Personal Wellbeing  Recent Flowsheet Documentation  Taken 8/9/2023 2000 by Risa Erickson RN  Trust Relationship/Rapport:   care explained   choices provided   emotional support provided   empathic listening provided   questions answered   questions encouraged   reassurance provided   thoughts/feelings acknowledged  Intervention: Protect Skin and Joint Integrity  Recent Flowsheet Documentation  Taken 8/9/2023 2000 by Risa Erickson RN  Body Position: position changed independently     Problem: Skin Injury Risk Increased  Goal: Skin Health and Integrity  Outcome: Ongoing, Progressing  Intervention: Promote and Optimize Oral Intake  Recent Flowsheet Documentation  Taken 8/9/2023 2000 by Risa Erickson RN  Oral Nutrition Promotion: rest periods promoted  Intervention: Optimize Skin Protection  Recent Flowsheet Documentation  Taken 8/9/2023 2000 by Risa Erickson RN  Pressure Reduction Techniques:   frequent weight shift encouraged   pressure points protected   heels elevated off bed  Head of Bed (HOB) Positioning: HOB at 30-45 degrees  Pressure Reduction Devices:   pressure-redistributing mattress utilized   positioning supports utilized   heel offloading device utilized  Skin Protection:   adhesive use limited   tubing/devices free from skin contact     Problem: Fall Injury Risk  Goal: Absence of Fall and Fall-Related Injury  Outcome: Ongoing, Progressing  Intervention: Identify and Manage Contributors  Recent Flowsheet Documentation  Taken 8/9/2023 2000 by Risa Erickson  RN  Medication Review/Management: medications reviewed  Intervention: Promote Injury-Free Environment  Recent Flowsheet Documentation  Taken 8/10/2023 0200 by Risa Erickson RN  Safety Promotion/Fall Prevention:   activity supervised   clutter free environment maintained   lighting adjusted   nonskid shoes/slippers when out of bed   room organization consistent   safety round/check completed  Taken 8/10/2023 0100 by Risa Erickson RN  Safety Promotion/Fall Prevention:   activity supervised   clutter free environment maintained   lighting adjusted   safety round/check completed   room organization consistent  Taken 8/10/2023 0000 by Risa Erickson RN  Safety Promotion/Fall Prevention:   activity supervised   clutter free environment maintained   lighting adjusted   nonskid shoes/slippers when out of bed   room organization consistent   safety round/check completed  Taken 8/9/2023 2300 by Rias Erickson RN  Safety Promotion/Fall Prevention:   activity supervised   clutter free environment maintained   lighting adjusted   nonskid shoes/slippers when out of bed   safety round/check completed   room organization consistent  Taken 8/9/2023 2200 by Risa Erickson RN  Safety Promotion/Fall Prevention:   activity supervised   clutter free environment maintained   lighting adjusted   nonskid shoes/slippers when out of bed   room organization consistent   safety round/check completed  Taken 8/9/2023 2100 by Risa Erickson RN  Safety Promotion/Fall Prevention:   activity supervised   clutter free environment maintained   lighting adjusted   room organization consistent   safety round/check completed   nonskid shoes/slippers when out of bed  Taken 8/9/2023 2000 by Risa Erickson RN  Safety Promotion/Fall Prevention:   activity supervised   clutter free environment maintained   lighting adjusted   nonskid shoes/slippers when out of bed   safety round/check completed   room organization consistent  Taken 8/9/2023 1900 by  Erickson, Risa, RN  Safety Promotion/Fall Prevention:   activity supervised   clutter free environment maintained   lighting adjusted   nonskid shoes/slippers when out of bed   safety round/check completed   room organization consistent     Problem: COPD (Chronic Obstructive Pulmonary Disease) Comorbidity  Goal: Maintenance of COPD Symptom Control  Outcome: Ongoing, Progressing  Intervention: Maintain COPD-Symptom Control  Recent Flowsheet Documentation  Taken 8/9/2023 2000 by Risa Erickson RN  Medication Review/Management: medications reviewed     Problem: Adjustment to Illness (Sepsis/Septic Shock)  Goal: Optimal Coping  Outcome: Ongoing, Progressing  Intervention: Optimize Psychosocial Adjustment to Illness  Recent Flowsheet Documentation  Taken 8/9/2023 2000 by Risa Erickson RN  Family/Support System Care:   self-care encouraged   support provided     Problem: Bleeding (Sepsis/Septic Shock)  Goal: Absence of Bleeding  Outcome: Ongoing, Progressing     Problem: Glycemic Control Impaired (Sepsis/Septic Shock)  Goal: Blood Glucose Level Within Desired Range  Outcome: Ongoing, Progressing  Intervention: Optimize Glycemic Control  Recent Flowsheet Documentation  Taken 8/9/2023 2000 by Risa Erickson RN  Glycemic Management:   blood glucose monitored   oral hydration promoted     Problem: Infection Progression (Sepsis/Septic Shock)  Goal: Absence of Infection Signs and Symptoms  Outcome: Ongoing, Progressing  Intervention: Initiate Sepsis Management  Recent Flowsheet Documentation  Taken 8/9/2023 1900 by Risa Erickson RN  Infection Prevention:   environmental surveillance performed   hand hygiene promoted   rest/sleep promoted   single patient room provided  Intervention: Promote Recovery  Recent Flowsheet Documentation  Taken 8/9/2023 2000 by Risa Erickson RN  Activity Management: activity encouraged  Intervention: Promote Stabilization  Recent Flowsheet Documentation  Taken 8/9/2023 2000 by Risa Erickson  RN  Fluid/Electrolyte Management: fluids provided     Problem: Nutrition Impaired (Sepsis/Septic Shock)  Goal: Optimal Nutrition Intake  Outcome: Ongoing, Progressing     Problem: Communication Impairment (Mechanical Ventilation, Invasive)  Goal: Effective Communication  Outcome: Ongoing, Progressing     Problem: Device-Related Complication Risk (Mechanical Ventilation, Invasive)  Goal: Optimal Device Function  Outcome: Ongoing, Progressing  Intervention: Optimize Device Care and Function  Recent Flowsheet Documentation  Taken 8/9/2023 1900 by Risa Erickson RN  Airway Safety Measures: suction at bedside     Problem: Inability to Wean (Mechanical Ventilation, Invasive)  Goal: Mechanical Ventilation Liberation  Outcome: Ongoing, Progressing  Intervention: Promote Extubation and Mechanical Ventilation Liberation  Recent Flowsheet Documentation  Taken 8/9/2023 2000 by Risa Erickson, RN  Medication Review/Management: medications reviewed     Problem: Nutrition Impairment (Mechanical Ventilation, Invasive)  Goal: Optimal Nutrition Delivery  Outcome: Ongoing, Progressing     Problem: Skin and Tissue Injury (Mechanical Ventilation, Invasive)  Goal: Absence of Device-Related Skin and Tissue Injury  Outcome: Ongoing, Progressing  Intervention: Maintain Skin and Tissue Health  Recent Flowsheet Documentation  Taken 8/9/2023 2000 by Risa Erickson, RN  Device Skin Pressure Protection:   absorbent pad utilized/changed   skin-to-device areas padded     Problem: Ventilator-Induced Lung Injury (Mechanical Ventilation, Invasive)  Goal: Absence of Ventilator-Induced Lung Injury  Outcome: Ongoing, Progressing  Intervention: Prevent Ventilator-Associated Pneumonia  Recent Flowsheet Documentation  Taken 8/9/2023 2000 by Risa Erickson, RN  Head of Bed (HOB) Positioning: HOB at 30-45 degrees     Problem: Adult Inpatient Plan of Care  Goal: Plan of Care Review  Outcome: Ongoing, Progressing  Flowsheets (Taken 8/10/2023 0219)  Outcome  Evaluation: Pt resting in bed, A+Ox4, RA, VSS. Prn pain medication administered. Using urinal, adequate output. Bed alarm set, in lowest position, call light within reach. NPO at midnight for heart cath.  Goal: Patient-Specific Goal (Individualized)  Outcome: Ongoing, Progressing  Goal: Absence of Hospital-Acquired Illness or Injury  Outcome: Ongoing, Progressing  Intervention: Identify and Manage Fall Risk  Recent Flowsheet Documentation  Taken 8/10/2023 0200 by Risa Erickson RN  Safety Promotion/Fall Prevention:   activity supervised   clutter free environment maintained   lighting adjusted   nonskid shoes/slippers when out of bed   room organization consistent   safety round/check completed  Taken 8/10/2023 0100 by Risa Erickson RN  Safety Promotion/Fall Prevention:   activity supervised   clutter free environment maintained   lighting adjusted   safety round/check completed   room organization consistent  Taken 8/10/2023 0000 by Risa Erickson RN  Safety Promotion/Fall Prevention:   activity supervised   clutter free environment maintained   lighting adjusted   nonskid shoes/slippers when out of bed   room organization consistent   safety round/check completed  Taken 8/9/2023 2300 by Risa Erickson RN  Safety Promotion/Fall Prevention:   activity supervised   clutter free environment maintained   lighting adjusted   nonskid shoes/slippers when out of bed   safety round/check completed   room organization consistent  Taken 8/9/2023 2200 by Risa Erickson RN  Safety Promotion/Fall Prevention:   activity supervised   clutter free environment maintained   lighting adjusted   nonskid shoes/slippers when out of bed   room organization consistent   safety round/check completed  Taken 8/9/2023 2100 by Risa Erickson RN  Safety Promotion/Fall Prevention:   activity supervised   clutter free environment maintained   lighting adjusted   room organization consistent   safety round/check completed   nonskid  shoes/slippers when out of bed  Taken 8/9/2023 2000 by Risa Erickson RN  Safety Promotion/Fall Prevention:   activity supervised   clutter free environment maintained   lighting adjusted   nonskid shoes/slippers when out of bed   safety round/check completed   room organization consistent  Taken 8/9/2023 1900 by Risa Erickson RN  Safety Promotion/Fall Prevention:   activity supervised   clutter free environment maintained   lighting adjusted   nonskid shoes/slippers when out of bed   safety round/check completed   room organization consistent  Intervention: Prevent Skin Injury  Recent Flowsheet Documentation  Taken 8/9/2023 2000 by Risa Erickson RN  Body Position: position changed independently  Skin Protection:   adhesive use limited   tubing/devices free from skin contact  Intervention: Prevent and Manage VTE (Venous Thromboembolism) Risk  Recent Flowsheet Documentation  Taken 8/9/2023 2000 by Risa Erickson RN  Activity Management: activity encouraged  VTE Prevention/Management: (heparin gtt) other (see comments)  Intervention: Prevent Infection  Recent Flowsheet Documentation  Taken 8/9/2023 1900 by Risa Erickson RN  Infection Prevention:   environmental surveillance performed   hand hygiene promoted   rest/sleep promoted   single patient room provided  Goal: Optimal Comfort and Wellbeing  Outcome: Ongoing, Progressing  Intervention: Provide Person-Centered Care  Recent Flowsheet Documentation  Taken 8/9/2023 2000 by Risa Erickson RN  Trust Relationship/Rapport:   care explained   choices provided   emotional support provided   empathic listening provided   questions answered   questions encouraged   reassurance provided   thoughts/feelings acknowledged  Goal: Readiness for Transition of Care  Outcome: Ongoing, Progressing   Goal Outcome Evaluation:              Outcome Evaluation: Pt resting in bed, A+Ox4, RA, VSS. Prn pain medication administered. Using urinal, adequate output. Bed alarm set, in  lowest position, call light within reach. NPO at midnight for heart cath.

## 2023-08-11 ENCOUNTER — READMISSION MANAGEMENT (OUTPATIENT)
Dept: CALL CENTER | Facility: HOSPITAL | Age: 78
End: 2023-08-11
Payer: MEDICARE

## 2023-08-11 VITALS
HEART RATE: 89 BPM | OXYGEN SATURATION: 94 % | HEIGHT: 68 IN | DIASTOLIC BLOOD PRESSURE: 78 MMHG | BODY MASS INDEX: 21.78 KG/M2 | WEIGHT: 143.7 LBS | SYSTOLIC BLOOD PRESSURE: 149 MMHG | TEMPERATURE: 98 F | RESPIRATION RATE: 18 BRPM

## 2023-08-11 PROBLEM — I21.A1 TYPE 2 MYOCARDIAL INFARCTION: Status: ACTIVE | Noted: 2023-08-11

## 2023-08-11 LAB
ALBUMIN SERPL-MCNC: 2.9 G/DL (ref 3.5–5.2)
ALBUMIN/GLOB SERPL: 1.5 G/DL
ALP SERPL-CCNC: 56 U/L (ref 39–117)
ALT SERPL W P-5'-P-CCNC: 24 U/L (ref 1–41)
ANION GAP SERPL CALCULATED.3IONS-SCNC: 8.2 MMOL/L (ref 5–15)
AST SERPL-CCNC: 23 U/L (ref 1–40)
BILIRUB SERPL-MCNC: 0.2 MG/DL (ref 0–1.2)
BUN SERPL-MCNC: 19 MG/DL (ref 8–23)
BUN/CREAT SERPL: 20.7 (ref 7–25)
CALCIUM SPEC-SCNC: 8.1 MG/DL (ref 8.6–10.5)
CHLORIDE SERPL-SCNC: 108 MMOL/L (ref 98–107)
CO2 SERPL-SCNC: 21.8 MMOL/L (ref 22–29)
CREAT SERPL-MCNC: 0.92 MG/DL (ref 0.76–1.27)
DEPRECATED RDW RBC AUTO: 45.9 FL (ref 37–54)
EGFRCR SERPLBLD CKD-EPI 2021: 85.7 ML/MIN/1.73
ERYTHROCYTE [DISTWIDTH] IN BLOOD BY AUTOMATED COUNT: 13 % (ref 12.3–15.4)
GLOBULIN UR ELPH-MCNC: 2 GM/DL
GLUCOSE SERPL-MCNC: 99 MG/DL (ref 65–99)
HCT VFR BLD AUTO: 29.6 % (ref 37.5–51)
HGB BLD-MCNC: 9.8 G/DL (ref 13–17.7)
MCH RBC QN AUTO: 31.9 PG (ref 26.6–33)
MCHC RBC AUTO-ENTMCNC: 33.1 G/DL (ref 31.5–35.7)
MCV RBC AUTO: 96.4 FL (ref 79–97)
PLATELET # BLD AUTO: 134 10*3/MM3 (ref 140–450)
PMV BLD AUTO: 11.5 FL (ref 6–12)
POTASSIUM SERPL-SCNC: 3.6 MMOL/L (ref 3.5–5.2)
PROT SERPL-MCNC: 4.9 G/DL (ref 6–8.5)
RBC # BLD AUTO: 3.07 10*6/MM3 (ref 4.14–5.8)
SODIUM SERPL-SCNC: 138 MMOL/L (ref 136–145)
UFH PPP CHRO-ACNC: 0.32 IU/ML (ref 0.3–0.7)
UFH PPP CHRO-ACNC: 0.33 IU/ML (ref 0.3–0.7)
VANCOMYCIN TROUGH SERPL-MCNC: 7.1 MCG/ML (ref 5–20)
WBC NRBC COR # BLD: 6.35 10*3/MM3 (ref 3.4–10.8)

## 2023-08-11 PROCEDURE — 93458 L HRT ARTERY/VENTRICLE ANGIO: CPT | Performed by: INTERNAL MEDICINE

## 2023-08-11 PROCEDURE — 94664 DEMO&/EVAL PT USE INHALER: CPT

## 2023-08-11 PROCEDURE — 25510000001 IOPAMIDOL PER 1 ML: Performed by: INTERNAL MEDICINE

## 2023-08-11 PROCEDURE — 25010000002 LEVOFLOXACIN PER 250 MG: Performed by: STUDENT IN AN ORGANIZED HEALTH CARE EDUCATION/TRAINING PROGRAM

## 2023-08-11 PROCEDURE — 85027 COMPLETE CBC AUTOMATED: CPT | Performed by: STUDENT IN AN ORGANIZED HEALTH CARE EDUCATION/TRAINING PROGRAM

## 2023-08-11 PROCEDURE — 93454 CORONARY ARTERY ANGIO S&I: CPT | Performed by: INTERNAL MEDICINE

## 2023-08-11 PROCEDURE — 25010000002 HEPARIN (PORCINE) PER 1000 UNITS: Performed by: INTERNAL MEDICINE

## 2023-08-11 PROCEDURE — 25010000002 MIDAZOLAM PER 1 MG: Performed by: INTERNAL MEDICINE

## 2023-08-11 PROCEDURE — 85520 HEPARIN ASSAY: CPT

## 2023-08-11 PROCEDURE — 99232 SBSQ HOSP IP/OBS MODERATE 35: CPT | Performed by: INTERNAL MEDICINE

## 2023-08-11 PROCEDURE — 25010000002 FENTANYL CITRATE (PF) 50 MCG/ML SOLUTION: Performed by: INTERNAL MEDICINE

## 2023-08-11 PROCEDURE — 94799 UNLISTED PULMONARY SVC/PX: CPT

## 2023-08-11 PROCEDURE — C1769 GUIDE WIRE: HCPCS | Performed by: INTERNAL MEDICINE

## 2023-08-11 PROCEDURE — 80053 COMPREHEN METABOLIC PANEL: CPT | Performed by: STUDENT IN AN ORGANIZED HEALTH CARE EDUCATION/TRAINING PROGRAM

## 2023-08-11 PROCEDURE — 25010000002 VANCOMYCIN 5 G RECONSTITUTED SOLUTION: Performed by: STUDENT IN AN ORGANIZED HEALTH CARE EDUCATION/TRAINING PROGRAM

## 2023-08-11 PROCEDURE — 80202 ASSAY OF VANCOMYCIN: CPT

## 2023-08-11 PROCEDURE — 94761 N-INVAS EAR/PLS OXIMETRY MLT: CPT

## 2023-08-11 PROCEDURE — C1894 INTRO/SHEATH, NON-LASER: HCPCS | Performed by: INTERNAL MEDICINE

## 2023-08-11 PROCEDURE — 25010000002 HEPARIN (PORCINE) 25000-0.45 UT/250ML-% SOLUTION: Performed by: STUDENT IN AN ORGANIZED HEALTH CARE EDUCATION/TRAINING PROGRAM

## 2023-08-11 PROCEDURE — B2111ZZ FLUOROSCOPY OF MULTIPLE CORONARY ARTERIES USING LOW OSMOLAR CONTRAST: ICD-10-PCS | Performed by: INTERNAL MEDICINE

## 2023-08-11 RX ORDER — LIDOCAINE HYDROCHLORIDE 20 MG/ML
INJECTION, SOLUTION INFILTRATION; PERINEURAL
Status: DISCONTINUED | OUTPATIENT
Start: 2023-08-11 | End: 2023-08-11 | Stop reason: HOSPADM

## 2023-08-11 RX ORDER — HEPARIN SODIUM 1000 [USP'U]/ML
INJECTION, SOLUTION INTRAVENOUS; SUBCUTANEOUS
Status: DISCONTINUED | OUTPATIENT
Start: 2023-08-11 | End: 2023-08-11 | Stop reason: HOSPADM

## 2023-08-11 RX ORDER — FLUTICASONE PROPIONATE 50 MCG
2 SPRAY, SUSPENSION (ML) NASAL DAILY
Status: DISCONTINUED | OUTPATIENT
Start: 2023-08-11 | End: 2023-08-11 | Stop reason: HOSPADM

## 2023-08-11 RX ORDER — ROSUVASTATIN CALCIUM 40 MG/1
40 TABLET, COATED ORAL NIGHTLY
Qty: 30 TABLET | Refills: 0 | Status: SHIPPED | OUTPATIENT
Start: 2023-08-11 | End: 2023-08-11 | Stop reason: SDUPTHER

## 2023-08-11 RX ORDER — GUAIFENESIN/DEXTROMETHORPHAN 100-10MG/5
5 SYRUP ORAL EVERY 4 HOURS PRN
Status: DISCONTINUED | OUTPATIENT
Start: 2023-08-11 | End: 2023-08-11 | Stop reason: HOSPADM

## 2023-08-11 RX ORDER — BENZONATATE 100 MG/1
100 CAPSULE ORAL 3 TIMES DAILY PRN
Status: DISCONTINUED | OUTPATIENT
Start: 2023-08-11 | End: 2023-08-11 | Stop reason: HOSPADM

## 2023-08-11 RX ORDER — SODIUM CHLORIDE 9 MG/ML
100 INJECTION, SOLUTION INTRAVENOUS CONTINUOUS
Status: DISCONTINUED | OUTPATIENT
Start: 2023-08-11 | End: 2023-08-11 | Stop reason: HOSPADM

## 2023-08-11 RX ORDER — FENTANYL CITRATE 50 UG/ML
INJECTION, SOLUTION INTRAMUSCULAR; INTRAVENOUS
Status: DISCONTINUED | OUTPATIENT
Start: 2023-08-11 | End: 2023-08-11 | Stop reason: HOSPADM

## 2023-08-11 RX ORDER — IPRATROPIUM BROMIDE AND ALBUTEROL SULFATE 2.5; .5 MG/3ML; MG/3ML
SOLUTION RESPIRATORY (INHALATION)
Qty: 18 ML | Refills: 0 | Status: SHIPPED | OUTPATIENT
Start: 2023-08-11

## 2023-08-11 RX ORDER — ROSUVASTATIN CALCIUM 40 MG/1
40 TABLET, COATED ORAL NIGHTLY
Qty: 30 TABLET | Refills: 0 | Status: SHIPPED | OUTPATIENT
Start: 2023-08-11 | End: 2023-09-10

## 2023-08-11 RX ORDER — DOXYCYCLINE 100 MG/1
CAPSULE ORAL
Qty: 10 CAPSULE | Refills: 0 | Status: SHIPPED | OUTPATIENT
Start: 2023-08-11

## 2023-08-11 RX ORDER — PREDNISONE 20 MG/1
40 TABLET ORAL DAILY
Qty: 10 TABLET | Refills: 0 | Status: SHIPPED | OUTPATIENT
Start: 2023-08-11

## 2023-08-11 RX ORDER — VERAPAMIL HYDROCHLORIDE 2.5 MG/ML
INJECTION, SOLUTION INTRAVENOUS
Status: DISCONTINUED | OUTPATIENT
Start: 2023-08-11 | End: 2023-08-11 | Stop reason: HOSPADM

## 2023-08-11 RX ORDER — SODIUM CHLORIDE 9 MG/ML
INJECTION, SOLUTION INTRAVENOUS
Status: COMPLETED | OUTPATIENT
Start: 2023-08-11 | End: 2023-08-11

## 2023-08-11 RX ORDER — ACETAMINOPHEN 325 MG/1
650 TABLET ORAL EVERY 4 HOURS PRN
Status: DISCONTINUED | OUTPATIENT
Start: 2023-08-11 | End: 2023-08-11 | Stop reason: HOSPADM

## 2023-08-11 RX ORDER — GABAPENTIN 800 MG/1
400 TABLET ORAL 3 TIMES DAILY
Start: 2023-08-11

## 2023-08-11 RX ORDER — MIDAZOLAM HYDROCHLORIDE 1 MG/ML
INJECTION INTRAMUSCULAR; INTRAVENOUS
Status: DISCONTINUED | OUTPATIENT
Start: 2023-08-11 | End: 2023-08-11 | Stop reason: HOSPADM

## 2023-08-11 RX ADMIN — LEVOFLOXACIN 750 MG: 750 INJECTION, SOLUTION INTRAVENOUS at 09:06

## 2023-08-11 RX ADMIN — SODIUM CHLORIDE 100 ML/HR: 9 INJECTION, SOLUTION INTRAVENOUS at 16:51

## 2023-08-11 RX ADMIN — OXYCODONE HYDROCHLORIDE 15 MG: 15 TABLET ORAL at 15:14

## 2023-08-11 RX ADMIN — FLUTICASONE PROPIONATE 2 SPRAY: 50 SPRAY, METERED NASAL at 12:56

## 2023-08-11 RX ADMIN — IPRATROPIUM BROMIDE AND ALBUTEROL SULFATE 3 ML: 2.5; .5 SOLUTION RESPIRATORY (INHALATION) at 00:14

## 2023-08-11 RX ADMIN — OXYCODONE HYDROCHLORIDE 15 MG: 15 TABLET ORAL at 09:25

## 2023-08-11 RX ADMIN — VANCOMYCIN HYDROCHLORIDE 1250 MG: 5 INJECTION, POWDER, LYOPHILIZED, FOR SOLUTION INTRAVENOUS at 06:23

## 2023-08-11 RX ADMIN — Medication 10 ML: at 12:55

## 2023-08-11 RX ADMIN — IPRATROPIUM BROMIDE AND ALBUTEROL SULFATE 3 ML: 2.5; .5 SOLUTION RESPIRATORY (INHALATION) at 06:39

## 2023-08-11 RX ADMIN — OXYCODONE HYDROCHLORIDE 15 MG: 15 TABLET ORAL at 02:48

## 2023-08-11 RX ADMIN — IPRATROPIUM BROMIDE AND ALBUTEROL SULFATE 3 ML: 2.5; .5 SOLUTION RESPIRATORY (INHALATION) at 13:01

## 2023-08-11 RX ADMIN — ACETAMINOPHEN 650 MG: 325 TABLET ORAL at 14:37

## 2023-08-11 RX ADMIN — IPRATROPIUM BROMIDE AND ALBUTEROL SULFATE 3 ML: 2.5; .5 SOLUTION RESPIRATORY (INHALATION) at 18:51

## 2023-08-11 RX ADMIN — HEPARIN SODIUM 23 UNITS/KG/HR: 10000 INJECTION, SOLUTION INTRAVENOUS at 10:39

## 2023-08-11 NOTE — PROGRESS NOTES
[facility] INFUSION  Paul Gomez is a  77 y.o. male receiving heparin infusion.     Therapy for (VTE/Cardiac):   cardiac  Patient Dosing Weight: 61.2 kg  Initial Bolus (Y/N):   N  Any Bolus (Y/N):   N        Signs or Symptoms of Bleeding: N    Cardiac or Other (Not VTE)   Initial Bolus: 60 units/kg (Max 4,000 units)  Initial rate: 12 units/kg/hr (Max 1,000 units/hr)   Anti Xa Rebolus Infusion Hold time Change infusion Dose (Units/kg/hr) Next Anti Xa or aPTT Level Due   < 0.11 50 Units/kg  (4000 Units Max) None Increase by  3 Units/kg/hr 6 hours   0.11- 0.19 25 Units/kg  (2000 Units Max) None Increase by  2 Units/kg/hr 6 hours   0.2 - 0.29 0 None Increase by  1 Units/kg/hr 6 hours   0.3 - 0.5 0 None No Change 6 hours (after 2 consecutive levels in range check q24h @0700)   0.51 - 0.6 0 None Decrease by  1 Units/kg/hr 6 hours   0.61 - 0.8 0 30 Minutes Decrease by  2 Units/kg/hr 6 hours   0.81 - 1 0 60 Minutes Decrease by  3 Units/kg/hr 6 hours   >1 0 Hold  After Anti Xa less than 0.5 decrease previous rate by  4 Units/kg/hr  Every 2 hours until Anti Xa  less than 0.5 then when infusion restarts in 6 hours       Recommend anti-Xa every 6 hours.          Date   Time   Anti-Xa Current Rate (Unit/kg/hr) Bolus   (Units) Rate Change   (Unit/kg/hr) New Rate (Unit/kg/hr) Next   Anti-Xa Comments  Pump Check Daily   08/08 0435 0.34 - None ever -2 10 1100 Pt received a heparin 5000 unit bolus at  0245. I reduced his starting dose by 2 U/kg/hr per the flow chart based on his current anti-Xa. Notified  nurses, Michell and Alysa, that they could start the drip, no bolus.    8/8 1101 < 0.10 10 None ever +3 13 1800 No bolus ever, rate change no s/s bleeding, d/w  orestes ruiz RN   8/8 1826 <0.10 13 Never +3 16 0030 No s/s of bleeding or stops per nurse Malika. Order in EPIC still 10 U/kg/hr. MAR and nursing confirm actual rate  has been 13 U/kg/hr.     8/9 0050 <0.1 16 never +3 19 0700 Spoke with Malathi about the need for a rate  increase. No s/s of bleeding.    8/9 0701 0.23 19 never +1 20 1500 Rate increase , no s/s bleeding, d/w Tyree RN   8/9 1443 0.26 20 Never  +1 21 2200 Rate increase, no s/s bleeding d/w Chen  RN   08/09 2225 0.26 21 never +1 22 0400 Rate inccrease , no s/s of bleeding per Annabella Preciado RN    08/10 0458 0.24 22 never +1 23 1100 Rate increase, no s/s bleeding per CAYDEN Zamarripa    8/10 0950  23    1100 Pump check- rate and weight are correct.   8/10 0936 0.36 23 never - 23 1600 Therapeutic x 1, continue current rate discussed with nurse Tyree, no s/sx bleeding.   8/10 1555 0.3 23 never - 23 8/11 0700 Therapeutic x 2, Discussed with nurse   8/11 0724 0.33 23 never - 23 8/12 0700 Therapeutic x 3, spoke with patients nurse Lai. No rate change. No s/s of bleeding.   8/11 1530  23    8/12 0700 Pump check weight and rate look good                                                                                             Pharmacy will continue to follow anti-Xa results and monitor for signs and symptoms of bleeding or thrombosis.    Thank you,    Jemal Yao, PharmD  08/11/23  15:45 EDT

## 2023-08-11 NOTE — DISCHARGE SUMMARY
[facility] HOSPITALISTS DISCHARGE SUMMARY    Patient Identification:  Name:  Paul Gomez  Age:  77 y.o.  Sex:  male  :  1945  MRN:  [id number]  Visit Number:  [id number]    Date of Admission: 2023  Date of Discharge:  2023     PCP: Luis Dobbs,     DISCHARGE DIAGNOSIS  Acute hypercapnic respiratory failure  Acute exacerbation of COPD  Elevated troponin second to respiratory failure  Left lower lobe pneumonia  Severe sepsis  Contaminated blood culture  Hypertension  Hyperlipidemia  Gout  Chronic low back pain  Chronic neuropathy    CONSULTS   Cardiology  Infectious disease  COPD education    PROCEDURES PERFORMED      HOSPITAL COURSE  Patient is a 77 y.o. male presented to [facility] after being found encephalopathic and minimally responsive.  Please see the admitting history and physical for further details.      After initial evaluation in the emergency department including evaluation by on-call interventional cardiology as patient was called called ahead by EMS as a STEMI.  Patient seen and evaluate by cardiology at bedside shortly after being intubated upon presentation with noting of EKG showing ST elevation in the inferior and lateral leads but repeat EKG afterwards showing resolution of these changes.  Therefore cardiology did not feel that patient was a true STEMI and elected to take cancel emergent left heart cath.  Patient then was then subsequently admitted to the hospitalist service for acute hypercapnic respiratory failure felt to be secondary to an acute exacerbation of COPD.  Patient did well post intubation and was able to be quickly weaned from ventilator and extubated.  Patient shortly after extubation able to be titrated down to room air and remained so throughout his hospitalization.  Patient does carry a significant amount of polypharmacy and sedating medications and his chronic medical regiment and patient does admit that he took some extra  as well as smoking some marijuana and this was felt to be potential source of patient's exacerbation of COPD and resultant respiratory failure.  Due to patient's significantly elevated troponins cardiology did ultimately decide to take the patient for left heart cath which showed mild nonobstructive coronary artery disease.  Cardiology recommended aspirin, statin and beta-blocker with no further interventions needed.  Patient did have blood cultures that became positive with gram positive bacteria prior to identification and patient was started on empiric antibiotic therapy.  However blood cultures were drawn prior to initiation of antibiotic therapy and his repeat culture showed no growth and therefore patient's bacteremia was felt to be due to contaminant Per infectious disease who did see and evaluate the patient in consultation while in hospital.  Therefore the patient was back to baseline and bacteremia had been ruled out as well as any obstructive coronary artery disease patient was felt to have achieved maximal benefit of continued hospitalization and was discharged home in stable medical condition.  Patient was provided a COPD rescue kit as well as being started on the above-noted medications as recommended by cardiology.  Patient will follow-up with PCP and cardiology postdischarge.      VITAL SIGNS:  Temp:  [98 øF (36.7 øC)-98.1 øF (36.7 øC)] 98 øF (36.7 øC)  Heart Rate:  [53-82] 77  Resp:  [18-20] 18  BP: (110-149)/(58-95) 149/78  SpO2:  [93 %-99 %] 94 %  on  Flow (L/min):  [2.5] 2.5;   Device (Oxygen Therapy): room air    Body mass index is 21.85 kg/mý.  Wt Readings from Last 3 Encounters:   08/11/23 65.2 kg (143 lb 11.2 oz)   06/19/23 61.3 kg (135 lb 3.2 oz)   06/05/23 61.8 kg (136 lb 3.2 oz)       PHYSICAL EXAM:  Constitutional: Elderly chronically ill-appearing adult male resting in bed breathing comfortably on room air.  HENT:  Head:  Normocephalic and atraumatic.  Mouth:  Moist mucous membranes.  ET  tube in place.  Eyes:  Conjunctivae and EOM are normal. No scleral icterus.    Cardiovascular:  Normal rate, regular rhythm and normal heart sounds with no murmur.  Pulmonary/Chest:  No respiratory distress, no wheezes, no crackles, with diminished breath sounds by  Abdominal:  Soft.  Bowel sounds are normal.  No distension and no tenderness.   Musculoskeletal:  No deformity.  No red or swollen joints anywhere.  Functional ROM intact.   Neurological: Sedated on mechanical ventilation.  No focal neurological deficits on exam.    Skin:  Skin is warm and dry. No rash or lesion noted. No pallor.   Peripheral vascular:  Pulses in all 4 extremities with no clubbing, no cyanosis, no edema.  Psychiatric: Appropriate mood and affect, pleasant.     DISCHARGE DISPOSITION   Stable    DISCHARGE MEDICATIONS:     Discharge Medications        New Medications        Instructions Start Date   doxycycline 100 MG capsule  Commonly known as: MONODOX   Take 1 capsule by mouth every 12 hours for 5 days as part of COPD Rescue Kit. (Only Start if in YELLOW ZONE.)      ipratropium-albuterol 0.5-2.5 mg/3 ml nebulizer  Commonly known as: DUO-NEB   Take 3 mL by neb every 30 minutes as needed for shortness of air for up to 6 doses. Part of COPD Rescue Kit. (Only Start if in YELLOW ZONE.)      metoprolol tartrate 25 MG tablet  Commonly known as: LOPRESSOR   25 mg, Oral, Every 12 Hours Scheduled      predniSONE 20 MG tablet  Commonly known as: DELTASONE   40 mg, Oral, Daily, Take 2 tablets daily for 5 days as part of COPD Rescue Kit. (Only Start if in YELLOW ZONE.)      rosuvastatin 40 MG tablet  Commonly known as: CRESTOR   40 mg, Oral, Nightly             Changes to Medications        Instructions Start Date   gabapentin 800 MG tablet  Commonly known as: NEURONTIN  What changed: how much to take   400 mg, Oral, 3 Times Daily             Continue These Medications        Instructions Start Date   albuterol sulfate  (90 Base) MCG/ACT  inhaler  Commonly known as: PROVENTIL HFA;VENTOLIN HFA;PROAIR HFA   2 puffs, Inhalation, Every 6 Hours PRN      allopurinol 100 MG tablet  Commonly known as: ZYLOPRIM   100 mg, Oral, Daily      aspirin 81 MG EC tablet   81 mg, Oral, Daily      celecoxib 200 MG capsule  Commonly known as: CeleBREX   200 mg, Oral, Daily      fluticasone-salmeterol 230-21 MCG/ACT inhaler  Commonly known as: ADVAIR HFA   2 puffs, Inhalation, 2 Times Daily      oxyCODONE 15 MG immediate release tablet  Commonly known as: ROXICODONE   15 mg, Oral, Every 6 Hours PRN      triamcinolone 0.1 % cream  Commonly known as: KENALOG   1 application , Topical, 2 Times Daily, Apply to affected area                 Additional Instructions for the Follow-ups that You Need to Schedule       Discharge Follow-up with PCP   As directed       Currently Documented PCP:    Luis Dobbs DO    PCP Phone Number:    [phone]     Follow Up Details: 1 week post hospital follow up        Discharge Follow-up with Specialty: Cardiology; 3 Weeks   As directed      Specialty: Cardiology   Follow Up: 3 Weeks   Follow Up Details: 2-3 week follow up               Follow-up Information       [facility] .    Specialty: Pulmonology  Contact information:  [address] [place] [address]  [place] [address] [phone]             Luis Dobbs DO .    Specialty: Family Medicine  Why: 1 week post hospital follow up  Contact information:  [address] [place] [address]  [phone]                              TEST  RESULTS PENDING AT DISCHARGE  Pending Labs       Order Current Status    Blood Culture - Blood, Arm, Left Preliminary result    Blood Culture - Blood, Arm, Left Preliminary result    Blood Culture - Blood, Arm, Left Preliminary result    Blood Culture - Blood, Arm, Right Preliminary result             The ASCVD Risk score (Augusto DK, et al., 2019) failed to calculate for the following reasons:    The patient has a prior MI or  stroke diagnosis     CODE STATUS  Code Status and Medical Interventions:   Ordered at: 08/08/23 0702     Level Of Support Discussed With:    Patient     Code Status (Patient has no pulse and is not breathing):    CPR (Attempt to Resuscitate)     Medical Interventions (Patient has pulse or is breathing):    Full Support     Release to patient:    Routine Release       Sriram Parmar DO  [facility] ER[facility]  08/11/23  18:12 EDT    Please note that this discharge summary required more than 30 minutes to complete.

## 2023-08-11 NOTE — PROGRESS NOTES
PROGRESS NOTE         Patient Identification:  Name:  Paul Gomez  Age:  77 y.o.  Sex:  male  :  1945  MRN:  [id number]  Visit Number:  [id number]  Primary Care Provider:  Luis Dobbs DO         LOS: 3 days       ----------------------------------------------------------------------------------------------------------------------  Subjective       Chief Complaints:    Shortness of Breath, Loss of Consciousness, and Chest Pain        Interval History:      The patient is awake and alert, resting comfortably in bed.  On 2 L nasal cannula with no apparent distress.  Afebrile.  Denies diarrhea.  Lung exam is remarkable for inspiratory and expiratory wheezing bilaterally to auscultation.  Abdomen is soft and nontender with normoactive bowel sounds.  Peripheral IVs without any evidence of infection or phlebitis.  WBC normal at 6.35.  Blood cultures reporting no growth at 2 days.    Review of Systems:    Constitutional: no fever, chills and night sweats.  Fatigue.  Eyes: no eye drainage, itching or redness.  HEENT: no mouth sores, dysphagia or nose bleed.  Respiratory: no for shortness of breath, cough or production of sputum.  Cardiovascular: no chest pain, no palpitations, no orthopnea.  Gastrointestinal: no nausea, vomiting or diarrhea. No abdominal pain, hematemesis or rectal bleeding.  Genitourinary: no dysuria or polyuria.  Hematologic/lymphatic: no lymph node abnormalities, no easy bruising or easy bleeding.  Musculoskeletal: no muscle or joint pain.  Skin: No rash and no itching.  Neurological: no loss of consciousness, no seizure, no headache.  Behavioral/Psych: no depression or suicidal ideation.  Endocrine: no hot flashes.  Immunologic: negative.    ----------------------------------------------------------------------------------------------------------------------      Objective       Current [facility] Meds:  allopurinol, 100 mg, Oral, Daily  aspirin, 81 mg, Oral,  Daily  fluticasone, 2 spray, Each Nare, Daily  gabapentin, 200 mg, Oral, Q12H  ipratropium-albuterol, 3 mL, Nebulization, Q6H - RT  metoprolol tartrate, 25 mg, Oral, Q12H  mupirocin, 1 application , Each Nare, BID  rosuvastatin, 40 mg, Oral, Nightly  senna-docusate sodium, 2 tablet, Oral, BID  sodium chloride, 10 mL, Intravenous, Q12H  sodium chloride, 10 mL, Intravenous, Q12H  sodium chloride, 10 mL, Intravenous, Q12H      heparin, 23 Units/kg/hr, Last Rate: 23 Units/kg/hr (08/10/23 1244)  Pharmacy to Dose Heparin,       ----------------------------------------------------------------------------------------------------------------------    Vital Signs:  Temp:  [98 øF (36.7 øC)-98.5 øF (36.9 øC)] 98.1 øF (36.7 øC)  Heart Rate:  [52-67] 61  Resp:  [18-20] 18  BP: (110-144)/(58-82) 115/61  Mean Arterial Pressure (Non-Invasive) for the past 24 hrs (Last 3 readings):   Noninvasive MAP (mmHg)   08/11/23 0700 78   08/11/23 0323 91   08/10/23 2338 85     SpO2 Percentage    08/11/23 0639 08/11/23 0652 08/11/23 0700   SpO2: 97% 99% 98%     SpO2:  [92 %-99 %] 98 %  on  Flow (L/min):  [2.5] 2.5;   Device (Oxygen Therapy): nasal cannula    Body mass index is 21.85 kg/mý.  Wt Readings from Last 3 Encounters:   08/11/23 65.2 kg (143 lb 11.2 oz)   06/19/23 61.3 kg (135 lb 3.2 oz)   06/05/23 61.8 kg (136 lb 3.2 oz)        Intake/Output Summary (Last 24 hours) at 8/11/2023 0931  Last data filed at 8/11/2023 0906  Gross per 24 hour   Intake 222 ml   Output 2180 ml   Net -1958 ml     NPO Diet NPO Type: Strict NPO  ----------------------------------------------------------------------------------------------------------------------      Physical Exam:    Constitutional: Chronically ill-appearing elderly  male.  On 2 L nasal cannula with no apparent distress.  HENT:  Head: Normocephalic and atraumatic.  Mouth:  Moist mucous membranes.    Eyes:  Conjunctivae and EOM are normal.  No scleral icterus.  Neck:  Neck supple.  No JVD  present.    Cardiovascular:  Normal rate, regular rhythm and normal heart sounds with no murmur. No edema.  Pulmonary/Chest: Lung exam remarkable for inspiratory and expiratory wheezing bilaterally to auscultation  Abdominal:  Soft.  Bowel sounds are normal.  No distension and no tenderness.   Musculoskeletal:  No edema, no tenderness, and no deformity.  No swelling or redness of joints.  Neurological:  Alert and oriented to person, place, and time.  No facial droop.  No slurred speech.   Skin:  Skin is warm and dry.  No rash noted.  No pallor.   Psychiatric:  Normal mood and affect.  Behavior is normal.        ----------------------------------------------------------------------------------------------------------------------  Results from last 7 days   Lab Units 08/09/23  0013 08/08/23  0452 08/08/23  0250   HSTROP T ng/L 74* 127* 107*         Results from last 7 days   Lab Units 08/08/23  1311   PH, ARTERIAL pH units 7.468*   PO2 ART mm Hg 79.9*   PCO2, ARTERIAL mm Hg 35.4   HCO3 ART mmol/L 25.6     Results from last 7 days   Lab Units 08/11/23  0331 08/10/23  0936 08/09/23  0013 08/08/23  0452 08/08/23  0214 08/08/23  0208   LACTATE mmol/L  --   --   --  1.2 5.3*  --    WBC 10*3/mm3 6.35 8.23 9.19  --   --  12.13*   HEMOGLOBIN g/dL 9.8* 10.5* 10.9*  --   --  13.0   HEMATOCRIT % 29.6* 31.8* 33.4*  --   --  40.9   MCV fL 96.4 98.1* 97.9*  --   --  100.2*   MCHC g/dL 33.1 33.0 32.6  --   --  31.8   PLATELETS 10*3/mm3 134* 150 151  --   --  201   INR   --   --   --   --   --  1.00     Results from last 7 days   Lab Units 08/11/23  0331 08/10/23  0936 08/09/23  0013 08/08/23  0250   SODIUM mmol/L 138 138 136 145   POTASSIUM mmol/L 3.6 3.6 3.9 4.8   MAGNESIUM mg/dL  --   --   --  2.1   CHLORIDE mmol/L 108* 106 103 109*   CO2 mmol/L 21.8* 21.8* 23.5 26.3   BUN mg/dL 19 20 18 12   CREATININE mg/dL 0.92 0.97 1.00 0.94   CALCIUM mg/dL 8.1* 8.2* 8.5* 8.3*   GLUCOSE mg/dL 99 119* 131* 162*   ALBUMIN g/dL 2.9*  --  3.2* 3.5    BILIRUBIN mg/dL 0.2  --  0.3 0.3   ALK PHOS U/L 56  --  73 87   AST (SGOT) U/L 23  --  39 76*   ALT (SGPT) U/L 24  --  27 35   Estimated Creatinine Clearance: 62 mL/min (by C-G formula based on SCr of 0.92 mg/dL).  No results found for: AMMONIA    No results found for: HGBA1C, POCGLU  Lab Results   Component Value Date    HGBA1C 5.40 01/20/2023     Lab Results   Component Value Date    TSH 6.060 (H) 01/20/2023    FREET4 1.39 01/20/2023       Blood Culture   Date Value Ref Range Status   08/09/2023 No growth at 2 days  Preliminary   08/09/2023 No growth at 2 days  Preliminary   08/08/2023 Staphylococcus, coagulase negative (C)  Preliminary   08/08/2023 Staphylococcus, coagulase negative (C)  Preliminary     No results found for: URINECX  No results found for: WOUNDCX  No results found for: STOOLCX  Respiratory Culture   Date Value Ref Range Status   08/08/2023   Final    Scant growth (1+) Normal respiratory cici. No S. aureus or Pseudomonas aeruginosa detected. Final report.     Pain Management Panel  More data exists         Latest Ref Rng & Units 4/11/2019 12/6/2018   Pain Management Panel   Creatinine, Urine mg/dL 75.0  130.3  130.3    Amphetamine, Urine Qual Negative Negative  -   Barbiturates Screen, Urine Negative Negative  -   Benzodiazepine Screen, Urine Negative Negative  -   Buprenorphine, Screen, Urine Negative Negative  -   Cocaine Screen, Urine Negative Negative  -   Methadone Screen , Urine Negative Negative  -         ----------------------------------------------------------------------------------------------------------------------  Imaging Results (Last 24 Hours)       ** No results found for the last 24 hours. **            ----------------------------------------------------------------------------------------------------------------------    Pertinent Infectious Disease Results        Assessment/Plan       Assessment     Staphylococcal bacteremia versus contaminant  COPD  exacerbation        Plan      I saw and examined the patient myself this morning with BEATRICE Wayne and discussed the plan of care with her and primary RN and here are my findings:    The patient is fully awake and alert, finding comfort while resting in their bed. They are currently receiving 2 L of oxygen through a nasal cannula and appear to be without any noticeable distress. Their temperature is within the normal range, and they deny experiencing diarrhea. During a lung examination, significant inspiratory and expiratory wheezing is detected bilaterally upon auscultation. The abdomen presents as soft, non-tender, and features normoactive bowel sounds. Peripheral IVs remain in place without any indications of infection or phlebitis. A white blood cell count of 6.35 falls within the normal range. Notably, blood culture results have indicated no signs of growth over the course of 2 days.  The case has been thoroughly discussed with the primary medical team. Given that the blood cultures were negative prior to the initiation of the vancomycin course and have continued to remain negative for a consecutive 2-day period, the decision has been made to discontinue the vancomycin treatment today. The plan moving forward involves close monitoring without the need for ongoing coverage at this time.      ANTIMICROBIAL THERAPY    This patient does not have an active medication from one of the medication groupers.     Code Status:   Code Status and Medical Interventions:   Ordered at: 08/08/23 0702     Level Of Support Discussed With:    Patient     Code Status (Patient has no pulse and is not breathing):    CPR (Attempt to Resuscitate)     Medical Interventions (Patient has pulse or is breathing):    Full Support     Release to patient:    Routine Release       BEATRICE Corrales  08/11/23  09:31 EDT     Electronically signed by BEATRICE Corrales, 08/11/23, 9:34 AM EDT.   Electronically signed by Johnna Vo  MD Jose, 08/11/23, 8:28 PM EDT.

## 2023-08-11 NOTE — PROGRESS NOTES
[facility]  PROGRESS NOTE    Patient information:  Name: Paul Gomez  Age/Sex: 77 y.o. male  :  1945        PCP: Luis Dobbs DO  Attending: Edita Jones MD  MRN:  [id number]  Visit Number:  [id number]    LOS:  LOS: 3 days     CODE STATUS:    Code Status and Medical Interventions:   Ordered at: 23 0702     Level Of Support Discussed With:    Patient     Code Status (Patient has no pulse and is not breathing):    CPR (Attempt to Resuscitate)     Medical Interventions (Patient has pulse or is breathing):    Full Support     Release to patient:    Routine Release       PROBLEM LIST:Principal Problem:    Respiratory failure with hypoxia and hypercapnia  Active Problems:    COPD exacerbation    NSTEMI, initial episode of care    Interventional Cardiology Consulting Physician: Dr. Tony Sanchez MD, [facility]     Reason for Cardiology follow-up: elevated troponin in setting of acute hypoxic respiratory failure      Subjective   ADMISSION INFORMATION:  Chief Complaint   Patient presents with    Shortness of Breath    Loss of Consciousness    Chest Pain       Interval History:     Mr. Gomez has been moved to room 303B on today's evaluation. He is chest pain free during evaluation. We discuss cardiac catheterization possibly today given blood cultures are now reportedly contaminants.  He reports he is agreeable.    He asks for medication for pain during evaluation.     Vital Signs  Temp:  [98 øF (36.7 øC)-98.5 øF (36.9 øC)] 98.1 øF (36.7 øC)  Heart Rate:  [52-67] 61  Resp:  [18-20] 18  BP: (110-144)/(58-82) 115/61  Flow (L/min):  [2.5] 2.5  Vital Signs (last 72 hrs)          0700  08/08 0659  0700  08/ 0659  0700  08/10 0659 08/10 0700  08/10 1328   Most Recent      Temp (øF) 97.4 -  98.6    97.8 -  98.7    97.6 -  98    98.5 -  98.7     98.5 (36.9) 08/10 1130    Heart Rate 86 -  141    60 -  95    53 -  93    52 -  75      59 08/10 1300    Resp 20 -  30    8 - 23    8 -  20    16 -  20     19 08/10 1220    /93 -  203/129    101/66 -  153/94    114/66 -  155/82    113/58 -  136/69     113/58 08/10 1300    SpO2 (%) 96 -  100    91 -  100    90 -  98    93 -  96     95 08/10 1300          Body mass index is 21.85 kg/mý.    Intake/Output Summary (Last 24 hours) at 8/11/2023 1001  Last data filed at 8/11/2023 0906  Gross per 24 hour   Intake 222 ml   Output 2180 ml   Net -1958 ml         Objective     Physical Exam:      Constitutional:       Appearance: Well-developed.  No acute distress noted at this time.  Neck:      Vascular: No carotid bruit or JVD.   Pulmonary:      Effort: Pulmonary effort is normal. No respiratory distress.      Breath sounds: Normal breath sounds. No wheezing. No rales.   Cardiovascular:      Normal rate. Regular rhythm.      Murmurs: No murmur, gallop or rub noted.     Comments: no edema.  Skin:     General: Skin is warm and dry.  No edema noted.  Neurological:      Mental Status: Alert and oriented to person, place, and time.       Results review   Results Review:   Results from last 7 days   Lab Units 08/11/23  0331 08/10/23  0936 08/09/23  0013 08/08/23  0208   WBC 10*3/mm3 6.35 8.23 9.19 12.13*   HEMOGLOBIN g/dL 9.8* 10.5* 10.9* 13.0   PLATELETS 10*3/mm3 134* 150 151 201       Results from last 7 days   Lab Units 08/11/23  0331 08/10/23  0936 08/09/23  0013 08/08/23  0250   SODIUM mmol/L 138 138 136 145   POTASSIUM mmol/L 3.6 3.6 3.9 4.8   CHLORIDE mmol/L 108* 106 103 109*   CO2 mmol/L 21.8* 21.8* 23.5 26.3   BUN mg/dL 19 20 18 12   CREATININE mg/dL 0.92 0.97 1.00 0.94   CALCIUM mg/dL 8.1* 8.2* 8.5* 8.3*   GLUCOSE mg/dL 99 119* 131* 162*   ALT (SGPT) U/L 24  --  27 35   AST (SGOT) U/L 23  --  39 76*       Results from last 7 days   Lab Units 08/09/23  0013 08/08/23  0452 08/08/23  0250   HSTROP T ng/L 74* 127* 107*       Lab Results   Component Value Date    PROBNP 98.0 06/24/2022    PROBNP 281.7  06/19/2022     No results found.     Lab Results   Component Value Date    INR 1.00 08/08/2023    INR 1.11 (H) 01/20/2023    INR 1.14 (H) 05/17/2022    INR 0.92 06/12/2018     Lab Results   Component Value Date    MG 2.1 08/08/2023    MG 2.0 01/27/2023    MG 1.7 01/26/2023     Lab Results   Component Value Date    TSH 6.060 (H) 01/20/2023    PSA 0.760 03/30/2023      No results found for: CHOL, TRIG, HDL, LDL  Pain Management Panel  More data exists         Latest Ref Rng & Units 4/11/2019 12/6/2018   Pain Management Panel   Creatinine, Urine mg/dL 75.0  130.3  130.3    Amphetamine, Urine Qual Negative Negative  -   Barbiturates Screen, Urine Negative Negative  -   Benzodiazepine Screen, Urine Negative Negative  -   Buprenorphine, Screen, Urine Negative Negative  -   Cocaine Screen, Urine Negative Negative  -   Methadone Screen , Urine Negative Negative  -     Microbiology Results (last 10 days)       Procedure Component Value - Date/Time    Blood Culture - Blood, Arm, Left [[id number]]  (Normal) Collected: 08/09/23 0526    Lab Status: Preliminary result Specimen: Blood from Arm, Left Updated: 08/11/23 0545     Blood Culture No growth at 2 days    Blood Culture - Blood, Arm, Left [[id number]]  (Normal) Collected: 08/09/23 0444    Lab Status: Preliminary result Specimen: Blood from Arm, Left Updated: 08/11/23 0500     Blood Culture No growth at 2 days    Respiratory Culture - Sputum, ET Suction [[id number]] Collected: 08/08/23 1025    Lab Status: Final result Specimen: Sputum from ET Suction Updated: 08/10/23 1007     Respiratory Culture Scant growth (1+) Normal respiratory cici. No S. aureus or Pseudomonas aeruginosa detected. Final report.     Gram Stain Moderate (3+) WBCs seen      Few (2+) Epithelial cells seen      Mixed cici    Respiratory Panel PCR w/COVID-19(SARS-CoV-2) ONDINA/ARTI/GENET/PAD/COR/MAD/MARIKA In-House, NP Swab in UTM/VTM, 3-4 HR TAT - Swab, Nasopharynx [[id number]]  (Normal) Collected: 08/08/23 0837    Lab  Status: Final result Specimen: Swab from Nasopharynx Updated: 08/08/23 0950     ADENOVIRUS, PCR Not Detected     Coronavirus 229E Not Detected     Coronavirus HKU1 Not Detected     Coronavirus NL63 Not Detected     Coronavirus OC43 Not Detected     COVID19 Not Detected     Human Metapneumovirus Not Detected     Human Rhinovirus/Enterovirus Not Detected     Influenza A PCR Not Detected     Influenza B PCR Not Detected     Parainfluenza Virus 1 Not Detected     Parainfluenza Virus 2 Not Detected     Parainfluenza Virus 3 Not Detected     Parainfluenza Virus 4 Not Detected     RSV, PCR Not Detected     Bordetella pertussis pcr Not Detected     Bordetella parapertussis PCR Not Detected     Chlamydophila pneumoniae PCR Not Detected     Mycoplasma pneumo by PCR Not Detected    Narrative:      In the setting of a positive respiratory panel with a viral infection PLUS a negative procalcitonin without other underlying concern for bacterial infection, consider observing off antibiotics or discontinuation of antibiotics and continue supportive care. If the respiratory panel is positive for atypical bacterial infection (Bordetella pertussis, Chlamydophila pneumoniae, or Mycoplasma pneumoniae), consider antibiotic de-escalation to target atypical bacterial infection.    S. Pneumo Ag Urine or CSF - Urine, Urine, Clean Catch [[id number]]  (Normal) Collected: 08/08/23 0837    Lab Status: Final result Specimen: Urine, Clean Catch Updated: 08/08/23 1812     Strep Pneumo Ag Negative    Blood Culture - Blood, Arm, Right [[id number]]  (Abnormal) Collected: 08/08/23 0406    Lab Status: Preliminary result Specimen: Blood from Arm, Right Updated: 08/11/23 0814     Blood Culture Staphylococcus, coagulase negative     Isolated from Aerobic and Anaerobic Bottles     Gram Stain Aerobic Bottle Gram positive cocci in clusters      Anaerobic Bottle Gram positive cocci in clusters    Blood Culture - Blood, Arm, Left [[id number]]  (Abnormal)  Collected: 08/08/23 0321    Lab Status: Preliminary result Specimen: Blood from Arm, Left Updated: 08/11/23 0812     Blood Culture Staphylococcus, coagulase negative     Isolated from Aerobic Bottle     Gram Stain Aerobic Bottle Gram positive cocci in clusters    Blood Culture ID, PCR - Blood, Arm, Left [[id number]]  (Abnormal) Collected: 08/08/23 0321    Lab Status: Final result Specimen: Blood from Arm, Left Updated: 08/09/23 0243     BCID, PCR Staph spp, not aureus or lugdunensis. Identification by BCID2 PCR.     BOTTLE TYPE Aerobic Bottle    COVID PRE-OP / PRE-PROCEDURE SCREENING ORDER (NO ISOLATION) - Swab, Nasopharynx [[id number]]  (Normal) Collected: 08/08/23 0207    Lab Status: Final result Specimen: Swab from Nasopharynx Updated: 08/08/23 0232    Narrative:      The following orders were created for panel order COVID PRE-OP / PRE-PROCEDURE SCREENING ORDER (NO ISOLATION) - Swab, Nasopharynx.  Procedure                               Abnormality         Status                     ---------                               -----------         ------                     COVID-19 and FLU A/B PCR...[[id number]]  Normal              Final result                 Please view results for these tests on the individual orders.    COVID-19 and FLU A/B PCR - Swab, Nasopharynx [[id number]]  (Normal) Collected: 08/08/23 0207    Lab Status: Final result Specimen: Swab from Nasopharynx Updated: 08/08/23 0232     COVID19 Not Detected     Influenza A PCR Not Detected     Influenza B PCR Not Detected    Narrative:      Fact sheet for providers: https://www.fda.gov/media/693478/download    Fact sheet for patients: https://www.fda.gov/media/781018/download    Test performed by PCR.           Imaging Results (Last 24 Hours)       ** No results found for the last 24 hours. **            ECHO:  Results for orders placed during the hospital encounter of 08/08/23    Adult Transthoracic Echo Complete W/ Cont if Necessary Per  Protocol    Interpretation Summary    Left ventricular systolic function is normal. Left ventricular ejection fraction appears to be 51 - 55%.    Left ventricular diastolic function is consistent with (grade I) impaired relaxation.    Estimated right ventricular systolic pressure from tricuspid regurgitation is normal (<35 mmHg).    There is no evidence of pericardial effusion      STRESS TEST:   No results found for this or any previous visit.      HEART CATH:  No results found for this or any previous visit.      TELEMETRY:  SR 60's              I reviewed the patient's new clinical results.    Medication Review:   Current list of medications may not reflect those currently placed in orders that are not signed or are being held.     allopurinol, 100 mg, Oral, Daily  aspirin, 81 mg, Oral, Daily  fluticasone, 2 spray, Each Nare, Daily  gabapentin, 200 mg, Oral, Q12H  ipratropium-albuterol, 3 mL, Nebulization, Q6H - RT  metoprolol tartrate, 25 mg, Oral, Q12H  mupirocin, 1 application , Each Nare, BID  rosuvastatin, 40 mg, Oral, Nightly  senna-docusate sodium, 2 tablet, Oral, BID  sodium chloride, 10 mL, Intravenous, Q12H  sodium chloride, 10 mL, Intravenous, Q12H  sodium chloride, 10 mL, Intravenous, Q12H      heparin, 23 Units/kg/hr, Last Rate: 23 Units/kg/hr (08/10/23 1244)  Pharmacy to Dose Heparin,         acetaminophen    benzonatate    senna-docusate sodium **AND** polyethylene glycol **AND** bisacodyl **AND** bisacodyl    guaiFENesin-dextromethorphan    ipratropium    melatonin    nitroglycerin    oxyCODONE    Pharmacy to Dose Heparin    sodium chloride    sodium chloride    sodium chloride    sodium chloride    Assessment      Acute hypoxic respiratory failure, improved  2. Non-STEMI, ischemic evaluation planned for today.   3. Chronic active tobacco use disorder  4. Hypertension  5. Dyslipidemia  6. COPD  7. Bacteremia  8. Chronic Back Pain      Plan   Recommendations:  Patient's Cardiac catheterization is  planned for today with him expressing agreement in procedure.    Continue aspirin, Lopressor, and Crestor  Bacteremia felt to be contaminant per ID review.    Pain management per Primary Care Team        I have discussed this patient with Dr. Aguilar and together, we have formed a plan of care for this patient as stated above in the recommendations.       I have discussed the patients findings and my recommendations with patient.            Ariadna Adams PA-C  08/11/23  10:04 EDT            Tony Sanchez MD, [facility]  Interventional Cardiology    I have explained the risks associated with the procedure to the patient including but not limited to an allergic reaction to the contrast material or medications used during the procedure bleeding, infection, and bruising at the catheter insertion site blood clots, which may trigger heart attack, stroke,   damage to the artery where the catheter was inserted, or damage to the arteries as the catheter travels through your body, irregular heart rhythm arrhythmias, kidney damage caused by the contrast material.

## 2023-08-11 NOTE — PLAN OF CARE
Goal Outcome Evaluation:      Patient has been resting in bed this shift. Patient did complain of one episode of shortness of breath at approximately 0001. Patient denied any chest pain, but complained of congestion and requested a breathing treatment. Patient had audible wheezing. Called respiratory who was able to deliver the scheduled 0100 breathing treatment. Patient stated shortness of breath was relieved with breathing treatment. Wheezing greatly reduced. Patient placed on 2L nasal cannula. VIJAY Sosa made aware. Will continue with plan of care.

## 2023-08-11 NOTE — CASE MANAGEMENT/SOCIAL WORK
Continued Stay Note  HEIDE Albarran     Patient Name: Paul Gomez  MRN: [id number]  Today's Date: 8/11/2023    Admit Date: 8/8/2023    Plan: Discharge plan remains home with ex-wife Chari, has nebulizer; Chari or brother will provide transport home at discharge when medically stable. Reverified with pt today.   Discharge Plan       Row Name 08/11/23 1314       Plan    Plan Discharge plan remains home with ex-wife Chari, has nebulizer; Chari or brother will provide transport home at discharge when medically stable. Reverified with pt today.    Plan Comments NPO for [facility] today.            Expected Discharge Date and Time       Expected Discharge Date Expected Discharge Time    Aug 14, 2023      Tawanna Mukherjee

## 2023-08-12 NOTE — OUTREACH NOTE
Prep Survey      Flowsheet Row Responses   [facility] facility patient discharged from? [facility]   Is LACE score < 7 ? No   Eligibility [facility]   H[facility] [place]   Date of Admission 08/08/23   Date of Discharge 08/11/23   Discharge Disposition Home or Self Care   Discharge diagnosis Respiratory failure with hypoxia and hypercapnia/COPD   Does the patient have one of the following disease processes/diagnoses(primary or secondary)? COPD   Does the patient have Home health ordered? No   Is there a DME ordered? No   Comments regarding appointments Ambulatory referral to Pulmonary/COPD clinic   Medication alerts for this patient SEE AVS   General alerts for this patient left heart cath 8/11/23   Prep survey completed? Yes            Sirisha JEROME - Registered Nurse

## 2023-08-14 ENCOUNTER — TRANSITIONAL CARE MANAGEMENT TELEPHONE ENCOUNTER (OUTPATIENT)
Dept: CALL CENTER | Facility: HOSPITAL | Age: 78
End: 2023-08-14
Payer: MEDICARE

## 2023-08-14 ENCOUNTER — TELEPHONE (OUTPATIENT)
Dept: TELEMETRY | Facility: HOSPITAL | Age: 78
End: 2023-08-14
Payer: MEDICARE

## 2023-08-14 LAB
BACTERIA SPEC AEROBE CULT: NORMAL
BACTERIA SPEC AEROBE CULT: NORMAL

## 2023-08-14 NOTE — OUTREACH NOTE
Call Center TCM Note      Flowsheet Row Responses   [facility] facility patient discharged from? [facility]   Does the patient have one of the following disease processes/diagnoses(primary or secondary)? COPD   TCM attempt successful? No  [wife]   Unsuccessful attempts Attempt 1   General alerts for this patient left heart cath 8/11/23   Discharge diagnosis Respiratory failure with hypoxia and hypercapnia/COPD            Zoe Brito RN    8/14/2023, 09:05 EDT

## 2023-08-14 NOTE — OUTREACH NOTE
Call Center TCM Note      Flowsheet Row Responses   [facility] patient discharged from? [facility]   Does the patient have one of the following disease processes/diagnoses(primary or secondary)? COPD   TCM attempt successful? Yes   Call end time 1450   Discharge diagnosis Respiratory failure with hypoxia and hypercapnia/COPD   Meds reviewed with patient/caregiver? Yes   Is the patient having any side effects they believe may be caused by any medication additions or changes? No   Does the patient have all medications ordered at discharge? Yes   Is the patient taking all medications as directed (includes completed medication regime)? Yes   Comments HOSP DC FU appt 8/15/23 11 am   Does the patient have an appointment with their PCP within 7-14 days of discharge? Yes   Has home health visited the patient within 72 hours of discharge? N/A   Pulse Ox monitoring Intermittent   Pulse Ox device source Patient   O2 Sat comments 96% RA   O2 Sat: education provided Sat levels, Monitoring frequency, When to seek care   Psychosocial issues? No   Did the patient receive a copy of their discharge instructions? Yes   Nursing interventions Reviewed instructions with patient   What is the patient's perception of their health status since discharge? Improving   Nursing Interventions Nurse provided patient education   Is the patient/caregiver able to teach back the hierarchy of who to call/visit for symptoms/problems? PCP, Specialist, Home health nurse, Urgent Care, ED, 911 Yes   TCM call completed? Yes   Wrap up additional comments Wife reports Pt is doing well. Cath site without any issues.   Call end time 1450            Zoe Brito RN    8/14/2023, 14:51 EDT

## 2023-08-15 LAB
BACTERIA SPEC AEROBE CULT: ABNORMAL
GRAM STN SPEC: ABNORMAL
ISOLATED FROM: ABNORMAL

## 2023-08-22 ENCOUNTER — READMISSION MANAGEMENT (OUTPATIENT)
Dept: CALL CENTER | Facility: HOSPITAL | Age: 78
End: 2023-08-22
Payer: MEDICARE

## 2023-08-22 NOTE — OUTREACH NOTE
COPD/PN Week 2 Survey      Flowsheet Row Responses   [facility] patient discharged from? [facility]   Does the patient have one of the following disease processes/diagnoses(primary or secondary)? COPD   Week 2 attempt successful? Yes   Call start time 1022   Call end time 1034   Discharge diagnosis Respiratory failure with hypoxia and hypercapnia/COPD   Meds reviewed with patient/caregiver? Yes   Is the patient having any side effects they believe may be caused by any medication additions or changes? No   Does the patient have all medications ordered at discharge? Yes   Is the patient taking all medications as directed (includes completed medication regime)? Yes   Medication comments Pt has finished ATBs/steroids   Comments regarding appointments Ambulatory referral to Pulmonary/COPD clinic--appt today 8/24/23, called hospital for pt to obtain correct phone number and address as not listed correctly on AVS   Does the patient have a primary care provider?  Yes   Has the patient kept scheduled appointments due by today? No  [reports he called provider and will keep appt that was scheduled for september]   Comments Cardiology on 8/29/23   Has home health visited the patient within 72 hours of discharge? N/A   Pulse Ox monitoring Intermittent   Pulse Ox device source Patient   O2 Sat comments sats in upper 90s   O2 Sat: education provided Sat levels, Monitoring frequency, When to seek care   Psychosocial issues? No   Did the patient receive a copy of their discharge instructions? Yes   Nursing interventions Reviewed instructions with patient   What is the patient's perception of their health status since discharge? Improving  [He is doing well--denies chest pain, reports sats in upper 90s. Has pulmonary rehab appt 8/24/23.  Awaret to monitor for cardiac s/s and increased resp s/s and seek care prn.]   Nursing Interventions Nurse provided patient education   If the patient is a current smoker, are they able to teach  back resources for cessation? --  [pt is no longer smoker]   Is the patient/caregiver able to teach back the hierarchy of who to call/visit for symptoms/problems? PCP, Specialist, Home health nurse, Urgent Care, ED, 911 Yes   Week 2 call completed? Yes   Call end time 1034            DANIELLE Barth Registered Nurse

## 2023-08-30 ENCOUNTER — READMISSION MANAGEMENT (OUTPATIENT)
Dept: CALL CENTER | Facility: HOSPITAL | Age: 78
End: 2023-08-30
Payer: MEDICARE

## 2023-08-30 NOTE — OUTREACH NOTE
COPD/PN Week 3 Survey      Flowsheet Row Responses   [facility] patient discharged from? [facility]   Does the patient have one of the following disease processes/diagnoses(primary or secondary)? COPD   Week 3 attempt successful? Yes   Call start time 1720   Call end time 1728   Discharge diagnosis Respiratory failure with hypoxia and hypercapnia/COPD   Person spoke with today (if not patient) and relationship Patient   Meds reviewed with patient/caregiver? Yes   Does the patient have all medications ordered at discharge? Yes   Is the patient taking all medications as directed (includes completed medication regime)? Yes   Does the patient have a primary care provider?  Yes   Does the patient have an appointment with their PCP or specialist within 7 days of discharge? Greater than 7 days   Comments regarding PCP PCP Dr Dobbs. Appt 9/6   Nursing Interventions Verified appointment date/time/provider   Has the patient kept scheduled appointments due by today? No   What is preventing the patient from keeping their appointments? Financial  [Patient reports that he has had to cancel some appts because of car trouble. He plans to keep the appt with Dr Dobbs next week. ]   Nursing Interventions Educated on use of video visits  [Advised to call offices on the ones he can't get to and ask to change to a telehealth appt.]   Has home health visited the patient within 72 hours of discharge? N/A   Pulse Ox monitoring None   Psychosocial issues? No   Did the patient receive a copy of their discharge instructions? Yes   Nursing interventions Reviewed instructions with patient   What is the patient's perception of their health status since discharge? Improving   Is the patient/caregiver able to teach back the hierarchy of who to call/visit for symptoms/problems? PCP, Specialist, Home health nurse, Urgent Care, ED, 911 Yes   Is the patient/caregiver able to teach back signs and symptoms of worsening condition: Fever/chills,  Shortness of breath, Chest pain   Is the patient/caregiver able to teach back importance of completing antibiotic course of treatment? Yes   Week 3 call completed? Yes   Graduated Yes   Is the patient interested in additional calls from an ambulatory ? No   Would this patient benefit from a Referral to [facility] Social Work? No   Call end time [phone]            CARLOS DÍAZ - Registered Nurse

## 2023-09-29 ENCOUNTER — TRANSCRIBE ORDERS (OUTPATIENT)
Dept: ADMINISTRATIVE | Facility: HOSPITAL | Age: 78
End: 2023-09-29
Payer: MEDICARE

## 2023-09-29 DIAGNOSIS — M54.17 LUMBOSACRAL RADICULOPATHY: Primary | ICD-10-CM

## 2023-09-29 NOTE — TELEPHONE ENCOUNTER
"Patient called indicating he had to reschedule appointment due to transportation issues and will be out of pended medications before next appointment.  He noted he was given a \"COPD emergency kit\" and has had to use it.  Can he get refills on that as well?    Pended for review...    "

## 2023-09-29 NOTE — TELEPHONE ENCOUNTER
Caller: Paul Gomez    Relationship: Self    Best call back number: [phone]    What is the best time to reach you: ANYTIME    Who are you requesting to speak with (clinical staff, provider,  specific staff member): CLINICAL STAFF    Do you know the name of the person who called: N/A    What was the call regarding: REQUESTING TO SPEAK TO CLINICAL    Is it okay if the provider responds through MyChart: N/A

## 2023-10-02 RX ORDER — PREDNISONE 20 MG/1
40 TABLET ORAL DAILY
Qty: 10 TABLET | Refills: 0 | Status: SHIPPED | OUTPATIENT
Start: 2023-10-02

## 2023-10-02 RX ORDER — ROSUVASTATIN CALCIUM 40 MG/1
40 TABLET, COATED ORAL NIGHTLY
Qty: 30 TABLET | Refills: 0 | Status: SHIPPED | OUTPATIENT
Start: 2023-10-02 | End: 2023-11-01

## 2023-10-02 RX ORDER — DOXYCYCLINE 100 MG/1
CAPSULE ORAL
Qty: 10 CAPSULE | Refills: 0 | Status: SHIPPED | OUTPATIENT
Start: 2023-10-02

## 2023-10-02 RX ORDER — TRIAMCINOLONE ACETONIDE 1 MG/G
1 CREAM TOPICAL 2 TIMES DAILY
Qty: 45 G | Refills: 1 | Status: SHIPPED | OUTPATIENT
Start: 2023-10-02

## 2023-10-23 ENCOUNTER — TELEPHONE (OUTPATIENT)
Dept: FAMILY MEDICINE CLINIC | Facility: CLINIC | Age: 78
End: 2023-10-23
Payer: MEDICARE

## 2023-11-03 ENCOUNTER — OFFICE VISIT (OUTPATIENT)
Dept: FAMILY MEDICINE CLINIC | Facility: CLINIC | Age: 78
End: 2023-11-03
Payer: MEDICARE

## 2023-11-03 VITALS
HEART RATE: 87 BPM | TEMPERATURE: 97.1 F | DIASTOLIC BLOOD PRESSURE: 68 MMHG | SYSTOLIC BLOOD PRESSURE: 124 MMHG | OXYGEN SATURATION: 98 % | HEIGHT: 68 IN | WEIGHT: 136.8 LBS | BODY MASS INDEX: 20.73 KG/M2

## 2023-11-03 DIAGNOSIS — F17.200 CURRENT EVERY DAY SMOKER: Primary | ICD-10-CM

## 2023-11-03 DIAGNOSIS — L30.8 OTHER ECZEMA: ICD-10-CM

## 2023-11-03 DIAGNOSIS — E87.5 HYPERKALEMIA: ICD-10-CM

## 2023-11-03 DIAGNOSIS — R79.89 ELEVATED SERUM CREATININE: ICD-10-CM

## 2023-11-03 DIAGNOSIS — M1A.0720 IDIOPATHIC CHRONIC GOUT OF LEFT FOOT WITHOUT TOPHUS: ICD-10-CM

## 2023-11-03 DIAGNOSIS — I25.10 CORONARY ARTERY DISEASE INVOLVING NATIVE CORONARY ARTERY OF NATIVE HEART WITHOUT ANGINA PECTORIS: ICD-10-CM

## 2023-11-03 DIAGNOSIS — G89.4 CHRONIC PAIN SYNDROME: ICD-10-CM

## 2023-11-03 DIAGNOSIS — I10 PRIMARY HYPERTENSION: ICD-10-CM

## 2023-11-03 DIAGNOSIS — J43.2 CENTRILOBULAR EMPHYSEMA: ICD-10-CM

## 2023-11-03 DIAGNOSIS — D62 ACUTE BLOOD LOSS ANEMIA (ABLA): ICD-10-CM

## 2023-11-03 PROCEDURE — 3078F DIAST BP <80 MM HG: CPT | Performed by: FAMILY MEDICINE

## 2023-11-03 PROCEDURE — 82043 UR ALBUMIN QUANTITATIVE: CPT | Performed by: FAMILY MEDICINE

## 2023-11-03 PROCEDURE — 84550 ASSAY OF BLOOD/URIC ACID: CPT | Performed by: FAMILY MEDICINE

## 2023-11-03 PROCEDURE — 85027 COMPLETE CBC AUTOMATED: CPT | Performed by: FAMILY MEDICINE

## 2023-11-03 PROCEDURE — 80053 COMPREHEN METABOLIC PANEL: CPT | Performed by: FAMILY MEDICINE

## 2023-11-03 PROCEDURE — 99214 OFFICE O/P EST MOD 30 MIN: CPT | Performed by: FAMILY MEDICINE

## 2023-11-03 PROCEDURE — 3074F SYST BP LT 130 MM HG: CPT | Performed by: FAMILY MEDICINE

## 2023-11-03 RX ORDER — NICOTINE 21 MG/24HR
1 PATCH, TRANSDERMAL 24 HOURS TRANSDERMAL EVERY 24 HOURS
Qty: 30 PATCH | Refills: 2 | Status: SHIPPED | OUTPATIENT
Start: 2023-11-03

## 2023-11-03 RX ORDER — ALBUTEROL SULFATE 90 UG/1
2 AEROSOL, METERED RESPIRATORY (INHALATION) EVERY 6 HOURS PRN
Qty: 18 G | Refills: 5 | Status: SHIPPED | OUTPATIENT
Start: 2023-11-03

## 2023-11-03 RX ORDER — FLUTICASONE PROPIONATE AND SALMETEROL XINAFOATE 230; 21 UG/1; UG/1
2 AEROSOL, METERED RESPIRATORY (INHALATION) 2 TIMES DAILY
Qty: 12 G | Refills: 11 | Status: SHIPPED | OUTPATIENT
Start: 2023-11-03

## 2023-11-03 RX ORDER — ROSUVASTATIN CALCIUM 40 MG/1
40 TABLET, COATED ORAL NIGHTLY
Qty: 90 TABLET | Refills: 0 | Status: SHIPPED | OUTPATIENT
Start: 2023-11-03

## 2023-11-03 NOTE — PROGRESS NOTES
Subjective   Paul Gomez is a 77 y.o. male.   Pt presents today with CC of Hypertension      History of Present Illness   History of Present Illness  1.  Patient is a 77-year-old male who had been lost to follow-up recently.  He was hospitalized for pneumonia and MI.  He had left heart cath not follow-up with cardiology.  At this time he is not interested.  He is interested in trying to quit smoking.  He has never utilized NicoDerm before.  #2 he has emphysema.  He needs refill of Advair and albuterol.  He is currently doing well.  #3 he has a acute blood loss anemia.  He is agreeable to blood work today.  He has not had blood work since his hospitalization.  He has hypertension, coronary artery disease, gout, and history of disease.   #4 lastly, he has chronic pain.       The following portions of the patient's history were reviewed and updated as appropriate: allergies, current medications, past family history, past medical history, past social history, past surgical history, and problem list.    Review of Systems   Constitutional:  Negative for chills, fever and unexpected weight loss.   HENT:  Negative for congestion and sore throat.    Eyes:  Negative for blurred vision and visual disturbance.   Respiratory:  Negative for cough and wheezing.    Cardiovascular:  Negative for chest pain and palpitations.   Gastrointestinal:  Negative for abdominal pain and diarrhea.   Endocrine: Negative for cold intolerance and heat intolerance.   Genitourinary:  Negative for dysuria.   Musculoskeletal:  Negative for arthralgias and neck stiffness.   Neurological:  Negative for dizziness, seizures and syncope.   Psychiatric/Behavioral:  Negative for self-injury, suicidal ideas and depressed mood.        Objective   Physical Exam  Vitals and nursing note reviewed.   Constitutional:       Appearance: He is well-developed.   HENT:      Head: Normocephalic and atraumatic.      Right Ear: External ear normal.      Left Ear: External  ear normal.      Nose: Nose normal.   Eyes:      Conjunctiva/sclera: Conjunctivae normal.      Pupils: Pupils are equal, round, and reactive to light.   Cardiovascular:      Rate and Rhythm: Normal rate and regular rhythm.      Heart sounds: Normal heart sounds.   Pulmonary:      Effort: Pulmonary effort is normal.      Breath sounds: Normal breath sounds.   Abdominal:      General: Bowel sounds are normal.      Palpations: Abdomen is soft.   Musculoskeletal:      Cervical back: Normal range of motion and neck supple.   Skin:     General: Skin is warm and dry.   Neurological:      Mental Status: He is alert and oriented to person, place, and time.   Psychiatric:         Behavior: Behavior normal.           Assessment & Plan   Diagnoses and all orders for this visit:    1. Current every day smoker (Primary)  -     nicotine (Nicoderm CQ) 14 MG/24HR patch; Place 1 patch on the skin as directed by provider Daily.  Dispense: 30 patch; Refill: 2    2. Centrilobular emphysema  -     albuterol sulfate  (90 Base) MCG/ACT inhaler; Inhale 2 puffs Every 6 (Six) Hours As Needed for Wheezing or Shortness of Air.  Dispense: 18 g; Refill: 5  -     fluticasone-salmeterol (ADVAIR HFA) 230-21 MCG/ACT inhaler; Inhale 2 puffs 2 (Two) Times a Day.  Dispense: 12 g; Refill: 11    3. Hyperkalemia  Recheck CMP today.  4. Chronic pain syndrome    5. Acute blood loss anemia (ABLA)  -     CBC No Differential; Future    6. Elevated serum creatinine  -     MicroAlbumin, Urine, Random - Urine, Clean Catch; Future  -     Comprehensive metabolic panel; Future    7. Idiopathic chronic gout of left foot without tophus  -     Uric acid; Future    8. Other eczema    9. Primary hypertension  -     MicroAlbumin, Urine, Random - Urine, Clean Catch; Future  -     metoprolol tartrate (LOPRESSOR) 25 MG tablet; Take 1 tablet by mouth Every 12 (Twelve) Hours.  Dispense: 180 tablet; Refill: 0    10. Coronary artery disease involving native coronary artery  of native heart without angina pectoris  -     MicroAlbumin, Urine, Random - Urine, Clean Catch; Future  -     rosuvastatin (CRESTOR) 40 MG tablet; Take 1 tablet by mouth Every Night.  Dispense: 90 tablet; Refill: 0  -     nicotine (Nicoderm CQ) 14 MG/24HR patch; Place 1 patch on the skin as directed by provider Daily.  Dispense: 30 patch; Refill: 2  I recommend follow-up with cardiology.  He is can follow-up with me in 10 weeks to discuss smoking cessation, and to ensure that he is still doing well from a pulmonary standpoint.             Paul Gomez  reports that he has been smoking cigarettes. He started smoking about 58 years ago. He has a 60.00 pack-year smoking history. He has never used smokeless tobacco.. I have educated him on the risk of diseases from using tobacco products such as cancer, COPD, and heart disease.     I advised him to quit and he is willing to quit. We have discussed the following method/s for tobacco cessation:  Counseling Prescription Medicaiton.  Together we have set a quit date for 1 week from today.  He will follow up with me in 10 weeks or sooner to check on his progress.    I spent 3  minutes counseling the patient.         BMI is within normal parameters. No other follow-up for BMI required.          This document has been electronically signed by Carrie Torres  November 3, 2023 13:26 EDT    Dictated Utilizing Dragon Dictation: Part of this note may be an electronic transcription/translation of spoken language to printed text using the Dragon Dictation System.

## 2023-11-04 LAB
ALBUMIN SERPL-MCNC: 4.1 G/DL (ref 3.5–5.2)
ALBUMIN UR-MCNC: <1.2 MG/DL
ALBUMIN/GLOB SERPL: 1.5 G/DL
ALP SERPL-CCNC: 90 U/L (ref 39–117)
ALT SERPL W P-5'-P-CCNC: 15 U/L (ref 1–41)
ANION GAP SERPL CALCULATED.3IONS-SCNC: 9.4 MMOL/L (ref 5–15)
AST SERPL-CCNC: 23 U/L (ref 1–40)
BILIRUB SERPL-MCNC: 0.3 MG/DL (ref 0–1.2)
BUN SERPL-MCNC: 13 MG/DL (ref 8–23)
BUN/CREAT SERPL: 15.5 (ref 7–25)
CALCIUM SPEC-SCNC: 9 MG/DL (ref 8.6–10.5)
CHLORIDE SERPL-SCNC: 108 MMOL/L (ref 98–107)
CO2 SERPL-SCNC: 24.6 MMOL/L (ref 22–29)
CREAT SERPL-MCNC: 0.84 MG/DL (ref 0.76–1.27)
DEPRECATED RDW RBC AUTO: 43.8 FL (ref 37–54)
EGFRCR SERPLBLD CKD-EPI 2021: 89.8 ML/MIN/1.73
ERYTHROCYTE [DISTWIDTH] IN BLOOD BY AUTOMATED COUNT: 12.5 % (ref 12.3–15.4)
GLOBULIN UR ELPH-MCNC: 2.7 GM/DL
GLUCOSE SERPL-MCNC: 99 MG/DL (ref 65–99)
HCT VFR BLD AUTO: 37.6 % (ref 37.5–51)
HGB BLD-MCNC: 12.7 G/DL (ref 13–17.7)
MCH RBC QN AUTO: 32.2 PG (ref 26.6–33)
MCHC RBC AUTO-ENTMCNC: 33.8 G/DL (ref 31.5–35.7)
MCV RBC AUTO: 95.2 FL (ref 79–97)
PLATELET # BLD AUTO: 233 10*3/MM3 (ref 140–450)
PMV BLD AUTO: 12.4 FL (ref 6–12)
POTASSIUM SERPL-SCNC: 3.9 MMOL/L (ref 3.5–5.2)
PROT SERPL-MCNC: 6.8 G/DL (ref 6–8.5)
RBC # BLD AUTO: 3.95 10*6/MM3 (ref 4.14–5.8)
SODIUM SERPL-SCNC: 142 MMOL/L (ref 136–145)
URATE SERPL-MCNC: 5.6 MG/DL (ref 3.4–7)
WBC NRBC COR # BLD: 6.14 10*3/MM3 (ref 3.4–10.8)

## 2023-11-13 ENCOUNTER — TELEPHONE (OUTPATIENT)
Dept: FAMILY MEDICINE CLINIC | Facility: CLINIC | Age: 78
End: 2023-11-13
Payer: MEDICARE

## 2023-11-13 NOTE — TELEPHONE ENCOUNTER
----- Message from Luis Dobbs DO sent at 11/13/2023  2:49 PM EST -----  No problems on your blood work.

## 2023-11-13 NOTE — TELEPHONE ENCOUNTER
----- Message from Luis Dobbs DO sent at 11/13/2023  2:49 PM EST -----  No problems on your blood work.      Patient notified.

## 2023-11-21 ENCOUNTER — TELEPHONE (OUTPATIENT)
Dept: FAMILY MEDICINE CLINIC | Facility: CLINIC | Age: 78
End: 2023-11-21
Payer: MEDICARE

## 2024-01-12 ENCOUNTER — OFFICE VISIT (OUTPATIENT)
Dept: FAMILY MEDICINE CLINIC | Facility: CLINIC | Age: 79
End: 2024-01-12
Payer: MEDICARE

## 2024-01-12 VITALS
HEIGHT: 68 IN | DIASTOLIC BLOOD PRESSURE: 80 MMHG | OXYGEN SATURATION: 98 % | SYSTOLIC BLOOD PRESSURE: 120 MMHG | BODY MASS INDEX: 20.49 KG/M2 | WEIGHT: 135.2 LBS | TEMPERATURE: 98.4 F | HEART RATE: 69 BPM

## 2024-01-12 DIAGNOSIS — I25.10 CORONARY ARTERY DISEASE INVOLVING NATIVE CORONARY ARTERY OF NATIVE HEART WITHOUT ANGINA PECTORIS: ICD-10-CM

## 2024-01-12 DIAGNOSIS — D64.89 ANEMIA DUE TO OTHER CAUSE, NOT CLASSIFIED: ICD-10-CM

## 2024-01-12 DIAGNOSIS — Z00.00 MEDICARE ANNUAL WELLNESS VISIT, SUBSEQUENT: Primary | ICD-10-CM

## 2024-01-12 DIAGNOSIS — E87.5 HYPERKALEMIA: ICD-10-CM

## 2024-01-12 DIAGNOSIS — Z87.39 HISTORY OF DEGENERATIVE DISC DISEASE: ICD-10-CM

## 2024-01-12 DIAGNOSIS — I21.A1 TYPE 2 MYOCARDIAL INFARCTION: ICD-10-CM

## 2024-01-12 DIAGNOSIS — I10 PRIMARY HYPERTENSION: ICD-10-CM

## 2024-01-12 DIAGNOSIS — G89.29 OTHER CHRONIC PAIN: ICD-10-CM

## 2024-01-12 DIAGNOSIS — G62.9 NEUROPATHY: ICD-10-CM

## 2024-01-12 DIAGNOSIS — Z78.9 DNI (DO NOT INTUBATE): ICD-10-CM

## 2024-01-12 DIAGNOSIS — F17.200 CURRENT EVERY DAY SMOKER: ICD-10-CM

## 2024-01-12 DIAGNOSIS — Z66 DNR (DO NOT RESUSCITATE): ICD-10-CM

## 2024-01-12 DIAGNOSIS — M1A.0720 IDIOPATHIC CHRONIC GOUT OF LEFT FOOT WITHOUT TOPHUS: ICD-10-CM

## 2024-01-12 DIAGNOSIS — J43.2 CENTRILOBULAR EMPHYSEMA: ICD-10-CM

## 2024-01-12 PROCEDURE — 80053 COMPREHEN METABOLIC PANEL: CPT | Performed by: FAMILY MEDICINE

## 2024-01-12 PROCEDURE — 84550 ASSAY OF BLOOD/URIC ACID: CPT | Performed by: FAMILY MEDICINE

## 2024-01-12 PROCEDURE — 85027 COMPLETE CBC AUTOMATED: CPT | Performed by: FAMILY MEDICINE

## 2024-01-12 PROCEDURE — 36415 COLL VENOUS BLD VENIPUNCTURE: CPT | Performed by: FAMILY MEDICINE

## 2024-01-12 RX ORDER — CELECOXIB 200 MG/1
200 CAPSULE ORAL DAILY
Qty: 90 CAPSULE | Refills: 1 | Status: SHIPPED | OUTPATIENT
Start: 2024-01-12

## 2024-01-12 RX ORDER — ALLOPURINOL 100 MG/1
100 TABLET ORAL DAILY
Qty: 30 TABLET | Refills: 2 | Status: SHIPPED | OUTPATIENT
Start: 2024-01-12

## 2024-01-12 RX ORDER — ALBUTEROL SULFATE 90 UG/1
2 AEROSOL, METERED RESPIRATORY (INHALATION) EVERY 6 HOURS PRN
Qty: 18 G | Refills: 5 | Status: SHIPPED | OUTPATIENT
Start: 2024-01-12

## 2024-01-12 RX ORDER — FLUTICASONE PROPIONATE AND SALMETEROL XINAFOATE 230; 21 UG/1; UG/1
2 AEROSOL, METERED RESPIRATORY (INHALATION) 2 TIMES DAILY
Qty: 12 G | Refills: 11 | Status: SHIPPED | OUTPATIENT
Start: 2024-01-12

## 2024-01-12 RX ORDER — IPRATROPIUM BROMIDE AND ALBUTEROL SULFATE 2.5; .5 MG/3ML; MG/3ML
SOLUTION RESPIRATORY (INHALATION)
Qty: 18 ML | Refills: 0 | Status: SHIPPED | OUTPATIENT
Start: 2024-01-12

## 2024-01-12 RX ORDER — ROSUVASTATIN CALCIUM 40 MG/1
40 TABLET, COATED ORAL NIGHTLY
Qty: 90 TABLET | Refills: 0 | Status: SHIPPED | OUTPATIENT
Start: 2024-01-12

## 2024-01-12 RX ORDER — GABAPENTIN 800 MG/1
800 TABLET ORAL 3 TIMES DAILY
Qty: 180 TABLET | Refills: 0 | Status: SHIPPED | OUTPATIENT
Start: 2024-01-12

## 2024-01-13 LAB
ALBUMIN SERPL-MCNC: 4.3 G/DL (ref 3.5–5.2)
ALBUMIN/GLOB SERPL: 1.5 G/DL
ALP SERPL-CCNC: 110 U/L (ref 39–117)
ALT SERPL W P-5'-P-CCNC: 12 U/L (ref 1–41)
ANION GAP SERPL CALCULATED.3IONS-SCNC: 9 MMOL/L (ref 5–15)
AST SERPL-CCNC: 18 U/L (ref 1–40)
BILIRUB SERPL-MCNC: 0.4 MG/DL (ref 0–1.2)
BUN SERPL-MCNC: 11 MG/DL (ref 8–23)
BUN/CREAT SERPL: 11.3 (ref 7–25)
CALCIUM SPEC-SCNC: 9.3 MG/DL (ref 8.6–10.5)
CHLORIDE SERPL-SCNC: 106 MMOL/L (ref 98–107)
CO2 SERPL-SCNC: 25 MMOL/L (ref 22–29)
CREAT SERPL-MCNC: 0.97 MG/DL (ref 0.76–1.27)
DEPRECATED RDW RBC AUTO: 43.1 FL (ref 37–54)
EGFRCR SERPLBLD CKD-EPI 2021: 79.9 ML/MIN/1.73
ERYTHROCYTE [DISTWIDTH] IN BLOOD BY AUTOMATED COUNT: 12.6 % (ref 12.3–15.4)
GLOBULIN UR ELPH-MCNC: 2.8 GM/DL
GLUCOSE SERPL-MCNC: 95 MG/DL (ref 65–99)
HCT VFR BLD AUTO: 40.4 % (ref 37.5–51)
HGB BLD-MCNC: 13.1 G/DL (ref 13–17.7)
MCH RBC QN AUTO: 30.1 PG (ref 26.6–33)
MCHC RBC AUTO-ENTMCNC: 32.4 G/DL (ref 31.5–35.7)
MCV RBC AUTO: 92.9 FL (ref 79–97)
PLATELET # BLD AUTO: 213 10*3/MM3 (ref 140–450)
PMV BLD AUTO: 11.6 FL (ref 6–12)
POTASSIUM SERPL-SCNC: 5.3 MMOL/L (ref 3.5–5.2)
PROT SERPL-MCNC: 7.1 G/DL (ref 6–8.5)
RBC # BLD AUTO: 4.35 10*6/MM3 (ref 4.14–5.8)
SODIUM SERPL-SCNC: 140 MMOL/L (ref 136–145)
URATE SERPL-MCNC: 6.2 MG/DL (ref 3.4–7)
WBC NRBC COR # BLD AUTO: 6.84 10*3/MM3 (ref 3.4–10.8)

## 2024-01-14 NOTE — PROGRESS NOTES
The ABCs of the Annual Wellness Visit  Subsequent Medicare Wellness Visit    Subjective      Paul Gomez is a 78 y.o. male who presents for a Subsequent Medicare Wellness Visit.    The following portions of the patient's history were reviewed and   updated as appropriate: allergies, current medications, past family history, past medical history, past social history, past surgical history, and problem list.    Compared to one year ago, the patient feels his physical   health is the same.    Compared to one year ago, the patient feels his mental   health is the same.    Recent Hospitalizations:  This patient has had a [facility] admission record on file within the last 365 days.    Current Medical Providers:  Patient Care Team:  Luis Dobbs,  as PCP - General (Family Medicine)    Outpatient Medications Prior to Visit   Medication Sig Dispense Refill    aspirin 81 MG EC tablet Take 1 tablet by mouth Daily.      nicotine (Nicoderm CQ) 14 MG/24HR patch Place 1 patch on the skin as directed by provider Daily. 30 patch 2    triamcinolone (KENALOG) 0.1 % cream Apply 1 application  topically to the appropriate area as directed 2 (Two) Times a Day. Apply to affected area 45 g 1    albuterol sulfate  (90 Base) MCG/ACT inhaler Inhale 2 puffs Every 6 (Six) Hours As Needed for Wheezing or Shortness of Air. 18 g 5    allopurinol (ZYLOPRIM) 100 MG tablet Take 1 tablet by mouth Daily. 30 tablet 2    celecoxib (CeleBREX) 200 MG capsule Take 1 capsule by mouth Daily.      fluticasone-salmeterol (ADVAIR HFA) 230-21 MCG/ACT inhaler Inhale 2 puffs 2 (Two) Times a Day. 12 g 11    gabapentin (NEURONTIN) 800 MG tablet Take 0.5 tablets by mouth 3 (Three) Times a Day.      ipratropium-albuterol (DUO-NEB) 0.5-2.5 mg/3 ml nebulizer Take 3 mL by neb every 30 minutes as needed for shortness of air for up to 6 doses. Part of COPD Rescue Kit. (Only Start if in YELLOW ZONE.) 18 mL 0    metoprolol tartrate (LOPRESSOR) 25 MG  tablet Take 1 tablet by mouth Every 12 (Twelve) Hours. 180 tablet 0    rosuvastatin (CRESTOR) 40 MG tablet Take 1 tablet by mouth Every Night. 90 tablet 0    oxyCODONE (ROXICODONE) 15 MG immediate release tablet Take 1 tablet by mouth Every 6 (Six) Hours As Needed for Moderate Pain. (Patient not taking: Reported on 1/12/2024)       No facility-administered medications prior to visit.       No opioid medication identified on active medication list. I have reviewed chart for other potential  high risk medication/s and harmful drug interactions in the elderly.        Aspirin is on active medication list. Aspirin use is indicated based on review of current medical condition/s. Pros and cons of this therapy have been discussed today. Benefits of this medication outweigh potential harm.  Patient has been encouraged to continue taking this medication.  .      Patient Active Problem List   Diagnosis    Low back pain    Hypertension    Hyperlipidemia    History of degenerative disc disease    COPD (chronic obstructive pulmonary disease)    Gout    B12 deficiency    Seborrheic eczema    Hypothyroidism    Dilated pancreatic duct    Epigastric pain    Chronic pancreatitis    Headache, unspecified headache type    Chest pain    COPD with exacerbation    Former smoker    Acute exacerbation of chronic obstructive pulmonary disease (COPD)    Erectile dysfunction    Acute congestive heart failure    Bilateral rales    1+ pitting edema    Dyspnea on minimal exertion    Visual loss    Altered mental status    DNR (do not resuscitate)    DNI (do not intubate)    Fracture of femoral neck, left    Closed left hip fracture, sequela    Respiratory failure with hypoxia and hypercapnia    COPD exacerbation    NSTEMI, initial episode of care    Type 2 myocardial infarction     Advance Care Planning   Advance Care Planning     Advance Directive is on file.  ACP discussion was held with the patient during this visit. Patient has an advance  "directive in EMR which is still valid.      Objective    Vitals:    24 1416   BP: 120/80   BP Location: Left arm   Patient Position: Sitting   Cuff Size: Adult   Pulse: 69   Temp: 98.4 °F (36.9 °C)   TempSrc: Temporal   SpO2: 98%   Weight: 61.3 kg (135 lb 3.2 oz)   Height: 172.7 cm (68\")     Estimated body mass index is 20.56 kg/m² as calculated from the following:    Height as of this encounter: 172.7 cm (68\").    Weight as of this encounter: 61.3 kg (135 lb 3.2 oz).    BMI is within normal parameters. No other follow-up for BMI required.      Does the patient have evidence of cognitive impairment?   No            HEALTH RISK ASSESSMENT    Smoking Status:  Social History     Tobacco Use   Smoking Status Every Day    Packs/day: 1.00    Years: 60.00    Additional pack years: 0.00    Total pack years: 60.00    Types: Cigarettes    Start date:    Smokeless Tobacco Never   Tobacco Comments    Patient intubated and sedated     Alcohol Consumption:  Social History     Substance and Sexual Activity   Alcohol Use No     Fall Risk Screen:    STEADI Fall Risk Assessment was completed, and patient is at HIGH risk for falls. Assessment completed on:2024    Depression Screenin/12/2024     2:23 PM   PHQ-2/PHQ-9 Depression Screening   Little Interest or Pleasure in Doing Things 0-->not at all   Feeling Down, Depressed or Hopeless 1-->several days   PHQ-9: Brief Depression Severity Measure Score 1       Health Habits and Functional and Cognitive Screenin/12/2024     2:21 PM   Functional & Cognitive Status   Do you have difficulty preparing food and eating? No   Do you have difficulty bathing yourself, getting dressed or grooming yourself? No   Do you have difficulty using the toilet? No   Do you have difficulty moving around from place to place? No   Do you have trouble with steps or getting out of a bed or a chair? No   Current Diet Well Balanced Diet   Dental Exam Other   Eye Exam Not up to date "   Exercise (times per week) 7 times per week   Current Exercises Include Walking   Do you need help using the phone?  No   Are you deaf or do you have serious difficulty hearing?  Yes   Do you need help to go to places out of walking distance? No   Do you need help shopping? No   Do you need help preparing meals?  No   Do you need help with housework?  No   Do you need help with laundry? No   Do you need help taking your medications? No   Do you need help managing money? No   Do you ever drive or ride in a car without wearing a seat belt? No       Age-appropriate Screening Schedule:  Refer to the list below for future screening recommendations based on patient's age, sex and/or medical conditions. Orders for these recommended tests are listed in the plan section. The patient has been provided with a written plan.    Health Maintenance   Topic Date Due    COVID-19 Vaccine (1) Never done    ZOSTER VACCINE (1 of 2) Never done    HEPATITIS C SCREENING  Never done    DIABETIC FOOT EXAM  Never done    DIABETIC EYE EXAM  05/24/2022    LIPID PANEL  01/12/2023    HEMOGLOBIN A1C  07/20/2023    MOST FORM  01/10/2024    INFLUENZA VACCINE  03/31/2024 (Originally 8/1/2023)    TDAP/TD VACCINES (1 - Tdap) 11/01/2024 (Originally 11/14/1964)    Pneumococcal Vaccine 65+ (1 of 2 - PCV) 11/03/2024 (Originally 11/14/1951)    URINE MICROALBUMIN  11/03/2024    ANNUAL WELLNESS VISIT  01/12/2025    COLORECTAL CANCER SCREENING  06/02/2026                  CMS Preventative Services Quick Reference  Risk Factors Identified During Encounter:    Immunizations Discussed/Encouraged: Influenza    The above risks/problems have been discussed with the patient.  Pertinent information has been shared with the patient in the After Visit Summary.    1. Medicare annual wellness visit, subsequent (Primary)  No major concerns other than his chronic pain.  2. Current every day smoker    3. Hyperkalemia  -     Comprehensive metabolic panel; Future  -      Comprehensive metabolic panel    4. Anemia due to other cause, not classified  -     CBC No Differential; Future  -     CBC No Differential    5. Centrilobular emphysema  -     albuterol sulfate  (90 Base) MCG/ACT inhaler; Inhale 2 puffs Every 6 (Six) Hours As Needed for Wheezing or Shortness of Air.  Dispense: 18 g; Refill: 5  -     fluticasone-salmeterol (ADVAIR HFA) 230-21 MCG/ACT inhaler; Inhale 2 puffs 2 (Two) Times a Day.  Dispense: 12 g; Refill: 11  -     ipratropium-albuterol (DUO-NEB) 0.5-2.5 mg/3 ml nebulizer; Take 3 mL by neb every 30 minutes as needed for shortness of air for up to 6 doses. Part of COPD Rescue Kit. (Only Start if in YELLOW ZONE.)  Dispense: 18 mL; Refill: 0    6. Idiopathic chronic gout of left foot without tophus  -     allopurinol (ZYLOPRIM) 100 MG tablet; Take 1 tablet by mouth Daily.  Dispense: 30 tablet; Refill: 2  -     Uric acid; Future  -     Uric acid    7. Type 2 myocardial infarction  -     gabapentin (NEURONTIN) 800 MG tablet; Take 1 tablet by mouth 3 (Three) Times a Day.  Dispense: 180 tablet; Refill: 0    8. History of degenerative disc disease  -     gabapentin (NEURONTIN) 800 MG tablet; Take 1 tablet by mouth 3 (Three) Times a Day.  Dispense: 180 tablet; Refill: 0    9. Primary hypertension  -     metoprolol tartrate (LOPRESSOR) 25 MG tablet; Take 1 tablet by mouth Every 12 (Twelve) Hours.  Dispense: 180 tablet; Refill: 0    10. Coronary artery disease involving native coronary artery of native heart without angina pectoris  -     rosuvastatin (CRESTOR) 40 MG tablet; Take 1 tablet by mouth Every Night.  Dispense: 90 tablet; Refill: 0    11. DNI (do not intubate)    12. DNR (do not resuscitate)    13. Neuropathy    14. Other chronic pain    -     celecoxib (CeleBREX) 200 MG capsule; Take 1 capsule by mouth Daily.  Dispense: 90 capsule; Refill: 1  -     gabapentin (NEURONTIN) 800 MG tablet; Take 1 tablet by mouth 3 (Three) Times a Day.  Dispense: 180 tablet; Refill:  0  His pain management clinic that he has been going to for nearly a decade abruptly closed.  He has not taken narcotics, per report in 2 weeks.  He states that his pain is worse, but it is very manageable, he is no longer having withdrawal symptoms, he would like to avoid going to another pain management clinic for now.  He states that getting off narcotics causes pain to get worse, but overall he feels better without the sedation from them.  For now, he would like to hold off on narcotic pills.  In the meantime, he has been taking gabapentin, he had a supply left over.  He would like to continue this medication if possible.  It is my medical opinion that it would be reasonable for us to continue this until he needs a pain specialist as he has been stable on it for years.  If his pain worsens, and he decides he would like to pursue pain management clinic, we will send to a local pain management doctor.      Follow Up:   Next Medicare Wellness visit to be scheduled in 1 year.      An After Visit Summary and PPPS were made available to the patient.

## 2024-01-15 ENCOUNTER — TELEPHONE (OUTPATIENT)
Dept: FAMILY MEDICINE CLINIC | Facility: CLINIC | Age: 79
End: 2024-01-15
Payer: MEDICARE

## 2024-01-15 DIAGNOSIS — E87.5 HYPERKALEMIA: Primary | ICD-10-CM

## 2024-01-15 NOTE — TELEPHONE ENCOUNTER
----- Message from Luis Dobbs, DO sent at 1/15/2024  2:29 PM EST -----  No problems with your blood work with the exception of your potassium being high.  I recommend rechecking here in the next week or so.  BMP ordered.

## 2024-01-15 NOTE — TELEPHONE ENCOUNTER
Pt notified and expressed understanding. Pt wants to know how he can get it brought down at home.

## 2024-01-18 NOTE — TELEPHONE ENCOUNTER
It would be best if you came in and had your blood drawn to ensure that it is returning to a safe level.  However, it is reasonable to wait until next appointment as long as you have not had any problems.  Drinking a little extra water could help bring your potassium levels down and keeping them down.  Be sure that you are not eating any substitute salt product that have potassium chloride, rather than sodium chloride in them.

## 2024-01-22 NOTE — TELEPHONE ENCOUNTER
Patient notified & verbalized understanding,stated he has been eating a lot of Bananas & oranges,will cut back.

## 2024-01-29 ENCOUNTER — TELEPHONE (OUTPATIENT)
Dept: FAMILY MEDICINE CLINIC | Facility: CLINIC | Age: 79
End: 2024-01-29
Payer: MEDICARE

## 2024-01-29 NOTE — TELEPHONE ENCOUNTER
Spoke with patient & he verbalized understanding,stated he told you it was going to be dirty not to worry about it he knows what to do.

## 2024-01-29 NOTE — TELEPHONE ENCOUNTER
----- Message from Luis Dobbs DO sent at 1/29/2024  3:43 PM EST -----  I reviewed your urine drug screen.  It was not consistent with your current regimen.  I will no longer prescribe gabapentin.  As you take a high dose, I recommend taper off over the next several weeks.  I recommend taking it twice a day rather than 3 times a day for the next 2 weeks, then take 1/2 tablet twice a day for another 2 weeks, then half tab daily until complete.  No refills will be provided.

## 2024-01-31 ENCOUNTER — TELEPHONE (OUTPATIENT)
Dept: FAMILY MEDICINE CLINIC | Facility: CLINIC | Age: 79
End: 2024-01-31
Payer: MEDICARE

## 2024-01-31 DIAGNOSIS — G89.29 OTHER CHRONIC PAIN: Primary | ICD-10-CM

## 2024-01-31 NOTE — TELEPHONE ENCOUNTER
Caller: Pual Gomez    Relationship: Self    Best call back number: [phone]     What is the medical concern/diagnosis: PAIN IN BACK, NECK, AND HIP    What specialty or service is being requested: PAIN CLINIC    What is the provider, practice or medical service name: DR.JAMES LEDESMA    What is the office phone number: FAX NUMBER [phone]  PHONE NUMBER [phone]    Any additional details: PLEASE CALL PATIENT TO FURTHER DISCUSS

## 2024-03-14 ENCOUNTER — TELEPHONE (OUTPATIENT)
Dept: FAMILY MEDICINE CLINIC | Facility: CLINIC | Age: 79
End: 2024-03-14
Payer: MEDICARE

## 2024-03-20 ENCOUNTER — OFFICE VISIT (OUTPATIENT)
Dept: FAMILY MEDICINE CLINIC | Facility: CLINIC | Age: 79
End: 2024-03-20
Payer: MEDICARE

## 2024-03-20 VITALS
SYSTOLIC BLOOD PRESSURE: 122 MMHG | BODY MASS INDEX: 20.58 KG/M2 | HEART RATE: 94 BPM | OXYGEN SATURATION: 96 % | WEIGHT: 135.8 LBS | HEIGHT: 68 IN | DIASTOLIC BLOOD PRESSURE: 76 MMHG | TEMPERATURE: 98.7 F

## 2024-03-20 DIAGNOSIS — E87.5 HYPERKALEMIA: Primary | ICD-10-CM

## 2024-03-20 DIAGNOSIS — J43.2 CENTRILOBULAR EMPHYSEMA: ICD-10-CM

## 2024-03-20 DIAGNOSIS — M25.511 CHRONIC RIGHT SHOULDER PAIN: ICD-10-CM

## 2024-03-20 DIAGNOSIS — I25.10 CORONARY ARTERY DISEASE INVOLVING NATIVE CORONARY ARTERY OF NATIVE HEART WITHOUT ANGINA PECTORIS: ICD-10-CM

## 2024-03-20 DIAGNOSIS — I10 PRIMARY HYPERTENSION: ICD-10-CM

## 2024-03-20 DIAGNOSIS — G89.29 CHRONIC RIGHT SHOULDER PAIN: ICD-10-CM

## 2024-03-20 DIAGNOSIS — M1A.0720 IDIOPATHIC CHRONIC GOUT OF LEFT FOOT WITHOUT TOPHUS: ICD-10-CM

## 2024-03-20 PROCEDURE — 36415 COLL VENOUS BLD VENIPUNCTURE: CPT | Performed by: FAMILY MEDICINE

## 2024-03-20 PROCEDURE — 80048 BASIC METABOLIC PNL TOTAL CA: CPT | Performed by: FAMILY MEDICINE

## 2024-03-20 RX ORDER — FLUTICASONE PROPIONATE AND SALMETEROL XINAFOATE 230; 21 UG/1; UG/1
2 AEROSOL, METERED RESPIRATORY (INHALATION) 2 TIMES DAILY
Qty: 12 G | Refills: 11 | Status: SHIPPED | OUTPATIENT
Start: 2024-03-20

## 2024-03-20 RX ORDER — CELECOXIB 200 MG/1
200 CAPSULE ORAL DAILY
Qty: 90 CAPSULE | Refills: 1 | Status: SHIPPED | OUTPATIENT
Start: 2024-03-20

## 2024-03-20 RX ORDER — ROSUVASTATIN CALCIUM 40 MG/1
40 TABLET, COATED ORAL NIGHTLY
Qty: 90 TABLET | Refills: 0 | Status: SHIPPED | OUTPATIENT
Start: 2024-03-20

## 2024-03-20 RX ORDER — IPRATROPIUM BROMIDE AND ALBUTEROL SULFATE 2.5; .5 MG/3ML; MG/3ML
SOLUTION RESPIRATORY (INHALATION)
Qty: 18 ML | Refills: 0 | Status: SHIPPED | OUTPATIENT
Start: 2024-03-20

## 2024-03-20 RX ORDER — ALLOPURINOL 100 MG/1
100 TABLET ORAL DAILY
Qty: 30 TABLET | Refills: 2 | Status: SHIPPED | OUTPATIENT
Start: 2024-03-20

## 2024-03-20 RX ORDER — ALBUTEROL SULFATE 90 UG/1
2 AEROSOL, METERED RESPIRATORY (INHALATION) EVERY 6 HOURS PRN
Qty: 18 G | Refills: 5 | Status: SHIPPED | OUTPATIENT
Start: 2024-03-20

## 2024-03-20 NOTE — PROGRESS NOTES
Subjective   Paul Gomez is a 78 y.o. male.   Pt presents today with CC of Hypertension      History of Present Illness   History of Present Illness  1.  Patient is a 78-year-old male here complaining of right shoulder pain.  He has chronic right shoulder pain.  Historically, he has taken narcotic pain medication and gabapentin for this, he has been off of these medication and has had worsening pain.  Pain is worse with abduction with external rotation.  He denies any recent injuries.  #2 he has hyperkalemia on prior blood work.  He is agreeable to recheck today.  #3 he has emphysema, he is still smoking cigarettes though is trying to quit.  He has associated heart disease and follows with cardiology.  #4 he has gout for which he takes allopurinol 100 mg daily         The following portions of the patient's history were reviewed and updated as appropriate: allergies, current medications, past family history, past medical history, past social history, past surgical history, and problem list.    Review of Systems   Constitutional:  Negative for chills, fever and unexpected weight loss.   HENT:  Negative for congestion and sore throat.    Eyes:  Negative for blurred vision and visual disturbance.   Respiratory:  Negative for cough and wheezing.    Cardiovascular:  Negative for chest pain and palpitations.   Gastrointestinal:  Negative for abdominal pain and diarrhea.   Endocrine: Negative for cold intolerance and heat intolerance.   Genitourinary:  Negative for dysuria.   Musculoskeletal:  Negative for arthralgias and neck stiffness.   Neurological:  Negative for dizziness, seizures and syncope.   Psychiatric/Behavioral:  Negative for self-injury, suicidal ideas and depressed mood.      Vitals:    03/20/24 1437   BP: 122/76   Pulse: 94   Temp: 98.7 °F (37.1 °C)   SpO2: 96%        Objective   Physical Exam  Vitals and nursing note reviewed.   Constitutional:       Appearance: He is well-developed.   HENT:      Head:  Normocephalic and atraumatic.      Right Ear: External ear normal.      Left Ear: External ear normal.      Nose: Nose normal.   Eyes:      Conjunctiva/sclera: Conjunctivae normal.      Pupils: Pupils are equal, round, and reactive to light.   Cardiovascular:      Rate and Rhythm: Normal rate and regular rhythm.      Heart sounds: Normal heart sounds.   Pulmonary:      Effort: Pulmonary effort is normal.      Breath sounds: Normal breath sounds.   Abdominal:      General: Bowel sounds are normal.      Palpations: Abdomen is soft.   Musculoskeletal:      Cervical back: Normal range of motion and neck supple.      Comments: Muscle wasting in bilateral shoulders.    + apprehension test on right, no palpable dislocation.  Unclear if this is impingement.   Skin:     General: Skin is warm and dry.   Neurological:      Mental Status: He is alert and oriented to person, place, and time.   Psychiatric:         Behavior: Behavior normal.         Assessment & Plan   Diagnoses and all orders for this visit:    1. Hyperkalemia (Primary)  -     Basic metabolic panel; Future  -     Basic metabolic panel  Will recheck metabolic panel.  2. Centrilobular emphysema  -     albuterol sulfate  (90 Base) MCG/ACT inhaler; Inhale 2 puffs Every 6 (Six) Hours As Needed for Wheezing or Shortness of Air.  Dispense: 18 g; Refill: 5  -     fluticasone-salmeterol (ADVAIR HFA) 230-21 MCG/ACT inhaler; Inhale 2 puffs 2 (Two) Times a Day.  Dispense: 12 g; Refill: 11  -     ipratropium-albuterol (DUO-NEB) 0.5-2.5 mg/3 ml nebulizer; Take 3 mL by neb every 30 minutes as needed for shortness of air for up to 6 doses. Part of COPD Rescue Kit. (Only Start if in YELLOW ZONE.)  Dispense: 18 mL; Refill: 0    3. Idiopathic chronic gout of left foot without tophus  -     allopurinol (ZYLOPRIM) 100 MG tablet; Take 1 tablet by mouth Daily.  Dispense: 30 tablet; Refill: 2  -     celecoxib (CeleBREX) 200 MG capsule; Take 1 capsule by mouth Daily.  Dispense:  90 capsule; Refill: 1    4. Primary hypertension  -     Basic metabolic panel; Future  -     metoprolol tartrate (LOPRESSOR) 25 MG tablet; Take 1 tablet by mouth Every 12 (Twelve) Hours.  Dispense: 180 tablet; Refill: 0  -     Basic metabolic panel    5. Coronary artery disease involving native coronary artery of native heart without angina pectoris  -     rosuvastatin (CRESTOR) 40 MG tablet; Take 1 tablet by mouth Every Night.  Dispense: 90 tablet; Refill: 0    6. Chronic right shoulder pain  -     XR Shoulder 2+ View Right  Getting an x-ray to rule out any major bony abnormalities.  If no major abnormality seen, will consider steroid injection here in the office versus referral to orthopedics.             Paul Gomez  reports that he has been smoking cigarettes. He started smoking about 59 years ago. He has a 60 pack-year smoking history. He has never used smokeless tobacco. I have educated him on the risk of diseases from using tobacco products such as cancer, COPD, and heart disease.     I advised him to quit and he is not willing to quit.    I spent 3  minutes counseling the patient.         BMI is within normal parameters. No other follow-up for BMI required.          This document has been electronically signed by Carrie Torres  March 20, 2024 14:39 EDT    Dictated Utilizing Dragon Dictation: Part of this note may be an electronic transcription/translation of spoken language to printed text using the Dragon Dictation System.

## 2024-03-21 ENCOUNTER — TELEPHONE (OUTPATIENT)
Dept: FAMILY MEDICINE CLINIC | Facility: CLINIC | Age: 79
End: 2024-03-21
Payer: MEDICARE

## 2024-03-21 LAB
ANION GAP SERPL CALCULATED.3IONS-SCNC: 10.1 MMOL/L (ref 5–15)
BUN SERPL-MCNC: 15 MG/DL (ref 8–23)
BUN/CREAT SERPL: 15 (ref 7–25)
CALCIUM SPEC-SCNC: 8.6 MG/DL (ref 8.6–10.5)
CHLORIDE SERPL-SCNC: 108 MMOL/L (ref 98–107)
CO2 SERPL-SCNC: 23.9 MMOL/L (ref 22–29)
CREAT SERPL-MCNC: 1 MG/DL (ref 0.76–1.27)
EGFRCR SERPLBLD CKD-EPI 2021: 77 ML/MIN/1.73
GLUCOSE SERPL-MCNC: 112 MG/DL (ref 65–99)
POTASSIUM SERPL-SCNC: 4.8 MMOL/L (ref 3.5–5.2)
SODIUM SERPL-SCNC: 142 MMOL/L (ref 136–145)

## 2024-03-21 NOTE — TELEPHONE ENCOUNTER
----- Message from Luis Dobbs DO sent at 3/21/2024 12:45 PM EDT -----  Your potassium level is back in the normal range.

## 2024-03-21 NOTE — TELEPHONE ENCOUNTER
----- Message from Luis Dobbs DO sent at 3/21/2024 12:45 PM EDT -----  No acute findings of your shoulder.  AC joint degenerative changes confirmed.  You may benefit from steroid injection.  If you would like to proceed, schedule an appointment in the next few weeks with me so that we can inject your shoulder.

## 2024-04-12 ENCOUNTER — HOSPITAL ENCOUNTER (OUTPATIENT)
Dept: MRI IMAGING | Facility: HOSPITAL | Age: 79
Discharge: HOME | End: 2024-04-12
Payer: MEDICARE

## 2024-04-12 DIAGNOSIS — M54.17 LUMBOSACRAL RADICULOPATHY: ICD-10-CM

## 2024-04-12 PROCEDURE — 72148 MRI LUMBAR SPINE W/O DYE: CPT

## 2024-04-15 ENCOUNTER — OFFICE VISIT (OUTPATIENT)
Dept: FAMILY MEDICINE CLINIC | Facility: CLINIC | Age: 79
End: 2024-04-15
Payer: MEDICARE

## 2024-04-15 ENCOUNTER — TELEPHONE (OUTPATIENT)
Dept: FAMILY MEDICINE CLINIC | Facility: CLINIC | Age: 79
End: 2024-04-15

## 2024-04-15 VITALS
HEART RATE: 66 BPM | OXYGEN SATURATION: 92 % | BODY MASS INDEX: 20.37 KG/M2 | DIASTOLIC BLOOD PRESSURE: 74 MMHG | HEIGHT: 68 IN | TEMPERATURE: 98.4 F | WEIGHT: 134.4 LBS | SYSTOLIC BLOOD PRESSURE: 118 MMHG

## 2024-04-15 DIAGNOSIS — J06.9 VIRAL URI WITH COUGH: Primary | ICD-10-CM

## 2024-04-15 DIAGNOSIS — K12.0 APHTHOUS ULCER OF MOUTH: ICD-10-CM

## 2024-04-15 DIAGNOSIS — J43.2 CENTRILOBULAR EMPHYSEMA: ICD-10-CM

## 2024-04-15 DIAGNOSIS — F17.200 CURRENT EVERY DAY SMOKER: ICD-10-CM

## 2024-04-15 DIAGNOSIS — R05.8 PRODUCTIVE COUGH: ICD-10-CM

## 2024-04-15 DIAGNOSIS — J44.1 COPD EXACERBATION: ICD-10-CM

## 2024-04-15 PROCEDURE — 99214 OFFICE O/P EST MOD 30 MIN: CPT | Performed by: FAMILY MEDICINE

## 2024-04-15 PROCEDURE — 1160F RVW MEDS BY RX/DR IN RCRD: CPT | Performed by: FAMILY MEDICINE

## 2024-04-15 PROCEDURE — 1159F MED LIST DOCD IN RCRD: CPT | Performed by: FAMILY MEDICINE

## 2024-04-15 PROCEDURE — G2211 COMPLEX E/M VISIT ADD ON: HCPCS | Performed by: FAMILY MEDICINE

## 2024-04-15 PROCEDURE — 3074F SYST BP LT 130 MM HG: CPT | Performed by: FAMILY MEDICINE

## 2024-04-15 PROCEDURE — 3078F DIAST BP <80 MM HG: CPT | Performed by: FAMILY MEDICINE

## 2024-04-15 RX ORDER — AMOXICILLIN AND CLAVULANATE POTASSIUM 875; 125 MG/1; MG/1
1 TABLET, FILM COATED ORAL 2 TIMES DAILY
Qty: 20 TABLET | Refills: 0 | Status: SHIPPED | OUTPATIENT
Start: 2024-04-15

## 2024-04-15 RX ORDER — IPRATROPIUM BROMIDE AND ALBUTEROL SULFATE 2.5; .5 MG/3ML; MG/3ML
SOLUTION RESPIRATORY (INHALATION)
Qty: 300 ML | Refills: 0 | Status: SHIPPED | OUTPATIENT
Start: 2024-04-15

## 2024-04-15 RX ORDER — PREDNISONE 10 MG/1
TABLET ORAL
Qty: 28 TABLET | Refills: 0 | Status: SHIPPED | OUTPATIENT
Start: 2024-04-15 | End: 2024-04-25

## 2024-04-15 NOTE — TELEPHONE ENCOUNTER
----- Message from Luis Dobbs DO sent at 4/15/2024  1:18 PM EDT -----  No problems on your chest x-ray.  No changes to your plan.  We will see you here in a couple weeks to ensure that you have recovered.

## 2024-04-15 NOTE — PROGRESS NOTES
Subjective   Paul Gomez is a 78 y.o. male.   Pt presents today with CC of mouth blisters      History of Present Illness   History of Present Illness  1.  Patient is a 78-year-old male with COPD here complaining of 1 week of worsening cough and congestion.  He reports that productive cough yesterday, denies fevers or chills but has been wheezing.  He reports that he has been unable to smoke cigarettes because it makes him cough.  #2 he has sores underneath his tongue and on his cheek that he would like evaluated.  He has never had anything like this before.  He stated that they appeared in the last couple days when he started feeling worse.       The following portions of the patient's history were reviewed and updated as appropriate: allergies, current medications, past family history, past medical history, past social history, past surgical history, and problem list.    Review of Systems   Constitutional:  Negative for chills, fever and unexpected weight loss.   HENT:  Negative for congestion and sore throat.    Eyes:  Negative for blurred vision and visual disturbance.   Respiratory:  Negative for cough and wheezing.    Cardiovascular:  Negative for chest pain and palpitations.   Gastrointestinal:  Negative for abdominal pain and diarrhea.   Endocrine: Negative for cold intolerance and heat intolerance.   Genitourinary:  Negative for dysuria.   Musculoskeletal:  Negative for arthralgias and neck stiffness.   Neurological:  Negative for dizziness, seizures and syncope.   Psychiatric/Behavioral:  Negative for self-injury, suicidal ideas and depressed mood.      Vitals:    04/15/24 1141   BP: 118/74   Pulse: 66   Temp: 98.4 °F (36.9 °C)   SpO2: 92%        Objective   Physical Exam  Vitals and nursing note reviewed.   Constitutional:       Appearance: He is well-developed.   HENT:      Head: Normocephalic and atraumatic.      Right Ear: External ear normal.      Left Ear: External ear normal.      Nose: Nose  normal.   Eyes:      Conjunctiva/sclera: Conjunctivae normal.      Pupils: Pupils are equal, round, and reactive to light.   Cardiovascular:      Rate and Rhythm: Normal rate and regular rhythm.      Heart sounds: Normal heart sounds.   Pulmonary:      Effort: Pulmonary effort is normal. No respiratory distress.      Breath sounds: Wheezing and rhonchi present.   Abdominal:      General: Bowel sounds are normal.      Palpations: Abdomen is soft.   Musculoskeletal:      Cervical back: Normal range of motion and neck supple.   Skin:     General: Skin is warm and dry.   Neurological:      Mental Status: He is alert and oriented to person, place, and time.   Psychiatric:         Behavior: Behavior normal.           Assessment & Plan   Diagnoses and all orders for this visit:    1. Viral URI with cough (Primary)    2. Aphthous ulcer of mouth  Reassurance on the aphthous ulcers.  I suspect this is due to a viral illness.  3. Productive cough  -     amoxicillin-clavulanate (AUGMENTIN) 875-125 MG per tablet; Take 1 tablet by mouth 2 (Two) Times a Day.  Dispense: 20 tablet; Refill: 0  -     XR Chest 2 View  Getting a chest x-ray because if pneumonia is present, it would .  He has wheezing throughout that makes it difficult to tell if there is any focal findings.  4. COPD exacerbation  -     predniSONE (DELTASONE) 10 MG tablet; Take 6 tablets by mouth Daily for 2 days, THEN 4 tablets Daily for 2 days, THEN 2 tablets Daily for 2 days, THEN 1 tablet Daily for 4 days.  Dispense: 28 tablet; Refill: 0  -     amoxicillin-clavulanate (AUGMENTIN) 875-125 MG per tablet; Take 1 tablet by mouth 2 (Two) Times a Day.  Dispense: 20 tablet; Refill: 0  -     XR Chest 2 View    5. Current every day smoker    6. Centrilobular emphysema  -     ipratropium-albuterol (DUO-NEB) 0.5-2.5 mg/3 ml nebulizer; Take 3 mL by neb every 30 minutes as needed for shortness of air for up to 6 doses.  Dispense: 300 mL; Refill: 0                Paul Gomez  reports that he has been smoking cigarettes. He started smoking about 59 years ago. He has a 60.1 pack-year smoking history. He has never used smokeless tobacco. I have educated him on the risk of diseases from using tobacco products such as cancer, COPD, and heart disease.     I advised him to quit and he is not willing to quit.    I spent 3  minutes counseling the patient.         BMI is within normal parameters. No other follow-up for BMI required.          This document has been electronically signed by Carrie Torres  April 15, 2024 11:43 EDT    Dictated Utilizing Dragon Dictation: Part of this note may be an electronic transcription/translation of spoken language to printed text using the Dragon Dictation System.

## 2024-04-19 ENCOUNTER — PATIENT OUTREACH (OUTPATIENT)
Dept: CASE MANAGEMENT | Facility: OTHER | Age: 79
End: 2024-04-19
Payer: MEDICARE

## 2024-04-19 NOTE — OUTREACH NOTE
AMBULATORY CASE MANAGEMENT NOTE    Names and Relationships of Patient and/or Support Persons Contacted During This Encounter:     Outreach To: Paul Gomez; Relationship: Self, [phone]    Patient Outreach    RN-ACM outreach to patient proactively identified for ACO-HRCM.  Patient is agreeable to engagement and occasional outreach.    Initial Outreach for Episode  Purpose of outreach  Sent SDOH & ACP Questionnaires  Initiate Assessment  Establish Ruba of Outreach  Create Care Plan, Goals, Targets, and Education  Update episode status      Margo NEWTON  Ambulatory Case Management    4/19/2024, 09:34 EDT

## 2024-05-31 ENCOUNTER — PATIENT OUTREACH (OUTPATIENT)
Dept: CASE MANAGEMENT | Facility: OTHER | Age: 79
End: 2024-05-31
Payer: MEDICARE
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
## 2024-06-05 ENCOUNTER — OFFICE VISIT (OUTPATIENT)
Dept: FAMILY MEDICINE CLINIC | Facility: CLINIC | Age: 79
End: 2024-06-05
Payer: MEDICARE

## 2024-06-05 VITALS
HEIGHT: 68 IN | SYSTOLIC BLOOD PRESSURE: 122 MMHG | HEART RATE: 81 BPM | DIASTOLIC BLOOD PRESSURE: 74 MMHG | TEMPERATURE: 98.4 F | OXYGEN SATURATION: 97 % | BODY MASS INDEX: 20 KG/M2 | WEIGHT: 132 LBS

## 2024-06-05 DIAGNOSIS — I10 PRIMARY HYPERTENSION: Primary | ICD-10-CM

## 2024-06-05 DIAGNOSIS — J43.2 CENTRILOBULAR EMPHYSEMA: ICD-10-CM

## 2024-06-05 DIAGNOSIS — M1A.0720 IDIOPATHIC CHRONIC GOUT OF LEFT FOOT WITHOUT TOPHUS: ICD-10-CM

## 2024-06-05 DIAGNOSIS — G89.4 CHRONIC PAIN SYNDROME: ICD-10-CM

## 2024-06-05 DIAGNOSIS — I25.10 CORONARY ARTERY DISEASE INVOLVING NATIVE CORONARY ARTERY OF NATIVE HEART WITHOUT ANGINA PECTORIS: ICD-10-CM

## 2024-06-05 PROCEDURE — 1160F RVW MEDS BY RX/DR IN RCRD: CPT | Performed by: FAMILY MEDICINE

## 2024-06-05 PROCEDURE — 1125F AMNT PAIN NOTED PAIN PRSNT: CPT | Performed by: FAMILY MEDICINE

## 2024-06-05 PROCEDURE — 99214 OFFICE O/P EST MOD 30 MIN: CPT | Performed by: FAMILY MEDICINE

## 2024-06-05 PROCEDURE — G2211 COMPLEX E/M VISIT ADD ON: HCPCS | Performed by: FAMILY MEDICINE

## 2024-06-05 PROCEDURE — 3078F DIAST BP <80 MM HG: CPT | Performed by: FAMILY MEDICINE

## 2024-06-05 PROCEDURE — 1159F MED LIST DOCD IN RCRD: CPT | Performed by: FAMILY MEDICINE

## 2024-06-05 PROCEDURE — 3074F SYST BP LT 130 MM HG: CPT | Performed by: FAMILY MEDICINE

## 2024-06-05 RX ORDER — ROSUVASTATIN CALCIUM 40 MG/1
40 TABLET, COATED ORAL NIGHTLY
Qty: 90 TABLET | Refills: 1 | Status: SHIPPED | OUTPATIENT
Start: 2024-06-05

## 2024-06-05 RX ORDER — ALBUTEROL SULFATE 90 UG/1
2 AEROSOL, METERED RESPIRATORY (INHALATION) EVERY 6 HOURS PRN
Qty: 18 G | Refills: 5 | Status: SHIPPED | OUTPATIENT
Start: 2024-06-05

## 2024-06-05 RX ORDER — OXYCODONE AND ACETAMINOPHEN 10; 325 MG/1; MG/1
1 TABLET ORAL EVERY 6 HOURS PRN
COMMUNITY
Start: 2024-05-17

## 2024-06-05 RX ORDER — FLUTICASONE PROPIONATE AND SALMETEROL XINAFOATE 230; 21 UG/1; UG/1
2 AEROSOL, METERED RESPIRATORY (INHALATION) 2 TIMES DAILY
Qty: 12 G | Refills: 11 | Status: SHIPPED | OUTPATIENT
Start: 2024-06-05

## 2024-06-05 RX ORDER — IPRATROPIUM BROMIDE AND ALBUTEROL SULFATE 2.5; .5 MG/3ML; MG/3ML
SOLUTION RESPIRATORY (INHALATION)
Qty: 300 ML | Refills: 0 | Status: SHIPPED | OUTPATIENT
Start: 2024-06-05

## 2024-06-05 RX ORDER — ALLOPURINOL 100 MG/1
100 TABLET ORAL DAILY
Qty: 90 TABLET | Refills: 1 | Status: SHIPPED | OUTPATIENT
Start: 2024-06-05

## 2024-06-05 RX ORDER — GABAPENTIN 800 MG/1
800 TABLET ORAL 3 TIMES DAILY
COMMUNITY
Start: 2024-05-17

## 2024-06-11 NOTE — PROGRESS NOTES
Subjective   Paul Gomez is a 78 y.o. male.   Pt presents today with CC of COPD      History of Present Illness   History of Present Illness  1.  Patient is a 78-year-old male here to follow-up on emphysema.  He uses albuterol, Advair, and DuoNebs.  Still smokes cigarettes.  #2 he has coronary artery disease for which he takes Crestor.  Metoprolol, and daily aspirin.  He does follow with cardiology.  #3 he has chronic pain for which he follows with pain management.  #4 he has hypertension that has been well-controlled on his current regimen.  #5 he has chronic gout for which he takes allopurinol 100 mg daily.  He is agreeable to blood work.         The following portions of the patient's history were reviewed and updated as appropriate: allergies, current medications, past family history, past medical history, past social history, past surgical history, and problem list.    Review of Systems   Constitutional:  Negative for chills, fever and unexpected weight loss.   HENT:  Negative for congestion and sore throat.    Eyes:  Negative for blurred vision and visual disturbance.   Respiratory:  Negative for cough and wheezing.    Cardiovascular:  Negative for chest pain and palpitations.   Gastrointestinal:  Negative for abdominal pain and diarrhea.   Endocrine: Negative for cold intolerance and heat intolerance.   Genitourinary:  Negative for dysuria.   Musculoskeletal:  Positive for arthralgias and back pain. Negative for neck stiffness.   Neurological:  Negative for dizziness, seizures and syncope.   Psychiatric/Behavioral:  Negative for self-injury, suicidal ideas and depressed mood.      Vitals:    06/05/24 1633   BP: 122/74   Pulse: 81   Temp: 98.4 °F (36.9 °C)   SpO2: 97%        Objective   Physical Exam  Vitals and nursing note reviewed.   Constitutional:       Appearance: He is well-developed.   HENT:      Head: Normocephalic and atraumatic.      Right Ear: External ear normal.      Left Ear: External ear  normal.      Nose: Nose normal.   Eyes:      Conjunctiva/sclera: Conjunctivae normal.      Pupils: Pupils are equal, round, and reactive to light.   Cardiovascular:      Rate and Rhythm: Normal rate and regular rhythm.      Heart sounds: Normal heart sounds.   Pulmonary:      Effort: Pulmonary effort is normal.      Breath sounds: Normal breath sounds.   Abdominal:      General: Bowel sounds are normal.      Palpations: Abdomen is soft.   Musculoskeletal:      Cervical back: Normal range of motion and neck supple.   Skin:     General: Skin is warm and dry.   Neurological:      Mental Status: He is alert and oriented to person, place, and time.   Psychiatric:         Behavior: Behavior normal.           Assessment & Plan   Diagnoses and all orders for this visit:    1. Primary hypertension (Primary)  -     metoprolol tartrate (LOPRESSOR) 25 MG tablet; Take 1 tablet by mouth Every 12 (Twelve) Hours.  Dispense: 180 tablet; Refill: 1  -     Comprehensive metabolic panel; Future  -     Lipid panel; Future  -     CBC No Differential; Future  Blood pressure well-controlled.  2. Centrilobular emphysema  -     albuterol sulfate  (90 Base) MCG/ACT inhaler; Inhale 2 puffs Every 6 (Six) Hours As Needed for Wheezing or Shortness of Air.  Dispense: 18 g; Refill: 5  -     ipratropium-albuterol (DUO-NEB) 0.5-2.5 mg/3 ml nebulizer; Take 3 mL by neb every 30 minutes as needed for shortness of air for up to 6 doses.  Dispense: 300 mL; Refill: 0  -     fluticasone-salmeterol (ADVAIR HFA) 230-21 MCG/ACT inhaler; Inhale 2 puffs 2 (Two) Times a Day.  Dispense: 12 g; Refill: 11    3. Coronary artery disease involving native coronary artery of native heart without angina pectoris  -     rosuvastatin (CRESTOR) 40 MG tablet; Take 1 tablet by mouth Every Night.  Dispense: 90 tablet; Refill: 1  -     Comprehensive metabolic panel; Future  -     Lipid panel; Future  -     CBC No Differential; Future    4. Idiopathic chronic gout of left  foot without tophus  -     allopurinol (ZYLOPRIM) 100 MG tablet; Take 1 tablet by mouth Daily.  Dispense: 90 tablet; Refill: 1  -     Comprehensive metabolic panel; Future  -     Uric acid; Future    5. Chronic pain syndrome  He has chronic pain, he reports a 6 out of 10 generalized pain.  He reports this is his baseline, he follows with pain management.             Paul Gomez  reports that he has been smoking cigarettes. He started smoking about 59 years ago. He has a 60.2 pack-year smoking history. He has never used smokeless tobacco. I have educated him on the risk of diseases from using tobacco products such as cancer, COPD, and heart disease.     I advised him to quit and he is not willing to quit.    I spent 3  minutes counseling the patient.         BMI is within normal parameters. No other follow-up for BMI required.          This document has been electronically signed by Luis Dobbs DO  June 11, 2024 14:31 EDT    Dictated Utilizing Dragon Dictation: Part of this note may be an electronic transcription/translation of spoken language to printed text using the Dragon Dictation System.

## 2024-07-25 ENCOUNTER — PATIENT OUTREACH (OUTPATIENT)
Dept: CASE MANAGEMENT | Facility: OTHER | Age: 79
End: 2024-07-25
Payer: MEDICARE

## 2024-07-25 NOTE — OUTREACH NOTE
AMBULATORY CASE MANAGEMENT NOTE    Names and Relationships of Patient/Support Persons: Contact: Paul Gomez; Relationship: Self -     Patient Outreach    RN-ACM attempted contact with patient previously engaged in ACO/HRCM.  Calls to patient have been unsuccessful x3 attempts.  HRCM will be advanced to monitoring this date with intent to close as 'lost contact' in 90 days.     MyChart is active and patient may contact RN-ACM as needs arise.     Margo NEWTON RN-[facility]  Ambulatory Case Management  [phone]    07/25/24   15:14 EDT

## 2024-11-27 ENCOUNTER — OFFICE VISIT (OUTPATIENT)
Dept: FAMILY MEDICINE CLINIC | Facility: CLINIC | Age: 79
End: 2024-11-27
Payer: MEDICARE

## 2024-11-27 VITALS
TEMPERATURE: 97.5 F | OXYGEN SATURATION: 93 % | BODY MASS INDEX: 20.76 KG/M2 | HEIGHT: 68 IN | WEIGHT: 137 LBS | HEART RATE: 84 BPM | SYSTOLIC BLOOD PRESSURE: 120 MMHG | DIASTOLIC BLOOD PRESSURE: 74 MMHG

## 2024-11-27 DIAGNOSIS — J44.1 COPD EXACERBATION: Primary | ICD-10-CM

## 2024-11-27 DIAGNOSIS — J43.2 CENTRILOBULAR EMPHYSEMA: ICD-10-CM

## 2024-11-27 DIAGNOSIS — F17.200 CURRENT EVERY DAY SMOKER: ICD-10-CM

## 2024-11-27 DIAGNOSIS — I25.10 CORONARY ARTERY DISEASE INVOLVING NATIVE CORONARY ARTERY OF NATIVE HEART WITHOUT ANGINA PECTORIS: ICD-10-CM

## 2024-11-27 DIAGNOSIS — I10 PRIMARY HYPERTENSION: ICD-10-CM

## 2024-11-27 DIAGNOSIS — M1A.0720 IDIOPATHIC CHRONIC GOUT OF LEFT FOOT WITHOUT TOPHUS: ICD-10-CM

## 2024-11-27 RX ORDER — ALBUTEROL SULFATE 90 UG/1
2 INHALANT RESPIRATORY (INHALATION) EVERY 6 HOURS PRN
Qty: 18 G | Refills: 5 | Status: SHIPPED | OUTPATIENT
Start: 2024-11-27

## 2024-11-27 RX ORDER — CELECOXIB 200 MG/1
200 CAPSULE ORAL DAILY
Qty: 90 CAPSULE | Refills: 1 | Status: SHIPPED | OUTPATIENT
Start: 2024-11-27

## 2024-11-27 RX ORDER — IPRATROPIUM BROMIDE AND ALBUTEROL SULFATE 2.5; .5 MG/3ML; MG/3ML
SOLUTION RESPIRATORY (INHALATION)
Qty: 300 ML | Refills: 0 | Status: SHIPPED | OUTPATIENT
Start: 2024-11-27

## 2024-11-27 RX ORDER — TRIAMCINOLONE ACETONIDE 1 MG/G
1 CREAM TOPICAL 2 TIMES DAILY
Qty: 80 G | Refills: 2 | Status: SHIPPED | OUTPATIENT
Start: 2024-11-27

## 2024-11-27 RX ORDER — FLUTICASONE PROPIONATE AND SALMETEROL XINAFOATE 230; 21 UG/1; UG/1
2 AEROSOL, METERED RESPIRATORY (INHALATION) 2 TIMES DAILY
Qty: 12 G | Refills: 11 | Status: SHIPPED | OUTPATIENT
Start: 2024-11-27

## 2024-11-27 RX ORDER — PREDNISONE 50 MG/1
50 TABLET ORAL DAILY
Qty: 4 TABLET | Refills: 0 | Status: SHIPPED | OUTPATIENT
Start: 2024-11-27

## 2024-11-27 RX ORDER — ALLOPURINOL 100 MG/1
100 TABLET ORAL DAILY
Qty: 90 TABLET | Refills: 1 | Status: SHIPPED | OUTPATIENT
Start: 2024-11-27

## 2024-11-27 NOTE — PROGRESS NOTES
Subjective   Paul Gomez is a 79 y.o. male.   Pt presents today with CC of Hypertension      History of Present Illness   History of Present Illness  Patient is a 79-year-old male who is here for medication follow-up.  He has not had his blood work from his last visit 5 months ago.  He is not willing to have blood work today, though he states he will come back next week and have blood work done.  He needs his medications refilled.  He is a current everyday smoker with history of COPD exacerbations.  He reports that his wheezing has not worsened slowly over the last couple weeks.  He requests antibiotic and steroid.     The following portions of the patient's history were reviewed and updated as appropriate: allergies, current medications, past family history, past medical history, past social history, past surgical history, and problem list.    Review of Systems   Constitutional:  Negative for chills, fever and unexpected weight loss.   HENT:  Negative for congestion and sore throat.    Eyes:  Negative for blurred vision and visual disturbance.   Respiratory:  Positive for shortness of breath and wheezing. Negative for cough and stridor.    Cardiovascular:  Negative for chest pain, palpitations and leg swelling.   Gastrointestinal:  Negative for abdominal pain and diarrhea.   Endocrine: Negative for cold intolerance and heat intolerance.   Genitourinary:  Negative for dysuria.   Musculoskeletal:  Negative for arthralgias and neck stiffness.   Neurological:  Negative for dizziness, seizures and syncope.   Psychiatric/Behavioral:  Negative for self-injury, suicidal ideas and depressed mood.      Vitals:    11/27/24 1426   BP: 120/74   Pulse: 84   Temp: 97.5 °F (36.4 °C)   SpO2: 93%        Objective   Physical Exam  Vitals and nursing note reviewed.   Constitutional:       Appearance: He is well-developed.   HENT:      Head: Normocephalic and atraumatic.      Right Ear: External ear normal.      Left Ear: External ear  normal.      Nose: Nose normal.   Eyes:      Conjunctiva/sclera: Conjunctivae normal.      Pupils: Pupils are equal, round, and reactive to light.   Cardiovascular:      Rate and Rhythm: Normal rate and regular rhythm.      Heart sounds: Normal heart sounds.   Pulmonary:      Effort: Pulmonary effort is normal. No respiratory distress.      Breath sounds: Wheezing present.   Abdominal:      General: Bowel sounds are normal.      Palpations: Abdomen is soft.   Musculoskeletal:      Cervical back: Normal range of motion and neck supple.   Skin:     General: Skin is warm and dry.   Neurological:      Mental Status: He is alert and oriented to person, place, and time.   Psychiatric:         Behavior: Behavior normal.         Assessment & Plan   Diagnoses and all orders for this visit:    1. COPD exacerbation (Primary)  -     predniSONE (DELTASONE) 50 MG tablet; Take 1 tablet by mouth Daily.  Dispense: 4 tablet; Refill: 0  -     amoxicillin-clavulanate (AUGMENTIN) 875-125 MG per tablet; Take 1 tablet by mouth 2 (Two) Times a Day.  Dispense: 20 tablet; Refill: 0  Though he seems to be doing okay, he reports that over the last couple weeks his wheezing has been worsening.  He is wheezing on exam, but the best I can tell he wheezes all the time.  He does have expiratory wheeze.  He request antibiotic.  Earlier this year we treated him with Augmentin and prednisone with good result.  He is going to follow-up in 2 weeks if not  2. Centrilobular emphysema  -     albuterol sulfate  (90 Base) MCG/ACT inhaler; Inhale 2 puffs Every 6 (Six) Hours As Needed for Wheezing or Shortness of Air.  Dispense: 18 g; Refill: 5  -     fluticasone-salmeterol (ADVAIR HFA) 230-21 MCG/ACT inhaler; Inhale 2 puffs 2 (Two) Times a Day.  Dispense: 12 g; Refill: 11  -     ipratropium-albuterol (DUO-NEB) 0.5-2.5 mg/3 ml nebulizer; Take 3 mL by neb every 30 minutes as needed for shortness of air for up to 6 doses.  Dispense: 300 mL; Refill:  0    3. Idiopathic chronic gout of left foot without tophus  -     allopurinol (ZYLOPRIM) 100 MG tablet; Take 1 tablet by mouth Daily.  Dispense: 90 tablet; Refill: 1  -     celecoxib (CeleBREX) 200 MG capsule; Take 1 capsule by mouth Daily.  Dispense: 90 capsule; Refill: 1  -     Uric acid; Future    4. Primary hypertension  -     Comprehensive metabolic panel; Future  -     CBC No Differential; Future    5. Coronary artery disease involving native coronary artery of native heart without angina pectoris  -     Comprehensive metabolic panel; Future  -     Lipid panel; Future  -     CBC No Differential; Future  He refuses to take beta-blocker or statin.  These were taken off of his medication list.  6. Current every day smoker    Other orders  -     triamcinolone (KENALOG) 0.1 % cream; Apply 1 Application topically to the appropriate area as directed 2 (Two) Times a Day. Apply to affected area  Dispense: 80 g; Refill: 2  He states that he has been running out of this, a larger supply sent.             Paul Gomez  reports that he has been smoking cigarettes. He started smoking about 59 years ago. He has a 60.7 pack-year smoking history. He has never used smokeless tobacco. I have educated him on the risk of diseases from using tobacco products such as cancer, COPD, and heart disease.     I advised him to quit and he is not willing to quit.    I spent 3  minutes counseling the patient.         BMI is within normal parameters. No other follow-up for BMI required.          This document has been electronically signed by Carrie Torres  November 27, 2024 14:28 EST    Dictated Utilizing Dragon Dictation: Part of this note may be an electronic transcription/translation of spoken language to printed text using the Dragon Dictation System.

## 2025-04-23 ENCOUNTER — LAB (OUTPATIENT)
Dept: FAMILY MEDICINE CLINIC | Facility: CLINIC | Age: 80
End: 2025-04-23
Payer: MEDICARE

## 2025-04-23 ENCOUNTER — OFFICE VISIT (OUTPATIENT)
Dept: FAMILY MEDICINE CLINIC | Facility: CLINIC | Age: 80
End: 2025-04-23
Payer: MEDICARE

## 2025-04-23 VITALS
HEART RATE: 89 BPM | SYSTOLIC BLOOD PRESSURE: 118 MMHG | HEIGHT: 68 IN | DIASTOLIC BLOOD PRESSURE: 74 MMHG | WEIGHT: 142.8 LBS | BODY MASS INDEX: 21.64 KG/M2 | OXYGEN SATURATION: 94 % | TEMPERATURE: 97.7 F

## 2025-04-23 DIAGNOSIS — I25.10 CORONARY ARTERY DISEASE INVOLVING NATIVE CORONARY ARTERY OF NATIVE HEART WITHOUT ANGINA PECTORIS: ICD-10-CM

## 2025-04-23 DIAGNOSIS — J43.2 CENTRILOBULAR EMPHYSEMA: ICD-10-CM

## 2025-04-23 DIAGNOSIS — M1A.0720 IDIOPATHIC CHRONIC GOUT OF LEFT FOOT WITHOUT TOPHUS: ICD-10-CM

## 2025-04-23 DIAGNOSIS — I10 PRIMARY HYPERTENSION: ICD-10-CM

## 2025-04-23 DIAGNOSIS — F17.200 CURRENT EVERY DAY SMOKER: ICD-10-CM

## 2025-04-23 DIAGNOSIS — Z00.00 MEDICARE ANNUAL WELLNESS VISIT, SUBSEQUENT: Primary | ICD-10-CM

## 2025-04-23 DIAGNOSIS — J44.1 COPD EXACERBATION: ICD-10-CM

## 2025-04-23 PROCEDURE — 36415 COLL VENOUS BLD VENIPUNCTURE: CPT

## 2025-04-23 PROCEDURE — 85027 COMPLETE CBC AUTOMATED: CPT | Performed by: FAMILY MEDICINE

## 2025-04-23 PROCEDURE — 84550 ASSAY OF BLOOD/URIC ACID: CPT | Performed by: FAMILY MEDICINE

## 2025-04-23 PROCEDURE — 80053 COMPREHEN METABOLIC PANEL: CPT | Performed by: FAMILY MEDICINE

## 2025-04-23 PROCEDURE — 80061 LIPID PANEL: CPT | Performed by: FAMILY MEDICINE

## 2025-04-23 RX ORDER — IPRATROPIUM BROMIDE AND ALBUTEROL SULFATE 2.5; .5 MG/3ML; MG/3ML
SOLUTION RESPIRATORY (INHALATION)
Qty: 300 ML | Refills: 0 | Status: SHIPPED | OUTPATIENT
Start: 2025-04-23

## 2025-04-23 RX ORDER — FLUTICASONE PROPIONATE AND SALMETEROL XINAFOATE 230; 21 UG/1; UG/1
2 AEROSOL, METERED RESPIRATORY (INHALATION) 2 TIMES DAILY
Qty: 12 G | Refills: 11 | Status: SHIPPED | OUTPATIENT
Start: 2025-04-23

## 2025-04-23 RX ORDER — PREDNISONE 50 MG/1
50 TABLET ORAL DAILY
Qty: 4 TABLET | Refills: 0 | Status: SHIPPED | OUTPATIENT
Start: 2025-04-23

## 2025-04-23 RX ORDER — CELECOXIB 200 MG/1
200 CAPSULE ORAL DAILY
Qty: 90 CAPSULE | Refills: 1 | Status: SHIPPED | OUTPATIENT
Start: 2025-04-23

## 2025-04-23 RX ORDER — ALLOPURINOL 100 MG/1
100 TABLET ORAL DAILY
Qty: 90 TABLET | Refills: 1 | Status: SHIPPED | OUTPATIENT
Start: 2025-04-23

## 2025-04-23 RX ORDER — ALBUTEROL SULFATE 90 UG/1
2 INHALANT RESPIRATORY (INHALATION) EVERY 6 HOURS PRN
Qty: 18 G | Refills: 5 | Status: SHIPPED | OUTPATIENT
Start: 2025-04-23

## 2025-04-23 NOTE — PROGRESS NOTES
Subjective   The ABCs of the Annual Wellness Visit  Medicare Wellness Visit      Paul Gomez is a 79 y.o. patient who presents for a Medicare Wellness Visit.    The following portions of the patient's history were reviewed and   updated as appropriate: allergies, current medications, past family history, past medical history, past social history, past surgical history, and problem list.    Compared to one year ago, the patient's physical   health is the same.  Compared to one year ago, the patient's mental   health is the same.    Recent Hospitalizations:  He was not admitted to the hospital during the last year.     Current Medical Providers:  Patient Care Team:  Luis Dobbs DO as PCP - General (Family Medicine)  Margo Colon RN as Ambulatory  ([facility])    Outpatient Medications Prior to Visit   Medication Sig Dispense Refill    gabapentin (NEURONTIN) 800 MG tablet Take 1 tablet by mouth 3 (Three) Times a Day.      oxyCODONE-acetaminophen (PERCOCET)  MG per tablet Take 1 tablet by mouth Every 6 (Six) Hours As Needed.      triamcinolone (KENALOG) 0.1 % cream Apply 1 Application topically to the appropriate area as directed 2 (Two) Times a Day. Apply to affected area 80 g 2    albuterol sulfate  (90 Base) MCG/ACT inhaler Inhale 2 puffs Every 6 (Six) Hours As Needed for Wheezing or Shortness of Air. 18 g 5    allopurinol (ZYLOPRIM) 100 MG tablet Take 1 tablet by mouth Daily. 90 tablet 1    celecoxib (CeleBREX) 200 MG capsule Take 1 capsule by mouth Daily. 90 capsule 1    fluticasone-salmeterol (ADVAIR HFA) 230-21 MCG/ACT inhaler Inhale 2 puffs 2 (Two) Times a Day. 12 g 11    ipratropium-albuterol (DUO-NEB) 0.5-2.5 mg/3 ml nebulizer Take 3 mL by neb every 30 minutes as needed for shortness of air for up to 6 doses. 300 mL 0    aspirin 81 MG EC tablet Take 1 tablet by mouth Daily.      amoxicillin-clavulanate (AUGMENTIN) 875-125 MG per tablet Take 1 tablet by mouth 2  (Two) Times a Day. 20 tablet 0    predniSONE (DELTASONE) 50 MG tablet Take 1 tablet by mouth Daily. 4 tablet 0     No facility-administered medications prior to visit.     Opioid medication/s are on active medication list.  and I have evaluated his active treatment plan and pain score trends (see table).  Vitals:    04/23/25 1439   PainSc: 6    PainLoc: Back     I have reviewed the chart for potential of high risk medication and harmful drug interactions in the elderly.        Aspirin is on active medication list. Aspirin use is indicated based on review of current medical condition/s. Pros and cons of this therapy have been discussed today. Benefits of this medication outweigh potential harm.  Patient has been encouraged to continue taking this medication.  .      Patient Active Problem List   Diagnosis    Low back pain    Hypertension    Hyperlipidemia    History of degenerative disc disease    COPD (chronic obstructive pulmonary disease)    Gout    B12 deficiency    Seborrheic eczema    Hypothyroidism    Dilated pancreatic duct    Epigastric pain    Chronic pancreatitis    Headache, unspecified headache type    Chest pain    COPD with exacerbation    Former smoker    Acute exacerbation of chronic obstructive pulmonary disease (COPD)    Erectile dysfunction    Acute congestive heart failure    Bilateral rales    1+ pitting edema    Dyspnea on minimal exertion    Visual loss    Altered mental status    DNR (do not resuscitate)    DNI (do not intubate)    Fracture of femoral neck, left    Closed left hip fracture, sequela    Respiratory failure with hypoxia and hypercapnia    COPD exacerbation    NSTEMI, initial episode of care    Type 2 myocardial infarction     Advance Care Planning Advance Directive is on file.  ACP discussion was held with the patient during this visit. Patient has an advance directive in EMR which is still valid.             Objective   Vitals:    04/23/25 1439   BP: 118/74   BP Location: Right  "arm   Patient Position: Sitting   Pulse: 89   Temp: 97.7 °F (36.5 °C)   SpO2: 94%   Weight: 64.8 kg (142 lb 12.8 oz)   Height: 172.7 cm (68\")   PainSc: 6    PainLoc: Back       Estimated body mass index is 21.71 kg/m² as calculated from the following:    Height as of this encounter: 172.7 cm (68\").    Weight as of this encounter: 64.8 kg (142 lb 12.8 oz).    BMI is within normal parameters. No other follow-up for BMI required.    Does the patient have evidence of cognitive impairment? No                                                                                                Health  Risk Assessment    Smoking Status:  Social History     Tobacco Use   Smoking Status Every Day    Current packs/day: 1.00    Average packs/day: 1 pack/day for 61.1 years (61.1 ttl pk-yrs)    Types: Cigarettes    Start date:    Smokeless Tobacco Never   Tobacco Comments    Patient intubated and sedated     Alcohol Consumption:  Social History     Substance and Sexual Activity   Alcohol Use No       Fall Risk Screen  STEADI Fall Risk Assessment was completed, and patient is at LOW risk for falls.Assessment completed on:2025    Depression Screening   Little interest or pleasure in doing things? Not at all   Feeling down, depressed, or hopeless? Not at all   PHQ-2 Total Score 0      Health Habits and Functional and Cognitive Screenin/23/2025     2:42 PM   Functional & Cognitive Status   Do you have difficulty preparing food and eating? No   Do you have difficulty bathing yourself, getting dressed or grooming yourself? No   Do you have difficulty using the toilet? No   Do you have difficulty moving around from place to place? No   Do you have trouble with steps or getting out of a bed or a chair? No   Current Diet Well Balanced Diet   Dental Exam Not up to date   Eye Exam Not up to date   Exercise (times per week) 5 times per week   Current Exercises Include Walking;Yard Work   Do you need help using the phone?  No "   Are you deaf or do you have serious difficulty hearing?  Yes   Do you need help to go to places out of walking distance? No   Do you need help shopping? No   Do you need help preparing meals?  No   Do you need help with housework?  No   Do you need help with laundry? No   Do you need help taking your medications? No   Do you need help managing money? No   Do you ever drive or ride in a car without wearing a seat belt? No   Have you felt unusual stress, anger or loneliness in the last month? No   Who do you live with? Other   If you need help, do you have trouble finding someone available to you? No   Have you been bothered in the last four weeks by sexual problems? No   Do you have difficulty concentrating, remembering or making decisions? No           Age-appropriate Screening Schedule:  Refer to the list below for future screening recommendations based on patient's age, sex and/or medical conditions. Orders for these recommended tests are listed in the plan section. The patient has been provided with a written plan.    Health Maintenance List  Health Maintenance   Topic Date Due    Pneumococcal Vaccine 50+ (1 of 2 - PCV) Never done    TDAP/TD VACCINES (1 - Tdap) Never done    ZOSTER VACCINE (1 of 2) Never done    HEPATITIS C SCREENING  Never done    RSV Vaccine - Adults (1 - 1-dose 75+ series) Never done    LIPID PANEL  01/12/2023    HEMOGLOBIN A1C  07/20/2023    MOST FORM  01/10/2024    COVID-19 Vaccine (1 - 2024-25 season) Never done    ANNUAL WELLNESS VISIT  01/12/2025    INFLUENZA VACCINE  07/01/2025    COLORECTAL CANCER SCREENING  06/02/2026    URINE MICROALBUMIN-CREATININE RATIO (uACR)  Discontinued    LUNG CANCER SCREENING  Discontinued                                                                                                                                                CMS Preventative Services Quick Reference  Risk Factors Identified During Encounter  Chronic Pain: Natural history and expected  "course discussed. Questions answered.  Chronic Pain Educational material Discussed and Shared in After Visit Summary for Patient.  Tobacco Use/Dependance Risk (use dotphrase .tobaccocessation for documentation)  Vision was found to be suboptimal.  He has an appointment with optometry to address.  I recommend against operating motor vehicle until vision is addressed.    The above risks/problems have been discussed with the patient.  Pertinent information has been shared with the patient in the After Visit Summary.  An After Visit Summary and PPPS were made available to the patient.    Follow Up:   Next Medicare Wellness visit to be scheduled in 1 year.         Additional E&M Note during same encounter follows:  Patient has additional, significant, and separately identifiable condition(s)/problem(s) that require work above and beyond the Medicare Wellness Visit     Chief Complaint  Medicare Wellness-subsequent    Subjective   HPI  Ray is also being seen today for medication management and chronic disease management.    Review of Systems   Constitutional:  Positive for fatigue.   Respiratory:  Positive for cough and wheezing.    Cardiovascular:  Negative for chest pain, palpitations and leg swelling.              Objective   Vital Signs:  /74 (BP Location: Right arm, Patient Position: Sitting)   Pulse 89   Temp 97.7 °F (36.5 °C)   Ht 172.7 cm (68\")   Wt 64.8 kg (142 lb 12.8 oz)   SpO2 94%   BMI 21.71 kg/m²   Physical Exam  Constitutional:       Appearance: He is well-developed.   HENT:      Head: Normocephalic and atraumatic.      Right Ear: External ear normal.      Left Ear: External ear normal.      Nose: Nose normal.   Eyes:      Conjunctiva/sclera: Conjunctivae normal.      Pupils: Pupils are equal, round, and reactive to light.   Cardiovascular:      Rate and Rhythm: Normal rate and regular rhythm.      Heart sounds: Normal heart sounds.   Pulmonary:      Effort: Pulmonary effort is normal. No " respiratory distress.      Breath sounds: Wheezing present.   Abdominal:      General: Bowel sounds are normal.      Palpations: Abdomen is soft.   Musculoskeletal:      Cervical back: Normal range of motion and neck supple.   Skin:     General: Skin is warm and dry.   Neurological:      Mental Status: He is alert and oriented to person, place, and time.   Psychiatric:         Behavior: Behavior normal.            Assessment and Plan Additional age appropriate preventative wellness advice topics were discussed during today's preventative wellness exam(some topics already addressed during AWV portion of the note above):    Physical Activity: Advised cardiovascular activity 150 minutes per week as tolerated. (example brisk walk for 30 minutes, 5 days a week).     Nutrition: Discussed nutrition plan with patient. Information shared in after visit summary. Goal is for a well balanced diet to enhance overall health.     Centrilobular emphysema      Orders:    albuterol sulfate  (90 Base) MCG/ACT inhaler; Inhale 2 puffs Every 6 (Six) Hours As Needed for Wheezing or Shortness of Air.    ipratropium-albuterol (DUO-NEB) 0.5-2.5 mg/3 ml nebulizer; Take 3 mL by neb every 30 minutes as needed for shortness of air for up to 6 doses.    fluticasone-salmeterol (ADVAIR HFA) 230-21 MCG/ACT inhaler; Inhale 2 puffs 2 (Two) Times a Day.  follow up in 1 week if not improved.   Idiopathic chronic gout of left foot without tophus    Orders:    allopurinol (ZYLOPRIM) 100 MG tablet; Take 1 tablet by mouth Daily.    celecoxib (CeleBREX) 200 MG capsule; Take 1 capsule by mouth Daily.    Uric acid; Future    Medicare annual wellness visit, subsequent         Primary hypertension           Coronary artery disease involving native coronary artery of native heart without angina pectoris      Orders:    Comprehensive Metabolic Panel; Future    CBC No Differential; Future    Lipid panel; Future    Current every day smoker         COPD  exacerbation  COPD is worsening.  Recommend smoking cessation.    Plan:  Resume taking the following medication/s;  continue inhalers as directed.  Adding amoxicillin clavulanic acid and prednisone today for an acute exacerbation.  He has numerous exacerbations historically.  Avoiding smoking strongly recommended.  If his situation is not resolved next week he is can to follow-up..        Orders:    predniSONE (DELTASONE) 50 MG tablet; Take 1 tablet by mouth Daily.  Augmentin and prednisone sent.  Follow-up next week if not improved.        I spent 40 minutes caring for Paul on this date of service. This time includes time spent by me in the following activities:preparing for the visit, reviewing tests, obtaining and/or reviewing a separately obtained history, performing a medically appropriate examination and/or evaluation , counseling and educating the patient/family/caregiver, ordering medications, tests, or procedures, documenting information in the medical record, and independently interpreting results and communicating that information with the patient/family/caregiver  Follow Up   Return in about 3 months (around 7/23/2025) for Recheck.  Patient was given instructions and counseling regarding his condition or for health maintenance advice. Please see specific information pulled into the AVS if appropriate.

## 2025-04-23 NOTE — ASSESSMENT & PLAN NOTE
COPD is worsening.  Recommend smoking cessation.    Plan:  Resume taking the following medication/s;  continue inhalers as directed.  Adding amoxicillin clavulanic acid and prednisone today for an acute exacerbation.  He has numerous exacerbations historically.  Avoiding smoking strongly recommended.  If his situation is not resolved next week he is can to follow-up..        Orders:    predniSONE (DELTASONE) 50 MG tablet; Take 1 tablet by mouth Daily.  Augmentin and prednisone sent.  Follow-up next week if not improved.

## 2025-04-23 NOTE — ASSESSMENT & PLAN NOTE
Orders:    albuterol sulfate  (90 Base) MCG/ACT inhaler; Inhale 2 puffs Every 6 (Six) Hours As Needed for Wheezing or Shortness of Air.    ipratropium-albuterol (DUO-NEB) 0.5-2.5 mg/3 ml nebulizer; Take 3 mL by neb every 30 minutes as needed for shortness of air for up to 6 doses.    fluticasone-salmeterol (ADVAIR HFA) 230-21 MCG/ACT inhaler; Inhale 2 puffs 2 (Two) Times a Day.  follow up in 1 week if not improved.

## 2025-04-23 NOTE — ASSESSMENT & PLAN NOTE
Orders:    allopurinol (ZYLOPRIM) 100 MG tablet; Take 1 tablet by mouth Daily.    celecoxib (CeleBREX) 200 MG capsule; Take 1 capsule by mouth Daily.    Uric acid; Future

## 2025-04-24 LAB
ALBUMIN SERPL-MCNC: 3.7 G/DL (ref 3.5–5.2)
ALBUMIN/GLOB SERPL: 1.3 G/DL
ALP SERPL-CCNC: 89 U/L (ref 39–117)
ALT SERPL W P-5'-P-CCNC: 21 U/L (ref 1–41)
ANION GAP SERPL CALCULATED.3IONS-SCNC: 7 MMOL/L (ref 5–15)
AST SERPL-CCNC: 25 U/L (ref 1–40)
BILIRUB SERPL-MCNC: 0.3 MG/DL (ref 0–1.2)
BUN SERPL-MCNC: 11 MG/DL (ref 8–23)
BUN/CREAT SERPL: 10.5 (ref 7–25)
CALCIUM SPEC-SCNC: 8.6 MG/DL (ref 8.6–10.5)
CHLORIDE SERPL-SCNC: 110 MMOL/L (ref 98–107)
CHOLEST SERPL-MCNC: 141 MG/DL (ref 0–200)
CO2 SERPL-SCNC: 24 MMOL/L (ref 22–29)
CREAT SERPL-MCNC: 1.05 MG/DL (ref 0.76–1.27)
DEPRECATED RDW RBC AUTO: 44.8 FL (ref 37–54)
EGFRCR SERPLBLD CKD-EPI 2021: 72.2 ML/MIN/1.73
ERYTHROCYTE [DISTWIDTH] IN BLOOD BY AUTOMATED COUNT: 12.6 % (ref 12.3–15.4)
GLOBULIN UR ELPH-MCNC: 2.8 GM/DL
GLUCOSE SERPL-MCNC: 88 MG/DL (ref 65–99)
HCT VFR BLD AUTO: 37.7 % (ref 37.5–51)
HDLC SERPL-MCNC: 50 MG/DL (ref 40–60)
HGB BLD-MCNC: 12.4 G/DL (ref 13–17.7)
LDLC SERPL CALC-MCNC: 81 MG/DL (ref 0–100)
LDLC/HDLC SERPL: 1.64 {RATIO}
MCH RBC QN AUTO: 31.9 PG (ref 26.6–33)
MCHC RBC AUTO-ENTMCNC: 32.9 G/DL (ref 31.5–35.7)
MCV RBC AUTO: 96.9 FL (ref 79–97)
PLATELET # BLD AUTO: 192 10*3/MM3 (ref 140–450)
PMV BLD AUTO: 11.8 FL (ref 6–12)
POTASSIUM SERPL-SCNC: 5.2 MMOL/L (ref 3.5–5.2)
PROT SERPL-MCNC: 6.5 G/DL (ref 6–8.5)
RBC # BLD AUTO: 3.89 10*6/MM3 (ref 4.14–5.8)
SODIUM SERPL-SCNC: 141 MMOL/L (ref 136–145)
TRIGL SERPL-MCNC: 46 MG/DL (ref 0–150)
URATE SERPL-MCNC: 6.8 MG/DL (ref 3.4–7)
VLDLC SERPL-MCNC: 10 MG/DL (ref 5–40)
WBC NRBC COR # BLD AUTO: 5.47 10*3/MM3 (ref 3.4–10.8)

## 2025-04-28 ENCOUNTER — RESULTS FOLLOW-UP (OUTPATIENT)
Dept: FAMILY MEDICINE CLINIC | Facility: CLINIC | Age: 80
End: 2025-04-28
Payer: MEDICARE

## 2025-04-28 NOTE — LETTER
Paul RAMACHANDRAN Patricia  [address] [place] [address]    April 28, 2025     Dear Mr. Gomez:    Below are the results from your recent visit: Stable labs    Resulted Orders   Comprehensive Metabolic Panel   Result Value Ref Range    Glucose 88 65 - 99 mg/dL    BUN 11 8 - 23 mg/dL    Creatinine 1.05 0.76 - 1.27 mg/dL    Sodium 141 136 - 145 mmol/L    Potassium 5.2 3.5 - 5.2 mmol/L    Chloride 110 (H) 98 - 107 mmol/L    CO2 24.0 22.0 - 29.0 mmol/L    Calcium 8.6 8.6 - 10.5 mg/dL    Total Protein 6.5 6.0 - 8.5 g/dL    Albumin 3.7 3.5 - 5.2 g/dL    ALT (SGPT) 21 1 - 41 U/L    AST (SGOT) 25 1 - 40 U/L    Alkaline Phosphatase 89 39 - 117 U/L    Total Bilirubin 0.3 0.0 - 1.2 mg/dL    Globulin 2.8 gm/dL    A/G Ratio 1.3 g/dL    BUN/Creatinine Ratio 10.5 7.0 - 25.0    Anion Gap 7.0 5.0 - 15.0 mmol/L    eGFR 72.2 >60.0 mL/min/1.73   CBC No Differential   Result Value Ref Range    WBC 5.47 3.40 - 10.80 10*3/mm3    RBC 3.89 (L) 4.14 - 5.80 10*6/mm3    Hemoglobin 12.4 (L) 13.0 - 17.7 g/dL    Hematocrit 37.7 37.5 - 51.0 %    MCV 96.9 79.0 - 97.0 fL    MCH 31.9 26.6 - 33.0 pg    MCHC 32.9 31.5 - 35.7 g/dL    RDW 12.6 12.3 - 15.4 %    RDW-SD 44.8 37.0 - 54.0 fl    MPV 11.8 6.0 - 12.0 fL    Platelets 192 140 - 450 10*3/mm3   Uric acid   Result Value Ref Range    Uric Acid 6.8 3.4 - 7.0 mg/dL   Lipid panel   Result Value Ref Range    Total Cholesterol 141 0 - 200 mg/dL    Triglycerides 46 0 - 150 mg/dL    HDL Cholesterol 50 40 - 60 mg/dL    LDL Cholesterol  81 0 - 100 mg/dL    VLDL Cholesterol 10 5 - 40 mg/dL    LDL/HDL Ratio 1.64            If you have any questions or concerns, please don't hesitate to call.         Sincerely,        Luis Dobbs, DO

## 2025-07-23 ENCOUNTER — LAB (OUTPATIENT)
Dept: FAMILY MEDICINE CLINIC | Facility: CLINIC | Age: 80
End: 2025-07-23
Payer: MEDICARE

## 2025-07-23 ENCOUNTER — OFFICE VISIT (OUTPATIENT)
Dept: FAMILY MEDICINE CLINIC | Facility: CLINIC | Age: 80
End: 2025-07-23
Payer: MEDICARE

## 2025-07-23 VITALS
HEIGHT: 68 IN | DIASTOLIC BLOOD PRESSURE: 86 MMHG | WEIGHT: 134.4 LBS | SYSTOLIC BLOOD PRESSURE: 138 MMHG | OXYGEN SATURATION: 94 % | TEMPERATURE: 97.7 F | HEART RATE: 88 BPM | BODY MASS INDEX: 20.37 KG/M2

## 2025-07-23 DIAGNOSIS — J43.2 CENTRILOBULAR EMPHYSEMA: ICD-10-CM

## 2025-07-23 DIAGNOSIS — J44.1 COPD EXACERBATION: ICD-10-CM

## 2025-07-23 DIAGNOSIS — G89.4 CHRONIC PAIN SYNDROME: ICD-10-CM

## 2025-07-23 DIAGNOSIS — L20.82 FLEXURAL ECZEMA: ICD-10-CM

## 2025-07-23 DIAGNOSIS — R63.4 WEIGHT LOSS: Primary | ICD-10-CM

## 2025-07-23 DIAGNOSIS — I25.10 CORONARY ARTERY DISEASE INVOLVING NATIVE CORONARY ARTERY OF NATIVE HEART WITHOUT ANGINA PECTORIS: ICD-10-CM

## 2025-07-23 DIAGNOSIS — M1A.0720 IDIOPATHIC CHRONIC GOUT OF LEFT FOOT WITHOUT TOPHUS: ICD-10-CM

## 2025-07-23 DIAGNOSIS — F17.200 CURRENT EVERY DAY SMOKER: ICD-10-CM

## 2025-07-23 DIAGNOSIS — L21.9 SEBORRHEIC DERMATITIS OF SCALP: ICD-10-CM

## 2025-07-23 DIAGNOSIS — I10 PRIMARY HYPERTENSION: ICD-10-CM

## 2025-07-23 RX ORDER — IPRATROPIUM BROMIDE AND ALBUTEROL SULFATE 2.5; .5 MG/3ML; MG/3ML
SOLUTION RESPIRATORY (INHALATION)
Qty: 300 ML | Refills: 0 | Status: SHIPPED | OUTPATIENT
Start: 2025-07-23

## 2025-07-23 RX ORDER — TRIAMCINOLONE ACETONIDE 1 MG/G
1 CREAM TOPICAL 2 TIMES DAILY
Qty: 80 G | Refills: 2 | Status: SHIPPED | OUTPATIENT
Start: 2025-07-23

## 2025-07-23 RX ORDER — KETOCONAZOLE 20 MG/ML
SHAMPOO, SUSPENSION TOPICAL 2 TIMES WEEKLY
Qty: 120 ML | Refills: 9 | Status: SHIPPED | OUTPATIENT
Start: 2025-07-24

## 2025-07-23 RX ORDER — FLUTICASONE PROPIONATE AND SALMETEROL XINAFOATE 230; 21 UG/1; UG/1
2 AEROSOL, METERED RESPIRATORY (INHALATION) 2 TIMES DAILY
Qty: 12 G | Refills: 11 | Status: SHIPPED | OUTPATIENT
Start: 2025-07-23

## 2025-07-23 RX ORDER — CELECOXIB 200 MG/1
200 CAPSULE ORAL DAILY
Qty: 90 CAPSULE | Refills: 1 | Status: SHIPPED | OUTPATIENT
Start: 2025-07-23

## 2025-07-23 RX ORDER — DOXYCYCLINE 100 MG/1
100 CAPSULE ORAL 2 TIMES DAILY
Qty: 20 CAPSULE | Refills: 0 | Status: SHIPPED | OUTPATIENT
Start: 2025-07-23

## 2025-07-23 RX ORDER — PREDNISONE 50 MG/1
50 TABLET ORAL DAILY
Qty: 4 TABLET | Refills: 0 | Status: SHIPPED | OUTPATIENT
Start: 2025-07-23

## 2025-07-23 RX ORDER — ALLOPURINOL 100 MG/1
100 TABLET ORAL DAILY
Qty: 90 TABLET | Refills: 1 | Status: SHIPPED | OUTPATIENT
Start: 2025-07-23

## 2025-07-23 NOTE — PROGRESS NOTES
Subjective   Paul Gomez is a 79 y.o. male.   Pt presents today with CC of Hypertension      History of Present Illness   History of Present Illness  Patient is a 79-year-old male with chronic pain.  Follows with pain management. .  He also takes gabapentin.  He is going to discuss with pain management for other concerns with his pain medication.  He denies any recent trauma and has no acute problems reported.  #2 he has been losing little weight.  He has emphysema and reports that he has been short of breath and wheezing and request antibiotic and steroid.       The following portions of the patient's history were reviewed and updated as appropriate: allergies, current medications, past family history, past medical history, past social history, past surgical history, and problem list.    Review of Systems   Constitutional:  Negative for chills, fever and unexpected weight loss.   HENT:  Negative for congestion and sore throat.    Eyes:  Negative for blurred vision and visual disturbance.   Respiratory:  Negative for cough and wheezing.    Cardiovascular:  Negative for chest pain and palpitations.   Gastrointestinal:  Negative for abdominal pain and diarrhea.   Endocrine: Negative for cold intolerance and heat intolerance.   Genitourinary:  Negative for dysuria.   Musculoskeletal:  Negative for arthralgias and neck stiffness.   Neurological:  Negative for dizziness, seizures and syncope.   Psychiatric/Behavioral:  Negative for self-injury, suicidal ideas and depressed mood.      Vitals:    07/23/25 1423   BP: 138/86   Pulse: 88   Temp: 97.7 °F (36.5 °C)   SpO2: 94%        Objective   Physical Exam  Vitals and nursing note reviewed.   Constitutional:       Appearance: He is well-developed.   HENT:      Head: Normocephalic and atraumatic.      Right Ear: External ear normal.      Left Ear: External ear normal.      Nose: Nose normal.   Eyes:      Conjunctiva/sclera: Conjunctivae normal.      Pupils: Pupils are equal,  round, and reactive to light.   Cardiovascular:      Rate and Rhythm: Normal rate and regular rhythm.      Heart sounds: Normal heart sounds.   Pulmonary:      Effort: Pulmonary effort is normal.      Breath sounds: Normal breath sounds.   Abdominal:      General: Bowel sounds are normal.      Palpations: Abdomen is soft.   Musculoskeletal:      Cervical back: Normal range of motion and neck supple.   Skin:     General: Skin is warm and dry.   Neurological:      Mental Status: He is alert and oriented to person, place, and time.   Psychiatric:         Behavior: Behavior normal.           Assessment & Plan   Diagnoses and all orders for this visit:    1. Weight loss (Primary)  Will continue to monitor.  2. COPD exacerbation  -     predniSONE (DELTASONE) 50 MG tablet; Take 1 tablet by mouth Daily.  Dispense: 4 tablet; Refill: 0  As below  3. Idiopathic chronic gout of left foot without tophus  -     Uric Acid; Future  -     celecoxib (CeleBREX) 200 MG capsule; Take 1 capsule by mouth Daily.  Dispense: 90 capsule; Refill: 1  -     allopurinol (ZYLOPRIM) 100 MG tablet; Take 1 tablet by mouth Daily.  Dispense: 90 tablet; Refill: 1    4. Coronary artery disease involving native coronary artery of native heart without angina pectoris    5. Primary hypertension  -     Comprehensive Metabolic Panel; Future  -     CBC No Differential; Future  -     Lipid Panel; Future    6. Current every day smoker    7. Chronic pain syndrome  Follows with pain management.  8. Centrilobular emphysema  -     predniSONE (DELTASONE) 50 MG tablet; Take 1 tablet by mouth Daily.  Dispense: 4 tablet; Refill: 0  -     doxycycline (MONODOX) 100 MG capsule; Take 1 capsule by mouth 2 (Two) Times a Day.  Dispense: 20 capsule; Refill: 0  -     ipratropium-albuterol (DUO-NEB) 0.5-2.5 mg/3 ml nebulizer; Take 3 mL by neb every 30 minutes as needed for shortness of air for up to 6 doses.  Dispense: 300 mL; Refill: 0  -     fluticasone-salmeterol (ADVAIR HFA)  230-21 MCG/ACT inhaler; Inhale 2 puffs 2 (Two) Times a Day.  Dispense: 12 g; Refill: 11  .  I do not suspect he has pneumonia, PE, or risk for respiratory failure.  Smoking cessation is absolutely necessary.    9. Flexural eczema  -     triamcinolone (KENALOG) 0.1 % cream; Apply 1 Application topically to the appropriate area as directed 2 (Two) Times a Day. Apply to affected area  Dispense: 80 g; Refill: 2    10. Seborrheic dermatitis of scalp  -     ketoconazole (NIZORAL) 2 % shampoo; Apply  topically to the appropriate area as directed 2 (Two) Times a Week.  Dispense: 120 mL; Refill: 9  Does well with Nizoral shampoo.  Needs refill.             Paul RAMACHANDRAN Patricia  reports that he has been smoking cigarettes. He started smoking about 60 years ago. He has a 61.4 pack-year smoking history. He has never used smokeless tobacco. I have educated him on the risk of diseases from using tobacco products such as cancer, COPD, and heart disease.     I advised him to quit and he is not willing to quit.    I spent 3  minutes counseling the patient.         BMI is within normal parameters. No other follow-up for BMI required.          This document has been electronically signed by Carrie Torres  July 23, 2025 14:25 EDT    Dictated Utilizing Dragon Dictation: Part of this note may be an electronic transcription/translation of spoken language to printed text using the Dragon Dictation System.

## (undated) DEVICE — SUT MNCRYL PLS ANTIB UD 2/0 CP1 27IN

## (undated) DEVICE — SYS CLS SKIN PREMIERPRO EXOFINFUSION 22CM

## (undated) DEVICE — DRSNG SURESITE WNDW 4X4.5

## (undated) DEVICE — BNDG ELAS CO-FLEX SLF ADHR 4IN5YD LF STRL

## (undated) DEVICE — PILLW ABD MD

## (undated) DEVICE — ASMBL SPK CONTRST CONTRL

## (undated) DEVICE — PATIENT RETURN ELECTRODE, SINGLE-USE, CONTACT QUALITY MONITORING, ADULT, WITH 9FT CORD, FOR PATIENTS WEIGING OVER 33LBS. (15KG): Brand: MEGADYNE

## (undated) DEVICE — PK PRSTH HIP 70

## (undated) DEVICE — CATH F5 INF JL 4 100CM: Brand: INFINITI

## (undated) DEVICE — APPL CHLORAPREP HI/LITE 26ML ORNG

## (undated) DEVICE — RUNWAY RADL W/TOP PAD

## (undated) DEVICE — SUT ETHIB NO 5 MB46G

## (undated) DEVICE — PASS SUT TRANSOSSEOUS

## (undated) DEVICE — LN FLTR ORL/NASL MICROSTREAM NONINTUB A/LNG

## (undated) DEVICE — ST INF PRI SMRTSTE 20DRP 2VLV 24ML 117

## (undated) DEVICE — TR BAND RADIAL ARTERY COMPRESSION DEVICE: Brand: TR BAND

## (undated) DEVICE — GLV SURG SENSICARE PI LF PF 8 GRN STRL

## (undated) DEVICE — HANDPIECE SET WITH COAXIAL HIGH FLOW TIP AND SUCTION TUBE: Brand: INTERPULSE

## (undated) DEVICE — CATH F5 INF PIG145 110CM 6SH: Brand: INFINITI

## (undated) DEVICE — HOLDER: Brand: DEROYAL

## (undated) DEVICE — GLIDESHEATH SLENDER STAINLESS STEEL KIT: Brand: GLIDESHEATH SLENDER

## (undated) DEVICE — GW INQW FIX/CORE PTFE J/3MM .035 260CM

## (undated) DEVICE — DRSNG WND STRIP OPTIFOAM AG  A/MIC LTX  3.5X10IN STRL

## (undated) DEVICE — ADULT DISPOSABLE SINGLE-PATIENT USE PULSE OXIMETER SENSOR: Brand: NONIN

## (undated) DEVICE — GLV SURG SENSICARE PI ORTHO SZ7.5 LF STRL

## (undated) DEVICE — DRAPE, RADIAL, STERILE: Brand: MEDLINE

## (undated) DEVICE — 2108 SERIES SAGITTAL BLADE (18.6 X 0.8 X 73.8MM)

## (undated) DEVICE — Device

## (undated) DEVICE — TBG PENCL TELESCP MEGADYNE SMOKE EVAC 10FT

## (undated) DEVICE — CATH F5 INF JR 5 100CM: Brand: INFINITI

## (undated) DEVICE — GOWN,NON-REINFORCED,SIRUS,SET IN SLV,XXL: Brand: MEDLINE

## (undated) DEVICE — MODEL AT P65, P/N 701554-001KIT CONTENTS: HAND CONTROLLER, 3-WAY HIGH-PRESSURE STOPCOCK WITH ROTATING END AND PREMIUM HIGH-PRESSURE TUBING: Brand: ANGIOTOUCH® KIT

## (undated) DEVICE — PAD, DEFIB, ADULT, RADIOTRANS, ZOLL: Brand: MEDLINE

## (undated) DEVICE — 4-PORT MANIFOLD: Brand: NEPTUNE 2

## (undated) DEVICE — ST EXT IV SMRTSTE 2VLV FIX M LL 6ML 41

## (undated) DEVICE — ANTIBACTERIAL UNDYED BRAIDED (POLYGLACTIN 910), SYNTHETIC ABSORBABLE SUTURE: Brand: COATED VICRYL

## (undated) DEVICE — A2000 MULTI-USE SYRINGE KIT, P/N 701277-003KIT CONTENTS: 100ML CONTRAST RESERVOIR AND TUBING WITH CONTRAST SPIKE AND CLAMP: Brand: A2000 MULTI-USE SYRINGE KIT

## (undated) DEVICE — RADIFOCUS OPTITORQUE ANGIOGRAPHIC CATHETER: Brand: OPTITORQUE

## (undated) DEVICE — DRSNG WND STRIP OPTIFOAM AG A/MIC LF 3.5X6IN STRL

## (undated) DEVICE — PK CATH CARD 70